# Patient Record
Sex: FEMALE | Race: WHITE | NOT HISPANIC OR LATINO | Employment: FULL TIME | URBAN - METROPOLITAN AREA
[De-identification: names, ages, dates, MRNs, and addresses within clinical notes are randomized per-mention and may not be internally consistent; named-entity substitution may affect disease eponyms.]

---

## 2021-01-04 ENCOUNTER — TRANSCRIBE ORDERS (OUTPATIENT)
Dept: URGENT CARE | Facility: CLINIC | Age: 41
End: 2021-01-04

## 2021-01-04 ENCOUNTER — APPOINTMENT (OUTPATIENT)
Dept: URGENT CARE | Facility: CLINIC | Age: 41
End: 2021-01-04

## 2021-01-04 DIAGNOSIS — Z02.1 PHYSICAL EXAM, PRE-EMPLOYMENT: ICD-10-CM

## 2021-01-04 DIAGNOSIS — Z02.1 PHYSICAL EXAM, PRE-EMPLOYMENT: Primary | ICD-10-CM

## 2021-01-04 PROCEDURE — 86765 RUBEOLA ANTIBODY: CPT | Performed by: NURSE PRACTITIONER

## 2021-01-04 PROCEDURE — 86480 TB TEST CELL IMMUN MEASURE: CPT | Performed by: NURSE PRACTITIONER

## 2021-01-05 LAB
GAMMA INTERFERON BACKGROUND BLD IA-ACNC: 0.03 IU/ML
M TB IFN-G BLD-IMP: NEGATIVE
M TB IFN-G CD4+ BCKGRND COR BLD-ACNC: 0 IU/ML
M TB IFN-G CD4+ BCKGRND COR BLD-ACNC: 0.01 IU/ML
MEV IGG SER QL: NORMAL
MITOGEN IGNF BCKGRD COR BLD-ACNC: >10 IU/ML

## 2021-05-06 ENCOUNTER — OFFICE VISIT (OUTPATIENT)
Dept: PODIATRY | Facility: CLINIC | Age: 41
End: 2021-05-06
Payer: COMMERCIAL

## 2021-05-06 VITALS
SYSTOLIC BLOOD PRESSURE: 103 MMHG | HEIGHT: 64 IN | HEART RATE: 73 BPM | DIASTOLIC BLOOD PRESSURE: 71 MMHG | WEIGHT: 177.2 LBS | BODY MASS INDEX: 30.25 KG/M2

## 2021-05-06 DIAGNOSIS — M79.674 PAIN IN TOE OF RIGHT FOOT: ICD-10-CM

## 2021-05-06 DIAGNOSIS — L60.0 INGROWN TOENAIL: Primary | ICD-10-CM

## 2021-05-06 DIAGNOSIS — M79.675 PAIN IN TOE OF LEFT FOOT: ICD-10-CM

## 2021-05-06 PROCEDURE — 11750 EXCISION NAIL&NAIL MATRIX: CPT | Performed by: PODIATRIST

## 2021-05-06 PROCEDURE — 99202 OFFICE O/P NEW SF 15 MIN: CPT | Performed by: PODIATRIST

## 2021-05-06 RX ORDER — LIDOCAINE HYDROCHLORIDE AND EPINEPHRINE 10; 10 MG/ML; UG/ML
1 INJECTION, SOLUTION INFILTRATION; PERINEURAL ONCE
Status: COMPLETED | OUTPATIENT
Start: 2021-05-06 | End: 2021-05-06

## 2021-05-06 RX ORDER — PROPRANOLOL HYDROCHLORIDE 40 MG/1
TABLET ORAL 2 TIMES DAILY
COMMUNITY
Start: 2021-04-28

## 2021-05-06 RX ORDER — ZOLPIDEM TARTRATE 5 MG/1
TABLET ORAL DAILY PRN
COMMUNITY
Start: 2021-04-28 | End: 2021-12-28

## 2021-05-06 RX ORDER — SUMATRIPTAN 100 MG/1
TABLET, FILM COATED ORAL DAILY PRN
COMMUNITY
Start: 2021-04-28 | End: 2022-03-28

## 2021-05-06 RX ORDER — LORATADINE 10 MG/1
10 TABLET ORAL DAILY
COMMUNITY

## 2021-05-06 RX ORDER — MELOXICAM 15 MG/1
TABLET ORAL
COMMUNITY
Start: 2021-04-05 | End: 2021-10-28 | Stop reason: CLARIF

## 2021-05-06 RX ORDER — LIDOCAINE HYDROCHLORIDE 10 MG/ML
1 INJECTION, SOLUTION EPIDURAL; INFILTRATION; INTRACAUDAL; PERINEURAL ONCE
Status: COMPLETED | OUTPATIENT
Start: 2021-05-06 | End: 2021-05-06

## 2021-05-06 RX ADMIN — LIDOCAINE HYDROCHLORIDE AND EPINEPHRINE 1 ML: 10; 10 INJECTION, SOLUTION INFILTRATION; PERINEURAL at 08:24

## 2021-05-06 RX ADMIN — LIDOCAINE HYDROCHLORIDE 1 ML: 10 INJECTION, SOLUTION EPIDURAL; INFILTRATION; INTRACAUDAL; PERINEURAL at 08:24

## 2021-05-06 NOTE — PROGRESS NOTES
Assessment/Plan:      Explained the patient that she is dealing with chronic ingrown toenails affecting the medial nail border of each great toe  Patient desires both toes addressed today  Discussed treatment options recommending partial matrixectomy  The goal of this procedure is permanent  Elimination of the offending nail border  Procedure performed as follows: Anesthesia via 6 cc of a 1:1 mixture of 1 percent xylocaine with epinephrine and 1percent xylocaine plain  Betadine prep was performed  The medial nail border of the right great toe was avulsed to the eponychium  A partial matrixectomy was performed utilizing phenol in a standard manner  A bacitracin dressing was applied  Patient is to soak in warm water twice a day tomorrow followed by a Neosporin dressing  A similar procedure was then performed along the medial nail border of the left great toe  No problem-specific Assessment & Plan notes found for this encounter  Diagnoses and all orders for this visit:    Ingrown toenail  -     lidocaine (PF) (XYLOCAINE-MPF) 1 % injection 1 mL  -     lidocaine-epinephrine (XYLOCAINE/EPINEPHRINE) 1 %-1:100,000 injection 1 mL    Pain in toe of right foot    Pain in toe of left foot    Other orders  -     loratadine (CLARITIN) 10 mg tablet; Take 10 mg by mouth  -     meloxicam (MOBIC) 15 mg tablet; TAKE 1 TABLET BY MOUTH DAILY AS NEEDED FOR JOINT OR MUSCLE PAIN  -     propranolol (INDERAL) 40 mg tablet  -     SUMAtriptan (IMITREX) 100 mg tablet  -     zolpidem (AMBIEN) 5 mg tablet          Subjective:      Patient ID: Norma Sherman is a 47 y o  female  HPI       Patient, a 81-year-old female presents with painful ingrown toenails affecting the medial nail border of each great toe  The left great toe is recently infected but the infection seems to have resolved  There is granulation tissue along the medial border however      The following portions of the patient's history were reviewed and updated as appropriate: allergies, current medications, past family history, past medical history, past social history, past surgical history and problem list     Review of Systems   Gastrointestinal: Negative  Musculoskeletal: Negative  Neurological: Negative  Objective:      /71   Pulse 73   Ht 5' 4" (1 626 m) Comment: verbal  Wt 80 4 kg (177 lb 3 2 oz)   BMI 30 42 kg/m²          Physical Exam  Constitutional:       Appearance: Normal appearance  Cardiovascular:      Pulses: Normal pulses  Musculoskeletal: Normal range of motion  Skin:     Comments: Pain with palpation medial nail border great toe bilateral   Dried granulation tissue noted along the medial nail border of the left great toe  Neurological:      General: No focal deficit present  Mental Status: She is oriented to person, place, and time  Nail removal    Date/Time: 5/6/2021 8:31 AM  Performed by: Angelic Cordova DPM  Authorized by: Angelic Cordova DPM     Patient location:  [facility] Protocol:  Consent: Verbal consent obtained  Risks and benefits: risks, benefits and alternatives were discussed  Consent given by: patient  Patient understanding: patient states understanding of the procedure being performed  Patient identity confirmed: verbally with patient      Location:     Foot:  R big toe  Pre-procedure details:     Skin preparation:  Betadine    Preparation: Patient was prepped and draped in the usual sterile fashion    Anesthesia (see MAR for exact dosages):      Anesthesia method:  Nerve block    Block needle gauge:  25 G    Block anesthetic:  Lidocaine 1% WITH epi and lidocaine 1% w/o epi    Block injection procedure:  Anatomic landmarks identified    Block outcome:  Anesthesia achieved  Nail Removal:     Nail removed:  Partial    Nail side:  Medial    Nail bed sutured: no    Ingrown nail:     Wedge excision of skin: no      Nail matrix removed or ablated:  Partial  Post-procedure details: Dressing:  4x4 sterile gauze, antibiotic ointment and gauze roll    Patient tolerance of procedure: Tolerated well, no immediate complications  Nail removal    Date/Time: 5/6/2021 8:32 AM  Performed by: Dmitri Persaud DPM  Authorized by: Dmitri Persaud DPM     Patient location:  Clinic[facility] Protocol:  Consent: Verbal consent obtained  Risks and benefits: risks, benefits and alternatives were discussed  Consent given by: patient  Patient understanding: patient states understanding of the procedure being performed  Patient identity confirmed: verbally with patient      Location:     Foot:  L big toe  Pre-procedure details:     Skin preparation:  Betadine    Preparation: Patient was prepped and draped in the usual sterile fashion    Anesthesia (see MAR for exact dosages): Anesthesia method:  Nerve block    Block needle gauge:  25 G    Block anesthetic:  Lidocaine 1% WITH epi and lidocaine 1% w/o epi    Block injection procedure:  Anatomic landmarks identified    Block outcome:  Anesthesia achieved  Nail Removal:     Nail removed:  Partial    Nail side:  Medial    Nail bed sutured: no    Ingrown nail:     Wedge excision of skin: no      Nail matrix removed or ablated:  Partial  Post-procedure details:     Dressing:  4x4 sterile gauze, antibiotic ointment and gauze roll    Patient tolerance of procedure:   Tolerated well, no immediate complications

## 2021-05-13 ENCOUNTER — OFFICE VISIT (OUTPATIENT)
Dept: PODIATRY | Facility: CLINIC | Age: 41
End: 2021-05-13

## 2021-05-13 VITALS
HEIGHT: 64 IN | WEIGHT: 177 LBS | SYSTOLIC BLOOD PRESSURE: 109 MMHG | DIASTOLIC BLOOD PRESSURE: 76 MMHG | HEART RATE: 71 BPM | BODY MASS INDEX: 30.22 KG/M2

## 2021-05-13 DIAGNOSIS — L60.0 INGROWN TOENAIL: Primary | ICD-10-CM

## 2021-05-13 PROCEDURE — 99024 POSTOP FOLLOW-UP VISIT: CPT | Performed by: PODIATRIST

## 2021-05-13 NOTE — PROGRESS NOTES
Patient presents for assessment of partial matrixectomy which was performed last week   Along the medial nail border of each great toe  Surgical sites healing uneventfully with minimal discomfort related  Drainage is present within normal limits for procedure performed  There is no evidence of infection  Patient to contact me should problems arise

## 2021-07-26 ENCOUNTER — OFFICE VISIT (OUTPATIENT)
Dept: GASTROENTEROLOGY | Facility: CLINIC | Age: 41
End: 2021-07-26
Payer: COMMERCIAL

## 2021-07-26 VITALS
TEMPERATURE: 98.9 F | SYSTOLIC BLOOD PRESSURE: 112 MMHG | WEIGHT: 172 LBS | HEIGHT: 64 IN | BODY MASS INDEX: 29.37 KG/M2 | HEART RATE: 80 BPM | DIASTOLIC BLOOD PRESSURE: 60 MMHG

## 2021-07-26 DIAGNOSIS — K59.00 CONSTIPATION, UNSPECIFIED CONSTIPATION TYPE: Primary | ICD-10-CM

## 2021-07-26 DIAGNOSIS — R10.9 ABDOMINAL PAIN, UNSPECIFIED ABDOMINAL LOCATION: ICD-10-CM

## 2021-07-26 DIAGNOSIS — R12 HEARTBURN: ICD-10-CM

## 2021-07-26 PROCEDURE — 99244 OFF/OP CNSLTJ NEW/EST MOD 40: CPT | Performed by: PHYSICIAN ASSISTANT

## 2021-07-26 RX ORDER — LINACLOTIDE 72 UG/1
1 CAPSULE, GELATIN COATED ORAL
Qty: 30 CAPSULE | Refills: 5 | Status: SHIPPED | OUTPATIENT
Start: 2021-07-26 | End: 2021-10-28 | Stop reason: CLARIF

## 2021-07-26 RX ORDER — OMEPRAZOLE 20 MG/1
20 CAPSULE, DELAYED RELEASE ORAL DAILY
Qty: 30 CAPSULE | Refills: 3 | Status: SHIPPED | OUTPATIENT
Start: 2021-07-26 | End: 2021-10-20

## 2021-07-26 RX ORDER — DICYCLOMINE HYDROCHLORIDE 10 MG/1
10 CAPSULE ORAL
Qty: 120 CAPSULE | Refills: 2 | Status: SHIPPED | OUTPATIENT
Start: 2021-07-26 | End: 2021-12-28

## 2021-07-26 NOTE — PROGRESS NOTES
[facility] - Outpatient Consultation  Kala Blackwood 47 y o  female MRN: [id number]  Encounter: [id number]          ASSESSMENT AND PLAN:      Marcin Nielson is a 48 y/o female here for consultation of alternating bowel movements  1  Change in bowel habits  2  Abdominal pain, generalized   3  Rectal Bleeding  Pt had chronic diarrhea since she was young but this changed to constipation about 1-2 years ago with loose BMs as a result and associated abdominal pain that is alleviated after moving her bowels  She has BRB [place] with wiping when straining  She has tried and failed: fiber, miralax, OTC stool softeners  Pt says her maternal grandfather and uncle have colon cancer but no immediate family  No prior colon  -etiology likely mixed with functional cause and overflow constipation   -TSH, Celiac, cmp ordered  -cbc to monitor blood count  -continue daily fiber supplement   -increase daily water to at least 64 ounces/day  -increase daily activity  -bentyl prn pain   -as she has failed miralax and stool softeners, she should start linzess 72 mcg daily but I educated that she should buy OTC mag citrate and clean herself out prior to starting it  -educated pt that loose stools for about 1 week after starting linzess is normal but call office if this continues  -call office in 2 weeks regardless to let us know outcome  -colonoscopy ordered due to change in bowel habits; instructions given; risks and benefits reviewed     4  Heartburn   Pt complains of heartburn without specific food triggers about 2-3 times/week  She is not on antireflux therapy  No prior EGD  Denies NSAID and tobacco use    -anti-gerd diet and lifestyle management explained   -egd with biopsies ordered to rule out h pylori, ulcer disease  ______________________________________________________________________    HPI:  Marcin Nielson is a 48 y/o female here for consultation of alternating bowel movements   Pt says she has had chronic diarrhea since she was young but about 1-2 years ago, this turned to constipation in that she goes about 1-3 weeks without moving her bowels  Then, she says that when she finally moves her bowels, she has loose BMs  She says that when she gets the loose stools, she gets associated abdominal pain prior and that the BMs alleviate the pain  She also says that when she attempts to move her bowels when she is constipated, she has BRB [place] with wiping about once or twice every few weeks  Patient says she has tried daily fiber supplement, miralax, stool softeners, and laxatives OTC (unknown name) that have not helped  She says that her grandfather and uncle form her mother's side have colon cancer but denies any immediate family members  She has never had prior colonoscopy and she denies: unintentional weight loss, fevers, chills, night sweats, hematochezia, melena  Pt also denies any recent change in her diet or new or OTC meds that she started prior to this change in bowel habits  She does admit to heartburn about 2-3 times a week without identifiable triggers  REVIEW OF SYSTEMS:    CONSTITUTIONAL: Denies any fever, chills, rigors, and weight loss  HEENT: No earache or tinnitus  Denies hearing loss or visual disturbances  CARDIOVASCULAR: No chest pain or palpitations  RESPIRATORY: Denies any cough, hemoptysis, shortness of breath or dyspnea on exertion  GASTROINTESTINAL: As noted in the History of Present Illness  GENITOURINARY: No problems with urination  Denies any hematuria or dysuria  NEUROLOGIC: No dizziness or vertigo, denies headaches  MUSCULOSKELETAL: Denies any muscle or joint pain  SKIN: Denies skin rashes or itching  ENDOCRINE: Denies excessive thirst  Denies intolerance to heat or cold  PSYCHOSOCIAL: Denies depression or anxiety  Denies any recent memory loss         Historical Information   Past Medical History:   Diagnosis Date    Migraines     Chronic per pt     Past Surgical History:   Procedure Laterality Date    NASAL SEPTUM SURGERY      TONSILLECTOMY       Social History   Social History     Substance and Sexual Activity   Alcohol Use None     Social History     Substance and Sexual Activity   Drug Use Not on file     Social History     Tobacco Use   Smoking Status Never Smoker   Smokeless Tobacco Never Used     No family history on file  Meds/Allergies       Current Outpatient Medications:     loratadine (CLARITIN) 10 mg tablet    meloxicam (MOBIC) 15 mg tablet    propranolol (INDERAL) 40 mg tablet    SUMAtriptan (IMITREX) 100 mg tablet    zolpidem (AMBIEN) 5 mg tablet    No Known Allergies        Objective     There were no vitals taken for this visit  There is no height or weight on file to calculate BMI  PHYSICAL EXAM:      General Appearance:   Alert, cooperative, no distress   HEENT:   Normocephalic, atraumatic, anicteric      Neck:  Supple, symmetrical, trachea midline   Lungs:   Clear to auscultation bilaterally; no rales, rhonchi or wheezing; respirations unlabored    Heart[de-identified]   Regular rate and rhythm; no murmur, rub, or gallop  Abdomen:   Soft, non-tender, non-distended; normal bowel sounds; no masses, no organomegaly    Genitalia:   Deferred    Rectal:   Deferred    Extremities:  No cyanosis, clubbing or edema    Pulses:  2+ and symmetric    Skin:  No jaundice, rashes, or lesions    Lymph nodes:  No palpable cervical lymphadenopathy        Lab Results:   No visits with results within 1 Day(s) from this visit  Latest known visit with results is:   Appointment on 01/04/2021   Component Date Value    QFT Nil 01/04/2021 0 03     QFT TB1-NIL 01/04/2021 0 01     QFT TB2-NIL 01/04/2021 0 00     QFT Mitogen-NIL 01/04/2021 >10 00     QFT Final Interpretation 01/04/2021 Negative     Rubeola IgG 01/04/2021 IMMUNE          Radiology Results:   No results found

## 2021-07-26 NOTE — PATIENT INSTRUCTIONS
1  Increase daily water to at least 64 ounces/day  2  Continue daily fiber  3  Take magnesium citrate whenever you have off this week; after you're cleared out, start linzess the next day   4   Take bentyl as needed for the pain       EGD/COLON scheduled on 9/8 at [facility] with Paolo Linda gave pt verbal instructions/paper work given  Prep-Miralax/Dulcolax

## 2021-08-19 ENCOUNTER — APPOINTMENT (OUTPATIENT)
Dept: LAB | Age: 41
End: 2021-08-19
Payer: COMMERCIAL

## 2021-08-19 DIAGNOSIS — Z00.8 HEALTH EXAMINATION IN POPULATION SURVEY: ICD-10-CM

## 2021-08-19 DIAGNOSIS — K59.00 CONSTIPATION, UNSPECIFIED CONSTIPATION TYPE: ICD-10-CM

## 2021-08-19 LAB
ALBUMIN SERPL BCP-MCNC: 3.6 G/DL (ref 3.5–5)
ALP SERPL-CCNC: 47 U/L (ref 46–116)
ALT SERPL W P-5'-P-CCNC: 29 U/L (ref 12–78)
ANION GAP SERPL CALCULATED.3IONS-SCNC: 8 MMOL/L (ref 4–13)
AST SERPL W P-5'-P-CCNC: 18 U/L (ref 5–45)
BILIRUB SERPL-MCNC: 0.31 MG/DL (ref 0.2–1)
BUN SERPL-MCNC: 13 MG/DL (ref 5–25)
CALCIUM SERPL-MCNC: 8.8 MG/DL (ref 8.3–10.1)
CHLORIDE SERPL-SCNC: 107 MMOL/L (ref 100–108)
CHOLEST SERPL-MCNC: 190 MG/DL (ref 50–200)
CO2 SERPL-SCNC: 23 MMOL/L (ref 21–32)
CREAT SERPL-MCNC: 0.67 MG/DL (ref 0.6–1.3)
ERYTHROCYTE [DISTWIDTH] IN BLOOD BY AUTOMATED COUNT: 12.2 % (ref 11.6–15.1)
EST. AVERAGE GLUCOSE BLD GHB EST-MCNC: 108 MG/DL
GFR SERPL CREATININE-BSD FRML MDRD: 112 ML/MIN/1.73SQ M
GLUCOSE P FAST SERPL-MCNC: 86 MG/DL (ref 65–99)
HBA1C MFR BLD: 5.4 %
HCT VFR BLD AUTO: 39.1 % (ref 34.8–46.1)
HDLC SERPL-MCNC: 47 MG/DL
HGB BLD-MCNC: 13 G/DL (ref 11.5–15.4)
LDLC SERPL CALC-MCNC: 129 MG/DL (ref 0–100)
MCH RBC QN AUTO: 31.9 PG (ref 26.8–34.3)
MCHC RBC AUTO-ENTMCNC: 33.2 G/DL (ref 31.4–37.4)
MCV RBC AUTO: 96 FL (ref 82–98)
NONHDLC SERPL-MCNC: 143 MG/DL
PLATELET # BLD AUTO: 306 THOUSANDS/UL (ref 149–390)
PMV BLD AUTO: 10.9 FL (ref 8.9–12.7)
POTASSIUM SERPL-SCNC: 3.8 MMOL/L (ref 3.5–5.3)
PROT SERPL-MCNC: 7 G/DL (ref 6.4–8.2)
RBC # BLD AUTO: 4.08 MILLION/UL (ref 3.81–5.12)
SODIUM SERPL-SCNC: 138 MMOL/L (ref 136–145)
T4 FREE SERPL-MCNC: 0.99 NG/DL (ref 0.76–1.46)
TRIGL SERPL-MCNC: 72 MG/DL
TSH SERPL DL<=0.05 MIU/L-ACNC: 4.09 UIU/ML (ref 0.36–3.74)
WBC # BLD AUTO: 6.81 THOUSAND/UL (ref 4.31–10.16)

## 2021-08-19 PROCEDURE — 84439 ASSAY OF FREE THYROXINE: CPT

## 2021-08-19 PROCEDURE — 36415 COLL VENOUS BLD VENIPUNCTURE: CPT

## 2021-08-19 PROCEDURE — 83516 IMMUNOASSAY NONANTIBODY: CPT

## 2021-08-19 PROCEDURE — 82784 ASSAY IGA/IGD/IGG/IGM EACH: CPT

## 2021-08-19 PROCEDURE — 80061 LIPID PANEL: CPT

## 2021-08-19 PROCEDURE — 80053 COMPREHEN METABOLIC PANEL: CPT

## 2021-08-19 PROCEDURE — 86255 FLUORESCENT ANTIBODY SCREEN: CPT

## 2021-08-19 PROCEDURE — 83036 HEMOGLOBIN GLYCOSYLATED A1C: CPT

## 2021-08-19 PROCEDURE — 84443 ASSAY THYROID STIM HORMONE: CPT

## 2021-08-19 PROCEDURE — 85027 COMPLETE CBC AUTOMATED: CPT

## 2021-08-20 LAB
ENDOMYSIUM IGA SER QL: NEGATIVE
GLIADIN PEPTIDE IGA SER-ACNC: 3 UNITS (ref 0–19)
GLIADIN PEPTIDE IGG SER-ACNC: 1 UNITS (ref 0–19)
IGA SERPL-MCNC: 108 MG/DL (ref 87–352)
TTG IGA SER-ACNC: <2 U/ML (ref 0–3)
TTG IGG SER-ACNC: <2 U/ML (ref 0–5)

## 2021-08-30 ENCOUNTER — TELEPHONE (OUTPATIENT)
Dept: GASTROENTEROLOGY | Facility: CLINIC | Age: 41
End: 2021-08-30

## 2021-09-06 ENCOUNTER — NURSE TRIAGE (OUTPATIENT)
Dept: OTHER | Facility: OTHER | Age: 41
End: 2021-09-06

## 2021-09-06 DIAGNOSIS — Z20.828 SARS-ASSOCIATED CORONAVIRUS EXPOSURE: Primary | ICD-10-CM

## 2021-09-06 NOTE — TELEPHONE ENCOUNTER
Regarding: COVID - Symptomatic - Headache / Diarrhea  ----- Message from Wannetta Jal sent at 9/6/2021  5:55 PM EDT -----  "I have a headache & diarrhea for the last two days, I was exposed to someone that tested positive about 6 days ago, I would like to be tested for COVID "

## 2021-09-06 NOTE — TELEPHONE ENCOUNTER
Reason for Disposition   [1] COVID-19 infection suspected by caller or triager AND [2] mild symptoms (cough, fever, or others) AND [2] no complications or SOB    Answer Assessment - Initial Assessment Questions  1  COVID-19 DIAGNOSIS: "Who made your Coronavirus (COVID-19) diagnosis?" "Was it confirmed by a positive lab test?" If not diagnosed by a HCP, ask "Are there lots of cases (community spread) where you live?" (See public health department website, if unsure)      Community spread  2  COVID-19 EXPOSURE: "Was there any known exposure to COVID before the symptoms began?" [facility] Definition of close contact: within 6 feet (2 meters) for a total of 15 minutes or more over a 24-hour period  Secondary contact  3  ONSET: "When did the COVID-19 symptoms start?"       2 days ago  4  WORST SYMPTOM: "What is your worst symptom?" (e g , cough, fever, shortness of breath, muscle aches)      Headache  5  COUGH: "Do you have a cough?" If Yes, ask: "How bad is the cough?"        Denies  6  FEVER: "Do you have a fever?" If Yes, ask: "What is your temperature, how was it measured, and when did it start?"      Denies  7  RESPIRATORY STATUS: "Describe your breathing?" (e g , shortness of breath, wheezing, unable to speak)       Unremarkable - able to converse with ease  8  BETTER-SAME-WORSE: "Are you getting better, staying the same or getting worse compared to yesterday?"  If getting worse, ask, "In what way?"      Worse  9  HIGH RISK DISEASE: "Do you have any chronic medical problems?" (e g , asthma, heart or lung disease, weak immune system, obesity, etc )      Denies  10  PREGNANCY: "Is there any chance you are pregnant?" "When was your last menstrual period?"        8/28/21  11   OTHER SYMPTOMS: "Do you have any other symptoms?"  (e g , chills, fatigue, headache, loss of smell or taste, muscle pain, sore throat; new loss of smell or taste especially support the diagnosis of COVID-19)        Diarrhea - currently taking prep for colonoscopy on Wed 9/8/21    Protocols used: CORONAVIRUS (COVID-19) DIAGNOSED OR SUSPECTED-ADULT-AH

## 2021-09-07 ENCOUNTER — TELEPHONE (OUTPATIENT)
Dept: GASTROENTEROLOGY | Facility: HOSPITAL | Age: 41
End: 2021-09-07

## 2021-09-07 PROCEDURE — U0005 INFEC AGEN DETEC AMPLI PROBE: HCPCS | Performed by: FAMILY MEDICINE

## 2021-09-07 PROCEDURE — U0003 INFECTIOUS AGENT DETECTION BY NUCLEIC ACID (DNA OR RNA); SEVERE ACUTE RESPIRATORY SYNDROME CORONAVIRUS 2 (SARS-COV-2) (CORONAVIRUS DISEASE [COVID-19]), AMPLIFIED PROBE TECHNIQUE, MAKING USE OF HIGH THROUGHPUT TECHNOLOGIES AS DESCRIBED BY CMS-2020-01-R: HCPCS | Performed by: FAMILY MEDICINE

## 2021-09-08 ENCOUNTER — HOSPITAL ENCOUNTER (OUTPATIENT)
Dept: GASTROENTEROLOGY | Facility: HOSPITAL | Age: 41
Setting detail: OUTPATIENT SURGERY
Discharge: HOME | End: 2021-09-08
Attending: INTERNAL MEDICINE | Admitting: INTERNAL MEDICINE
Payer: COMMERCIAL

## 2021-09-08 ENCOUNTER — ANESTHESIA EVENT (OUTPATIENT)
Dept: GASTROENTEROLOGY | Facility: HOSPITAL | Age: 41
End: 2021-09-08

## 2021-09-08 ENCOUNTER — ANESTHESIA (OUTPATIENT)
Dept: GASTROENTEROLOGY | Facility: HOSPITAL | Age: 41
End: 2021-09-08

## 2021-09-08 VITALS
DIASTOLIC BLOOD PRESSURE: 53 MMHG | SYSTOLIC BLOOD PRESSURE: 98 MMHG | OXYGEN SATURATION: 99 % | RESPIRATION RATE: 16 BRPM | HEART RATE: 79 BPM | TEMPERATURE: 96.4 F

## 2021-09-08 DIAGNOSIS — R12 HEARTBURN: ICD-10-CM

## 2021-09-08 DIAGNOSIS — K59.00 CONSTIPATION, UNSPECIFIED CONSTIPATION TYPE: ICD-10-CM

## 2021-09-08 LAB
EXT PREGNANCY TEST URINE: NEGATIVE
EXT. CONTROL: NORMAL

## 2021-09-08 PROCEDURE — 43239 EGD BIOPSY SINGLE/MULTIPLE: CPT | Performed by: INTERNAL MEDICINE

## 2021-09-08 PROCEDURE — 88305 TISSUE EXAM BY PATHOLOGIST: CPT | Performed by: PATHOLOGY

## 2021-09-08 PROCEDURE — 81025 URINE PREGNANCY TEST: CPT | Performed by: ANESTHESIOLOGY

## 2021-09-08 PROCEDURE — 45378 DIAGNOSTIC COLONOSCOPY: CPT | Performed by: INTERNAL MEDICINE

## 2021-09-08 RX ORDER — SODIUM CHLORIDE 9 MG/ML
INJECTION, SOLUTION INTRAVENOUS CONTINUOUS PRN
Status: DISCONTINUED | OUTPATIENT
Start: 2021-09-08 | End: 2021-09-08

## 2021-09-08 RX ORDER — PROPOFOL 10 MG/ML
INJECTION, EMULSION INTRAVENOUS CONTINUOUS PRN
Status: DISCONTINUED | OUTPATIENT
Start: 2021-09-08 | End: 2021-09-08

## 2021-09-08 RX ORDER — PROPOFOL 10 MG/ML
INJECTION, EMULSION INTRAVENOUS AS NEEDED
Status: DISCONTINUED | OUTPATIENT
Start: 2021-09-08 | End: 2021-09-08

## 2021-09-08 RX ORDER — ONDANSETRON 2 MG/ML
INJECTION INTRAMUSCULAR; INTRAVENOUS AS NEEDED
Status: DISCONTINUED | OUTPATIENT
Start: 2021-09-08 | End: 2021-09-08

## 2021-09-08 RX ORDER — LIDOCAINE HYDROCHLORIDE 10 MG/ML
INJECTION, SOLUTION EPIDURAL; INFILTRATION; INTRACAUDAL; PERINEURAL AS NEEDED
Status: DISCONTINUED | OUTPATIENT
Start: 2021-09-08 | End: 2021-09-08

## 2021-09-08 RX ADMIN — ONDANSETRON 4 MG: 2 INJECTION INTRAMUSCULAR; INTRAVENOUS at 10:30

## 2021-09-08 RX ADMIN — PROPOFOL 120 MG: 10 INJECTION, EMULSION INTRAVENOUS at 10:09

## 2021-09-08 RX ADMIN — SODIUM CHLORIDE: 9 INJECTION, SOLUTION INTRAVENOUS at 10:04

## 2021-09-08 RX ADMIN — LIDOCAINE HYDROCHLORIDE 50 MG: 10 INJECTION, SOLUTION EPIDURAL; INFILTRATION; INTRACAUDAL; PERINEURAL at 10:09

## 2021-09-08 RX ADMIN — PROPOFOL 150 MCG/KG/MIN: 10 INJECTION, EMULSION INTRAVENOUS at 10:09

## 2021-09-08 NOTE — ANESTHESIA PREPROCEDURE EVALUATION
Procedure:  COLONOSCOPY  EGD    Relevant Problems   ANESTHESIA   (-) History of anesthesia complications      CARDIO   (+) Migraines        Physical Exam    Airway    Mallampati score: II  TM Distance: >3 FB  Neck ROM: full     Dental   No notable dental hx     Cardiovascular      Pulmonary      Other Findings        Anesthesia Plan  ASA Score- 1     Anesthesia Type- IV sedation with anesthesia with ASA Monitors  Additional Monitors:   Airway Plan:     Comment: I discussed the risks and benefits of IV sedation anesthesia including the possibility of the need to convert to general anesthesia and the potential risk of awareness  Plan Factors-Exercise tolerance (METS): >4 METS  Exercise comment: Able to climb two flights of stairs without cardiopulmonary limitation  Chart reviewed  Existing labs reviewed  Patient summary reviewed  Patient is not a current smoker  Patient did not smoke on day of surgery  Induction- intravenous  Postoperative Plan-     Informed Consent- Anesthetic plan and risks discussed with patient

## 2021-09-08 NOTE — H&P
History and Physical -  [facility]  Dory Macias 47 y o  female MRN: [id number]                  HPI: Dory Macias is a 47y o  year old female who presents for EGD/colonoscopy for abdominal pain, diarrhea and rectal bleeding  REVIEW OF SYSTEMS: Per the HPI, and otherwise unremarkable  Historical Information   Past Medical History:   Diagnosis Date    Migraines     Chronic per pt     Past Surgical History:   Procedure Laterality Date    NASAL SEPTUM SURGERY      TONSILLECTOMY       Social History   Social History     Substance and Sexual Activity   Alcohol Use Yes    Comment: socially      Social History     Substance and Sexual Activity   Drug Use Never     Social History     Tobacco Use   Smoking Status Never Smoker   Smokeless Tobacco Never Used     Family History   Problem Relation Age of Onset    Colon cancer Maternal Grandfather     Colon cancer Cousin        Meds/Allergies       Current Outpatient Medications:     dicyclomine (BENTYL) 10 mg capsule    linaCLOtide (Linzess) 72 MCG CAPS    loratadine (CLARITIN) 10 mg tablet    meloxicam (MOBIC) 15 mg tablet    omeprazole (PriLOSEC) 20 mg delayed release capsule    propranolol (INDERAL) 40 mg tablet    SUMAtriptan (IMITREX) 100 mg tablet    zolpidem (AMBIEN) 5 mg tablet    No Known Allergies    Objective     There were no vitals taken for this visit  PHYSICAL EXAM    Gen: NAD  Head: NCAT  CV: RRR  CHEST: Clear  ABD: soft, NT/ND  EXT: no edema      ASSESSMENT/PLAN:  Dory Macias is a 47y o  year old female who presents for EGD/colonoscopy for abdominal pain, diarrhea and rectal bleeding  The patient is stable and optimized for the procedure, we reviewed risk and benefits  Risk include but not limited to infection, bleeding, perforation and missing a lesion

## 2021-10-20 DIAGNOSIS — R12 HEARTBURN: ICD-10-CM

## 2021-10-20 RX ORDER — OMEPRAZOLE 20 MG/1
CAPSULE, DELAYED RELEASE ORAL
Qty: 90 CAPSULE | Refills: 0 | Status: SHIPPED | OUTPATIENT
Start: 2021-10-20 | End: 2022-01-20

## 2021-10-28 ENCOUNTER — OFFICE VISIT (OUTPATIENT)
Dept: GASTROENTEROLOGY | Facility: CLINIC | Age: 41
End: 2021-10-28
Payer: COMMERCIAL

## 2021-10-28 VITALS
WEIGHT: 170.2 LBS | HEIGHT: 64 IN | HEART RATE: 57 BPM | DIASTOLIC BLOOD PRESSURE: 60 MMHG | BODY MASS INDEX: 29.06 KG/M2 | SYSTOLIC BLOOD PRESSURE: 100 MMHG | TEMPERATURE: 98.2 F

## 2021-10-28 DIAGNOSIS — K58.2 IRRITABLE BOWEL SYNDROME WITH BOTH CONSTIPATION AND DIARRHEA: ICD-10-CM

## 2021-10-28 DIAGNOSIS — K29.30 CHRONIC SUPERFICIAL GASTRITIS WITHOUT BLEEDING: ICD-10-CM

## 2021-10-28 DIAGNOSIS — K21.9 GASTROESOPHAGEAL REFLUX DISEASE WITHOUT ESOPHAGITIS: Primary | ICD-10-CM

## 2021-10-28 DIAGNOSIS — K44.9 HIATAL HERNIA: ICD-10-CM

## 2021-10-28 DIAGNOSIS — K59.00 CONSTIPATION, UNSPECIFIED CONSTIPATION TYPE: ICD-10-CM

## 2021-10-28 PROCEDURE — 99214 OFFICE O/P EST MOD 30 MIN: CPT | Performed by: INTERNAL MEDICINE

## 2021-10-28 RX ORDER — LINACLOTIDE 290 UG/1
290 CAPSULE, GELATIN COATED ORAL DAILY
Qty: 90 CAPSULE | Refills: 3 | Status: SHIPPED | OUTPATIENT
Start: 2021-10-28

## 2021-10-28 RX ORDER — DOCUSATE SODIUM 100 MG/1
100 CAPSULE, LIQUID FILLED ORAL 2 TIMES DAILY
Qty: 180 CAPSULE | Refills: 3 | Status: SHIPPED | OUTPATIENT
Start: 2021-10-28

## 2021-11-04 ENCOUNTER — ANNUAL EXAM (OUTPATIENT)
Dept: OBGYN CLINIC | Facility: CLINIC | Age: 41
End: 2021-11-04
Payer: COMMERCIAL

## 2021-11-04 VITALS
HEIGHT: 64 IN | SYSTOLIC BLOOD PRESSURE: 116 MMHG | DIASTOLIC BLOOD PRESSURE: 68 MMHG | WEIGHT: 166.6 LBS | BODY MASS INDEX: 28.44 KG/M2

## 2021-11-04 DIAGNOSIS — Z01.419 ENCOUNTER FOR ANNUAL ROUTINE GYNECOLOGICAL EXAMINATION: Primary | ICD-10-CM

## 2021-11-04 DIAGNOSIS — Z11.51 SCREENING FOR HUMAN PAPILLOMAVIRUS (HPV): ICD-10-CM

## 2021-11-04 DIAGNOSIS — Z12.31 ENCOUNTER FOR SCREENING MAMMOGRAM FOR MALIGNANT NEOPLASM OF BREAST: ICD-10-CM

## 2021-11-04 DIAGNOSIS — R39.89 BLADDER PAIN: ICD-10-CM

## 2021-11-04 DIAGNOSIS — G43.709 CHRONIC MIGRAINE W/O AURA W/O STATUS MIGRAINOSUS, NOT INTRACTABLE: ICD-10-CM

## 2021-11-04 PROBLEM — K58.1 IRRITABLE BOWEL SYNDROME WITH CONSTIPATION: Status: ACTIVE | Noted: 2021-11-04

## 2021-11-04 PROBLEM — N87.1 CIN II (CERVICAL INTRAEPITHELIAL NEOPLASIA II): Status: ACTIVE | Noted: 2017-10-04

## 2021-11-04 PROCEDURE — G0145 SCR C/V CYTO,THINLAYER,RESCR: HCPCS | Performed by: OBSTETRICS & GYNECOLOGY

## 2021-11-04 PROCEDURE — G0476 HPV COMBO ASSAY CA SCREEN: HCPCS | Performed by: OBSTETRICS & GYNECOLOGY

## 2021-11-04 PROCEDURE — 99395 PREV VISIT EST AGE 18-39: CPT | Performed by: OBSTETRICS & GYNECOLOGY

## 2021-11-08 LAB
HPV HR 12 DNA CVX QL NAA+PROBE: NEGATIVE
HPV16 DNA CVX QL NAA+PROBE: NEGATIVE
HPV18 DNA CVX QL NAA+PROBE: NEGATIVE

## 2021-11-10 LAB
LAB AP GYN PRIMARY INTERPRETATION: NORMAL
LAB AP LMP: NORMAL
Lab: NORMAL

## 2021-12-01 ENCOUNTER — APPOINTMENT (OUTPATIENT)
Dept: LAB | Age: 41
End: 2021-12-01
Payer: COMMERCIAL

## 2021-12-01 DIAGNOSIS — R39.89 BLADDER PAIN: ICD-10-CM

## 2021-12-01 LAB

## 2021-12-01 PROCEDURE — 87086 URINE CULTURE/COLONY COUNT: CPT

## 2021-12-01 PROCEDURE — 81001 URINALYSIS AUTO W/SCOPE: CPT

## 2021-12-03 LAB — BACTERIA UR CULT: NORMAL

## 2021-12-20 ENCOUNTER — PATIENT MESSAGE (OUTPATIENT)
Dept: OBGYN CLINIC | Facility: CLINIC | Age: 41
End: 2021-12-20

## 2021-12-28 ENCOUNTER — OFFICE VISIT (OUTPATIENT)
Dept: INTERNAL MEDICINE CLINIC | Age: 41
End: 2021-12-28
Payer: COMMERCIAL

## 2021-12-28 VITALS
DIASTOLIC BLOOD PRESSURE: 70 MMHG | TEMPERATURE: 98.2 F | BODY MASS INDEX: 28.34 KG/M2 | HEIGHT: 64 IN | WEIGHT: 166 LBS | HEART RATE: 73 BPM | SYSTOLIC BLOOD PRESSURE: 108 MMHG | OXYGEN SATURATION: 97 %

## 2021-12-28 DIAGNOSIS — K58.1 IRRITABLE BOWEL SYNDROME WITH CONSTIPATION: ICD-10-CM

## 2021-12-28 DIAGNOSIS — Z76.89 ENCOUNTER TO ESTABLISH CARE: ICD-10-CM

## 2021-12-28 DIAGNOSIS — G43.709 CHRONIC MIGRAINE W/O AURA W/O STATUS MIGRAINOSUS, NOT INTRACTABLE: ICD-10-CM

## 2021-12-28 DIAGNOSIS — R79.89 ABNORMAL TSH: ICD-10-CM

## 2021-12-28 DIAGNOSIS — J30.1 ALLERGIC RHINITIS DUE TO POLLEN, UNSPECIFIED SEASONALITY: ICD-10-CM

## 2021-12-28 DIAGNOSIS — E04.1 THYROID NODULE: ICD-10-CM

## 2021-12-28 DIAGNOSIS — F51.01 PRIMARY INSOMNIA: Primary | ICD-10-CM

## 2021-12-28 PROBLEM — IMO0002 CHRONIC MIGRAINE: Status: ACTIVE | Noted: 2021-12-28

## 2021-12-28 PROBLEM — IMO0002 CHRONIC MIGRAINE: Status: RESOLVED | Noted: 2021-12-28 | Resolved: 2021-12-28

## 2021-12-28 PROCEDURE — 99204 OFFICE O/P NEW MOD 45 MIN: CPT | Performed by: NURSE PRACTITIONER

## 2021-12-28 RX ORDER — FLUTICASONE PROPIONATE 50 MCG
2 SPRAY, SUSPENSION (ML) NASAL DAILY
Qty: 16 G | Refills: 2 | Status: SHIPPED | OUTPATIENT
Start: 2021-12-28

## 2021-12-28 RX ORDER — MELOXICAM 15 MG/1
15 TABLET ORAL DAILY PRN
COMMUNITY
Start: 2021-12-21

## 2021-12-28 RX ORDER — ZOLPIDEM TARTRATE 6.25 MG/1
6.25 TABLET, FILM COATED, EXTENDED RELEASE ORAL
Qty: 30 TABLET | Refills: 0 | Status: SHIPPED | OUTPATIENT
Start: 2021-12-28

## 2021-12-30 ENCOUNTER — HOSPITAL ENCOUNTER (OUTPATIENT)
Dept: RADIOLOGY | Age: 41
Discharge: HOME | End: 2021-12-30
Payer: COMMERCIAL

## 2021-12-30 DIAGNOSIS — E04.1 THYROID NODULE: ICD-10-CM

## 2021-12-30 DIAGNOSIS — R79.89 ABNORMAL TSH: ICD-10-CM

## 2021-12-30 PROCEDURE — 76536 US EXAM OF HEAD AND NECK: CPT

## 2022-01-14 ENCOUNTER — TELEMEDICINE (OUTPATIENT)
Dept: INTERNAL MEDICINE CLINIC | Facility: OTHER | Age: 42
End: 2022-01-14
Payer: COMMERCIAL

## 2022-01-14 VITALS — TEMPERATURE: 99.3 F

## 2022-01-14 DIAGNOSIS — B34.9 VIRAL INFECTION, UNSPECIFIED: Primary | ICD-10-CM

## 2022-01-14 LAB
FLUAV RNA RESP QL NAA+PROBE: NEGATIVE
FLUBV RNA RESP QL NAA+PROBE: NEGATIVE
SARS-COV-2 RNA RESP QL NAA+PROBE: POSITIVE

## 2022-01-14 PROCEDURE — 99213 OFFICE O/P EST LOW 20 MIN: CPT | Performed by: NURSE PRACTITIONER

## 2022-01-14 PROCEDURE — 87636 SARSCOV2 & INF A&B AMP PRB: CPT | Performed by: NURSE PRACTITIONER

## 2022-01-14 NOTE — PROGRESS NOTES
COVID-19 Outpatient Progress Note    Assessment/Plan:    Problem List Items Addressed This Visit     None      Visit Diagnoses     Viral infection, unspecified    -  Primary    Relevant Orders    Covid/Flu- Mobile Van or [facility] Collect         Disposition:     Referred patient to centralized site to test for COVID-19/Influenza  Advised to use ibuprofen 400mg and tylenol 650mg and alternate every 3 hours  Mucinex D and flonase  Rest and hydration  Vitamin D3 2000 IU daily    Advised to start isolating and if positive follow CDC guidelines which is to isolate for a full 5 days from symptom onset (first day is day 0)  After day 5 if symptoms are improved and you are afebrile for 24 hours without the use of fever reducing medications, you may discontinue isolation and continue to mask around others for an additional 5 days  If your symptoms persist or worsen, please call our office to schedule a follow up  Reviewed red flag symptoms which would require ED visit         I have spent 10 minutes directly with the patient  Encounter provider HENRY Isbell    Provider located at [address] [place] [address]    Recent Visits  No visits were found meeting these conditions  Showing recent visits within past 7 days and meeting all other requirements  Today's Visits  Date Type Provider Dept   01/14/22 HENRY [facility]   Showing today's visits and meeting all other requirements  Future Appointments  No visits were found meeting these conditions  Showing future appointments within next 150 days and meeting all other requirements       Patient agrees to participate in a virtual check in via telephone or video visit instead of presenting to the office to address urgent/immediate medical needs   Patient is aware this is a billable service  After connecting through [facility], the patient was identified by name and date of birth  Brown Merlin was informed that this was a telemedicine visit and that the exam was being conducted confidentially over secure lines  My office door was closed  No one else was in the room  Brown Merlin acknowledged consent and understanding of privacy and security of the telemedicine visit  I informed the patient that I have reviewed her record in Epic and presented the opportunity for her to ask any questions regarding the visit today  The patient agreed to participate  Verification of patient location:  Patient is located in the following state in which I hold an active license: [place]    Subjective:   Brown Merlin is a 46 y o  female who is concerned about COVID-19  Patient's symptoms include fever (102 4 t max), chills, fatigue, nasal congestion, myalgias and headache  Patient denies rhinorrhea, sore throat (scratchy), cough, shortness of breath, chest tightness, nausea, vomiting and diarrhea  - Date of symptom onset: 1/12/2022      COVID-19 vaccination status: Not vaccinated    Exposure:   Contact with a person who is under investigation (PUI) for or who is positive for COVID-19 within the last 14 days?: No    Started with sinus congestion over a week ago which she does report is common for her with change in weather after the snow storm  Wednesday 1/12/2022 is when she started with body aches and fever   She has been using claritin D last week  She is not vaccinated and does get tested twice weekly for work   She tested negative for covid on Monday     Lab Results   Component Value Date    SARSCOV2 Negative 01/10/2022     Past Medical History:   Diagnosis Date    Migraines     Chronic per pt     Past Surgical History:   Procedure Laterality Date    CERVICAL BIOPSY  W/ LOOP ELECTRODE EXCISION  2017    COLONOSCOPY      NASAL SEPTUM SURGERY      TONSILLECTOMY      UPPER GASTROINTESTINAL ENDOSCOPY  WISDOM TOOTH EXTRACTION       Current Outpatient Medications   Medication Sig Dispense Refill    docusate sodium (COLACE) 100 mg capsule Take 1 capsule (100 mg total) by mouth 2 (two) times a day 180 capsule 3    fluticasone (FLONASE) 50 mcg/act nasal spray 2 sprays into each nostril daily 16 g 2    linaCLOtide (Linzess) 290 MCG CAPS Take 1 capsule by mouth daily 90 capsule 3    loratadine (CLARITIN) 10 mg tablet Take 10 mg by mouth      meloxicam (MOBIC) 15 mg tablet Take 15 mg by mouth daily as needed      omeprazole (PriLOSEC) 20 mg delayed release capsule TAKE ONE CAPSULE BY MOUTH EVERY DAY 90 capsule 0    propranolol (INDERAL) 40 mg tablet 2 (two) times a day        SUMAtriptan (IMITREX) 100 mg tablet daily as needed        zolpidem (AMBIEN CR) 6 25 MG CR tablet Take 1 tablet (6 25 mg total) by mouth daily at bedtime as needed for sleep 30 tablet 0     No current facility-administered medications for this visit  No Known Allergies    Review of Systems   Constitutional: Positive for chills, fatigue and fever (102 4 t max)  HENT: Positive for congestion  Negative for rhinorrhea and sore throat (scratchy)  Respiratory: Negative for cough, chest tightness and shortness of breath  Gastrointestinal: Negative for diarrhea, nausea and vomiting  Musculoskeletal: Positive for myalgias  Neurological: Positive for headaches  Objective:    Vitals:    01/14/22 1018   Temp: 99 3 °F (37 4 °C)   TempSrc: Temporal       Physical Exam  Constitutional:       Appearance: She is ill-appearing  She is not toxic-appearing  HENT:      Head: Normocephalic and atraumatic  Nose: Congestion present  Pulmonary:      Effort: Pulmonary effort is normal  No respiratory distress  Comments: No cough  No conversational dyspnea  Neurological:      Mental Status: She is alert  Mental status is at baseline     Psychiatric:         Mood and Affect: Mood normal          Behavior: Behavior normal  VIRTUAL VISIT DISCLAIMER    Merry White Bem verbally agrees to participate in [employer]  Pt is aware that [employer] could be limited without vital signs or the ability to perform a full hands-on physical Rachael Duong understands she or the provider may request at any time to terminate the video visit and request the patient to seek care or treatment in person

## 2022-01-17 ENCOUNTER — TELEMEDICINE (OUTPATIENT)
Dept: INTERNAL MEDICINE CLINIC | Facility: OTHER | Age: 42
End: 2022-01-17
Payer: COMMERCIAL

## 2022-01-17 ENCOUNTER — TELEPHONE (OUTPATIENT)
Dept: INTERNAL MEDICINE CLINIC | Age: 42
End: 2022-01-17

## 2022-01-17 VITALS — TEMPERATURE: 98.5 F

## 2022-01-17 DIAGNOSIS — J01.10 ACUTE NON-RECURRENT FRONTAL SINUSITIS: Primary | ICD-10-CM

## 2022-01-17 PROCEDURE — 99213 OFFICE O/P EST LOW 20 MIN: CPT | Performed by: NURSE PRACTITIONER

## 2022-01-17 RX ORDER — AZITHROMYCIN 250 MG/1
TABLET, FILM COATED ORAL
Qty: 6 TABLET | Refills: 0 | Status: SHIPPED | OUTPATIENT
Start: 2022-01-17 | End: 2022-01-22

## 2022-01-17 NOTE — TELEPHONE ENCOUNTER
Pt is covid positive, had a virtual visit with Ceci Stock last week  Still having a lot of sinus congestion, asking if there is something you can prescribe or she can take  She said it wont drain no matter what she does

## 2022-01-17 NOTE — TELEPHONE ENCOUNTER
She may try over the counter Mucinex, and use flonase nasal spray  She may take warm steamy showers to help with congestion, and humidification at night  If no relief of sympotms recommend virtual follow up

## 2022-01-17 NOTE — PROGRESS NOTES
Assessment/Plan:    Acute non-recurrent frontal sinusitis  Afrin 2 sprays bilateral nares bid x 3 days  Encourage hydration  Zpack take as directed         Problem List Items Addressed This Visit        Respiratory    Acute non-recurrent frontal sinusitis - Primary     Afrin 2 sprays bilateral nares bid x 3 days  Encourage hydration  Zpack take as directed         Relevant Medications    azithromycin (Zithromax) 250 mg tablet            Subjective:      Patient ID: Annette Chow is a 46 y o  female  Joe More is calling today for persistent nasal congestion  She states that the nasal congestion initially started back on January 7th, she did develop symptoms of headache, fever, cough and fatigue and tested positive on January 14th for COVID  Those symptoms have since resolved however her nasal congestion remains constant, she is now having discoloration in her secretions as well  I am recommending treating her with an antibiotic for sinusitis, as well as I did recommend Afrin 2 sprays to each tarry 2 times daily for 3 days  I also recommended plenty of hydration  She is finishing up her quarantine and knows to wear a mask  She does work from home  The following portions of the patient's history were reviewed and updated as appropriate: allergies, current medications, past family history, past medical history, past social history, past surgical history and problem list     Review of Systems   Constitutional: Negative for chills, fever and unexpected weight change  HENT: Positive for congestion, sinus pressure and sinus pain  Negative for ear pain and sore throat  Eyes: Negative for visual disturbance  Respiratory: Negative for cough, shortness of breath and wheezing  Cardiovascular: Negative for chest pain, palpitations and leg swelling  Gastrointestinal: Negative for abdominal pain, constipation, diarrhea, nausea and vomiting     Genitourinary: Negative for difficulty urinating, dysuria, frequency and hematuria  Musculoskeletal: Negative for myalgias  Skin: Negative for rash  Neurological: Negative for dizziness, numbness and headaches  Psychiatric/Behavioral: Negative for confusion  The patient is not nervous/anxious  Objective:      Temp 98 5 °F (36 9 °C) (Tympanic)          Physical Exam  Vitals and nursing note reviewed

## 2022-01-20 ENCOUNTER — TELEMEDICINE (OUTPATIENT)
Dept: INTERNAL MEDICINE CLINIC | Age: 42
End: 2022-01-20
Payer: COMMERCIAL

## 2022-01-20 VITALS — TEMPERATURE: 97.4 F

## 2022-01-20 DIAGNOSIS — S16.1XXA ACUTE STRAIN OF NECK MUSCLE, INITIAL ENCOUNTER: Primary | ICD-10-CM

## 2022-01-20 DIAGNOSIS — G43.809 OTHER MIGRAINE WITHOUT STATUS MIGRAINOSUS, NOT INTRACTABLE: ICD-10-CM

## 2022-01-20 DIAGNOSIS — R12 HEARTBURN: ICD-10-CM

## 2022-01-20 DIAGNOSIS — R09.81 NASAL CONGESTION: ICD-10-CM

## 2022-01-20 DIAGNOSIS — U07.1 COVID-19: ICD-10-CM

## 2022-01-20 PROCEDURE — 99213 OFFICE O/P EST LOW 20 MIN: CPT | Performed by: PHYSICIAN ASSISTANT

## 2022-01-20 RX ORDER — PREDNISONE 20 MG/1
40 TABLET ORAL DAILY
Qty: 10 TABLET | Refills: 0 | Status: SHIPPED | OUTPATIENT
Start: 2022-01-20 | End: 2022-02-10 | Stop reason: ALTCHOICE

## 2022-01-20 RX ORDER — METHOCARBAMOL 500 MG/1
500 TABLET, FILM COATED ORAL 4 TIMES DAILY
Qty: 30 TABLET | Refills: 0 | Status: SHIPPED | OUTPATIENT
Start: 2022-01-20 | End: 2022-02-10

## 2022-01-20 RX ORDER — OMEPRAZOLE 20 MG/1
CAPSULE, DELAYED RELEASE ORAL
Qty: 90 CAPSULE | Refills: 0 | Status: SHIPPED | OUTPATIENT
Start: 2022-01-20 | End: 2022-04-20

## 2022-01-20 NOTE — LETTER
January 20, 2022     Patient: Nannette Lewis   YOB: 1980   Date of Visit: 1/20/2022       To Whom it May Concern:    Dari Graves is under my professional care  She was seen in my office on 1/20/2022  She may return to work on 1/21/22  If you have any questions or concerns, please don't hesitate to call           Sincerely,          Sylvia Nicholson PA-C        CC: No Recipients

## 2022-01-20 NOTE — PROGRESS NOTES
COVID-19 Outpatient Progress Note    Assessment/Plan:    Problem List Items Addressed This Visit        Cardiovascular and Mediastinum    Migraines    Relevant Medications    predniSONE 20 mg tablet    methocarbamol (ROBAXIN) 500 mg tablet      Other Visit Diagnoses     Acute strain of neck muscle, initial encounter    -  Primary    Relevant Medications    methocarbamol (ROBAXIN) 500 mg tablet    COVID-19        Relevant Medications    predniSONE 20 mg tablet    Nasal congestion        Relevant Medications    predniSONE 20 mg tablet       persistent migraine, pred 40mg qd x 5 days, take in am with food   may use methocarbamol prn for neck strain, warned may make drowsy   may continue to work from home as tolerated    Disposition:     Patient has COVID-19 infection  Based off CDC guidelines, they were recommended to isolate for 5 days from the date of the positive test  If they remain asymptomatic, isolation may be ended followed by 5 days of wearing a mask when around othes to minimize risk of infecting others  If they have a fever, continue to stay home until fever resolves for at least 24 hours  I recommended continued isolation until at least 24 hours have passed since recovery defined as resolution of fever without the use of fever-reducing medications AND improvement in COVID symptoms AND 10 days have passed since onset of symptoms (or 10 days have passed since date of first positive viral diagnostic test for asymptomatic patients)  I have spent 12 minutes directly with the patient  Greater than 50% of this time was spent in counseling/coordination of care regarding: instructions for management and patient and family education   pred 40mg daily x 5 days   - take in am with food  Methocarbamol for cervical strain prn      Encounter provider Sonia Lutz PA-C    Provider located at [place] [address] [place] [id number]    Recent Visits  Date Type Provider Dept   01/17/22 Telephone HENRY Tate [facility]   01/14/22 Yonnyclaudia [address]te [address], MALIKNP [facility]   Showing recent visits within past 7 days and meeting all other requirements  Today's Visits  Date Type Provider Dept   01/20/22 Telemedicine Gorge Jimenez PA-C [facility]   Showing today's visits and meeting all other requirements  Future Appointments  No visits were found meeting these conditions  Showing future appointments within next 150 days and meeting all other requirements     This virtual check-in was done via [facility] and patient was informed that this is a secure, HIPAA-compliant platform  She agrees to proceed  Patient agrees to participate in a virtual check in via telephone or video visit instead of presenting to the office to address urgent/immediate medical needs  Patient is aware this is a billable service  After connecting through [facility], the patient was identified by name and date of birth  Shawn Polanco was informed that this was a telemedicine visit and that the exam was being conducted confidentially over secure lines  My office door was closed  No one else was in the room  Shawn Polanco acknowledged consent and understanding of privacy and security of the telemedicine visit  I informed the patient that I have reviewed her record in Epic and presented the opportunity for her to ask any questions regarding the visit today  The patient agreed to participate  Verification of patient location:  Patient is located in the following state in which I hold an active license: [place]    Subjective:   Shawn Polanco is a 46 y o  female who has been screened for COVID-19  Symptom change since last report: worsening  Patient's symptoms include nasal congestion, myalgias and headache   Patient denies fever, chills, fatigue, malaise, sore throat, anosmia, loss of taste, cough, shortness of breath, chest tightness, abdominal pain, nausea, vomiting and diarrhea  - Date of symptom onset: 1/12/2022  - Date of positive COVID-19 test: 1/14/2022  Type of test: PCR  COVID-19 vaccination status: Fully vaccinated with booster    Katia Mcgrath has been staying home and has isolated themselves in her home  She is taking care to not share personal items and is cleaning all surfaces that are touched often, like counters, tabletops, and doorknobs using household cleaning sprays or wipes  She is wearing a mask when she leaves her room  Pt reports worsening migraines, frontal sinus pressure and severe nasal congestion     Lab Results   Component Value Date    SARSCOV2 Positive (A) 01/14/2022     Past Medical History:   Diagnosis Date    Migraines     Chronic per pt    Venereal wart 8/7/2014     Past Surgical History:   Procedure Laterality Date    CERVICAL BIOPSY  W/ LOOP ELECTRODE EXCISION  2017    COLONOSCOPY      NASAL SEPTUM SURGERY      TONSILLECTOMY      UPPER GASTROINTESTINAL ENDOSCOPY      WISDOM TOOTH EXTRACTION       Current Outpatient Medications   Medication Sig Dispense Refill    azithromycin (Zithromax) 250 mg tablet Take 2 tablets (500 mg total) by mouth daily for 1 day, THEN 1 tablet (250 mg total) daily for 4 days   6 tablet 0    docusate sodium (COLACE) 100 mg capsule Take 1 capsule (100 mg total) by mouth 2 (two) times a day 180 capsule 3    fluticasone (FLONASE) 50 mcg/act nasal spray 2 sprays into each nostril daily 16 g 2    linaCLOtide (Linzess) 290 MCG CAPS Take 1 capsule by mouth daily 90 capsule 3    loratadine (CLARITIN) 10 mg tablet Take 10 mg by mouth daily        meloxicam (MOBIC) 15 mg tablet Take 15 mg by mouth daily as needed      omeprazole (PriLOSEC) 20 mg delayed release capsule TAKE ONE CAPSULE BY MOUTH EVERY DAY 90 capsule 0    propranolol (INDERAL) 40 mg tablet 2 (two) times a day        SUMAtriptan (IMITREX) 100 mg tablet daily as needed        zolpidem (AMBIEN CR) 6 25 MG CR tablet Take 1 tablet (6 25 mg total) by mouth daily at bedtime as needed for sleep 30 tablet 0    methocarbamol (ROBAXIN) 500 mg tablet Take 1 tablet (500 mg total) by mouth 4 (four) times a day 30 tablet 0    predniSONE 20 mg tablet Take 2 tablets (40 mg total) by mouth daily In am with food 10 tablet 0     No current facility-administered medications for this visit  No Known Allergies    Review of Systems   Constitutional: Negative for chills, fatigue and fever  HENT: Positive for congestion  Negative for sore throat  Respiratory: Negative for cough, chest tightness and shortness of breath  Gastrointestinal: Negative for abdominal pain, diarrhea, nausea and vomiting  Musculoskeletal: Positive for myalgias  Neurological: Positive for headaches  Objective:    Vitals:    01/20/22 0945   Temp: (!) 97 4 °F (36 3 °C)   TempSrc: Tympanic       Physical Exam  Vitals reviewed  Constitutional:       General: She is not in acute distress  Pulmonary:      Effort: Pulmonary effort is normal  No respiratory distress  Neurological:      Mental Status: She is alert  Psychiatric:         Mood and Affect: Mood normal          VIRTUAL VISIT DISCLAIMER    Vanda Lu Bem verbally agrees to participate in [employer]  Pt is aware that [employer] could be limited without vital signs or the ability to perform a full hands-on physical Yoonsumit Lewis understands she or the provider may request at any time to terminate the video visit and request the patient to seek care or treatment in person

## 2022-02-07 ENCOUNTER — APPOINTMENT (OUTPATIENT)
Dept: LAB | Age: 42
End: 2022-02-07
Payer: COMMERCIAL

## 2022-02-07 DIAGNOSIS — R79.89 ABNORMAL TSH: ICD-10-CM

## 2022-02-07 DIAGNOSIS — E04.1 THYROID NODULE: ICD-10-CM

## 2022-02-07 LAB
T4 FREE SERPL-MCNC: 0.95 NG/DL (ref 0.76–1.46)
TSH SERPL DL<=0.05 MIU/L-ACNC: 6.55 UIU/ML (ref 0.36–3.74)

## 2022-02-07 PROCEDURE — 36415 COLL VENOUS BLD VENIPUNCTURE: CPT

## 2022-02-07 PROCEDURE — 84439 ASSAY OF FREE THYROXINE: CPT

## 2022-02-07 PROCEDURE — 84443 ASSAY THYROID STIM HORMONE: CPT

## 2022-02-10 ENCOUNTER — OFFICE VISIT (OUTPATIENT)
Dept: INTERNAL MEDICINE CLINIC | Facility: CLINIC | Age: 42
End: 2022-02-10
Payer: COMMERCIAL

## 2022-02-10 ENCOUNTER — APPOINTMENT (OUTPATIENT)
Dept: RADIOLOGY | Age: 42
End: 2022-02-10
Payer: COMMERCIAL

## 2022-02-10 VITALS
SYSTOLIC BLOOD PRESSURE: 110 MMHG | OXYGEN SATURATION: 96 % | WEIGHT: 162.4 LBS | HEART RATE: 77 BPM | BODY MASS INDEX: 26.1 KG/M2 | DIASTOLIC BLOOD PRESSURE: 78 MMHG | HEIGHT: 66 IN | TEMPERATURE: 97.4 F

## 2022-02-10 DIAGNOSIS — H93.8X3 SENSATION OF FULLNESS IN BOTH EARS: ICD-10-CM

## 2022-02-10 DIAGNOSIS — E04.1 THYROID NODULE: Primary | ICD-10-CM

## 2022-02-10 DIAGNOSIS — R79.89 ABNORMAL TSH: ICD-10-CM

## 2022-02-10 DIAGNOSIS — M54.2 NECK PAIN: ICD-10-CM

## 2022-02-10 PROCEDURE — 99214 OFFICE O/P EST MOD 30 MIN: CPT | Performed by: NURSE PRACTITIONER

## 2022-02-10 PROCEDURE — 72040 X-RAY EXAM NECK SPINE 2-3 VW: CPT

## 2022-02-10 RX ORDER — METHYLPREDNISOLONE 4 MG/1
TABLET ORAL
Qty: 21 EACH | Refills: 0 | Status: SHIPPED | OUTPATIENT
Start: 2022-02-10 | End: 2022-03-28

## 2022-02-10 NOTE — PROGRESS NOTES
Assessment/Plan:    Thyroid nodule  -Reviewed results  -Repeat US in one year    Abnormal TSH  TSH elevated, T4 within normal range, will defer medication at this time as patient is asymptomatic  Repeat TSH in 6 weeks  Neck pain  Referral placed to physical therapy, will get x-ray of cervical spine  Will do Medrol Dosepak  Sensation of fullness in both ears  Continue with Flonase as well as antihistamine  Patient will be on Medrol Dosepak for neck pain  If no relief of symptoms follow-up with ENT  Diagnoses and all orders for this visit:    Thyroid nodule  -     US thyroid; Future  -     TSH, 3rd generation with Free T4 reflex    Neck pain  -     XR spine cervical 2 or 3 vw injury; Future  -     methylPREDNISolone 4 MG tablet therapy pack; Use as directed on package  -     Ambulatory Referral to Physical Therapy; Future    Sensation of fullness in both ears  -     Ambulatory Referral to Otolaryngology; Future    Abnormal TSH          Subjective:      Patient ID: Mala Ryan is a 46 y o  female  Patient presents today for follow up on thyroid US and blood work results  She is a [employer] Employee and works as an        Medical conditions include:     IBS-constipation - currently followed by GI  Managed on linzess 290mg daily and colace 100mg twice daily  She states she does still suffer from pretty significant constipation  She has a bowel movement every 5 days  She is s/p colonoscopy and EGD, advised routine follow up at age 41     Small hiatal hernia with reflux - EGD 7/26/2021  Controlled on omeprazole 20mg daily      Migraines - she is currently on propranolol 40mg twice daily  Imitrex 100mg prn  She ranges from migraines 1-2 times a week  She is scheduled to see a neurologist in March 2022   She has seen neurology in the past - she has nortriptyline and tropane in the past    She feels the propranolol has been working the best    She reports getting sinus headaches and menstrual headaches      Insomnia  - she is currently on Ambien 5mg prn  She feels her sleep has been very poor lately over the last few months  She has been having trouble falling asleep and staying asleep  She also adds melatonin 3mg which helps her stay asleep  She tries to cut caffeine out early in the day  She has tried Trudee Bologna but this makes her groggy in the morning     Thyroid 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 times in the past and states that she know she has a thyroid nodule which seems to be increasing in size and now is about 1 cm  She has never seen medical provider for this  Her most recent TSH was slightly abnormal with a normal free T4  Patient reports that she is asymptomatic and would like to hold off on medication at this time       US thyroid results  Multinodular thyroid gland  No nodule meeting current ACR criteria for biopsy or follow-up, however, given the appearance of the left lower lobe 0 9 cm nodule, recommend 12 month follow-up ultrasound  Reports that she had COVID in January of this year at which point she had pretty significant congestion, she has been started on a Z-Coy and prednisone and originally her symptoms have improved  She reports however since that time she has been having congestion which seems to be worse at night and on and off ear pressure to her right ear  She reports some intermittent loss of hearing to right ear  She is currently taking antihistamine and using Flonase  She also reports since COVID she has been having muscle aches and neck pain to her neck  She denies any previous significant injury to her neck however she does report that 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 and looking forward  She had been previously treated with prednisone as well as muscle relaxer with no relief of discomfort    Patient does reports that she gets some massages for her neck pain which temporarily relieves the discomfort but comes back shortly after  The following portions of the patient's history were reviewed and updated as appropriate: allergies, current medications, past family history, past medical history, past social history, past surgical history and problem list     Review of Systems   Constitutional: Negative for activity change, appetite change, chills, diaphoresis and fever  HENT: Positive for congestion  Negative for ear discharge, ear pain, postnasal drip, rhinorrhea, sinus pressure, sinus pain and sore throat  Eyes: Negative for pain, discharge, itching and visual disturbance  Respiratory: Negative for cough, chest tightness, shortness of breath and wheezing  Cardiovascular: Negative for chest pain, palpitations and leg swelling  Gastrointestinal: Negative for abdominal pain, constipation, diarrhea, nausea and vomiting  Endocrine: Negative for polydipsia, polyphagia and polyuria  Genitourinary: Negative for difficulty urinating, dysuria and urgency  Musculoskeletal: Positive for neck pain  Negative for arthralgias and back pain  Skin: Negative for rash and wound  Neurological: Negative for dizziness, weakness, numbness and headaches           Past Medical History:   Diagnosis Date    Migraines     Chronic per pt    Venereal wart 8/7/2014         Current Outpatient Medications:     docusate sodium (COLACE) 100 mg capsule, Take 1 capsule (100 mg total) by mouth 2 (two) times a day, Disp: 180 capsule, Rfl: 3    fluticasone (FLONASE) 50 mcg/act nasal spray, 2 sprays into each nostril daily, Disp: 16 g, Rfl: 2    linaCLOtide (Linzess) 290 MCG CAPS, Take 1 capsule by mouth daily, Disp: 90 capsule, Rfl: 3    loratadine (CLARITIN) 10 mg tablet, Take 10 mg by mouth daily  , Disp: , Rfl:     meloxicam (MOBIC) 15 mg tablet, Take 15 mg by mouth daily as needed, Disp: , Rfl:     omeprazole (PriLOSEC) 20 mg delayed release capsule, TAKE ONE CAPSULE BY MOUTH EVERY DAY, Disp: 90 capsule, Rfl: 0    propranolol (INDERAL) 40 mg tablet, 2 (two) times a day  , Disp: , Rfl:     SUMAtriptan (IMITREX) 100 mg tablet, daily as needed  , Disp: , Rfl:     zolpidem (AMBIEN CR) 6 25 MG CR tablet, Take 1 tablet (6 25 mg total) by mouth daily at bedtime as needed for sleep, Disp: 30 tablet, Rfl: 0    methylPREDNISolone 4 MG tablet therapy pack, Use as directed on package, Disp: 21 each, Rfl: 0    No Known Allergies    Social History   Past Surgical History:   Procedure Laterality Date    CERVICAL BIOPSY  W/ LOOP ELECTRODE EXCISION  2017    COLONOSCOPY      NASAL SEPTUM SURGERY      TONSILLECTOMY      UPPER GASTROINTESTINAL ENDOSCOPY      WISDOM TOOTH EXTRACTION       Family History   Problem Relation Age of Onset    Colon cancer Maternal Grandfather     Colon cancer Cousin     Hypertension Mother     Transient ischemic attack Father     Hypertension Father     Uterine cancer Maternal Grandmother     Lung cancer Paternal Grandmother        Objective:  /78 (BP Location: Left arm, Patient Position: Sitting, Cuff Size: Standard)   Pulse 77   Temp (!) 97 4 °F (36 3 °C) (Tympanic)   Ht 5' 5 63" (1 667 m)   Wt 73 7 kg (162 lb 6 4 oz)   SpO2 96% Comment: room air  BMI 26 51 kg/m²     Recent Results (from the past 1344 hour(s))   COVID only    Collection Time: 12/16/21  5:32 PM    Specimen: Nose; Nares   Result Value Ref Range    SARS-CoV-2 Negative Negative   COVID only    Collection Time: 12/21/21  6:38 PM    Specimen: Nose; Nares   Result Value Ref Range    SARS-CoV-2 Negative Negative   COVID only    Collection Time: 12/28/21  6:01 PM    Specimen: Nose; Nares   Result Value Ref Range    SARS-CoV-2 Negative Negative   COVID only    Collection Time: 01/06/22  6:14 PM    Specimen: Nose; Nares   Result Value Ref Range    SARS-CoV-2 Negative Negative   COVID only    Collection Time: 01/10/22  5:10 PM    Specimen: Nose; Nares   Result Value Ref Range SARS-CoV-2 Negative Negative   Covid/Flu- [facility] Collect    Collection Time: 01/14/22 11:38 AM    Specimen: Nose; Nares   Result Value Ref Range    SARS-CoV-2 Positive (A) Negative    INFLUENZA A PCR Negative Negative    INFLUENZA B PCR Negative Negative   TSH, 3rd generation with Free T4 reflex    Collection Time: 02/07/22  6:58 AM   Result Value Ref Range    TSH 3RD GENERATON 6 550 (H) 0 358 - 3 740 uIU/mL   T4, free    Collection Time: 02/07/22  6:58 AM   Result Value Ref Range    Free T4 0 95 0 76 - 1 46 ng/dL            Physical Exam  Constitutional:       General: She is not in acute distress  Appearance: She is well-developed  She is not diaphoretic  HENT:      Head: Normocephalic and atraumatic  Right Ear: External ear normal  Tympanic membrane is bulging  Tympanic membrane is not erythematous  Left Ear: External ear normal  Tympanic membrane is bulging  Tympanic membrane is not erythematous  Nose: Nose normal       Mouth/Throat:      Pharynx: No oropharyngeal exudate  Eyes:      General:         Right eye: No discharge  Left eye: No discharge  Conjunctiva/sclera: Conjunctivae normal       Pupils: Pupils are equal, round, and reactive to light  Neck:      Thyroid: No thyromegaly  Cardiovascular:      Rate and Rhythm: Normal rate and regular rhythm  Heart sounds: Normal heart sounds  No murmur heard  No friction rub  No gallop  Pulmonary:      Effort: Pulmonary effort is normal  No respiratory distress  Breath sounds: Normal breath sounds  No stridor  No wheezing or rales  Abdominal:      General: Bowel sounds are normal  There is no distension  Palpations: Abdomen is soft  Tenderness: There is no abdominal tenderness  Musculoskeletal:      Cervical back: Normal range of motion and neck supple  Spinous process tenderness and muscular tenderness present  Normal range of motion     Lymphadenopathy:      Cervical: No cervical adenopathy  Skin:     General: Skin is warm and dry  Findings: No erythema or rash  Neurological:      Mental Status: She is alert and oriented to person, place, and time  Psychiatric:         Behavior: Behavior normal          Thought Content:  Thought content normal          Judgment: Judgment normal

## 2022-02-10 NOTE — ASSESSMENT & PLAN NOTE
Continue with Flonase as well as antihistamine  Patient will be on Medrol Dosepak for neck pain  If no relief of symptoms follow-up with ENT

## 2022-02-10 NOTE — ASSESSMENT & PLAN NOTE
TSH elevated, T4 within normal range, will defer medication at this time as patient is asymptomatic  Repeat TSH in 6 weeks

## 2022-03-01 ENCOUNTER — EVALUATION (OUTPATIENT)
Dept: PHYSICAL THERAPY | Age: 42
End: 2022-03-01
Payer: COMMERCIAL

## 2022-03-01 DIAGNOSIS — M25.511 ACUTE PAIN OF RIGHT SHOULDER: ICD-10-CM

## 2022-03-01 DIAGNOSIS — M54.2 NECK PAIN: Primary | ICD-10-CM

## 2022-03-01 PROCEDURE — 97162 PT EVAL MOD COMPLEX 30 MIN: CPT | Performed by: PHYSICAL THERAPIST

## 2022-03-01 PROCEDURE — 97110 THERAPEUTIC EXERCISES: CPT | Performed by: PHYSICAL THERAPIST

## 2022-03-01 NOTE — PROGRESS NOTES
PT Evaluation     Today's date: 3/1/2022  Patient name: Sarita Espinoza  : 1980  MRN: [id number]  Referring provider: Pina Garvin*  Dx:   Encounter Diagnosis     ICD-10-CM    1  Neck pain  M54 2 Ambulatory Referral to Physical Therapy   2  Acute pain of left shoulder  M25 512    3  Acute pain of right shoulder  M25 511                   Assessment  Assessment details: Patient seen for PT evaluation for neck and R shoulder pain  Patient presents with scapular dyskinesia in the R shoulder, R shoulder anteriorly tilted, patient is able to correct and move R shoulder into proper position; weakness in R shoulder with pain with MMT in all directions  No significant ROM loss in R shoulder, however, painful end range throughout movement  Patient has been active at home and has been exercising, decreased weights herself at home to not aggravate the shoulder  Patient works long hours and may not have consistent time for PT treatments; PT did recommend patient to trial the exercises at home (HEP) and to notify PT if she would like to return to PT or not, mainly for manual work  Based on presentation, possible adhesive capsulitis (beginning/freezing phase) vs scapular diskinesia  PT did recommend patient to keep her weights minimal to none and focus on strengthening her scapula, to correct her R humeral head position  Impairments: impaired physical strength, lacks appropriate home exercise program, pain with function and scapular dyskinesis  Understanding of Dx/Px/POC: good   Prognosis: good    Goals  Impairment Goals to be met within 4 weeks  - Decrease pain to 0/10  - Patient to have pain free full ROM R shoulder  - Increase strength to 5/5 throughout  - Reduce pain at cervical spine at end range  Functional Goals to be met within 4-6 weeks  - Return to Prior Level of Function  - Increase Functional Status Measure to: expected  - Patient will be independent with HEP  - Improve upright posture  Plan  Patient would benefit from: skilled physical therapy  Planned modality interventions: cryotherapy and thermotherapy: hydrocollator packs  Planned therapy interventions: abdominal trunk stabilization, activity modification, manual therapy, massage, joint mobilization, neuromuscular re-education, patient education, postural training, strengthening, stretching, therapeutic activities, therapeutic exercise, home exercise program and body mechanics training  Frequency: 1x week  Duration in weeks: 6  Treatment plan discussed with: patient        Subjective Evaluation    History of Present Illness  Date of onset: 2022  Mechanism of injury: Patient reports a history of L shoulder pain, L UT tightness, and has been starting to have more R shoulder pain, specifically across the front of the shoulder to the front of the chest  Patient still has L shoulder and UT tightness, but has not had the R shoulder pain before  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, 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  Pain  Current pain ratin  At best pain rating: 3  At worst pain ratin  Location: R shoulder  Quality: tight  Aggravating factors: overhead activity and lifting  Progression: no change    Hand dominance: right      Diagnostic Tests  No diagnostic tests performed  Treatments  Previous treatment: medication  Patient Goals  Patient goals for therapy: decreased pain, increased motion, increased strength and independence with ADLs/IADLs          Objective     Concurrent Complaints  Positive for disturbed sleep  Negative for night pain    Additional Special Questions  Patient has been noticing a whitening of her hands, specifically her finger tips to ~ mid metacarpals in B hands- thinking this could be an onset of raynaud's   Hasn't been officially diagnosed with yet    Active Range of Motion   Cervical/Thoracic Spine       Cervical    Flexion: 46 degrees with pain  Extension: 25 degrees      Left lateral flexion: 26 (pain at R UT with L lateral flexion) degrees     with pain  Right lateral flexion: 24 degrees      Left rotation: 68 degrees  Right rotation: 64 degrees    with pain    Right Shoulder   Flexion: 180 degrees with pain  Extension: 60 degrees   Abduction: 170 degrees with pain  External rotation BTH: C6 with pain  Internal rotation BTB: T8     Left Elbow   Normal active range of motion    Right Elbow   Normal active range of motion    Strength/Myotome Testing     Left Shoulder   Normal muscle strength    Right Shoulder     Planes of Motion   Flexion: 4-   Abduction: 4-   External rotation at 0°: 3+   Internal rotation at 0°: 3+     Left Elbow   Normal strength    Right Elbow   Flexion: 4  Extension: 4             Precautions: none      Manuals 3/1                                       Neuro Re-Ed                                                                Ther Ex         ----HEP--- recommended no weights to tolerance       scap retraction with depression 10x       Prone Is, Ts, Ys, W's 10x       Corner vs doorway pec stretch 3x:10       S/l shoulder abd 10x       S/l ER 10x        --------------------                                                                                               Ther Activity                        Gait Training                        Modalities

## 2022-03-09 ENCOUNTER — OFFICE VISIT (OUTPATIENT)
Dept: URGENT CARE | Age: 42
End: 2022-03-09
Payer: COMMERCIAL

## 2022-03-09 VITALS
RESPIRATION RATE: 20 BRPM | SYSTOLIC BLOOD PRESSURE: 124 MMHG | DIASTOLIC BLOOD PRESSURE: 74 MMHG | BODY MASS INDEX: 26.98 KG/M2 | OXYGEN SATURATION: 99 % | HEART RATE: 65 BPM | HEIGHT: 64 IN | TEMPERATURE: 99 F | WEIGHT: 158 LBS

## 2022-03-09 DIAGNOSIS — G43.011 INTRACTABLE MIGRAINE WITHOUT AURA AND WITH STATUS MIGRAINOSUS: Primary | ICD-10-CM

## 2022-03-09 PROCEDURE — G0382 LEV 3 HOSP TYPE B ED VISIT: HCPCS

## 2022-03-09 PROCEDURE — S9083 URGENT CARE CENTER GLOBAL: HCPCS

## 2022-03-09 RX ORDER — KETOROLAC TROMETHAMINE 30 MG/ML
30 INJECTION, SOLUTION INTRAMUSCULAR; INTRAVENOUS ONCE
Status: COMPLETED | OUTPATIENT
Start: 2022-03-09 | End: 2022-03-09

## 2022-03-09 RX ORDER — PREDNISONE 20 MG/1
20 TABLET ORAL DAILY
Qty: 5 TABLET | Refills: 0 | Status: SHIPPED | OUTPATIENT
Start: 2022-03-09 | End: 2022-03-14

## 2022-03-09 RX ORDER — ZOLPIDEM TARTRATE 5 MG/1
5 TABLET ORAL
COMMUNITY
Start: 2022-02-25

## 2022-03-09 RX ADMIN — KETOROLAC TROMETHAMINE 30 MG: 30 INJECTION, SOLUTION INTRAMUSCULAR; INTRAVENOUS at 19:56

## 2022-03-10 NOTE — PROGRESS NOTES
[facility]        NAME: Ender Hernandez is a 46 y o  female  : 1980    MRN: [id number]  DATE: 2022  TIME: 7:53 PM    Assessment and Plan   Intractable migraine without aura and with status migrainosus [G43 011]  1  Intractable migraine without aura and with status migrainosus  ketorolac (TORADOL) injection 30 mg   Impression is intractable migraine aggravated by sinusitis  Patient interested in IM Toradol which she believes she has had in the past with some success  Reports that she has not had any steroids in approximately 2 months, will trial oral prednisone  Discussed possibility of emergency department referral, patient is declining at this time  Patient Instructions   If change in vision, weakness, intractable vomiting, lethargy or other neurological signs, report to emergency department for further evaluation  Follow up with PCP in 3-5 days  Proceed to  ER if symptoms worsen  Chief Complaint     Chief Complaint   Patient presents with    Migraine     MIGRAINE FOR 3 DAYS   PAIN NECK AND UPPER BACK LEFT SIDE, BILATERAL EAR PAIN AND CLOGGED  WITH SINUS PRESSURE    Nausea    Abdominal Pain    Earache         History of Present Illness       Patient is a 46 y o  female with PMH significant for migraine and sinusitis who presents for chief complaint of intractable migraine for 3 days  She reports left frontal and temporal pain that radiates down the left side of her neck  She reports nausea, photophobia, tinnitus, ear pressure, post nasal drip and congestion  She has been taking Flonase, Mobic/Tylenol with minimal relief  She denies numbness, tingling, vision changes, weakness, dizziness, vomiting, rash, chest pain, SOB  Neurologic Problem  The patient's primary symptoms include a loss of balance, a visual change and weakness   The patient's pertinent negatives include no altered mental status, clumsiness, focal sensory loss, focal weakness, memory loss, near-syncope, slurred speech or syncope  This is a recurrent problem  The current episode started in the past 7 days  The neurological problem developed suddenly  The problem has been waxing and waning since onset  There was facial and left-sided focality noted  Associated symptoms include an auditory change, an aura, back pain, dizziness, fatigue, headaches, nausea, neck pain and vertigo  Pertinent negatives include no abdominal pain, bladder incontinence, bowel incontinence, chest pain, confusion, diaphoresis, fever, light-headedness, palpitations, shortness of breath or vomiting  Past treatments include acetaminophen, bed rest, drinking, eating, medication, neck support, position change, sleep and walking  The treatment provided mild relief  Review of Systems   Review of Systems   Constitutional: Positive for fatigue  Negative for diaphoresis and fever  HENT: Positive for congestion, ear pain, hearing loss, postnasal drip, rhinorrhea, sinus pressure, sinus pain and tinnitus  Negative for dental problem, drooling, ear discharge, facial swelling, mouth sores, nosebleeds, sneezing, sore throat, trouble swallowing and voice change  Eyes: Positive for photophobia  Negative for pain, discharge, redness, itching and visual disturbance  Respiratory: Negative for cough, choking, shortness of breath, wheezing and stridor  Cardiovascular: Negative for chest pain, palpitations and near-syncope  Gastrointestinal: Positive for nausea  Negative for abdominal pain, bowel incontinence and vomiting  Endocrine: Negative  Genitourinary: Negative  Negative for bladder incontinence, difficulty urinating, dysuria, pelvic pain and urgency  Musculoskeletal: Positive for back pain and neck pain  Negative for gait problem, joint swelling and myalgias  Skin: Negative  Negative for rash  Allergic/Immunologic: Negative  Neurological: Positive for dizziness, vertigo, weakness, headaches and loss of balance   Negative for focal weakness, syncope and light-headedness  Hematological: Negative  Psychiatric/Behavioral: Negative for confusion and memory loss           Current Medications       Current Outpatient Medications:     fluticasone (FLONASE) 50 mcg/act nasal spray, 2 sprays into each nostril daily, Disp: 16 g, Rfl: 2    linaCLOtide (Linzess) 290 MCG CAPS, Take 1 capsule by mouth daily, Disp: 90 capsule, Rfl: 3    loratadine (CLARITIN) 10 mg tablet, Take 10 mg by mouth daily  , Disp: , Rfl:     meloxicam (MOBIC) 15 mg tablet, Take 15 mg by mouth daily as needed, Disp: , Rfl:     omeprazole (PriLOSEC) 20 mg delayed release capsule, TAKE ONE CAPSULE BY MOUTH EVERY DAY, Disp: 90 capsule, Rfl: 0    SUMAtriptan (IMITREX) 100 mg tablet, daily as needed  , Disp: , Rfl:     zolpidem (AMBIEN CR) 6 25 MG CR tablet, Take 1 tablet (6 25 mg total) by mouth daily at bedtime as needed for sleep, Disp: 30 tablet, Rfl: 0    docusate sodium (COLACE) 100 mg capsule, Take 1 capsule (100 mg total) by mouth 2 (two) times a day (Patient not taking: Reported on 3/9/2022 ), Disp: 180 capsule, Rfl: 3    methylPREDNISolone 4 MG tablet therapy pack, Use as directed on package (Patient not taking: Reported on 3/9/2022 ), Disp: 21 each, Rfl: 0    propranolol (INDERAL) 40 mg tablet, 2 (two) times a day   (Patient not taking: Reported on 3/9/2022 ), Disp: , Rfl:     zolpidem (AMBIEN) 5 mg tablet, Take 5 mg by mouth daily at bedtime as needed (Patient not taking: Reported on 3/9/2022 ), Disp: , Rfl:     Current Facility-Administered Medications:     ketorolac (TORADOL) injection 30 mg, 30 mg, Intramuscular, Once, HENRY Fabian    Current Allergies     Allergies as of 03/09/2022    (No Known Allergies)            The following portions of the patient's history were reviewed and updated as appropriate: allergies, current medications, past family history, past medical history, past social history, past surgical history and problem list  Past Medical History:   Diagnosis Date    Migraines     Chronic per pt    Venereal wart 8/7/2014       Past Surgical History:   Procedure Laterality Date    CERVICAL BIOPSY  W/ LOOP ELECTRODE EXCISION  2017    COLONOSCOPY      NASAL SEPTUM SURGERY      TONSILLECTOMY      UPPER GASTROINTESTINAL ENDOSCOPY      WISDOM TOOTH EXTRACTION         Family History   Problem Relation Age of Onset    Colon cancer Maternal Grandfather     Colon cancer Cousin     Hypertension Mother     Transient ischemic attack Father     Hypertension Father     Uterine cancer Maternal Grandmother     Lung cancer Paternal Grandmother          Medications have been verified  Objective   /74   Pulse 65   Temp 99 °F (37 2 °C) (Temporal)   Resp 20   Ht 5' 4" (1 626 m)   Wt 71 7 kg (158 lb)   LMP 02/09/2022 (Approximate)   SpO2 99%   BMI 27 12 kg/m²        Physical Exam     Physical Exam  Vitals and nursing note reviewed  Constitutional:       General: She is not in acute distress  Appearance: Normal appearance  She is not ill-appearing  HENT:      Head: Normocephalic and atraumatic  Right Ear: Tympanic membrane normal       Left Ear: Tympanic membrane normal       Nose: Nose normal  No congestion or rhinorrhea  Mouth/Throat:      Mouth: Mucous membranes are moist       Tongue: No lesions  Tongue does not deviate from midline  Palate: No mass and lesions  Eyes:      General: Lids are normal  Vision grossly intact  Extraocular Movements: Extraocular movements intact  Right eye: Normal extraocular motion  Left eye: Normal extraocular motion  Conjunctiva/sclera: Conjunctivae normal       Right eye: Right conjunctiva is not injected  Left eye: Left conjunctiva is not injected  Pupils: Pupils are equal, round, and reactive to light  Comments: Notes some pain when turning eyes to left during cardinal points of gaze   Mild unidirectional nystagmus noted with extreme lateral gaze  PERRLA, pupils 3 mm bilatrally  Cardiovascular:      Rate and Rhythm: Normal rate and regular rhythm  Pulses: Normal pulses  Heart sounds: Normal heart sounds  No murmur heard  No friction rub  No gallop  Pulmonary:      Effort: Pulmonary effort is normal  No respiratory distress  Breath sounds: Normal breath sounds  No stridor  No wheezing, rhonchi or rales  Abdominal:      General: Bowel sounds are normal       Palpations: Abdomen is soft  Tenderness: There is no abdominal tenderness  There is no guarding or rebound  Musculoskeletal:         General: Normal range of motion  Cervical back: Normal range of motion and neck supple  No signs of trauma, rigidity, torticollis, tenderness or crepitus  No pain with movement  Normal range of motion  Lymphadenopathy:      Cervical: Cervical adenopathy present  Right cervical: Superficial cervical adenopathy present  Left cervical: Superficial cervical adenopathy present  Skin:     General: Skin is warm and dry  Capillary Refill: Capillary refill takes less than 2 seconds  Neurological:      General: No focal deficit present  Mental Status: She is alert and oriented to person, place, and time  Mental status is at baseline  GCS: GCS eye subscore is 4  GCS verbal subscore is 5  GCS motor subscore is 6  Cranial Nerves: No cranial nerve deficit  Sensory: Sensation is intact  Motor: Motor function is intact  Coordination: Coordination is intact  Gait: Gait is intact  Deep Tendon Reflexes:      Reflex Scores:       Patellar reflexes are 1+ on the right side and 1+ on the left side    Psychiatric:         Mood and Affect: Mood normal          Behavior: Behavior normal

## 2022-03-10 NOTE — PATIENT INSTRUCTIONS
Sinusitis   WHAT YOU NEED TO KNOW:   Sinusitis is inflammation or infection of your sinuses  Sinusitis is most often caused by a virus  Acute sinusitis may last up to 12 weeks  Chronic sinusitis lasts longer than 12 weeks  Recurrent sinusitis means you have 4 or more infections in 1 year  DISCHARGE INSTRUCTIONS:   Return to the emergency department if:   · You have trouble breathing or wheezing that is getting worse  · You have a stiff neck, a fever, or a bad headache  · You cannot open your eye  · Your eyeball bulges out or you cannot move your eye  · You are more sleepy than normal, or you notice changes in your ability to think, move, or talk  · You have swelling of your forehead or scalp  Call your doctor if:   · You have vision changes, such as double vision  · Your eye and eyelid are red, swollen, and painful  · Your symptoms do not improve or go away after 10 days  · You have nausea and are vomiting  · Your nose is bleeding  · You have questions or concerns about your condition or care  Medicines: Your symptoms may go away on their own  Your healthcare provider may recommend watchful waiting for up to 10 days before starting antibiotics  You may need any of the following:  · Acetaminophen  decreases pain and fever  It is available without a doctor's order  Ask how much to take and how often to take it  Follow directions  Read the labels of all other medicines you are using to see if they also contain acetaminophen, or ask your doctor or pharmacist  Acetaminophen can cause liver damage if not taken correctly  Do not use more than 4 grams (4,000 milligrams) total of acetaminophen in one day  · NSAIDs , such as ibuprofen, help decrease swelling, pain, and fever  This medicine is available with or without a doctor's order  NSAIDs can cause stomach bleeding or kidney problems in certain people   If you take blood thinner medicine, always ask your healthcare provider if NSAIDs are safe for you  Always read the medicine label and follow directions  · Nasal steroid sprays  may help decrease inflammation in your nose and sinuses  · Decongestants  help reduce swelling and drain mucus in the nose and sinuses  They may help you breathe easier  · Antihistamines  help dry mucus in the nose and relieve sneezing  · Antibiotics  help treat or prevent a bacterial infection  · Take your medicine as directed  Contact your healthcare provider if you think your medicine is not helping or if you have side effects  Tell him or her if you are allergic to any medicine  Keep a list of the medicines, vitamins, and herbs you take  Include the amounts, and when and why you take them  Bring the list or the pill bottles to follow-up visits  Carry your medicine list with you in case of an emergency  Self-care:   · Rinse your sinuses as directed  Use a sinus rinse device to rinse your nasal passages with a saline (salt water) solution or distilled water  Do not use tap water  This will help thin the mucus in your nose and rinse away pollen and dirt  It will also help reduce swelling so you can breathe normally  · Use a humidifier  to increase air moisture in your home  This may make it easier for you to breathe and help decrease your cough  · Sleep with your head elevated  Place an extra pillow under your head before you go to sleep to help your sinuses drain  · Drink liquids as directed  Ask your healthcare provider how much liquid to drink each day and which liquids are best for you  Liquids will thin the mucus in your nose and help it drain  Avoid drinks that contain alcohol or caffeine  · Do not smoke, and avoid secondhand smoke  Nicotine and other chemicals in cigarettes and cigars can make your symptoms worse  Ask your healthcare provider for information if you currently smoke and need help to quit   E-cigarettes or smokeless tobacco still contain nicotine  Talk to your healthcare provider before you use these products  Prevent the spread of germs:   · Wash your hands often with soap and water  Wash your hands after you use the bathroom, change a child's diaper, or sneeze  Wash your hands before you prepare or eat food  · Stay away from people who are sick  Some germs spread easily and quickly through contact  Follow up with your doctor as directed: You may be referred to an ear, nose, and throat specialist  Write down your questions so you remember to ask them during your visits  © Copyright [employer] 2022 Information is for End User's use only and may not be sold, redistributed or otherwise used for commercial purposes  All illustrations and images included in CareNotes® are the copyrighted property of [employer]  or Keisha Vasquez   The above information is an  only  It is not intended as medical advice for individual conditions or treatments  Talk to your doctor, nurse or pharmacist before following any medical regimen to see if it is safe and effective for you  Migraine Headache   WHAT YOU NEED TO KNOW:   A migraine is a severe headache  The pain can be so severe that it interferes with your daily activities  A migraine can last a few hours up to several days  The exact cause of migraines is not known  DISCHARGE INSTRUCTIONS:   Call your local emergency number (911 in the [address]) or have someone call if:   · You feel like you are going to faint, you become confused, or you have a seizure  Return to the emergency department if:   · You have a headache that seems different or much worse than your usual migraine headache  · You have a severe headache with a fever or a stiff neck  · You have new problems with speech, vision, balance, or movement  Call your doctor or neurologist if:   · Your migraines interfere with your daily activities  · Your medicines or treatments stop working      · You have questions or concerns about your condition or care  Medicines:  Some medicines may only be given while you are in the emergency department  You may also need medicines later to manage migraines or other health problems they can cause  Take medicine as soon as you feel a migraine begin, or as directed  · Migraine medicines  are used to help prevent or stop a migraine  · NSAIDs  help decrease swelling and pain or fever  This medicine is available with or without a doctor's order  NSAIDs can cause stomach bleeding or kidney problems in certain people  If you take blood thinner medicine, always ask your healthcare provider if NSAIDs are safe for you  Always read the medicine label and follow directions  · Acetaminophen  decreases pain and fever  It is available without a doctor's order  Ask how much to take and how often to take it  Follow directions  Read the labels of all other medicines you are using to see if they also contain acetaminophen, or ask your doctor or pharmacist  Acetaminophen can cause liver damage if not taken correctly  Do not use more than 4 grams (4,000 milligrams) total of acetaminophen in one day  · Prescription pain medicine  may be given  Ask your healthcare provider how to take this medicine safely  Some prescription pain medicines contain acetaminophen  Do not take other medicines that contain acetaminophen without talking to your healthcare provider  Too much acetaminophen may cause liver damage  Prescription pain medicine may cause constipation  Ask your healthcare provider how to prevent or treat constipation  · Antinausea medicine  may be given to calm your stomach and to help prevent vomiting  This medicine can also help relieve pain  · Steroids  may be given to prevent a migraine from coming back right away  · Take your medicine as directed  Contact your healthcare provider if you think your medicine is not helping or if you have side effects   Tell him or her if you are allergic to any medicine  Keep a list of the medicines, vitamins, and herbs you take  Include the amounts, and when and why you take them  Bring the list or the pill bottles to follow-up visits  Carry your medicine list with you in case of an emergency  Manage your symptoms:   · Rest in a dark, quiet room  This will help decrease your pain  Sleep may also help relieve the pain  · Apply ice to decrease pain  Use an ice pack, or put crushed ice in a plastic bag  Cover the ice pack with a towel and place it on your head  Apply ice for 15 to 20 minutes every hour  · Apply heat to decrease pain and muscle spasms  Use a small towel dampened with warm water or a heating pad, or sit in a warm bath  Apply heat on the area for 20 to 30 minutes every 2 hours  You may alternate heat and ice  · Keep a migraine record  Write down when your migraines start and stop  Include your symptoms and what you were doing when a migraine began  Record what you ate or drank for 24 hours before the migraine started  Keep track of what you did to treat your migraine and if it worked  Bring the migraine record with you to visits with your healthcare provider  Common triggers for a migraine include the following:   · Stress, eye strain, oversleeping, or not getting enough sleep    · Hormone changes in women from birth control pills, pregnancy, menopause, or during a monthly period    · Skipping meals, going too long without eating, or not drinking enough liquids    · Certain foods or drinks such as chocolate, hard cheese, alcohol, or drinks that contain caffeine    · Foods that contain gluten, nitrates, MSG, or artificial sweeteners    · Sunlight, bright or flashing lights, loud noises, smoke, or strong smells    · Heat, humidity, or changes in the weather    Prevent another migraine:   · Prevent a medicine overuse headache  Take pain medicines only as long as directed   A medicine may be limited to a certain amount each month  Your healthcare provider can help you create a plan so you get a safe amount each month  · Do not smoke  Nicotine and other chemicals in cigarettes and cigars can trigger a migraine or make it worse  Ask your healthcare provider for information if you currently smoke and need help to quit  E-cigarettes or smokeless tobacco still contain nicotine  Talk to your healthcare provider before you use these products  · Do not drink alcohol  Alcohol can trigger a migraine  It can also keep medicines used to treat your migraines from working  · Be physically active  Physical activity, such as exercise, may help prevent migraines  Talk to your healthcare provider about the best activity plan for you  Try to get at least 30 minutes of physical activity on most days  · Manage stress  Stress may trigger a migraine  Learn new ways to relax, such as deep breathing  · Create a sleep schedule  Go to bed and get up at the same times each day  Do not watch television before bed  · Eat a variety of healthy foods  Include healthy foods such as fruit, vegetables, whole-grain breads, low-fat dairy products, beans, lean meat, and fish  Do not have food or drinks that trigger your migraines  · Drink more liquids to prevent dehydration  Your healthcare provider can tell you how much liquid to drink each day and which liquids are best for you  Follow up with your doctor or neurologist as directed:  Bring your migraine record with you  Write down your questions so you remember to ask them during your visits  © Copyright [address] 2022 Information is for End User's use only and may not be sold, redistributed or otherwise used for commercial purposes  All illustrations and images included in CareNotes® are the copyrighted property of [employer]  or Keisha Lott  The above information is an  only   It is not intended as medical advice for individual conditions or treatments  Talk to your doctor, nurse or pharmacist before following any medical regimen to see if it is safe and effective for you

## 2022-03-16 PROCEDURE — 88305 TISSUE EXAM BY PATHOLOGIST: CPT | Performed by: STUDENT IN AN ORGANIZED HEALTH CARE EDUCATION/TRAINING PROGRAM

## 2022-03-17 ENCOUNTER — LAB REQUISITION (OUTPATIENT)
Dept: LAB | Facility: HOSPITAL | Age: 42
End: 2022-03-17
Payer: COMMERCIAL

## 2022-03-17 DIAGNOSIS — D48.5 NEOPLASM OF UNCERTAIN BEHAVIOR OF SKIN: ICD-10-CM

## 2022-03-28 ENCOUNTER — CONSULT (OUTPATIENT)
Dept: NEUROLOGY | Facility: CLINIC | Age: 42
End: 2022-03-28
Payer: COMMERCIAL

## 2022-03-28 VITALS
BODY MASS INDEX: 27.31 KG/M2 | HEIGHT: 64 IN | DIASTOLIC BLOOD PRESSURE: 74 MMHG | SYSTOLIC BLOOD PRESSURE: 121 MMHG | WEIGHT: 160 LBS | HEART RATE: 80 BPM

## 2022-03-28 DIAGNOSIS — G43.109 MIGRAINE WITH AURA AND WITHOUT STATUS MIGRAINOSUS, NOT INTRACTABLE: ICD-10-CM

## 2022-03-28 PROCEDURE — 99245 OFF/OP CONSLTJ NEW/EST HI 55: CPT | Performed by: PSYCHIATRY & NEUROLOGY

## 2022-03-28 RX ORDER — RIZATRIPTAN BENZOATE 10 MG/1
10 TABLET ORAL ONCE AS NEEDED
Qty: 9 TABLET | Refills: 6 | Status: SHIPPED | OUTPATIENT
Start: 2022-03-28

## 2022-03-28 NOTE — PROGRESS NOTES
[facility] [facility] Consult  PATIENT:  Renard Almazan  MRN:  [id number]  :  1980  DATE OF SERVICE:  3/28/2022  REFERRED BY: Barbee Frankel, MD  PMD: HENRY King    Assessment/Plan:     Renard Almazan is a deligthful 46 y o  female with a past medical history that includes IBS with constipation, allergic rhinitis, chronic sinusitis, migraines, TOSHIA II, insomnia, neck pain, vasovagal syncope in 20s, anxiety, Intestinal cystitis, childhood asthma  referred here for evaluation of headache  My initial evaluation 3/28/2022    Migraine with aura and without status migrainosus, not intractable  She reports a long history of headaches and migraines dating back to 10or 8years old  Family history of migraines  She reports she has followed with multiple neurologists in the past   Pain is typically unilateral more often left-sided with visual aura at times and with typical associated migrainous features as well as autonomic features that do not seem to be unilateral to suggest trigeminal autonomic cephalalgia   -as of 2022 on average over 15 migraine days per month  Trial of emgality for prevention rizatriptan for abortive  Workup:  - Neurologic assessment reveals normal neurological exam   - MRI brain in her teens was reportedly unremarkable  - at this time with symptoms consistent with migraine, no red flags, no new or concerning features, normal exam, we discussed could hold off on follow-up imaging at this time  However, could obtain repeat brain MRI at any time if indicated  Preventative:  - we discussed headache hygiene and lifestyle factors that may improve headaches  - trial of emgality  Discussed proper use, possible side effects and risks    - Currently on through other providers: propranolol 40 mg BID (could consider gradual wean off in the future), nortriptyline (for Intestinal cystitis)  - Past/ failed/contraindicated: topiramate, gabapentin, propranolol 40 mg BID, nortriptyline, amitriptyline, prosac/fluoxetine, aimovig contraindicated due to constiptation  - future options: alternative CGRP, botox     Abortive:  - discussed not taking over-the-counter or prescription pain medications more than 3 days per week to prevent medication overuse/rebound headache  - trial of     rizatriptan (MAXALT) 10 MG tablet; Take 1 tablet (10 mg total) by mouth once as needed for migraine May repeat in 2 hours if needed  Max 2/24 hours, 9/month  Discussed proper use, possible side effects and risks  - Past/ failed/contraindicated: sumatriptan 100 mg works sometimes and not others   - past/helped:  Steroids have helped temporarily  - future options:   prochlorperazine, Toradol IM or p o , could consider trial for 5 days of Depakote or dexamethasone for prolonged migraine, ubrelvy, reyvow, nurtec        Patient instructions      No estrogen due to migraine aura    Headache/migraine treatment:   Abortive medications (for immediate treatment of a headache): It is ok to take ibuprofen, acetaminophen or naproxen (Advil, Tylenol,  Aleve, Excedrin) if they help your headaches you should limit these to No more than 3 times a week to avoid medication overuse/rebound headaches  For your more moderate to severe migraines take this medication early   Maxalt (rizatriptan) 10mg tabs - take one at the onset of headache  May repeat one time after 1-2 hours if pain has not resolved  (Max 2 a day and 9 a month)         Prescription preventive medications for headaches/migraines   (to take every day to help prevent headaches - not to take at the time of headache):  [x] Emgality/Galcanezumab - the 1st dose is 240 mg loading dose of 2 consecutive 120 mg injections  Thereafter, 120 mg injections every 30 days     READ INSTRUCTIONS that come with the medication  REFRIGERATE  Keep out of direct sunlight  Prior to administration, allow to come to room temperature for 30 minutes   Do not warm using a heat source (eg, microwave or hot water)  Do not shake  Administer in preferably abdomen (avoiding 2 inches around the navel), thigh, upper arm, or buttocks avoiding areas of skin that are tender, bruised, red or hard  Deliver entire contents of single-use prefilled pen or syringe  Unknown impact in pregnancy therefore would recommend stopping 6 months prior to considering pregnancy  *Typically these types of medications take time untill you see the benefit, although some may see improvement in days, often it may take weeks, especially if the medication is being titrated up to a beneficial level  Please contact us if there are any concerns or questions regarding the medication  Lifestyle Recommendations:  [x] SLEEP - Maintain a regular sleep schedule: Adults need at least 7-8 hours of uninterrupted a night  Maintain good sleep hygiene:  Going to bed and waking up at consistent times, avoiding excessive daytime naps, avoiding caffeinated beverages in the evening, avoid excessive stimulation in the evening and generally using bed primarily for sleeping  One hour before bedtime would recommend turning lights down lower, decreasing your activity (may read quietly, listen to music at a low volume)  When you get into bed, should eliminate all technology (no texting, emailing, playing with your phone, iPad or tablet in bed)  [x] HYDRATION - Maintain good hydration  Drink  2L of fluid a day (4 typical small water bottles)  [x] DIET - Maintain good nutrition  In particular don't skip meals and try and eat healthy balanced meals regularly  [x] TRIGGERS - Look for other triggers and avoid them: Limit caffeine to 1-2 cups a day or less  Avoid dietary triggers that you have noticed bring on your headaches (this could include aged cheese, peanuts, MSG, aspartame and nitrates)    [x] EXERCISE - physical exercise as we all know is good for you in many ways, and not only is good for your heart, but also is beneficial for your mental health, cognitive health and  chronic pain/headaches  I would encourage at the least 5 days of physical exercise weekly for at least 30 minutes  Education and Follow-up  [x] Please call with any questions or concerns  Of course if any new concerning symptoms go to the emergency department  [x] Follow up 3 months, sooner if needed         CC: We had the pleasure of evaluating Roswell Schirmer in neurological consultation today  Roswell Schirmer is a   right handed female who presents today for evaluation of headaches  History obtained from patient as well as available medical record review  History of Present Illness:   Current medical illnesses  or past medical history include IBS with constipation, allergic rhinitis, chronic sinusitis, migraines, TOSHIA II, insomnia, neck pain, vasovagal syncope in 20s, anxiety, Intestinal cystitis, childhood asthma          Headaches started at what age? 89 years old  How often do the headaches occur?   - as of 3/28/2022: on average over 15 days a month   What time of the day do the headaches start? Occasionally wakes up with them, or afternoon or evening  How long do the headaches last? All day, up to weeks   Are you ever headache free? Yes    Aura? Sometimes aura  Blurred, floaters, lines, flashing orange, 20 mins or more     Last eye exam: goes early for monitoring, no issues except     Where is your headache located and pain quality?   - most of the time unilateral and usually on the left  - left side of head and neck - temporal and parietal and occipital and down the neck  - stabbing, pressure, pulsating  - head warm to touch  - sinus pressure   What is the intensity of pain?  Average: 5-6/10, worst 9/10  Associated symptoms:   [x] Nausea       [] Vomiting        [x] Stiff or sore neck   [x] Photophobia     [x]Phonophobia      [x] Osmophobia  [x] Blurred vision    [x] Light-headed or dizzy     [x] Tinnitus  - both  [x] Hands or feet tingle or feel numb/paresthesias - tingling in both legs and fingers sometimes without focal weakness   [] Ptosis      [] Facial droop  [x] Lacrimation - both  [x] Nasal congestion/rhinorrhea - chronically       Things that make the headache worse? No specific movements, any movement if bad enough    Headache triggers:  Hormonal/menses, weather changes, stress, certain foods, alcohol, sinus congestion      Have you seen someone else for headaches or pain? Yes multiple neurologists, last about 10 years ago   Have you had trigger point injection performed and how often? No  Have you had Botox injection performed and how often? No   Have you had epidural injections or transforaminal injections performed? No  Are you current pregnant or planning on getting pregnant? No future pregnancy, not active   Have you ever had any Brain imaging? yes - MRI Brain in her teens was normal     What medications do you take or have you taken for your headaches?    ABORTIVE:      Sumatriptan 100 mg - makes her nauseated      Past  Steroids In Jan 2021 with COVID, and then again in Feb for unrelenting migraine helps   Rizatriptan/maxalt - thinks may not as been as effective as imitrex    PREVENTIVE:       Propranolol 40 mg BID (4 years has helped)  ambien 5  Nortriptyline 20 mg (started a couple of weeks ago for Intestinal cystitis)    Past/ failed/contraindicated:  nortriptyline  Amitriptyline  topiramate  Gabapentin  prosac/fluoxetine      LIFESTYLE  Sleep   - averages: 6 hours, sometimes takes ambien  Problems falling asleep?:   Yes  Problems staying asleep?:  Yes, 3-4 a night    Water: 40-60 oz per day  Caffeine: coffee - 1 cups in am, 1 at lunch, per day    Mood:   Anxiety maybe more in her 25s, not much now     The following portions of the patient's history were reviewed and updated as appropriate: allergies, current medications, past family history, past medical history, past social history, past surgical history and problem list      Pertinent family history:  Family history of headaches:  migraine headaches in sister, and mom has headaches that are likely migraines   Any family history of aneurysms - No    Pertinent social history:  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) and US at [employer] alone normally        Past Medical History:     Past Medical History:   Diagnosis Date    Migraines     Chronic per pt    Venereal wart 8/7/2014       Patient Active Problem List   Diagnosis    Migraines    Chronic migraine w/o aura w/o status migrainosus, not intractable    TOSHIA II (cervical intraepithelial neoplasia II)    Irritable bowel syndrome with constipation    Allergic rhinitis due to pollen    Chronic sinusitis    Insomnia    Neck pain    Vestibulitis, vulvar    Thyroid nodule    Abnormal TSH    Acute non-recurrent frontal sinusitis    Sensation of fullness in both ears       Medications:      Current Outpatient Medications   Medication Sig Dispense Refill    fluticasone (FLONASE) 50 mcg/act nasal spray 2 sprays into each nostril daily 16 g 2    linaCLOtide (Linzess) 290 MCG CAPS Take 1 capsule by mouth daily 90 capsule 3    loratadine (CLARITIN) 10 mg tablet Take 10 mg by mouth daily        meloxicam (MOBIC) 15 mg tablet Take 15 mg by mouth daily as needed      omeprazole (PriLOSEC) 20 mg delayed release capsule TAKE ONE CAPSULE BY MOUTH EVERY DAY 90 capsule 0    propranolol (INDERAL) 40 mg tablet 2 (two) times a day        zolpidem (AMBIEN) 5 mg tablet Take 5 mg by mouth daily at bedtime as needed        docusate sodium (COLACE) 100 mg capsule Take 1 capsule (100 mg total) by mouth 2 (two) times a day (Patient not taking: Reported on 3/9/2022 ) 180 capsule 3    Galcanezumab-gnlm 120 MG/ML SOAJ Inject 240 mg under the skin once for 1 dose For just the first month loading dose, followed by 1 injection monthly on separate prescription   2 mL 0    Galcanezumab-gnlm 120 MG/ML SOAJ Inject 120 mg under the skin every 30 (thirty) days Following the first month loading dose of 2 pens  1 mL 11    rizatriptan (MAXALT) 10 MG tablet Take 1 tablet (10 mg total) by mouth once as needed for migraine May repeat in 2 hours if needed  Max 2/24 hours, 9/month  9 tablet 6    zolpidem (AMBIEN CR) 6 25 MG CR tablet Take 1 tablet (6 25 mg total) by mouth daily at bedtime as needed for sleep (Patient not taking: Reported on 3/28/2022 ) 30 tablet 0     No current facility-administered medications for this visit          Allergies:    No Known Allergies    Family History:     Family History   Problem Relation Age of Onset    Colon cancer Maternal Grandfather     Colon cancer Cousin     Hypertension Mother     Transient ischemic attack Father     Hypertension Father     Uterine cancer Maternal Grandmother     Lung cancer Paternal Grandmother        Social History:       Social History     Socioeconomic History    Marital status: Single     Spouse name: Not on file    Number of children: Not on file    Years of education: Not on file    Highest education level: Not on file   Occupational History    Not on file   Tobacco Use    Smoking status: Never Smoker    Smokeless tobacco: Never Used   Vaping Use    Vaping Use: Never used   Substance and Sexual Activity    Alcohol use: Yes     Comment: socially     Drug use: Never    Sexual activity: Not Currently   Other Topics Concern    Not on file   Social History Narrative    Not on file     Social Determinants of Health     Financial Resource Strain: Not on file   Food Insecurity: Not on file   Transportation Needs: Not on file   Physical Activity: Not on file   Stress: Not on file   Social Connections: Not on file   Intimate Partner Violence: Not on file   Housing Stability: Not on file         Objective:       Physical Exam:                                                                 Vitals:            Constitutional:    /74 (BP Location: Left arm, Patient Position: Sitting, Cuff Size: Standard)   Pulse 80   Ht 5' 4" (1 626 m)   Wt 72 6 kg (160 lb)   BMI 27 46 kg/m²   BP Readings from Last 3 Encounters:   03/28/22 121/74   03/09/22 124/74   02/10/22 110/78     Pulse Readings from Last 3 Encounters:   03/28/22 80   03/09/22 65   02/10/22 77         Well developed, well nourished, well groomed  No dysmorphic features  HEENT:  Normocephalic atraumatic  Oropharynx is clear and moist  No oral mucosal lesions  Chest:  Respirations regular and unlabored  Cardiovascular:  Distal extremities warm without palpable edema or tenderness, no observed significant swelling  Musculoskeletal:  (see below under neurologic exam for evaluation of motor function and gait)   Skin:  warm and dry, not diaphoretic  No apparent birthmarks or stigmata of neurocutaneous disease  Psychiatric:  Normal behavior and appropriate affect        Neurological Examination:     Mental status/cognitive function:   Orientated to time, place and person  Recent and remote memory intact  Attention span and concentration as well as fund of knowledge are appropriate for age  Normal language and spontaneous speech  Cranial Nerves:  II-visual fields full  Fundi poorly visualized due to pupillary constriction  III, IV, VI-Pupils were equal, round, and reactive to light and accomodation  Extraocular movements were full and conjugate without nystagmus  V-facial sensation symmetric  VII-facial expression symmetric, intact forehead wrinkle, strong eye closure, symmetric smile    VIII-hearing grossly intact bilaterally   IX, X-palate elevation symmetric, no dysarthria  XI-shoulder shrug strength intact    XII-tongue protrusion midline  Motor Exam: symmetric bulk and tone throughout, no pronator drift  Power/strength 5/5 bilateral upper and lower extremities, no atrophy, fasciculations or abnormal movements noted  Sensory: grossly intact light touch in all extremities  Reflexes: brachioradialis 2+, biceps 2+, knee 2+, ankle 2+ bilaterally  No ankle clonus  Coordination: Finger nose finger intact bilaterally, no apparent dysmetria, ataxia or tremor noted  Gait: steady casual and tandem gait  Pertinent lab results:   - 01/14/2022 COVID-19 positive  - 8/19/21 CMP and CBC unremarkable   TSH elevated at 4 09 with normal Free T4     Imaging:   No head imaging in system     MRI brain in her teens was reportedly unremarkable    Review of Systems:   ROS obtained by medical assistant Personally reviewed and updated if indicated  I recommended PCP follow up for non neurologic problems  Review of Systems   Constitutional: Negative  Negative for appetite change and fever  HENT: Positive for ear pain (ear pressure)  Negative for hearing loss, tinnitus, trouble swallowing and voice change  Eyes: Negative  Negative for photophobia and pain  Respiratory: Negative  Negative for shortness of breath  Cardiovascular: Negative  Negative for palpitations  Gastrointestinal: Negative  Negative for nausea and vomiting  Endocrine: Negative  Negative for cold intolerance  Genitourinary: Negative  Negative for dysuria, frequency and urgency  Musculoskeletal: Negative  Negative for myalgias and neck pain  Skin: Negative  Negative for rash  Allergic/Immunologic: Positive for environmental allergies (nasal pressure)  Neurological: Negative  Negative for dizziness, tremors, seizures, syncope, facial asymmetry, speech difficulty, weakness, light-headedness, numbness and headaches  Hematological: Negative  Does not bruise/bleed easily  Psychiatric/Behavioral: Negative  Negative for confusion, hallucinations and sleep disturbance  All other systems reviewed and are negative         I have spent 55 minutes with Patient today in which greater than 50% of this time was spent in counseling/coordination of care regarding Diagnostic results, Prognosis, Risks and benefits of tx options, Intructions for management, Patient education, Importance of tx compliance, Risk factor reductions and Impressions  I also spent 12 minutes non face to face for this patient the same day           Author:  Trenton Wilkins MD 3/28/2022 10:26 AM

## 2022-03-28 NOTE — PROGRESS NOTES
Review of Systems   Constitutional: Negative  Negative for appetite change and fever  HENT: Positive for ear pain (ear pressure)  Negative for hearing loss, tinnitus, trouble swallowing and voice change  Eyes: Negative  Negative for photophobia and pain  Respiratory: Negative  Negative for shortness of breath  Cardiovascular: Negative  Negative for palpitations  Gastrointestinal: Negative  Negative for nausea and vomiting  Endocrine: Negative  Negative for cold intolerance  Genitourinary: Negative  Negative for dysuria, frequency and urgency  Musculoskeletal: Negative  Negative for myalgias and neck pain  Skin: Negative  Negative for rash  Allergic/Immunologic: Positive for environmental allergies (nasal pressure)  Neurological: Negative  Negative for dizziness, tremors, seizures, syncope, facial asymmetry, speech difficulty, weakness, light-headedness, numbness and headaches  Hematological: Negative  Does not bruise/bleed easily  Psychiatric/Behavioral: Negative  Negative for confusion, hallucinations and sleep disturbance  All other systems reviewed and are negative

## 2022-03-28 NOTE — PATIENT INSTRUCTIONS
Headache/migraine treatment:   Abortive medications (for immediate treatment of a headache): It is ok to take ibuprofen, acetaminophen or naproxen (Advil, Tylenol,  Aleve, Excedrin) if they help your headaches you should limit these to No more than 3 times a week to avoid medication overuse/rebound headaches  For your more moderate to severe migraines take this medication early   Maxalt (rizatriptan) 10mg tabs - take one at the onset of headache  May repeat one time after 1-2 hours if pain has not resolved  (Max 2 a day and 9 a month)         Prescription preventive medications for headaches/migraines   (to take every day to help prevent headaches - not to take at the time of headache):  [x] Emgality/Galcanezumab - the 1st dose is 240 mg loading dose of 2 consecutive 120 mg injections  Thereafter, 120 mg injections every 30 days     READ INSTRUCTIONS that come with the medication  REFRIGERATE  Keep out of direct sunlight  Prior to administration, allow to come to room temperature for 30 minutes  Do not warm using a heat source (eg, microwave or hot water)  Do not shake  Administer in preferably abdomen (avoiding 2 inches around the navel), thigh, upper arm, or buttocks avoiding areas of skin that are tender, bruised, red or hard  Deliver entire contents of single-use prefilled pen or syringe  Unknown impact in pregnancy therefore would recommend stopping 6 months prior to considering pregnancy  *Typically these types of medications take time untill you see the benefit, although some may see improvement in days, often it may take weeks, especially if the medication is being titrated up to a beneficial level  Please contact us if there are any concerns or questions regarding the medication  Lifestyle Recommendations:  [x] SLEEP - Maintain a regular sleep schedule: Adults need at least 7-8 hours of uninterrupted a night   Maintain good sleep hygiene:  Going to bed and waking up at consistent times, avoiding excessive daytime naps, avoiding caffeinated beverages in the evening, avoid excessive stimulation in the evening and generally using bed primarily for sleeping  One hour before bedtime would recommend turning lights down lower, decreasing your activity (may read quietly, listen to music at a low volume)  When you get into bed, should eliminate all technology (no texting, emailing, playing with your phone, iPad or tablet in bed)  [x] HYDRATION - Maintain good hydration  Drink  2L of fluid a day (4 typical small water bottles)  [x] DIET - Maintain good nutrition  In particular don't skip meals and try and eat healthy balanced meals regularly  [x] TRIGGERS - Look for other triggers and avoid them: Limit caffeine to 1-2 cups a day or less  Avoid dietary triggers that you have noticed bring on your headaches (this could include aged cheese, peanuts, MSG, aspartame and nitrates)  [x] EXERCISE - physical exercise as we all know is good for you in many ways, and not only is good for your heart, but also is beneficial for your mental health, cognitive health and  chronic pain/headaches  I would encourage at the least 5 days of physical exercise weekly for at least 30 minutes  Education and Follow-up  [x] Please call with any questions or concerns  Of course if any new concerning symptoms go to the emergency department    [x] Follow up 3 months, sooner if needed

## 2022-03-29 ENCOUNTER — TELEPHONE (OUTPATIENT)
Dept: NEUROLOGY | Facility: CLINIC | Age: 42
End: 2022-03-29

## 2022-04-04 ENCOUNTER — COSMETIC (OUTPATIENT)
Dept: PLASTIC SURGERY | Facility: CLINIC | Age: 42
End: 2022-04-04

## 2022-04-04 DIAGNOSIS — Z41.1 ENCOUNTER FOR COSMETIC PROCEDURE: ICD-10-CM

## 2022-04-04 PROCEDURE — BOTOX1U PR BOTOX BY THE UNIT: Performed by: PLASTIC SURGERY

## 2022-04-04 NOTE — PROGRESS NOTES
Patient is a 46year-old female who is here today for Botox injection, the patient has never had botox before   She is here today for treatment to the forehead  She has no major medical problems, she had no major reactions to the Botox appear, she does not take steroids or blood thinners        Physical exam--patient with moderate transverse forehead lines with animation, she has minimal forehead lines at rest        discussed with the patient that she is an excellent candidate for Botox, we discussed the risks, benefits, and alternatives of Botox including the nature of the treatment, its mechanism of action, the risk of bleeding, bruising, infection, scarring, asymmetry, poor aesthetic result, under correction, over-correction, brow ptosis, eyelid ptosis, need for repeat treatment, need for revision, need for touchup, discussed with her the Botox typically takes approximately 3-4 days to see for affected lost approximately 3-4 months  All the patient's questions were answered to her satisfaction, informed consent obtained and signed, the patient then underwent 20 units injection into the forehead in 10 injection sites keeping 1 cm at least between the injection site in the orbital rim and within the limits of the lateral limbus, the injections were done in an aseptic conditions directly into the muscle belly    The patient tolerated the procedure well, she can follow-up in 1 week for reassessment as needed and 3-4 months or sooner p r n  for treatment      1 area 20 units

## 2022-04-05 NOTE — TELEPHONE ENCOUNTER
PA has been approved through 10/1/2022    [facility] pharmacy, spoke w/Melany and made aware of the approval  She got a paid claim for the loading dose but copay is   $250 w/ insurance       Called and left a message on pt's answering machine for a call back    Shout TV message sent

## 2022-04-11 ENCOUNTER — APPOINTMENT (OUTPATIENT)
Dept: LAB | Age: 42
End: 2022-04-11
Payer: COMMERCIAL

## 2022-04-11 DIAGNOSIS — Z00.8 HEALTH EXAMINATION IN POPULATION SURVEY: ICD-10-CM

## 2022-04-11 LAB
CHOLEST SERPL-MCNC: 170 MG/DL
EST. AVERAGE GLUCOSE BLD GHB EST-MCNC: 103 MG/DL
HBA1C MFR BLD: 5.2 %
HDLC SERPL-MCNC: 51 MG/DL
LDLC SERPL CALC-MCNC: 102 MG/DL (ref 0–100)
NONHDLC SERPL-MCNC: 119 MG/DL
TRIGL SERPL-MCNC: 86 MG/DL
TSH SERPL DL<=0.05 MIU/L-ACNC: 1.73 UIU/ML (ref 0.45–4.5)

## 2022-04-11 PROCEDURE — 83036 HEMOGLOBIN GLYCOSYLATED A1C: CPT

## 2022-04-11 PROCEDURE — 80061 LIPID PANEL: CPT

## 2022-04-11 PROCEDURE — 84443 ASSAY THYROID STIM HORMONE: CPT | Performed by: NURSE PRACTITIONER

## 2022-04-11 PROCEDURE — 36415 COLL VENOUS BLD VENIPUNCTURE: CPT

## 2022-04-12 ENCOUNTER — COSMETIC (OUTPATIENT)
Dept: PLASTIC SURGERY | Facility: CLINIC | Age: 42
End: 2022-04-12

## 2022-04-12 DIAGNOSIS — Z41.1 ENCOUNTER FOR COSMETIC PROCEDURE: Primary | ICD-10-CM

## 2022-04-12 PROCEDURE — RECHECK: Performed by: PLASTIC SURGERY

## 2022-04-12 NOTE — PROGRESS NOTES
Patient is a 56-year-old female who is here today for Botox follow-up status post forehead injection  The patient has slightly more lateral elevation of the left lateral brow than the right  Otherwise she is very happy with her result  Physical exam--patient with a very natural appearing arch to her brow, she does have some lateral movement to the tail of her left lateral eyebrow  Discussion--discussed with patient who benefit from a small touchup in this area  We briefly once again reviewed the risks, benefits, alternatives of Botox, the patient then underwent a 4 unit injection to the peak of the activity of the frontalis on the left brow  This was done under aseptic conditions, the patient tolerated the procedure well  Patient can follow-up for repeat treatment, will take note to treat the left lateral brow slightly lower and more lateral during the next repeat treatment

## 2022-04-20 DIAGNOSIS — R12 HEARTBURN: ICD-10-CM

## 2022-04-20 RX ORDER — OMEPRAZOLE 20 MG/1
CAPSULE, DELAYED RELEASE ORAL
Qty: 90 CAPSULE | Refills: 0 | Status: SHIPPED | OUTPATIENT
Start: 2022-04-20 | End: 2022-07-21

## 2022-06-22 ENCOUNTER — TELEPHONE (OUTPATIENT)
Dept: NEUROLOGY | Facility: CLINIC | Age: 42
End: 2022-06-22

## 2022-06-22 NOTE — TELEPHONE ENCOUNTER
Called and left message for patient confirming their upcoming appointment with Dr Jinny Hall on 7/1/22

## 2022-07-01 ENCOUNTER — OFFICE VISIT (OUTPATIENT)
Dept: NEUROLOGY | Facility: CLINIC | Age: 42
End: 2022-07-01
Payer: COMMERCIAL

## 2022-07-01 VITALS
WEIGHT: 158.7 LBS | DIASTOLIC BLOOD PRESSURE: 71 MMHG | BODY MASS INDEX: 27.24 KG/M2 | HEART RATE: 73 BPM | SYSTOLIC BLOOD PRESSURE: 105 MMHG

## 2022-07-01 DIAGNOSIS — G43.109 MIGRAINE WITH AURA AND WITHOUT STATUS MIGRAINOSUS, NOT INTRACTABLE: Primary | ICD-10-CM

## 2022-07-01 PROCEDURE — 99214 OFFICE O/P EST MOD 30 MIN: CPT | Performed by: PSYCHIATRY & NEUROLOGY

## 2022-07-01 RX ORDER — NORTRIPTYLINE HYDROCHLORIDE 10 MG/1
CAPSULE ORAL
COMMUNITY
Start: 2022-05-13

## 2022-07-01 NOTE — PROGRESS NOTES
Patient ID: Edgar Vance is a 46 y o  female  Assessment/Plan:    No problem-specific Assessment & Plan notes found for this encounter  {Assess/PlanSmartLinks:42163}       Subjective:    HPI    {[facility] HPI texts:69043}    {Common ambulatory SmartLinks:10668}         Objective:    Blood pressure 105/71, pulse 73, weight 72 kg (158 lb 11 2 oz)  Physical Exam    Neurological Exam      ROS:    Review of Systems   Constitutional: Negative  Negative for appetite change and fever  HENT: Negative  Negative for hearing loss, tinnitus, trouble swallowing and voice change  Eyes: Negative  Negative for photophobia and pain  Respiratory: Negative  Negative for shortness of breath  Cardiovascular: Negative  Negative for palpitations  Gastrointestinal: Negative  Negative for nausea and vomiting  Endocrine: Negative  Negative for cold intolerance  Genitourinary: Negative  Negative for dysuria, frequency and urgency  Musculoskeletal: Negative  Negative for myalgias and neck pain  Skin: Negative  Negative for rash  Neurological: Negative  Negative for dizziness, tremors, seizures, syncope, facial asymmetry, speech difficulty, weakness, light-headedness, numbness and headaches  Hematological: Negative  Does not bruise/bleed easily  Psychiatric/Behavioral: Negative  Negative for confusion, hallucinations and sleep disturbance

## 2022-07-01 NOTE — PATIENT INSTRUCTIONS
No estrogen due to migraine aura    Headache/migraine treatment:   Abortive medications (for immediate treatment of a headache): It is ok to take ibuprofen, acetaminophen or naproxen (Advil, Tylenol,  Aleve, Excedrin) if they help your headaches you should limit these to No more than 3 times a week to avoid medication overuse/rebound headaches  For your more moderate to severe migraines take this medication early   Maxalt (rizatriptan) 10mg tabs - take one at the onset of headache  May repeat one time after 1-2 hours if pain has not resolved  (Max 2 a day and 9 a month)     Prescription preventive medications for headaches/migraines   (to take every day to help prevent headaches - not to take at the time of headache):  [x] Emgality/Galcanezumab -  120 mg injections every 30 days     READ INSTRUCTIONS that come with the medication  REFRIGERATE  Keep out of direct sunlight  Prior to administration, allow to come to room temperature for 30 minutes  Do not warm using a heat source (eg, microwave or hot water)  Do not shake  Administer in preferably abdomen (avoiding 2 inches around the navel), thigh, upper arm, or buttocks avoiding areas of skin that are tender, bruised, red or hard  Deliver entire contents of single-use prefilled pen or syringe  Unknown impact in pregnancy therefore would recommend stopping 6 months prior to considering pregnancy  Let me know if you ever want to try and slowly come off propranolol       *Typically these types of medications take time untill you see the benefit, although some may see improvement in days, often it may take weeks, especially if the medication is being titrated up to a beneficial level  Please contact us if there are any concerns or questions regarding the medication  Lifestyle Recommendations:  [x] SLEEP - Maintain a regular sleep schedule: Adults need at least 7-8 hours of uninterrupted a night   Maintain good sleep hygiene:  Going to bed and waking up at consistent times, avoiding excessive daytime naps, avoiding caffeinated beverages in the evening, avoid excessive stimulation in the evening and generally using bed primarily for sleeping  One hour before bedtime would recommend turning lights down lower, decreasing your activity (may read quietly, listen to music at a low volume)  When you get into bed, should eliminate all technology (no texting, emailing, playing with your phone, iPad or tablet in bed)  [x] HYDRATION - Maintain good hydration  Drink  2L of fluid a day (4 typical small water bottles)  [x] DIET - Maintain good nutrition  In particular don't skip meals and try and eat healthy balanced meals regularly  [x] TRIGGERS - Look for other triggers and avoid them: Limit caffeine to 1-2 cups a day or less  Avoid dietary triggers that you have noticed bring on your headaches (this could include aged cheese, peanuts, MSG, aspartame and nitrates)  [x] EXERCISE - physical exercise as we all know is good for you in many ways, and not only is good for your heart, but also is beneficial for your mental health, cognitive health and  chronic pain/headaches  I would encourage at the least 5 days of physical exercise weekly for at least 30 minutes  Education and Follow-up  [x] Please call with any questions or concerns  Of course if any new concerning symptoms go to the emergency department    [x] Follow up 6 months, sooner if needed

## 2022-07-01 NOTE — PROGRESS NOTES
[facility] Consult - Follow up   PATIENT:  Sal Ramon  MRN:  [id number]  :  1980  DATE OF SERVICE:  2022  REFERRED BY: No ref  provider found  PMD: HENRY Duffy    Assessment/Plan:   Sal Ramon is a deligthful 46 y o  female with a past medical history that includes IBS with constipation, allergic rhinitis, chronic sinusitis, migraines, TOSHIA II, insomnia, neck pain, vasovagal syncope in 20s, anxiety, Intestinal cystitis, childhood asthma  referred here for evaluation of headache  My initial evaluation 3/28/2022    Migraine with aura and without status migrainosus, not intractable  She reports a long history of headaches and migraines dating back to 10or 8years old  Family history of migraines  She reports she has followed with multiple neurologists in the past   Pain is typically unilateral more often left-sided with visual aura at times and with typical associated migrainous features as well as autonomic features that do not seem to be unilateral to suggest trigeminal autonomic cephalalgia   -as of 2022 on average over 15 migraine days per month  Trial of emgality for prevention rizatriptan for abortive  - as of 2022:  She reports she has had significant improvement on emgality down from 15 to 7-8 times a month and severity has improved as well  They respond to rizatriptan which she feels she tolerates more than sumatriptan  Workup:  - Neurologic assessment reveals normal neurological exam   - MRI brain in her teens was reportedly unremarkable  - at this time with symptoms consistent with migraine, no red flags, no new or concerning features, normal exam, we discussed could hold off on follow-up imaging at this time  However, could obtain repeat brain MRI at any time if indicated  Preventative:  - we discussed headache hygiene and lifestyle factors that may improve headaches  - continue  emgality   Discussed proper use, possible side effects and risks  - Currently on through other providers: propranolol 40 mg BID (could consider gradual wean off in the future), nortriptyline 20 mg (for Intestinal cystitis), ambien   - Past/ failed/contraindicated: topiramate, gabapentin, propranolol 40 mg BID, nortriptyline, amitriptyline, prosac/fluoxetine, aimovig contraindicated due to constiptation  - future options: alternative CGRP, botox     Abortive:  - discussed not taking over-the-counter or prescription pain medications more than 3 days per week to prevent medication overuse/rebound headache  -  rizatriptan (MAXALT) 10 MG tablet; Take 1 tablet (10 mg total) by mouth once as needed for migraine May repeat in 2 hours if needed  Max 2/24 hours, 9/month  Discussed proper use, possible side effects and risks  - Past/ failed/contraindicated: sumatriptan 100 mg works sometimes and not others, rizatriptan   - past/helped:  Steroids have helped temporarily  - future options:   prochlorperazine, Toradol IM or p o , could consider trial for 5 days of Depakote or dexamethasone for prolonged migraine, ubrelvy, reyvow, nurtec        Patient instructions      No estrogen due to migraine aura    Headache/migraine treatment:   Abortive medications (for immediate treatment of a headache): It is ok to take ibuprofen, acetaminophen or naproxen (Advil, Tylenol,  Aleve, Excedrin) if they help your headaches you should limit these to No more than 3 times a week to avoid medication overuse/rebound headaches  For your more moderate to severe migraines take this medication early   Maxalt (rizatriptan) 10mg tabs - take one at the onset of headache  May repeat one time after 1-2 hours if pain has not resolved     (Max 2 a day and 9 a month)     Prescription preventive medications for headaches/migraines   (to take every day to help prevent headaches - not to take at the time of headache):  [x] Emgality/Galcanezumab -  120 mg injections every 30 days     READ INSTRUCTIONS that come with the medication  REFRIGERATE  Keep out of direct sunlight  Prior to administration, allow to come to room temperature for 30 minutes  Do not warm using a heat source (eg, microwave or hot water)  Do not shake  Administer in preferably abdomen (avoiding 2 inches around the navel), thigh, upper arm, or buttocks avoiding areas of skin that are tender, bruised, red or hard  Deliver entire contents of single-use prefilled pen or syringe  Unknown impact in pregnancy therefore would recommend stopping 6 months prior to considering pregnancy  Let me know if you ever want to try and slowly come off propranolol       *Typically these types of medications take time untill you see the benefit, although some may see improvement in days, often it may take weeks, especially if the medication is being titrated up to a beneficial level  Please contact us if there are any concerns or questions regarding the medication  Lifestyle Recommendations:  [x] SLEEP - Maintain a regular sleep schedule: Adults need at least 7-8 hours of uninterrupted a night  Maintain good sleep hygiene:  Going to bed and waking up at consistent times, avoiding excessive daytime naps, avoiding caffeinated beverages in the evening, avoid excessive stimulation in the evening and generally using bed primarily for sleeping  One hour before bedtime would recommend turning lights down lower, decreasing your activity (may read quietly, listen to music at a low volume)  When you get into bed, should eliminate all technology (no texting, emailing, playing with your phone, iPad or tablet in bed)  [x] HYDRATION - Maintain good hydration  Drink  2L of fluid a day (4 typical small water bottles)  [x] DIET - Maintain good nutrition  In particular don't skip meals and try and eat healthy balanced meals regularly  [x] TRIGGERS - Look for other triggers and avoid them: Limit caffeine to 1-2 cups a day or less   Avoid dietary triggers that you have noticed bring on your headaches (this could include aged cheese, peanuts, MSG, aspartame and nitrates)  [x] EXERCISE - physical exercise as we all know is good for you in many ways, and not only is good for your heart, but also is beneficial for your mental health, cognitive health and  chronic pain/headaches  I would encourage at the least 5 days of physical exercise weekly for at least 30 minutes  Education and Follow-up  [x] Please call with any questions or concerns  Of course if any new concerning symptoms go to the emergency department  [x] Follow up 6 months, sooner if needed         CC: We had the pleasure of evaluating Ariane Rodriguez in neurological consultation today  Ariane Rodriguez is a   right handed female who presents today for evaluation of headaches  History obtained from patient as well as available medical record review  History of Present Illness:   Interval history as of 7/1/2022  - denies any new or concerning neurologic symptoms since last visit      Headaches and migraines   She reports improvement on emgality which has brought migraines down from 15 days a month to 7-8 a month  Less severe as well   The first month did the best, "almost forgot I had migraines for a while"   Some wearing off effect when due for next shot   - triggered by weather changes     Preventative:   - Trial of emgality - approved 4/5/22 - 4th shot soon   Denies bothersome side effects     Abortive:   - Trial of rizatriptan 10 mg - makes her less nauseated than sumatriptan, does not work quiet as well as sumatriptan, may not completely get rid of it but improves and then eventually goes away   Denies bothersome side effects     History as of initial visit 3/28/22:   Headaches started at what age? 89 years old  How often do the headaches occur?   - as of 3/28/2022: on average over 15 days a month   What time of the day do the headaches start?  Occasionally wakes up with them, or afternoon or evening  How long do the headaches last? All day, up to weeks   Are you ever headache free? Yes    Aura? Sometimes aura  Blurred, floaters, lines, flashing orange, 20 mins or more     Last eye exam: goes early for monitoring, no issues except     Where is your headache located and pain quality?   - most of the time unilateral and usually on the left  - left side of head and neck - temporal and parietal and occipital and down the neck  - stabbing, pressure, pulsating  - head warm to touch  - sinus pressure   What is the intensity of pain? Average: 5-6/10, worst 9/10  Associated symptoms:   [x] Nausea       [] Vomiting        [x] Stiff or sore neck   [x] Photophobia     [x]Phonophobia      [x] Osmophobia  [x] Blurred vision    [x] Light-headed or dizzy     [x] Tinnitus  - both  [x] Hands or feet tingle or feel numb/paresthesias - tingling in both legs and fingers sometimes without focal weakness   [] Ptosis      [] Facial droop  [x] Lacrimation - both  [x] Nasal congestion/rhinorrhea - chronically       Things that make the headache worse? No specific movements, any movement if bad enough    Headache triggers:  Hormonal/menses, weather changes, stress, certain foods, alcohol, sinus congestion      Have you seen someone else for headaches or pain? Yes multiple neurologists, last about 10 years ago   Have you had trigger point injection performed and how often? No  Have you had Botox injection performed and how often? No   Have you had epidural injections or transforaminal injections performed? No  Are you current pregnant or planning on getting pregnant? No future pregnancy, not active   Have you ever had any Brain imaging? yes - MRI Brain in her teens was normal     What medications do you take or have you taken for your headaches?    ABORTIVE:      Sumatriptan 100 mg - makes her nauseated      Past  Steroids In Jan 2021 with COVID, and then again in Feb for unrelenting migraine helps   Rizatriptan/maxalt - thinks may not as been as effective as imitrex    PREVENTIVE:       Propranolol 40 mg BID (4 years has helped)  ambien 5  Nortriptyline 20 mg (started a couple of weeks ago for Intestinal cystitis)    Past/ failed/contraindicated:  nortriptyline  Amitriptyline  topiramate  Gabapentin  prosac/fluoxetine      LIFESTYLE  Sleep   - averages: 6 hours, sometimes takes ambien  Problems falling asleep?:   Yes  Problems staying asleep?:  Yes, 3-4 a night    Water: 40-60 oz per day  Caffeine: coffee - 1 cups in am, 1 at lunch, per day    Mood:   Anxiety maybe more in her 25s, not much now     The following portions of the patient's history were reviewed and updated as appropriate: allergies, current medications, past family history, past medical history, past social history, past surgical history and problem list      Pertinent family history:  Family history of headaches:  migraine headaches in sister, and mom has headaches that are likely migraines   Any family history of aneurysms - No    Pertinent social history:  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 alone normally      Past Medical History:     Past Medical History:   Diagnosis Date    Migraines     Chronic per pt    Venereal wart 8/7/2014       Patient Active Problem List   Diagnosis    Migraines    Chronic migraine w/o aura w/o status migrainosus, not intractable    TOSHIA II (cervical intraepithelial neoplasia II)    Irritable bowel syndrome with constipation    Allergic rhinitis due to pollen    Chronic sinusitis    Insomnia    Neck pain    Vestibulitis, vulvar    Thyroid nodule    Abnormal TSH    Acute non-recurrent frontal sinusitis    Sensation of fullness in both ears       Medications:      Current Outpatient Medications   Medication Sig Dispense Refill    fluticasone (FLONASE) 50 mcg/act nasal spray 2 sprays into each nostril daily 16 g 2    Galcanezumab-gnlm 120 MG/ML SOAJ Inject 120 mg under the skin every 30 (thirty) days Following the first month loading dose of 2 pens  1 mL 11    linaCLOtide (Linzess) 290 MCG CAPS Take 1 capsule by mouth daily 90 capsule 3    loratadine (CLARITIN) 10 mg tablet Take 10 mg by mouth daily        meloxicam (MOBIC) 15 mg tablet Take 15 mg by mouth daily as needed      omeprazole (PriLOSEC) 20 mg delayed release capsule TAKE ONE CAPSULE BY MOUTH EVERY DAY 90 capsule 0    propranolol (INDERAL) 40 mg tablet 2 (two) times a day        rizatriptan (MAXALT) 10 MG tablet Take 1 tablet (10 mg total) by mouth once as needed for migraine May repeat in 2 hours if needed  Max 2/24 hours, 9/month  9 tablet 6    zolpidem (AMBIEN) 5 mg tablet Take 5 mg by mouth daily at bedtime as needed        docusate sodium (COLACE) 100 mg capsule Take 1 capsule (100 mg total) by mouth 2 (two) times a day (Patient not taking: No sig reported) 180 capsule 3    nortriptyline (PAMELOR) 10 mg capsule TAKE 1 CAPSULE BY MOUTH AT BEDTIME MAY INCREASE TO TAKE 1 CAPSULE BY MOUTH AT BEDTIME AFTER 1 WEEK      zolpidem (AMBIEN CR) 6 25 MG CR tablet Take 1 tablet (6 25 mg total) by mouth daily at bedtime as needed for sleep (Patient not taking: Reported on 3/28/2022 ) 30 tablet 0     No current facility-administered medications for this visit          Allergies:    No Known Allergies    Family History:     Family History   Problem Relation Age of Onset    Colon cancer Maternal Grandfather     Colon cancer Cousin     Hypertension Mother     Transient ischemic attack Father     Hypertension Father     Uterine cancer Maternal Grandmother     Lung cancer Paternal Grandmother        Social History:     Social History     Socioeconomic History    Marital status: Single     Spouse name: Not on file    Number of children: Not on file    Years of education: Not on file    Highest education level: Not on file   Occupational History    Not on file   Tobacco Use    Smoking status: Never Smoker    Smokeless tobacco: Never Used   Vaping Use    Vaping Use: Never used   Substance and Sexual Activity    Alcohol use: Yes     Comment: socially     Drug use: Never    Sexual activity: Not Currently   Other Topics Concern    Not on file   Social History Narrative    Not on file     Social Determinants of Health     Financial Resource Strain: Not on file   Food Insecurity: Not on file   Transportation Needs: Not on file   Physical Activity: Not on file   Stress: Not on file   Social Connections: Not on file   Intimate Partner Violence: Not on file   Housing Stability: Not on file         Objective:     Physical Exam:                                                                 Vitals:            Constitutional:    /71 (BP Location: Left arm, Patient Position: Sitting, Cuff Size: Standard)   Pulse 73   Wt 72 kg (158 lb 11 2 oz)   BMI 27 24 kg/m²   BP Readings from Last 3 Encounters:   07/01/22 105/71   03/28/22 121/74   03/09/22 124/74     Pulse Readings from Last 3 Encounters:   07/01/22 73   03/28/22 80   03/09/22 65         Well developed, well nourished, well groomed  No dysmorphic features  HEENT:  Normocephalic atraumatic  See neuro exam   Chest:  Respirations appear regular and unlabored  Cardiovascular:  no observed significant swelling  Musculoskeletal:  (see below under neurologic exam for evaluation of motor function and gait)   Skin:  warm and dry, not diaphoretic  Psychiatric:  Normal behavior and appropriate affect        Neurological Examination:     Mental status/cognitive function:   Recent and remote memory intact  Attention span and concentration as well as fund of knowledge are appropriate for age  Normal language and spontaneous speech  Cranial Nerves:  VII-facial expression symmetric  VIII-hearing grossly intact bilaterally   Motor Exam: symmetric bulk throughout  no atrophy, fasciculations or abnormal movements noted     Coordination:  no apparent dysmetria, ataxia or tremor noted  Gait: steady casual gait     Pertinent lab results:   See EMR for recent labs  - 01/14/2022 COVID-19 positive  - 8/19/21 CMP and CBC unremarkable   TSH elevated at 4 09 with normal Free T4     Imaging:   No head imaging in system      MRI brain in her teens was reportedly unremarkable    Review of Systems:   ROS obtained by medical assistant Personally reviewed and updated if indicated  I recommended PCP follow up for non neurologic problems  Review of Systems   Constitutional: Negative  Negative for appetite change and fever  HENT: Negative  Negative for hearing loss, tinnitus, trouble swallowing and voice change  Eyes: Negative  Negative for photophobia and pain  Respiratory: Negative  Negative for shortness of breath  Cardiovascular: Negative  Negative for palpitations  Gastrointestinal: Negative  Negative for nausea and vomiting  Endocrine: Negative  Negative for cold intolerance  Genitourinary: Negative  Negative for dysuria, frequency and urgency  Musculoskeletal: Negative  Negative for myalgias and neck pain  Skin: Negative  Negative for rash  Neurological: Negative  Negative for dizziness, tremors, seizures, syncope, facial asymmetry, speech difficulty, weakness, light-headedness, numbness and headaches  Hematological: Negative  Does not bruise/bleed easily  Psychiatric/Behavioral: Negative  Negative for confusion, hallucinations and sleep disturbance  I have spent 23 minutes with Patient  today in which greater than 50% of this time was spent in counseling/coordination of care  I also spent 10 minutes non face to face for this patient the same day         Author:  Eed Ramirez MD 7/1/2022 8:56 AM

## 2022-07-21 DIAGNOSIS — R12 HEARTBURN: ICD-10-CM

## 2022-07-21 RX ORDER — OMEPRAZOLE 20 MG/1
CAPSULE, DELAYED RELEASE ORAL
Qty: 90 CAPSULE | Refills: 0 | Status: SHIPPED | OUTPATIENT
Start: 2022-07-21 | End: 2022-10-17

## 2022-08-01 ENCOUNTER — TELEPHONE (OUTPATIENT)
Dept: PLASTIC SURGERY | Facility: CLINIC | Age: 42
End: 2022-08-01

## 2022-08-22 ENCOUNTER — COSMETIC (OUTPATIENT)
Dept: PLASTIC SURGERY | Facility: CLINIC | Age: 42
End: 2022-08-22

## 2022-08-22 DIAGNOSIS — Z41.1 ENCOUNTER FOR COSMETIC PROCEDURE: Primary | ICD-10-CM

## 2022-08-22 PROCEDURE — RECHECK: Performed by: PLASTIC SURGERY

## 2022-08-22 PROCEDURE — BOTOX1U PR BOTOX BY THE UNIT: Performed by: PLASTIC SURGERY

## 2022-08-22 NOTE — PROGRESS NOTES
Patient is a 42-year-old female who is known to me status post Botox for the forehead, she required a small touchup on the left side as she had some increased movement, she states that she initially like how the movement appeared and did not like the completely frozen appearance afterwards, I discussed with her today that we would split the difference between those 2 areas to try to get her that desired result, otherwise she was happy with her results and is here today for repeat treatment  Physical exam--patient with resumption of animation to the forehead, she has moderate transverse lines at animation and minimal at rest    discussed with the patient that she is an excellent candidate for Botox, we discussed the risks, benefits, and alternatives of Botox including the nature of the treatment, its mechanism of action, the risk of bleeding, bruising, infection, scarring, asymmetry, poor aesthetic result, under correction, over-correction, brow ptosis, eyelid ptosis, need for repeat treatment, need for revision, need for touchup, discussed with her the Botox typically takes approximately 3-4 days to see for affected lost approximately 3-4 months   All the patient's questions were answered to her satisfaction, informed consent obtained and signed, the patient then underwent 20 units injection into the forehead in 10 injection sites keeping 1 cm at least between the injection site in the orbital rim and within the limits of the lateral limbus, the lateral injection sites were made slightly more lateral to decrease the risk of her brow elevation that she had last time the injections were done in an aseptic conditions directly into the muscle belly  The patient tolerated the procedure well, she can follow-up in 1 week for reassessment as needed and 3-4 months or sooner p r n  for treatment      20 units, 1 area

## 2022-10-12 PROBLEM — J01.10 ACUTE NON-RECURRENT FRONTAL SINUSITIS: Status: RESOLVED | Noted: 2022-01-17 | Resolved: 2022-10-12

## 2022-10-17 DIAGNOSIS — R12 HEARTBURN: ICD-10-CM

## 2022-10-17 RX ORDER — OMEPRAZOLE 20 MG/1
CAPSULE, DELAYED RELEASE ORAL
Qty: 90 CAPSULE | Refills: 0 | Status: SHIPPED | OUTPATIENT
Start: 2022-10-17

## 2022-10-20 ENCOUNTER — OFFICE VISIT (OUTPATIENT)
Dept: PODIATRY | Facility: CLINIC | Age: 42
End: 2022-10-20
Payer: COMMERCIAL

## 2022-10-20 VITALS
HEIGHT: 64 IN | SYSTOLIC BLOOD PRESSURE: 117 MMHG | WEIGHT: 161.8 LBS | HEART RATE: 64 BPM | BODY MASS INDEX: 27.62 KG/M2 | DIASTOLIC BLOOD PRESSURE: 80 MMHG

## 2022-10-20 DIAGNOSIS — M25.572 PAIN IN JOINT OF LEFT FOOT: Primary | ICD-10-CM

## 2022-10-20 PROCEDURE — 99243 OFF/OP CNSLTJ NEW/EST LOW 30: CPT | Performed by: PODIATRIST

## 2022-10-20 RX ORDER — CHLORHEXIDINE GLUCONATE 0.12 MG/ML
15 RINSE ORAL ONCE
OUTPATIENT
Start: 2022-10-20 | End: 2022-10-20

## 2022-10-20 NOTE — PROGRESS NOTES
Assessment/Plan:    I personally reviewed x-rays of the left foot  They reveal metatarsus primus elevatus  No dorsal spur present 1st metatarsal head  Explained the patient that she has pain in the left great toe joint due to an elevation of the 1st metatarsal ray  Discussed treatment options  Cortisone injection could provide temporary relief but also atrophy at the site of injection could prove problematic in the future  In order to correct this disorder, recommended Hazel/Grupo bunionectomy left foot  This is a osteotomy that should plantar flex the 1st metatarsal head  Patient desires the surgery as soon as possible  The procedure was explained including pre and postoperative course, risks and possible complications  The consent form was signed  Procedure has been scheduled for December 16th at [facility]  No problem-specific Assessment & Plan notes found for this encounter  Diagnoses and all orders for this visit:    Pain in joint of left foot  -     XR foot 2 vw left; Future          Subjective:      Patient ID: Klaus Robbins is a 38 y o  female  HPI     Patient, a 28-year-old female in apparent good health presents with pain in her left great toe joint  Patient notes a bone prominence on the top of the left 1st metatarsal head  Pressure on this prominence causes pain along with radiation towards the left great toe  At times, the left great toe feels numb  Patient denies trauma to this joint  The pain began in July of 2022  The pain is intermittent but has been intensifying  Patient tried meloxicam to no avail  The following portions of the patient's history were reviewed and updated as appropriate: allergies, current medications, past family history, past medical history, past social history, past surgical history and problem list     Review of Systems   Gastrointestinal:        Irritable bowel syndrome   Musculoskeletal: Positive for arthralgias  Objective:      /80   Pulse 64   Ht 5' 4" (1 626 m)   Wt 73 4 kg (161 lb 12 8 oz)   BMI 27 77 kg/m²          Physical Exam  Constitutional:       Appearance: Normal appearance  Cardiovascular:      Pulses: Normal pulses  Musculoskeletal:         General: Tenderness and deformity present  Comments: Dorsal bone prominence noted in weight-bearing left 1st metatarsal head  Direct pain on the prominence causes pain  Range of motion left 1st MPJ is within normal limits  There is pain with plantar flexion  Skin:     General: Skin is warm  Neurological:      General: No focal deficit present  Mental Status: She is oriented to person, place, and time

## 2022-11-04 ENCOUNTER — TELEPHONE (OUTPATIENT)
Dept: NEUROLOGY | Facility: CLINIC | Age: 42
End: 2022-11-04

## 2022-11-04 ENCOUNTER — PATIENT MESSAGE (OUTPATIENT)
Dept: NEUROLOGY | Facility: CLINIC | Age: 42
End: 2022-11-04

## 2022-11-04 NOTE — TELEPHONE ENCOUNTER
Claudell Bustard  to Luis Manuel Santiago MD    SB      11/4/22 11:51 AM  Hi Dr Guy Fraire,   I sent my refill in for my Emgality injection this month and it was denied by insurance  They told me it requires a prior authorization   I am due to take the injection tomorrow 11/5, so I was wondering if there’s an alternative that I could use that might be covered by insurance       Thank you,  Michele Mcnamara

## 2022-11-04 NOTE — TELEPHONE ENCOUNTER
----- Message from Vamsi Murphy sent at 11/4/2022 11:51 AM EDT -----  Regarding: Emgality refill  Hi Dr Tapia Never,   I sent my refill in for my Emgality injection this month and it was denied by insurance  They told me it requires a prior authorization  I am due to take the injection tomorrow 11/5, so I was wondering if there’s an alternative that I could use that might be covered by insurance       Thank you,  Mike Cobian

## 2022-11-04 NOTE — TELEPHONE ENCOUNTER
Called [facility] at [phone] and spoke to lety KIRK complete over the phone  Marked as urgent  Turn around time is within 24 hours     PA #[id number]    [employer] message sent to pt

## 2022-11-04 NOTE — TELEPHONE ENCOUNTER
Hi, my name is Lakesha Gonzales  I was calling because my pharmacy notified me that my emgality prescription was denied by insurance this month  They said they needed a prior auth and it was denied  So I'm just calling to see if there is an alternative  I normally take the injection the 5th of every month, which would be tomorrow  I think I've been taking it since the spring or winter of last year  If you could please let me know if there's an alternative or how I might go about being able to get that filled or authorized  Phone number is [phone]  Thank you  Patient calling about PA denial for Emgality  Due for next injection tomorrow

## 2022-11-07 ENCOUNTER — APPOINTMENT (OUTPATIENT)
Dept: LAB | Age: 42
End: 2022-11-07

## 2022-11-07 DIAGNOSIS — M25.572 PAIN IN JOINT OF LEFT FOOT: ICD-10-CM

## 2022-11-07 DIAGNOSIS — Z01.818 PRE-OP TESTING: ICD-10-CM

## 2022-11-07 LAB
ANION GAP SERPL CALCULATED.3IONS-SCNC: 4 MMOL/L (ref 4–13)
BASOPHILS # BLD AUTO: 0.07 THOUSANDS/ÂΜL (ref 0–0.1)
BASOPHILS NFR BLD AUTO: 1 % (ref 0–1)
BUN SERPL-MCNC: 20 MG/DL (ref 5–25)
CALCIUM SERPL-MCNC: 8.8 MG/DL (ref 8.3–10.1)
CHLORIDE SERPL-SCNC: 111 MMOL/L (ref 96–108)
CO2 SERPL-SCNC: 26 MMOL/L (ref 21–32)
CREAT SERPL-MCNC: 0.67 MG/DL (ref 0.6–1.3)
EOSINOPHIL # BLD AUTO: 0.23 THOUSAND/ÂΜL (ref 0–0.61)
EOSINOPHIL NFR BLD AUTO: 3 % (ref 0–6)
ERYTHROCYTE [DISTWIDTH] IN BLOOD BY AUTOMATED COUNT: 12.4 % (ref 11.6–15.1)
GFR SERPL CREATININE-BSD FRML MDRD: 110 ML/MIN/1.73SQ M
GLUCOSE SERPL-MCNC: 95 MG/DL (ref 65–140)
HCT VFR BLD AUTO: 36.4 % (ref 34.8–46.1)
HGB BLD-MCNC: 11.7 G/DL (ref 11.5–15.4)
IMM GRANULOCYTES # BLD AUTO: 0.01 THOUSAND/UL (ref 0–0.2)
IMM GRANULOCYTES NFR BLD AUTO: 0 % (ref 0–2)
LYMPHOCYTES # BLD AUTO: 2.22 THOUSANDS/ÂΜL (ref 0.6–4.47)
LYMPHOCYTES NFR BLD AUTO: 33 % (ref 14–44)
MCH RBC QN AUTO: 31 PG (ref 26.8–34.3)
MCHC RBC AUTO-ENTMCNC: 32.1 G/DL (ref 31.4–37.4)
MCV RBC AUTO: 97 FL (ref 82–98)
MONOCYTES # BLD AUTO: 0.59 THOUSAND/ÂΜL (ref 0.17–1.22)
MONOCYTES NFR BLD AUTO: 9 % (ref 4–12)
NEUTROPHILS # BLD AUTO: 3.56 THOUSANDS/ÂΜL (ref 1.85–7.62)
NEUTS SEG NFR BLD AUTO: 54 % (ref 43–75)
NRBC BLD AUTO-RTO: 0 /100 WBCS
PLATELET # BLD AUTO: 328 THOUSANDS/UL (ref 149–390)
PMV BLD AUTO: 10.4 FL (ref 8.9–12.7)
POTASSIUM SERPL-SCNC: 3.9 MMOL/L (ref 3.5–5.3)
RBC # BLD AUTO: 3.77 MILLION/UL (ref 3.81–5.12)
SODIUM SERPL-SCNC: 141 MMOL/L (ref 135–147)
WBC # BLD AUTO: 6.68 THOUSAND/UL (ref 4.31–10.16)

## 2022-11-08 NOTE — TELEPHONE ENCOUNTER
Emgality approved from 11/4/22-11/4/23    [employer] message sent to pt making her aware of approval

## 2022-11-21 DIAGNOSIS — K59.00 CONSTIPATION, UNSPECIFIED CONSTIPATION TYPE: ICD-10-CM

## 2022-11-21 RX ORDER — LINACLOTIDE 290 UG/1
CAPSULE, GELATIN COATED ORAL
Qty: 30 CAPSULE | Refills: 0 | Status: SHIPPED | OUTPATIENT
Start: 2022-11-21

## 2022-12-01 ENCOUNTER — TELEPHONE (OUTPATIENT)
Dept: PLASTIC SURGERY | Facility: CLINIC | Age: 42
End: 2022-12-01

## 2022-12-22 RX ORDER — DIPHENHYDRAMINE HYDROCHLORIDE 25 MG/1
TABLET ORAL
COMMUNITY

## 2022-12-22 RX ORDER — DIPHENOXYLATE HYDROCHLORIDE AND ATROPINE SULFATE 2.5; .025 MG/1; MG/1
1 TABLET ORAL DAILY
COMMUNITY

## 2022-12-22 NOTE — PRE-PROCEDURE INSTRUCTIONS
Pre-Surgery Instructions:   Medication Instructions   • Biotin (Biotin 5000) 5 MG CAPS Stop taking 7 days prior to surgery  • FIBER ADULT GUMMIES PO Stop taking 7 days prior to surgery  • Levocetirizine Dihydrochloride (XYZAL PO) Take day of surgery  • Linzess 290 MCG CAPS Hold day of surgery  • meloxicam (MOBIC) 15 mg tablet Stop taking 3 days prior to surgery  • multivitamin (THERAGRAN) TABS Stop taking 7 days prior to surgery  • omeprazole (PriLOSEC) 20 mg delayed release capsule Take day of surgery  • propranolol (INDERAL) 40 mg tablet Take day of surgery  • rizatriptan (MAXALT) 10 MG tablet Hold day of surgery  • zolpidem (AMBIEN) 5 mg tablet Hold day of surgery  Preop bathing and medication instructions reviewed with pt via telephone call  Pt verbalizes understanding  Pt states she takes her vitamins on her own, not prescribed by physician  Pt ok with holding for 7 days prior to sx  Pt states she can hold Linzess and will take after surgery  Instructed pt no alcohol, drugs or smoking 24 hrs prior to surgery  No vitamins or supplements (not ordered by a physician) for 7 days prior to surgery  No NSAIDS at least 3 days prior to surgery  Tylenol is OK to use  NPO after midnight the night prior to surgery  The hospital will call the pt with time and instructions one business day prior to surgery  Pt verbalizes understanding and all questions/concerns addressed

## 2022-12-28 ENCOUNTER — ANESTHESIA EVENT (OUTPATIENT)
Dept: PERIOP | Facility: HOSPITAL | Age: 42
End: 2022-12-28

## 2022-12-29 PROBLEM — E66.3 OVERWEIGHT (BMI 25.0-29.9): Status: ACTIVE | Noted: 2022-12-29

## 2022-12-29 NOTE — ANESTHESIA PREPROCEDURE EVALUATION
Procedure:  OSTEOTOMY METATARSAL 1st (Left: Foot)    Relevant Problems   CARDIO   (+) Migraines      NEURO/PSYCH   (+) Migraines      Digestive   (+) Irritable bowel syndrome with constipation      Endocrine   (+) Thyroid nodule      Other   (+) Abnormal TSH   (+) Overweight (BMI 25 0-29  9)        Physical Exam    Airway    Mallampati score: II  TM Distance: >3 FB  Neck ROM: full     Dental   No notable dental hx     Cardiovascular  Rhythm: regular, Cardiovascular exam normal    Pulmonary  Pulmonary exam normal     Other Findings        Anesthesia Plan  ASA Score- 2     Anesthesia Type- general with ASA Monitors  Additional Monitors:   Airway Plan: LMA  Plan Factors-Exercise tolerance (METS): >4 METS  Chart reviewed  Patient is not a current smoker  Obstructive sleep apnea risk education given perioperatively  Induction- intravenous  Postoperative Plan- Plan for postoperative opioid use  Informed Consent- Anesthetic plan and risks discussed with patient

## 2022-12-30 ENCOUNTER — APPOINTMENT (OUTPATIENT)
Dept: RADIOLOGY | Facility: HOSPITAL | Age: 42
End: 2022-12-30

## 2022-12-30 ENCOUNTER — ANESTHESIA (OUTPATIENT)
Dept: PERIOP | Facility: HOSPITAL | Age: 42
End: 2022-12-30

## 2022-12-30 ENCOUNTER — HOSPITAL ENCOUNTER (OUTPATIENT)
Facility: HOSPITAL | Age: 42
Setting detail: OUTPATIENT SURGERY
Discharge: HOME | End: 2022-12-30
Attending: PODIATRIST | Admitting: PODIATRIST

## 2022-12-30 VITALS
HEIGHT: 64 IN | RESPIRATION RATE: 16 BRPM | SYSTOLIC BLOOD PRESSURE: 106 MMHG | WEIGHT: 161.82 LBS | BODY MASS INDEX: 27.63 KG/M2 | HEART RATE: 73 BPM | OXYGEN SATURATION: 98 % | DIASTOLIC BLOOD PRESSURE: 67 MMHG | TEMPERATURE: 98 F

## 2022-12-30 DIAGNOSIS — Z98.890 POST-OPERATIVE STATE: Primary | ICD-10-CM

## 2022-12-30 LAB
EXT PREGNANCY TEST URINE: NEGATIVE
EXT. CONTROL: NORMAL

## 2022-12-30 DEVICE — SCREW COMP 2.5 X 14MM MICRO FT: Type: IMPLANTABLE DEVICE | Site: FOOT | Status: FUNCTIONAL

## 2022-12-30 DEVICE — WIRE 0.86MM  DBL ENDED TROCAR TIP: Type: IMPLANTABLE DEVICE | Site: FOOT | Status: FUNCTIONAL

## 2022-12-30 RX ORDER — CEFAZOLIN SODIUM 1 G/50ML
1000 SOLUTION INTRAVENOUS ONCE
Status: COMPLETED | OUTPATIENT
Start: 2022-12-30 | End: 2022-12-30

## 2022-12-30 RX ORDER — FENTANYL CITRATE 50 UG/ML
INJECTION, SOLUTION INTRAMUSCULAR; INTRAVENOUS AS NEEDED
Status: DISCONTINUED | OUTPATIENT
Start: 2022-12-30 | End: 2022-12-30

## 2022-12-30 RX ORDER — BUPIVACAINE HYDROCHLORIDE 5 MG/ML
INJECTION, SOLUTION EPIDURAL; INTRACAUDAL AS NEEDED
Status: DISCONTINUED | OUTPATIENT
Start: 2022-12-30 | End: 2022-12-30 | Stop reason: HOSPADM

## 2022-12-30 RX ORDER — ACETAMINOPHEN 500 MG
1000 TABLET ORAL EVERY 6 HOURS PRN
COMMUNITY

## 2022-12-30 RX ORDER — HYDROMORPHONE HCL/PF 1 MG/ML
0.5 SYRINGE (ML) INJECTION
Status: DISCONTINUED | OUTPATIENT
Start: 2022-12-30 | End: 2022-12-30 | Stop reason: HOSPADM

## 2022-12-30 RX ORDER — MAGNESIUM HYDROXIDE 1200 MG/15ML
LIQUID ORAL AS NEEDED
Status: DISCONTINUED | OUTPATIENT
Start: 2022-12-30 | End: 2022-12-30 | Stop reason: HOSPADM

## 2022-12-30 RX ORDER — CHLORHEXIDINE GLUCONATE 0.12 MG/ML
15 RINSE ORAL ONCE
Status: COMPLETED | OUTPATIENT
Start: 2022-12-30 | End: 2022-12-30

## 2022-12-30 RX ORDER — ONDANSETRON 2 MG/ML
4 INJECTION INTRAMUSCULAR; INTRAVENOUS ONCE AS NEEDED
Status: DISCONTINUED | OUTPATIENT
Start: 2022-12-30 | End: 2022-12-30 | Stop reason: HOSPADM

## 2022-12-30 RX ORDER — LIDOCAINE HYDROCHLORIDE 10 MG/ML
INJECTION, SOLUTION EPIDURAL; INFILTRATION; INTRACAUDAL; PERINEURAL AS NEEDED
Status: DISCONTINUED | OUTPATIENT
Start: 2022-12-30 | End: 2022-12-30

## 2022-12-30 RX ORDER — MIDAZOLAM HYDROCHLORIDE 2 MG/2ML
INJECTION, SOLUTION INTRAMUSCULAR; INTRAVENOUS AS NEEDED
Status: DISCONTINUED | OUTPATIENT
Start: 2022-12-30 | End: 2022-12-30

## 2022-12-30 RX ORDER — ONDANSETRON 2 MG/ML
INJECTION INTRAMUSCULAR; INTRAVENOUS AS NEEDED
Status: DISCONTINUED | OUTPATIENT
Start: 2022-12-30 | End: 2022-12-30

## 2022-12-30 RX ORDER — MEPERIDINE HYDROCHLORIDE 25 MG/ML
12.5 INJECTION INTRAMUSCULAR; INTRAVENOUS; SUBCUTANEOUS
Status: DISCONTINUED | OUTPATIENT
Start: 2022-12-30 | End: 2022-12-30 | Stop reason: HOSPADM

## 2022-12-30 RX ORDER — LIDOCAINE HYDROCHLORIDE 10 MG/ML
INJECTION, SOLUTION EPIDURAL; INFILTRATION; INTRACAUDAL; PERINEURAL AS NEEDED
Status: DISCONTINUED | OUTPATIENT
Start: 2022-12-30 | End: 2022-12-30 | Stop reason: HOSPADM

## 2022-12-30 RX ORDER — KETOROLAC TROMETHAMINE 30 MG/ML
INJECTION, SOLUTION INTRAMUSCULAR; INTRAVENOUS AS NEEDED
Status: DISCONTINUED | OUTPATIENT
Start: 2022-12-30 | End: 2022-12-30

## 2022-12-30 RX ORDER — PROPOFOL 10 MG/ML
INJECTION, EMULSION INTRAVENOUS CONTINUOUS PRN
Status: DISCONTINUED | OUTPATIENT
Start: 2022-12-30 | End: 2022-12-30

## 2022-12-30 RX ORDER — PROPOFOL 10 MG/ML
INJECTION, EMULSION INTRAVENOUS AS NEEDED
Status: DISCONTINUED | OUTPATIENT
Start: 2022-12-30 | End: 2022-12-30

## 2022-12-30 RX ORDER — CHOLECALCIFEROL (VITAMIN D3) 125 MCG
CAPSULE ORAL
COMMUNITY

## 2022-12-30 RX ORDER — OXYCODONE HYDROCHLORIDE AND ACETAMINOPHEN 5; 325 MG/1; MG/1
1 TABLET ORAL EVERY 4 HOURS PRN
Qty: 20 TABLET | Refills: 0 | Status: SHIPPED | OUTPATIENT
Start: 2022-12-30 | End: 2023-01-06

## 2022-12-30 RX ORDER — OXYCODONE HYDROCHLORIDE AND ACETAMINOPHEN 5; 325 MG/1; MG/1
1 TABLET ORAL ONCE AS NEEDED
Status: DISCONTINUED | OUTPATIENT
Start: 2022-12-30 | End: 2022-12-30 | Stop reason: HOSPADM

## 2022-12-30 RX ORDER — FENTANYL CITRATE/PF 50 MCG/ML
25 SYRINGE (ML) INJECTION
Status: DISCONTINUED | OUTPATIENT
Start: 2022-12-30 | End: 2022-12-30 | Stop reason: HOSPADM

## 2022-12-30 RX ORDER — SODIUM CHLORIDE, SODIUM LACTATE, POTASSIUM CHLORIDE, CALCIUM CHLORIDE 600; 310; 30; 20 MG/100ML; MG/100ML; MG/100ML; MG/100ML
125 INJECTION, SOLUTION INTRAVENOUS CONTINUOUS
Status: DISCONTINUED | OUTPATIENT
Start: 2022-12-30 | End: 2022-12-30 | Stop reason: HOSPADM

## 2022-12-30 RX ADMIN — CEFAZOLIN SODIUM 1000 MG: 1 SOLUTION INTRAVENOUS at 07:22

## 2022-12-30 RX ADMIN — LIDOCAINE HYDROCHLORIDE 100 MG: 10 INJECTION, SOLUTION EPIDURAL; INFILTRATION; INTRACAUDAL; PERINEURAL at 07:29

## 2022-12-30 RX ADMIN — KETOROLAC TROMETHAMINE 30 MG: 30 INJECTION, SOLUTION INTRAMUSCULAR at 08:33

## 2022-12-30 RX ADMIN — SODIUM CHLORIDE, SODIUM LACTATE, POTASSIUM CHLORIDE, AND CALCIUM CHLORIDE 125 ML/HR: .6; .31; .03; .02 INJECTION, SOLUTION INTRAVENOUS at 09:32

## 2022-12-30 RX ADMIN — PROPOFOL 100 MCG/KG/MIN: 10 INJECTION, EMULSION INTRAVENOUS at 07:30

## 2022-12-30 RX ADMIN — ONDANSETRON 4 MG: 2 INJECTION INTRAMUSCULAR; INTRAVENOUS at 08:26

## 2022-12-30 RX ADMIN — FENTANYL CITRATE 50 MCG: 50 INJECTION INTRAMUSCULAR; INTRAVENOUS at 07:29

## 2022-12-30 RX ADMIN — PROPOFOL 50 MG: 10 INJECTION, EMULSION INTRAVENOUS at 07:51

## 2022-12-30 RX ADMIN — PROPOFOL 200 MG: 10 INJECTION, EMULSION INTRAVENOUS at 07:29

## 2022-12-30 RX ADMIN — CHLORHEXIDINE GLUCONATE 0.12% ORAL RINSE 15 ML: 1.2 LIQUID ORAL at 06:33

## 2022-12-30 RX ADMIN — FENTANYL CITRATE 50 MCG: 50 INJECTION INTRAMUSCULAR; INTRAVENOUS at 07:51

## 2022-12-30 RX ADMIN — SODIUM CHLORIDE, SODIUM LACTATE, POTASSIUM CHLORIDE, AND CALCIUM CHLORIDE 125 ML/HR: .6; .31; .03; .02 INJECTION, SOLUTION INTRAVENOUS at 06:35

## 2022-12-30 RX ADMIN — MIDAZOLAM 2 MG: 1 INJECTION INTRAMUSCULAR; INTRAVENOUS at 07:22

## 2022-12-30 NOTE — ANESTHESIA POSTPROCEDURE EVALUATION
Post-Op Assessment Note    CV Status:  Stable  Pain Score: 0    Pain management: adequate     Mental Status:  Alert and awake   Hydration Status:  Euvolemic   PONV Controlled:  Controlled   Airway Patency:  Patent      Post Op Vitals Reviewed: Yes      Staff: Anesthesiologist         No notable events documented      BP      Temp      Pulse     Resp      SpO2      /67   Pulse 73   Temp 98 °F (36 7 °C) (Temporal)   Resp 16   Ht 5' 4" (1 626 m)   Wt 73 4 kg (161 lb 13 1 oz)   LMP 12/24/2022   SpO2 98%   BMI 27 78 kg/m²

## 2022-12-30 NOTE — OP NOTE
OPERATIVE REPORT - Podiatry  PATIENT NAME: Rohini Lombardo    :  1980  MRN: [id number]  Pt Location: [facility] OR ROOM 03    SURGERY DATE: 2022    Surgeon(s) and Role:     * Lennox Bonilla DPM - Primary     * Davis Halsted, DPM - Assisting    Pre-op Diagnosis:  Pain in joint of left foot [M25 572]    Post-Op Diagnosis Codes:     * Pain in joint of left foot [M25 572]    Procedure(s) (LRB):  OSTEOTOMY METATARSAL 1st (Left)    Specimen(s):  * No specimens in log *    Estimated Blood Loss:   5 mL    Drains:  * No LDAs found *    Anesthesia Type:   General/LMA with 10 ml of 1% Lidocaine and 0 5% Bupivacaine in a 1:1 mixture    Hemostasis:  Ankle tourniquet 250mmHg    Materials:  Implant Name Type Inv  Item Serial No   Lot No  LRB No  Used Action   SCREW COMP 2 5 X 14MM MICRO FT - [id number]  SCREW COMP 2 5 X 14MM MICRO FT  [employer]  Left 2 Implanted   WIRE 0 86MM  DBL ENDED TROCAR TIP - [id number]  WIRE 0 86MM  DBL ENDED TROCAR TIP  [employer]  Left 2 Implanted     3-0 Vicryl suture  4-0 Vicryl suture  4-0 Nylon suture    Operative Findings:  Consistent with pre op diagnosis  Mild to moderate degeneration of the 1st metatarsal head articular surface noted intra-op  Complications:   None    Procedure and Technique:     Under mild sedation, the patient was brought into the operating room and placed on the operating room table in the supine position  IV sedation was achieved by anesthesia team and a universal timeout was performed where all parties are in agreement of correct patient, correct procedure and correct site  A pneumatic tourniquet was then placed over the patient's left ankle with ample padding  A ribeiro block was performed consisting of 10 ml of 1% Lidocaine and 0 5% Bupivacaine in a 1:1 mixture  The foot was then prepped and draped in the usual aseptic manner  An esmarch bandage was used to exsangunate the foot and the pneumatic tourniquet was then inflated to 250mmHg      Attention was then directed to the left foot  A linear longitudinal skin incision was made with a 15 blade over the dorsomedial 1st MTPJ  This incision was deepened with sharp and blunt dissection down to capsule and periosteum with care taken to protect neurovascular structures and cauterize any superficial veins as needed  An L shaped periosteal and capsular incision was then made to gain access to the 1st MTPJ  There was mild to moderate degeneration of the articular surface of the 1st metatarsal head noted with a small fissure of the articular cartilage centrally  The medial eminence of the 1st metatarsal head was then resected with a sagittal saw  The 2mm [place] chevron osteotomy guide was then aligned into proper position and secured with K wires  Using the guide, the osteotomies were made with a sagittal saw and the sliver of bone was removed from the dorsal arm of the osteotomy and passed off the field  The sagittal saw was then used to resect the dorsal spurring/lipping of bone at the 1st metatarsal head and smooth the surface  K wires were then used to obtain temporary fixation across the osteotomy  Two [employer] 2 5mm cannulated headless screws were then inserted across the osteotomy per  protocol to serve as fixation across the osteotomy and all K wires were removed  The site was irrigated with normal saline  The 1st MTPJ was noted to have improved motion clinically  Capsular and periosteal closure was obtained using 3-0 Vicryl suture in interrupted fashion  Subcutaneous closure was obtained using 4-0 Vicryl suture in interrupted fashion  Skin closure was obtained using 4-0 Nylon suture in interrupted fashion  A post op block consisting of 7ml of 0 5% Bupivacaine was administered  The site was dressed with betadine soaked adaptic, 4x4 gauze, oly, and tape  The tourniquet was deflated at approximately 52 min and normal hyperemic response was noted to all digits   The patient tolerated the procedure and anesthesia well without immediate complications and transferred to PACU with vital signs stable  Dr Raisa Clayton was present during the entire procedure and participated in all key aspects  SIGNATURE: Tsering Rivera DPM  DATE: December 30, 2022  TIME: 8:43 AM      Portions of the record may have been created with voice recognition software  Occasional wrong word or "sound a like" substitutions may have occurred due to the inherent limitations of voice recognition software  Read the chart carefully and recognize, using context, where substitutions have occurred

## 2022-12-30 NOTE — DISCHARGE INSTR - AVS FIRST PAGE
Discharge Instructions - Podiatry    Weight Bearing Status: Weight bearing as tolerated, wearing surgical shoe on left foot  Use crutches for assistance as needed  Pain: Continue analgesics as directed    Follow-up appointment instructions: Please go to your first post op visit with Dr Kelton Lawrence  Contact sooner if any increase in pain, or signs of infection occur    Wound Care: Leave dressings clean, dry, and intact to first post op visit

## 2022-12-30 NOTE — H&P
H&P Exam - Namitae Manner 38 y o  female MRN: [id number]    Unit/Bed#: [facility] Encounter: [id number]    Assessment:  ASA 2 38 female     Plan:  Acceptable for General anesthesia     History of Present Illness   1 st toe bunion    Review of Systems   Constitutional: Negative  HENT: Negative  Eyes: Negative  Respiratory: Negative  Cardiovascular: Negative  Gastrointestinal:        IBS   Endocrine: Negative  Genitourinary: Negative  Neurological: Negative  Hematological: Negative  Psychiatric/Behavioral: Negative  All other systems reviewed and are negative  Historical Information   Past Medical History:   Diagnosis Date   • History of IBS     with constipation   • Ingrown toenail    • Migraines     Chronic per pt   • Plantar fasciitis    • Venereal wart 08/07/2014     Past Surgical History:   Procedure Laterality Date   • CERVICAL BIOPSY  W/ LOOP ELECTRODE EXCISION  2017   • COLONOSCOPY     • NASAL SEPTUM SURGERY     • TONSILLECTOMY     • UPPER GASTROINTESTINAL ENDOSCOPY     • WISDOM TOOTH EXTRACTION       Social History   Social History     Substance and Sexual Activity   Alcohol Use Yes   • Alcohol/week: 2 0 standard drinks   • Types: 2 Glasses of wine per week    Comment: 2 glassess wine a week     Social History     Substance and Sexual Activity   Drug Use Never     Social History     Tobacco Use   Smoking Status Never   Smokeless Tobacco Never     E-Cigarette Use: Never User     E-Cigarette/Vaping Substances   • Nicotine No    • THC No    • CBD No    • Flavoring No    • Other No    • Unknown No        Family History: non-contributory    Meds/Allergies   PTA meds:   Prior to Admission Medications   Prescriptions Last Dose Informant Patient Reported? Taking?    Biotin 5 MG CAPS 12/21/2022  Yes Yes   Sig: Take by mouth   FIBER ADULT GUMMIES PO 12/21/2022  Yes Yes   Sig: Take by mouth   Galcanezumab-gnlm 120 MG/ML SOAJ   No No   Sig: Inject 120 mg under the skin every 30 (thirty) days Following the first month loading dose of 2 pens  Levocetirizine Dihydrochloride (XYZAL PO) 2022 at 2100  Yes Yes   Sig: Take by mouth   Linzess 290 MCG CAPS 2022  No Yes   Sig: TAKE ONE CAPSULE BY MOUTH DAILY  Melatonin 5 MG TABS 2022  Yes Yes   Sig: Take by mouth   acetaminophen (TYLENOL) 500 mg tablet 2022  Yes Yes   Sig: Take 1,000 mg by mouth every 6 (six) hours as needed for mild pain   fluticasone (FLONASE) 50 mcg/act nasal spray Not Taking  No No   Si sprays into each nostril daily   Patient not taking: Reported on 2022   loratadine (CLARITIN) 10 mg tablet Not Taking  Yes No   Sig: Take 10 mg by mouth daily     Patient not taking: Reported on 2022   meloxicam (MOBIC) 15 mg tablet 2022  Yes Yes   Sig: Take 15 mg by mouth daily as needed   multivitamin [facility]) TABS 2022  Yes Yes   Sig: Take 1 tablet by mouth daily   nortriptyline (PAMELOR) 10 mg capsule Not Taking  Yes No   Sig: TAKE 1 CAPSULE BY MOUTH AT BEDTIME MAY INCREASE TO TAKE 1 CAPSULE BY MOUTH AT BEDTIME AFTER 1 WEEK   Patient not taking: Reported on 2022   omeprazole (PriLOSEC) 20 mg delayed release capsule 2022  No Yes   Sig: TAKE ONE CAPSULE BY MOUTH EVERY DAY   propranolol (INDERAL) 40 mg tablet 2022  Yes Yes   Si (two) times a day     rizatriptan (MAXALT) 10 MG tablet Past Month  No Yes   Sig: Take 1 tablet (10 mg total) by mouth once as needed for migraine May repeat in 2 hours if needed   Max 2/24 hours, 9/month    zolpidem (AMBIEN) 5 mg tablet 2022  Yes Yes   Sig: Take 10 mg by mouth daily at bedtime as needed      Facility-Administered Medications: None     No Known Allergies    Objective   First Vitals:   Blood Pressure: 115/79 (22)  Pulse: 67 (22)  Temperature: 98 7 °F (37 1 °C) (22)  Temp Source: Temporal (22)  Respirations: 16 (22)  Height: 5' 4" (162 6 cm) (22)  Weight - Scale: 73 4 kg (161 lb 13 1 oz) (12/30/22 0618)  SpO2: 97 % (12/30/22 0618)    Current Vitals:   Blood Pressure: 115/79 (12/30/22 0618)  Pulse: 67 (12/30/22 0618)  Temperature: 98 7 °F (37 1 °C) (12/30/22 0618)  Temp Source: Temporal (12/30/22 0618)  Respirations: 16 (12/30/22 0618)  Height: 5' 4" (162 6 cm) (12/30/22 0618)  Weight - Scale: 73 4 kg (161 lb 13 1 oz) (12/30/22 0618)  SpO2: 97 % (12/30/22 0618)    No intake or output data in the 24 hours ending 12/30/22 0727    Invasive Devices     Peripheral Intravenous Line  Duration           Peripheral IV 12/30/22 Dorsal (posterior); Left Hand <1 day                Physical Exam    Lab Results: n/a  Imaging: n/a  EKG, Pathology, and Other Studies: n/a    Code Status: No Order  Advance Directive and Living Will:      Power of :    POLST:      Counseling / Coordination of Care:   None

## 2022-12-30 NOTE — DISCHARGE SUMMARY
Discharge Summary Outpatient Procedure Podiatry -   Carla Sarmiento 38 y o  female MRN: [id number]  Unit/Bed#: OR [place] Encounter: [id number]    Admission Date: 12/30/2022     Admitting Diagnosis: Pain in joint of left foot [M25 572]    Discharge Diagnosis: same    Procedures Performed: OSTEOTOMY METATARSAL 1st: 26664 (CPT®)    Complications: none    Condition at Discharge: stable    Discharge instructions/Information to patient and family:   See after visit summary for information provided to patient and family  Provisions for Follow-Up Care/Important appointments:  See after visit summary for information related to follow-up care and any pertinent home health orders  Discharge Medications:  See after visit summary for reconciled discharge medications provided to patient and family 
Unknown if ever smoked

## 2023-01-03 ENCOUNTER — HOSPITAL ENCOUNTER (OUTPATIENT)
Dept: RADIOLOGY | Age: 43
Discharge: HOME | End: 2023-01-03

## 2023-01-03 VITALS — HEIGHT: 64 IN | BODY MASS INDEX: 27.49 KG/M2 | WEIGHT: 161 LBS

## 2023-01-03 DIAGNOSIS — Z12.31 ENCOUNTER FOR SCREENING MAMMOGRAM FOR MALIGNANT NEOPLASM OF BREAST: ICD-10-CM

## 2023-01-03 DIAGNOSIS — E04.1 THYROID NODULE: ICD-10-CM

## 2023-01-05 ENCOUNTER — OFFICE VISIT (OUTPATIENT)
Dept: PODIATRY | Facility: CLINIC | Age: 43
End: 2023-01-05

## 2023-01-05 VITALS
DIASTOLIC BLOOD PRESSURE: 69 MMHG | SYSTOLIC BLOOD PRESSURE: 105 MMHG | HEART RATE: 65 BPM | BODY MASS INDEX: 27.64 KG/M2 | HEIGHT: 64 IN

## 2023-01-05 DIAGNOSIS — M25.572 PAIN IN JOINT OF LEFT FOOT: Primary | ICD-10-CM

## 2023-01-05 RX ORDER — ZOLPIDEM TARTRATE 10 MG/1
10 TABLET ORAL
COMMUNITY
Start: 2022-12-28

## 2023-01-05 NOTE — PROGRESS NOTES
Patient presents 6 days post Hazel/Grupo bunionectomy left foot  Surgical site healing uneventfully  Minimal pain related  Good range of motion at left first MPJ  Patient to continue with current orders  She will be rescheduled in 1 week for suture removal   It is anticipated that she will be in surgical shoe for a total of 4 weeks

## 2023-01-06 ENCOUNTER — TELEPHONE (OUTPATIENT)
Dept: NEUROLOGY | Facility: CLINIC | Age: 43
End: 2023-01-06

## 2023-01-06 NOTE — TELEPHONE ENCOUNTER
Called and spoke to patient to confirm their upcoming appointment with Dr Gladis Gerardo  Informed patient about arriving in the [place] location 15 minutes prior to their appointment to get checked in and going over chart

## 2023-01-10 ENCOUNTER — OFFICE VISIT (OUTPATIENT)
Dept: PODIATRY | Facility: CLINIC | Age: 43
End: 2023-01-10

## 2023-01-10 VITALS
DIASTOLIC BLOOD PRESSURE: 73 MMHG | SYSTOLIC BLOOD PRESSURE: 110 MMHG | HEIGHT: 64 IN | BODY MASS INDEX: 27.64 KG/M2 | HEART RATE: 67 BPM

## 2023-01-10 DIAGNOSIS — E04.1 THYROID NODULE: Primary | ICD-10-CM

## 2023-01-10 DIAGNOSIS — M25.572 PAIN IN JOINT OF LEFT FOOT: Primary | ICD-10-CM

## 2023-01-10 NOTE — PROGRESS NOTES
Patient presents 11 days post Hazel/Grupo bunionectomy left foot  Minimal pain related  Surgical site healing uneventfully  All sutures removed  Patient may now get the foot wet  She is to remain in surgical shoe for approximately 14 more days  She is rescheduled in 3 weeks

## 2023-01-11 ENCOUNTER — OFFICE VISIT (OUTPATIENT)
Dept: NEUROLOGY | Facility: CLINIC | Age: 43
End: 2023-01-11

## 2023-01-11 VITALS
HEIGHT: 64 IN | BODY MASS INDEX: 28.51 KG/M2 | HEART RATE: 76 BPM | SYSTOLIC BLOOD PRESSURE: 122 MMHG | TEMPERATURE: 98.1 F | DIASTOLIC BLOOD PRESSURE: 70 MMHG | WEIGHT: 167 LBS

## 2023-01-11 DIAGNOSIS — G43.109 MIGRAINE WITH AURA AND WITHOUT STATUS MIGRAINOSUS, NOT INTRACTABLE: ICD-10-CM

## 2023-01-11 DIAGNOSIS — G47.00 INSOMNIA, UNSPECIFIED TYPE: Primary | ICD-10-CM

## 2023-01-11 RX ORDER — RIZATRIPTAN BENZOATE 10 MG/1
10 TABLET ORAL ONCE AS NEEDED
Qty: 9 TABLET | Refills: 12 | Status: SHIPPED | OUTPATIENT
Start: 2023-01-11

## 2023-01-11 NOTE — PROGRESS NOTES
[facility] Consult - Follow up   PATIENT:  Hola Shelton  MRN:  [id number]  :  1980  DATE OF SERVICE:  2023  REFERRED BY: No ref  provider found  PMD: HENRY Logan    Assessment/Plan:   Hola Shelton is a deligthful 38 y o  female with a past medical history that includes IBS with constipation, allergic rhinitis, chronic sinusitis, migraines, TOSHIA II, insomnia, neck pain, vasovagal syncope in 20s, anxiety, Intestinal cystitis, childhood asthma  referred here for evaluation of headache  My initial evaluation 3/28/2022    Migraine with aura and without status migrainosus, not intractable  She reports a long history of headaches and migraines dating back to 10or 8years old  Family history of migraines  She reports she has followed with multiple neurologists in the past   Pain is typically unilateral more often left-sided with visual aura at times and with typical associated migrainous features as well as autonomic features that do not seem to be unilateral to suggest trigeminal autonomic cephalalgia   -as of 2022 on average over 15 migraine days per month  Trial of emgality for prevention rizatriptan for abortive  - as of 2022:  She reports she has had significant improvement on emgality down from 15 to 7-8 times a month and severity has improved as well  They respond to rizatriptan which she feels she tolerates more than sumatriptan  - as of 2023: She reports she continues to do very well on Emgality with about 4 migraines a month that resolve with rizatriptan  Some wearing off when due for next dose and recommended taking it at 28 days rather than 30 or 31  She would like to stay on propranolol 40 mg twice daily for now as she feels this is helping prevent tachycardia from causing migraine  She was able to get off nortriptyline summer 2022 which she was on for an interstitial cystitis without increase        Workup:  - Neurologic assessment reveals normal neurological exam   - MRI brain in her teens was reportedly unremarkable  - at this time with symptoms consistent with migraine, no red flags, no new or concerning features, normal exam, we discussed could hold off on follow-up imaging at this time  However, could obtain repeat brain MRI at any time if indicated  Preventative:  - we discussed headache hygiene and lifestyle factors that may improve headaches  - continue  emgality  Discussed proper use, possible side effects and risks  - Currently on through other providers: propranolol 40 mg BID (could consider gradual wean off in the future),   nortriptyline 20 mg (for Intestinal cystitis) - stopped around July 2022 - IC not worse off of it, ambien 10 mg (previously on 5 and plans on going back, moved in April and loud) - (off and on 5 years)  - Past/ failed/contraindicated: topiramate, gabapentin, propranolol 40 mg BID, nortriptyline, amitriptyline, prosac/fluoxetine, aimovig contraindicated due to constiptation  - future options: alternative CGRP, botox     Abortive:  - discussed not taking over-the-counter or prescription pain medications more than 3 days per week to prevent medication overuse/rebound headache  -  rizatriptan (MAXALT) 10 MG tablet; Take 1 tablet (10 mg total) by mouth once as needed for migraine May repeat in 2 hours if needed  Max 2/24 hours, 9/month  Discussed proper use, possible side effects and risks  - Past/ failed/contraindicated: sumatriptan 100 mg works sometimes and not others, rizatriptan   - past/helped:  Steroids have helped temporarily  - future options:   prochlorperazine, Toradol IM or p o , could consider trial for 5 days of Depakote or dexamethasone for prolonged migraine, ubrelvy, reyvow, nurtec    Insomnia, prolonged Ambien use  -     Ambulatory referral to Sleep Medicine;  Future      Patient instructions      No estrogen due to migraine aura    "why we sleep" by Leeanna Branham - good read or listen on audible    I am placing a referral to the sleep medicine specialist team to further evaluate your sleep issues  Please call [phone] to schedule and I recommend you see one of the following providers:  - Francie Antoine MD - sleep doctor who is a neurologist and is excellent       Headache/migraine treatment:   Abortive medications (for immediate treatment of a headache): It is ok to take ibuprofen, acetaminophen or naproxen (Advil, Tylenol,  Aleve, Excedrin) if they help your headaches you should limit these to No more than 3 times a week to avoid medication overuse/rebound headaches  For your more moderate to severe migraines take this medication early   Maxalt (rizatriptan) 10mg tabs - take one at the onset of headache  May repeat one time after 1-2 hours if pain has not resolved  (Max 2 a day and 9 a month)     Prescription preventive medications for headaches/migraines   (to take every day to help prevent headaches - not to take at the time of headache):  [x] Emgality/Galcanezumab -  120 mg injections every 28 days     READ INSTRUCTIONS that come with the medication  REFRIGERATE  Keep out of direct sunlight  Prior to administration, allow to come to room temperature for 30 minutes  Do not warm using a heat source (eg, microwave or hot water)  Do not shake  Administer in preferably abdomen (avoiding 2 inches around the navel), thigh, upper arm, or buttocks avoiding areas of skin that are tender, bruised, red or hard  Deliver entire contents of single-use prefilled pen or syringe  Unknown impact in pregnancy therefore would recommend stopping 6 months prior to considering pregnancy  Let me know if you ever want to try and slowly come off propranolol     *Typically these types of medications take time untill you see the benefit, although some may see improvement in days, often it may take weeks, especially if the medication is being titrated up to a beneficial level   Please contact us if there are any concerns or questions regarding the medication  Lifestyle Recommendations:  [x] SLEEP - Maintain a regular sleep schedule: Adults need at least 7-8 hours of uninterrupted a night  Maintain good sleep hygiene:  Going to bed and waking up at consistent times, avoiding excessive daytime naps, avoiding caffeinated beverages in the evening, avoid excessive stimulation in the evening and generally using bed primarily for sleeping  One hour before bedtime would recommend turning lights down lower, decreasing your activity (may read quietly, listen to music at a low volume)  When you get into bed, should eliminate all technology (no texting, emailing, playing with your phone, iPad or tablet in bed)  [x] HYDRATION - Maintain good hydration  Drink  2L of fluid a day (4 typical small water bottles)  [x] DIET - Maintain good nutrition  In particular don't skip meals and try and eat healthy balanced meals regularly  [x] TRIGGERS - Look for other triggers and avoid them: Limit caffeine to 1-2 cups a day or less  Avoid dietary triggers that you have noticed bring on your headaches (this could include aged cheese, peanuts, MSG, aspartame and nitrates)  [x] EXERCISE - physical exercise as we all know is good for you in many ways, and not only is good for your heart, but also is beneficial for your mental health, cognitive health and  chronic pain/headaches  I would encourage at the least 5 days of physical exercise weekly for at least 30 minutes  Education and Follow-up  [x] Please call with any questions or concerns  Of course if any new concerning symptoms go to the emergency department  [x] Follow up 1 year,  sooner if needed    CC: We had the pleasure of evaluating Montana Clemens in neurological consultation today  Montana Clemens is a   right handed female who presents today for evaluation of headaches  History obtained from patient as well as available medical record review    History of Present Illness:   Interval history as of 1/11/2023  - denies any new or concerning neurologic symptoms since last visit   - foot surgery 12/30/22  - thyroid bx coming up    Headaches and migraines   She is doing much better, now about 4 a month and rizatriptan works well, "its amazing"  Nov had one that lasted a week  Last was Dec 29th  Wearing off effect when due for next dose, maybe 3 days before     Preventative:    - emgality   Denies bothersome side effects  - Currently on through other providers: propranolol 40 mg BID (could consider gradual wean off in the future),   nortriptyline 20 mg (for Intestinal cystitis) - stopped around July 2022 - IC not worse off of it, ambien 10 mg (previously on 5 and plans on going back, moved in April and loud) - (off and on 5 years)    Abortive:   - rizatriptan - working well now, rarely needs two  Denies bothersome side effects        Interval history as of 7/1/2022  - denies any new or concerning neurologic symptoms since last visit      Headaches and migraines   She reports improvement on emgality which has brought migraines down from 15 days a month to 7-8 a month  Less severe as well   The first month did the best, "almost forgot I had migraines for a while"   Some wearing off effect when due for next shot   - triggered by weather changes     Preventative:   - Trial of emgality - approved 4/5/22 - 4th shot soon   Denies bothersome side effects     Abortive:   - Trial of rizatriptan 10 mg - makes her less nauseated than sumatriptan, does not work quiet as well as sumatriptan, may not completely get rid of it but improves and then eventually goes away   Denies bothersome side effects     History as of initial visit 3/28/22:   Headaches started at what age? 89 years old  How often do the headaches occur?   - as of 3/28/2022: on average over 15 days a month   What time of the day do the headaches start?  Occasionally wakes up with them, or afternoon or evening  How long do the headaches last? All day, up to weeks   Are you ever headache free? Yes    Aura? Sometimes aura  Blurred, floaters, lines, flashing orange, 20 mins or more     Last eye exam: goes early for monitoring, no issues except     Where is your headache located and pain quality?   - most of the time unilateral and usually on the left  - left side of head and neck - temporal and parietal and occipital and down the neck  - stabbing, pressure, pulsating  - head warm to touch  - sinus pressure   What is the intensity of pain? Average: 5-6/10, worst 9/10  Associated symptoms:   [x] Nausea       [] Vomiting        [x] Stiff or sore neck   [x] Photophobia     [x]Phonophobia      [x] Osmophobia  [x] Blurred vision    [x] Light-headed or dizzy     [x] Tinnitus  - both  [x] Hands or feet tingle or feel numb/paresthesias - tingling in both legs and fingers sometimes without focal weakness   [] Ptosis      [] Facial droop  [x] Lacrimation - both  [x] Nasal congestion/rhinorrhea - chronically       Things that make the headache worse? No specific movements, any movement if bad enough    Headache triggers:  Hormonal/menses, weather changes, stress, certain foods, alcohol, sinus congestion      Have you seen someone else for headaches or pain? Yes multiple neurologists, last about 10 years ago   Have you had trigger point injection performed and how often? No  Have you had Botox injection performed and how often? No   Have you had epidural injections or transforaminal injections performed? No  Are you current pregnant or planning on getting pregnant? No future pregnancy, not active   Have you ever had any Brain imaging? yes - MRI Brain in her teens was normal     What medications do you take or have you taken for your headaches?    ABORTIVE:      Sumatriptan 100 mg - makes her nauseated      Past  Steroids In Jan 2021 with COVID, and then again in Feb for unrelenting migraine helps   Rizatriptan/maxalt - thinks may not as been as effective as imitrex    PREVENTIVE:       Propranolol 40 mg BID (4 years has helped)  ambien 5  Nortriptyline 20 mg (started a couple of weeks ago for Intestinal cystitis)    Past/ failed/contraindicated:  nortriptyline  Amitriptyline  topiramate  Gabapentin  prosac/fluoxetine      LIFESTYLE  Sleep   - averages: 6 hours, sometimes takes ambien  Problems falling asleep?:   Yes  Problems staying asleep?:  Yes, 3-4 a night    Water: 40-60 oz per day  Caffeine: coffee - 1 cups in am, 1 at lunch, per day    Mood:   Anxiety maybe more in her 25s, not much now     The following portions of the patient's history were reviewed and updated as appropriate: allergies, current medications, past family history, past medical history, past social history, past surgical history and problem list      Pertinent family history:  Family history of headaches:  migraine headaches in sister, and mom has headaches that are likely migraines   Any family history of aneurysms - No    Pertinent social history:  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) and US at [employer] alone normally      Past Medical History:     Past Medical History:   Diagnosis Date   • History of IBS     with constipation   • Ingrown toenail    • Migraines     Chronic per pt   • Plantar fasciitis    • Venereal wart 08/07/2014       Patient Active Problem List   Diagnosis   • Migraines   • Chronic migraine w/o aura w/o status migrainosus, not intractable   • TOSHIA II (cervical intraepithelial neoplasia II)   • Irritable bowel syndrome with constipation   • Allergic rhinitis due to pollen   • Chronic sinusitis   • Insomnia   • Neck pain   • Vestibulitis, vulvar   • Thyroid nodule   • Abnormal TSH   • Sensation of fullness in both ears   • Overweight (BMI 25 0-29  9)       Medications:      Current Outpatient Medications   Medication Sig Dispense Refill   • acetaminophen (TYLENOL) 500 mg tablet Take 1,000 mg by mouth every 6 (six) hours as needed for mild pain     • Biotin 5 MG CAPS Take by mouth     • FIBER ADULT GUMMIES PO Take by mouth     • fluticasone (FLONASE) 50 mcg/act nasal spray 2 sprays into each nostril daily 16 g 2   • Galcanezumab-gnlm 120 MG/ML SOAJ Inject 120 mg under the skin every 28 days 1 mL 11   • Levocetirizine Dihydrochloride (XYZAL PO) Take by mouth     • Linzess 290 MCG CAPS TAKE ONE CAPSULE BY MOUTH DAILY  30 capsule 0   • Melatonin 5 MG TABS Take by mouth     • meloxicam (MOBIC) 15 mg tablet Take 15 mg by mouth daily as needed     • multivitamin (THERAGRAN) TABS Take 1 tablet by mouth daily     • omeprazole (PriLOSEC) 20 mg delayed release capsule TAKE ONE CAPSULE BY MOUTH EVERY DAY 90 capsule 0   • propranolol (INDERAL) 40 mg tablet 2 (two) times a day     • rizatriptan (MAXALT) 10 mg tablet Take 1 tablet (10 mg total) by mouth once as needed for migraine May repeat in 2 hours if needed  Max 2/24 hours, 9/month  9 tablet 12   • zolpidem (AMBIEN) 10 mg tablet Take 10 mg by mouth daily at bedtime as needed       No current facility-administered medications for this visit          Allergies:    No Known Allergies    Family History:     Family History   Problem Relation Age of Onset   • Hypertension Mother    • Arthritis Mother    • Transient ischemic attack Father    • Hypertension Father    • Rashes / Skin problems Sister         Shingles   • No Known Problems Sister    • No Known Problems Sister    • Uterine cancer Maternal Grandmother 21   • Cancer Maternal Grandmother         Uterine   • Colon cancer Maternal Grandfather 61   • Cancer Maternal Grandfather         Colon   • Lung cancer Paternal Grandmother 61   • Cancer Paternal Grandmother         Lung   • No Known Problems Paternal Grandfather    • Colon cancer Cousin [place]       Social History:     Social History     Socioeconomic History   • Marital status: Single     Spouse name: Not on file   • Number of children: Not on file   • Years of education: Not on file   • Highest education level: Not on file   Occupational History   • Not on file   Tobacco Use   • Smoking status: Never   • Smokeless tobacco: Never   Vaping Use   • Vaping Use: Never used   Substance and Sexual Activity   • Alcohol use: Yes     Alcohol/week: 2 0 standard drinks     Types: 2 Glasses of wine per week     Comment: 2 glassess wine a week   • Drug use: Never   • Sexual activity: Not Currently   Other Topics Concern   • Not on file   Social History Narrative   • Not on file     Social Determinants of Health     Financial Resource Strain: Not on file   Food Insecurity: Not on file   Transportation Needs: Not on file   Physical Activity: Not on file   Stress: Not on file   Social Connections: Not on file   Intimate Partner Violence: Not on file   Housing Stability: Not on file         Objective:     Physical Exam:                                                                 Vitals:            Constitutional:    /70 (BP Location: Left arm, Patient Position: Sitting, Cuff Size: Standard)   Pulse 76   Temp 98 1 °F (36 7 °C) (Tympanic)   Ht 5' 4" (1 626 m)   Wt 75 8 kg (167 lb)   LMP 12/24/2022 (Exact Date)   BMI 28 67 kg/m²   BP Readings from Last 3 Encounters:   01/11/23 122/70   01/10/23 110/73   01/05/23 105/69     Pulse Readings from Last 3 Encounters:   01/11/23 76   01/10/23 67   01/05/23 65         Well developed, well nourished, well groomed  Psychiatric:  Normal behavior and appropriate affect        Neurological Examination:     Mental status/cognitive function:   Recent and remote memory appear intact  Attention span and concentration as well as fund of knowledge are appropriate for age  Normal language and spontaneous speech  Cranial Nerves:   VII-facial expression symmetric  Motor Exam: symmetric bulk throughout  no atrophy, fasciculations or abnormal movements noted     Coordination:  no apparent dysmetria, ataxia or tremor noted  Gait: steady casual gait        Pertinent lab results:   See EMR for recent labs  - 01/14/2022 COVID-19 positive  - 8/19/21 CMP and CBC unremarkable   TSH elevated at 4 09 with normal Free T4     Imaging:   No head imaging in system      MRI brain in her teens was reportedly unremarkable    Review of Systems:   ROS obtained by medical assistant Personally reviewed and updated if indicated  I recommended PCP follow up for non neurologic problems  Review of Systems   Constitutional: Negative for appetite change and fever  HENT: Negative  Negative for hearing loss, tinnitus, trouble swallowing and voice change  Eyes: Negative  Negative for photophobia and pain  Respiratory: Negative  Negative for shortness of breath  Cardiovascular: Negative  Negative for palpitations  Gastrointestinal: Negative  Negative for nausea and vomiting  Endocrine: Negative  Negative for cold intolerance  Genitourinary: Negative  Negative for dysuria, frequency and urgency  Musculoskeletal: Negative  Negative for myalgias and neck pain  Skin: Negative  Negative for rash  Neurological: Negative  Negative for dizziness, tremors, seizures, syncope, facial asymmetry, speech difficulty, weakness, light-headedness, numbness and headaches  Hematological: Negative  Does not bruise/bleed easily  Psychiatric/Behavioral: Negative  Negative for confusion, hallucinations and sleep disturbance  All other systems reviewed and are negative      I have spent 23 minutes with Patient  today in which greater than 50% of this time was spent in counseling/coordination of care  I also spent 10 minutes non face to face for this patient the same day         Author:  Adilia Mccoy MD 1/11/2023 4:52 PM

## 2023-01-11 NOTE — PATIENT INSTRUCTIONS
No estrogen due to migraine aura    "why we sleep" by Humphrey Kinsey - good read or listen on audible    I am placing a referral to the sleep medicine specialist team to further evaluate your sleep issues  Please call [phone] to schedule and I recommend you see one of the following providers:  - Lisa Villegas MD - sleep doctor who is a neurologist and is excellent       Headache/migraine treatment:   Abortive medications (for immediate treatment of a headache): It is ok to take ibuprofen, acetaminophen or naproxen (Advil, Tylenol,  Aleve, Excedrin) if they help your headaches you should limit these to No more than 3 times a week to avoid medication overuse/rebound headaches  For your more moderate to severe migraines take this medication early   Maxalt (rizatriptan) 10mg tabs - take one at the onset of headache  May repeat one time after 1-2 hours if pain has not resolved  (Max 2 a day and 9 a month)     Prescription preventive medications for headaches/migraines   (to take every day to help prevent headaches - not to take at the time of headache):  [x] Emgality/Galcanezumab -  120 mg injections every 30 days     READ INSTRUCTIONS that come with the medication  REFRIGERATE  Keep out of direct sunlight  Prior to administration, allow to come to room temperature for 30 minutes  Do not warm using a heat source (eg, microwave or hot water)  Do not shake  Administer in preferably abdomen (avoiding 2 inches around the navel), thigh, upper arm, or buttocks avoiding areas of skin that are tender, bruised, red or hard  Deliver entire contents of single-use prefilled pen or syringe  Unknown impact in pregnancy therefore would recommend stopping 6 months prior to considering pregnancy      Let me know if you ever want to try and slowly come off propranolol     *Typically these types of medications take time untill you see the benefit, although some may see improvement in days, often it may take weeks, especially if the medication is being titrated up to a beneficial level  Please contact us if there are any concerns or questions regarding the medication  Lifestyle Recommendations:  [x] SLEEP - Maintain a regular sleep schedule: Adults need at least 7-8 hours of uninterrupted a night  Maintain good sleep hygiene:  Going to bed and waking up at consistent times, avoiding excessive daytime naps, avoiding caffeinated beverages in the evening, avoid excessive stimulation in the evening and generally using bed primarily for sleeping  One hour before bedtime would recommend turning lights down lower, decreasing your activity (may read quietly, listen to music at a low volume)  When you get into bed, should eliminate all technology (no texting, emailing, playing with your phone, iPad or tablet in bed)  [x] HYDRATION - Maintain good hydration  Drink  2L of fluid a day (4 typical small water bottles)  [x] DIET - Maintain good nutrition  In particular don't skip meals and try and eat healthy balanced meals regularly  [x] TRIGGERS - Look for other triggers and avoid them: Limit caffeine to 1-2 cups a day or less  Avoid dietary triggers that you have noticed bring on your headaches (this could include aged cheese, peanuts, MSG, aspartame and nitrates)  [x] EXERCISE - physical exercise as we all know is good for you in many ways, and not only is good for your heart, but also is beneficial for your mental health, cognitive health and  chronic pain/headaches  I would encourage at the least 5 days of physical exercise weekly for at least 30 minutes  Education and Follow-up  [x] Please call with any questions or concerns  Of course if any new concerning symptoms go to the emergency department    [x] Follow up 1 year sooner if needed

## 2023-01-17 ENCOUNTER — TELEMEDICINE (OUTPATIENT)
Dept: INTERNAL MEDICINE CLINIC | Facility: OTHER | Age: 43
End: 2023-01-17

## 2023-01-17 VITALS — WEIGHT: 159 LBS | HEIGHT: 64 IN | BODY MASS INDEX: 27.14 KG/M2

## 2023-01-17 DIAGNOSIS — K58.1 IRRITABLE BOWEL SYNDROME WITH CONSTIPATION: ICD-10-CM

## 2023-01-17 DIAGNOSIS — R79.89 ABNORMAL TSH: ICD-10-CM

## 2023-01-17 DIAGNOSIS — E04.1 THYROID NODULE: Primary | ICD-10-CM

## 2023-01-17 DIAGNOSIS — G43.709 CHRONIC MIGRAINE W/O AURA W/O STATUS MIGRAINOSUS, NOT INTRACTABLE: ICD-10-CM

## 2023-01-17 DIAGNOSIS — F51.01 PRIMARY INSOMNIA: ICD-10-CM

## 2023-01-17 DIAGNOSIS — Z13.228 SCREENING FOR METABOLIC DISORDER: ICD-10-CM

## 2023-01-17 NOTE — PROGRESS NOTES
Virtual Regular Visit    Verification of patient location:    Patient is located in the following state in which I hold an active license [place]      Assessment/Plan:    Problem List Items Addressed This Visit        Digestive    Irritable bowel syndrome with constipation     Currently follows GI, due for follow up, continues on Linzess  Endocrine    Thyroid nodule - Primary     Reviewed US, recommend biopsy, patient is scheduled         Relevant Orders    TSH, 3rd generation with Free T4 reflex       Cardiovascular and Mediastinum    Chronic migraine w/o aura w/o status migrainosus, not intractable     Well controlled, follows neurology  Other    Insomnia     Controlled on Ambien          Abnormal TSH     Will get repeat TSH         Relevant Orders    TSH, 3rd generation with Free T4 reflex   Other Visit Diagnoses     Screening for metabolic disorder        Relevant Orders    CBC and differential    Comprehensive metabolic panel    Lipid panel          BMI Counseling: Body mass index is 27 29 kg/m²  The BMI is above normal  Nutrition recommendations include decreasing portion sizes, encouraging healthy choices of fruits and vegetables, decreasing fast food intake, consuming healthier snacks, limiting drinks that contain sugar, moderation in carbohydrate intake, increasing intake of lean protein, reducing intake of saturated and trans fat and reducing intake of cholesterol  Exercise recommendations include moderate physical activity 150 minutes/week and exercising 3-5 times per week  Rationale for BMI follow-up plan is due to patient being overweight or obese  Depression Screening and Follow-up Plan: Patient was screened for depression during today's encounter  They screened negative with a PHQ-2 score of 0          Reason for visit is   Chief Complaint   Patient presents with   • Virtual Regular Visit     Virtual follow up         Encounter provider HENRY Guevara    Provider located at [address]  [address]      Recent Visits  No visits were found meeting these conditions  Showing recent visits within past 7 days and meeting all other requirements  Today's Visits  Date Type Provider Dept   01/17/23 [place], CRNP [facility]   Showing today's visits and meeting all other requirements  Future Appointments  No visits were found meeting these conditions  Showing future appointments within next 150 days and meeting all other requirements       The patient was identified by name and date of birth  Mariann Reyes was informed that this is a telemedicine visit and that the visit is being conducted through the [address] [employer] platform  She agrees to proceed     My office door was closed  No one else was in the room  She acknowledged consent and understanding of privacy and security of the video platform  The patient has agreed to participate and understands they can discontinue the visit at any time  Patient is aware this is a billable service  Jh Perez is a 38 y o  female    Patient presents today for follow up on thyroid US  She is a [employer] and works as an     Denies any new concerns      Medical conditions include:     IBS-constipation - currently followed by GI  Managed on linzess 290mg daily  She is s/p colonoscopy and EGD, advised routine follow up at age 41  Has been getting worse lately, she reports that she is due for follow up      Small hiatal hernia with reflux - EGD 7/26/2021   Controlled on omeprazole 20mg daily      Migraines -4-5 a month, she currently follows neurology, significant improvement on Emgality      Insomnia  - she is currently on Ambien 5mg prn     Thyroid nodule - US reviewed and suggested biopsy, which has been scheduled               Past Medical History:   Diagnosis Date   • Allergic    • GERD (gastroesophageal reflux disease)    • Headache(784 0)    • History of IBS     with constipation   • Ingrown toenail    • Migraines     Chronic per pt   • Plantar fasciitis    • Venereal wart 08/07/2014       Past Surgical History:   Procedure Laterality Date   • CERVICAL BIOPSY  W/ LOOP ELECTRODE EXCISION  2017   • COLONOSCOPY     • NASAL SEPTUM SURGERY     • RI OSTEOT W/WO LNGTH SHRT/CORRJ 1ST METAR Left 12/30/2022    Procedure: OSTEOTOMY METATARSAL 1st;  Surgeon: Maude Mae DPM;  Location: [facility];  Service: Podiatry   • TONSILLECTOMY     • UPPER GASTROINTESTINAL ENDOSCOPY     • WISDOM TOOTH EXTRACTION         Current Outpatient Medications   Medication Sig Dispense Refill   • acetaminophen (TYLENOL) 500 mg tablet Take 1,000 mg by mouth every 6 (six) hours as needed for mild pain     • Biotin 5 MG CAPS Take by mouth     • FIBER ADULT GUMMIES PO Take by mouth     • fluticasone (FLONASE) 50 mcg/act nasal spray 2 sprays into each nostril daily 16 g 2   • Galcanezumab-gnlm 120 MG/ML SOAJ Inject 120 mg under the skin every 28 days 1 mL 11   • Levocetirizine Dihydrochloride (XYZAL PO) Take by mouth     • Linzess 290 MCG CAPS TAKE ONE CAPSULE BY MOUTH DAILY  30 capsule 0   • Melatonin 5 MG TABS Take by mouth     • meloxicam (MOBIC) 15 mg tablet Take 15 mg by mouth daily as needed     • multivitamin (THERAGRAN) TABS Take 1 tablet by mouth daily     • omeprazole (PriLOSEC) 20 mg delayed release capsule TAKE ONE CAPSULE BY MOUTH EVERY DAY 90 capsule 0   • propranolol (INDERAL) 40 mg tablet 2 (two) times a day     • rizatriptan (MAXALT) 10 mg tablet Take 1 tablet (10 mg total) by mouth once as needed for migraine May repeat in 2 hours if needed  Max 2/24 hours, 9/month  9 tablet 12   • zolpidem (AMBIEN) 10 mg tablet Take 10 mg by mouth daily at bedtime as needed       No current facility-administered medications for this visit          No Known Allergies    Review of Systems   Constitutional: Negative for activity change, appetite change, chills, diaphoresis and fever  HENT: Negative for congestion, ear discharge, ear pain, postnasal drip, rhinorrhea, sinus pressure, sinus pain and sore throat  Eyes: Negative for pain, discharge, itching and visual disturbance  Respiratory: Negative for cough, chest tightness, shortness of breath and wheezing  Cardiovascular: Negative for chest pain, palpitations and leg swelling  Gastrointestinal: Negative for abdominal pain, constipation, diarrhea, nausea and vomiting  Endocrine: Negative for polydipsia, polyphagia and polyuria  Genitourinary: Negative for difficulty urinating, dysuria and urgency  Musculoskeletal: Negative for arthralgias, back pain and neck pain  Skin: Negative for rash and wound  Neurological: Negative for dizziness, weakness, numbness and headaches  Psychiatric/Behavioral: Negative for dysphoric mood  The patient is not nervous/anxious  Video Exam    Vitals:    01/17/23 0826   Weight: 72 1 kg (159 lb)   Height: 5' 4" (1 626 m)       Physical Exam  Constitutional:       General: She is not in acute distress  HENT:      Mouth/Throat:      Pharynx: No oropharyngeal exudate  Eyes:      General: No scleral icterus  Pulmonary:      Effort: No respiratory distress  Neurological:      Mental Status: She is alert and oriented to person, place, and time  Psychiatric:         Behavior: Behavior normal          Thought Content:  Thought content normal          Judgment: Judgment normal           I spent 15 minutes directly with the patient during this visit

## 2023-01-20 NOTE — NURSING NOTE
Call placed to patient to discuss upcoming appointment at [facility] radiology department and complete consultation with patient  Patient is having a thyroid biopsy utilizing US  guidance  Reviewed patient's allergies, no current anticoagulant medication present per patient , also discussed the pre and post procedure expectations  Reminded patient of location and time expected for procedure, Patient expressed understanding by verbalizing and repeating instructions

## 2023-01-26 ENCOUNTER — OFFICE VISIT (OUTPATIENT)
Dept: PODIATRY | Facility: CLINIC | Age: 43
End: 2023-01-26

## 2023-01-26 VITALS
SYSTOLIC BLOOD PRESSURE: 112 MMHG | DIASTOLIC BLOOD PRESSURE: 74 MMHG | HEIGHT: 64 IN | HEART RATE: 51 BPM | BODY MASS INDEX: 27.29 KG/M2

## 2023-01-26 DIAGNOSIS — M25.572 PAIN IN JOINT OF LEFT FOOT: Primary | ICD-10-CM

## 2023-01-26 NOTE — PROGRESS NOTES
Patient presents approximately 1 month post Hazel/Grupo bunionectomy left foot  Surgical site is healing unremarkably with no evidence of infection  Patient is still in surgical shoe  Patient has pain with range of motion especially forced dorsiflexion  Dorsiflexion present to 40 degrees at this time  Patient told that she may return to a running shoe pain permitted tomorrow  Patient has meloxicam at home for her shoulder injury and she was told to return to it on a regular basis  She will be reassessed in 3 weeks

## 2023-01-31 ENCOUNTER — HOSPITAL ENCOUNTER (OUTPATIENT)
Dept: RADIOLOGY | Facility: HOSPITAL | Age: 43
Discharge: HOME | End: 2023-01-31
Admitting: RADIOLOGY

## 2023-01-31 DIAGNOSIS — E04.1 THYROID NODULE: ICD-10-CM

## 2023-01-31 RX ORDER — LIDOCAINE HYDROCHLORIDE 10 MG/ML
2 INJECTION, SOLUTION EPIDURAL; INFILTRATION; INTRACAUDAL; PERINEURAL ONCE
Status: DISCONTINUED | OUTPATIENT
Start: 2023-01-31 | End: 2023-02-01 | Stop reason: HOSPADM

## 2023-02-01 ENCOUNTER — DOCUMENTATION (OUTPATIENT)
Dept: HEMATOLOGY ONCOLOGY | Facility: CLINIC | Age: 43
End: 2023-02-01

## 2023-02-01 PROBLEM — R89.9 ABNORMAL THYROID BIOPSY: Status: ACTIVE | Noted: 2023-02-01

## 2023-02-01 NOTE — PROGRESS NOTES
Intake received/ Chart reviewed for services completed outside of [facility]    Pathology completed:1/31/23    Imaging completed:1/3/23    All records needed are in patients chart  No records retrieval needed at this time

## 2023-02-03 ENCOUNTER — CONSULT (OUTPATIENT)
Dept: ENDOCRINOLOGY | Facility: CLINIC | Age: 43
End: 2023-02-03

## 2023-02-03 ENCOUNTER — APPOINTMENT (OUTPATIENT)
Dept: LAB | Age: 43
End: 2023-02-03

## 2023-02-03 VITALS
HEART RATE: 62 BPM | TEMPERATURE: 98.7 F | OXYGEN SATURATION: 99 % | SYSTOLIC BLOOD PRESSURE: 108 MMHG | DIASTOLIC BLOOD PRESSURE: 72 MMHG | HEIGHT: 64 IN | BODY MASS INDEX: 27.83 KG/M2 | WEIGHT: 163 LBS

## 2023-02-03 DIAGNOSIS — C73 PAPILLARY THYROID CARCINOMA (HCC): Primary | ICD-10-CM

## 2023-02-03 DIAGNOSIS — C73 PAPILLARY THYROID CARCINOMA (HCC): ICD-10-CM

## 2023-02-03 DIAGNOSIS — R89.9 ABNORMAL THYROID BIOPSY: ICD-10-CM

## 2023-02-03 LAB
BASOPHILS # BLD AUTO: 0.07 THOUSANDS/ÂΜL (ref 0–0.1)
BASOPHILS NFR BLD AUTO: 1 % (ref 0–1)
EOSINOPHIL # BLD AUTO: 0.19 THOUSAND/ÂΜL (ref 0–0.61)
EOSINOPHIL NFR BLD AUTO: 2 % (ref 0–6)
ERYTHROCYTE [DISTWIDTH] IN BLOOD BY AUTOMATED COUNT: 12.2 % (ref 11.6–15.1)
HCT VFR BLD AUTO: 37.8 % (ref 34.8–46.1)
HGB BLD-MCNC: 12.4 G/DL (ref 11.5–15.4)
IMM GRANULOCYTES # BLD AUTO: 0.01 THOUSAND/UL (ref 0–0.2)
IMM GRANULOCYTES NFR BLD AUTO: 0 % (ref 0–2)
LYMPHOCYTES # BLD AUTO: 1.89 THOUSANDS/ÂΜL (ref 0.6–4.47)
LYMPHOCYTES NFR BLD AUTO: 23 % (ref 14–44)
MCH RBC QN AUTO: 31.6 PG (ref 26.8–34.3)
MCHC RBC AUTO-ENTMCNC: 32.8 G/DL (ref 31.4–37.4)
MCV RBC AUTO: 96 FL (ref 82–98)
MONOCYTES # BLD AUTO: 0.59 THOUSAND/ÂΜL (ref 0.17–1.22)
MONOCYTES NFR BLD AUTO: 7 % (ref 4–12)
NEUTROPHILS # BLD AUTO: 5.52 THOUSANDS/ÂΜL (ref 1.85–7.62)
NEUTS SEG NFR BLD AUTO: 67 % (ref 43–75)
NRBC BLD AUTO-RTO: 0 /100 WBCS
PLATELET # BLD AUTO: 339 THOUSANDS/UL (ref 149–390)
PMV BLD AUTO: 10.3 FL (ref 8.9–12.7)
RBC # BLD AUTO: 3.93 MILLION/UL (ref 3.81–5.12)
T3 SERPL-MCNC: 1.1 NG/ML (ref 0.6–1.8)
T4 FREE SERPL-MCNC: 1.27 NG/DL (ref 0.76–1.46)
TSH SERPL DL<=0.05 MIU/L-ACNC: 1.9 UIU/ML (ref 0.45–4.5)
WBC # BLD AUTO: 8.27 THOUSAND/UL (ref 4.31–10.16)

## 2023-02-03 NOTE — PROGRESS NOTES
Colleen Cespedes is a 38 y o  female presents for evaluation for thyroid FNA biopsy  Patient is a ultrasound technician, a thyroid ultrasound was done in 2021 which showed left lower pole nodule measuring 0 5 5 4 x 0 2, TI-RADS 4 and left lower pole nodule measuring 0 8x 0 9x 0 6 with TI-RADS 4,   A follow-up thyroid ultrasound which was done in 2023 showed left lower pole nodule measuring 1 x 0 7 x 1 cm which has grown and showed punctate echogenic foci with TI-RADS 5, ultrasound-guided thyroid biopsy was done in [place] showed [place] category VI, papillary thyroid carcinoma  Associated signs/symptoms:  Trouble swallowing  No  Hoarseness/change in voice: Yes  Trouble breathing  No     Modifying factors which can cause nodule formation:  H/o Radiation to the head or neck region No  History of thyroid cancer in one or more first degree relativesNo  Prior hemithyroidectomy with discovery of thyroid cancer No  Family history of thyroid disease: Yes     Fatigue: No  No weight loss/ Yes weight gain  NoChange in appetite  Noheat intolerance/ Yescold intolerance  Nodiarrhea/ Yesconstipation  Nosweating/Notremor/Nopalpitation  Yesdifficulty sleeping        Patient Active Problem List   Diagnosis   • Migraines   • Chronic migraine w/o aura w/o status migrainosus, not intractable   • TOSHIA II (cervical intraepithelial neoplasia II)   • Irritable bowel syndrome with constipation   • Insomnia   • Neck pain   • Vestibulitis, vulvar   • Thyroid nodule   • Abnormal TSH   • Sensation of fullness in both ears   • Overweight (BMI 25 0-29  9)   • Abnormal thyroid biopsy      Past Medical History:   Diagnosis Date   • Allergic    • GERD (gastroesophageal reflux disease)    • Headache(784 0)    • History of IBS     with constipation   • Ingrown toenail    • Migraines     Chronic per pt   • Plantar fasciitis    • Venereal wart 08/07/2014      Past Surgical History:   Procedure Laterality Date   • CERVICAL BIOPSY  W/ LOOP ELECTRODE EXCISION  2017   • COLONOSCOPY     • NASAL SEPTUM SURGERY     • OR OSTEOT W/WO LNGTH SHRT/CORRJ 1ST METAR Left 12/30/2022    Procedure: OSTEOTOMY METATARSAL 1st;  Surgeon: Maude Mae DPM;  Location: [facility];  Service: Podiatry   • TONSILLECTOMY     • UPPER GASTROINTESTINAL ENDOSCOPY     • US GUIDED THYROID BIOPSY  1/31/2023   • WISDOM TOOTH EXTRACTION        Family History   Problem Relation Age of Onset   • Hypertension Mother    • Arthritis Mother    • Thyroid disease Mother    • Transient ischemic attack Father    • Hypertension Father    • Stroke Father    • Vision loss Father    • Rashes / Skin problems Sister         Shingles   • No Known Problems Sister    • No Known Problems Sister    • Uterine cancer Maternal Grandmother 21   • Cancer Maternal Grandmother         Uterine   • Colon cancer Maternal Grandfather    • Cancer Maternal Grandfather         Colon   • Lung cancer Paternal Grandmother 61   • Cancer Paternal Grandmother         Lung   • No Known Problems Paternal Grandfather    • Colon cancer Cousin 27   • Breast cancer Maternal Aunt    • Breast cancer Maternal Aunt    • Breast cancer Maternal Aunt      Social History     Tobacco Use   • Smoking status: Never   • Smokeless tobacco: Never   Substance Use Topics   • Alcohol use: Yes     Alcohol/week: 2 0 standard drinks     Types: 2 Glasses of wine per week     Comment: 2 glassess wine a week     No Known Allergies     Physical Examination:  Vitals:    02/03/23 1129   BP: 108/72   Pulse: 62   Temp: 98 7 °F (37 1 °C)   SpO2: 99%       General appearance - alert, well appearing, and in no distress  Mental status - normal mood, behavior, speech, dress, motor activity, and thought processes  HEENT: Normocephalic/ atraumatic  EOMI  Respiratory : CTAB; no wheeze; no crept  Cardiovascular: regular rate and rhythm,  Abdomen: soft, non distended, non tender  Extremities: No edema   DP 2 + No cyanosis/ no clubbing  Thyroid - normal size, not tender, a firm mobile   Nodule not fixed to any adjacent structures, nontender, not firm, location, left lower lobe was palpable,        Component      Latest Ref Rng & Units 2/7/2022 4/11/2022   Hemoglobin A1C      Normal 3 8-5 6%; PreDiabetic 5 7-6 4%; Diabetic >=6 5%; Glycemic control for adults with diabetes <7 0% %  5 2   eAG, EST AVG Glucose      mg/dl  103   TSH 3RD GENERATON      0 450 - 4 500 uIU/mL 6 550 (H) 1 730   Free T4      0 76 - 1 46 ng/dL 0 95         Final Diagnosis   A -B  Thyroid, Left, lower (Thin-prep and smear preparations): Malignant ([place] Category VI) - See note  Papillary carcinoma  Imaging:      THYROID ULTRASOUND     INDICATION:    E04 1: Nontoxic single thyroid nodule      COMPARISON:  None     TECHNIQUE:   Ultrasound of the thyroid was performed with a high frequency linear transducer in transverse and sagittal planes including volumetric imaging sweeps as well as traditional still imaging technique      FINDINGS:  Normal homogeneous smooth echotexture      Right lobe: 4 8 x 1 2 x 1 7 cm  Volume 4 4 mL  Left lobe:  4 5 x 1 0 x 1 4 cm  Volume 3 1 mL  Isthmus: 0 2  cm      4 mm nodule in the lower aspect of the left lobe of thyroid is unchanged      Nodule #2  Image 51  LEFT lower pole nodule measuring 1 x 0 7 x 1 cm  Minimally larger   COMPOSITION:  2 points, solid or almost completely solid   ECHOGENICITY:  2 points, hypoechoic  SHAPE:  0 points, wider-than-tall  MARGIN: 2 points, lobulated or irregular  ECHOGENIC FOCI:  3 points, punctate echogenic foci  TI-RADS Classification: TR 5 (7 or > points), Highly suspicious  FNA if > 1 cm  Follow if > 0 5 cm            IMPRESSION:     The following meet current ACR criteria for recommending ultrasound guided biopsy:  Nodule 2 in the inferior left lobe of thyroid, TR 5      Previous records including pertinent laboratory and radiographic results were reviewed  Thyroid ultrasound images were independently reviewed  ASSESSMENT/PLAN:              1  Thyroid nodules  2  S/p US guided biopsy with [place] Category VI  3  Papillary thyroid cancer   4  History of subclinical hypothyroidism       I reviewed pathophysiology of thyroid nodules with patient  Reviewed FNA results which is [place] category VI  We reviewed differentiated thyroid cancer differentials including papillary thyroid carcinoma, different surgical options, possible need for radioactive iodine and importance of follow-up with thyroglobulin and thyroglobulin antibody and TSH suppression if needed after surgical removal of the nodule and final pathology  She has upcoming appointment with surgical oncology as well  I ordered TSH, free T4, thyroglobulin antibody and microsomal antibody  I ordered neck ultrasound with lymph node mapping  I have spent 40 minutes with Patient  today in which greater than 50% of this time was spent in counseling/coordination of care regarding Diagnostic results, Prognosis, Intructions for management, Patient and family education, Importance of tx compliance and Impressions  Portions of the record may have been created with voice recognition software  Occasional wrong word or "sound a like" substitutions may have occurred due to the inherent limitations of voice recognition software  Read the chart carefully and recognize, using context, where substitutions have occurred

## 2023-02-06 PROBLEM — C73 PAPILLARY THYROID CARCINOMA (HCC): Status: ACTIVE | Noted: 2023-02-06

## 2023-02-07 LAB
THYROGLOB AB SERPL-ACNC: <1 IU/ML (ref 0–0.9)
THYROGLOB SERPL-MCNC: 12 NG/ML (ref 1.5–38.5)

## 2023-02-08 ENCOUNTER — CONSULT (OUTPATIENT)
Dept: SURGICAL ONCOLOGY | Facility: CLINIC | Age: 43
End: 2023-02-08

## 2023-02-08 VITALS
SYSTOLIC BLOOD PRESSURE: 110 MMHG | BODY MASS INDEX: 27.83 KG/M2 | WEIGHT: 163 LBS | OXYGEN SATURATION: 100 % | TEMPERATURE: 98.1 F | RESPIRATION RATE: 16 BRPM | DIASTOLIC BLOOD PRESSURE: 70 MMHG | HEART RATE: 70 BPM | HEIGHT: 64 IN

## 2023-02-08 DIAGNOSIS — C73 PAPILLARY THYROID CARCINOMA (HCC): Primary | ICD-10-CM

## 2023-02-08 DIAGNOSIS — R89.9 ABNORMAL THYROID BIOPSY: ICD-10-CM

## 2023-02-08 RX ORDER — LEVOTHYROXINE SODIUM 0.12 MG/1
125 TABLET ORAL DAILY
Qty: 30 TABLET | Refills: 3 | Status: SHIPPED | OUTPATIENT
Start: 2023-02-08

## 2023-02-08 RX ORDER — TRAMADOL HYDROCHLORIDE 50 MG/1
50 TABLET ORAL EVERY 6 HOURS PRN
Qty: 10 TABLET | Refills: 0 | Status: SHIPPED | OUTPATIENT
Start: 2023-02-08

## 2023-02-08 NOTE — PROGRESS NOTES
Surgical Oncology Consult       [facility] [address] [id number] [phone]    Martin Lindsay  1980  [id number]  [facility] Mariano Mercedes  [address] [facility]  [facility] [id number] [phone]    Chief Complaint   Patient presents with   • Consult       Assessment/Plan:    No problem-specific Assessment & Plan notes found for this encounter  Diagnoses and all orders for this visit:    Papillary thyroid carcinoma ([address] )    Abnormal thyroid biopsy  -     Ambulatory Referral to Surgical Oncology        Advance Care Planning/Advance Directives:  Discussed disease status, cancer treatment plans and/or cancer treatment goals with the patient  Oncology History   Papillary thyroid carcinoma ([address] )   1/31/2023 Biopsy    Thyroid, Left, lower (Thin-prep and smear preparations): Malignant ([place] Category VI)  Papillary carcinoma  History of Present Illness: 38year old woman who underwent screening ultrasound, found to have a nodule in her left thyroid lobe  She is aware this from prior ultrasounds  Most recent scan showed interval enlargement in size  This prompted a biopsy confirming papillary thyroid cancer  She is otherwise asymptomatic  No personal or family history of endocrine malignancies  No history radiation exposure to the head or neck area  Currently not on thyroid replacement therapy  Review of Systems   Constitutional: Negative  HENT: Negative  Eyes: Negative  Respiratory: Negative  Cardiovascular: Negative  Gastrointestinal: Negative  Endocrine: Negative  Genitourinary: Negative  Musculoskeletal: Negative  Skin: Negative  Allergic/Immunologic: Negative  Neurological: Negative  Hematological: Negative  Psychiatric/Behavioral: Negative      All other systems reviewed and are negative  Patient Active Problem List   Diagnosis   • Migraines   • Chronic migraine w/o aura w/o status migrainosus, not intractable   • TOSHIA II (cervical intraepithelial neoplasia II)   • Irritable bowel syndrome with constipation   • Insomnia   • Neck pain   • Vestibulitis, vulvar   • Thyroid nodule   • Abnormal TSH   • Sensation of fullness in both ears   • Overweight (BMI 25 0-29  9)   • Abnormal thyroid biopsy   • Papillary thyroid carcinoma (HCC)     Past Medical History:   Diagnosis Date   • Allergic    • GERD (gastroesophageal reflux disease)    • Headache(784 0)    • History of IBS     with constipation   • Ingrown toenail    • Migraines     Chronic per pt   • Plantar fasciitis    • Venereal wart 08/07/2014     Past Surgical History:   Procedure Laterality Date   • CERVICAL BIOPSY  W/ LOOP ELECTRODE EXCISION  2017   • COLONOSCOPY     • NASAL SEPTUM SURGERY     • WV OSTEOT W/WO LNGTH SHRT/CORRJ 1ST METAR Left 12/30/2022    Procedure: OSTEOTOMY METATARSAL 1st;  Surgeon: Maude Mae DPM;  Location: [facility];  Service: Podiatry   • TONSILLECTOMY     • UPPER GASTROINTESTINAL ENDOSCOPY     • US GUIDED THYROID BIOPSY  1/31/2023   • WISDOM TOOTH EXTRACTION       Family History   Problem Relation Age of Onset   • Hypertension Mother    • Arthritis Mother    • Thyroid disease Mother    • Transient ischemic attack Father    • Hypertension Father    • Stroke Father    • Vision loss Father    • Rashes / Skin problems Sister         Shingles   • No Known Problems Sister    • No Known Problems Sister    • Uterine cancer Maternal Grandmother 20   • Cancer Maternal Grandmother         Uterine   • Colon cancer Maternal Grandfather    • Cancer Maternal Grandfather         Colon   • Lung cancer Paternal Grandmother 61   • Cancer Paternal Grandmother         Lung   • No Known Problems Paternal Grandfather    • Colon cancer Cousin 27   • Breast cancer Maternal Aunt    • Breast cancer Maternal Aunt    • Breast cancer Maternal Aunt      Social History     Socioeconomic History   • Marital status: Single     Spouse name: Not on file   • Number of children: Not on file   • Years of education: Not on file   • Highest education level: Not on file   Occupational History   • Not on file   Tobacco Use   • Smoking status: Never   • Smokeless tobacco: Never   Vaping Use   • Vaping Use: Never used   Substance and Sexual Activity   • Alcohol use: Yes     Alcohol/week: 2 0 standard drinks     Types: 2 Glasses of wine per week     Comment: 2 glassess wine a week   • Drug use: Never   • Sexual activity: Not Currently   Other Topics Concern   • Not on file   Social History Narrative   • Not on file     Social Determinants of Health     Financial Resource Strain: Not on file   Food Insecurity: Not on file   Transportation Needs: Not on file   Physical Activity: Not on file   Stress: Not on file   Social Connections: Not on file   Intimate Partner Violence: Not on file   Housing Stability: Not on file       Current Outpatient Medications:   •  acetaminophen (TYLENOL) 500 mg tablet, Take 1,000 mg by mouth every 6 (six) hours as needed for mild pain, Disp: , Rfl:   •  Biotin 5 MG CAPS, Take by mouth, Disp: , Rfl:   •  FIBER ADULT GUMMIES PO, Take by mouth, Disp: , Rfl:   •  fluticasone (FLONASE) 50 mcg/act nasal spray, 2 sprays into each nostril daily, Disp: 16 g, Rfl: 2  •  Galcanezumab-gnlm 120 MG/ML SOAJ, Inject 120 mg under the skin every 28 days, Disp: 1 mL, Rfl: 11  •  Levocetirizine Dihydrochloride (XYZAL PO), Take by mouth, Disp: , Rfl:   •  Linzess 290 MCG CAPS, TAKE ONE CAPSULE BY MOUTH DAILY  , Disp: 30 capsule, Rfl: 0  •  Melatonin 5 MG TABS, Take by mouth as needed, Disp: , Rfl:   •  meloxicam (MOBIC) 15 mg tablet, Take 15 mg by mouth daily as needed, Disp: , Rfl:   •  multivitamin (THERAGRAN) TABS, Take 1 tablet by mouth daily, Disp: , Rfl:   •  omeprazole (PriLOSEC) 20 mg delayed release capsule, TAKE ONE CAPSULE BY MOUTH EVERY DAY (Patient taking differently: Take 20 mg by mouth as needed), Disp: 90 capsule, Rfl: 0  •  propranolol (INDERAL) 40 mg tablet, 2 (two) times a day, Disp: , Rfl:   •  rizatriptan (MAXALT) 10 mg tablet, Take 1 tablet (10 mg total) by mouth once as needed for migraine May repeat in 2 hours if needed  Max 2/24 hours, 9/month , Disp: 9 tablet, Rfl: 12  •  zolpidem (AMBIEN) 10 mg tablet, Take 10 mg by mouth daily at bedtime as needed, Disp: , Rfl:   No Known Allergies  Vitals:    02/08/23 0917   BP: 110/70   Pulse: 70   Resp: 16   Temp: 98 1 °F (36 7 °C)   SpO2: 100%       Physical Exam  Vitals reviewed  Constitutional:       Appearance: Normal appearance  HENT:      Head: Normocephalic and atraumatic  Right Ear: External ear normal       Left Ear: External ear normal       Mouth/Throat:      Mouth: Mucous membranes are moist    Eyes:      Extraocular Movements: Extraocular movements intact  Pupils: Pupils are equal, round, and reactive to light  Cardiovascular:      Rate and Rhythm: Normal rate and regular rhythm  Pulses: Normal pulses  Heart sounds: Normal heart sounds  Pulmonary:      Effort: Pulmonary effort is normal       Breath sounds: Normal breath sounds  Abdominal:      General: Abdomen is flat  Palpations: Abdomen is soft  Musculoskeletal:         General: Normal range of motion  Cervical back: Normal range of motion and neck supple  No rigidity or tenderness  Lymphadenopathy:      Cervical: No cervical adenopathy  Skin:     General: Skin is warm and dry  Neurological:      Mental Status: She is alert and oriented to person, place, and time  Psychiatric:         Mood and Affect: Mood normal          Behavior: Behavior normal          Thought Content:  Thought content normal          Judgment: Judgment normal          Pathology:  Case Report   Non-gynecologic Cytology                          Case: [id number]                                   Authorizing Provider: HENRY Holland   Collected:           01/31/2023 0940               Ordering Location:     [facility]      Received:            01/31/2023 0957                                      Hospital Ultrasound                                                           Pathologist:           Janet Adams DO                                                             Specimens:   A) - Thyroid, Left, lower                                                                            B) - Thyroid, Left, lower                                                                  Final Diagnosis   A -B  Thyroid, Left, lower (Thin-prep and smear preparations): Malignant ([place] Category VI) - See note  Papillary carcinoma      Satisfactory for evaluation      Note: As reported in the [address] [facility] [address] for Reporting Thyroid Cytopathology*, this diagnostic category has demonstrated anywhere from 97% - 99% risk of malignancy being found in subsequent resections  A small proportion of cases (~3-4%) diagnosed as malignant - compatible with papillary thyroid carcinoma - may prove to be NIFTP (non-invasive follicular thyroid neoplasm with papillary-like nuclear features) on histopathologic examination  Due to the usage of the surgical pathology diagnosis of NIFTP, the anticipated risk of malignancy is 94-96%  The manual reports that the usual management following this diagnosis is near-total or total thyroidectomy (in cases of "Malignant" interpretation indicating metastatic tumor rather than primary, surgery may not be indicated)  Ultimately, clinical/imaging correlation for this patient is needed in arriving at the actual management plan  *     *The [place] System for Reporting Thyroid Cytopathology, Jodie Mckeon, 2018 (2nd ed )    Electronically signed by Janet Adams DO on 2/1/2023 at 12:58 PM         Labs:  Lab Results   Component Value Date    [id number] 1 900 02/03/2023 Imaging    THYROID ULTRASOUND     INDICATION:    E04 1: Nontoxic single thyroid nodule  R79 89: Other specified abnormal findings of blood chemistry      COMPARISON:  None     TECHNIQUE:   Ultrasound of the thyroid was performed with a high frequency linear transducer in transverse and sagittal planes including volumetric imaging sweeps as well as traditional still imaging technique      FINDINGS:  Normal homogeneous smooth echotexture      Right lobe:  4 9 x 0 9 x 2 0 cm  Left lobe:  4 6 x 1 0 x 1 3 cm  Isthmus:  0 2 cm      Nodule #1  Image 26  LEFT lower pole nodule measuring 0 5 x 0 4 x 0 2 cm  COMPOSITION:  2 points, solid or almost completely solid   ECHOGENICITY:  2 points, hypoechoic  SHAPE:  0 points, wider-than-tall  MARGIN: 0 points, smooth  ECHOGENIC FOCI:  0 points, none or large comet-tail artifacts  TI-RADS Classification: TR 4 (4-6 points),  FNA if > 1 5 cm  Follow if > 1cm      Nodule #2  Image 32  LEFT lower pole nodule measuring 0 8 x 0 9 x 0 6 cm   COMPOSITION:  2 points, solid or almost completely solid   ECHOGENICITY:  2 points, hypoechoic  SHAPE:  0 points, wider-than-tall  MARGIN: 2 points, lobulated or irregular  ECHOGENIC FOCI:  0 points, none or large comet-tail artifacts  TI-RADS Classification: TR 4 (4-6 points), FNA if > 1 5 cm  Follow if > 1cm           IMPRESSION:     Multinodular thyroid gland  No nodule meeting current ACR criteria for biopsy or follow-up, however, given the appearance of the left lower lobe 0 9 cm nodule, recommend 12 month follow-up ultrasound            The study was marked in EPIC for significant notification  US guided thyroid biopsy with PJ ROBERTS    Result Date: 1/31/2023  Narrative: ULTRASOUND-GUIDED THYROID BIOPSY HISTORY: 38year-old with a history of thyroid nodules  The patient presents with a prescription for ultrasound-guided fine-needle aspiration biopsy with Afirma sampling of the left lower pole thyroid nodule  COMPARISON: Thyroid ultrasound dated 1/3/2023 was reviewed  A left lower pole thyroid nodule measuring 1 0 x 0 7 x 1 0 cm was identified and recommended for biopsy  FINDINGS: On ultrasound imaging performed today, the corresponding left lower pole thyroid nodule measures 1 1 x 0 8 x 1 2 cm  PROCEDURE: The procedure was explained to the patient including risks of hemorrhage, infection, allergic reaction, and local injury  The possibility of a nondiagnostic biopsy result and the need for repeat biopsy or sonographic follow-up was explained to  the patient  Informed consent was freely obtained  The patient verbalized expressed understanding of the above risks and wished to proceed with the procedure  Final standard "time-out" procedure was performed  The neck was prepped and draped in normal sterile fashion  Under real-time ultrasound guidance and local anesthesia five passes with 25 gauge needles were made through the left lower pole thyroid nodule  Cytopathology was present and deemed the specimens  adequate for evaluation  Two of the samples were reserved for potential Afirma testing  The patient tolerated the procedure well  There were no complications  Post-procedure instructions were provided for the patient  I asked the patient to call us with any questions, concerns, or acute problems  The patient expressed understanding of the above  Impression: Status post successful ultrasound-guided thyroid biopsy  Final pathology results are pending  Additional FNA samples were obtained and reserved for Afirma testing, if required  The above findings and procedure were reviewed with Dr Lora Olvera  Procedure was performed by Mary Lou Mathews PA-C under the direct supervision of Dr Lora Olvera  Workstation performed: [id number]     I reviewed the above laboratory and imaging data  Discussion/Summary: 38year-old thyroid cancer in left lobe    Treatment options including Remi versus total thyroidecetomy were discussed today  She understands and wishes to proceed with total thyroidectomy  Ultrasound for cervical node mapping is still pending  Assuming all is negative, we will plan on total thyroidectomy  Risk and benefits of surgical infection, bleeding, recurrent nerve injury, hypocalcemia, discussed  All questions answered and consent signed at this visit

## 2023-02-08 NOTE — H&P (VIEW-ONLY)
Surgical Oncology Consult       [address] [facility] [address] [address] [phone]    Ender David  1980  [id number]  [address] [facility] [address]  [facility] [id number] [phone]    Chief Complaint   Patient presents with   • Consult       Assessment/Plan:    No problem-specific Assessment & Plan notes found for this encounter  Diagnoses and all orders for this visit:    Papillary thyroid carcinoma ([facility] [address] )    Abnormal thyroid biopsy  -     Ambulatory Referral to Surgical Oncology        Advance Care Planning/Advance Directives:  Discussed disease status, cancer treatment plans and/or cancer treatment goals with the patient  Oncology History   Papillary thyroid carcinoma ([address] )   1/31/2023 Biopsy    Thyroid, Left, lower (Thin-prep and smear preparations): Malignant ([place] Category VI)  Papillary carcinoma  History of Present Illness: 38year old woman who underwent screening ultrasound, found to have a nodule in her left thyroid lobe  She is aware this from prior ultrasounds  Most recent scan showed interval enlargement in size  This prompted a biopsy confirming papillary thyroid cancer  She is otherwise asymptomatic  No personal or family history of endocrine malignancies  No history radiation exposure to the head or neck area  Currently not on thyroid replacement therapy  Review of Systems   Constitutional: Negative  HENT: Negative  Eyes: Negative  Respiratory: Negative  Cardiovascular: Negative  Gastrointestinal: Negative  Endocrine: Negative  Genitourinary: Negative  Musculoskeletal: Negative  Skin: Negative  Allergic/Immunologic: Negative  Neurological: Negative  Hematological: Negative  Psychiatric/Behavioral: Negative      All other systems reviewed and are negative  Patient Active Problem List   Diagnosis   • Migraines   • Chronic migraine w/o aura w/o status migrainosus, not intractable   • TOSHIA II (cervical intraepithelial neoplasia II)   • Irritable bowel syndrome with constipation   • Insomnia   • Neck pain   • Vestibulitis, vulvar   • Thyroid nodule   • Abnormal TSH   • Sensation of fullness in both ears   • Overweight (BMI 25 0-29  9)   • Abnormal thyroid biopsy   • Papillary thyroid carcinoma (HCC)     Past Medical History:   Diagnosis Date   • Allergic    • GERD (gastroesophageal reflux disease)    • Headache(784 0)    • History of IBS     with constipation   • Ingrown toenail    • Migraines     Chronic per pt   • Plantar fasciitis    • Venereal wart 08/07/2014     Past Surgical History:   Procedure Laterality Date   • CERVICAL BIOPSY  W/ LOOP ELECTRODE EXCISION  2017   • COLONOSCOPY     • NASAL SEPTUM SURGERY     • IA OSTEOT W/WO LNGTH SHRT/CORRJ 1ST METAR Left 12/30/2022    Procedure: OSTEOTOMY METATARSAL 1st;  Surgeon: Tiffanie Kendall DPM;  Location: [facility];  Service: Podiatry   • TONSILLECTOMY     • UPPER GASTROINTESTINAL ENDOSCOPY     • US GUIDED THYROID BIOPSY  1/31/2023   • WISDOM TOOTH EXTRACTION       Family History   Problem Relation Age of Onset   • Hypertension Mother    • Arthritis Mother    • Thyroid disease Mother    • Transient ischemic attack Father    • Hypertension Father    • Stroke Father    • Vision loss Father    • Rashes / Skin problems Sister         Shingles   • No Known Problems Sister    • No Known Problems Sister    • Uterine cancer Maternal Grandmother 20   • Cancer Maternal Grandmother         Uterine   • Colon cancer Maternal Grandfather    • Cancer Maternal Grandfather         Colon   • Lung cancer Paternal Grandmother 61   • Cancer Paternal Grandmother         Lung   • No Known Problems Paternal Grandfather    • Colon cancer Cousin 27   • Breast cancer Maternal Aunt    • Breast cancer Maternal Aunt    • Breast cancer Maternal Aunt      Social History     Socioeconomic History   • Marital status: Single     Spouse name: Not on file   • Number of children: Not on file   • Years of education: Not on file   • Highest education level: Not on file   Occupational History   • Not on file   Tobacco Use   • Smoking status: Never   • Smokeless tobacco: Never   Vaping Use   • Vaping Use: Never used   Substance and Sexual Activity   • Alcohol use: Yes     Alcohol/week: 2 0 standard drinks     Types: 2 Glasses of wine per week     Comment: 2 glassess wine a week   • Drug use: Never   • Sexual activity: Not Currently   Other Topics Concern   • Not on file   Social History Narrative   • Not on file     Social Determinants of Health     Financial Resource Strain: Not on file   Food Insecurity: Not on file   Transportation Needs: Not on file   Physical Activity: Not on file   Stress: Not on file   Social Connections: Not on file   Intimate Partner Violence: Not on file   Housing Stability: Not on file       Current Outpatient Medications:   •  acetaminophen (TYLENOL) 500 mg tablet, Take 1,000 mg by mouth every 6 (six) hours as needed for mild pain, Disp: , Rfl:   •  Biotin 5 MG CAPS, Take by mouth, Disp: , Rfl:   •  FIBER ADULT GUMMIES PO, Take by mouth, Disp: , Rfl:   •  fluticasone (FLONASE) 50 mcg/act nasal spray, 2 sprays into each nostril daily, Disp: 16 g, Rfl: 2  •  Galcanezumab-gnlm 120 MG/ML SOAJ, Inject 120 mg under the skin every 28 days, Disp: 1 mL, Rfl: 11  •  Levocetirizine Dihydrochloride (XYZAL PO), Take by mouth, Disp: , Rfl:   •  Linzess 290 MCG CAPS, TAKE ONE CAPSULE BY MOUTH DAILY  , Disp: 30 capsule, Rfl: 0  •  Melatonin 5 MG TABS, Take by mouth as needed, Disp: , Rfl:   •  meloxicam (MOBIC) 15 mg tablet, Take 15 mg by mouth daily as needed, Disp: , Rfl:   •  multivitamin (THERAGRAN) TABS, Take 1 tablet by mouth daily, Disp: , Rfl:   •  omeprazole (PriLOSEC) 20 mg delayed release capsule, TAKE ONE CAPSULE BY MOUTH EVERY DAY (Patient taking differently: Take 20 mg by mouth as needed), Disp: 90 capsule, Rfl: 0  •  propranolol (INDERAL) 40 mg tablet, 2 (two) times a day, Disp: , Rfl:   •  rizatriptan (MAXALT) 10 mg tablet, Take 1 tablet (10 mg total) by mouth once as needed for migraine May repeat in 2 hours if needed  Max 2/24 hours, 9/month , Disp: 9 tablet, Rfl: 12  •  zolpidem (AMBIEN) 10 mg tablet, Take 10 mg by mouth daily at bedtime as needed, Disp: , Rfl:   No Known Allergies  Vitals:    02/08/23 0917   BP: 110/70   Pulse: 70   Resp: 16   Temp: 98 1 °F (36 7 °C)   SpO2: 100%       Physical Exam  Vitals reviewed  Constitutional:       Appearance: Normal appearance  HENT:      Head: Normocephalic and atraumatic  Right Ear: External ear normal       Left Ear: External ear normal       Mouth/Throat:      Mouth: Mucous membranes are moist    Eyes:      Extraocular Movements: Extraocular movements intact  Pupils: Pupils are equal, round, and reactive to light  Cardiovascular:      Rate and Rhythm: Normal rate and regular rhythm  Pulses: Normal pulses  Heart sounds: Normal heart sounds  Pulmonary:      Effort: Pulmonary effort is normal       Breath sounds: Normal breath sounds  Abdominal:      General: Abdomen is flat  Palpations: Abdomen is soft  Musculoskeletal:         General: Normal range of motion  Cervical back: Normal range of motion and neck supple  No rigidity or tenderness  Lymphadenopathy:      Cervical: No cervical adenopathy  Skin:     General: Skin is warm and dry  Neurological:      Mental Status: She is alert and oriented to person, place, and time  Psychiatric:         Mood and Affect: Mood normal          Behavior: Behavior normal          Thought Content:  Thought content normal          Judgment: Judgment normal          Pathology:  Case Report   Non-gynecologic Cytology                          Case: [id number]                                   Authorizing Provider: HENRY Warren   Collected:           01/31/2023 0940               Ordering Location:     [facility]      Received:            01/31/2023 0957                                      Hospital [facility]                                                           Pathologist:           Jairo Lew DO                                                             Specimens:   A) - Thyroid, Left, lower                                                                            B) - Thyroid, Left, lower                                                                  Final Diagnosis   A -B  Thyroid, Left, lower (Thin-prep and smear preparations): Malignant ([place] Category VI) - See note  Papillary carcinoma      Satisfactory for evaluation      Note: As reported in the [facility] for Reporting Thyroid Cytopathology*, this diagnostic category has demonstrated anywhere from 97% - 99% risk of malignancy being found in subsequent resections  A small proportion of cases (~3-4%) diagnosed as malignant - compatible with papillary thyroid carcinoma - may prove to be NIFTP (non-invasive follicular thyroid neoplasm with papillary-like nuclear features) on histopathologic examination  Due to the usage of the surgical pathology diagnosis of NIFTP, the anticipated risk of malignancy is 94-96%  The manual reports that the usual management following this diagnosis is near-total or total thyroidectomy (in cases of "Malignant" interpretation indicating metastatic tumor rather than primary, surgery may not be indicated)  Ultimately, clinical/imaging correlation for this patient is needed in arriving at the actual management plan  *     *The [place] System for Reporting Thyroid Cytopathology, Patrick Padgett, 2018 (2nd ed )    Electronically signed by Jairo Lew DO on 2/1/2023 at 12:58 PM         Labs:  Lab Results   Component Value Date    [id number] 1 900 02/03/2023 Imaging    THYROID ULTRASOUND     INDICATION:    E04 1: Nontoxic single thyroid nodule  R79 89: Other specified abnormal findings of blood chemistry      COMPARISON:  None     TECHNIQUE:   Ultrasound of the thyroid was performed with a high frequency linear transducer in transverse and sagittal planes including volumetric imaging sweeps as well as traditional still imaging technique      FINDINGS:  Normal homogeneous smooth echotexture      Right lobe:  4 9 x 0 9 x 2 0 cm  Left lobe:  4 6 x 1 0 x 1 3 cm  Isthmus:  0 2 cm      Nodule #1  Image 26  LEFT lower pole nodule measuring 0 5 x 0 4 x 0 2 cm  COMPOSITION:  2 points, solid or almost completely solid   ECHOGENICITY:  2 points, hypoechoic  SHAPE:  0 points, wider-than-tall  MARGIN: 0 points, smooth  ECHOGENIC FOCI:  0 points, none or large comet-tail artifacts  TI-RADS Classification: TR 4 (4-6 points),  FNA if > 1 5 cm  Follow if > 1cm      Nodule #2  Image 32  LEFT lower pole nodule measuring 0 8 x 0 9 x 0 6 cm   COMPOSITION:  2 points, solid or almost completely solid   ECHOGENICITY:  2 points, hypoechoic  SHAPE:  0 points, wider-than-tall  MARGIN: 2 points, lobulated or irregular  ECHOGENIC FOCI:  0 points, none or large comet-tail artifacts  TI-RADS Classification: TR 4 (4-6 points), FNA if > 1 5 cm  Follow if > 1cm           IMPRESSION:     Multinodular thyroid gland  No nodule meeting current ACR criteria for biopsy or follow-up, however, given the appearance of the left lower lobe 0 9 cm nodule, recommend 12 month follow-up ultrasound            The study was marked in EPIC for significant notification  US guided thyroid biopsy with PJ ROBERTS    Result Date: 1/31/2023  Narrative: ULTRASOUND-GUIDED THYROID BIOPSY HISTORY: 38year-old with a history of thyroid nodules  The patient presents with a prescription for ultrasound-guided fine-needle aspiration biopsy with Afirma sampling of the left lower pole thyroid nodule  COMPARISON: Thyroid ultrasound dated 1/3/2023 was reviewed  A left lower pole thyroid nodule measuring 1 0 x 0 7 x 1 0 cm was identified and recommended for biopsy  FINDINGS: On ultrasound imaging performed today, the corresponding left lower pole thyroid nodule measures 1 1 x 0 8 x 1 2 cm  PROCEDURE: The procedure was explained to the patient including risks of hemorrhage, infection, allergic reaction, and local injury  The possibility of a nondiagnostic biopsy result and the need for repeat biopsy or sonographic follow-up was explained to  the patient  Informed consent was freely obtained  The patient verbalized expressed understanding of the above risks and wished to proceed with the procedure  Final standard "time-out" procedure was performed  The neck was prepped and draped in normal sterile fashion  Under real-time ultrasound guidance and local anesthesia five passes with 25 gauge needles were made through the left lower pole thyroid nodule  Cytopathology was present and deemed the specimens  adequate for evaluation  Two of the samples were reserved for potential Afirma testing  The patient tolerated the procedure well  There were no complications  Post-procedure instructions were provided for the patient  I asked the patient to call us with any questions, concerns, or acute problems  The patient expressed understanding of the above  Impression: Status post successful ultrasound-guided thyroid biopsy  Final pathology results are pending  Additional FNA samples were obtained and reserved for Afirma testing, if required  The above findings and procedure were reviewed with Dr Wilver Felix  Procedure was performed by Lawanda Mathews PA-C under the direct supervision of Dr Wilver Felix  Workstation performed: [id number]     I reviewed the above laboratory and imaging data  Discussion/Summary: 38year-old thyroid cancer in left lobe    Treatment options including Remi versus total thyroidecetomy were discussed today  She understands and wishes to proceed with total thyroidectomy  Ultrasound for cervical node mapping is still pending  Assuming all is negative, we will plan on total thyroidectomy  Risk and benefits of surgical infection, bleeding, recurrent nerve injury, hypocalcemia, discussed  All questions answered and consent signed at this visit

## 2023-02-10 ENCOUNTER — HOSPITAL ENCOUNTER (OUTPATIENT)
Dept: RADIOLOGY | Facility: HOSPITAL | Age: 43
Discharge: HOME | End: 2023-02-10
Attending: STUDENT IN AN ORGANIZED HEALTH CARE EDUCATION/TRAINING PROGRAM

## 2023-02-10 DIAGNOSIS — C73 PAPILLARY THYROID CARCINOMA (HCC): ICD-10-CM

## 2023-02-13 ENCOUNTER — APPOINTMENT (OUTPATIENT)
Dept: LAB | Age: 43
End: 2023-02-13

## 2023-02-13 ENCOUNTER — OFFICE VISIT (OUTPATIENT)
Dept: LAB | Age: 43
End: 2023-02-13

## 2023-02-13 ENCOUNTER — APPOINTMENT (OUTPATIENT)
Dept: RADIOLOGY | Age: 43
End: 2023-02-13

## 2023-02-13 ENCOUNTER — APPOINTMENT (OUTPATIENT)
Dept: PREADMISSION TESTING | Facility: HOSPITAL | Age: 43
End: 2023-02-13

## 2023-02-13 DIAGNOSIS — C73 PAPILLARY THYROID CARCINOMA (HCC): ICD-10-CM

## 2023-02-13 DIAGNOSIS — Z13.228 SCREENING FOR METABOLIC DISORDER: ICD-10-CM

## 2023-02-13 LAB
ALBUMIN SERPL BCP-MCNC: 3.5 G/DL (ref 3.5–5)
ALP SERPL-CCNC: 38 U/L (ref 46–116)
ALT SERPL W P-5'-P-CCNC: 22 U/L (ref 12–78)
ANION GAP SERPL CALCULATED.3IONS-SCNC: 8 MMOL/L (ref 4–13)
AST SERPL W P-5'-P-CCNC: 16 U/L (ref 5–45)
ATRIAL RATE: 62 BPM
BASOPHILS # BLD AUTO: 0.07 THOUSANDS/ÂΜL (ref 0–0.1)
BASOPHILS NFR BLD AUTO: 1 % (ref 0–1)
BILIRUB SERPL-MCNC: 0.29 MG/DL (ref 0.2–1)
BUN SERPL-MCNC: 30 MG/DL (ref 5–25)
CALCIUM SERPL-MCNC: 8.9 MG/DL (ref 8.3–10.1)
CHLORIDE SERPL-SCNC: 111 MMOL/L (ref 96–108)
CHOLEST SERPL-MCNC: 149 MG/DL
CO2 SERPL-SCNC: 23 MMOL/L (ref 21–32)
CREAT SERPL-MCNC: 0.96 MG/DL (ref 0.6–1.3)
EOSINOPHIL # BLD AUTO: 0.27 THOUSAND/ÂΜL (ref 0–0.61)
EOSINOPHIL NFR BLD AUTO: 3 % (ref 0–6)
ERYTHROCYTE [DISTWIDTH] IN BLOOD BY AUTOMATED COUNT: 12.3 % (ref 11.6–15.1)
GFR SERPL CREATININE-BSD FRML MDRD: 74 ML/MIN/1.73SQ M
GLUCOSE P FAST SERPL-MCNC: 84 MG/DL (ref 65–99)
HCT VFR BLD AUTO: 36.6 % (ref 34.8–46.1)
HDLC SERPL-MCNC: 45 MG/DL
HGB BLD-MCNC: 12.2 G/DL (ref 11.5–15.4)
IMM GRANULOCYTES # BLD AUTO: 0.02 THOUSAND/UL (ref 0–0.2)
IMM GRANULOCYTES NFR BLD AUTO: 0 % (ref 0–2)
LDLC SERPL CALC-MCNC: 92 MG/DL (ref 0–100)
LYMPHOCYTES # BLD AUTO: 2.36 THOUSANDS/ÂΜL (ref 0.6–4.47)
LYMPHOCYTES NFR BLD AUTO: 28 % (ref 14–44)
MCH RBC QN AUTO: 31.9 PG (ref 26.8–34.3)
MCHC RBC AUTO-ENTMCNC: 33.3 G/DL (ref 31.4–37.4)
MCV RBC AUTO: 96 FL (ref 82–98)
MONOCYTES # BLD AUTO: 0.75 THOUSAND/ÂΜL (ref 0.17–1.22)
MONOCYTES NFR BLD AUTO: 9 % (ref 4–12)
NEUTROPHILS # BLD AUTO: 4.85 THOUSANDS/ÂΜL (ref 1.85–7.62)
NEUTS SEG NFR BLD AUTO: 59 % (ref 43–75)
NONHDLC SERPL-MCNC: 104 MG/DL
NRBC BLD AUTO-RTO: 0 /100 WBCS
P AXIS: 44 DEGREES
PLATELET # BLD AUTO: 295 THOUSANDS/UL (ref 149–390)
PMV BLD AUTO: 10.7 FL (ref 8.9–12.7)
POTASSIUM SERPL-SCNC: 3.9 MMOL/L (ref 3.5–5.3)
PR INTERVAL: 158 MS
PROT SERPL-MCNC: 6.7 G/DL (ref 6.4–8.4)
QRS AXIS: 43 DEGREES
QRSD INTERVAL: 84 MS
QT INTERVAL: 380 MS
QTC INTERVAL: 385 MS
RBC # BLD AUTO: 3.82 MILLION/UL (ref 3.81–5.12)
SODIUM SERPL-SCNC: 142 MMOL/L (ref 135–147)
T WAVE AXIS: 20 DEGREES
TRIGL SERPL-MCNC: 58 MG/DL
VENTRICULAR RATE: 62 BPM
WBC # BLD AUTO: 8.32 THOUSAND/UL (ref 4.31–10.16)

## 2023-02-15 ENCOUNTER — ANESTHESIA EVENT (OUTPATIENT)
Dept: PERIOP | Facility: HOSPITAL | Age: 43
End: 2023-02-15

## 2023-02-15 NOTE — PRE-PROCEDURE INSTRUCTIONS
Pre-Surgery Instructions:   Medication Instructions   • Biotin 5 MG CAPS Stop taking 7 days prior to surgery  • FIBER ADULT GUMMIES PO Hold day of surgery  • fluticasone (FLONASE) 50 mcg/act nasal spray Uses PRN- OK to take day of surgery   • Galcanezumab-gnlm 120 MG/ML SOAJ takes monthly   • Levocetirizine Dihydrochloride (XYZAL PO) Uses PRN- OK to take day of surgery   • Linzess 290 MCG CAPS Take day of surgery  • Melatonin 5 MG TABS Take night before surgery   • meloxicam (MOBIC) 15 mg tablet Stop taking 3 days prior to surgery  • multivitamin (THERAGRAN) TABS Stop taking 7 days prior to surgery  • omeprazole (PriLOSEC) 20 mg delayed release capsule Take day of surgery  • propranolol (INDERAL) 40 mg tablet Take day of surgery  • rizatriptan (MAXALT) 10 mg tablet Uses PRN- OK to take day of surgery   • zolpidem (AMBIEN) 10 mg tablet Take night before surgery      - Reviewed with patient, in detail, instructions from "My Surgical Experience"  - Instructed to avoid all OTC vitamins/supplements one week prior to surgery and NSAIDS for 3 days prior to surgery per anesthesia guidelines  Tylenol ok to take PRN  - Advised patient nothing eat or drink after midnight prior to surgery, except medications that he/she is to take morning DOS with only small sip of water     - Advised patient that Herbert Hayes will call with surgery arrival time and hospital directions the business day prior to surgery  Patient verbalized understanding of current visitor restrictions/masking guidelines and advised that he/she can confirm these at time of arrival call with Herbert Hayes      - Patient verbalized understanding and knows to call surgeon's office with any additional questions prior to surgery  Instructed to call surgeon's office in meantime with any new illnesses/exposure, patient verbalized understanding

## 2023-02-16 ENCOUNTER — OFFICE VISIT (OUTPATIENT)
Dept: PODIATRY | Facility: CLINIC | Age: 43
End: 2023-02-16

## 2023-02-16 VITALS
HEIGHT: 64 IN | BODY MASS INDEX: 27.83 KG/M2 | HEART RATE: 66 BPM | SYSTOLIC BLOOD PRESSURE: 117 MMHG | WEIGHT: 163 LBS | DIASTOLIC BLOOD PRESSURE: 75 MMHG

## 2023-02-16 DIAGNOSIS — M25.572 PAIN IN JOINT OF LEFT FOOT: Primary | ICD-10-CM

## 2023-02-16 NOTE — PROGRESS NOTES
Patient presents 7 weeks post [facility]el[facility] bunionectomy left foot  Patient is wearing a shoe with reasonable comfort  Patient still has discomfort with palpation of the surgical incision and with range of motion but it is improving  She has been taking meloxicam on a regular basis month but will be discontinuing on February 21  She is scheduled for thyroidectomy due to thyroid cancer  Recommended range of motion exercises at the great toe joint  She was told to return to the meloxicam once she recuperates from her thyroid surgery  She may use ice at this time  She will be reassessed in 6 weeks

## 2023-02-23 PROBLEM — E03.9 HYPOTHYROIDISM: Status: ACTIVE | Noted: 2023-02-23

## 2023-02-23 PROBLEM — K21.9 GASTROESOPHAGEAL REFLUX DISEASE: Status: ACTIVE | Noted: 2023-02-23

## 2023-02-24 ENCOUNTER — ANESTHESIA (OUTPATIENT)
Dept: PERIOP | Facility: HOSPITAL | Age: 43
End: 2023-02-24

## 2023-02-24 ENCOUNTER — HOSPITAL ENCOUNTER (OUTPATIENT)
Facility: HOSPITAL | Age: 43
Setting detail: OUTPATIENT SURGERY
Discharge: HOME | End: 2023-02-24
Attending: SURGERY | Admitting: SURGERY

## 2023-02-24 VITALS
HEART RATE: 70 BPM | DIASTOLIC BLOOD PRESSURE: 75 MMHG | RESPIRATION RATE: 15 BRPM | HEIGHT: 64 IN | TEMPERATURE: 98.2 F | BODY MASS INDEX: 27.83 KG/M2 | WEIGHT: 163 LBS | OXYGEN SATURATION: 99 % | SYSTOLIC BLOOD PRESSURE: 110 MMHG

## 2023-02-24 DIAGNOSIS — R89.9 ABNORMAL THYROID BIOPSY: ICD-10-CM

## 2023-02-24 DIAGNOSIS — C73 PAPILLARY THYROID CARCINOMA (HCC): ICD-10-CM

## 2023-02-24 LAB
CALCIUM SERPL-MCNC: 8.6 MG/DL (ref 8.3–10.1)
CALCIUM SERPL-MCNC: 8.7 MG/DL (ref 8.3–10.1)
CALCIUM SERPL-MCNC: 8.8 MG/DL (ref 8.3–10.1)
EXT PREGNANCY TEST URINE: NEGATIVE
EXT. CONTROL: NORMAL

## 2023-02-24 RX ORDER — FENTANYL CITRATE 50 UG/ML
INJECTION, SOLUTION INTRAMUSCULAR; INTRAVENOUS AS NEEDED
Status: DISCONTINUED | OUTPATIENT
Start: 2023-02-24 | End: 2023-02-24

## 2023-02-24 RX ORDER — GLYCOPYRROLATE 0.2 MG/ML
INJECTION INTRAMUSCULAR; INTRAVENOUS AS NEEDED
Status: DISCONTINUED | OUTPATIENT
Start: 2023-02-24 | End: 2023-02-24

## 2023-02-24 RX ORDER — SODIUM CHLORIDE, SODIUM LACTATE, POTASSIUM CHLORIDE, CALCIUM CHLORIDE 600; 310; 30; 20 MG/100ML; MG/100ML; MG/100ML; MG/100ML
50 INJECTION, SOLUTION INTRAVENOUS CONTINUOUS
Status: DISCONTINUED | OUTPATIENT
Start: 2023-02-24 | End: 2023-02-24

## 2023-02-24 RX ORDER — PROPOFOL 10 MG/ML
INJECTION, EMULSION INTRAVENOUS AS NEEDED
Status: DISCONTINUED | OUTPATIENT
Start: 2023-02-24 | End: 2023-02-24

## 2023-02-24 RX ORDER — OXYCODONE HYDROCHLORIDE 5 MG/1
2.5 TABLET ORAL EVERY 4 HOURS PRN
Status: DISCONTINUED | OUTPATIENT
Start: 2023-02-24 | End: 2023-02-24 | Stop reason: HOSPADM

## 2023-02-24 RX ORDER — PROMETHAZINE HYDROCHLORIDE 25 MG/ML
25 INJECTION, SOLUTION INTRAMUSCULAR; INTRAVENOUS ONCE AS NEEDED
Status: DISCONTINUED | OUTPATIENT
Start: 2023-02-24 | End: 2023-02-24 | Stop reason: HOSPADM

## 2023-02-24 RX ORDER — FENTANYL CITRATE/PF 50 MCG/ML
50 SYRINGE (ML) INJECTION
Status: DISCONTINUED | OUTPATIENT
Start: 2023-02-24 | End: 2023-02-24 | Stop reason: HOSPADM

## 2023-02-24 RX ORDER — ALBUTEROL SULFATE 2.5 MG/3ML
2.5 SOLUTION RESPIRATORY (INHALATION) ONCE AS NEEDED
Status: DISCONTINUED | OUTPATIENT
Start: 2023-02-24 | End: 2023-02-24 | Stop reason: HOSPADM

## 2023-02-24 RX ORDER — LIDOCAINE HYDROCHLORIDE 20 MG/ML
INJECTION, SOLUTION EPIDURAL; INFILTRATION; INTRACAUDAL; PERINEURAL AS NEEDED
Status: DISCONTINUED | OUTPATIENT
Start: 2023-02-24 | End: 2023-02-24

## 2023-02-24 RX ORDER — PROPRANOLOL HYDROCHLORIDE 20 MG/1
40 TABLET ORAL ONCE
Status: DISCONTINUED | OUTPATIENT
Start: 2023-02-24 | End: 2023-02-24

## 2023-02-24 RX ORDER — KETOROLAC TROMETHAMINE 30 MG/ML
INJECTION, SOLUTION INTRAMUSCULAR; INTRAVENOUS AS NEEDED
Status: DISCONTINUED | OUTPATIENT
Start: 2023-02-24 | End: 2023-02-24

## 2023-02-24 RX ORDER — ONDANSETRON 2 MG/ML
INJECTION INTRAMUSCULAR; INTRAVENOUS AS NEEDED
Status: DISCONTINUED | OUTPATIENT
Start: 2023-02-24 | End: 2023-02-24

## 2023-02-24 RX ORDER — ACETAMINOPHEN 325 MG/1
650 TABLET ORAL EVERY 6 HOURS PRN
Status: DISCONTINUED | OUTPATIENT
Start: 2023-02-24 | End: 2023-02-24 | Stop reason: HOSPADM

## 2023-02-24 RX ORDER — MIDAZOLAM HYDROCHLORIDE 2 MG/2ML
INJECTION, SOLUTION INTRAMUSCULAR; INTRAVENOUS AS NEEDED
Status: DISCONTINUED | OUTPATIENT
Start: 2023-02-24 | End: 2023-02-24

## 2023-02-24 RX ORDER — ACETAMINOPHEN 325 MG/1
975 TABLET ORAL ONCE
Status: COMPLETED | OUTPATIENT
Start: 2023-02-24 | End: 2023-02-24

## 2023-02-24 RX ORDER — ONDANSETRON 2 MG/ML
4 INJECTION INTRAMUSCULAR; INTRAVENOUS ONCE AS NEEDED
Status: COMPLETED | OUTPATIENT
Start: 2023-02-24 | End: 2023-02-24

## 2023-02-24 RX ORDER — NEOSTIGMINE METHYLSULFATE 1 MG/ML
INJECTION INTRAVENOUS AS NEEDED
Status: DISCONTINUED | OUTPATIENT
Start: 2023-02-24 | End: 2023-02-24

## 2023-02-24 RX ORDER — HYDROMORPHONE HCL/PF 1 MG/ML
0.5 SYRINGE (ML) INJECTION
Status: DISCONTINUED | OUTPATIENT
Start: 2023-02-24 | End: 2023-02-24 | Stop reason: HOSPADM

## 2023-02-24 RX ORDER — MAGNESIUM HYDROXIDE 1200 MG/15ML
LIQUID ORAL AS NEEDED
Status: DISCONTINUED | OUTPATIENT
Start: 2023-02-24 | End: 2023-02-24 | Stop reason: HOSPADM

## 2023-02-24 RX ORDER — ROCURONIUM BROMIDE 10 MG/ML
INJECTION, SOLUTION INTRAVENOUS AS NEEDED
Status: DISCONTINUED | OUTPATIENT
Start: 2023-02-24 | End: 2023-02-24

## 2023-02-24 RX ADMIN — Medication 100 MCG: at 11:56

## 2023-02-24 RX ADMIN — KETOROLAC TROMETHAMINE 15 MG: 30 INJECTION, SOLUTION INTRAMUSCULAR; INTRAVENOUS at 13:50

## 2023-02-24 RX ADMIN — PROPOFOL 160 MG: 10 INJECTION, EMULSION INTRAVENOUS at 11:28

## 2023-02-24 RX ADMIN — FENTANYL CITRATE 50 MCG: 50 INJECTION, SOLUTION INTRAMUSCULAR; INTRAVENOUS at 11:28

## 2023-02-24 RX ADMIN — ONDANSETRON 4 MG: 2 INJECTION INTRAMUSCULAR; INTRAVENOUS at 14:46

## 2023-02-24 RX ADMIN — SODIUM CHLORIDE, SODIUM LACTATE, POTASSIUM CHLORIDE, AND CALCIUM CHLORIDE 50 ML/HR: .6; .31; .03; .02 INJECTION, SOLUTION INTRAVENOUS at 09:35

## 2023-02-24 RX ADMIN — NEOSTIGMINE METHYLSULFATE 3 MG: 1 INJECTION INTRAVENOUS at 13:58

## 2023-02-24 RX ADMIN — FENTANYL CITRATE 50 MCG: 50 INJECTION INTRAMUSCULAR; INTRAVENOUS at 14:46

## 2023-02-24 RX ADMIN — MIDAZOLAM 2 MG: 1 INJECTION INTRAMUSCULAR; INTRAVENOUS at 11:24

## 2023-02-24 RX ADMIN — OXYCODONE HYDROCHLORIDE 2.5 MG: 5 TABLET ORAL at 17:04

## 2023-02-24 RX ADMIN — ACETAMINOPHEN 650 MG: 325 TABLET ORAL at 16:03

## 2023-02-24 RX ADMIN — GLYCOPYRROLATE 0.4 MG: 0.2 INJECTION, SOLUTION INTRAMUSCULAR; INTRAVENOUS at 13:58

## 2023-02-24 RX ADMIN — MIDAZOLAM 2 MG: 1 INJECTION INTRAMUSCULAR; INTRAVENOUS at 11:20

## 2023-02-24 RX ADMIN — LIDOCAINE HYDROCHLORIDE 100 MG: 20 INJECTION, SOLUTION EPIDURAL; INFILTRATION; INTRACAUDAL; PERINEURAL at 11:28

## 2023-02-24 RX ADMIN — Medication 10 MG: at 12:58

## 2023-02-24 RX ADMIN — FENTANYL CITRATE 50 MCG: 50 INJECTION, SOLUTION INTRAMUSCULAR; INTRAVENOUS at 12:50

## 2023-02-24 RX ADMIN — ROCURONIUM BROMIDE 50 MG: 10 INJECTION INTRAVENOUS at 11:28

## 2023-02-24 RX ADMIN — ACETAMINOPHEN 975 MG: 325 TABLET ORAL at 09:26

## 2023-02-24 RX ADMIN — ONDANSETRON 4 MG: 2 INJECTION INTRAMUSCULAR; INTRAVENOUS at 13:49

## 2023-02-24 NOTE — INTERVAL H&P NOTE
H&P reviewed  After examining the patient I find no changes in the patients condition since the H&P had been written      Vitals:    02/24/23 0919   BP: 96/74   Pulse: 68   Resp: 16   Temp: 98 5 °F (36 9 °C)   SpO2: 97%

## 2023-02-24 NOTE — OP NOTE
OPERATIVE REPORT  PATIENT NAME: Nannette Lewis    :  1980  MRN: [id number]  Pt Location: [facility] OR ROOM 06    SURGERY DATE: 2023    Surgeon(s) and Role:     * Manuel Rivas MD - Primary     * Gayle Gilliland MD - Assisting    Preop Diagnosis:  Papillary thyroid carcinoma ([address] ) [C73]  Abnormal thyroid biopsy [R89 9]    Post-Op Diagnosis Codes:     * Papillary thyroid carcinoma ([address] ) [C73]     * Abnormal thyroid biopsy [R89 9]    Procedure(s):  TOTAL THYROIDECTOMY    Specimen(s):  ID Type Source Tests Collected by Time Destination   1 : Total thyroid  Stitch marks short is right, long is left  Tissue Thyroid TISSUE EXAM Manuel Rivas MD 2023 1318        Estimated Blood Loss:   Minimal    Drains:  * No LDAs found *    Anesthesia Type:   General    Operative Indications:  Papillary thyroid carcinoma ([address] ) [C73]  Abnormal thyroid biopsy [R89 9]      Operative Findings:  Left thyroid nodule    Complications:   None    Procedure and Technique:  The patient was brought into the operating room and identified by a proper timeout  Following this she was intubated by the anesthesia team  She was then positioned with a shoulder roll behind the scapula and the head in the sniffing position  The neck was then prepped and draped in the usual fashion  Following this, the skin at and around the planned cervical incision site was anesthetized with lidocaine  Next, a 4 cm incision was made 2 fingerbreadths above the sternal notch  Cautery was used to dissect through the dermis and subcutaneous fat  The platysma was transected with cautery  We then created flaps superiorly and inferiorly underneath the platysma  Gelpi retractors were then placed for exposure  We then proceeded to look for the strap muscles  We were able to visualize strap muscles and divided them to expose the thyroid  We first focused on the left thyroid lobe  The strap muscles were mobilized them off the anterolateral aspect of the thyroid lobe  Using traction on the superior pole we were able to take down the superior pole vessels with Harmonic Scalpel  We rotated the gland anteromedially  We saw what appeared to be the parathyroid glands which were visualized and preserved by means of close capsular dissection using bipolar device  We visualized recurrent laryngeal nerve as we rolled the lobe forward  The ligament of berry was then clamped and tied off with a 2-0 vicryl suture  The lobe was then freed from anterior surface of the trachea with cautery  Valsalva was applied in the operative Field inspected for bleeding  None was seen  Surgicel was placed in the operative field  We then focused on the right thyroid lobe  The strap muscles were mobilized them off the anterolateral aspect of the thyroid lobe  Using traction on the superior pole we were able to take down the superior pole vessels with Harmonic Scalpel  We rotated the gland anteromedially  We saw what appeared to be the parathyroid glands which were visualized and preserved by means of close capsular dissection using bipolar device  We visualized recurrent laryngeal nerve as we rolled the lobe forward  The ligament of berry was then clamped and tied off with a 2-0 vicryl suture  The lobe was then freed from anterior surface of the trachea with cautery and the gland was then sent to pathology for processing  Valsalva was applied in the operative Field inspected for bleeding  None was seen  Surgicel was placed in the operative field  Once we were sure of hemostasis, closure was performed using 3-0 Vicryl to close the strap muscles in the midline, followed by running 3-0 Vicryl to close the platysma, followed by a 4-0 vicryl to close the dermis in interrupted fashion, followed by 5-0 Monocryl placed in running subcuticular fashion to close the skin  Benzoin and steristrips were applied to the incision, followed by gauze and a blue towel   The patient tolerated the procedure well without any difficulties     I was present for the entire procedure    Patient Disposition:  PACU         SIGNATURE: Missy Mejias MD  DATE: February 24, 2023  TIME: 2:58 PM

## 2023-02-24 NOTE — DISCHARGE INSTR - AVS FIRST PAGE
Please follow-up as scheduled  Activity:  - No lifting greater than 20 pounds or strenuous physical activity or exercise for 2 weeks  - Walking and normal light activities are encouraged  - Normal daily activities including climbing steps are okay  - No driving until no longer using pain medications  Diet:    - You may resume your normal diet  Wound Care:  - May shower daily  No tub baths or swimming until cleared by your surgeon   - Wash incision gently with soap and water and pat dry  - Do not apply any creams or ointments unless instructed to do so by your surgeon   - Katie Gandhi may apply ice as needed (no longer than 20 minutes at a time) for the first 48 hours  - Bruising is not unusual and will go away with a little time  You may apply a warm, moist compress that may help the bruising resolve quicker  - You may remove the dressings the day after surgery (unless otherwise instructed)  Leave any skin tapes (steri-strips) on the incision(s) in place until they fall off on their own  Any new dressings are optional      Medications:    - You may resume all of your regular medications, including blood thinners and aspirin, after going home unless otherwise instructed  Please refer to your discharge medication list for further details  - Please take the pain medications as directed  - You are encouraged to use non-narcotic pain medications first and whenever possible  Reserve the use of narcotic pain medication for moderate to severe pain not controlled by non-narcotic medications   - No driving while taking narcotic pain medications  - You may become constipated, especially if taking pain medications  You may take any over the counter stool softeners or laxatives as needed  Examples: Milk of Magnesia, Colace, Senna      Additional Instructions:  - If you have any questions or concerns after discharge please call the office   - Call office or return to ER if fever greater than 101, chills, persistent nausea/vomiting, worsening/uncontrollable pain, and/or increasing redness or purulent/foul smelling drainage from incision(s)

## 2023-02-24 NOTE — ANESTHESIA POSTPROCEDURE EVALUATION
Post-Op Assessment Note    CV Status:  Stable  Pain Score: 0    Pain management: adequate     Mental Status:  Sleepy   Hydration Status:  Euvolemic   PONV Controlled:  Controlled   Airway Patency:  Patent      Post Op Vitals Reviewed: Yes      Staff: Anesthesiologist         No notable events documented      BP   107/75   Temp (!) 97 1 °F (36 2 °C) (02/24/23 1409)    Pulse  79   Resp   12   SpO2   100

## 2023-02-24 NOTE — ANESTHESIA PREPROCEDURE EVALUATION
Procedure:  TOTAL THYROIDECTOMY (Neck)    Relevant Problems   CARDIO   (+) Chronic migraine w/o aura w/o status migrainosus, not intractable   (+) Migraines      ENDO   (+) Hypothyroidism      GI/HEPATIC   (+) Gastroesophageal reflux disease      NEURO/PSYCH   (+) Chronic migraine w/o aura w/o status migrainosus, not intractable   (+) Migraines        Physical Exam    Airway    Mallampati score: II  TM Distance: >3 FB  Neck ROM: full     Dental   No notable dental hx     Cardiovascular      Pulmonary      Other Findings        Anesthesia Plan  ASA Score- 2     Anesthesia Type- general with ASA Monitors  Additional Monitors:   Airway Plan: ETT  Plan Factors-Exercise tolerance (METS): >4 METS  Chart reviewed  Existing labs reviewed  Patient summary reviewed  Patient is not a current smoker  Induction- intravenous  Postoperative Plan- Plan for postoperative opioid use  Planned trial extubation    Informed Consent- Anesthetic plan and risks discussed with patient

## 2023-02-26 ENCOUNTER — HOSPITAL ENCOUNTER (OUTPATIENT)
Facility: HOSPITAL | Age: 43
Setting detail: OBSERVATION
Discharge: HOME | End: 2023-02-28
Attending: EMERGENCY MEDICINE | Admitting: SURGERY

## 2023-02-26 ENCOUNTER — TELEPHONE (OUTPATIENT)
Dept: OTHER | Facility: OTHER | Age: 43
End: 2023-02-26

## 2023-02-26 DIAGNOSIS — G43.709 CHRONIC MIGRAINE W/O AURA W/O STATUS MIGRAINOSUS, NOT INTRACTABLE: ICD-10-CM

## 2023-02-26 DIAGNOSIS — R20.2 PARESTHESIAS: ICD-10-CM

## 2023-02-26 DIAGNOSIS — E89.0 S/P THYROIDECTOMY: Primary | ICD-10-CM

## 2023-02-26 DIAGNOSIS — E83.51 HYPOCALCEMIA: ICD-10-CM

## 2023-02-26 DIAGNOSIS — E87.6 HYPOKALEMIA: ICD-10-CM

## 2023-02-26 LAB
ALBUMIN SERPL BCP-MCNC: 3.3 G/DL (ref 3.5–5)
ALP SERPL-CCNC: 40 U/L (ref 46–116)
ALT SERPL W P-5'-P-CCNC: 22 U/L (ref 12–78)
ANION GAP SERPL CALCULATED.3IONS-SCNC: 3 MMOL/L (ref 4–13)
ANION GAP SERPL CALCULATED.3IONS-SCNC: 3 MMOL/L (ref 4–13)
AST SERPL W P-5'-P-CCNC: 14 U/L (ref 5–45)
BASOPHILS # BLD AUTO: 0.07 THOUSANDS/ÂΜL (ref 0–0.1)
BASOPHILS NFR BLD AUTO: 1 % (ref 0–1)
BILIRUB SERPL-MCNC: 0.19 MG/DL (ref 0.2–1)
BUN SERPL-MCNC: 15 MG/DL (ref 5–25)
BUN SERPL-MCNC: 16 MG/DL (ref 5–25)
CA-I BLD-SCNC: 0.89 MMOL/L (ref 1.12–1.32)
CA-I BLD-SCNC: 0.94 MMOL/L (ref 1.12–1.32)
CALCIUM ALBUM COR SERPL-MCNC: 7.4 MG/DL (ref 8.3–10.1)
CALCIUM SERPL-MCNC: 6.8 MG/DL (ref 8.3–10.1)
CALCIUM SERPL-MCNC: 7 MG/DL (ref 8.3–10.1)
CHLORIDE SERPL-SCNC: 108 MMOL/L (ref 96–108)
CHLORIDE SERPL-SCNC: 109 MMOL/L (ref 96–108)
CO2 SERPL-SCNC: 27 MMOL/L (ref 21–32)
CO2 SERPL-SCNC: 29 MMOL/L (ref 21–32)
CREAT SERPL-MCNC: 0.65 MG/DL (ref 0.6–1.3)
CREAT SERPL-MCNC: 0.69 MG/DL (ref 0.6–1.3)
EOSINOPHIL # BLD AUTO: 0.19 THOUSAND/ÂΜL (ref 0–0.61)
EOSINOPHIL NFR BLD AUTO: 2 % (ref 0–6)
ERYTHROCYTE [DISTWIDTH] IN BLOOD BY AUTOMATED COUNT: 12.3 % (ref 11.6–15.1)
GFR SERPL CREATININE-BSD FRML MDRD: 109 ML/MIN/1.73SQ M
GFR SERPL CREATININE-BSD FRML MDRD: 111 ML/MIN/1.73SQ M
GLUCOSE SERPL-MCNC: 88 MG/DL (ref 65–140)
GLUCOSE SERPL-MCNC: 90 MG/DL (ref 65–140)
HCT VFR BLD AUTO: 35.2 % (ref 34.8–46.1)
HGB BLD-MCNC: 11.7 G/DL (ref 11.5–15.4)
IMM GRANULOCYTES # BLD AUTO: 0.02 THOUSAND/UL (ref 0–0.2)
IMM GRANULOCYTES NFR BLD AUTO: 0 % (ref 0–2)
LYMPHOCYTES # BLD AUTO: 2.57 THOUSANDS/ÂΜL (ref 0.6–4.47)
LYMPHOCYTES NFR BLD AUTO: 33 % (ref 14–44)
MAGNESIUM SERPL-MCNC: 2 MG/DL (ref 1.6–2.6)
MCH RBC QN AUTO: 32.1 PG (ref 26.8–34.3)
MCHC RBC AUTO-ENTMCNC: 33.2 G/DL (ref 31.4–37.4)
MCV RBC AUTO: 96 FL (ref 82–98)
MONOCYTES # BLD AUTO: 0.64 THOUSAND/ÂΜL (ref 0.17–1.22)
MONOCYTES NFR BLD AUTO: 8 % (ref 4–12)
NEUTROPHILS # BLD AUTO: 4.39 THOUSANDS/ÂΜL (ref 1.85–7.62)
NEUTS SEG NFR BLD AUTO: 56 % (ref 43–75)
NRBC BLD AUTO-RTO: 0 /100 WBCS
PLATELET # BLD AUTO: 292 THOUSANDS/UL (ref 149–390)
PMV BLD AUTO: 9.8 FL (ref 8.9–12.7)
POTASSIUM SERPL-SCNC: 3.2 MMOL/L (ref 3.5–5.3)
POTASSIUM SERPL-SCNC: 4.6 MMOL/L (ref 3.5–5.3)
PROT SERPL-MCNC: 6.4 G/DL (ref 6.4–8.4)
PTH-INTACT SERPL-MCNC: <6.3 PG/ML (ref 18.4–80.1)
RBC # BLD AUTO: 3.65 MILLION/UL (ref 3.81–5.12)
SODIUM SERPL-SCNC: 139 MMOL/L (ref 135–147)
SODIUM SERPL-SCNC: 140 MMOL/L (ref 135–147)
WBC # BLD AUTO: 7.88 THOUSAND/UL (ref 4.31–10.16)

## 2023-02-26 RX ORDER — PANTOPRAZOLE SODIUM 20 MG/1
20 TABLET, DELAYED RELEASE ORAL
Status: DISCONTINUED | OUTPATIENT
Start: 2023-02-27 | End: 2023-02-28 | Stop reason: HOSPADM

## 2023-02-26 RX ORDER — ACETAMINOPHEN 325 MG/1
650 TABLET ORAL ONCE
Status: COMPLETED | OUTPATIENT
Start: 2023-02-26 | End: 2023-02-26

## 2023-02-26 RX ORDER — CALCIUM CARBONATE 200(500)MG
1000 TABLET,CHEWABLE ORAL 3 TIMES DAILY
Status: DISCONTINUED | OUTPATIENT
Start: 2023-02-26 | End: 2023-02-26

## 2023-02-26 RX ORDER — FLUTICASONE PROPIONATE 50 MCG
2 SPRAY, SUSPENSION (ML) NASAL DAILY
Status: DISCONTINUED | OUTPATIENT
Start: 2023-02-26 | End: 2023-02-28 | Stop reason: HOSPADM

## 2023-02-26 RX ORDER — CALCIUM GLUCONATE 20 MG/ML
1 INJECTION, SOLUTION INTRAVENOUS ONCE
Status: COMPLETED | OUTPATIENT
Start: 2023-02-26 | End: 2023-02-26

## 2023-02-26 RX ORDER — LUBIPROSTONE 8 UG/1
8 CAPSULE ORAL 2 TIMES DAILY WITH MEALS
Status: DISCONTINUED | OUTPATIENT
Start: 2023-02-26 | End: 2023-02-28 | Stop reason: HOSPADM

## 2023-02-26 RX ORDER — POTASSIUM CHLORIDE 20 MEQ/1
80 TABLET, EXTENDED RELEASE ORAL ONCE
Status: COMPLETED | OUTPATIENT
Start: 2023-02-26 | End: 2023-02-26

## 2023-02-26 RX ORDER — CALCITRIOL 0.25 UG/1
0.5 CAPSULE, LIQUID FILLED ORAL DAILY
Status: DISCONTINUED | OUTPATIENT
Start: 2023-02-26 | End: 2023-02-27

## 2023-02-26 RX ORDER — LEVOTHYROXINE SODIUM 0.12 MG/1
125 TABLET ORAL DAILY
Status: DISCONTINUED | OUTPATIENT
Start: 2023-02-26 | End: 2023-02-28 | Stop reason: HOSPADM

## 2023-02-26 RX ORDER — SUMATRIPTAN 25 MG/1
25 TABLET, FILM COATED ORAL ONCE AS NEEDED
Status: COMPLETED | OUTPATIENT
Start: 2023-02-26 | End: 2023-02-26

## 2023-02-26 RX ORDER — PROPRANOLOL HYDROCHLORIDE 20 MG/1
40 TABLET ORAL EVERY 12 HOURS SCHEDULED
Status: DISCONTINUED | OUTPATIENT
Start: 2023-02-26 | End: 2023-02-28 | Stop reason: HOSPADM

## 2023-02-26 RX ORDER — CALCIUM GLUCONATE 20 MG/ML
2 INJECTION, SOLUTION INTRAVENOUS ONCE
Status: COMPLETED | OUTPATIENT
Start: 2023-02-26 | End: 2023-02-26

## 2023-02-26 RX ORDER — ZOLPIDEM TARTRATE 5 MG/1
10 TABLET ORAL
Status: DISCONTINUED | OUTPATIENT
Start: 2023-02-26 | End: 2023-02-28 | Stop reason: HOSPADM

## 2023-02-26 RX ORDER — CALCIUM CARBONATE 500(1250)
2 TABLET ORAL
Status: DISCONTINUED | OUTPATIENT
Start: 2023-02-26 | End: 2023-02-27

## 2023-02-26 RX ORDER — ENOXAPARIN SODIUM 100 MG/ML
40 INJECTION SUBCUTANEOUS DAILY
Status: DISCONTINUED | OUTPATIENT
Start: 2023-02-26 | End: 2023-02-28 | Stop reason: HOSPADM

## 2023-02-26 RX ORDER — TRAMADOL HYDROCHLORIDE 50 MG/1
50 TABLET ORAL EVERY 6 HOURS PRN
Status: DISCONTINUED | OUTPATIENT
Start: 2023-02-26 | End: 2023-02-28 | Stop reason: HOSPADM

## 2023-02-26 RX ORDER — LANOLIN ALCOHOL/MO/W.PET/CERES
5 CREAM (GRAM) TOPICAL
Status: DISCONTINUED | OUTPATIENT
Start: 2023-02-26 | End: 2023-02-28 | Stop reason: HOSPADM

## 2023-02-26 RX ORDER — MELOXICAM 7.5 MG/1
15 TABLET ORAL DAILY PRN
Status: DISCONTINUED | OUTPATIENT
Start: 2023-02-26 | End: 2023-02-27

## 2023-02-26 RX ORDER — CALCIUM CARBONATE 200(500)MG
1000 TABLET,CHEWABLE ORAL DAILY PRN
Status: COMPLETED | OUTPATIENT
Start: 2023-02-26 | End: 2023-02-26

## 2023-02-26 RX ADMIN — TRAMADOL HYDROCHLORIDE 50 MG: 50 TABLET, COATED ORAL at 23:13

## 2023-02-26 RX ADMIN — CALCIUM GLUCONATE 2 G: 20 INJECTION, SOLUTION INTRAVENOUS at 14:29

## 2023-02-26 RX ADMIN — CALCIUM GLUCONATE 2 G: 20 INJECTION, SOLUTION INTRAVENOUS at 20:11

## 2023-02-26 RX ADMIN — PROPRANOLOL HYDROCHLORIDE 40 MG: 20 TABLET ORAL at 23:13

## 2023-02-26 RX ADMIN — SUMATRIPTAN SUCCINATE 25 MG: 25 TABLET ORAL at 21:55

## 2023-02-26 RX ADMIN — SODIUM CHLORIDE, SODIUM LACTATE, POTASSIUM CHLORIDE, AND CALCIUM CHLORIDE 1000 ML: .6; .31; .03; .02 INJECTION, SOLUTION INTRAVENOUS at 15:02

## 2023-02-26 RX ADMIN — ACETAMINOPHEN 650 MG: 325 TABLET ORAL at 12:37

## 2023-02-26 RX ADMIN — SODIUM CHLORIDE 500 ML: 0.9 INJECTION, SOLUTION INTRAVENOUS at 17:45

## 2023-02-26 RX ADMIN — Medication 2 TABLET: at 17:09

## 2023-02-26 RX ADMIN — CALCIUM CARBONATE (ANTACID) CHEW TAB 500 MG 1000 MG: 500 CHEW TAB at 22:01

## 2023-02-26 RX ADMIN — CALCIUM GLUCONATE 1 G: 20 INJECTION, SOLUTION INTRAVENOUS at 17:05

## 2023-02-26 RX ADMIN — POTASSIUM CHLORIDE 80 MEQ: 1500 TABLET, EXTENDED RELEASE ORAL at 14:27

## 2023-02-26 RX ADMIN — CALCITRIOL CAPSULES 0.25 MCG 0.5 MCG: 0.25 CAPSULE ORAL at 14:38

## 2023-02-26 NOTE — H&P
H&P Exam - Surgical Oncology   Rohini Lombardo 38 y o  female MRN: [id number]  Unit/Bed#: [facility] Encounter: [id number]    Assessment/Plan     Assessment:  38 F s/p total thyroidectomy on 2/24 c/b post-operative hypocalcemia  AVSS, room air     Incision c/d/i, neck normal to palpation    + Chvostek sign    Ca 6 8 (zeinab Ca 7 1)  iCa 0 89    No EKG abnormalities     Plan:  -observation  -2G IV calcium gluconate  -commence calcium carbonate 1000mg TID  -check PTH  -commence Rocaltrol, 0 5mcg loading dose, 0 25mcg daily thereafter  -replace potassium   -regular diet  -endocrinology consult  -6p, then AM labs  -continue other home medications as normal     History of Present Illness   History, ROS and PFSH unobtainable from any source due to n/a  HPI:  Rohini Lombardo is a 38 y o  female who presents with gradual onsent perioral and extremital numbness and paresthesias following a recent total thyroidectomy  She also reports headaches  She denies fevers,c hills, chest pain, rapid or irregular heartbeat, hoarseness, difficulty swallowing, and states her incision site has been well  Symtpom onset occurred this AM and has been progressive throughht  Review of Systems   Constitutional: Negative for chills, diaphoresis, fatigue and fever  HENT: Negative for trouble swallowing  Respiratory: Negative for chest tightness and shortness of breath  Musculoskeletal: Positive for neck pain  Neurological: Positive for numbness and headaches  Negative for dizziness, facial asymmetry and light-headedness  Hematological: Negative for adenopathy  Does not bruise/bleed easily  All other systems reviewed and are negative        Historical Information   Past Medical History:   Diagnosis Date   • Allergic    • Anxiety    • COVID-19 01/2022    mild s/s-not hospitalized, not vaccinated   • GERD (gastroesophageal reflux disease)    • Headache(784 0)    • History of IBS     with constipation   • Ingrown toenail    • Irritable bowel syndrome    • Migraines     Chronic per pt   • Plantar fasciitis    • Thyroid carcinoma (HCC)    • Venereal wart 08/07/2014     Past Surgical History:   Procedure Laterality Date   • CERVICAL BIOPSY  W/ LOOP ELECTRODE EXCISION  2017   • COLONOSCOPY     • NASAL SEPTUM SURGERY     • VT OSTEOT W/WO LNGTH SHRT/CORRJ 1ST METAR Left 12/30/2022    Procedure: OSTEOTOMY METATARSAL 1st;  Surgeon: Rosana Godwin DPM;  Location: [facility];  Service: Podiatry   • TONSILLECTOMY     • UPPER GASTROINTESTINAL ENDOSCOPY     • US GUIDED THYROID BIOPSY  1/31/2023   • WISDOM TOOTH EXTRACTION       Social History   Social History     Substance and Sexual Activity   Alcohol Use Yes   • Alcohol/week: 2 0 standard drinks   • Types: 2 Glasses of wine per week    Comment: 2 glasses wine a week     Social History     Substance and Sexual Activity   Drug Use Never     Social History     Tobacco Use   Smoking Status Never   Smokeless Tobacco Never     E-Cigarette/Vaping   • E-Cigarette Use Never User      E-Cigarette/Vaping Substances   • Nicotine No    • THC No    • CBD No    • Flavoring No    • Other No    • Unknown No      Family History:   Family History   Problem Relation Age of Onset   • Hypertension Mother    • Arthritis Mother    • Thyroid disease Mother    • Transient ischemic attack Father    • Hypertension Father    • Stroke Father    • Vision loss Father    • Rashes / Skin problems Sister         Shingles   • No Known Problems Sister    • No Known Problems Sister    • Uterine cancer Maternal Grandmother 20   • Cancer Maternal Grandmother         Uterine   • Colon cancer Maternal Grandfather    • Cancer Maternal Grandfather         Colon   • Lung cancer Paternal Grandmother 61   • Cancer Paternal Grandmother         Lung   • No Known Problems Paternal Grandfather    • Colon cancer Cousin 27   • Breast cancer Maternal Aunt    • Breast cancer Maternal Aunt    • Breast cancer Maternal Aunt        Meds/Allergies   all medications and allergies reviewed  No Known Allergies    Objective   First Vitals:   Blood Pressure: 142/87 (02/26/23 1059)  Pulse: 73 (02/26/23 1059)  Temperature: 98 4 °F (36 9 °C) (02/26/23 1059)  Temp Source: Oral (02/26/23 1059)  Respirations: 20 (02/26/23 1059)  SpO2: 100 % (02/26/23 1059)    Current Vitals:   Blood Pressure: 110/71 (02/26/23 1313)  Pulse: 67 (02/26/23 1313)  Temperature: 98 4 °F (36 9 °C) (02/26/23 1059)  Temp Source: Oral (02/26/23 1059)  Respirations: 18 (02/26/23 1313)  SpO2: 99 % (02/26/23 1313)    No intake or output data in the 24 hours ending 02/26/23 1445    Invasive Devices     Peripheral Intravenous Line  Duration           Peripheral IV 02/26/23 Left Antecubital <1 day                Physical Exam  Vitals reviewed  Constitutional:       General: She is not in acute distress  Appearance: Normal appearance  She is not ill-appearing or diaphoretic  HENT:      Head: Normocephalic and atraumatic  Mouth/Throat:      Mouth: Mucous membranes are moist    Eyes:      General: No scleral icterus  Pupils: Pupils are equal, round, and reactive to light  Neck:     Cardiovascular:      Rate and Rhythm: Normal rate and regular rhythm  Pulmonary:      Effort: No respiratory distress  Abdominal:      General: There is no distension  Palpations: Abdomen is soft  Tenderness: There is no abdominal tenderness  Musculoskeletal:         General: No swelling, tenderness or deformity  Cervical back: No rigidity  Lymphadenopathy:      Cervical: No cervical adenopathy  Skin:     General: Skin is warm and dry  Capillary Refill: Capillary refill takes 2 to 3 seconds  Coloration: Skin is not jaundiced or pale  Neurological:      Mental Status: She is alert and oriented to person, place, and time  Cranial Nerves: No cranial nerve deficit  Motor: No weakness     Psychiatric:         Mood and Affect: Mood normal          Behavior: Behavior normal          Lab Results:   I have personally reviewed pertinent lab results    , CBC:   Lab Results   Component Value Date    WBC 7 88 02/26/2023    HGB 11 7 02/26/2023    HCT 35 2 02/26/2023    MCV 96 02/26/2023     02/26/2023    MCH 32 1 02/26/2023    MCHC 33 2 02/26/2023    RDW 12 3 02/26/2023    MPV 9 8 02/26/2023    NRBC 0 02/26/2023   , CMP:   Lab Results   Component Value Date    SODIUM 140 02/26/2023    K 3 2 (L) 02/26/2023     02/26/2023    CO2 29 02/26/2023    BUN 16 02/26/2023    CREATININE 0 69 02/26/2023    CALCIUM 6 8 (L) 02/26/2023    AST 14 02/26/2023    ALT 22 02/26/2023    ALKPHOS 40 (L) 02/26/2023    EGFR 109 02/26/2023   , Coagulation: No results found for: PT, INR, APTT, Urinalysis: No results found for: Kourtney Emanuel, SPECGRAV, PHUR, LEUKOCYTESUR, NITRITE, PROTEINUA, GLUCOSEU, KETONESU, BILIRUBINUR, BLOODU, Amylase: No results found for: AMYLASE, Lipase: No results found for: LIPASE  Imaging: no imaging  EKG, Pathology, and Other Studies: no imagin    Code Status: Level 1 - Full Code  Advance Directive and Living Will:      Power of :    POLST:

## 2023-02-26 NOTE — PROGRESS NOTES
Progress Note - Surgical Oncology   Colleen Cespedes 38 y o  female MRN: [id number]  Unit/Bed#: [facility] 925-01 Encounter: [id number]    Assessment:  38 F s/p total thyroidectomy on 2/24 c/b symptomatic hypocalcemia  VS: AVSS, room air   UOP: multiple unmeasured occurrences     AM Labs:   K 3 4  Ca 7 6 from 7 0  p 5 5   Mg 1 9       Plan:  -regular diet  -continue Rocaltrol 0 25mcg daily  -continue calcium carbonate 1000mg TID  -continue synthroid  -home medications as normal   -pain control  endocrinology input appreciated  -DVT PPX  -disposition planning , likely d/c today     Subjective/Objective       Subjective: ongoing intermittent paresthesias and headaches     Objective:     Blood pressure 127/80, pulse 79, temperature 98 1 °F (36 7 °C), temperature source Temporal, resp  rate 16, height 5' 4" (1 626 m), weight 73 9 kg (163 lb), SpO2 97 %  ,Body mass index is 27 98 kg/m²  Intake/Output Summary (Last 24 hours) at 2/27/2023 2084  Last data filed at 2/26/2023 2011  Gross per 24 hour   Intake 593 ml   Output --   Net 593 ml       Invasive Devices     Peripheral Intravenous Line  Duration           Peripheral IV 02/26/23 Left Antecubital <1 day                Physical Exam:     General: NAD  HEENT: normocephalic, atraumatic   Cardiovascular: RRR  Respiratory: No distress, room air   Abdomen: soft, nontender  Extremities: No c/c/e  Neuro: AAOx3, 5/5 strength   Skin: warm, dry       Lab, Imaging and other studies:  I have personally reviewed pertinent lab results    , CBC:   Lab Results   Component Value Date    WBC 7 88 02/26/2023    HGB 11 7 02/26/2023    HCT 35 2 02/26/2023    MCV 96 02/26/2023     02/27/2023    MCH 32 1 02/26/2023    MCHC 33 2 02/26/2023    RDW 12 3 02/26/2023    MPV 9 8 02/27/2023    NRBC 0 02/26/2023   , CMP:   Lab Results   Component Value Date    SODIUM 139 02/27/2023    K 3 4 (L) 02/27/2023     02/27/2023    CO2 30 02/27/2023    BUN 9 02/27/2023    CREATININE 0 63 02/27/2023 CALCIUM 7 6 (L) 02/27/2023    AST 14 02/26/2023    ALT 22 02/26/2023    ALKPHOS 40 (L) 02/26/2023    EGFR 112 02/27/2023     VTE Pharmacologic Prophylaxis: Enoxaparin (Lovenox)  VTE Mechanical Prophylaxis: sequential compression device

## 2023-02-26 NOTE — ED ATTENDING ATTESTATION
2/26/2023  Ambrose Cleaning DO, saw and evaluated the patient  I have discussed the patient with the resident/non-physician practitioner and agree with the resident's/non-physician practitioner's findings, Plan of Care, and MDM as documented in the resident's/non-physician practitioner's note, except where noted  All available labs and Radiology studies were reviewed  I was present for key portions of any procedure(s) performed by the resident/non-physician practitioner and I was immediately available to provide assistance  At this point I agree with the current assessment done in the Emergency Department  I have conducted an independent evaluation of this patient a history and physical is as follows:    Patient presents for evaluation of diffuse paresthesias presenting as a tingling in her extremities  She is status post total thyroidectomy with attempted salvage of parathyroid glands 3 days ago  She did contact the ENT on-call who advised her to take Tums for concern for hypocalcemia and present to the ED  She does have mild, expected postoperative pain without swelling, bleeding or drainage from the wound  She is having no difficulty swallowing or phonating  No recent travel or sick contacts  ROS: Denies f/c, HA, CP, SOB, abdominal pain, n/v/d  12 system ROS o/w negative  PE: NAD, appears mildly uncomfortable, alert; PERRL, EOMI; MMM, no posterior oropharyngeal exudate, edema or erythema; clean/dry/intact low anterior neck surgical wound consistent with thyroidectomy that is glued and Steri-Stripped closed without bleeding, drainage, induration or fluctuance, it is appropriately, mildly TTP; HRR, no murmur, monitor shows sinus rhythm at 77 bpm; lungs CTA w/o w/r/r, POx 100% on RA (nl); abdomen s/nt/nd, nl BS in all 4 quadrant; (-) LE edema or calf TTP, FROM extremities x4, normal patellar reflexes; skin p/w/d; CNs GI/NF, oriented      MDM: Paresthesias - hypo-/hypercalcemia, hypo-/hypomagnesemia, other electrolyte abnormalities, no clinical evidence of infection including sepsis  Available laboratory test results were independently reviewed by me  A/P: Will check electrolytes, treat symptoms, reevaluate for further work up and disposition      ED Course         Critical Care Time  Procedures

## 2023-02-26 NOTE — ED NOTES
Pt c/o increased numbness/tingling  Pt given second bag of calcium and reports feeling worse  Dr Alfredo Burns notified        Logan Parra  02/26/23 1739

## 2023-02-26 NOTE — ED PROVIDER NOTES
History  Chief Complaint   Patient presents with   • Post-op Problem     Pt states total thyroidectomy this past week and woke up this am c/o numbness and tingling throughout her body and feeling lightheaded  Pt denies any fever or chills       History provided by:  Patient   used: No    Neurologic Problem  Severity:  Mild  Onset quality:  Gradual  Duration:  1 day  Timing:  Constant  Progression:  Worsening  Chronicity:  New  Context:  Tingling to extremities and genearlized weakness s/p thyroidectomy x3 days ago      Prior to Admission Medications   Prescriptions Last Dose Informant Patient Reported? Taking? Biotin 5 MG CAPS   Yes No   Sig: Take by mouth   FIBER ADULT GUMMIES PO   Yes No   Sig: Take by mouth   Galcanezumab-gnlm 120 MG/ML SOAJ   No No   Sig: Inject 120 mg under the skin every 28 days   Levocetirizine Dihydrochloride (XYZAL PO)   Yes No   Sig: Take by mouth daily at bedtime   Linzess 290 MCG CAPS   No No   Sig: TAKE ONE CAPSULE BY MOUTH DAILY  Melatonin 5 MG TABS   Yes No   Sig: Take by mouth as needed   acetaminophen (TYLENOL) 500 mg tablet   Yes No   Sig: Take 1,000 mg by mouth every 6 (six) hours as needed for mild pain   fluticasone (FLONASE) 50 mcg/act nasal spray   No No   Si sprays into each nostril daily   levothyroxine (Synthroid) 125 mcg tablet   No No   Sig: Take 1 tablet (125 mcg total) by mouth daily   meloxicam (MOBIC) 15 mg tablet   Yes No   Sig: Take 15 mg by mouth daily as needed   multivitamin (THERAGRAN) TABS   Yes No   Sig: Take 1 tablet by mouth daily   omeprazole (PriLOSEC) 20 mg delayed release capsule   No No   Sig: TAKE ONE CAPSULE BY MOUTH EVERY DAY   Patient taking differently: Take 20 mg by mouth as needed   propranolol (INDERAL) 40 mg tablet   Yes No   Si (two) times a day   rizatriptan (MAXALT) 10 mg tablet   No No   Sig: Take 1 tablet (10 mg total) by mouth once as needed for migraine May repeat in 2 hours if needed   Max 2/24 hours, 9/month     traMADol (Ultram) 50 mg tablet   No No   Sig: Take 1 tablet (50 mg total) by mouth every 6 (six) hours as needed for moderate pain   zolpidem (AMBIEN) 10 mg tablet   Yes No   Sig: Take 10 mg by mouth daily at bedtime as needed      Facility-Administered Medications: None       Past Medical History:   Diagnosis Date   • Allergic    • Anxiety    • COVID-19 01/2022    mild s/s-not hospitalized, not vaccinated   • GERD (gastroesophageal reflux disease)    • Headache(784 0)    • History of IBS     with constipation   • Ingrown toenail    • Irritable bowel syndrome    • Migraines     Chronic per pt   • Plantar fasciitis    • Thyroid carcinoma ([address] )    • Venereal wart 08/07/2014       Past Surgical History:   Procedure Laterality Date   • CERVICAL BIOPSY  W/ LOOP ELECTRODE EXCISION  2017   • COLONOSCOPY     • NASAL SEPTUM SURGERY     • [place] OSTEOT W/ [email or web address] SHRT/CORRJ 1ST METAR Left 12/30/2022    Procedure: OSTEOTOMY METATARSAL 1st;  Surgeon: Josue Casas DPM;  Location: [facility];  Service: Podiatry   • TONSILLECTOMY     • UPPER GASTROINTESTINAL ENDOSCOPY     • US GUIDED THYROID BIOPSY  1/31/2023   • WISDOM TOOTH EXTRACTION         Family History   Problem Relation Age of Onset   • Hypertension Mother    • Arthritis Mother    • Thyroid disease Mother    • Transient ischemic attack Father    • Hypertension Father    • Stroke Father    • Vision loss Father    • Rashes / Skin problems Sister         Shingles   • No Known Problems Sister    • No Known Problems Sister    • Uterine cancer Maternal Grandmother 20   • Cancer Maternal Grandmother         Uterine   • Colon cancer Maternal Grandfather    • Cancer Maternal Grandfather         Colon   • Lung cancer Paternal Grandmother 61   • Cancer Paternal Grandmother         Lung   • No Known Problems Paternal Grandfather    • Colon cancer Cousin 27   • Breast cancer Maternal Aunt    • Breast cancer Maternal Aunt    • Breast cancer Maternal Aunt      I have reviewed and agree with the history as documented  E-Cigarette/Vaping   • E-Cigarette Use Never User      E-Cigarette/Vaping Substances   • Nicotine No    • THC No    • CBD No    • Flavoring No    • Other No    • Unknown No      Social History     Tobacco Use   • Smoking status: Never   • Smokeless tobacco: Never   Vaping Use   • Vaping Use: Never used   Substance Use Topics   • Alcohol use: Yes     Alcohol/week: 2 0 standard drinks     Types: 2 Glasses of wine per week     Comment: 2 glasses wine a week   • Drug use: Never        Review of Systems   Neurological: Positive for weakness (Generalized, nonfocal) and light-headedness  Negative for dizziness, seizures, syncope and numbness  Paresthesias to extremities   All other systems reviewed and are negative  Physical Exam  ED Triage Vitals [02/26/23 1059]   Temperature Pulse Respirations Blood Pressure SpO2   98 4 °F (36 9 °C) 73 20 142/87 100 %      Temp Source Heart Rate Source Patient Position - Orthostatic VS BP Location FiO2 (%)   Oral Monitor Sitting Left arm --      Pain Score       4             Orthostatic Vital Signs  Vitals:    02/26/23 1059 02/26/23 1151 02/26/23 1313 02/26/23 1535   BP: 142/87 132/90 110/71    Pulse: 73 77 67 73   Patient Position - Orthostatic VS: Sitting Lying         Physical Exam  Vitals and nursing note reviewed  Constitutional:       General: She is not in acute distress  Appearance: Normal appearance  She is normal weight  She is not ill-appearing  HENT:      Head: Normocephalic and atraumatic  Mouth/Throat:      Mouth: Mucous membranes are moist    Eyes:      Extraocular Movements: Extraocular movements intact  Cardiovascular:      Rate and Rhythm: Normal rate and regular rhythm  Pulmonary:      Effort: Pulmonary effort is normal  No respiratory distress  Breath sounds: Normal breath sounds  Abdominal:      Palpations: Abdomen is soft  Tenderness: There is no abdominal tenderness  Musculoskeletal:         General: Normal range of motion  Cervical back: Normal range of motion  Skin:     General: Skin is warm and dry  Neurological:      General: No focal deficit present  Mental Status: She is alert and oriented to person, place, and time           ED Medications  Medications   calcitriol (ROCALTROL) capsule 0 5 mcg (0 5 mcg Oral Given 2/26/23 1438)   calcium carbonate (OYSTER SHELL,OSCAL) 500 mg tablet 2 tablet (has no administration in time range)   enoxaparin (LOVENOX) subcutaneous injection 40 mg (40 mg Subcutaneous Not Given 2/26/23 1502)   fluticasone (FLONASE) 50 mcg/act nasal spray 2 spray (has no administration in time range)   levothyroxine tablet 125 mcg (has no administration in time range)   melatonin tablet 4 5 mg (has no administration in time range)   meloxicam (MOBIC) tablet 15 mg (has no administration in time range)   pantoprazole (PROTONIX) EC tablet 20 mg (has no administration in time range)   SUMAtriptan (IMITREX) tablet 25 mg (has no administration in time range)   zolpidem (AMBIEN) tablet 10 mg (has no administration in time range)   traMADol (ULTRAM) tablet 50 mg (has no administration in time range)   lubiprostone (AMITIZA) capsule 8 mcg (has no administration in time range)   acetaminophen (TYLENOL) tablet 650 mg (650 mg Oral Given 2/26/23 1237)   potassium chloride (K-DUR,KLOR-CON) CR tablet 80 mEq (80 mEq Oral Given 2/26/23 1427)   calcium gluconate 2 g in sodium chloride 0 9% 100 mL (premix) (0 g Intravenous Stopped 2/26/23 1530)   lactated ringers bolus 1,000 mL (1,000 mL Intravenous New Bag 2/26/23 1502)       Diagnostic Studies  Results Reviewed     Procedure Component Value Units Date/Time    Basic metabolic panel [[id number]]     Lab Status: No result Specimen: Blood     Calcium, ionized [[id number]]     Lab Status: No result Specimen: Blood     PTH, intact [[id number]]  (Abnormal) Collected: 02/26/23 1420    Lab Status: Final result Specimen: Blood from Arm, Left Updated: 02/26/23 1501     PTH <6 3 pg/mL     Platelet count [[id number]]     Lab Status: No result Specimen: Blood     Magnesium [[id number]]  (Normal) Collected: 02/26/23 1228    Lab Status: Final result Specimen: Blood from Arm, Left Updated: 02/26/23 1317     Magnesium 2 0 mg/dL     Comprehensive metabolic panel [[id number]]  (Abnormal) Collected: 02/26/23 1228    Lab Status: Final result Specimen: Blood from Arm, Left Updated: 02/26/23 1317     Sodium 140 mmol/L      Potassium 3 2 mmol/L      Chloride 108 mmol/L      CO2 29 mmol/L      ANION GAP 3 mmol/L      BUN 16 mg/dL      Creatinine 0 69 mg/dL      Glucose 90 mg/dL      Calcium 6 8 mg/dL      Corrected Calcium 7 4 mg/dL      AST 14 U/L      ALT 22 U/L      Alkaline Phosphatase 40 U/L      Total Protein 6 4 g/dL      Albumin 3 3 g/dL      Total Bilirubin 0 19 mg/dL      eGFR 109 ml/min/1 73sq m     Narrative:      [place] guidelines for Chronic Kidney Disease (CKD):   •  Stage 1 with normal or high GFR (GFR > 90 mL/min/1 73 square meters)  •  Stage 2 Mild CKD (GFR = 60-89 mL/min/1 73 square meters)  •  Stage 3A Moderate CKD (GFR = 45-59 mL/min/1 73 square meters)  •  Stage 3B Moderate CKD (GFR = 30-44 mL/min/1 73 square meters)  •  Stage 4 Severe CKD (GFR = 15-29 mL/min/1 73 square meters)  •  Stage 5 End Stage CKD (GFR <15 mL/min/1 73 square meters)  Note: GFR calculation is accurate only with a steady state creatinine    CBC and differential [[id number]]  (Abnormal) Collected: 02/26/23 1228    Lab Status: Final result Specimen: Blood from Arm, Left Updated: 02/26/23 1248     WBC 7 88 Thousand/uL      RBC 3 65 Million/uL      Hemoglobin 11 7 g/dL      Hematocrit 35 2 %      MCV 96 fL      MCH 32 1 pg      MCHC 33 2 g/dL      RDW 12 3 %      MPV 9 8 fL      Platelets 134 Thousands/uL      nRBC 0 /100 WBCs      Neutrophils Relative 56 %      Immat GRANS % 0 %      Lymphocytes Relative 33 %      Monocytes Relative 8 %      Eosinophils Relative 2 %      Basophils Relative 1 %      Neutrophils Absolute 4 39 Thousands/µL      Immature Grans Absolute 0 02 Thousand/uL      Lymphocytes Absolute 2 57 Thousands/µL      Monocytes Absolute 0 64 Thousand/µL      Eosinophils Absolute 0 19 Thousand/µL      Basophils Absolute 0 07 Thousands/µL     Calcium, ionized [[id number]]  (Abnormal) Collected: 02/26/23 1228    Lab Status: Final result Specimen: Blood from Arm, Left Updated: 02/26/23 1241     Calcium, Ionized 0 89 mmol/L                  No orders to display         Procedures  ECG 12 Lead Documentation Only    Date/Time: 2/26/2023 12:26 PM  Performed by: Edie Acosta DO  Authorized by: Edie Acosta DO     Indications / Diagnosis:  S/p thyroidectomy c/o tingling weakness  ECG reviewed by me, the ED Provider: yes    Patient location:  ED  Previous ECG:     Previous ECG:  Compared to current    Similarity:  No change  Interpretation:     Interpretation: abnormal    Rate:     ECG rate:  58    ECG rate assessment: bradycardic    Rhythm:     Rhythm: sinus bradycardia    Ectopy:     Ectopy: none    QRS:     QRS axis:  Normal    QRS intervals:  Normal  Conduction:     Conduction: normal    ST segments:     ST segments:  Normal  T waves:     T waves: normal    Comments:      No QT prolongation          ED Course                                       Medical Decision Making  Pt is a 37 y/o F presenting for evaluation of tingling and generalized weakness x1 day    Initial presentation pt is stable  Reports that she had had 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  Since that time has been noting worsening gradual onset tingling to the extremities started in the left upper extremity yesterday progressed to all 4 extremities today patient noticed this upon awakening today  She endorses generalized weakness, no focal complaints of weakness  No numbness    Denies headache vision changes neck pain chest pain shortness of breath  Does endorse some lightheadedness and dizziness described as "feeling unsteady on my feet"  Patient denies any other complaints at this time  No other contributory medical history    On exam patient is tired but otherwise well-appearing nontoxic  Vitals within normal limits  Head atraumatic normocephalic no focal deficits on exam moving all extremities  EOMI cranial nerves grossly intact  Heart regular rate rhythm no murmurs  Lungs clear to auscultation bilaterally abdomen soft nontender  Neck full range of motion moving all extremities  Skin warm dry intact no rashes  Ddx: Given recent history of total thyroidectomy likely patient's symptoms are due to hypocalcemia  Will assess for other electrolyte abnormalities as contributory cause  Will assess for arrhythmia  Unlikely to be neurologic or infectious  Plan: Labs electrolytes EKG monitor reassess  Final dispo pending    Labs as interpreted by me hypocalcemia  Ionized calcium 0 79, serum corrected calcium 7 4  Potassium 3 2  No other electrolyte abnormalities  Patient has hypokalemia and hypocalcemia  Will need repletion  EKG as interpreted by me sinus bradycardia, no QT prolongation  Otherwise normal EKG (as discussed above)  Imaging as interpreted by me not applicable    ED Course: Remained hemodynamically stable during her entire ED course  Discussed with the [facility] surgery resident who is on-call for surgical oncology service  He will come to evaluate the patient  Orders placed for electrolyte repletion  [facility] surgery team to admit the patient under observation for monitoring repeat labs and electrolyte repletion as needed  Discussed plan with patient she is in agreement  We will follow-up with them outpatient  Final Dispo:  Admission          Hypocalcemia: acute illness or injury  Hypokalemia: acute illness or injury  S/P thyroidectomy: acute illness or injury  Amount and/or Complexity of Data Reviewed  Labs: ordered  Decision-making details documented in ED Course  Risk  OTC drugs  Decision regarding hospitalization  Disposition  Final diagnoses:   S/P thyroidectomy   Hypocalcemia   Hypokalemia   Paresthesias     Time reflects when diagnosis was documented in both MDM as applicable and the Disposition within this note     Time User Action Codes Description Comment    2/26/2023 12:27 PM Brain Hanks Add [E89 0] S/P thyroidectomy     2/26/2023  1:54 PM Brain Hanks Add [E83 51] Hypocalcemia     2/26/2023  1:54 PM Brain Hanks Add [E87 6] Hypokalemia     2/26/2023  4:27 PM [address] [place] [address]  [address], [facility] [address] [R20 2] Paresthesias       ED Disposition     ED Disposition   Admit    Condition   Stable    Date/Time   Sun Feb 26, 2023  2:29 PM    Comment   Case was discussed with Dr Zoraida Velazquez and the patient's admission status was agreed to be Admission Status: observation status to the service of Surg-Onc   Follow-up Information    None         Patient's Medications   Discharge Prescriptions    No medications on file     No discharge procedures on file  PDMP Review       Value Time User    PDMP Reviewed  Yes 2/8/2023 10:11 AM Bentley Ivey MD           ED Provider  Attending physically available and evaluated [id number] [address] Ramirez I managed the patient along with the ED Attending      Electronically Signed by         Hiwot Dye,   02/26/23 [address], DO  02/26/23 0699

## 2023-02-26 NOTE — TELEPHONE ENCOUNTER
Patient had surgery with Dr Traci Nowak on 02/24 and is now feeling generally unwell and experiencing tingling of extremities  She is requesting to speak with the on call provider      Paged to on call provider via TC

## 2023-02-27 PROBLEM — E83.51 HYPOCALCEMIA: Status: ACTIVE | Noted: 2023-02-27

## 2023-02-27 LAB
25(OH)D3 SERPL-MCNC: 27.8 NG/ML (ref 30–100)
ALBUMIN SERPL BCP-MCNC: 3.5 G/DL (ref 3.5–5)
ALBUMIN SERPL BCP-MCNC: 3.6 G/DL (ref 3.5–5)
ANION GAP SERPL CALCULATED.3IONS-SCNC: 5 MMOL/L (ref 4–13)
ATRIAL RATE: 58 BPM
BUN SERPL-MCNC: 9 MG/DL (ref 5–25)
CALCIUM SERPL-MCNC: 7.6 MG/DL (ref 8.3–10.1)
CALCIUM SERPL-MCNC: 7.9 MG/DL (ref 8.3–10.1)
CALCIUM SERPL-MCNC: 8.6 MG/DL (ref 8.3–10.1)
CHLORIDE SERPL-SCNC: 104 MMOL/L (ref 96–108)
CO2 SERPL-SCNC: 30 MMOL/L (ref 21–32)
CREAT SERPL-MCNC: 0.63 MG/DL (ref 0.6–1.3)
GFR SERPL CREATININE-BSD FRML MDRD: 112 ML/MIN/1.73SQ M
GLUCOSE SERPL-MCNC: 100 MG/DL (ref 65–140)
MAGNESIUM SERPL-MCNC: 1.9 MG/DL (ref 1.6–2.6)
P AXIS: 66 DEGREES
PHOSPHATE SERPL-MCNC: 5.5 MG/DL (ref 2.7–4.5)
PLATELET # BLD AUTO: 272 THOUSANDS/UL (ref 149–390)
PMV BLD AUTO: 9.8 FL (ref 8.9–12.7)
POTASSIUM SERPL-SCNC: 3.4 MMOL/L (ref 3.5–5.3)
PR INTERVAL: 158 MS
QRS AXIS: 68 DEGREES
QRSD INTERVAL: 84 MS
QT INTERVAL: 404 MS
QTC INTERVAL: 396 MS
SODIUM SERPL-SCNC: 139 MMOL/L (ref 135–147)
T WAVE AXIS: 30 DEGREES
VENTRICULAR RATE: 58 BPM

## 2023-02-27 RX ORDER — CALCITRIOL 0.25 UG/1
0.25 CAPSULE, LIQUID FILLED ORAL 2 TIMES DAILY
Status: DISCONTINUED | OUTPATIENT
Start: 2023-02-27 | End: 2023-02-28 | Stop reason: HOSPADM

## 2023-02-27 RX ORDER — CALCITRIOL 0.25 UG/1
0.25 CAPSULE, LIQUID FILLED ORAL 3 TIMES DAILY
Status: DISCONTINUED | OUTPATIENT
Start: 2023-02-27 | End: 2023-02-27

## 2023-02-27 RX ORDER — SUMATRIPTAN 50 MG/1
50 TABLET, FILM COATED ORAL ONCE
Status: COMPLETED | OUTPATIENT
Start: 2023-02-27 | End: 2023-02-27

## 2023-02-27 RX ORDER — SODIUM CHLORIDE 9 MG/ML
80 INJECTION, SOLUTION INTRAVENOUS CONTINUOUS
Status: DISCONTINUED | OUTPATIENT
Start: 2023-02-27 | End: 2023-02-27 | Stop reason: CLARIF

## 2023-02-27 RX ORDER — CALCIUM CARBONATE 500(1250)
2 TABLET ORAL 2 TIMES DAILY WITH MEALS
Status: DISCONTINUED | OUTPATIENT
Start: 2023-02-28 | End: 2023-02-28 | Stop reason: HOSPADM

## 2023-02-27 RX ORDER — CALCITRIOL 0.25 UG/1
0.25 CAPSULE, LIQUID FILLED ORAL DAILY
Status: DISCONTINUED | OUTPATIENT
Start: 2023-02-27 | End: 2023-02-27

## 2023-02-27 RX ORDER — KETOROLAC TROMETHAMINE 30 MG/ML
15 INJECTION, SOLUTION INTRAMUSCULAR; INTRAVENOUS ONCE
Status: COMPLETED | OUTPATIENT
Start: 2023-02-27 | End: 2023-02-27

## 2023-02-27 RX ORDER — MELOXICAM 7.5 MG/1
15 TABLET ORAL DAILY PRN
Status: DISCONTINUED | OUTPATIENT
Start: 2023-02-27 | End: 2023-02-28 | Stop reason: HOSPADM

## 2023-02-27 RX ORDER — POTASSIUM CHLORIDE 20 MEQ/1
40 TABLET, EXTENDED RELEASE ORAL ONCE
Status: COMPLETED | OUTPATIENT
Start: 2023-02-27 | End: 2023-02-27

## 2023-02-27 RX ORDER — TOPIRAMATE 25 MG/1
25 TABLET ORAL ONCE
Status: DISCONTINUED | OUTPATIENT
Start: 2023-02-27 | End: 2023-02-28 | Stop reason: HOSPADM

## 2023-02-27 RX ORDER — ONDANSETRON 2 MG/ML
4 INJECTION INTRAMUSCULAR; INTRAVENOUS EVERY 6 HOURS PRN
Status: DISCONTINUED | OUTPATIENT
Start: 2023-02-27 | End: 2023-02-28 | Stop reason: HOSPADM

## 2023-02-27 RX ADMIN — SUMATRIPTAN SUCCINATE 50 MG: 50 TABLET ORAL at 17:59

## 2023-02-27 RX ADMIN — POTASSIUM CHLORIDE 40 MEQ: 1500 TABLET, EXTENDED RELEASE ORAL at 05:06

## 2023-02-27 RX ADMIN — ONDANSETRON 4 MG: 2 INJECTION INTRAMUSCULAR; INTRAVENOUS at 07:02

## 2023-02-27 RX ADMIN — PANTOPRAZOLE SODIUM 20 MG: 20 TABLET, DELAYED RELEASE ORAL at 05:06

## 2023-02-27 RX ADMIN — ZOLPIDEM TARTRATE 10 MG: 5 TABLET ORAL at 21:04

## 2023-02-27 RX ADMIN — ONDANSETRON 4 MG: 2 INJECTION INTRAMUSCULAR; INTRAVENOUS at 15:17

## 2023-02-27 RX ADMIN — CALCITRIOL CAPSULES 0.25 MCG 0.25 MCG: 0.25 CAPSULE ORAL at 08:04

## 2023-02-27 RX ADMIN — SODIUM CHLORIDE 500 ML: 0.9 INJECTION, SOLUTION INTRAVENOUS at 05:06

## 2023-02-27 RX ADMIN — CALCIUM GLUCONATE: 98 INJECTION, SOLUTION INTRAVENOUS at 12:01

## 2023-02-27 RX ADMIN — LEVOTHYROXINE SODIUM 125 MCG: 125 TABLET ORAL at 05:06

## 2023-02-27 RX ADMIN — KETOROLAC TROMETHAMINE 15 MG: 30 INJECTION, SOLUTION INTRAMUSCULAR; INTRAVENOUS at 15:17

## 2023-02-27 RX ADMIN — Medication 2 TABLET: at 05:06

## 2023-02-27 RX ADMIN — PROPRANOLOL HYDROCHLORIDE 40 MG: 20 TABLET ORAL at 21:15

## 2023-02-27 RX ADMIN — LUBIPROSTONE 8 MCG: 8 CAPSULE, GELATIN COATED ORAL at 16:26

## 2023-02-27 RX ADMIN — SUMATRIPTAN SUCCINATE 50 MG: 50 TABLET ORAL at 08:04

## 2023-02-27 RX ADMIN — Medication 2 TABLET: at 12:02

## 2023-02-27 RX ADMIN — ENOXAPARIN SODIUM 40 MG: 40 INJECTION SUBCUTANEOUS at 08:04

## 2023-02-27 RX ADMIN — LUBIPROSTONE 8 MCG: 8 CAPSULE, GELATIN COATED ORAL at 07:02

## 2023-02-27 RX ADMIN — MELOXICAM 15 MG: 7.5 TABLET ORAL at 16:26

## 2023-02-27 RX ADMIN — PROPRANOLOL HYDROCHLORIDE 40 MG: 20 TABLET ORAL at 08:04

## 2023-02-27 RX ADMIN — SUMATRIPTAN SUCCINATE 50 MG: 50 TABLET ORAL at 12:25

## 2023-02-27 RX ADMIN — TRAMADOL HYDROCHLORIDE 50 MG: 50 TABLET, COATED ORAL at 21:04

## 2023-02-27 RX ADMIN — CALCITRIOL CAPSULES 0.25 MCG 0.25 MCG: 0.25 CAPSULE ORAL at 21:04

## 2023-02-27 NOTE — PLAN OF CARE
Problem: PAIN - ADULT  Goal: Verbalizes/displays adequate comfort level or baseline comfort level  Description: Interventions:  - Encourage patient to monitor pain and request assistance  - Assess pain using appropriate pain scale  - Administer analgesics based on type and severity of pain and evaluate response  - Implement non-pharmacological measures as appropriate and evaluate response  - Consider cultural and social influences on pain and pain management  - Notify physician/advanced practitioner if interventions unsuccessful or patient reports new pain  Outcome: Progressing     Problem: INFECTION - ADULT  Goal: Absence or prevention of progression during hospitalization  Description: INTERVENTIONS:  - Assess and monitor for signs and symptoms of infection  - Monitor lab/diagnostic results  - Monitor all insertion sites, i e  indwelling lines, tubes, and drains  - Monitor endotracheal if appropriate and nasal secretions for changes in amount and color  - [place] appropriate cooling/warming therapies per order  - Administer medications as ordered  - Instruct and encourage patient and family to use good hand hygiene technique  - Identify and instruct in appropriate isolation precautions for identified infection/condition  Outcome: Progressing  Goal: Absence of fever/infection during neutropenic period  Description: INTERVENTIONS:  - Monitor WBC    Outcome: Progressing     Problem: SAFETY ADULT  Goal: Patient will remain free of falls  Description: INTERVENTIONS:  - Educate patient/family on patient safety including physical limitations  - Instruct patient to call for assistance with activity   - Consult OT/PT to assist with strengthening/mobility   - Keep Call bell within reach  - Keep bed low and locked with side rails adjusted as appropriate  - Keep care items and personal belongings within reach  - Initiate and maintain comfort rounds  - Make Fall Risk Sign visible to staff  - Apply yellow socks and bracelet for high fall risk patients  - Consider moving patient to room near nurses station  Outcome: Progressing  Goal: Maintain or return to baseline ADL function  Description: INTERVENTIONS:  -  Assess patient's ability to carry out ADLs; assess patient's baseline for ADL function and identify physical deficits which impact ability to perform ADLs (bathing, care of mouth/teeth, toileting, grooming, dressing, etc )  - Assess/evaluate cause of self-care deficits   - Assess range of motion  - Assess patient's mobility; develop plan if impaired  - Assess patient's need for assistive devices and provide as appropriate  - Encourage maximum independence but intervene and supervise when necessary  - Involve family in performance of ADLs  - Assess for home care needs following discharge   - Consider OT consult to assist with ADL evaluation and planning for discharge  - Provide patient education as appropriate  Outcome: Progressing  Goal: Maintains/Returns to pre admission functional level  Description: INTERVENTIONS:  - Perform BMAT or MOVE assessment daily    - Set and communicate daily mobility goal to care team and patient/family/caregiver     - Collaborate with rehabilitation services on mobility goals if consulted  - Out of bed for toileting  - Record patient progress and toleration of activity level   Outcome: Progressing     Problem: DISCHARGE PLANNING  Goal: Discharge to home or other facility with appropriate resources  Description: INTERVENTIONS:  - Identify barriers to discharge w/patient and caregiver  - Arrange for needed discharge resources and transportation as appropriate  - Identify discharge learning needs (meds, wound care, etc )  - Arrange for interpretive services to assist at discharge as needed  - Refer to Case Management Department for coordinating discharge planning if the patient needs post-hospital services based on physician/advanced practitioner order or complex needs related to functional status, cognitive ability, or social support system  Outcome: Progressing     Problem: Knowledge Deficit  Goal: Patient/family/caregiver demonstrates understanding of disease process, treatment plan, medications, and discharge instructions  Description: Complete learning assessment and assess knowledge base    Interventions:  - Provide teaching at level of understanding  - Provide teaching via preferred learning methods  Outcome: Progressing

## 2023-02-27 NOTE — UTILIZATION REVIEW
Initial Clinical Review    Admission: Date/Time/Statement:   Admission Orders (From admission, onward)     Ordered        02/26/23 1438  Place in Observation  Once                      Orders Placed This Encounter   Procedures   • Place in Observation     Standing Status:   Standing     Number of Occurrences:   1     Order Specific Question:   Level of Care     Answer:   Med Surg [16]     ED Arrival Information     Expected   -    Arrival   2/26/2023 10:49    Acuity   Urgent            Means of arrival   Wheelchair    Escorted by   Self    Service   Oncology-Medical    Admission type   Emergency            Arrival complaint   post op problem,numbness           Chief Complaint   Patient presents with   • Post-op Problem     Pt states total thyroidectomy this past week and woke up this am c/o numbness and tingling throughout her body and feeling lightheaded  Pt denies any fever or chills       Initial Presentation: 38 y o  female who presents to ED as walk-in n with gradual onsent perioral and extremital numbness and paresthesias following a recent total thyroidectomy  She also reports headaches  She denies fevers, chills, chest pain, rapid or irregular heartbeat, hoarseness, difficulty swallowing, and states her incision site has been well  Symtpom onset occurred this AM and has been progressive throughout  On exam Incision c/d/i, neck normal to palpation, + Chvostek sign  VSS  Ca 6 8 (zeinab Ca 7 1)  iCa 0 89  No EKG abnormalities  Plan: admit under observation to M/S unit  -2G IV calcium gluconate  -commence calcium carbonate 1000mg TID  -check PTH  -commence Rocaltrol, 0 5mcg loading dose, 0 25mcg daily thereafter  -replace potassium   -regular diet  -endocrinology consult  -6p, then AM labs  -continue other home medications as normal         Date: 2/27   Day 2: ongoing intermittent paresthesias and headaches  K 3 4, Ca 7 6 from 7 0, p 5 5, Mg 1 9  Continue regular diet   Rocaltrol 0 25mcg daily, calcium carbonate 1000mg TID, synthroid  Home meds as normal  Pain control  Endocrine consult pending  SCDs  Endocrine consult --   Hypocalcemia secondary to hypoparathyroidism  Corrected calcium on admission 7 4 no, ionized calcium 0 89 low PTH less than 6 3, magnesium 2 normal, 25 hydroxyvitamin D 27 8 low  Corrected calcium today 8 2, Phos 5 5  Would recommend calcium carbonate 1000 mg twice daily and calcitriol 0 25 mcg twice daily  We will repeat calcium, albumin later today and follow-up on it to ensure if pt's symptoms correlate with hypocalcemia   Upon discharge, patient would benefit from calcium citrate instead of calcium carbonate for better absorption of calcium as she takes omeprazole daily as outpatient  Goal corrected calcium is low normal as pt has hypoparathyroidism     Papillary thyroid cancer status post total thyroidectomy in 2/24  Awaiting final pathology for total thyroidectomy   FNA of left lower thyroid lobe nodule in January 2023 showed [place] category VI      Size of the L lower thyroid nodule was 1 x 0 7 x 1 cm  Follows up outpatient with endocrine     Postoperative hypothyroidism  TSH 1 9 February 2023 (preop)      ED Triage Vitals [02/26/23 1059]   Temperature Pulse Respirations Blood Pressure SpO2   98 4 °F (36 9 °C) 73 20 142/87 100 %      Temp Source Heart Rate Source Patient Position - Orthostatic VS BP Location FiO2 (%)   Oral Monitor Sitting Left arm --      Pain Score       4          Wt Readings from Last 1 Encounters:   02/27/23 73 9 kg (163 lb)     Additional Vital Signs:   Date/Time Temp Pulse Resp BP MAP (mmHg) SpO2 O2 Device Patient Position - Orthostatic VS   02/27/23 08:05:06 97 9 °F (36 6 °C) 64 -- 122/80 94 96 % -- --   02/27/23 0804 -- 64 -- 122/80 -- -- -- --   02/27/23 0326 98 1 °F (36 7 °C) 79 16 127/80 -- -- -- --   02/27/23 0322 -- -- -- -- -- -- None (Room air) --   02/26/23 22:04:25 98 1 °F (36 7 °C) 79 15 127/80 96 97 % -- --   02/26/23 1900 -- -- -- -- -- -- None (Room air) --   02/26/23 18:45:25 98 1 °F (36 7 °C) 72 16 121/80 94 97 % -- --   02/26/23 1651 -- 78 20 149/92 114 100 % None (Room air) --   02/26/23 1535 -- 73 18 -- -- 99 % None (Room air) --   02/26/23 1313 -- 67 18 110/71 -- 99 % None (Room air) --   02/26/23 1151 -- 77 20 132/90 -- 100 % None (Room air) Lying   02/26/23 1059 98 4 °F (36 9 °C) 73 20 142/87 -- 100 % None (Room air) Sitting     Pertinent Labs/Diagnostic Test Results:   EKG 2/26:  Normal sinus rhythm with sinus arrhythmia  Nonspecific T wave abnormality  Abnormal ECG     Results from last 7 days   Lab Units 02/27/23  0407 02/26/23  1228   WBC Thousand/uL  --  7 88   HEMOGLOBIN g/dL  --  11 7   HEMATOCRIT %  --  35 2   PLATELETS Thousands/uL 272 292   NEUTROS ABS Thousands/µL  --  4 39     Results from last 7 days   Lab Units 02/27/23  0434 02/27/23  0434 02/26/23  1822 02/26/23  1751 02/26/23  1228 02/24/23  1928 02/24/23  1659   SODIUM mmol/L  --  139  --  139 140  --   --    POTASSIUM mmol/L  --  3 4*  --  4 6 3 2*  --   --    CHLORIDE mmol/L  --  104  --  109* 108  --   --    CO2 mmol/L  --  30  --  27 29  --   --    ANION GAP mmol/L  --  5  --  3* 3*  --   --    BUN mg/dL  --  9  --  15 16  --   --    CREATININE mg/dL  --  0 63  --  0 65 0 69  --   --    EGFR ml/min/1 73sq m  --  112  --  111 109  --   --    CALCIUM mg/dL  --  7 6*  --  7 0* 6 8* 8 6 8 7   CALCIUM, IONIZED mmol/L  --   --  0 94*  --  0 89*  --   --    MAGNESIUM mg/dL  --  1 9  --   --  2 0  --   --    PHOSPHORUS mg/dL 5 5*  --   --   --   --   --   --      Results from last 7 days   Lab Units 02/26/23  1228   AST U/L 14   ALT U/L 22   ALK PHOS U/L 40*   TOTAL PROTEIN g/dL 6 4   ALBUMIN g/dL 3 3*   TOTAL BILIRUBIN mg/dL 0 19*     Results from last 7 days   Lab Units 02/27/23  0434 02/26/23  1751 02/26/23  1228   GLUCOSE RANDOM mg/dL 100 88 90         ED Treatment:   Medication Administration from 02/26/2023 1048 to 02/26/2023 1837       Date/Time Order Dose Route Action     02/26/2023 1237 EST acetaminophen (TYLENOL) tablet 650 mg 650 mg Oral Given     02/26/2023 1427 EST potassium chloride (K-DUR,KLOR-CON) CR tablet 80 mEq 80 mEq Oral Given     02/26/2023 1429 EST calcium gluconate 2 g in sodium chloride 0 9% 100 mL (premix) 2 g Intravenous New Bag     02/26/2023 1438 EST calcitriol (ROCALTROL) capsule 0 5 mcg 0 5 mcg Oral Given     02/26/2023 1709 EST calcium carbonate (OYSTER SHELL,OSCAL) 500 mg tablet 2 tablet 2 tablet Oral Given     02/26/2023 1502 EST lactated ringers bolus 1,000 mL 1,000 mL Intravenous New Bag     02/26/2023 1705 EST calcium gluconate 1 g in sodium chloride 0 9% 50 mL (premix) 1 g Intravenous New Bag     02/26/2023 1745 EST sodium chloride 0 9 % bolus 500 mL 500 mL Intravenous New Bag     Past Medical History:   Diagnosis Date   • Allergic    • Anxiety    • COVID-19 01/2022    mild s/s-not hospitalized, not vaccinated   • GERD (gastroesophageal reflux disease)    • Headache(784 0)    • History of IBS     with constipation   • Ingrown toenail    • Irritable bowel syndrome    • Migraines     Chronic per pt   • Plantar fasciitis    • Thyroid carcinoma (HCC)    • Venereal wart 08/07/2014     Admitting Diagnosis: Hypocalcemia [E83 51]  Hypokalemia [E87 6]  Paresthesias [R20 2]  Post-op pain [G89 18]  S/P thyroidectomy [E89 0]  Age/Sex: 38 y o  female  Admission Orders:  Scheduled Medications:  calcitriol, 0 25 mcg, Oral, Daily  calcium carbonate, 2 tablet, Oral, TID With Meals  enoxaparin, 40 mg, Subcutaneous, Daily  fluticasone, 2 spray, Nasal, Daily  levothyroxine, 125 mcg, Oral, Daily  lubiprostone, 8 mcg, Oral, BID With Meals  pantoprazole, 20 mg, Oral, Early Morning  propranolol, 40 mg, Oral, Q12H TWYLA    PRN Meds:  melatonin, 4 5 mg, Oral, HS PRN  meloxicam, 15 mg, Oral, Daily PRN  ondansetron, 4 mg, Intravenous, Q6H PRN  traMADol, 50 mg, Oral, Q6H PRN  zolpidem, 10 mg, Oral, HS PRN        IP CONSULT TO ENDOCRINOLOGY    Network Utilization Review Department  ATTENTION: Please call with any questions or concerns to [phone] and carefully listen to the prompts so that you are directed to the right person  All voicemails are confidential   Aisha Pérez all requests for admission clinical reviews, approved or denied determinations and any other requests to dedicated fax number below belonging to the campus where the patient is receiving treatment   List of dedicated fax numbers for the Facilities:  [address] DENIALS (Administrative/Medical Necessity) [phone]   [address] (Maternity/NICU/Pediatrics) [phone]   4 Delmi Vidal [phone]   [facility] 77 [phone]   [address] [place] [address] [facility] 28 [phone]   1559 [facility] 134 815 [facility] [phone]

## 2023-02-27 NOTE — CONSULTS
Consultation - Nahomy Reyes 38 y o  female MRN: [id number]    Unit/Bed#: [facility] 925-01 Encounter: [id number]      Assessment/Plan     Assessment: This is a 38y o -year-old female with past medical history of papillary thyroid cancer status post total thyroidectomy, IBS, migraines who presented to the hospital with generalized numbness and tingling all over her body, admitted for symptomatic hypocalcemia    Plan:  Hypocalcemia secondary to hypoparathyroidism  Corrected calcium on admission 7 4 no, ionized calcium 0 89 low PTH less than 6 3, magnesium 2 normal, 25 hydroxyvitamin D 27 8 low  Corrected calcium today 8 2, Phos 5 5  Would recommend calcium carbonate 1000 mg twice daily and calcitriol 0 25 mcg twice daily  We will repeat calcium, albumin later today and follow-up on it to ensure if pt's symptoms correlate with hypocalcemia   Upon discharge, patient would benefit from calcium citrate instead of calcium carbonate for better absorption of calcium as she takes omeprazole daily as outpatient  Goal corrected calcium is low normal as pt has hypoparathyroidism    Papillary thyroid cancer status post total thyroidectomy in 2/24  Awaiting final pathology for total thyroidectomy   FNA of left lower thyroid lobe nodule in January 2023 showed [place] category VI  Size of the L lower thyroid nodule was 1 x 0 7 x 1 cm  Follows up outpatient with endocrine    Postoperative hypothyroidism  TSH 1 9 February 2023 (preop)  Continue with levothyroxine 125 mcg daily, repeat TSH and free T4 as outpatient in about 4 weeks    CC: Hypocalcemia Consult    History of Present Illness     HPI:    This is a 38y o -year-old female with past medical history of papillary thyroid cancer status post total thyroidectomy, IBS, migraines who presented to the hospital with generalized numbness and tingling all over her body, admitted for hypocalcemia    Patient reports that she had total thyroidectomy done on 2/24, was alerted to come to the emergency room for symptoms of hypocalcemia  Patient reports that she started to have symptoms of numbness and tingling as well as cramps that prompted her to come to the emergency room  Patient also complains of lightheadedness, headache, fatigue  On arrival to the emergency room patient's corrected calcium was found to be 7 4, was given calcium gluconate IV 2 g x 2, 1 g x 1, was started on calcium carbonate 1000 mg 3 times daily, calcitriol 0 5 mg loading dose followed by 0 25 mcg 3 times daily  Noted to have QTc of 397 on EKG  Corrected calcium today is 8 2, Phos 5 5, magnesium 1 9  Today, patient complains of feeling fatigued, reports to have migraine today  Inpatient consult to Endocrinology  Consult performed by: Nima Armstrong MD  Consult ordered by: Al Tesfaye MD          Review of Systems   Constitutional: Positive for fatigue  Negative for activity change and appetite change  HENT: Negative for congestion, sore throat and voice change  Eyes: Negative for redness and visual disturbance  Respiratory: Negative for cough and shortness of breath  Cardiovascular: Negative for chest pain, palpitations and leg swelling  Gastrointestinal: Positive for diarrhea  Negative for abdominal pain, constipation, nausea and vomiting  Endocrine: Negative for cold intolerance, heat intolerance, polydipsia, polyphagia and polyuria  Genitourinary: Negative for difficulty urinating and dysuria  Musculoskeletal: Negative for gait problem and neck pain  Skin: Negative for color change  Neurological: Positive for numbness and headaches  Negative for dizziness and syncope  Psychiatric/Behavioral: Negative for agitation and behavioral problems  All other systems reviewed and are negative        Historical Information   Past Medical History:   Diagnosis Date   • Allergic    • Anxiety    • COVID-19 01/2022    mild s/s-not hospitalized, not vaccinated   • GERD (gastroesophageal reflux disease) • Headache(784 0)    • History of IBS     with constipation   • Ingrown toenail    • Irritable bowel syndrome    • Migraines     Chronic per pt   • Plantar fasciitis    • Thyroid carcinoma ([address] )    • Venereal wart 08/07/2014     Past Surgical History:   Procedure Laterality Date   • CERVICAL BIOPSY  W/ LOOP ELECTRODE EXCISION  2017   • COLONOSCOPY     • NASAL SEPTUM SURGERY     • MA OSTEOT W/WO LNGTH SHRT/CORRJ 1ST METAR Left 12/30/2022    Procedure: OSTEOTOMY METATARSAL 1st;  Surgeon: Alex Mratines DPM;  Location: [facility];  Service: Podiatry   • THYROIDECTOMY N/A 2/24/2023    Procedure: TOTAL THYROIDECTOMY;  Surgeon: Lori Quezada MD;  Location: [facility];  Service: Surgical Oncology   • TONSILLECTOMY     • UPPER GASTROINTESTINAL ENDOSCOPY     • US GUIDED THYROID BIOPSY  1/31/2023   • WISDOM TOOTH EXTRACTION       Social History   Social History     Substance and Sexual Activity   Alcohol Use Yes   • Alcohol/week: 2 0 standard drinks   • Types: 2 Glasses of wine per week    Comment: 2 glasses wine a week     Social History     Substance and Sexual Activity   Drug Use Never     Social History     Tobacco Use   Smoking Status Never   Smokeless Tobacco Never     Family History:   Family History   Problem Relation Age of Onset   • Hypertension Mother    • Arthritis Mother    • Thyroid disease Mother    • Transient ischemic attack Father    • Hypertension Father    • Stroke Father    • Vision loss Father    • Rashes / Skin problems Sister         Shingles   • No Known Problems Sister    • No Known Problems Sister    • Uterine cancer Maternal Grandmother 20   • Cancer Maternal Grandmother         Uterine   • Colon cancer Maternal Grandfather    • Cancer Maternal Grandfather         Colon   • Lung cancer Paternal Grandmother 61   • Cancer Paternal Grandmother         Lung   • No Known Problems Paternal Grandfather    • Colon cancer Cousin 27   • Breast cancer Maternal Aunt    • Breast cancer Maternal Aunt    • Breast cancer Maternal Aunt        Meds/Allergies   Current Facility-Administered Medications   Medication Dose Route Frequency Provider Last Rate Last Admin   • calcitriol (ROCALTROL) capsule 0 25 mcg  0 25 mcg Oral TID Geraldo Cohen PA-C       • calcium carbonate (OYSTER SHELL,OSCAL) 500 mg tablet 2 tablet  2 tablet Oral TID With Meals Dee Dee Barr MD   2 tablet at 02/27/23 7355   • calcium gluconate 4 g in sodium chloride 0 9 % 1,000 mL infusion   Intravenous Once Geraldo Cohen PA-C       • enoxaparin (LOVENOX) subcutaneous injection 40 mg  40 mg Subcutaneous Daily Dee Dee Barr MD   40 mg at 02/27/23 0804   • fluticasone (FLONASE) 50 mcg/act nasal spray 2 spray  2 spray Nasal Daily Dee Dee Barr MD       • levothyroxine tablet 125 mcg  125 mcg Oral Daily Dee Dee Barr MD   125 mcg at 02/27/23 4699   • lubiprostone (AMITIZA) capsule 8 mcg  8 mcg Oral BID With Meals Dee Dee Barr MD   8 mcg at 02/27/23 3024   • melatonin tablet 4 5 mg  4 5 mg Oral HS PRN Dee Dee Barr MD       • meloxicam [facility]) tablet 15 mg  15 mg Oral Daily PRN Dee Dee Barr MD       • ondansetron [facility]) injection 4 mg  4 mg Intravenous Q6H PRN Dee Dee Barr MD   4 mg at 02/27/23 6331   • pantoprazole (PROTONIX) EC tablet 20 mg  20 mg Oral Early Morning Dee Dee Barr MD   20 mg at 02/27/23 0506   • propranolol (INDERAL) tablet 40 mg  40 mg Oral Q12H [address] Dee Dee Barr MD   40 mg at 02/27/23 0804   • traMADol (ULTRAM) tablet 50 mg  50 mg Oral Q6H PRN Dee Dee Barr MD   50 mg at 02/26/23 2313   • zolpidem (AMBIEN) tablet 10 mg  10 mg Oral HS PRN Dee Dee Barr MD         No Known Allergies    Objective   Vitals: Blood pressure 122/80, pulse 64, temperature 97 9 °F (36 6 °C), resp  rate 16, height 5' 4" (1 626 m), weight 73 9 kg (163 lb), SpO2 96 %      Intake/Output Summary (Last 24 hours) at 2/27/2023 1121  Last data filed at 2/26/2023 2011  Gross per 24 hour   Intake 593 ml   Output -- Net 593 ml     Invasive Devices     Peripheral Intravenous Line  Duration           Peripheral IV 02/26/23 Left Antecubital <1 day                Physical Exam  Constitutional:       Appearance: Normal appearance  HENT:      Head: Normocephalic and atraumatic  Neck:      Comments: Post thyroidectomy horizontal Anterior neck incision site noted, covered in steri strips  Cardiovascular:      Rate and Rhythm: Normal rate and regular rhythm  Pulses: Normal pulses  Heart sounds: Normal heart sounds  No murmur heard  No gallop  Pulmonary:      Effort: Pulmonary effort is normal       Breath sounds: Normal breath sounds  No wheezing or rales  Abdominal:      Tenderness: There is no abdominal tenderness  Musculoskeletal:         General: No swelling  Cervical back: Normal range of motion and neck supple  Right lower leg: No edema  Left lower leg: No edema  Skin:     General: Skin is warm  Neurological:      Mental Status: She is alert and oriented to person, place, and time  Mental status is at baseline  Comments: Chvostek's sign positive bilaterally   Psychiatric:         Mood and Affect: Mood normal          Behavior: Behavior normal          The history was obtained from the review of the chart, patient  Lab Results:       Lab Results   Component Value Date    WBC 7 88 02/26/2023    HGB 11 7 02/26/2023    HCT 35 2 02/26/2023    MCV 96 02/26/2023     02/27/2023     Lab Results   Component Value Date/Time    BUN 9 02/27/2023 04:34 AM    K 3 4 (L) 02/27/2023 04:34 AM     02/27/2023 04:34 AM    CO2 30 02/27/2023 04:34 AM    CREATININE 0 63 02/27/2023 04:34 AM    AST 14 02/26/2023 12:28 PM    ALT 22 02/26/2023 12:28 PM    ALB 3 3 (L) 02/26/2023 12:28 PM     No results for input(s): CHOL, HDL, LDL, TRIG, VLDL in the last 72 hours    No results found for: Cyndee Curling  No results found for: POCGLU    Imaging Studies: I have personally reviewed pertinent reports  US thyroid Jan 2023:  FINDINGS:  Normal homogeneous smooth echotexture      Right lobe: 4 8 x 1 2 x 1 7 cm  Volume 4 4 mL  Left lobe:  4 5 x 1 0 x 1 4 cm  Volume 3 1 mL  Isthmus: 0 2  cm      4 mm nodule in the lower aspect of the left lobe of thyroid is unchanged      Nodule #2  Image 51  LEFT lower pole nodule measuring 1 x 0 7 x 1 cm  Minimally larger   COMPOSITION:  2 points, solid or almost completely solid   ECHOGENICITY:  2 points, hypoechoic  SHAPE:  0 points, wider-than-tall  MARGIN: 2 points, lobulated or irregular  ECHOGENIC FOCI:  3 points, punctate echogenic foci  TI-RADS Classification: TR 5 (7 or > points), Highly suspicious  FNA if > 1 cm  Follow if > 0 5 cm        IMPRESSION:     The following meet current ACR criteria for recommending ultrasound guided biopsy:  Nodule 2 in the inferior left lobe of thyroid, TR 5  FNA of L lower thyroid lobe Jan 2023:    A -B  Thyroid, Left, lower (Thin-prep and smear preparations): Malignant ([place] Category VI) - See note  Papillary carcinoma      Satisfactory for evaluation      Portions of the record may have been created with voice recognition software  Please Tigertext questions to the clinician covering the "[id number]" Role  Thank you

## 2023-02-28 VITALS
RESPIRATION RATE: 17 BRPM | BODY MASS INDEX: 27.83 KG/M2 | HEART RATE: 67 BPM | SYSTOLIC BLOOD PRESSURE: 118 MMHG | WEIGHT: 163 LBS | TEMPERATURE: 97.9 F | DIASTOLIC BLOOD PRESSURE: 77 MMHG | HEIGHT: 64 IN | OXYGEN SATURATION: 96 %

## 2023-02-28 LAB
ALBUMIN SERPL BCP-MCNC: 3.3 G/DL (ref 3.5–5)
ALBUMIN SERPL BCP-MCNC: 3.5 G/DL (ref 3.5–5)
ANION GAP SERPL CALCULATED.3IONS-SCNC: 4 MMOL/L (ref 4–13)
ANION GAP SERPL CALCULATED.3IONS-SCNC: 5 MMOL/L (ref 4–13)
BUN SERPL-MCNC: 7 MG/DL (ref 5–25)
BUN SERPL-MCNC: 7 MG/DL (ref 5–25)
CALCIUM SERPL-MCNC: 7.7 MG/DL (ref 8.3–10.1)
CALCIUM SERPL-MCNC: 7.9 MG/DL (ref 8.3–10.1)
CALCIUM SERPL-MCNC: 8 MG/DL (ref 8.3–10.1)
CHLORIDE SERPL-SCNC: 106 MMOL/L (ref 96–108)
CHLORIDE SERPL-SCNC: 106 MMOL/L (ref 96–108)
CO2 SERPL-SCNC: 21 MMOL/L (ref 21–32)
CO2 SERPL-SCNC: 29 MMOL/L (ref 21–32)
CREAT SERPL-MCNC: 0.62 MG/DL (ref 0.6–1.3)
CREAT SERPL-MCNC: 0.66 MG/DL (ref 0.6–1.3)
GFR SERPL CREATININE-BSD FRML MDRD: 110 ML/MIN/1.73SQ M
GFR SERPL CREATININE-BSD FRML MDRD: 113 ML/MIN/1.73SQ M
GLUCOSE SERPL-MCNC: 106 MG/DL (ref 65–140)
GLUCOSE SERPL-MCNC: 107 MG/DL (ref 65–140)
POTASSIUM SERPL-SCNC: 3.6 MMOL/L (ref 3.5–5.3)
POTASSIUM SERPL-SCNC: 5.9 MMOL/L (ref 3.5–5.3)
SODIUM SERPL-SCNC: 131 MMOL/L (ref 135–147)
SODIUM SERPL-SCNC: 140 MMOL/L (ref 135–147)

## 2023-02-28 RX ORDER — DEXTROSE MONOHYDRATE 100 MG/ML
125 INJECTION, SOLUTION INTRAVENOUS CONTINUOUS
Status: DISCONTINUED | OUTPATIENT
Start: 2023-02-28 | End: 2023-02-28

## 2023-02-28 RX ORDER — SUMATRIPTAN 50 MG/1
50 TABLET, FILM COATED ORAL ONCE
Status: COMPLETED | OUTPATIENT
Start: 2023-02-28 | End: 2023-02-28

## 2023-02-28 RX ORDER — ONDANSETRON 4 MG/1
4 TABLET, ORALLY DISINTEGRATING ORAL EVERY 6 HOURS PRN
Qty: 20 TABLET | Refills: 0 | Status: SHIPPED | OUTPATIENT
Start: 2023-02-28

## 2023-02-28 RX ORDER — KETOROLAC TROMETHAMINE 30 MG/ML
30 INJECTION, SOLUTION INTRAMUSCULAR; INTRAVENOUS ONCE
Status: COMPLETED | OUTPATIENT
Start: 2023-02-28 | End: 2023-02-28

## 2023-02-28 RX ORDER — MELATONIN
1000 DAILY
Qty: 30 TABLET | Refills: 0 | Status: SHIPPED | OUTPATIENT
Start: 2023-03-01 | End: 2023-03-31

## 2023-02-28 RX ORDER — ACETAMINOPHEN 325 MG/1
650 TABLET ORAL EVERY 6 HOURS PRN
Status: DISCONTINUED | OUTPATIENT
Start: 2023-02-28 | End: 2023-02-28 | Stop reason: HOSPADM

## 2023-02-28 RX ORDER — MELATONIN
1000 DAILY
Status: DISCONTINUED | OUTPATIENT
Start: 2023-02-28 | End: 2023-02-28 | Stop reason: HOSPADM

## 2023-02-28 RX ORDER — MAGNESIUM SULFATE HEPTAHYDRATE 40 MG/ML
2 INJECTION, SOLUTION INTRAVENOUS ONCE
Status: COMPLETED | OUTPATIENT
Start: 2023-02-28 | End: 2023-02-28

## 2023-02-28 RX ORDER — METOCLOPRAMIDE 10 MG/1
10 TABLET ORAL ONCE
Status: COMPLETED | OUTPATIENT
Start: 2023-02-28 | End: 2023-02-28

## 2023-02-28 RX ORDER — CALCIUM CARBONATE 500(1250)
2 TABLET ORAL 2 TIMES DAILY WITH MEALS
Qty: 56 TABLET | Refills: 0 | OUTPATIENT
Start: 2023-02-28 | End: 2023-03-02

## 2023-02-28 RX ORDER — PREDNISONE 20 MG/1
TABLET ORAL
Qty: 18 TABLET | Refills: 0 | Status: SHIPPED | OUTPATIENT
Start: 2023-03-01

## 2023-02-28 RX ORDER — CALCITRIOL 0.25 UG/1
0.25 CAPSULE, LIQUID FILLED ORAL 2 TIMES DAILY
Qty: 28 CAPSULE | Refills: 0 | Status: SHIPPED | OUTPATIENT
Start: 2023-02-28 | End: 2023-03-14 | Stop reason: SDUPTHER

## 2023-02-28 RX ADMIN — ENOXAPARIN SODIUM 40 MG: 40 INJECTION SUBCUTANEOUS at 09:36

## 2023-02-28 RX ADMIN — KETOROLAC TROMETHAMINE 30 MG: 30 INJECTION, SOLUTION INTRAMUSCULAR; INTRAVENOUS at 14:09

## 2023-02-28 RX ADMIN — ACETAMINOPHEN 650 MG: 325 TABLET ORAL at 02:50

## 2023-02-28 RX ADMIN — Medication 2 TABLET: at 09:38

## 2023-02-28 RX ADMIN — MAGNESIUM SULFATE HEPTAHYDRATE 2 G: 40 INJECTION, SOLUTION INTRAVENOUS at 14:58

## 2023-02-28 RX ADMIN — PROPRANOLOL HYDROCHLORIDE 40 MG: 20 TABLET ORAL at 09:36

## 2023-02-28 RX ADMIN — VALPROATE SODIUM 500 MG: 100 INJECTION, SOLUTION INTRAVENOUS at 14:08

## 2023-02-28 RX ADMIN — LUBIPROSTONE 8 MCG: 8 CAPSULE, GELATIN COATED ORAL at 17:32

## 2023-02-28 RX ADMIN — CHOLECALCIFEROL TAB 25 MCG (1000 UNIT) 1000 UNITS: 25 TAB at 09:36

## 2023-02-28 RX ADMIN — ONDANSETRON 4 MG: 2 INJECTION INTRAMUSCULAR; INTRAVENOUS at 06:44

## 2023-02-28 RX ADMIN — Medication 2 TABLET: at 17:32

## 2023-02-28 RX ADMIN — CALCITRIOL CAPSULES 0.25 MCG 0.25 MCG: 0.25 CAPSULE ORAL at 09:36

## 2023-02-28 RX ADMIN — METOCLOPRAMIDE 10 MG: 10 TABLET ORAL at 14:09

## 2023-02-28 RX ADMIN — SUMATRIPTAN SUCCINATE 50 MG: 50 TABLET ORAL at 05:21

## 2023-02-28 RX ADMIN — PANTOPRAZOLE SODIUM 20 MG: 20 TABLET, DELAYED RELEASE ORAL at 06:44

## 2023-02-28 RX ADMIN — LEVOTHYROXINE SODIUM 125 MCG: 125 TABLET ORAL at 06:44

## 2023-02-28 RX ADMIN — LUBIPROSTONE 8 MCG: 8 CAPSULE, GELATIN COATED ORAL at 09:36

## 2023-02-28 NOTE — PLAN OF CARE
Problem: PAIN - ADULT  Goal: Verbalizes/displays adequate comfort level or baseline comfort level  Description: Interventions:  - Encourage patient to monitor pain and request assistance  - Assess pain using appropriate pain scale  - Administer analgesics based on type and severity of pain and evaluate response  - Implement non-pharmacological measures as appropriate and evaluate response  - Consider cultural and social influences on pain and pain management  - Notify physician/advanced practitioner if interventions unsuccessful or patient reports new pain  Outcome: Progressing     Problem: INFECTION - ADULT  Goal: Absence or prevention of progression during hospitalization  Description: INTERVENTIONS:  - Assess and monitor for signs and symptoms of infection  - Monitor lab/diagnostic results  - Monitor all insertion sites, i e  indwelling lines, tubes, and drains  - Monitor endotracheal if appropriate and nasal secretions for changes in amount and color  - [place] appropriate cooling/warming therapies per order  - Administer medications as ordered  - Instruct and encourage patient and family to use good hand hygiene technique  - Identify and instruct in appropriate isolation precautions for identified infection/condition  Outcome: Progressing

## 2023-02-28 NOTE — PROGRESS NOTES
Progress Note - Surgical Oncology  Julietta Leventhal 38 y o  female MRN: [id number]  Unit/Bed#: [facility] 925-01 Encounter: [id number]      Objective: Nimesh Mckenzie feels somewhat better this morning  Still does not feel great  No perioral numbness or tingling, less tingling of all her extremities  Still complaining of a migraine headache states it is slightly less this morning however  Her vitamin D level is decreased as well as patient is a coffee drinker and has not had any coffee in the last few days  Patient is also complaining of some tinnitus which is normal for her when she gets a migraine  Patient states she is hungry this morning  Did not eat a lot of dinner last night  Her last calcium yesterday was 8 6 with an albumin of 3 5  Patient continues on calcitriol 0 25 mcg twice daily, calcium carbonate 2 tabs twice daily with meals, Inderal 40 mg every 12 hours, Imitrex 50 mg daily may repeat in 2 hours and not to go higher than 200 mg in 24 hours  3 UA as recorded    Blood pressure 127/80, pulse 68, temperature 97 9 °F (36 6 °C), resp  rate 16, height 5' 4" (1 626 m), weight 73 9 kg (163 lb), SpO2 97 %  ,Body mass index is 27 98 kg/m²  No intake or output data in the 24 hours ending 02/28/23 0516    Invasive Devices     Peripheral Intravenous Line  Duration           Peripheral IV 02/26/23 Left Antecubital 1 day                Physical Exam:   Anterior neck incision is clean and dry with Steri-Strips intact, there is no hematoma, no ecchymosis  No Chovstek's  sign present    Lab, Imaging and other studies: BMP pending this morning  VTE Pharmacologic Prophylaxis: Enoxaparin (Lovenox)  VTE Mechanical Prophylaxis: sequential compression device    Assessment:  POD #4 total thyroidectomy  Hypocalcemia  Migraines  Tinnitus    Plan:  1  To continue with the Imitrex up to 200 mg daily if needed  2  Follow a m  calcium level  3  We will add vitamin D 3 since low levels of vitamin D can cause headaches  4    Patient may have coffee whenever she wants to assist with her migraines  5  To be out of bed ambulating the halls and working with her incentive spirometry  6  May shower  7    Follow-up endocrinology

## 2023-02-28 NOTE — DISCHARGE INSTR - AVS FIRST PAGE
Prednisone taper: 20 mg tabs    Take 80 mg on March 1st, then decrease by 10 mg every day until complete:    March 1st: 4 tabs (80 mg)  March 2nd: 3 5 tabs (70 mg)  March 3rd: 3 tabs (60 mg)  March 4th: 2 5 tabs (50 mg)  March 5th: 2 tabs (40 mg)  March 6th: 1 5 tabs (30 mg)  March 7th:1 tab (20 mg)  March 8th: 0 5 tab (10 mg)    Calcitriol 0 25 mg twice a day for 7 days  Calcium carbonate 1000 mg twice a day for 7 days  Vitamin D 1000 mg daily    Post Thyroidectomy   -If you develop any numbness or tingling around the mouth, in the fingertips, or in the toes, TAKE 2 TUMS AND CALL THE OFFICE  -If you develop any significant swelling underneath the incision site (tennis ball, baseball, softball size), call the office or go directly to ED  -If you have any SOB, difficulty breathing or swallowing, call the office or go directly to the ED

## 2023-02-28 NOTE — DISCHARGE SUMMARY
Discharge Summary -surgical oncology  Gurwinder Renteria Bem 38 y o  female MRN: [id number]  Unit/Bed#: [facility] 925-01 Encounter: [id number]    Admission Date: 2/26/2023     Discharge Date: 2/28/2023    Admitting Diagnosis: Hypocalcemia [E83 51]  Hypokalemia [E87 6]  Paresthesias [R20 2]  Post-op pain [G89 18]  S/P thyroidectomy [E89 0]    Discharge Diagnosis: hypocalcemia tx calcitriol, calcium carbonate, calcium gluconate, endocrine consult-resolved/improved  Chronic migraine treated with neurology consult, prednisone taper    Attending and Service: Dr Kenan Bliss,  Surgical oncology services  Consulting Physician(s): Neurology and endocrinology    Imaging and Procedures Performed:   Orders Placed This Encounter   Procedures   • ED ECG Documentation Only     Hospital Course: Bandar Cheney is a female with a past medical history of recent thyroidectomy38year-old who presented with gradual onset over perioral numbness and tingling as well as peripheral paresthesias  She was admitted under the surgical oncology service and was started on additional calcium supplementation including calcitriol 0 25 mg twice daily, calcium carbonate 1000 mg twice daily, and 4 g calcium gluconate mixed with normal saline infused over 12 hours  Endocrinology was also consulted for assistance with supplementation  Neurology was also consulted for patient's chronic migraines with aura  Throughout hospital stay her calcium and albumin were monitored closely and she was cleared for discharge on hospital day 2 with improvement in calcium levels and symptoms resolved  Neurology gave strict instructions on prednisone taper for discharge  They will follow-up with her in the office in 1 to 2 weeks  All discharge instructions as well as follow-up appointments were discussed with the patient, she was in agreement with plan      On discharge, the patient is instructed to follow-up with the patient's primary care provider within the next 2 weeks to review the events of the recent Cherry on 3/8/2023 at 10 AM   The patient is instructed to follow-up with   The patient is instructed to follow the provided discharge instructions  Condition at Discharge: good     Discharge instructions/Information to patient and family:   See after visit summary for information provided to patient and family  Provisions for Follow-Up Care:  See after visit summary for information related to follow-up care and any pertinent home health orders  Disposition: Home    Planned Readmission: No    Discharge Statement   I spent 30 minutes discharging the patient  This time was spent on the day of discharge  I had direct contact with the patient on the day of discharge  Additional documentation is required if more than 30 minutes were spent on discharge  Discharge Medications:  See after visit summary for reconciled discharge medications provided to patient and family      Kenan Tipton PA-C  2/28/2023  4:45 PM

## 2023-02-28 NOTE — CONSULTS
Consultation - Neurology   Murali Kolb Bem 38 y o  female MRN: [id number]  Unit/Bed#: [facility] 925-01 Encounter: [id number]      Assessment/Plan   Migraines  Assessment & Plan  44-year-old female with recently diagnosed papillary thyroid cancer via fine-needle aspiration biopsy (chronic history of headache/migraine, recent total thyroidectomy on 2/24), IBS, IC, insomnia, anxiety  Presents on 2/26 following development of diffuse extremity paresthesias; suspected symptomatic hypocalcemia/parathyroid dysfunction  Neurology asked to evaluate today 2/28 following continued migraine as well as nausea which began on 2/25 following her surgery  Headache course and characteristics are typical of her migraine history without new/atypical features   No nuchal rigidity nor meningismus on exam   No other focal neurologic deficits elicited during exam     Plan:  -Headache meds trialed up to this morning include:   -Imitrex 50 mg for 4 doses   -Zofran as needed   -Tylenol as needed  -Trial Toradol/Reglan and assess response; if remaining admitted overnight, can use Toradol 30 mg/Reglan dose at night with gabapentin 200 mg at night for additional headache relief  -Trial Mg Sulfate/Depacon and assess response   -upon discharge, will recommend Prednisone taper to bridge migraine management from inpatient to home setting   -80 mg daily starting tomorrow (03/01), then decrease dose by 10 mg every day after until completed (8 day course)  -advised patient if no improvement/migraine still bothersome in 3-4 days from now, to call headache clinic /Dr Mary Monk to discuss alternative options  -supportive care   -Continued post-operative/medical management via surg oncology and endocrine teams    No further inpatient neurologic workup, would be ok from neurology standpoint or discharge later today if deemed ready by primary team  Discussed plan of care with attending neurologist     Jodie Craig will need follow up in 8-10 with headache attending or advance practitioner  She will not require outpatient neurological testing  History of Present Illness     Reason for Consult / Principal Problem: Migraine    HPI: Bandar Cheney is a 38 y o  female with     Patient is known to the outpatient headache clinic, particularly Dr Denice Orona, last seen in January 2023  To summarize her migraine history, she began with migraines at approximately [age]years old, with strong family history of migraines as well  Her migraines are largely unilateral, involving the left side of her temple as well as left maxillary area, radiating down the left side of her head to her neck/paracervical area  She also has associated photophobia, nausea, tinnitus with severe migraines  She started [facility] in 2022 and noticed an improvement in monthly migraines; previously her migraines 10+ per month, now are approximately 4/month  Currently: headache/migraine began Saturday 2/25 (following her total thyroidectomy which was the day prior on the 24th)  She took her abortive rizatriptan Saturday evening, but noticed it did not improve the headache  She ultimately presented on the 26th following onset of diffuse extremity tingling and paresthesias in the setting of low calcium  Given her headache and nausea has continued during admission, neurology was consulted for input today  She denies any new/atypical migrainous features today compared to usual   Currently she has blurry vision as well as bilateral tinnitus, which she is experienced with long migraines in the past   Denies any hemimotor or sensory deficits  No issues with dysphagia  No recent fevers, chills, diaphoresis  During admission thus far she has received several doses of Imitrex without significant effect noting the Imitrex has made her "jittery and spacey"  Headache at onset on Saturday was approximately 4/10, today is 5/10, but nausea is slightly worsening    Headache is not positionally induced  Inpatient consult to Neurology  Consult performed by: Dossie Gaucher, PA-C  Consult ordered by: Tyler De Los Santos PA-C          Review of Systems   Constitutional: Negative for chills, diaphoresis, fatigue and fever  HENT: Positive for tinnitus  Negative for hearing loss, trouble swallowing and voice change  Eyes: Positive for photophobia and visual disturbance (blurriness)  Respiratory: Negative  Cardiovascular: Negative  Gastrointestinal: Positive for nausea  Negative for vomiting  Musculoskeletal: Positive for neck pain  Negative for neck stiffness  Skin: Negative  Neurological: Positive for headaches  Negative for dizziness, tremors, seizures, syncope, facial asymmetry, speech difficulty, weakness, light-headedness and numbness  All other ROS reviewed and negative       Historical Information   Past Medical History:   Diagnosis Date   • Allergic    • Anxiety    • COVID-19 01/2022    mild s/s-not hospitalized, not vaccinated   • GERD (gastroesophageal reflux disease)    • Headache(784 0)    • History of IBS     with constipation   • Ingrown toenail    • Irritable bowel syndrome    • Migraines     Chronic per pt   • Plantar fasciitis    • Thyroid carcinoma ([facility] [address] )    • Venereal wart 08/07/2014     Past Surgical History:   Procedure Laterality Date   • CERVICAL BIOPSY  W/ LOOP ELECTRODE EXCISION  2017   • COLONOSCOPY     • NASAL SEPTUM SURGERY     • DE OSTEOT W/ [address] SHRT/CORRJ 1ST METAR Left 12/30/2022    Procedure: OSTEOTOMY METATARSAL 1st;  Surgeon: Leighton Bee DPM;  Location: [facility];  Service: Podiatry   • THYROIDECTOMY N/A 2/24/2023    Procedure: TOTAL THYROIDECTOMY;  Surgeon: Tommy Mendez MD;  Location:  MAIN OR;  Service: Surgical Oncology   • TONSILLECTOMY     • UPPER GASTROINTESTINAL ENDOSCOPY     • US GUIDED THYROID BIOPSY  1/31/2023   • WISDOM TOOTH EXTRACTION       Social History   Social History     Substance and Sexual Activity   Alcohol Use Yes • Alcohol/week: 2 0 standard drinks   • Types: 2 Glasses of wine per week    Comment: 2 glasses wine a week     Social History     Substance and Sexual Activity   Drug Use Never     E-Cigarette/Vaping   • E-Cigarette Use Never User      E-Cigarette/Vaping Substances   • Nicotine No    • THC No    • CBD No    • Flavoring No    • Other No    • Unknown No      Social History     Tobacco Use   Smoking Status Never   Smokeless Tobacco Never     Family History:   Family History   Problem Relation Age of Onset   • Hypertension Mother    • Arthritis Mother    • Thyroid disease Mother    • Transient ischemic attack Father    • Hypertension Father    • Stroke Father    • Vision loss Father    • Rashes / Skin problems Sister         Shingles   • No Known Problems Sister    • No Known Problems Sister    • Uterine cancer Maternal Grandmother 20   • Cancer Maternal Grandmother         Uterine   • Colon cancer Maternal Grandfather    • Cancer Maternal Grandfather         Colon   • Lung cancer Paternal Grandmother 61   • Cancer Paternal Grandmother         Lung   • No Known Problems Paternal Grandfather    • Colon cancer Cousin 27   • Breast cancer Maternal Aunt    • Breast cancer Maternal Aunt    • Breast cancer Maternal Aunt        Review of previous medical records was completed      Meds/Allergies   current meds:   Current Facility-Administered Medications   Medication Dose Route Frequency   • acetaminophen (TYLENOL) tablet 650 mg  650 mg Oral Q6H PRN   • calcitriol (ROCALTROL) capsule 0 25 mcg  0 25 mcg Oral BID   • calcium carbonate (OYSTER SHELL,OSCAL) 500 mg tablet 2 tablet  2 tablet Oral BID With Meals   • cholecalciferol (VITAMIN D3) tablet 1,000 Units  1,000 Units Oral Daily   • enoxaparin (LOVENOX) subcutaneous injection 40 mg  40 mg Subcutaneous Daily   • fluticasone (FLONASE) 50 mcg/act nasal spray 2 spray  2 spray Nasal Daily   • ketorolac (TORADOL) injection 30 mg  30 mg Intravenous Once   • levothyroxine tablet 125 mcg  125 mcg Oral Daily   • lubiprostone (AMITIZA) capsule 8 mcg  8 mcg Oral BID With Meals   • magnesium sulfate 2 g/50 mL IVPB (premix) 2 g  2 g Intravenous Once   • melatonin tablet 4 5 mg  4 5 mg Oral HS PRN   • meloxicam (MOBIC) tablet 15 mg  15 mg Oral Daily PRN   • metoclopramide (REGLAN) tablet 10 mg  10 mg Oral Once   • ondansetron (ZOFRAN) injection 4 mg  4 mg Intravenous Q6H PRN   • pantoprazole (PROTONIX) EC tablet 20 mg  20 mg Oral Early Morning   • propranolol (INDERAL) tablet 40 mg  40 mg Oral Q12H TWYLA   • topiramate (TOPAMAX) tablet 25 mg  25 mg Oral Once   • traMADol (ULTRAM) tablet 50 mg  50 mg Oral Q6H PRN   • valproate (DEPACON) 500 mg in sodium chloride 0 9 % 50 mL BOLUS  500 mg Intravenous Once   • zolpidem (AMBIEN) tablet 10 mg  10 mg Oral HS PRN    and PTA meds:   Prior to Admission Medications   Prescriptions Last Dose Informant Patient Reported? Taking? Biotin 5 MG CAPS 2023  Yes No   Sig: Take by mouth   FIBER ADULT GUMMIES PO 2023  Yes No   Sig: Take by mouth   Galcanezumab-gnlm 120 MG/ML SOAJ 2023  No No   Sig: Inject 120 mg under the skin every 28 days   Levocetirizine Dihydrochloride (XYZAL PO) 2023  Yes Yes   Sig: Take by mouth daily at bedtime   Linzess 290 MCG CAPS 2023  No Yes   Sig: TAKE ONE CAPSULE BY MOUTH DAILY     Melatonin 5 MG TABS 2023  Yes Yes   Sig: Take by mouth as needed   acetaminophen (TYLENOL) 500 mg tablet 2023  Yes Yes   Sig: Take 1,000 mg by mouth every 6 (six) hours as needed for mild pain   fluticasone (FLONASE) 50 mcg/act nasal spray Not Taking  No No   Si sprays into each nostril daily   Patient not taking: Reported on 2023   levothyroxine (Synthroid) 125 mcg tablet 2023  No Yes   Sig: Take 1 tablet (125 mcg total) by mouth daily   meloxicam (MOBIC) 15 mg tablet 2023  Yes Yes   Sig: Take 15 mg by mouth daily as needed   multivitamin [facility]) TABS 2023  Yes No   Sig: Take 1 tablet by mouth daily omeprazole (PriLOSEC) 20 mg delayed release capsule Past Week  No Yes   Sig: TAKE ONE CAPSULE BY MOUTH EVERY DAY   Patient taking differently: Take 20 mg by mouth as needed   propranolol (INDERAL) 40 mg tablet 2023  Yes Yes   Si (two) times a day   rizatriptan (MAXALT) 10 mg tablet Past Week  No Yes   Sig: Take 1 tablet (10 mg total) by mouth once as needed for migraine May repeat in 2 hours if needed  Max 224 hours, 9/month  traMADol (Ultram) 50 mg tablet 2023  No Yes   Sig: Take 1 tablet (50 mg total) by mouth every 6 (six) hours as needed for moderate pain   zolpidem (AMBIEN) 10 mg tablet 2023  Yes Yes   Sig: Take 10 mg by mouth daily at bedtime as needed      Facility-Administered Medications: None       No Known Allergies    Objective   Vitals:Blood pressure 120/76, pulse 77, temperature 97 9 °F (36 6 °C), resp  rate 16, height 5' 4" (1 626 m), weight 73 9 kg (163 lb), SpO2 95 %  ,Body mass index is 27 98 kg/m²  No intake or output data in the 24 hours ending 23 1224    Invasive Devices: Invasive Devices     Peripheral Intravenous Line  Duration           Peripheral IV 23 Left Antecubital 1 day                Physical Exam  Constitutional:       Appearance: Normal appearance  HENT:      Head: Normocephalic and atraumatic  Eyes:      Extraocular Movements: Extraocular movements intact  Conjunctiva/sclera: Conjunctivae normal       Pupils: Pupils are equal, round, and reactive to light  Neck:      Comments: Slight L paracervical TTP  Cardiovascular:      Rate and Rhythm: Normal rate  Pulmonary:      Effort: Pulmonary effort is normal    Abdominal:      General: There is no distension  Musculoskeletal:         General: Normal range of motion  Cervical back: Normal range of motion and neck supple  Skin:     General: Skin is warm and dry  Neurological:      Mental Status: She is alert        Motor: Motor strength is normal       Coordination: Finger-Nose-Finger Test and Heel to [place] cherelle Test normal        Neurologic Exam     Mental Status     Pleasant, fully awake and alert, fully oriented  Speech clear, following commands  Cranial Nerves     CN III, IV, VI   Pupils are equal, round, and reactive to light  Full neck range of motion, no meningeal signs no nuchal rigidity  Otherwise cranial nerve exam normal      Motor Exam     Strength   Strength 5/5 throughout  Sensory Exam   Light touch normal      Gait, Coordination, and Reflexes     Coordination   Finger to nose coordination: normal  Heel to shin coordination: normal    Tremor   Resting tremor: absent  Intention tremor: absent  2+ DTRs throughout, gait deferred given headache  Lab Results:   CBC:   Results from last 7 days   Lab Units 02/27/23  0407 02/26/23  1228   WBC Thousand/uL  --  7 88   RBC Million/uL  --  3 65*   HEMOGLOBIN g/dL  --  11 7   HEMATOCRIT %  --  35 2   MCV fL  --  96   PLATELETS Thousands/uL 272 292   , BMP/CMP:   Results from last 7 days   Lab Units 02/28/23  0803 02/28/23  8828 02/27/23  1649 02/27/23  1132 02/27/23  0434 02/26/23  1751 02/26/23  1228   SODIUM mmol/L 140 131*  --   --  139   < > 140   POTASSIUM mmol/L 3 6 5 9*  --   --  3 4*   < > 3 2*   CHLORIDE mmol/L 106 106  --   --  104   < > 108   CO2 mmol/L 29 21  --   --  30   < > 29   BUN mg/dL 7 7  --   --  9   < > 16   CREATININE mg/dL 0 62 0 66  --   --  0 63   < > 0 69   CALCIUM mg/dL 7 7* 7 9* 8 6   < > 7 6*   < > 6 8*   AST U/L  --   --   --   --   --   --  14   ALT U/L  --   --   --   --   --   --  22   ALK PHOS U/L  --   --   --   --   --   --  40*   EGFR ml/min/1 73sq m 113 110  --   --  112   < > 109    < > = values in this interval not displayed  , Vitamin B12:   , HgBA1C:   , TSH:   , Coagulation:   , Lipid Profile:     Imaging Studies: I have personally reviewed pertinent films in PACS   No orders to display     EKG, Pathology, and Other Studies: I have personally reviewed pertinent reports  VTE Prophylaxis: Sequential compression device (Venodyne)  and Enoxaparin (Lovenox)    Code Status: Level 1 - Full Code    Total time spent today 45 minutes

## 2023-03-01 ENCOUNTER — NURSE TRIAGE (OUTPATIENT)
Dept: OTHER | Facility: OTHER | Age: 43
End: 2023-03-01

## 2023-03-01 ENCOUNTER — TELEPHONE (OUTPATIENT)
Dept: HEMATOLOGY ONCOLOGY | Facility: CLINIC | Age: 43
End: 2023-03-01

## 2023-03-01 DIAGNOSIS — K59.00 CONSTIPATION, UNSPECIFIED CONSTIPATION TYPE: ICD-10-CM

## 2023-03-01 NOTE — TELEPHONE ENCOUNTER
Reason for Disposition  • Caller has NON-URGENT medicine question about med that PCP or specialist prescribed and triager unable to answer question    Answer Assessment - Initial Assessment Questions  1  NAME of MEDICATION: "What medicine are you calling about?"      Linzess   2  QUESTION: "What is your question?" (e g , medication refill, side effect)      Patient was prescribed to take Linzess on empty stomach  Patient is concerned if it is ok take Linzess along with Levothyroxine which she also  takes on empty stomach   3  PRESCRIBING HCP: "Who prescribed it?" Reason: if prescribed by specialist, call should be referred to that group        Dr Clare Dunn    Protocols used: MEDICATION QUESTION CALL-ADULT-[place]

## 2023-03-01 NOTE — TELEPHONE ENCOUNTER
Spoke to patient and answered all questions regarding her post op medications  Patient reports that she is started to feel a little better since her surgery

## 2023-03-01 NOTE — TELEPHONE ENCOUNTER
Call Transfer   Reason for patient call? Patient calling in with questions regarding her post- op medication  If someone other than patient is calling, are they listed on the communication consent? N/A   Patient's primary oncologist? Dr Stern Russian    Person/Department that the call was transferred to? Time that call was transferred? Brittany Simmons @ 9:25 AM    Informed patient that the message will be forwarded to the team and someone will get back to them as soon as possible  Did you relay this information to the patient?  Yes

## 2023-03-01 NOTE — TELEPHONE ENCOUNTER
"Regarding: Med question  ----- Message from [facility] sent at 3/1/2023 12:29 PM EST -----  \"I need to know if I an take my Linzess in the morn with another medication  \"    "

## 2023-03-02 ENCOUNTER — HOSPITAL ENCOUNTER (EMERGENCY)
Facility: HOSPITAL | Age: 43
Discharge: HOME | End: 2023-03-02
Attending: EMERGENCY MEDICINE

## 2023-03-02 ENCOUNTER — TELEPHONE (OUTPATIENT)
Dept: HEMATOLOGY ONCOLOGY | Facility: CLINIC | Age: 43
End: 2023-03-02

## 2023-03-02 VITALS
RESPIRATION RATE: 17 BRPM | DIASTOLIC BLOOD PRESSURE: 71 MMHG | TEMPERATURE: 98.1 F | HEART RATE: 60 BPM | OXYGEN SATURATION: 95 % | SYSTOLIC BLOOD PRESSURE: 122 MMHG

## 2023-03-02 DIAGNOSIS — H53.8 VISION BLURRING: ICD-10-CM

## 2023-03-02 DIAGNOSIS — C73 PAPILLARY THYROID CARCINOMA (HCC): Primary | ICD-10-CM

## 2023-03-02 DIAGNOSIS — R53.1 GENERALIZED WEAKNESS: ICD-10-CM

## 2023-03-02 DIAGNOSIS — E83.51 HYPOCALCEMIA: ICD-10-CM

## 2023-03-02 DIAGNOSIS — R79.89 LOW SERUM PARATHYROID HORMONE (PTH): ICD-10-CM

## 2023-03-02 DIAGNOSIS — R79.89 LOW TSH LEVEL: ICD-10-CM

## 2023-03-02 DIAGNOSIS — R20.2 PARESTHESIAS: Primary | ICD-10-CM

## 2023-03-02 LAB
ALBUMIN SERPL BCP-MCNC: 3.6 G/DL (ref 3.5–5)
ALP SERPL-CCNC: 45 U/L (ref 46–116)
ALT SERPL W P-5'-P-CCNC: 43 U/L (ref 12–78)
ANION GAP SERPL CALCULATED.3IONS-SCNC: 2 MMOL/L (ref 4–13)
AST SERPL W P-5'-P-CCNC: 38 U/L (ref 5–45)
ATRIAL RATE: 72 BPM
BASOPHILS # BLD MANUAL: 0 THOUSAND/UL (ref 0–0.1)
BASOPHILS NFR MAR MANUAL: 0 % (ref 0–1)
BILIRUB SERPL-MCNC: 0.2 MG/DL (ref 0.2–1)
BUN SERPL-MCNC: 14 MG/DL (ref 5–25)
CA-I BLD-SCNC: 1.01 MMOL/L (ref 1.12–1.32)
CALCIUM SERPL-MCNC: 7.8 MG/DL (ref 8.3–10.1)
CHLORIDE SERPL-SCNC: 107 MMOL/L (ref 96–108)
CO2 SERPL-SCNC: 28 MMOL/L (ref 21–32)
CREAT SERPL-MCNC: 0.67 MG/DL (ref 0.6–1.3)
EOSINOPHIL # BLD MANUAL: 0 THOUSAND/UL (ref 0–0.4)
EOSINOPHIL NFR BLD MANUAL: 0 % (ref 0–6)
ERYTHROCYTE [DISTWIDTH] IN BLOOD BY AUTOMATED COUNT: 11.9 % (ref 11.6–15.1)
GFR SERPL CREATININE-BSD FRML MDRD: 110 ML/MIN/1.73SQ M
GIANT PLATELETS BLD QL SMEAR: PRESENT
GLUCOSE SERPL-MCNC: 131 MG/DL (ref 65–140)
HCT VFR BLD AUTO: 38 % (ref 34.8–46.1)
HGB BLD-MCNC: 12.7 G/DL (ref 11.5–15.4)
LYMPHOCYTES # BLD AUTO: 0.36 THOUSAND/UL (ref 0.6–4.47)
LYMPHOCYTES # BLD AUTO: 4 % (ref 14–44)
MAGNESIUM SERPL-MCNC: 2.3 MG/DL (ref 1.6–2.6)
MCH RBC QN AUTO: 31.2 PG (ref 26.8–34.3)
MCHC RBC AUTO-ENTMCNC: 33.4 G/DL (ref 31.4–37.4)
MCV RBC AUTO: 93 FL (ref 82–98)
MONOCYTES # BLD AUTO: 0.09 THOUSAND/UL (ref 0–1.22)
MONOCYTES NFR BLD: 1 % (ref 4–12)
NEUTROPHILS # BLD MANUAL: 8.05 THOUSAND/UL (ref 1.85–7.62)
NEUTS BAND NFR BLD MANUAL: 8 % (ref 0–8)
NEUTS SEG NFR BLD AUTO: 81 % (ref 43–75)
P AXIS: 62 DEGREES
PHOSPHATE SERPL-MCNC: 3.7 MG/DL (ref 2.7–4.5)
PLATELET # BLD AUTO: 330 THOUSANDS/UL (ref 149–390)
PLATELET BLD QL SMEAR: ADEQUATE
PMV BLD AUTO: 10 FL (ref 8.9–12.7)
POLYCHROMASIA BLD QL SMEAR: PRESENT
POTASSIUM SERPL-SCNC: 3.9 MMOL/L (ref 3.5–5.3)
PR INTERVAL: 156 MS
PROT SERPL-MCNC: 7.1 G/DL (ref 6.4–8.4)
PTH-INTACT SERPL-MCNC: <6.3 PG/ML (ref 18.4–80.1)
QRS AXIS: 76 DEGREES
QRSD INTERVAL: 72 MS
QT INTERVAL: 370 MS
QTC INTERVAL: 405 MS
RBC # BLD AUTO: 4.07 MILLION/UL (ref 3.81–5.12)
RBC MORPH BLD: PRESENT
SODIUM SERPL-SCNC: 137 MMOL/L (ref 135–147)
T WAVE AXIS: 30 DEGREES
T4 FREE SERPL-MCNC: 1.59 NG/DL (ref 0.76–1.46)
TSH SERPL DL<=0.05 MIU/L-ACNC: 0.17 UIU/ML (ref 0.45–4.5)
VARIANT LYMPHS # BLD AUTO: 6 %
VENTRICULAR RATE: 72 BPM
WBC # BLD AUTO: 9.04 THOUSAND/UL (ref 4.31–10.16)

## 2023-03-02 RX ORDER — LINACLOTIDE 290 UG/1
1 CAPSULE, GELATIN COATED ORAL DAILY
Qty: 30 CAPSULE | Refills: 0 | Status: SHIPPED | OUTPATIENT
Start: 2023-03-02

## 2023-03-02 RX ORDER — CALCIUM GLUCONATE 20 MG/ML
2 INJECTION, SOLUTION INTRAVENOUS ONCE
Status: COMPLETED | OUTPATIENT
Start: 2023-03-02 | End: 2023-03-02

## 2023-03-02 RX ORDER — IBUPROFEN 200 MG
1 CAPSULE ORAL 3 TIMES DAILY
Qty: 42 TABLET | Refills: 0 | Status: SHIPPED | OUTPATIENT
Start: 2023-03-02 | End: 2023-03-16

## 2023-03-02 RX ORDER — CALCIUM CARBONATE 200(500)MG
500 TABLET,CHEWABLE ORAL DAILY PRN
Status: DISCONTINUED | OUTPATIENT
Start: 2023-03-02 | End: 2023-03-02 | Stop reason: HOSPADM

## 2023-03-02 RX ADMIN — CALCIUM GLUCONATE 2 G: 20 INJECTION, SOLUTION INTRAVENOUS at 14:51

## 2023-03-02 NOTE — ED PROVIDER NOTES
History  Chief Complaint   Patient presents with   • Post-op Problem     Pt had recent total thyroidectomy on Friday  Pt is experiencing tingling/numbness in all extremities, blurred vision, and tinnitus  Pt states she felt like this after the sx and they realized she was hypocalcemic      Patient is a 47-year-old female, with a history significant for papillary thyroid cancer status post total thyroidectomy on 2/24, who presents to the ED today, at the recommendation of her surg onc, due to total body paresthesias  Patient reports associated binocular vision blurring, muscle spasms, nonbloody diarrhea (no recent antibiotics, travel, history of A-fib)  Notably, patient was hospitalized with hypo-calcemia from 2/26-2/28 and reports the same constellation of symptoms during that time  She states that, since being discharged with calcium supplements and vitamin D, that her symptoms have been improving until today when they came back severely  Patient's sister, at bedside, also states that patient has been somewhat confused today  There is no associated fever, chest pain, dyspnea, urinary symptoms, focal weakness or numbness, current abdominal pain  Patient is without other concerns at this time  Prior to Admission Medications   Prescriptions Last Dose Informant Patient Reported? Taking?    Biotin 5 MG CAPS   Yes No   Sig: Take by mouth   FIBER ADULT GUMMIES PO   Yes No   Sig: Take by mouth   Galcanezumab-gnlm 120 MG/ML SOAJ   No No   Sig: Inject 120 mg under the skin every 28 days   Levocetirizine Dihydrochloride (XYZAL PO)   Yes No   Sig: Take by mouth daily at bedtime   Melatonin 5 MG TABS   Yes No   Sig: Take by mouth as needed   acetaminophen (TYLENOL) 500 mg tablet   Yes No   Sig: Take 1,000 mg by mouth every 6 (six) hours as needed for mild pain   calcitriol (ROCALTROL) 0 25 mcg capsule   No No   Sig: Take 1 capsule (0 25 mcg total) by mouth 2 (two) times a day for 14 days   calcium carbonate (OYSTER SHELL,OSCAL) 500 mg   No No   Sig: Take 2 tablets by mouth 2 (two) times a day with meals for 14 days   cholecalciferol (VITAMIN D3) 1,000 units tablet   No No   Sig: Take 1 tablet (1,000 Units total) by mouth daily Do not start before 2023  levothyroxine (Synthroid) 125 mcg tablet   No No   Sig: Take 1 tablet (125 mcg total) by mouth daily   linaCLOtide (Linzess) 290 MCG CAPS   No No   Sig: Take 1 capsule by mouth daily   meloxicam (MOBIC) 15 mg tablet   Yes No   Sig: Take 15 mg by mouth daily as needed   multivitamin (THERAGRAN) TABS   Yes No   Sig: Take 1 tablet by mouth daily   omeprazole (PriLOSEC) 20 mg delayed release capsule   No No   Sig: TAKE ONE CAPSULE BY MOUTH EVERY DAY   Patient taking differently: Take 20 mg by mouth as needed   ondansetron (Zofran ODT) 4 mg disintegrating tablet   No No   Sig: Take 1 tablet (4 mg total) by mouth every 6 (six) hours as needed for nausea or vomiting   predniSONE 20 mg tablet   No No   Sig: Take 80 mg on ; then decrease by 10 mg every day until completed: : 4 tabs, : 3 5 tabs, : 3 tabs, : 2 5 tabs, : 2 tabs, : 1 5 tabs, : 1 tab, : 0 5 tab Do not start before 2023  propranolol (INDERAL) 40 mg tablet   Yes No   Si (two) times a day   rizatriptan (MAXALT) 10 mg tablet   No No   Sig: Take 1 tablet (10 mg total) by mouth once as needed for migraine May repeat in 2 hours if needed  Max 2/24 hours, 9/month     traMADol (Ultram) 50 mg tablet   No No   Sig: Take 1 tablet (50 mg total) by mouth every 6 (six) hours as needed for moderate pain   zolpidem (AMBIEN) 10 mg tablet   Yes No   Sig: Take 10 mg by mouth daily at bedtime as needed      Facility-Administered Medications: None       Past Medical History:   Diagnosis Date   • Allergic    • Anxiety    • COVID-19 2022    mild s/s-not hospitalized, not vaccinated   • GERD (gastroesophageal reflux disease)    • Headache(784 0)    • History of IBS     with constipation   • Ingrown toenail    • Irritable bowel syndrome    • Migraines     Chronic per pt   • Plantar fasciitis    • Thyroid carcinoma ([address] )    • Venereal wart 08/07/2014       Past Surgical History:   Procedure Laterality Date   • CERVICAL BIOPSY  W/ LOOP ELECTRODE EXCISION  2017   • COLONOSCOPY     • NASAL SEPTUM SURGERY     • IL OSTEOT W/WO LNGTH SHRT/CORRJ 1ST METAR Left 12/30/2022    Procedure: OSTEOTOMY METATARSAL 1st;  Surgeon: Maddie Carrion DPM;  Location: [facility];  Service: Podiatry   • THYROIDECTOMY N/A 2/24/2023    Procedure: TOTAL THYROIDECTOMY;  Surgeon: Mojgan Patel MD;  Location: [facility];  Service: Surgical Oncology   • TONSILLECTOMY     • UPPER GASTROINTESTINAL ENDOSCOPY     • US GUIDED THYROID BIOPSY  1/31/2023   • WISDOM TOOTH EXTRACTION         Family History   Problem Relation Age of Onset   • Hypertension Mother    • Arthritis Mother    • Thyroid disease Mother    • Transient ischemic attack Father    • Hypertension Father    • Stroke Father    • Vision loss Father    • Rashes / Skin problems Sister         Shingles   • No Known Problems Sister    • No Known Problems Sister    • Uterine cancer Maternal Grandmother 20   • Cancer Maternal Grandmother         Uterine   • Colon cancer Maternal Grandfather    • Cancer Maternal Grandfather         Colon   • Lung cancer Paternal Grandmother 61   • Cancer Paternal Grandmother         Lung   • No Known Problems Paternal Grandfather    • Colon cancer Cousin 27   • Breast cancer Maternal Aunt    • Breast cancer Maternal Aunt    • Breast cancer Maternal Aunt      I have reviewed and agree with the history as documented      E-Cigarette/Vaping   • E-Cigarette Use Never User      E-Cigarette/Vaping Substances   • Nicotine No    • THC No    • CBD No    • Flavoring No    • Other No    • Unknown No      Social History     Tobacco Use   • Smoking status: Never   • Smokeless tobacco: Never   Vaping Use • Vaping Use: Never used   Substance Use Topics   • Alcohol use: Yes     Alcohol/week: 2 0 standard drinks     Types: 2 Glasses of wine per week     Comment: 2 glasses wine a week   • Drug use: Never        Review of Systems   Constitutional: Negative for fever  HENT: Negative for trouble swallowing  Eyes: Positive for visual disturbance  Respiratory: Negative for shortness of breath  Cardiovascular: Negative for chest pain  Gastrointestinal: Positive for diarrhea  Negative for abdominal pain (Resolved, felt like spasm earlier ) and vomiting  Endocrine: Negative for polyuria  Genitourinary: Negative for dysuria  Musculoskeletal: Negative for gait problem  Skin: Negative for rash  Allergic/Immunologic: Negative for environmental allergies  Neurological: Positive for weakness and numbness  Hematological: Negative for adenopathy  Psychiatric/Behavioral: Positive for confusion  All other systems reviewed and are negative  Physical Exam  ED Triage Vitals [03/02/23 1348]   Temperature Pulse Respirations Blood Pressure SpO2   98 1 °F (36 7 °C) 71 18 125/86 96 %      Temp Source Heart Rate Source Patient Position - Orthostatic VS BP Location FiO2 (%)   Temporal Monitor Lying Right arm --      Pain Score       5             Orthostatic Vital Signs  Vitals:    03/02/23 1348 03/02/23 1430 03/02/23 1630   BP: 125/86 118/78 122/71   Pulse: 71 70 60   Patient Position - Orthostatic VS: Lying  Lying       Physical Exam  Vitals and nursing note reviewed  Constitutional:       General: She is not in acute distress  Appearance: She is not toxic-appearing or diaphoretic  Comments: Patient appears comfortable during my evaluation  Tired appearing   HENT:      Head: Normocephalic and atraumatic  Right Ear: External ear normal       Left Ear: External ear normal       Nose: Nose normal  No rhinorrhea        Mouth/Throat:      Mouth: Mucous membranes are moist       Pharynx: Oropharynx is clear  No oropharyngeal exudate or posterior oropharyngeal erythema  Comments: Uvula midline  No oropharyngeal or submandibular mass/swelling  Eyes:      General: No scleral icterus  Right eye: No discharge  Left eye: No discharge  Conjunctiva/sclera: Conjunctivae normal       Pupils: Pupils are equal, round, and reactive to light  Neck:      Comments: Patient is spontaneously rotating their neck to the left and right during the history and physical exam interaction without difficulty or apparent discomfort      Anterior neck wound appropriately dressed  No surrounding erythema or crepitus  Cardiovascular:      Rate and Rhythm: Normal rate and regular rhythm  Pulses: Normal pulses  Heart sounds: Normal heart sounds  No murmur heard  No friction rub  No gallop  Comments: 2+ Radial  Pulmonary:      Effort: Pulmonary effort is normal  No respiratory distress  Breath sounds: Normal breath sounds  No stridor  No wheezing, rhonchi or rales  Abdominal:      General: Abdomen is flat  There is no distension  Palpations: Abdomen is soft  Tenderness: There is no abdominal tenderness  There is no right CVA tenderness, left CVA tenderness, guarding or rebound  Musculoskeletal:         General: No deformity  Cervical back: Neck supple  No tenderness  No muscular tenderness  Lymphadenopathy:      Cervical: No cervical adenopathy  Skin:     General: Skin is warm and dry  Capillary Refill: Capillary refill takes less than 2 seconds  Neurological:      Mental Status: She is alert  Comments: Patient is speaking clearly in complete sentences  Patient is answering appropriately and able follow commands  Patient is moving all four extremities spontaneously  No facial droop  Tongue midline  4/5 strength in all 4 extremities    Slight spasm with Chvostek testing   Psychiatric:         Mood and Affect: Mood normal          Behavior: Behavior normal          ED Medications  Medications   calcium carbonate (TUMS) chewable tablet 500 mg (has no administration in time range)   calcium gluconate 2 g in sodium chloride 0 9% 100 mL (premix) (0 g Intravenous Stopped 3/2/23 1629)       Diagnostic Studies  Results Reviewed     Procedure Component Value Units Date/Time    T4, free [[id number]]  (Abnormal) Collected: 03/02/23 1629    Lab Status: Final result Specimen: Blood from Arm, Left Updated: 03/02/23 1728     Free T4 1 59 ng/dL     TSH, 3rd generation with Free T4 reflex [[id number]]  (Abnormal) Collected: 03/02/23 1629    Lab Status: Final result Specimen: Blood from Arm, Left Updated: 03/02/23 1709     TSH 3RD GENERATON 0 167 uIU/mL     Narrative:      Patients undergoing fluorescein dye angiography may retain small amounts of fluorescein in the body for 48-72 hours post procedure  Samples containing fluorescein can produce falsely depressed TSH values  If the patient had this procedure,a specimen should be resubmitted post fluorescein clearance        PTH, intact [[id number]]  (Abnormal) Collected: 03/02/23 1629    Lab Status: Final result Specimen: Blood from Arm, Left Updated: 03/02/23 1709     PTH <6 3 pg/mL     Comprehensive metabolic panel [[id number]]  (Abnormal) Collected: 03/02/23 1426    Lab Status: Final result Specimen: Blood from Arm, Left Updated: 03/02/23 1502     Sodium 137 mmol/L      Potassium 3 9 mmol/L      Chloride 107 mmol/L      CO2 28 mmol/L      ANION GAP 2 mmol/L      BUN 14 mg/dL      Creatinine 0 67 mg/dL      Glucose 131 mg/dL      Calcium 7 8 mg/dL      AST 38 U/L      ALT 43 U/L      Alkaline Phosphatase 45 U/L      Total Protein 7 1 g/dL      Albumin 3 6 g/dL      Total Bilirubin 0 20 mg/dL      eGFR 110 ml/min/1 73sq m     Narrative:      [place] guidelines for Chronic Kidney Disease (CKD):   •  Stage 1 with normal or high GFR (GFR > 90 mL/min/1 73 square meters)  •  Stage 2 Mild CKD (GFR = 60-89 mL/min/1 73 square meters)  •  Stage 3A Moderate CKD (GFR = 45-59 mL/min/1 73 square meters)  •  Stage 3B Moderate CKD (GFR = 30-44 mL/min/1 73 square meters)  •  Stage 4 Severe CKD (GFR = 15-29 mL/min/1 73 square meters)  •  Stage 5 End Stage CKD (GFR <15 mL/min/1 73 square meters)  Note: GFR calculation is accurate only with a steady state creatinine    Magnesium [[id number]]  (Normal) Collected: 03/02/23 1426    Lab Status: Final result Specimen: Blood from Arm, Left Updated: 03/02/23 1502     Magnesium 2 3 mg/dL     Phosphorus [[id number]]  (Normal) Collected: 03/02/23 1426    Lab Status: Final result Specimen: Blood from Arm, Left Updated: 03/02/23 1502     Phosphorus 3 7 mg/dL     CBC and differential [[id number]]  (Normal) Collected: 03/02/23 1426    Lab Status: Final result Specimen: Blood from Arm, Left Updated: 03/02/23 1458     WBC 9 04 Thousand/uL      RBC 4 07 Million/uL      Hemoglobin 12 7 g/dL      Hematocrit 38 0 %      MCV 93 fL      MCH 31 2 pg      MCHC 33 4 g/dL      RDW 11 9 %      MPV 10 0 fL      Platelets 067 Thousands/uL     Narrative: This is an appended report  These results have been appended to a previously verified report      Manual Differential(PHLEBS Do Not Order) [[id number]]  (Abnormal) Collected: 03/02/23 1426    Lab Status: Final result Specimen: Blood from Arm, Left Updated: 03/02/23 1458     Segmented % 81 %      Bands % 8 %      Lymphocytes % 4 %      Monocytes % 1 %      Eosinophils, % 0 %      Basophils % 0 %      Atypical Lymphocytes % 6 %      Absolute Neutrophils 8 05 Thousand/uL      Lymphocytes Absolute 0 36 Thousand/uL      Monocytes Absolute 0 09 Thousand/uL      Eosinophils Absolute 0 00 Thousand/uL      Basophils Absolute 0 00 Thousand/uL      Total Counted --     RBC Morphology Present     Polychromasia Present     Platelet Estimate Adequate     Giant PLTs Present    Calcium, ionized [[id number]]  (Abnormal) Collected: 03/02/23 1426    Lab Status: Final result Specimen: Blood from Arm, Left Updated: 03/02/23 1442     Calcium, Ionized 1 01 mmol/L                  No orders to display         Procedures  Procedures      ED Course  ED Course as of 03/02/23 1804   Thu Mar 02, 2023   1421 ECG per my independent interpretation: Normal rate, regular rhythm, no ectopy, normal axis, no ST elevations or depressions     1430 Calcium, Ionized(!): 1 01  Low   1504 Calcium(!): 7 8  Low   1543 Discussed with Dr Darilyn Mortimer from Surg Onc - Low suspicion for hypocalcemia and higher suspicion for thyroid dysregulation  Endocrine consult recommended   [phone] Discussed with Dr Lisha Singh from endocrinology  No clear reason for admission at this time  Recommendations include: Calcium citrate 1000mg TID, continue calcitriol 0 25mg BID  CMP in 1 week  Patient has strong preference to go home at this time  6172 Patient reports improvement in her symptoms and she does appear less fatigued at this time  Clarification questions, after receiving discharge instructions return precautions, were answered to her satisfaction                                       Medical Decision Making  Patient is a 53-year-old female, with a history significant for papillary thyroid cancer status post total thyroidectomy on 2/24, who presents to the ED today, at the recommendation of her surg onc, due to total body paresthesias  Patient reports associated binocular vision blurring, muscle spasms, nonbloody diarrhea (no recent antibiotics, travel, history of A-fib)  Notably, patient was hospitalized with hypo-calcemia from 2/26-2/28 and reports the same constellation of symptoms during that time  She states that, since being discharged with calcium supplements and vitamin D, that her symptoms have been improving until today when they came back severely  Patient's sister, at bedside, also states that patient has been somewhat confused today    There is no associated fever, chest pain, dyspnea, urinary symptoms, focal weakness or numbness, current abdominal pain  Patient is currently afebrile and hemodynamically stable  Her physical exam is notable for generalized weakness, slight spasm with Chvostek testing, tired appearance  This presentation is concerning for: Hypocalcemia  Will investigate with CBC, CMP, ionized calcium, magnesium, phosphorus  Will manage with calcium, surgeon unk evaluation, further based upon work-up/recommendations      - No language barrier    - History obtained from patient and her sister    - There are no limitations to the history obtained  - External record review performed of previous charting was performed by me  - I independently reviewed and interpreted ordered labs, ECGs from this encounter   - Patient's medication regimen reviewed  - Patient's comorbidities factored into diagnosis considerations and disposition planning  Generalized weakness: acute illness or injury  Hypocalcemia: self-limited or minor problem  Low serum parathyroid hormone (PTH): acute illness or injury  Low TSH level: acute illness or injury  Paresthesias: acute illness or injury  Vision blurring: acute illness or injury  Amount and/or Complexity of Data Reviewed  Labs: ordered  Decision-making details documented in ED Course  Risk  OTC drugs  Prescription drug management              Disposition  Final diagnoses:   Paresthesias   Generalized weakness   Vision blurring   Hypocalcemia   Low serum parathyroid hormone (PTH)   Low TSH level     Time reflects when diagnosis was documented in both MDM as applicable and the Disposition within this note     Time User Action Codes Description Comment    3/2/2023  2:02 PM Evern Jitendra A Add [R20 2] Paresthesias     3/2/2023  2:02 PM Tata Page A Add [R53 1] Generalized weakness     3/2/2023  2:02 PM Evern Jitendra Add [H53 8] Vision blurring     3/2/2023  4:05 PM Evern Jitendra Add [E83 51] Hypocalcemia     3/2/2023  5:50 PM Camilo Tata ESPINOZA Add [R79 89] Low serum parathyroid hormone (PTH)     3/2/2023  5:50 PM Camilo Tata ESPINOZA Add [R79 89] Low TSH level       ED Disposition     ED Disposition   Discharge    Condition   Stable    Date/Time   Thu Mar 2, 2023  5:55 PM    Comment   Fermin Felix Bem discharge to home/self care  Follow-up Information     Follow up With Specialties Details Why Contact Info Additional [facility] Schedule an appointment as soon as possible for a visit   [address] [place] [address]       [address] [facility] [place] [facility] Schedule an appointment as soon as possible for a visit   [address] [facility] [id number] [phone] 126 [facility] [place], [address] [place] [address] Marcy Armani, [place], [address] [place] [address]          Patient's Medications   Discharge Prescriptions    CALCIUM CITRATE (CALCITRATE) 950 (200 CA) MG TABLET    Take 1 tablet (950 mg total) by mouth 3 (three) times a day for 14 days       Start Date: 3/2/2023  End Date: 3/16/2023       Order Dose: 950 mg       Quantity: 42 tablet    Refills: 0     Outpatient Discharge Orders   Comprehensive metabolic panel   Standing Status: Future Standing Exp  Date: 03/02/24       PDMP Review       Value Time User    PDMP Reviewed  Yes 2/8/2023 10:11 AM Elli Miner MD           ED Provider  Attending physically available and evaluated [id number] [address] Ramirez I managed the patient along with the ED Attending      Electronically Signed by         Patel Huerta MD  03/02/23 5490

## 2023-03-02 NOTE — ED ATTENDING ATTESTATION
Final Diagnoses:     1  Paresthesias    2  Generalized weakness    3  Vision blurring    4  Hypocalcemia    5  Low serum parathyroid hormone (PTH)    6  Low TSH level           IKarl MD, saw and evaluated the patient  All available labs and X-rays were ordered by me or the resident and have been reviewed by myself  I discussed the patient with the resident / non-physician and agree with the resident's / non-physician practitioner's findings and plan as documented in the resident's / non-physician practicitioner's note, except where noted  At this point, I agree with the current assessment done in the ED  I was present during key portions of all procedures performed unless otherwise stated  Nursing Triage:     Chief Complaint   Patient presents with   • Post-op Problem     Pt had recent total thyroidectomy on Friday  Pt is experiencing tingling/numbness in all extremities, blurred vision, and tinnitus  Pt states she felt like this after the sx and they realized she was hypocalcemic        HPI:   This is a 38 y o  female presenting for evaluation of tingling in both arms and legs  She had a recent thyroid procedure  She had very abnormal electorlytes after that  She's currently on prednisone and calcium supplements  She describes muscle spasms  She did note weakness when lifting a coffee cup this morning  ASSESSMENT + PLAN:   Highly suspicious for electrolyte derangements given the procedures ? ionized calcium, CMP, mag/phos  Will talk t ospecialist   Likely admission depending on results  Physical:   Pertinent: no trousseau sign  No hyperreflexia noted  No tremor  No clonus  No focal weakness, bilateral  equal strength  HF HE 5/5 bilaterally  General: VS reviewed  Appears in NAD  awake, alert  Well-nourished, well-developed  Appears stated age  Speaking normally in full sentences  Head: Normocephalic, atraumatic  Eyes: EOM-I  No diplopia  No hyphema     No subconjunctival hemorrhages  Symmetrical lids  ENT: Atraumatic external nose and ears  MMM  No malocclusion  No stridor  Normal phonation  No drooling  Normal swallowing  Neck: No JVD  CV: No pallor noted  Lungs:   No tachypnea  No respiratory distress  Abd: soft nt nd no rebound/guarding  MSK:   FROM spontaneously  Skin: Dry, intact  Neuro: Awake, alert, GCS15, CN II-XII grossly intact  Motor grossly intact  Psychiatric/Behavioral: interacting normally; appropriate mood/affect   Exam: deferred    Vitals:    03/02/23 1348 03/02/23 1430 03/02/23 1630   BP: 125/86 118/78 122/71   BP Location: Right arm  Left arm   Pulse: 71 70 60   Resp: 18 18 17   Temp: 98 1 °F (36 7 °C)     TempSrc: Temporal     SpO2: 96% 95% 95%     - There are no obvious limitations to social determinants of care  - Nursing note reviewed  - Vitals reviewed  - Orders placed by myself and/or advanced practitioner / resident     - Previous chart was reviewed  - No language barrier    - History obtained from patient  - There are no limitations to the history obtained:     Past Medical:    has a past medical history of Allergic, Anxiety, COVID-19 (01/2022), GERD (gastroesophageal reflux disease), Headache(784 0), History of IBS, Ingrown toenail, Irritable bowel syndrome, Migraines, Plantar fasciitis, Thyroid carcinoma ([facility] [address] ), and Venereal wart (08/07/2014)  Past Surgical:    has a past surgical history that includes Nasal septum surgery; Tonsillectomy; Colonoscopy; Upper gastrointestinal endoscopy; [place] tooth extraction; Cervical biopsy w/ loop electrode excision (2017); pr osteot w/wo lngth shrt/corrj 1st metar (Left, 12/30/2022); US guided thyroid biopsy (1/31/2023); and Thyroidectomy (N/A, 2/24/2023)      Social:     Social History     Substance and Sexual Activity   Alcohol Use Yes   • Alcohol/week: 2 0 standard drinks   • Types: 2 Glasses of wine per week    Comment: 2 glasses wine a week     Social History Tobacco Use   Smoking Status Never   Smokeless Tobacco Never     Social History     Substance and Sexual Activity   Drug Use Never       Echo:   No results found for this or any previous visit  No results found for this or any previous visit  Cath:    No results found for this or any previous visit  Code Status: Prior  Advance Directive and Living Will:      Power of :    POLST:    Medications   calcium gluconate 2 g in sodium chloride 0 9% 100 mL (premix) (0 g Intravenous Stopped 3/2/23 4284)     No orders to display     Orders Placed This Encounter   Procedures   • CBC and differential   • Comprehensive metabolic panel   • Calcium, ionized   • Magnesium   • Phosphorus   • TSH, 3rd generation with Free T4 reflex   • PTH, intact   • T4, free   • Comprehensive metabolic panel   • Ambulatory Referral to Endocrinology   • Inpatient Consult to Surgical Oncology   • ECG 12 lead   • ECG 12 lead     Labs Reviewed   COMPREHENSIVE METABOLIC PANEL - Abnormal       Result Value Ref Range Status    Sodium 137  135 - 147 mmol/L Final    Potassium 3 9  3 5 - 5 3 mmol/L Final    Chloride 107  96 - 108 mmol/L Final    CO2 28  21 - 32 mmol/L Final    ANION GAP 2 (*) 4 - 13 mmol/L Final    BUN 14  5 - 25 mg/dL Final    Creatinine 0 67  0 60 - 1 30 mg/dL Final    Comment: Standardized to IDMS reference method    Glucose 131  65 - 140 mg/dL Final    Comment: If the patient is fasting, the ADA then defines impaired fasting glucose as > 100 mg/dL and diabetes as > or equal to 123 mg/dL  Specimen collection should occur prior to Sulfasalazine administration due to the potential for falsely depressed results  Specimen collection should occur prior to Sulfapyridine administration due to the potential for falsely elevated results      Calcium 7 8 (*) 8 3 - 10 1 mg/dL Final    AST 38  5 - 45 U/L Final    Comment: Specimen collection should occur prior to Sulfasalazine administration due to the potential for falsely depressed results  ALT 43  12 - 78 U/L Final    Comment: Specimen collection should occur prior to Sulfasalazine and/or Sulfapyridine administration due to the potential for falsely depressed results  Alkaline Phosphatase 45 (*) 46 - 116 U/L Final    Total Protein 7 1  6 4 - 8 4 g/dL Final    Albumin 3 6  3 5 - 5 0 g/dL Final    Total Bilirubin 0 20  0 20 - 1 00 mg/dL Final    Comment: Use of this assay is not recommended for patients undergoing treatment with eltrombopag due to the potential for falsely elevated results  eGFR 110  ml/min/1 73sq m Final    Narrative:     [place] guidelines for Chronic Kidney Disease (CKD):   •  Stage 1 with normal or high GFR (GFR > 90 mL/min/1 73 square meters)  •  Stage 2 Mild CKD (GFR = 60-89 mL/min/1 73 square meters)  •  Stage 3A Moderate CKD (GFR = 45-59 mL/min/1 73 square meters)  •  Stage 3B Moderate CKD (GFR = 30-44 mL/min/1 73 square meters)  •  Stage 4 Severe CKD (GFR = 15-29 mL/min/1 73 square meters)  •  Stage 5 End Stage CKD (GFR <15 mL/min/1 73 square meters)  Note: GFR calculation is accurate only with a steady state creatinine   CALCIUM, IONIZED - Abnormal    Calcium, Ionized 1 01 (*) 1 12 - 1 32 mmol/L Final   TSH, 3RD GENERATION WITH FREE T4 REFLEX - Abnormal    TSH 3RD GENERATON 0 167 (*) 0 450 - 4 500 uIU/mL Final    Comment: The recommended reference ranges for TSH during pregnancy are as follows:   First trimester 0 1 to 2 5 uIU/mL   Second trimester  0 2 to 3 0 uIU/mL   Third trimester 0 3 to 3 0 uIU/m    Note: Normal ranges may not apply to patients who are transgender, non-binary, or whose legal sex, sex at birth, and gender identity differ  Adult TSH (3rd generation) reference range follows the recommended guidelines of the American Thyroid Association, January, 2020  Narrative:     Patients undergoing fluorescein dye angiography may retain small amounts of fluorescein in the body for 48-72 hours post procedure  Samples containing fluorescein can produce falsely depressed TSH values  If the patient had this procedure,a specimen should be resubmitted post fluorescein clearance  PTH, INTACT - Abnormal    PTH <6 3 (*) 18 4 - 80 1 pg/mL Final   T4, FREE - Abnormal    Free T4 1 59 (*) 0 76 - 1 46 ng/dL Final    Comment: Specimen collection should occur prior to Sulfasalazine administration due to the potential for falsely elevated results  MANUAL DIFFERENTIAL(PHLEBS DO NOT ORDER) - Abnormal    Segmented % 81 (*) 43 - 75 % Final    Bands % 8  0 - 8 % Final    Lymphocytes % 4 (*) 14 - 44 % Final    Monocytes % 1 (*) 4 - 12 % Final    Eosinophils, % 0  0 - 6 % Final    Basophils % 0  0 - 1 % Final    Atypical Lymphocytes % 6 (*) <=0 % Final    Absolute Neutrophils 8 05 (*) 1 85 - 7 62 Thousand/uL Final    Lymphocytes Absolute 0 36 (*) 0 60 - 4 47 Thousand/uL Final    Monocytes Absolute 0 09  0 00 - 1 22 Thousand/uL Final    Eosinophils Absolute 0 00  0 00 - 0 40 Thousand/uL Final    Basophils Absolute 0 00  0 00 - 0 10 Thousand/uL Final    Total Counted     Final    RBC Morphology Present   Final    Polychromasia Present   Final    Platelet Estimate Adequate  Adequate Final    Giant PLTs Present   Final   CBC AND DIFFERENTIAL - Normal    WBC 9 04  4 31 - 10 16 Thousand/uL Final    RBC 4 07  3 81 - 5 12 Million/uL Final    Hemoglobin 12 7  11 5 - 15 4 g/dL Final    Hematocrit 38 0  34 8 - 46 1 % Final    MCV 93  82 - 98 fL Final    MCH 31 2  26 8 - 34 3 pg Final    MCHC 33 4  31 4 - 37 4 g/dL Final    RDW 11 9  11 6 - 15 1 % Final    MPV 10 0  8 9 - 12 7 fL Final    Platelets 269  633 - 390 Thousands/uL Final    Narrative: This is an appended report  These results have been appended to a previously verified report     MAGNESIUM - Normal    Magnesium 2 3  1 6 - 2 6 mg/dL Final   PHOSPHORUS - Normal    Phosphorus 3 7  2 7 - 4 5 mg/dL Final     Time reflects when diagnosis was documented in both MDM as applicable and the Disposition within this note     Time User Action Codes Description Comment    3/2/2023  2:02 PM Elyn Larger A Add [R20 2] Paresthesias     3/2/2023  2:02 PM Legare, Dorthea Lesch A Add [R53 1] Generalized weakness     3/2/2023  2:02 PM Elyn Larger A Add [H53 8] Vision blurring     3/2/2023  4:05 PM Elyn Larger A Add [E83 51] Hypocalcemia     3/2/2023  5:50 PM Elyn Larger A Add [R79 89] Low serum parathyroid hormone (PTH)     3/2/2023  5:50 PM Elyn Larger A Add [R79 89] Low TSH level       ED Disposition     ED Disposition   Discharge    Condition   Stable    Date/Time   Thu Mar 2, 2023  5:55 PM    Comment   Kaleigh Dao Bem discharge to home/self care                 Follow-up Information     Follow up With Specialties Details Why Contact Info Additional [address] [facility] DO [facility] Schedule an appointment as soon as possible for a visit   [address] [place] [address] 35       126 [facility] End[facility]ology [place] [facility] Schedule an appointment as soon as possible for a visit   [address] [facility] [address] [phone] 126 [facility], [address] [place], [place], [facility]        Discharge Medication List as of 3/2/2023  5:56 PM      START taking these medications    Details   calcium citrate (CALCITRATE) 950 (200 Ca) MG tablet Take 1 tablet (950 mg total) by mouth 3 (three) times a day for 14 days, Starting Thu 3/2/2023, Until u 3/16/2023, Normal         CONTINUE these medications which have NOT CHANGED    Details   acetaminophen (TYLENOL) 500 mg tablet Take 1,000 mg by mouth every 6 (six) hours as needed for mild pain, Historical Med      Biotin 5 MG CAPS Take by mouth, Historical Med      calcitriol (ROCALTROL) 0 25 mcg capsule Take 1 capsule (0 25 mcg total) by mouth 2 (two) times a day for 14 days, Starting Tue 2/28/2023, Until Tue 3/14/2023, Normal      cholecalciferol (VITAMIN D3) 1,000 units tablet Take 1 tablet (1,000 Units total) by mouth daily Do not start before March 1, 2023 , Starting Wed 3/1/2023, Until Fri 3/31/2023, Normal      FIBER ADULT GUMMIES PO Take by mouth, Historical Med      Galcanezumab-gnlm 120 MG/ML SOAJ Inject 120 mg under the skin every 28 days, Starting Wed 1/11/2023, Normal      Levocetirizine Dihydrochloride (XYZAL PO) Take by mouth daily at bedtime, Historical Med      levothyroxine (Synthroid) 125 mcg tablet Take 1 tablet (125 mcg total) by mouth daily, Starting Wed 2/8/2023, Normal      linaCLOtide (Linzess) 290 MCG CAPS Take 1 capsule by mouth daily, Starting Thu 3/2/2023, Normal      Melatonin 5 MG TABS Take by mouth as needed, Historical Med      meloxicam (MOBIC) 15 mg tablet Take 15 mg by mouth daily as needed, Starting Tue 12/21/2021, Historical Med      multivitamin (THERAGRAN) TABS Take 1 tablet by mouth daily, Historical Med      omeprazole (PriLOSEC) 20 mg delayed release capsule TAKE ONE CAPSULE BY MOUTH EVERY DAY, Normal      ondansetron (Zofran ODT) 4 mg disintegrating tablet Take 1 tablet (4 mg total) by mouth every 6 (six) hours as needed for nausea or vomiting, Starting Tue 2/28/2023, Normal      predniSONE 20 mg tablet Take 80 mg on March 1st; then decrease by 10 mg every day until completed: March 1st: 4 tabs, March 2nd: 3 5 tabs, March 3rd: 3 tabs, March 4th: 2 5 tabs, March 5th: 2 tabs, March 6th: 1 5 tabs, March 7th: 1 tab, March 8th: 0 5 tab Do not start before Ma rch 1, 2023 , Normal      propranolol (INDERAL) 40 mg tablet 2 (two) times a day, Starting Wed 4/28/2021, Historical Med      rizatriptan (MAXALT) 10 mg tablet Take 1 tablet (10 mg total) by mouth once as needed for migraine May repeat in 2 hours if needed   Max 2/24 hours, 9/month , Starting Wed 1/11/2023, Normal      traMADol (Ultram) 50 mg tablet Take 1 tablet (50 mg total) by mouth every 6 (six) hours as needed for moderate pain, Starting Wed 2/8/2023, Normal      zolpidem (AMBIEN) 10 mg tablet Take 10 mg by mouth daily at bedtime as needed, Starting Wed 12/28/2022, Historical Med         STOP taking these medications       calcium carbonate (OYSTER SHELL,OSCAL) 500 mg Comments:   Reason for Stopping:             Outpatient Discharge Orders   Comprehensive metabolic panel   Standing Status: Future Standing Exp  Date: 03/02/24     Prior to Admission Medications   Prescriptions Last Dose Informant Patient Reported? Taking? Biotin 5 MG CAPS   Yes No   Sig: Take by mouth   FIBER ADULT GUMMIES PO   Yes No   Sig: Take by mouth   Galcanezumab-gnlm 120 MG/ML SOAJ   No No   Sig: Inject 120 mg under the skin every 28 days   Levocetirizine Dihydrochloride (XYZAL PO)   Yes No   Sig: Take by mouth daily at bedtime   Melatonin 5 MG TABS   Yes No   Sig: Take by mouth as needed   acetaminophen (TYLENOL) 500 mg tablet   Yes No   Sig: Take 1,000 mg by mouth every 6 (six) hours as needed for mild pain   calcitriol (ROCALTROL) 0 25 mcg capsule   No No   Sig: Take 1 capsule (0 25 mcg total) by mouth 2 (two) times a day for 14 days   calcium carbonate (OYSTER SHELL,OSCAL) 500 mg   No No   Sig: Take 2 tablets by mouth 2 (two) times a day with meals for 14 days   cholecalciferol (VITAMIN D3) 1,000 units tablet   No No   Sig: Take 1 tablet (1,000 Units total) by mouth daily Do not start before March 1, 2023     levothyroxine (Synthroid) 125 mcg tablet   No No   Sig: Take 1 tablet (125 mcg total) by mouth daily   linaCLOtide (Linzess) 290 MCG CAPS   No No   Sig: Take 1 capsule by mouth daily   meloxicam (MOBIC) 15 mg tablet   Yes No   Sig: Take 15 mg by mouth daily as needed   multivitamin (THERAGRAN) TABS   Yes No   Sig: Take 1 tablet by mouth daily   omeprazole (PriLOSEC) 20 mg delayed release capsule   No No   Sig: TAKE ONE CAPSULE BY MOUTH EVERY DAY   Patient taking differently: Take 20 mg by mouth as needed ondansetron (Zofran ODT) 4 mg disintegrating tablet   No No   Sig: Take 1 tablet (4 mg total) by mouth every 6 (six) hours as needed for nausea or vomiting   predniSONE 20 mg tablet   No No   Sig: Take 80 mg on ; then decrease by 10 mg every day until completed: : 4 tabs, : 3 5 tabs, : 3 tabs, : 2 5 tabs, : 2 tabs, : 1 5 tabs, : 1 tab, : 0 5 tab Do not start before 2023  propranolol (INDERAL) 40 mg tablet   Yes No   Si (two) times a day   rizatriptan (MAXALT) 10 mg tablet   No No   Sig: Take 1 tablet (10 mg total) by mouth once as needed for migraine May repeat in 2 hours if needed  Max 2/24 hours, 9/month  traMADol (Ultram) 50 mg tablet   No No   Sig: Take 1 tablet (50 mg total) by mouth every 6 (six) hours as needed for moderate pain   zolpidem (AMBIEN) 10 mg tablet   Yes No   Sig: Take 10 mg by mouth daily at bedtime as needed      Facility-Administered Medications: None                        Portions of the record may have been created with voice recognition software  Occasional wrong word or "sound a like" substitutions may have occurred due to the inherent limitations of voice recognition software  Read the chart carefully and recognize, using context, where substitutions have occurred      Electronically signed by:  Rodolfo Patricia

## 2023-03-02 NOTE — TELEPHONE ENCOUNTER
Spoke with patient and relayed recommendation  Patient states she will call the pharmacy where she picks up her medications  Patient requests one refill of Linzess until OV on 3/31 with Peterson KIRK  Rx sent

## 2023-03-02 NOTE — CONSULTS
Acute Care Surgery  Consultation    Assessment:  38year old female s/p total thyroidectomy, complicated by post operative hypocalcemia requiring re-admission who presents with malaise, weakness and blurred vision x 1 day  Ca: 7 8 (8 at discharge)  Patient taking daily levothyroxine at breakfast with calcium supplementation and lunch and dinner  She has been compliant with this  Plan:  Do not suspect her symptoms are related to hypocalcemia given stable calcium value  Would recommend evaluation by endocrinology pending TSH and T4 labs  History of Present Illness   HPI:  Carla Sarmiento is a 38 y o  female with recent total thyroidectomy  She was discharged home and subsequently readmitted due to symptoms of hypocalcemia and found to be hypocalcemic  She was readmitted to the hospital for management of hypocalcemia and calcium levels were trended  Additionally, she was seen by neurology due to chronic migraines as she was symptomatic throughout her hospital stay  Once her calcium level stabilized, she was discharged home  Patient represents today due to feelings of malaise, weakness, blurred vision additionally feeling tingling in her fingers bilaterally  She states that she has felt well until this morning when she started to feel weak, foggy and impaired memory  She denies fever, chills, chest pain, shortness of breath  She reports she has had decreased p o  intake yesterday and today but is drinking fluids adequately  Review of Systems   Constitutional: Positive for activity change, appetite change and fatigue  Negative for chills and fever  HENT: Negative  Eyes: Positive for visual disturbance  Respiratory: Negative for chest tightness and shortness of breath  Gastrointestinal: Negative for abdominal distention, abdominal pain, nausea and vomiting  Genitourinary: Negative for difficulty urinating  Musculoskeletal: Positive for neck pain  Negative for back pain     Skin: Negative for color change  Neurological: Positive for dizziness, weakness, light-headedness, numbness and headaches  Psychiatric/Behavioral: Negative for agitation, behavioral problems and confusion  All other systems reviewed and are negative        Historical Information   Past Medical History:   Diagnosis Date   • Allergic    • Anxiety    • COVID-19 01/2022    mild s/s-not hospitalized, not vaccinated   • GERD (gastroesophageal reflux disease)    • Headache(784 0)    • History of IBS     with constipation   • Ingrown toenail    • Irritable bowel syndrome    • Migraines     Chronic per pt   • Plantar fasciitis    • Thyroid carcinoma ([facility] [address] )    • Venereal wart 08/07/2014     Past Surgical History:   Procedure Laterality Date   • CERVICAL BIOPSY  W/ LOOP ELECTRODE EXCISION  2017   • COLONOSCOPY     • NASAL SEPTUM SURGERY     • [place] OSTEOT W/ [address] SHRT/CORRJ 1ST METAR Left 12/30/2022    Procedure: OSTEOTOMY METATARSAL 1st;  Surgeon: Valaria Cushing, DPM;  Location: [facility];  Service: Podiatry   • THYROIDECTOMY N/A 2/24/2023    Procedure: TOTAL THYROIDECTOMY;  Surgeon: Mili Banuelos MD;  Location: [facility];  Service: Surgical Oncology   • TONSILLECTOMY     • UPPER GASTROINTESTINAL ENDOSCOPY     • US GUIDED THYROID BIOPSY  1/31/2023   • WISDOM TOOTH EXTRACTION       Social History   Social History     Substance and Sexual Activity   Alcohol Use Yes   • Alcohol/week: 2 0 standard drinks   • Types: 2 Glasses of wine per week    Comment: 2 glasses wine a week     Social History     Substance and Sexual Activity   Drug Use Never     Social History     Tobacco Use   Smoking Status Never   Smokeless Tobacco Never     Family History   Problem Relation Age of Onset   • Hypertension Mother    • Arthritis Mother    • Thyroid disease Mother    • Transient ischemic attack Father    • Hypertension Father    • Stroke Father    • Vision loss Father    • Rashes / Skin problems Sister         Shingles   • No Known Problems Sister    • No Known Problems Sister    • Uterine cancer Maternal Grandmother 21   • Cancer Maternal Grandmother         Uterine   • Colon cancer Maternal Grandfather    • Cancer Maternal Grandfather         Colon   • Lung cancer Paternal Grandmother 61   • Cancer Paternal Grandmother         Lung   • No Known Problems Paternal Grandfather    • Colon cancer Cousin 27   • Breast cancer Maternal Aunt    • Breast cancer Maternal Aunt    • Breast cancer Maternal Aunt        Meds/Allergies   all current active meds have been reviewed, current meds:   Current Facility-Administered Medications   Medication Dose Route Frequency   • calcium carbonate (TUMS) chewable tablet 500 mg  500 mg Oral Daily PRN    and PTA meds:   Prior to Admission Medications   Prescriptions Last Dose Informant Patient Reported? Taking? Biotin 5 MG CAPS   Yes No   Sig: Take by mouth   FIBER ADULT GUMMIES PO   Yes No   Sig: Take by mouth   Galcanezumab-gnlm 120 MG/ML SOAJ   No No   Sig: Inject 120 mg under the skin every 28 days   Levocetirizine Dihydrochloride (XYZAL PO)   Yes No   Sig: Take by mouth daily at bedtime   Melatonin 5 MG TABS   Yes No   Sig: Take by mouth as needed   acetaminophen (TYLENOL) 500 mg tablet   Yes No   Sig: Take 1,000 mg by mouth every 6 (six) hours as needed for mild pain   calcitriol (ROCALTROL) 0 25 mcg capsule   No No   Sig: Take 1 capsule (0 25 mcg total) by mouth 2 (two) times a day for 14 days   calcium carbonate (OYSTER SHELL,OSCAL) 500 mg   No No   Sig: Take 2 tablets by mouth 2 (two) times a day with meals for 14 days   cholecalciferol (VITAMIN D3) 1,000 units tablet   No No   Sig: Take 1 tablet (1,000 Units total) by mouth daily Do not start before March 1, 2023     levothyroxine (Synthroid) 125 mcg tablet   No No   Sig: Take 1 tablet (125 mcg total) by mouth daily   linaCLOtide (Linzess) 290 MCG CAPS   No No   Sig: Take 1 capsule by mouth daily   meloxicam (MOBIC) 15 mg tablet   Yes No   Sig: Take 15 mg by mouth daily as needed   multivitamin (THERAGRAN) TABS   Yes No   Sig: Take 1 tablet by mouth daily   omeprazole (PriLOSEC) 20 mg delayed release capsule   No No   Sig: TAKE ONE CAPSULE BY MOUTH EVERY DAY   Patient taking differently: Take 20 mg by mouth as needed   ondansetron (Zofran ODT) 4 mg disintegrating tablet   No No   Sig: Take 1 tablet (4 mg total) by mouth every 6 (six) hours as needed for nausea or vomiting   predniSONE 20 mg tablet   No No   Sig: Take 80 mg on ; then decrease by 10 mg every day until completed: : 4 tabs, : 3 5 tabs, : 3 tabs, : 2 5 tabs, : 2 tabs, : 1 5 tabs, : 1 tab, : 0 5 tab Do not start before 2023  propranolol (INDERAL) 40 mg tablet   Yes No   Si (two) times a day   rizatriptan (MAXALT) 10 mg tablet   No No   Sig: Take 1 tablet (10 mg total) by mouth once as needed for migraine May repeat in 2 hours if needed  Max 2/24 hours, /month     traMADol (Ultram) 50 mg tablet   No No   Sig: Take 1 tablet (50 mg total) by mouth every 6 (six) hours as needed for moderate pain   zolpidem (AMBIEN) 10 mg tablet   Yes No   Sig: Take 10 mg by mouth daily at bedtime as needed      Facility-Administered Medications: None     No Known Allergies    Objective   First Vitals:   Blood Pressure: 125/86 (23 1348)  Pulse: 71 (23 1348)  Temperature: 98 1 °F (36 7 °C) (23 1348)  Temp Source: Temporal (23 1348)  Respirations: 18 (23 1348)  SpO2: 96 % (23 1348)    Current Vitals:   Blood Pressure: 122/71 (23 1630)  Pulse: 60 (23 1630)  Temperature: 98 1 °F (36 7 °C) (23 1348)  Temp Source: Temporal (23 1348)  Respirations: 17 (23 1630)  SpO2: 95 % (23 1630)      Intake/Output Summary (Last 24 hours) at 3/2/2023 1800  Last data filed at 3/2/2023 1629  Gross per 24 hour   Intake 100 ml   Output --   Net 100 ml       Invasive Devices     Peripheral Intravenous Line Duration           Peripheral IV 03/02/23 Distal;Left;Upper;Ventral (anterior) Arm <1 day                Physical Exam  Vitals and nursing note reviewed  Constitutional:       General: She is not in acute distress  Appearance: Normal appearance  She is not ill-appearing  HENT:      Head: Normocephalic and atraumatic  Mouth/Throat:      Mouth: Mucous membranes are moist       Pharynx: Oropharynx is clear  Eyes:      Extraocular Movements: Extraocular movements intact  Conjunctiva/sclera: Conjunctivae normal    Neck:      Comments: Midline surgical scar dressed with Steri-Strips  Clean, dry, intact  No erythema, hematoma, fluctuance  No active drainage  Appropriately tender to palpation  Cardiovascular:      Rate and Rhythm: Normal rate and regular rhythm  Pulmonary:      Effort: Pulmonary effort is normal  No respiratory distress  Abdominal:      General: Abdomen is flat  There is no distension  Palpations: Abdomen is soft  Tenderness: There is no abdominal tenderness  Musculoskeletal:         General: No swelling or tenderness  Normal range of motion  Skin:     General: Skin is warm and dry  Coloration: Skin is not jaundiced  Neurological:      General: No focal deficit present  Mental Status: She is alert and oriented to person, place, and time  Mental status is at baseline  Psychiatric:         Mood and Affect: Mood normal          Behavior: Behavior normal          Thought Content:  Thought content normal          Judgment: Judgment normal          Lab Results:   CBC:   Lab Results   Component Value Date    WBC 9 04 03/02/2023    HGB 12 7 03/02/2023    HCT 38 0 03/02/2023    MCV 93 03/02/2023     03/02/2023    MCH 31 2 03/02/2023    MCHC 33 4 03/02/2023    RDW 11 9 03/02/2023    MPV 10 0 03/02/2023   , CMP:   Lab Results   Component Value Date    SODIUM 137 03/02/2023    K 3 9 03/02/2023     03/02/2023    CO2 28 03/02/2023    BUN 14 03/02/2023 CREATININE 0 67 03/02/2023    CALCIUM 7 8 (L) 03/02/2023    AST 38 03/02/2023    ALT 43 03/02/2023    ALKPHOS 45 (L) 03/02/2023    EGFR 110 03/02/2023     Imaging: I have personally reviewed pertinent reports  EKG, Pathology, and Other Studies: I have personally reviewed pertinent reports  Counseling / Coordination of Care  Total floor / unit time spent today 40 minutes  Greater than 50% of total time was spent with the patient and / or family counseling and / or coordination of care      Miryam Hill MD  3/2/2023 6:00 PM

## 2023-03-02 NOTE — TELEPHONE ENCOUNTER
CALL RETURN FORM   Reason for patient call? Patient is calling in requesting to speak to Mathew Ricks regarding symptoms she is experiencing  She is saying that she is in extreme pain, feeling weak and has alot of muscle spasms    Patient's primary oncologist? Dr Cali Esteves    Name of person the patient was calling for? Jana    Any additional information to add, if applicable?  I offered to transfer patient to leave voicemail but patient declined     Informed patient that the message will be forwarded to the team and someone will get back to them as soon as possible    Did you relay this information to the patient?  yes, Zehra Kearney can be reached back at

## 2023-03-02 NOTE — DISCHARGE INSTRUCTIONS
You were evaluated in the emergency department for: change in sensation  You can access your current and pending results through [address]  A radiologist will take a second look at your X-Rays, if you had any, and you will be contacted with any new findings  You should follow-up with your primary care provider and your surgeon, as soon as possible, for re-evaluation  If you do not have a primary care provider, I have referred you to [address] [place] [address]  You will be contacted about scheduling an appointment  Their phone number is also included on this paperwork  You have also been referred to endocrinology and you should follow-up with them as well  Note: Your calcium prescription has changed  You should also have repeat bloodwork in one week and follow up with your doctors about that  Your workup revealed no emergent features at this time; however, many disease processes are dynamic:    Please, return to the emergency department if you experience new or worsening symptoms, fever, chest pain, shortness of breath, difficulty breathing, dizziness, abdominal pain, persistent nausea/vomiting, syncope or passing out, blood in your urine or stool, coughing up blood, leg swelling/pain, urinary retention, bowel or bladder incontinence, numbness between your legs

## 2023-03-02 NOTE — TELEPHONE ENCOUNTER
Spoke to patient and she reports that she is not feeling well, and is having blurry vision  Patient was encouraged to go to the ED for stat check of calcium  Patient agreed to go

## 2023-03-03 ENCOUNTER — OFFICE VISIT (OUTPATIENT)
Dept: ENDOCRINOLOGY | Facility: CLINIC | Age: 43
End: 2023-03-03

## 2023-03-03 VITALS
HEIGHT: 64 IN | DIASTOLIC BLOOD PRESSURE: 72 MMHG | OXYGEN SATURATION: 99 % | BODY MASS INDEX: 27.83 KG/M2 | HEART RATE: 80 BPM | WEIGHT: 163 LBS | SYSTOLIC BLOOD PRESSURE: 120 MMHG

## 2023-03-03 DIAGNOSIS — E89.0 POSTOPERATIVE HYPOTHYROIDISM: ICD-10-CM

## 2023-03-03 DIAGNOSIS — E83.51 HYPOCALCEMIA: ICD-10-CM

## 2023-03-03 DIAGNOSIS — E89.2 HYPOPARATHYROIDISM AFTER PROCEDURE (HCC): Primary | ICD-10-CM

## 2023-03-03 DIAGNOSIS — C73 PAPILLARY THYROID CARCINOMA (HCC): ICD-10-CM

## 2023-03-03 NOTE — QUICK NOTE
Received a message from ED team regarding concerns for calcium levels  Pt's corrected calcium was 8 1, pt is taking calcium carbonate 1000mg BID and calcitriol 0 25mcg BID  Pt takes omeprazole PRN  We will recommend calcium citrate 950mg TID and calcitriol 0 25mcg BID   Recommend to get CMP done in 1 week

## 2023-03-03 NOTE — PROGRESS NOTES
Opal Sanabria 38 y o  female MRN: [id number]    Encounter: [id number]      Assessment/Plan     1  Papillary thyroid carcinoma (HCC)  Papillary thyroid carcinoma, classic, 1 2 cm, involves left thyroid lobe, no lymphovascular invasion, no extrathyroidal extension,  AJCC eighth edition pT1b  Low risk for recurrence, no need for radioactive iodine ablation therapy  TSH goal 0 5-2  Continue levothyroxine 125 mcg once daily and half a pill on Sundays  Check TSH and free T4 in 8 weeks  Thyroglobulin and thyroglobulin antibody 6 to 8 weeks after surgery  Neck ultrasound with lymph node mapping 6 months after surgery  - T4, free Lab Collect; Future  - TSH, 3rd generation Lab Collect; Future  - Thyroglobulin w/ab Lab Collect; Future    2  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 [facility])  Currently on calcium carbonate 1000 mg twice daily and calcitriol 0 25 mcg twice daily  The most recent corrected calcium is 8 1 mg/dL, the goal is to keep calcium between 8 to 9 mg/dL  Patient reports wide range of symptoms, however patient has no sign of hypocalcemia and her most recent calcium is within goal   She will switch to calcium citrate 3 times a day and will continue Rocaltrol with current dose, will continue to monitor calcium over time  She was advised if she experiences any symptoms of hypocalcemia including perioral numbness, carpopedal spasm, report to ED  3  Hypocalcemia  See plan for postoperative hypoparathyroidism    4  Postoperative hypothyroidism  Continue levothyroxine 125 mcg once daily and half a pill on Sundays  TSH and free T4 in 8 weeks      CC:   Hypocalcemia    History of Present Illness      HPI:    Opal Sanabria is a 53-year-old female with papillary thyroid carcinoma and postsurgical hypoparathyroidism presented for follow-up    Patient is status post total thyroidectomy on February 24, 2023,  Patient had recent admission for hypocalcemia associated symptoms including perioral numbness and tingling, on admission corrected calcium was 7 4, with ionized calcium of 0 89 and PTH less than 6 3, magnesium of 2 and 25-hydroxy vitamin D of 27 8  Patient is currently on calcium carbonate 1000 mg twice daily and calcitriol 0 25 mg twice a day  Most recently calcium was 7 8 (corrected 8 1 mg/dL)  Patient is status post total thyroidectomy for thyroid nodule with FNA biopsy result [place] class VI  Pathology showed unifocal papillary carcinoma, classic, greatest dimension of 1 2 cm, with no angioinvasion, no lymphatic invasion, no extrathyroidal extension and margin negative for carcinoma  Patient is currently on levothyroxine 125 mcg once daily  Review of Systems   Constitutional: Positive for diaphoresis and fatigue  Negative for activity change, appetite change, chills, fever and unexpected weight change  HENT: Negative for congestion, drooling, ear discharge, ear pain, trouble swallowing and voice change  Eyes: Negative for photophobia, pain, discharge, redness, itching and visual disturbance  Respiratory: Negative for cough, chest tightness, shortness of breath and wheezing  Cardiovascular: Negative for chest pain, palpitations and leg swelling  Gastrointestinal: Negative for abdominal distention, abdominal pain, blood in stool, constipation, diarrhea, nausea and vomiting  Endocrine: Negative for cold intolerance, heat intolerance, polydipsia, polyphagia and polyuria  Genitourinary: Negative for dysuria, flank pain, frequency and hematuria  Musculoskeletal: Negative for back pain, gait problem, joint swelling, myalgias, neck pain and neck stiffness  Skin: Negative for color change, pallor, rash and wound  Neurological: Negative for dizziness, tremors, seizures, syncope, speech difficulty, weakness, numbness and headaches  Occasionally hands or feet paraesthesia    Psychiatric/Behavioral: Positive for confusion and sleep disturbance  Negative for agitation     All other systems reviewed and are negative        Historical Information   Past Medical History:   Diagnosis Date   • Allergic    • Anxiety    • COVID-19 01/2022    mild s/s-not hospitalized, not vaccinated   • GERD (gastroesophageal reflux disease)    • Headache(784 0)    • History of IBS     with constipation   • Ingrown toenail    • Irritable bowel syndrome    • Migraines     Chronic per pt   • Plantar fasciitis    • Thyroid carcinoma ([address] )    • Venereal wart 08/07/2014     Past Surgical History:   Procedure Laterality Date   • CERVICAL BIOPSY  W/ LOOP ELECTRODE EXCISION  2017   • COLONOSCOPY     • NASAL SEPTUM SURGERY     • WA OSTEOT W/WO LNGTH SHRT/CORRJ 1ST METAR Left 12/30/2022    Procedure: OSTEOTOMY METATARSAL 1st;  Surgeon: Mundo Canales DPM;  Location: [facility];  Service: Podiatry   • THYROIDECTOMY N/A 2/24/2023    Procedure: TOTAL THYROIDECTOMY;  Surgeon: Paolo Sorenson MD;  Location:  MAIN OR;  Service: Surgical Oncology   • TONSILLECTOMY     • UPPER GASTROINTESTINAL ENDOSCOPY     • US GUIDED THYROID BIOPSY  1/31/2023   • WISDOM TOOTH EXTRACTION       Social History   Social History     Substance and Sexual Activity   Alcohol Use Yes   • Alcohol/week: 2 0 standard drinks   • Types: 2 Glasses of wine per week    Comment: 2 glasses wine a week     Social History     Substance and Sexual Activity   Drug Use Never     Social History     Tobacco Use   Smoking Status Never   Smokeless Tobacco Never     Family History:   Family History   Problem Relation Age of Onset   • Hypertension Mother    • Arthritis Mother    • Thyroid disease Mother    • Transient ischemic attack Father    • Hypertension Father    • Stroke Father    • Vision loss Father    • Rashes / Skin problems Sister         Shingles   • No Known Problems Sister    • No Known Problems Sister    • Uterine cancer Maternal Grandmother 20   • Cancer Maternal Grandmother         Uterine   • Colon cancer Maternal Grandfather    • Cancer Maternal Grandfather Colon   • Lung cancer Paternal Grandmother 61   • Cancer Paternal Grandmother         Lung   • No Known Problems Paternal Grandfather    • Colon cancer Cousin 27   • Breast cancer Maternal Aunt    • Breast cancer Maternal Aunt    • Breast cancer Maternal Aunt        Meds/Allergies   Current Outpatient Medications   Medication Sig Dispense Refill   • acetaminophen (TYLENOL) 500 mg tablet Take 1,000 mg by mouth every 6 (six) hours as needed for mild pain     • Biotin 5 MG CAPS Take by mouth     • calcitriol (ROCALTROL) 0 25 mcg capsule Take 1 capsule (0 25 mcg total) by mouth 2 (two) times a day for 14 days 28 capsule 0   • calcium citrate (CALCITRATE) 950 (200 Ca) MG tablet Take 1 tablet (950 mg total) by mouth 3 (three) times a day for 14 days 42 tablet 0   • cholecalciferol (VITAMIN D3) 1,000 units tablet Take 1 tablet (1,000 Units total) by mouth daily Do not start before March 1, 2023  30 tablet 0   • FIBER ADULT GUMMIES PO Take by mouth     • Galcanezumab-gnlm 120 MG/ML SOAJ Inject 120 mg under the skin every 28 days 1 mL 11   • Levocetirizine Dihydrochloride (XYZAL PO) Take by mouth daily at bedtime     • levothyroxine (Synthroid) 125 mcg tablet Take 1 tablet (125 mcg total) by mouth daily 30 tablet 3   • linaCLOtide (Linzess) 290 MCG CAPS Take 1 capsule by mouth daily 30 capsule 0   • Melatonin 5 MG TABS Take by mouth as needed     • meloxicam (MOBIC) 15 mg tablet Take 15 mg by mouth daily as needed     • multivitamin (THERAGRAN) TABS Take 1 tablet by mouth daily     • omeprazole (PriLOSEC) 20 mg delayed release capsule TAKE ONE CAPSULE BY MOUTH EVERY DAY (Patient taking differently: Take 20 mg by mouth as needed) 90 capsule 0   • ondansetron (Zofran ODT) 4 mg disintegrating tablet Take 1 tablet (4 mg total) by mouth every 6 (six) hours as needed for nausea or vomiting 20 tablet 0   • predniSONE 20 mg tablet Take 80 mg on March 1st; then decrease by 10 mg every day until completed: March 1st: 4 tabs, March 2nd: 3 5 tabs, March 3rd: 3 tabs, March 4th: 2 5 tabs, March 5th: 2 tabs, March 6th: 1 5 tabs, March 7th: 1 tab, March 8th: 0 5 tab Do not start before March 1, 2023  18 tablet 0   • propranolol (INDERAL) 40 mg tablet 2 (two) times a day     • rizatriptan (MAXALT) 10 mg tablet Take 1 tablet (10 mg total) by mouth once as needed for migraine May repeat in 2 hours if needed  Max 2/24 hours, 9/month  9 tablet 12   • traMADol (Ultram) 50 mg tablet Take 1 tablet (50 mg total) by mouth every 6 (six) hours as needed for moderate pain 10 tablet 0   • zolpidem (AMBIEN) 10 mg tablet Take 10 mg by mouth daily at bedtime as needed       No current facility-administered medications for this visit  No Known Allergies    Objective   Vitals: Last menstrual period 02/18/2023  Physical Exam  Constitutional:       Appearance: She is well-developed  HENT:      Head: Normocephalic and atraumatic  Nose: Nose normal    Eyes:      Pupils: Pupils are equal, round, and reactive to light  Neck:      Thyroid: No thyromegaly  Vascular: No JVD  Comments: Negative [place]   Thyroidectomy with clean dressing    Cardiovascular:      Rate and Rhythm: Normal rate and regular rhythm  Heart sounds: Normal heart sounds  No murmur heard  No friction rub  No gallop  Pulmonary:      Effort: Pulmonary effort is normal  No respiratory distress  Breath sounds: Normal breath sounds  No stridor  No wheezing or rales  Chest:      Chest wall: No tenderness  Abdominal:      General: Bowel sounds are normal  There is no distension  Palpations: Abdomen is soft  There is no mass  Tenderness: There is no abdominal tenderness  There is no guarding  Musculoskeletal:         General: No deformity  Normal range of motion  Cervical back: Normal range of motion  Comments: Negative carpopedal spasm   Skin:     General: Skin is warm     Neurological:      Mental Status: She is alert and oriented to person, place, and time  The history was obtained from the review of the chart, patient  Lab Results:   Lab Results   Component Value Date/Time    TSH 3RD GENERATON 0 167 (L) 03/02/2023 04:29 PM    TSH 3RD GENERATON 1 900 02/03/2023 01:34 PM    TSH 3RD GENERATON 1 730 04/11/2022 05:56 PM    Free T4 1 59 (H) 03/02/2023 04:29 PM    Free T4 1 27 02/03/2023 01:34 PM    Thyroglobulin Ab <1 0 02/03/2023 01:34 PM     Component      Latest Ref Rng & Units 2/3/2023 3/2/2023   T3, Total      0 60 - 1 80 ng/mL 1 10    Calcium, Ionized      1 12 - 1 32 mmol/L  1 01 (L)   Magnesium      1 6 - 2 6 mg/dL  2 3   Phosphorus      2 7 - 4 5 mg/dL  3 7   TSH 3RD GENERATON      0 450 - 4 500 uIU/mL  0 167 (L)   PARATHYROID HORMONE      18 4 - 80 1 pg/mL  <6 3 (L)   Free T4      0 76 - 1 46 ng/dL  1 59 (H)     Component      Latest Ref Rng & Units 3/2/2023   Sodium      135 - 147 mmol/L 137   Potassium      3 5 - 5 3 mmol/L 3 9   Chloride      96 - 108 mmol/L 107   CO2      21 - 32 mmol/L 28   Anion Gap      4 - 13 mmol/L 2 (L)   BUN      5 - 25 mg/dL 14   Creatinine      0 60 - 1 30 mg/dL 0 67   Glucose, Random      65 - 140 mg/dL 131   Calcium      8 3 - 10 1 mg/dL 7 8 (L)   AST      5 - 45 U/L 38   ALT      12 - 78 U/L 43   Alkaline Phosphatase      46 - 116 U/L 45 (L)   Total Protein      6 4 - 8 4 g/dL 7 1   Albumin      3 5 - 5 0 g/dL 3 6   TOTAL BILIRUBIN      0 20 - 1 00 mg/dL 0 20   eGFR      ml/min/1 73sq m 110     Imaging Studies:  Results for orders placed during the hospital encounter of 02/10/23    US head neck lymph node mapping    Impression  No evidence of metastatic disease        Workstation performed: [id number]    SPECIMEN   Procedure  Total thyroidectomy    TUMOR   Tumor Focality  Unifocal    Tumor Characteristics     Tumor Site  Left lobe    Tumor Size  Greatest Dimension (Centimeters): 1 2 cm   Histologic Type  Papillary carcinoma, classic (usual, conventional)    Angioinvasion (vascular invasion)  Not identified    Lymphatic Invasion  Not identified    Extrathyroidal Extension  Not identified    Margin Status  All margins negative for carcinoma    Distance from Invasive Carcinoma to Closest Margin  1 mm   REGIONAL LYMPH NODES   Regional Lymph Node Status  Not applicable (no regional lymph nodes submitted or found)    PATHOLOGIC STAGE CLASSIFICATION (pTNM, AJCC 8th Edition)   Reporting of pT, pN, and (when applicable) pM categories is based on information available to the pathologist at the time the report is issued  As per the AJCC (Chapter 1, 8th Ed ) it is the managing physician’s responsibility to establish the final pathologic stage based upon all pertinent information, including but potentially not limited to this pathology report  pT Category  pT1b    pN Category  pN not assigned (no nodes submitted or found)    ADDITIONAL FINDINGS          I have personally reviewed pertinent reports  Portions of the record may have been created with voice recognition software  Occasional wrong word or "sound a like" substitutions may have occurred due to the inherent limitations of voice recognition software  Read the chart carefully and recognize, using context, where substitutions have occurred

## 2023-03-06 ENCOUNTER — TELEPHONE (OUTPATIENT)
Dept: ENDOCRINOLOGY | Facility: CLINIC | Age: 43
End: 2023-03-06

## 2023-03-06 NOTE — TELEPHONE ENCOUNTER
Pt called and said she saw Dr Mika Koch and he told her that if she isnt feeling any better to call back Monday  Pt called back and said she hasn't been feeling any better still dealing with blurred vision and other issues

## 2023-03-07 ENCOUNTER — APPOINTMENT (OUTPATIENT)
Dept: LAB | Age: 43
End: 2023-03-07

## 2023-03-07 DIAGNOSIS — E83.51 HYPOCALCEMIA: ICD-10-CM

## 2023-03-07 DIAGNOSIS — E89.2 HYPOPARATHYROIDISM AFTER PROCEDURE (HCC): Primary | ICD-10-CM

## 2023-03-07 LAB
ALBUMIN SERPL BCP-MCNC: 3.5 G/DL (ref 3.5–5)
ALP SERPL-CCNC: 42 U/L (ref 46–116)
ALT SERPL W P-5'-P-CCNC: 32 U/L (ref 12–78)
ANION GAP SERPL CALCULATED.3IONS-SCNC: 4 MMOL/L (ref 4–13)
AST SERPL W P-5'-P-CCNC: 11 U/L (ref 5–45)
BILIRUB SERPL-MCNC: 0.2 MG/DL (ref 0.2–1)
BUN SERPL-MCNC: 18 MG/DL (ref 5–25)
CALCIUM SERPL-MCNC: 7.8 MG/DL (ref 8.3–10.1)
CHLORIDE SERPL-SCNC: 103 MMOL/L (ref 96–108)
CO2 SERPL-SCNC: 30 MMOL/L (ref 21–32)
CREAT SERPL-MCNC: 0.7 MG/DL (ref 0.6–1.3)
GFR SERPL CREATININE-BSD FRML MDRD: 108 ML/MIN/1.73SQ M
GLUCOSE P FAST SERPL-MCNC: 88 MG/DL (ref 65–99)
POTASSIUM SERPL-SCNC: 3.7 MMOL/L (ref 3.5–5.3)
PROT SERPL-MCNC: 6.6 G/DL (ref 6.4–8.4)
SODIUM SERPL-SCNC: 137 MMOL/L (ref 135–147)

## 2023-03-08 ENCOUNTER — TELEPHONE (OUTPATIENT)
Dept: NEUROLOGY | Facility: CLINIC | Age: 43
End: 2023-03-08

## 2023-03-08 ENCOUNTER — OFFICE VISIT (OUTPATIENT)
Dept: SURGICAL ONCOLOGY | Facility: CLINIC | Age: 43
End: 2023-03-08

## 2023-03-08 VITALS
BODY MASS INDEX: 27.49 KG/M2 | TEMPERATURE: 99.3 F | SYSTOLIC BLOOD PRESSURE: 110 MMHG | WEIGHT: 161 LBS | DIASTOLIC BLOOD PRESSURE: 74 MMHG | HEIGHT: 64 IN

## 2023-03-08 DIAGNOSIS — C73 PAPILLARY THYROID CARCINOMA (HCC): Primary | ICD-10-CM

## 2023-03-08 NOTE — PROGRESS NOTES
Surgical Oncology Follow Up       [facility] [place]  [address]  [facility] [id number] [phone]    Dharmesh Vazquez  1980  [id number]  [address] [facility] SUR[facility] [address] [id number] [phone]    Chief Complaint   Patient presents with   • Post-op       Assessment/Plan:    No problem-specific Assessment & Plan notes found for this encounter  Diagnoses and all orders for this visit:    Papillary thyroid carcinoma [facility])        Advance Care Planning/Advance Directives:  Discussed disease status, cancer treatment plans and/or cancer treatment goals with the patient  Oncology History   Papillary thyroid carcinoma ([facility] [address] )   1/31/2023 Biopsy    Thyroid, Left, lower (Thin-prep and smear preparations): Malignant ([place] Category VI)  Papillary carcinoma  2/24/2023 Surgery    Thyroid; total thyroidectomy:       - Papillary thyroid carcinoma, classic, 1 2 cm      - Carcinoma involves left thyroid lobe      - Lymphovascular invasion: Not identified       - Extrathyroidal extension: Not identified       - All margins free of carcinoma       - Background thyroid with previous biopsy site changes  pT1b         History of Present Illness: 53-year-old woman here for postop check status post total thyroidectomy   -Interval History: No thyroid issues since surgery  She has had issues with blurry vision off and on since then however  She is being followed closely by endocrinology team   She also had postoperative hypocalcemia and was started on calcium and Rocaltrol  Corrected calcium levels have now normalized  Review of Systems:  Review of Systems   Constitutional: Positive for fatigue  HENT: Negative  Negative for trouble swallowing and voice change  Eyes: Negative  Respiratory: Negative  Cardiovascular: Negative      Gastrointestinal: Negative  Endocrine: Negative  Genitourinary: Negative  Musculoskeletal: Negative  Skin: Negative  Allergic/Immunologic: Negative  Neurological: Negative  Hematological: Negative  Psychiatric/Behavioral: Negative  Patient Active Problem List   Diagnosis   • Migraines   • Chronic migraine w/o aura w/o status migrainosus, not intractable   • TOSHIA II (cervical intraepithelial neoplasia II)   • Irritable bowel syndrome with constipation   • Insomnia   • Neck pain   • Vestibulitis, vulvar   • Thyroid nodule   • Abnormal TSH   • Sensation of fullness in both ears   • Overweight (BMI 25 0-29  9)   • Abnormal thyroid biopsy   • Papillary thyroid carcinoma (HCC)   • Hypothyroidism   • Gastroesophageal reflux disease   • Hypocalcemia   • 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 [facility])     Past Medical History:   Diagnosis Date   • Allergic    • Anxiety    • COVID-19 01/2022    mild s/s-not hospitalized, not vaccinated   • GERD (gastroesophageal reflux disease)    • Headache(784 0)    • History of IBS     with constipation   • Ingrown toenail    • Irritable bowel syndrome    • Migraines     Chronic per pt   • Plantar fasciitis    • Thyroid carcinoma ([facility] [address] )    • Venereal wart 08/07/2014     Past Surgical History:   Procedure Laterality Date   • CERVICAL BIOPSY  W/ LOOP ELECTRODE EXCISION  2017   • COLONOSCOPY     • NASAL SEPTUM SURGERY     • AK OSTEOT W/WO LNGTH SHRT/CORRJ 1ST METAR Left 12/30/2022    Procedure: OSTEOTOMY METATARSAL 1st;  Surgeon: Flip White DPM;  [facility];  Service: Podiatry   • THYROIDECTOMY N/A 2/24/2023    Procedure: TOTAL THYROIDECTOMY;  Surgeon: Luciano Norris MD;  [facility];  Service: Surgical Oncology   • TONSILLECTOMY     • UPPER GASTROINTESTINAL ENDOSCOPY     • US GUIDED THYROID BIOPSY  1/31/2023   • WISDOM TOOTH EXTRACTION       Family History   Problem Relation Age of Onset   • Hypertension Mother    • Arthritis Mother    • Thyroid disease Mother • Transient ischemic attack Father    • Hypertension Father    • Stroke Father    • Vision loss Father    • Rashes / Skin problems Sister         Shingles   • No Known Problems Sister    • No Known Problems Sister    • Uterine cancer Maternal Grandmother    • Cancer Maternal Grandmother         Uterine   • Colon cancer Maternal Grandfather    • Cancer Maternal Grandfather         Colon   • Lung cancer Paternal Grandmother 61   • Cancer Paternal Grandmother         Lung   • No Known Problems Paternal Grandfather    • Colon cancer Cousin 27   • Breast cancer Maternal Aunt    • Breast cancer Maternal Aunt    • Breast cancer Maternal Aunt      Social History     Socioeconomic History   • Marital status: Single     Spouse name: Not on file   • Number of children: Not on file   • Years of education: Not on file   • Highest education level: Not on file   Occupational History   • Not on file   Tobacco Use   • Smoking status: Never   • Smokeless tobacco: Never   Vaping Use   • Vaping Use: Never used   Substance and Sexual Activity   • Alcohol use:  Yes     Alcohol/week: 2 0 standard drinks     Types: 2 Glasses of wine per week     Comment: 2 glasses wine a week   • Drug use: Never   • Sexual activity: Not Currently   Other Topics Concern   • Not on file   Social History Narrative   • Not on file     Social Determinants of Health     Financial Resource Strain: Not on file   Food Insecurity: Not on file   Transportation Needs: Not on file   Physical Activity: Not on file   Stress: Not on file   Social Connections: Not on file   Intimate Partner Violence: Not on file   Housing Stability: Not on file       Current Outpatient Medications:   •  acetaminophen (TYLENOL) 500 mg tablet, Take 1,000 mg by mouth every 6 (six) hours as needed for mild pain, Disp: , Rfl:   •  Biotin 5 MG CAPS, Take by mouth, Disp: , Rfl:   •  calcitriol (ROCALTROL) 0 25 mcg capsule, Take 1 capsule (0 25 mcg total) by mouth 2 (two) times a day for 14 days, Disp: 28 capsule, Rfl: 0  •  calcium citrate (CALCITRATE) 950 (200 Ca) MG tablet, Take 1 tablet (950 mg total) by mouth 3 (three) times a day for 14 days, Disp: 42 tablet, Rfl: 0  •  cholecalciferol (VITAMIN D3) 1,000 units tablet, Take 1 tablet (1,000 Units total) by mouth daily Do not start before March 1, 2023 , Disp: 30 tablet, Rfl: 0  •  FIBER ADULT GUMMIES PO, Take by mouth, Disp: , Rfl:   •  Galcanezumab-gnlm 120 MG/ML SOAJ, Inject 120 mg under the skin every 28 days, Disp: 1 mL, Rfl: 11  •  Levocetirizine Dihydrochloride (XYZAL PO), Take by mouth daily at bedtime, Disp: , Rfl:   •  levothyroxine (Synthroid) 125 mcg tablet, Take 1 tablet (125 mcg total) by mouth daily, Disp: 30 tablet, Rfl: 3  •  linaCLOtide (Linzess) 290 MCG CAPS, Take 1 capsule by mouth daily, Disp: 30 capsule, Rfl: 0  •  Melatonin 5 MG TABS, Take by mouth as needed, Disp: , Rfl:   •  meloxicam (MOBIC) 15 mg tablet, Take 15 mg by mouth daily as needed, Disp: , Rfl:   •  multivitamin (THERAGRAN) TABS, Take 1 tablet by mouth daily, Disp: , Rfl:   •  omeprazole (PriLOSEC) 20 mg delayed release capsule, TAKE ONE CAPSULE BY MOUTH EVERY DAY (Patient taking differently: Take 20 mg by mouth as needed), Disp: 90 capsule, Rfl: 0  •  ondansetron (Zofran ODT) 4 mg disintegrating tablet, Take 1 tablet (4 mg total) by mouth every 6 (six) hours as needed for nausea or vomiting, Disp: 20 tablet, Rfl: 0  •  predniSONE 20 mg tablet, Take 80 mg on March 1st; then decrease by 10 mg every day until completed: March 1st: 4 tabs, March 2nd: 3 5 tabs, March 3rd: 3 tabs, March 4th: 2 5 tabs, March 5th: 2 tabs, March 6th: 1 5 tabs, March 7th: 1 tab, March 8th: 0 5 tab Do not start before March 1, 2023 , Disp: 18 tablet, Rfl: 0  •  propranolol (INDERAL) 40 mg tablet, 2 (two) times a day, Disp: , Rfl:   •  rizatriptan (MAXALT) 10 mg tablet, Take 1 tablet (10 mg total) by mouth once as needed for migraine May repeat in 2 hours if needed   Max 2/24 hours, 9/month , Disp: 9 tablet, Rfl: 12  •  traMADol (Ultram) 50 mg tablet, Take 1 tablet (50 mg total) by mouth every 6 (six) hours as needed for moderate pain, Disp: 10 tablet, Rfl: 0  •  zolpidem (AMBIEN) 10 mg tablet, Take 10 mg by mouth daily at bedtime as needed, Disp: , Rfl:   No Known Allergies  Vitals:    03/08/23 0946   BP: 110/74   Temp: 99 3 °F (37 4 °C)       Physical Exam  Vitals reviewed  Constitutional:       Appearance: Normal appearance  HENT:      Head: Normocephalic and atraumatic  Right Ear: External ear normal       Left Ear: External ear normal       Nose: Nose normal    Eyes:      Extraocular Movements: Extraocular movements intact  Pupils: Pupils are equal, round, and reactive to light  Cardiovascular:      Rate and Rhythm: Normal rate and regular rhythm  Heart sounds: Normal heart sounds  Pulmonary:      Effort: Pulmonary effort is normal       Breath sounds: Normal breath sounds  Abdominal:      General: Abdomen is flat  Palpations: Abdomen is soft  Musculoskeletal:         General: Normal range of motion  Cervical back: Normal range of motion and neck supple  Skin:     General: Skin is warm and dry  Neurological:      General: No focal deficit present  Mental Status: She is alert and oriented to person, place, and time  Psychiatric:         Mood and Affect: Mood normal          Behavior: Behavior normal          Thought Content:  Thought content normal            Results:  Labs:  Lab Results   Component Value Date    PTH <6 3 (L) 03/02/2023    CALCIUM 7 8 (L) 03/07/2023    PHOS 3 7 03/02/2023     Case Report   Surgical Pathology Report                         Case: [id number]                                    Authorizing Provider: Fidel Vera MD        Collected:           02/24/2023 1318               Ordering Location:     [address]      Received:            02/24/2023 [address] [place] [address] Operating Room                                                       Pathologist:           Osiel Ventura MD                                                          Specimen:    Thyroid, Total thyroid  Stitch marks short is right, long is left                          Final Diagnosis   A  Thyroid; total thyroidectomy:       - Papillary thyroid carcinoma, classic, 1 2 cm, see note       - Carcinoma involves left thyroid lobe      - Lymphovascular invasion: Not identified       - Extrathyroidal extension: Not identified       - All margins free of carcinoma       - Background thyroid with previous biopsy site changes      - See synoptic report   Electronically signed by Nia Karimi MD on 2/28/2023 at 11:37 AM   Note    If clinically indicated, the most appropriate tissue block(s) for ancillary testing are block(s): A8     Intradepartmental consultation is in agreement [facility])      Dr Lawanda Morocho is notified of the diagnosis in [facility] via [address] on 2/28/23 at 11:28AM     Additional Information    Interpretation performed at [facility], [address] [place] [address]     All reported additional testing was performed with appropriately reactive controls   These tests were developed and their performance characteristics determined by [facility] or appropriate performing facility, though some tests may be performed on tissues which have not been validated for performance characteristics (such as staining performed on alcohol exposed cell blocks and decalcified tissues)   Results should be interpreted with caution and in the context of the patients’ clinical condition  These tests may not be cleared or approved by the [place]  Food and Drug Administration, though the FDA has determined that such clearance or approval is not necessary  These tests are used for clinical purposes and they should not be regarded as investigational or for research   This laboratory has been approved by CLIA 88, designated as a high-complexity laboratory and is qualified to perform these tests      Synoptic Checklist   THYROID GLAND  8th Edition - Protocol posted: 6/30/2021  THYROID GLAND: RESECTION - All Specimens       SPECIMEN   Procedure  Total thyroidectomy    TUMOR   Tumor Focality  Unifocal    Tumor Characteristics     Tumor Site  Left lobe    Tumor Size  Greatest Dimension (Centimeters): 1 2 cm   Histologic Type  Papillary carcinoma, classic (usual, conventional)    Angioinvasion (vascular invasion)  Not identified    Lymphatic Invasion  Not identified    Extrathyroidal Extension  Not identified    Margin Status  All margins negative for carcinoma    Distance from Invasive Carcinoma to Closest Margin  1 mm   REGIONAL LYMPH NODES   Regional Lymph Node Status  Not applicable (no regional lymph nodes submitted or found)    PATHOLOGIC STAGE CLASSIFICATION (pTNM, AJCC 8th Edition)            Imaging  XR chest pa & lateral    Result Date: 2/15/2023  Narrative: CHEST INDICATION:   C73: Malignant neoplasm of thyroid gland  COMPARISON:  None EXAM PERFORMED/VIEWS:  XR CHEST PA & LATERAL  The frontal view was performed utilizing dual energy radiographic technique  FINDINGS: Cardiomediastinal silhouette appears unremarkable  The lungs are clear  No pneumothorax or pleural effusion  Osseous structures appear within normal limits for patient age  Impression: No acute cardiopulmonary disease  Workstation performed: [id number]     US head neck lymph node mapping    Result Date: 2/19/2023  Narrative: NECK ULTRASOUND INDICATION:     C73: Malignant neoplasm of thyroid gland  COMPARISON:   None FINDINGS: Ultrasound of the cervical lymph node chains was performed with a high frequency linear transducer  Lymph nodes maintain normal morphologic contour, echogenicity and short axis dimensions of less than 0 7 cm  No evidence for microcalcification or focal nodularity  Impression: No evidence of metastatic disease   Workstation performed: [id number]     I reviewed the above laboratory and imaging data  Discussion/Summary: 29-year-old woman, status post total thyroidectomy for stage I thyroid cancer  Doing well  Ultrasound of nodes negative  Margins clear  No high risk features  No role for radioactive iodine at this point  We will follow-up in 6 months with blood work and ultrasound at that time for cancer surveillance  All questions answered and copy of path report given to her for her records

## 2023-03-08 NOTE — TELEPHONE ENCOUNTER
1ST ATTEMPT - Called patient and LVM to call back to schedule HFU appt  Left our number  HFU/SLB/MIGRAINES      NOTES: Keily Gwendolyn will need follow up in 8-10 with headache attending or advance practitioner  She will not require outpatient neurological testing

## 2023-03-13 ENCOUNTER — APPOINTMENT (OUTPATIENT)
Dept: LAB | Age: 43
End: 2023-03-13

## 2023-03-13 DIAGNOSIS — E83.51 HYPOCALCEMIA: ICD-10-CM

## 2023-03-13 DIAGNOSIS — E89.2 HYPOPARATHYROIDISM AFTER PROCEDURE (HCC): ICD-10-CM

## 2023-03-13 DIAGNOSIS — E89.2 HYPOPARATHYROIDISM AFTER PROCEDURE (HCC): Primary | ICD-10-CM

## 2023-03-13 LAB
ALBUMIN SERPL BCP-MCNC: 3.5 G/DL (ref 3.5–5)
ALP SERPL-CCNC: 45 U/L (ref 46–116)
ALT SERPL W P-5'-P-CCNC: 25 U/L (ref 12–78)
ANION GAP SERPL CALCULATED.3IONS-SCNC: 3 MMOL/L (ref 4–13)
AST SERPL W P-5'-P-CCNC: 13 U/L (ref 5–45)
BILIRUB SERPL-MCNC: 0.28 MG/DL (ref 0.2–1)
BUN SERPL-MCNC: 26 MG/DL (ref 5–25)
CALCIUM SERPL-MCNC: 8.8 MG/DL (ref 8.3–10.1)
CHLORIDE SERPL-SCNC: 108 MMOL/L (ref 96–108)
CO2 SERPL-SCNC: 27 MMOL/L (ref 21–32)
CREAT SERPL-MCNC: 0.85 MG/DL (ref 0.6–1.3)
GFR SERPL CREATININE-BSD FRML MDRD: 85 ML/MIN/1.73SQ M
GLUCOSE P FAST SERPL-MCNC: 98 MG/DL (ref 65–99)
POTASSIUM SERPL-SCNC: 4 MMOL/L (ref 3.5–5.3)
PROT SERPL-MCNC: 6.9 G/DL (ref 6.4–8.4)
SODIUM SERPL-SCNC: 138 MMOL/L (ref 135–147)

## 2023-03-14 ENCOUNTER — TELEPHONE (OUTPATIENT)
Dept: ENDOCRINOLOGY | Facility: CLINIC | Age: 43
End: 2023-03-14

## 2023-03-14 DIAGNOSIS — R20.2 PARESTHESIAS: ICD-10-CM

## 2023-03-14 DIAGNOSIS — E83.51 HYPOCALCEMIA: ICD-10-CM

## 2023-03-14 RX ORDER — CALCITRIOL 0.25 UG/1
CAPSULE, LIQUID FILLED ORAL
Qty: 120 CAPSULE | Refills: 0 | Status: SHIPPED | OUTPATIENT
Start: 2023-03-14

## 2023-03-14 RX ORDER — IBUPROFEN 200 MG
1 CAPSULE ORAL 2 TIMES DAILY
Qty: 28 TABLET | Refills: 0 | Status: SHIPPED | OUTPATIENT
Start: 2023-03-14 | End: 2023-03-14 | Stop reason: SDUPTHER

## 2023-03-14 RX ORDER — IBUPROFEN 200 MG
1 CAPSULE ORAL 2 TIMES DAILY
Qty: 120 TABLET | Refills: 0 | Status: SHIPPED | OUTPATIENT
Start: 2023-03-14 | End: 2023-03-14 | Stop reason: SDUPTHER

## 2023-03-14 RX ORDER — CALCITRIOL 0.25 UG/1
0.25 CAPSULE, LIQUID FILLED ORAL 2 TIMES DAILY
Qty: 120 CAPSULE | Refills: 0 | Status: SHIPPED | OUTPATIENT
Start: 2023-03-14 | End: 2023-03-14 | Stop reason: SDUPTHER

## 2023-03-14 RX ORDER — IBUPROFEN 200 MG
1 CAPSULE ORAL 2 TIMES DAILY
Qty: 120 TABLET | Refills: 0 | Status: SHIPPED | OUTPATIENT
Start: 2023-03-14 | End: 2023-05-13

## 2023-03-30 ENCOUNTER — OFFICE VISIT (OUTPATIENT)
Dept: PODIATRY | Facility: CLINIC | Age: 43
End: 2023-03-30

## 2023-03-30 VITALS
HEIGHT: 64 IN | SYSTOLIC BLOOD PRESSURE: 126 MMHG | DIASTOLIC BLOOD PRESSURE: 85 MMHG | HEART RATE: 66 BPM | WEIGHT: 161 LBS | BODY MASS INDEX: 27.49 KG/M2

## 2023-03-30 DIAGNOSIS — M25.572 PAIN IN JOINT OF LEFT FOOT: Primary | ICD-10-CM

## 2023-03-30 NOTE — PROGRESS NOTES
Patient presents approximately 3 months post Hazel/Grupo bunionectomy left foot  Patient is doing very well with minimal discomfort  Range of motion is normal both in dorsiflexion and plantarflexion  There is no longer a dorsal exostosis  Patient may now wear any shoe that she is comfortable wearing  She is may return to all activities    Reappoint as needed

## 2023-03-31 ENCOUNTER — APPOINTMENT (OUTPATIENT)
Dept: RADIOLOGY | Age: 43
End: 2023-03-31

## 2023-03-31 ENCOUNTER — OFFICE VISIT (OUTPATIENT)
Dept: GASTROENTEROLOGY | Facility: CLINIC | Age: 43
End: 2023-03-31

## 2023-03-31 VITALS
SYSTOLIC BLOOD PRESSURE: 104 MMHG | HEIGHT: 64 IN | BODY MASS INDEX: 28.34 KG/M2 | WEIGHT: 166 LBS | TEMPERATURE: 98.8 F | DIASTOLIC BLOOD PRESSURE: 72 MMHG

## 2023-03-31 DIAGNOSIS — K21.9 GASTROESOPHAGEAL REFLUX DISEASE WITHOUT ESOPHAGITIS: ICD-10-CM

## 2023-03-31 DIAGNOSIS — R06.02 SOB (SHORTNESS OF BREATH): ICD-10-CM

## 2023-03-31 DIAGNOSIS — R05.2 SUBACUTE COUGH: ICD-10-CM

## 2023-03-31 DIAGNOSIS — K59.04 CHRONIC IDIOPATHIC CONSTIPATION: Primary | ICD-10-CM

## 2023-03-31 RX ORDER — OMEPRAZOLE 40 MG/1
40 CAPSULE, DELAYED RELEASE ORAL DAILY
Qty: 30 CAPSULE | Refills: 2 | Status: SHIPPED | OUTPATIENT
Start: 2023-03-31

## 2023-03-31 RX ORDER — LUBIPROSTONE 24 UG/1
24 CAPSULE ORAL 2 TIMES DAILY WITH MEALS
Qty: 60 CAPSULE | Refills: 2 | Status: SHIPPED | OUTPATIENT
Start: 2023-03-31

## 2023-03-31 NOTE — PATIENT INSTRUCTIONS
Stop linzess  Strt taking amitiza twice/day with meals  Do bowel preparation the night BEFORE you start amitiza the next morning   Stop over-the-counter omeprazole   Start omeprazole 40 mg every evening: make sure you have not eaten anything about an hour prior and wait half an hour to eat something after

## 2023-03-31 NOTE — PROGRESS NOTES
"[facility] Outpatient Follow[facility]  Christin Licea 38 y o  female MRN: [id number]  Encounter: [id number]          ASSESSMENT AND PLAN:       Krishan Mckeon is a 39 y/o female with GERD, IBS-C, chronic migraines and hx of papillary thyroid carcinoma s/p thyroidectomy with subsequent hypothyroid & hypoparathyroidism and hypocalcemia who presents for follow-up  1  GERD with hiatal hernia  Pt says that her omeprazole was stopped briefly when she was in the hospital for her thyroid cancer however she was able to re-start this, but is taking the OTC dose without any further relief  She says the higher doses of omeprazole \"always helped\" in the past  Pt underwent EGD in 2021 which noted small hiatal hernia; no signs of H pylori on gastric bx and no signs of celiac on duodenal bx   -stop OTC omeprazole  -start omeprazole 40 mg QHS     2  IBS-C  Pt says that since being started on calcium supplements, the linzess is no longer working and she is only moving her bowels once/week or so  She says this is causing a lot of bloating, increase flatulence, and lower abdominal discomfort  Colonoscopy in 2021 was completely WNL  -stop linzess  -start high dose amitiza BID with meals: I asked pt to undergo miralax bowel prep the night before starting this as she has not had a BM in 3-4 days  -please call our office in 1 5-2 weeks after starting amitiza to see if this has helped    3  Blurry vision   4  Brain Fog  5  Cough   Pt notes that since her thyroidectomy, she has been having intermittent blurry vision with brain fog, lack of focus, \"short-term memory loss\"  Despite having a corrected calcium level now  She says her surg/onc and endocrinology team are both aware  She also complains of cough with SOB \"every time\" she takes a deep breath since the surgery   She has appt with neuro in May  -asked pt to get blood work done ordered by her other providers to check TSH, T4, PTH, vitamin D  -I asked pt to call " "her neurology team today rather than waiting until May to be evaluated for this   -CXR ordered: if this is WNL, I told her that I would recommend she follows up with her PCP for the cough/SOB  ______________________________________________________________________    SUBJECTIVE:  Bryson Keith is a 37 y/o female with GERD, IBS-C, chronic migraines and hx of papillary thyroid carcinoma s/p thyroidectomy with hypothyroid hypoparathyroidism who presents for follow-up  The pt says that she was doing fine on omeprzole 20 mg and linzess daily however she was recently found to have thyroid cancer and underwent thyroid removal  She had subsequent hypocalcemia, so she was started on calcium supple,ments  Since being on calcium, the linzess is no longer helping and she is only moving her bowels once every week or so  She says this causes her to get very bloated, has increased flatulence and gas and will eventually have lower abdominal discomfort  She notes her omeprazole was stopped when she was admitted so when she was d/c home, she started OTC omeprazole (she suspects was 10 mg) which is not alleviate her reflux anymore  She says the higher doses of omeprazole \"Always helped\" in the past   Otherwise she denies unintentional weight loss, fevers, chills, night sweats, vomiting, bloody or black BMs  Non-related to GI: the pt complains of blurry vision that comes and goes a few times/week, brain fog in that she feels \"in a haze\" constantly, and short-term memory loss  She also notes that whenever she goes to take a deep breath, she gets short of breath and has a dry cough after  Pt says these all started after her thyroidectomy  REVIEW OF SYSTEMS IS OTHERWISE NEGATIVE        Historical Information   Past Medical History:   Diagnosis Date   • Allergic    • Anxiety    • COVID-19 01/2022    mild s/s-not hospitalized, not vaccinated   • GERD (gastroesophageal reflux disease)    • Headache(784 0)    • History of IBS     with " constipation   • Ingrown toenail    • Irritable bowel syndrome    • Migraines     Chronic per pt   • Plantar fasciitis    • Thyroid carcinoma ([facility] [address] )    • Venereal wart 08/07/2014     Past Surgical History:   Procedure Laterality Date   • CERVICAL BIOPSY  W/ LOOP ELECTRODE EXCISION  2017   • COLONOSCOPY     • NASAL SEPTUM SURGERY     • [place] OSTEOT W/ [address] SHRT/CORRJ 1ST METAR Left 12/30/2022    Procedure: OSTEOTOMY METATARSAL 1st;  Surgeon: Carmen Riggins DPM;  [facility];  Service: Podiatry   • THYROIDECTOMY N/A 2/24/2023    Procedure: TOTAL THYROIDECTOMY;  Surgeon: Torrie Shetty MD;  [facility];  Service: Surgical Oncology   • TONSILLECTOMY     • UPPER GASTROINTESTINAL ENDOSCOPY     • US GUIDED THYROID BIOPSY  1/31/2023   • WISDOM TOOTH EXTRACTION       Social History   Social History     Substance and Sexual Activity   Alcohol Use Yes   • Alcohol/week: 2 0 standard drinks   • Types: 2 Glasses of wine per week    Comment: 2 glasses wine a week     Social History     Substance and Sexual Activity   Drug Use Never     Social History     Tobacco Use   Smoking Status Never   Smokeless Tobacco Never     Family History   Problem Relation Age of Onset   • Hypertension Mother    • Arthritis Mother    • Thyroid disease Mother    • Transient ischemic attack Father    • Hypertension Father    • Stroke Father    • Vision loss Father    • Rashes / Skin problems Sister         Shingles   • No Known Problems Sister    • No Known Problems Sister    • Uterine cancer Maternal Grandmother    • Cancer Maternal Grandmother         Uterine   • Colon cancer Maternal Grandfather    • Cancer Maternal Grandfather         Colon   • Lung cancer Paternal Grandmother 61   • Cancer Paternal Grandmother         Lung   • No Known Problems Paternal Grandfather    • Colon cancer Cousin 27   • Breast cancer Maternal Aunt    • Breast cancer Maternal Aunt    • Breast cancer Maternal Aunt        Meds/Allergies       Current Outpatient Medications:   •  acetaminophen (TYLENOL) 500 mg tablet  •  Biotin 5 MG CAPS  •  calcitriol (ROCALTROL) 0 25 mcg capsule  •  calcium citrate (CALCITRATE) 950 (200 Ca) MG tablet  •  cholecalciferol (VITAMIN D3) 1,000 units tablet  •  FIBER ADULT GUMMIES PO  •  Galcanezumab-gnlm 120 MG/ML SOAJ  •  Levocetirizine Dihydrochloride (XYZAL PO)  •  levothyroxine (Synthroid) 125 mcg tablet  •  linaCLOtide (Linzess) 290 MCG CAPS  •  Melatonin 5 MG TABS  •  meloxicam (MOBIC) 15 mg tablet  •  multivitamin (THERAGRAN) TABS  •  omeprazole (PriLOSEC) 20 mg delayed release capsule  •  ondansetron (Zofran ODT) 4 mg disintegrating tablet  •  propranolol (INDERAL) 40 mg tablet  •  rizatriptan (MAXALT) 10 mg tablet  •  zolpidem (AMBIEN) 10 mg tablet    No Known Allergies        Objective     There were no vitals taken for this visit  There is no height or weight on file to calculate BMI  PHYSICAL EXAM:      General Appearance:   Alert, cooperative, no distress   HEENT:   Normocephalic, atraumatic, anicteric      Neck:  Supple, symmetrical, trachea midline   Lungs:   Clear to auscultation bilaterally; no rales, rhonchi or wheezing; respirations unlabored    Heart[de-identified]   Regular rate and rhythm; no murmur, rub, or gallop  Abdomen:   Soft, non-tender, non-distended; normal bowel sounds; no masses, no organomegaly    Genitalia:   Deferred    Rectal:   Deferred    Extremities:  No cyanosis, clubbing or edema    Pulses:  2+ and symmetric    Skin:  No jaundice, rashes, or lesions    Lymph nodes:  No palpable cervical lymphadenopathy        Lab Results:   No visits with results within 1 Day(s) from this visit     Latest known visit with results is:   Appointment on 03/13/2023   Component Date Value   • Sodium 03/13/2023 138    • Potassium 03/13/2023 4 0    • Chloride 03/13/2023 108    • CO2 03/13/2023 27    • ANION GAP 03/13/2023 3 (L)    • BUN 03/13/2023 26 (H)    • Creatinine 03/13/2023 0 85    • Glucose, Fasting 03/13/2023 98 • Calcium 03/13/2023 8 8    • AST 03/13/2023 13    • ALT 03/13/2023 25    • Alkaline Phosphatase 03/13/2023 45 (L)    • Total Protein 03/13/2023 6 9    • Albumin 03/13/2023 3 5    • Total Bilirubin 03/13/2023 0 28    • eGFR 03/13/2023 85          Radiology Results:   No results found

## 2023-04-05 ENCOUNTER — OFFICE VISIT (OUTPATIENT)
Dept: NEUROLOGY | Facility: CLINIC | Age: 43
End: 2023-04-05

## 2023-04-05 ENCOUNTER — TELEPHONE (OUTPATIENT)
Dept: NEUROLOGY | Facility: CLINIC | Age: 43
End: 2023-04-05

## 2023-04-05 VITALS
DIASTOLIC BLOOD PRESSURE: 86 MMHG | BODY MASS INDEX: 28.17 KG/M2 | HEART RATE: 56 BPM | TEMPERATURE: 97.7 F | WEIGHT: 165 LBS | HEIGHT: 64 IN | SYSTOLIC BLOOD PRESSURE: 120 MMHG

## 2023-04-05 DIAGNOSIS — G43.109 MIGRAINE WITH AURA AND WITHOUT STATUS MIGRAINOSUS, NOT INTRACTABLE: Primary | ICD-10-CM

## 2023-04-05 DIAGNOSIS — F44.7 FUNCTIONAL NEUROLOGICAL SYMPTOM DISORDER WITH MIXED SYMPTOMS: ICD-10-CM

## 2023-04-05 DIAGNOSIS — G47.33 OBSTRUCTIVE SLEEP APNEA (ADULT) (PEDIATRIC): ICD-10-CM

## 2023-04-05 DIAGNOSIS — F43.10 PTSD (POST-TRAUMATIC STRESS DISORDER): ICD-10-CM

## 2023-04-05 RX ORDER — TOPIRAMATE 25 MG/1
TABLET ORAL
Qty: 120 TABLET | Refills: 4 | Status: SHIPPED | OUTPATIENT
Start: 2023-04-05

## 2023-04-05 RX ORDER — DEXAMETHASONE 2 MG/1
TABLET ORAL
Qty: 5 TABLET | Refills: 0 | Status: SHIPPED | OUTPATIENT
Start: 2023-04-05

## 2023-04-05 NOTE — PROGRESS NOTES
Review of Systems   Constitutional: Negative for appetite change and fever  HENT: Positive for tinnitus  Negative for hearing loss, trouble swallowing and voice change  Eyes: Negative  Negative for photophobia and pain  Respiratory: Negative  Negative for shortness of breath  Cardiovascular: Negative  Negative for palpitations  Gastrointestinal: Negative  Negative for nausea and vomiting  Endocrine: Negative  Negative for cold intolerance  Genitourinary: Negative  Negative for dysuria, frequency and urgency  Musculoskeletal: Negative  Negative for myalgias and neck pain  Skin: Negative  Negative for rash  Neurological: Positive for dizziness and headaches  Negative for tremors, seizures, syncope, facial asymmetry, speech difficulty, weakness, light-headedness and numbness  Bad short term memory  Trouble concentrating  Muscle spasms in both legs   Hematological: Negative  Does not bruise/bleed easily  Psychiatric/Behavioral: Positive for sleep disturbance  Negative for confusion and hallucinations

## 2023-04-05 NOTE — TELEPHONE ENCOUNTER
----- Message from Ruddy Murry MD sent at 4/5/2023  4:07 PM EDT -----  Could you please help this patient with a list of therapists or psychologists covered by their insurance?  Specifically needs a provider who is experienced in cognitive behavioral therapy for PTSD

## 2023-04-05 NOTE — PATIENT INSTRUCTIONS
"  No estrogen due to migraine aura    \"why we sleep\" by Billy Escobar - good read or listen on audible    - \"Rewire Your Anxious Brain: How to Use the Neuroscience of Fear to End Anxiety, Panic, and Worry\" By Zack Molina PhD    - consider reading or listening to the book \"The body keeps the score\" - interesting book on how PTSD and stress can impact the body and cause physical symptoms    Our  will reach out to you with a list of providers as well that are covered by your insurance  - Dr Chivo Dia at [address] [facility]     - more info at [facility]pt[facility]  org   I have had patients recommend the following female therapists  (I am assuming female by their names of course and I do not know them myself): - Benjamín Vieyra PhD in [address] with Shauna Cline in [address] [facility] in [address] [facility] in [address] [place] [address] in [address] in [address] [facility] in [place]       I am placing a referral to the sleep medicine specialist team to further evaluate your sleep issues  Please call [phone] to schedule and I recommend you see one of the following providers:  - Ludin Villanueva MD - sleep doctor who is a neurologist and is excellent       Headache/migraine treatment:   Abortive medications (for immediate treatment of a headache): It is ok to take ibuprofen, acetaminophen or naproxen (Advil, Tylenol,  Aleve, Excedrin) if they help your headaches you should limit these to No more than 3 times a week to avoid medication overuse/rebound headaches  -     dexamethasone (DECADRON) 2 mg tablet; One tab with breakfast for 1-5 days for unrelenting migraine    For your more moderate to severe migraines take this medication early   Maxalt (rizatriptan) 10mg tabs - take one at the onset of headache   May repeat one time after 1-2 hours if pain has not " resolved  (Max 2 a day and 9 a month)     Prescription preventive medications for headaches/migraines   (to take every day to help prevent headaches - not to take at the time of headache):  [x] -     topiramate (TOPAMAX) 25 mg tablet; 1 tab PO QHS for 1 week, increase as tolerated to 2 tabs QHS for 1 week, then 3 tabs QHS for 1 week and finish at 4 tabs nightly or stop at tolerated dose    - generally the common side effects improve as your body gets used to the medication  If we need to spread out a more gradual increase of the medication on a longer scale we can, just call if any questions or concerns  - if necessary, if the a m  dose is causing side effects we can always have you take the full dose at night instead    - important to know per data, this medication may, but typically does not affect birth control unless you are taking 200 mg daily or more and I highly recommend being on birth control while on this medication due to possible significant detrimental effects to fetus if you were to get pregnant     The medication you are taking may impact pregnancy  It has been associated with birth defects and learning problems if taken during pregnancy  Thus, it is important to avoid unplanned pregnancy while taking this medication  In the future, if you plan to become pregnant, then you should discuss this with your neurologist since medication adjustments may be indicated  Emgality/Galcanezumab -  120 mg injections every 28 days     READ INSTRUCTIONS that come with the medication  REFRIGERATE  Keep out of direct sunlight  Prior to administration, allow to come to room temperature for 30 minutes  Do not warm using a heat source (eg, microwave or hot water)  Do not shake  Administer in preferably abdomen (avoiding 2 inches around the navel), thigh, upper arm, or buttocks avoiding areas of skin that are tender, bruised, red or hard  Deliver entire contents of single-use prefilled pen or syringe    Unknown impact in pregnancy therefore would recommend stopping 6 months prior to considering pregnancy  Let me know if you ever want to try and slowly come off propranolol     *Typically these types of medications take time untill you see the benefit, although some may see improvement in days, often it may take weeks, especially if the medication is being titrated up to a beneficial level  Please contact us if there are any concerns or questions regarding the medication  Lifestyle Recommendations:  [x] SLEEP - Maintain a regular sleep schedule: Adults need at least 7-8 hours of uninterrupted a night  Maintain good sleep hygiene:  Going to bed and waking up at consistent times, avoiding excessive daytime naps, avoiding caffeinated beverages in the evening, avoid excessive stimulation in the evening and generally using bed primarily for sleeping  One hour before bedtime would recommend turning lights down lower, decreasing your activity (may read quietly, listen to music at a low volume)  When you get into bed, should eliminate all technology (no texting, emailing, playing with your phone, iPad or tablet in bed)  [x] HYDRATION - Maintain good hydration  Drink  2L of fluid a day (4 typical small water bottles)  [x] DIET - Maintain good nutrition  In particular don't skip meals and try and eat healthy balanced meals regularly  [x] TRIGGERS - Look for other triggers and avoid them: Limit caffeine to 1-2 cups a day or less  Avoid dietary triggers that you have noticed bring on your headaches (this could include aged cheese, peanuts, MSG, aspartame and nitrates)  [x] EXERCISE - physical exercise as we all know is good for you in many ways, and not only is good for your heart, but also is beneficial for your mental health, cognitive health and  chronic pain/headaches  I would encourage at the least 5 days of physical exercise weekly for at least 30 minutes       Education and Follow-up  [x] Please call with any questions or concerns  Of course if any new concerning symptoms go to the emergency department    [x] Follow up  2-3 months,  sooner if needed

## 2023-04-05 NOTE — PROGRESS NOTES
[facility] Consult - Follow up   PATIENT:  Nila Puri  MRN:  [id number]  :  1980  DATE OF SERVICE:  2023  REFERRED BY: No ref  provider found  PMD: Merritt Rosenberg,     Assessment/Plan:   Nila Puri is a deligthful 38 y o  female with a past medical history that includes papillary thyroid cancer status post total thyroidectomy 2023 with postop hypoparathyroidism and significantly low calcium now on replacement, IBS with constipation, allergic rhinitis, chronic sinusitis, migraines, TOSHIA II, insomnia, neck pain, vasovagal syncope in 20s, anxiety, Intestinal cystitis, childhood asthma  referred here for evaluation of headache  My initial evaluation 3/28/2022    Migraine with aura and without status migrainosus, not intractable  Post traumatic stress disorder with symptoms of functional neurologic disorder  She reports a long history of headaches and migraines dating back to 10or 8years old  Family history of migraines  She reports she has followed with multiple neurologists in the past   Pain is typically unilateral more often left-sided with visual aura at times and with typical associated migrainous features as well as autonomic features that do not seem to be unilateral to suggest trigeminal autonomic cephalalgia   -as of 2022 on average over 15 migraine days per month  Trial of emgality for prevention rizatriptan for abortive  - as of 2022:  She reports she has had significant improvement on emgality down from 15 to 7-8 times a month and severity has improved as well  They respond to rizatriptan which she feels she tolerates more than sumatriptan  - as of 2023: She reports she continues to do very well on Emgality with about 4 migraines a month that resolve with rizatriptan  Some wearing off when due for next dose and recommended taking it at 28 days rather than 30 or 31    She would like to stay on propranolol 40 mg twice daily for now as she feels this is helping prevent tachycardia from causing migraine  She was able to get off nortriptyline summer 2022 which she was on for an interstitial cystitis without increase     - as of 4/5/2023:  She returns sooner with daily migraines now following papillary thyroid cancer status post total thyroidectomy 2/24/2023 with postop hypoparathyroidism and significantly low calcium now on replacement with some symptoms possibly consistent with high calcium and upcoming recheck of labs through endocrinology may suggest lowering calcium meds, but would defer to endocrine  She certainly has had increase in her migrainous symptoms as well as some symptoms possibly consistent with sleep apnea and I ordered sleep study  She reports she has had improvement in symptoms since the surgery but still was hoping there is something that could improve them as all the other doctors just told her to wait it out  We will start low-dose topiramate at night to see if this can help if tolerated  We also discussed PTSD and how it can cause intrusion, avoidance, negative impact on cognition and mood as well as arousal and reactivity changes and certainly sounds like she has symptoms of this as well and recommend counseling  We will have our  reach out to see if she can help  Workup:  - Neurologic assessment reveals normal neurological exam   - MRI brain in her teens was reportedly unremarkable  - at this time with symptoms consistent with migraine, no red flags, normal exam, we discussed could hold off on follow-up imaging at this time  However, could obtain repeat brain MRI at any time if indicated  Certainly will obtain if symptoms do not improve, thankfully as of 4/5/2023 she reports they are  Preventative:  - we discussed headache hygiene and lifestyle factors that may improve headaches  - continue  emgality every 28 days  Discussed proper use, possible side effects and risks    - re  Trial of "topiramate (TOPAMAX) 25 mg tablet; 1 tab PO QHS for 1 week, increase as tolerated to 2 tabs QHS for 1 week, then 3 tabs QHS for 1 week and finish at 4 tabs nightly or stop at tolerated dose  - Currently on through other providers: propranolol 40 mg BID, ambien 10 mg- (off and on 6 years), melatonin  - Past/ failed/contraindicated: topiramate, gabapentin, propranolol 40 mg BID, nortriptyline, amitriptyline, prosac/fluoxetine, aimovig contraindicated due to constiptation  - future options: alternative CGRP, botox     Abortive:  - discussed not taking over-the-counter or prescription pain medications more than 3 days per week to prevent medication overuse/rebound headache  -  Trial of dexamethasone (DECADRON) 2 mg tablet; One tab with breakfast for 1-5 days for unrelenting migraine  Discussed proper use, possible side effects and risks  -  rizatriptan (MAXALT) 10 MG tablet; Take 1 tablet (10 mg total) by mouth once as needed for migraine May repeat in 2 hours if needed  Max 2/24 hours, 9/month  Discussed proper use, possible side effects and risks    - Past/ failed/contraindicated: sumatriptan 100 mg works sometimes and not others, rizatriptan   - past/helped:  Steroids have helped temporarily, toradol IM in the past helped   - future options:   prochlorperazine, Toradol IM or p o , could consider trial for 5 days of Depakote or dexamethasone for prolonged migraine, ubrelvy, reyvow, nurtec    Insomnia, prolonged Ambien use  Suspected sleep apnea  -     Ambulatory referral to Sleep Medicine placed 1/11/2023  -Home sleep study ordered for 523          Patient instructions      Agree with eye doctor layla     No estrogen due to migraine aura    \"why we sleep\" by Roni Liao - good read or listen on audible    - \"Rewire Your Anxious Brain: How to Use the Neuroscience of Fear to End Anxiety, Panic, and Worry\" By Delma Ring PhD    - consider reading or listening to the book \"The body keeps the score\" - interesting book on how " PTSD and stress can impact the body and cause physical symptoms    Our  will reach out to you with a list of providers as well that are covered by your insurance  - Dr Luke Vasquez at [address] [facility]     - more info at neurosymptoms  org   I have had patients recommend the following female therapists  (I am assuming female by their names of course and I do not know them myself): - Braydon Parnell PhD in [address] with Benjamín Carlinrow in [address] [facility] in [address] in [address] in [address] in [address] [facility] in [place]       I am placing a referral to the sleep medicine specialist team to further evaluate your sleep issues  Please call [phone] to schedule and I recommend you see one of the following providers:  - Amari Calle MD - sleep doctor who is a neurologist and is excellent       Headache/migraine treatment:   Abortive medications (for immediate treatment of a headache): It is ok to take ibuprofen, acetaminophen or naproxen (Advil, Tylenol,  Aleve, Excedrin) if they help your headaches you should limit these to No more than 3 times a week to avoid medication overuse/rebound headaches  -     dexamethasone (DECADRON) 2 mg tablet; One tab with breakfast for 1-5 days for unrelenting migraine    For your more moderate to severe migraines take this medication early   Maxalt (rizatriptan) 10mg tabs - take one at the onset of headache  May repeat one time after 1-2 hours if pain has not resolved     (Max 2 a day and 9 a month)     Prescription preventive medications for headaches/migraines   (to take every day to help prevent headaches - not to take at the time of headache):  [x] -     topiramate (TOPAMAX) 25 mg tablet; 1 tab PO QHS for 1 week, increase as tolerated to 2 tabs QHS for 1 week, then 3 tabs QHS for 1 week and finish at 4 tabs nightly or stop at tolerated dose    - generally the common side effects improve as your body gets used to the medication  If we need to spread out a more gradual increase of the medication on a longer scale we can, just call if any questions or concerns  - if necessary, if the a m  dose is causing side effects we can always have you take the full dose at night instead    - important to know per data, this medication may, but typically does not affect birth control unless you are taking 200 mg daily or more and I highly recommend being on birth control while on this medication due to possible significant detrimental effects to fetus if you were to get pregnant     The medication you are taking may impact pregnancy  It has been associated with birth defects and learning problems if taken during pregnancy  Thus, it is important to avoid unplanned pregnancy while taking this medication  In the future, if you plan to become pregnant, then you should discuss this with your neurologist since medication adjustments may be indicated  Emgality/Galcanezumab -  120 mg injections every 28 days     READ INSTRUCTIONS that come with the medication  REFRIGERATE  Keep out of direct sunlight  Prior to administration, allow to come to room temperature for 30 minutes  Do not warm using a heat source (eg, microwave or hot water)  Do not shake  Administer in preferably abdomen (avoiding 2 inches around the navel), thigh, upper arm, or buttocks avoiding areas of skin that are tender, bruised, red or hard  Deliver entire contents of single-use prefilled pen or syringe  Unknown impact in pregnancy therefore would recommend stopping 6 months prior to considering pregnancy      Let me know if you ever want to try and slowly come off propranolol     *Typically these types of medications take time untill you see the benefit, although some may see improvement in days, often it may take weeks, especially if the medication is being titrated up to a beneficial level  Please contact us if there are any concerns or questions regarding the medication  Lifestyle Recommendations:  [x] SLEEP - Maintain a regular sleep schedule: Adults need at least 7-8 hours of uninterrupted a night  Maintain good sleep hygiene:  Going to bed and waking up at consistent times, avoiding excessive daytime naps, avoiding caffeinated beverages in the evening, avoid excessive stimulation in the evening and generally using bed primarily for sleeping  One hour before bedtime would recommend turning lights down lower, decreasing your activity (may read quietly, listen to music at a low volume)  When you get into bed, should eliminate all technology (no texting, emailing, playing with your phone, iPad or tablet in bed)  [x] HYDRATION - Maintain good hydration  Drink  2L of fluid a day (4 typical small water bottles)  [x] DIET - Maintain good nutrition  In particular don't skip meals and try and eat healthy balanced meals regularly  [x] TRIGGERS - Look for other triggers and avoid them: Limit caffeine to 1-2 cups a day or less  Avoid dietary triggers that you have noticed bring on your headaches (this could include aged cheese, peanuts, MSG, aspartame and nitrates)  [x] EXERCISE - physical exercise as we all know is good for you in many ways, and not only is good for your heart, but also is beneficial for your mental health, cognitive health and  chronic pain/headaches  I would encourage at the least 5 days of physical exercise weekly for at least 30 minutes  Education and Follow-up  [x] Please call with any questions or concerns  Of course if any new concerning symptoms go to the emergency department  [x] Follow up  2-3 months,  sooner if needed    CC: We had the pleasure of evaluating Lum Meigs in neurological consultation today  Lum Meigs is a   right handed female who presents today for evaluation of headaches       History obtained from patient as well as available medical record review  History of Present Illness:   Interval history as of 4/5/2023    - 2/24/23 thyroid resection and post op had severe hypoparathyroidism and they wanted to readmit her and then followed up with endocrine and continue calcium dose   - symptoms of hypoparathyroid intially and now symptoms of hyperparathyroidism    - all of the symptoms of PTSD, overwhelmed with noises   - was not on phone for a week because could not look at it     Headaches and migraines   - A lot better than she was, could not speak for a while and spasms, tetany, couldn't speak, much better    - daily headaches since the surgery, first in the hospital was way worse and steroids helped, and then headache never resolved  Worse at night before bed, comes and goes, not all day, better   - Mild headache now, they are daily, migraines nearly daily, not last night, every other night   - Dizziness better, but still there, may have had vertigo in the past    Tinnitus both ears, Right >left 30 seconds of muffled hearing at times, rarely in the past     Preventative:   - emgality monthly - [employer] - a few days ago     Abortive:   - rizatriptan once dose often helps   Denies bothersome side effects       Sleep   - averages: 5-6 hours, sometimes takes ambien, snores   Problems falling asleep?:   Yes  Problems staying asleep?:  Yes, 3-4 a night  - tired all the time    How likely are you to doze off or fall sleep during the following situations?     0 - would never doze  1 - slight chance of dozing  2 - moderate chance of dozing  3 - high chance of dozing  Faber sleepiness scale   Sitting and reading - 2  Watching TV - 3  Sitting, inactive in a public place such as a theater 1  As a passenger in a car for an hour without a break 1  Lying down to rest in afternoon when circumstances permit 3  Sitting and talking to someone 0  Sitting quietly after lunch without alcohol 2  In a car while stopped for "few minutes in traffic - 0      Interval history as of 1/11/2023  - denies any new or concerning neurologic symptoms since last visit   - foot surgery 12/30/22  - thyroid bx coming up    Headaches and migraines   She is doing much better, now about 4 a month and rizatriptan works well, [employer]  Nov had one that lasted a week  Last was Dec 29th  Wearing off effect when due for next dose, maybe 3 days before     Preventative:    - emgality     Denies bothersome side effects  - Currently on through other providers: propranolol 40 mg BID (could consider gradual wean off in the future),   nortriptyline 20 mg (for Intestinal cystitis) - stopped around July 2022 - IC not worse off of it, ambien 10 mg (previously on 5 and plans on going back, moved in April and loud) - (off and on 5 years)    Abortive:   - rizatriptan - working well now, rarely needs two  Denies bothersome side effects        Interval history as of 7/1/2022  - denies any new or concerning neurologic symptoms since last visit      Headaches and migraines   She reports improvement on emgality which has brought migraines down from 15 days a month to 7-8 a month  Less severe as well   The first month did the best, \"almost forgot I had migraines for a while\"   Some wearing off effect when due for next shot   - triggered by weather changes     Preventative:   - Trial of emgality - approved 4/5/22 - 4th shot soon   Denies bothersome side effects     Abortive:   - Trial of rizatriptan 10 mg - makes her less nauseated than sumatriptan, does not work quiet as well as sumatriptan, may not completely get rid of it but improves and then eventually goes away   Denies bothersome side effects     History as of initial visit 3/28/22:   Headaches started at what age? 89 years old  How often do the headaches occur?   - as of 3/28/2022: on average over 15 days a month   What time of the day do the headaches start?  Occasionally wakes up with them, or afternoon or " evening  How long do the headaches last? All day, up to weeks   Are you ever headache free? Yes    Aura? Sometimes aura  Blurred, floaters, lines, flashing orange, 20 mins or more     Last eye exam: goes early for monitoring, no issues except     Where is your headache located and pain quality?   - most of the time unilateral and usually on the left  - left side of head and neck - temporal and parietal and occipital and down the neck  - stabbing, pressure, pulsating  - head warm to touch  - sinus pressure   What is the intensity of pain? Average: 5-6/10, worst 9/10  Associated symptoms:   [x] Nausea       [] Vomiting        [x] Stiff or sore neck   [x] Photophobia     [x]Phonophobia      [x] Osmophobia  [x] Blurred vision    [x] Light-headed or dizzy     [x] Tinnitus  - both  [x] Hands or feet tingle or feel numb/paresthesias - tingling in both legs and fingers sometimes without focal weakness   [] Ptosis      [] Facial droop  [x] Lacrimation - both  [x] Nasal congestion/rhinorrhea - chronically       Things that make the headache worse? No specific movements, any movement if bad enough    Headache triggers:  Hormonal/menses, weather changes, stress, certain foods, alcohol, sinus congestion      Have you seen someone else for headaches or pain? Yes multiple neurologists, last about 10 years ago   Have you had trigger point injection performed and how often? No  Have you had Botox injection performed and how often? No   Have you had epidural injections or transforaminal injections performed? No  Are you current pregnant or planning on getting pregnant? No future pregnancy, not active   Have you ever had any Brain imaging? yes - MRI Brain in her teens was normal     What medications do you take or have you taken for your headaches?    ABORTIVE:      Sumatriptan 100 mg - makes her nauseated      Past  Steroids In Jan 2021 with COVID, and then again in Feb for unrelenting migraine helps   Rizatriptan/maxalt - thinks may not as been as effective as imitrex    PREVENTIVE:       Propranolol 40 mg BID (4 years has helped)  ambien 5  Nortriptyline 20 mg (started a couple of weeks ago for Intestinal cystitis)    Past/ failed/contraindicated:  nortriptyline  Amitriptyline  topiramate - maybe 1-2 months   Gabapentin  prosac/fluoxetine      LIFESTYLE  Sleep   - averages: 6 hours, sometimes takes ambien  Problems falling asleep?:   Yes  Problems staying asleep?:  Yes, 3-4 a night    Water: 40-60 oz per day  Caffeine: coffee - 1 cups in am, 1 at lunch, per day    Mood:   Anxiety maybe more in her 25s, not much now     The following portions of the patient's history were reviewed and updated as appropriate: allergies, current medications, past family history, past medical history, past social history, past surgical history and problem list      Pertinent family history:  Family history of headaches:  migraine headaches in sister, and mom has headaches that are likely migraines   Any family history of aneurysms - No    Pertinent social history:  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) and US at [employer] alone normally    Past Medical History:     Past Medical History:   Diagnosis Date   • Allergic    • Anxiety    • COVID-19 01/2022    mild s/s-not hospitalized, not vaccinated   • GERD (gastroesophageal reflux disease)    • Headache(784 0)    • History of IBS     with constipation   • Ingrown toenail    • Irritable bowel syndrome    • Migraines     Chronic per pt   • Plantar fasciitis    • Thyroid carcinoma ([facility] [address] )    • Venereal wart 08/07/2014       Patient Active Problem List   Diagnosis   • Migraines   • Chronic migraine w/o aura w/o status migrainosus, not intractable   • TOSHIA II (cervical intraepithelial neoplasia II)   • Irritable bowel syndrome with constipation   • Insomnia   • Neck pain   • Vestibulitis, vulvar   • Thyroid nodule   • Abnormal TSH   • Sensation of fullness in both ears   • Overweight (BMI 25 0-29  9)   • Abnormal thyroid biopsy   • Papillary thyroid carcinoma (HCC)   • Hypothyroidism   • Gastroesophageal reflux disease   • Hypocalcemia   • 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)       Medications:      Current Outpatient Medications   Medication Sig Dispense Refill   • acetaminophen (TYLENOL) 500 mg tablet Take 1,000 mg by mouth every 6 (six) hours as needed for mild pain     • calcitriol (ROCALTROL) 0 25 mcg capsule Take 1 tablet twice daily 120 capsule 0   • calcium citrate (CALCITRATE) 950 (200 Ca) MG tablet Take 1 tablet (950 mg total) by mouth 2 (two) times a day (Patient taking differently: Take 1 tablet by mouth 3 (three) times a day) 120 tablet 0   • cholecalciferol (VITAMIN D3) 1,000 units tablet Take 1 tablet (1,000 Units total) by mouth daily Do not start before March 1, 2023  30 tablet 0   • dexamethasone (DECADRON) 2 mg tablet One tab with breakfast for 1-5 days for unrelenting migraine 5 tablet 0   • Galcanezumab-gnlm 120 MG/ML SOAJ Inject 120 mg under the skin every 28 days 1 mL 11   • Levocetirizine Dihydrochloride (XYZAL PO) Take by mouth daily at bedtime     • levothyroxine (Synthroid) 125 mcg tablet Take 1 tablet (125 mcg total) by mouth daily 30 tablet 3   • Melatonin 5 MG TABS Take by mouth as needed     • meloxicam (MOBIC) 15 mg tablet Take 15 mg by mouth daily as needed     • omeprazole (PriLOSEC) 40 MG capsule Take 1 capsule (40 mg total) by mouth daily 30 capsule 2   • propranolol (INDERAL) 40 mg tablet 2 (two) times a day     • rizatriptan (MAXALT) 10 mg tablet Take 1 tablet (10 mg total) by mouth once as needed for migraine May repeat in 2 hours if needed  Max 2/24 hours, 9/month   9 tablet 12   • topiramate (TOPAMAX) 25 mg tablet 1 tab PO QHS for 1 week, increase as tolerated to 2 tabs QHS for 1 week, then 3 tabs QHS for 1 week and finish at 4 tabs nightly or stop at tolerated dose 120 tablet 4   • zolpidem (AMBIEN) 10 mg tablet Take 10 mg by mouth daily at bedtime as needed     • Biotin 5 MG CAPS Take by mouth (Patient not taking: Reported on 4/5/2023)     • FIBER ADULT GUMMIES PO Take by mouth (Patient not taking: Reported on 4/5/2023)     • lubiprostone (AMITIZA) 24 mcg capsule Take 1 capsule (24 mcg total) by mouth 2 (two) times a day with meals (Patient not taking: Reported on 4/5/2023) 60 capsule 2   • multivitamin (THERAGRAN) TABS Take 1 tablet by mouth daily (Patient not taking: Reported on 4/5/2023)     • ondansetron (Zofran ODT) 4 mg disintegrating tablet Take 1 tablet (4 mg total) by mouth every 6 (six) hours as needed for nausea or vomiting (Patient not taking: Reported on 4/5/2023) 20 tablet 0     No current facility-administered medications for this visit          Allergies:    No Known Allergies    Family History:     Family History   Problem Relation Age of Onset   • Hypertension Mother    • Arthritis Mother    • Thyroid disease Mother    • Transient ischemic attack Father    • Hypertension Father    • Stroke Father    • Vision loss Father    • Rashes / Skin problems Sister         Shingles   • No Known Problems Sister    • No Known Problems Sister    • Uterine cancer Maternal Grandmother    • Cancer Maternal Grandmother         Uterine   • Colon cancer Maternal Grandfather    • Cancer Maternal Grandfather         Colon   • Lung cancer Paternal Grandmother 61   • Cancer Paternal Grandmother         Lung   • No Known Problems Paternal Grandfather    • Colon cancer Cousin 27   • Breast cancer Maternal Aunt    • Breast cancer Maternal Aunt    • Breast cancer Maternal Aunt        Social History:     Social History     Socioeconomic History   • Marital status: Single     Spouse name: Not on file   • Number of children: Not on file   • Years of education: Not on file   • Highest education level: Not on file   Occupational History   • Not on file   Tobacco Use   • Smoking status: Never   • Smokeless tobacco: Never   Vaping Use   • Vaping Use: Never used "  Substance and Sexual Activity   • Alcohol use: Yes     Alcohol/week: 2 0 standard drinks     Types: 2 Glasses of wine per week     Comment: 2 glasses wine a week   • Drug use: Never   • Sexual activity: Not Currently   Other Topics Concern   • Not on file   Social History Narrative   • Not on file     Social Determinants of Health     Financial Resource Strain: Not on file   Food Insecurity: Not on file   Transportation Needs: Not on file   Physical Activity: Not on file   Stress: Not on file   Social Connections: Not on file   Intimate Partner Violence: Not on file   Housing Stability: Not on file         Objective:       Physical Exam:                                                                 Vitals:            Constitutional:    /86 (BP Location: Right arm, Patient Position: Sitting, Cuff Size: Standard)   Pulse 56   Temp 97 7 °F (36 5 °C) (Tympanic)   Ht 5' 4\" (1 626 m)   Wt 74 8 kg (165 lb)   BMI 28 32 kg/m²   BP Readings from Last 3 Encounters:   04/05/23 120/86   03/31/23 104/72   03/30/23 126/85     Pulse Readings from Last 3 Encounters:   04/05/23 56   03/30/23 66   03/03/23 80         Well developed, well nourished, well groomed  No dysmorphic features  HEENT:  Normocephalic atraumatic  Oropharynx appears clear and moist  No oral mucosal lesions noted  Chest:  Respirations appear regular and unlabored  Cardiovascular:  Distal extremities warm without palpable edema or tenderness, no observed significant swelling  Musculoskeletal:  (see below under neurologic exam for evaluation of motor function and gait)   Skin:  warm and dry  No apparent birthmarks or stigmata of neurocutaneous disease  Psychiatric:  Normal behavior and appropriate affect        Neurological Examination:     Mental status/cognitive function:   Recent and remote memory intact  Attention span and concentration as well as fund of knowledge are appropriate for age  Normal language and spontaneous speech   " Cranial Nerves:  II-visual fields full  Fundi poorly visualized due to pupillary constriction  III, IV, VI-Pupils were equal, round, and reactive to light and accommodation  Extraocular movements were full and conjugate without nystagmus  V-facial sensation symmetric  VII-facial expression symmetric, intact forehead wrinkle, strong eye closure, symmetric smile    VIII-hearing grossly intact bilaterally   IX, X-palate elevation symmetric, no dysarthria  XI-shoulder shrug strength intact    XII-tongue protrusion midline  Motor Exam: symmetric bulk and tone throughout, no pronator drift  Power/strength 5/5 bilateral upper and lower extremities, no atrophy, fasciculations or abnormal movements noted  Sensory: grossly intact light touch in all extremities  Reflexes: brachioradialis 2+, biceps 2+, knee 2+, ankle 2+ bilaterally  No ankle clonus  Coordination: Finger nose finger intact bilaterally, no apparent dysmetria, ataxia or tremor noted  Gait: steady casual and tandem gait  Pertinent lab results:   See EMR for recent labs  - 01/14/2022 COVID-19 positive  - 8/19/21 CMP and CBC unremarkable   TSH elevated at 4 09 with normal Free T4     Imaging:   No head imaging in system      MRI brain in her teens was reportedly unremarkable  Review of Systems:   ROS obtained by medical assistant Personally reviewed and updated if indicated  I recommended PCP follow up for non neurologic problems  Review of Systems   Constitutional: Negative for appetite change and fever  HENT: Positive for tinnitus  Negative for hearing loss, trouble swallowing and voice change  Eyes: Negative  Negative for photophobia and pain  Respiratory: Negative  Negative for shortness of breath  Cardiovascular: Negative  Negative for palpitations  Gastrointestinal: Negative  Negative for nausea and vomiting  Endocrine: Negative  Negative for cold intolerance  Genitourinary: Negative    Negative for dysuria, frequency and urgency  Musculoskeletal: Negative  Negative for myalgias and neck pain  Skin: Negative  Negative for rash  Neurological: Positive for dizziness and headaches  Negative for tremors, seizures, syncope, facial asymmetry, speech difficulty, weakness, light-headedness and numbness  Bad short term memory  Trouble concentrating  Muscle spasms in both legs   Hematological: Negative  Does not bruise/bleed easily  Psychiatric/Behavioral: Positive for sleep disturbance  Negative for confusion and hallucinations  I have spent 55 minutes with Patient  today in which greater than 50% of this time was spent in counseling/coordination of care regarding Prognosis, Risks and benefits of tx options, Instructions for management, Patient education, Importance of tx compliance, Risk factor reductions, Impressions, Counseling / Coordination of care, Documenting in the medical record, Reviewing / ordering tests, medicine, procedures   and Obtaining or reviewing history    I also spent 5 minutes non face to face for this patient the same day         Author:  Korina Grewal MD 4/5/2023 4:13 PM

## 2023-04-06 NOTE — TELEPHONE ENCOUNTER
MSW phoned pt and she was agreeable to review list of in network counselors sent via email to   [email or web address]  She will inform this writer which providers she is interested in , in order for MSW to call and learn if they have availability

## 2023-04-10 NOTE — TELEPHONE ENCOUNTER
MSW phoned pt who informed that she received list of counselors, she did not get a chance to review it yet  MSW made her aware that if she needs any assistance to please inform this writer   Pt was agreeable with plan

## 2023-05-08 ENCOUNTER — LAB REQUISITION (OUTPATIENT)
Dept: LAB | Facility: HOSPITAL | Age: 43
End: 2023-05-08

## 2023-05-08 DIAGNOSIS — D22.72 MELANOCYTIC NEVI OF LEFT LOWER LIMB, INCLUDING HIP: ICD-10-CM

## 2023-05-08 DIAGNOSIS — R20.8 OTHER DISTURBANCES OF SKIN SENSATION: ICD-10-CM

## 2023-05-08 DIAGNOSIS — D22.5 MELANOCYTIC NEVI OF TRUNK: ICD-10-CM

## 2023-05-11 DIAGNOSIS — R20.2 PARESTHESIAS: ICD-10-CM

## 2023-05-11 NOTE — TELEPHONE ENCOUNTER
Name of medication/s patient is requesting to be refilled- Calcitriol    Medication dosage 0 25 mg    How often is medication being taken 2 x daily     Is patient requesting 30 or 90 day supply, 30 day     Name of [address] [place] [address],[facility]     Last Visit 3/14/2023  Next Visit - 6/2/2023

## 2023-05-12 RX ORDER — CALCITRIOL 0.25 UG/1
CAPSULE, LIQUID FILLED ORAL
Qty: 120 CAPSULE | Refills: 0 | Status: SHIPPED | OUTPATIENT
Start: 2023-05-12

## 2023-05-31 ENCOUNTER — APPOINTMENT (OUTPATIENT)
Dept: LAB | Age: 43
End: 2023-05-31
Payer: COMMERCIAL

## 2023-05-31 DIAGNOSIS — C73 PAPILLARY THYROID CARCINOMA (HCC): ICD-10-CM

## 2023-05-31 LAB
T4 FREE SERPL-MCNC: 1.6 NG/DL (ref 0.61–1.12)
TSH SERPL DL<=0.05 MIU/L-ACNC: 3.87 UIU/ML (ref 0.45–4.5)

## 2023-05-31 PROCEDURE — 84443 ASSAY THYROID STIM HORMONE: CPT

## 2023-05-31 PROCEDURE — 86800 THYROGLOBULIN ANTIBODY: CPT

## 2023-05-31 PROCEDURE — 36415 COLL VENOUS BLD VENIPUNCTURE: CPT

## 2023-05-31 PROCEDURE — 84432 ASSAY OF THYROGLOBULIN: CPT

## 2023-05-31 PROCEDURE — 84439 ASSAY OF FREE THYROXINE: CPT

## 2023-06-01 LAB
THYROGLOB AB SERPL-ACNC: <1 IU/ML (ref 0–0.9)
THYROGLOB SERPL-MCNC: 0.3 NG/ML (ref 1.5–38.5)

## 2023-06-02 ENCOUNTER — OFFICE VISIT (OUTPATIENT)
Dept: ENDOCRINOLOGY | Facility: CLINIC | Age: 43
End: 2023-06-02

## 2023-06-02 VITALS
HEART RATE: 64 BPM | BODY MASS INDEX: 27.31 KG/M2 | SYSTOLIC BLOOD PRESSURE: 96 MMHG | HEIGHT: 64 IN | DIASTOLIC BLOOD PRESSURE: 58 MMHG | OXYGEN SATURATION: 98 % | WEIGHT: 160 LBS | RESPIRATION RATE: 18 BRPM

## 2023-06-02 DIAGNOSIS — E89.0 POSTOPERATIVE HYPOTHYROIDISM: ICD-10-CM

## 2023-06-02 DIAGNOSIS — E89.2 HYPOPARATHYROIDISM AFTER PROCEDURE (HCC): ICD-10-CM

## 2023-06-02 DIAGNOSIS — C73 PAPILLARY THYROID CARCINOMA (HCC): Primary | ICD-10-CM

## 2023-06-02 DIAGNOSIS — E83.51 HYPOCALCEMIA: ICD-10-CM

## 2023-06-02 NOTE — PROGRESS NOTES
Cari Sequeira 38 y o  female MRN: [id number]    Encounter: [id number]      Assessment/Plan     1  Postoperative hypothyroidism  Currently on levothyroxine 125 mcg once daily, half a pill on Sundays, she is taking it regularly and properly, she is clinically euthyroid, TSH goal is 0 5- 2 0, most recent TFT showed TSH of 3 873 and free T4 of 1 6, TSH is slightly above goal, however free T4 is above normal, I did not change levothyroxine, will repeat TFT in 2 months, she was advised to not take levothyroxine before blood draw for TFT  - TSH, 3rd generation Lab Collect; Future  - T4, free Lab Collect; Future    2  Papillary thyroid carcinoma ([facility] )    Detailed pathology in HPI, patient is low risk for recurrence, thyroglobulin is undetectable and thyroglobulin antibody is negative, no need for radioactive iodine for now  We will repeat thyroid current thyroid antibody in 6 months, he is scheduled for neck ultrasound with lymph node mapping in September     - TSH, 3rd generation Lab Collect; Future  - T4, free Lab Collect; Future    3  Hypocalcemia  Improved, currently on calcium citrate 500 mg 3 times a day and calcitriol 0 25 mcg twice a day  I decrease calcitriol to twice daily, will repeat CMP in 2 months     - Vitamin D 25 hydroxy Lab Collect; Future  - Comprehensive metabolic panel Lab Collect; Future    4  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 [facility])  See plan for hypocalcemia  Thyroid cancer  History of Present Illness      HPI:    Cari Sequeira is a 62-year-old female with papillary thyroid carcinoma, surgical hypoparathyroidism and postoperative hypothyroidism who presented to follow-up  Patient reports feeling significantly better clinically, no symptoms concerning for hypocalcemia, except occasional feeling pins-and-needles in her hands and feet  Recent lab results showed thyroglobulin of 0 3 ng/mL, negative thyroid antibody, TSH of 3 873 and free T4 of 1 60 ng/dl          Review of Systems Constitutional: Positive for fatigue  Negative for appetite change and unexpected weight change  HENT: Negative for trouble swallowing and voice change  Eyes: Negative for visual disturbance  Respiratory: Negative for cough, shortness of breath and wheezing  Cardiovascular: Negative for palpitations and leg swelling  Gastrointestinal: Negative for abdominal pain, constipation, diarrhea, nausea and vomiting  Endocrine: Negative for cold intolerance, heat intolerance, polyphagia and polyuria  Musculoskeletal: Negative for arthralgias  Skin: Negative for color change, rash and wound  Neurological: Negative for dizziness, tremors, weakness, light-headedness, numbness and headaches  Psychiatric/Behavioral: Negative for agitation and sleep disturbance  The patient is not nervous/anxious          Historical Information   Past Medical History:   Diagnosis Date   • Allergic    • Anxiety    • Cancer ([facility] ) 2/1/2023    Thyroid- Papillary carcinoma   • COVID-19 01/2022    mild s/s-not hospitalized, not vaccinated   • GERD (gastroesophageal reflux disease)    • Headache(784 0)    • History of IBS     with constipation   • Ingrown toenail    • Irritable bowel syndrome    • Migraines     Chronic per pt   • Plantar fasciitis    • [facility]    • Varicella    • Venereal wart 08/07/2014     Past Surgical History:   Procedure Laterality Date   • CERVICAL BIOPSY  W/ LOOP ELECTRODE EXCISION  2017   • COLONOSCOPY     • NASAL SEPTUM SURGERY     • CA OSTEOT W/ [address] SHRT/CORRJ 1ST METAR Left 12/30/2022    Procedure: OSTEOTOMY METATARSAL 1st;  Surgeon: Apurva Blood DPM;  Location: [facility];  Service: Podiatry   • THYROIDECTOMY N/A 2/24/2023    Procedure: TOTAL THYROIDECTOMY;  Surgeon: Baldomero Szymanski MD;  Location:  MAIN OR;  Service: Surgical Oncology   • TONSILLECTOMY     • UPPER GASTROINTESTINAL ENDOSCOPY     • US GUIDED THYROID BIOPSY  1/31/2023   • [address] EXTRACTION       Social History   Social History     Substance and Sexual Activity   Alcohol Use Yes   • Alcohol/week: 2 0 standard drinks of alcohol   • Types: 2 Glasses of wine per week    Comment: 2 glasses wine a week     Social History     Substance and Sexual Activity   Drug Use Never     Social History     Tobacco Use   Smoking Status Never   Smokeless Tobacco Never     Family History:   Family History   Problem Relation Age of Onset   • Hypertension Mother    • Arthritis Mother    • Thyroid disease Mother    • Osteoarthritis Mother    • Transient ischemic attack Father    • Hypertension Father    • Stroke Father    • Vision loss Father    • Rashes / Skin problems Sister         Shingles   • Migraines Sister    • No Known Problems Sister    • Uterine cancer Maternal Grandmother    • Cancer Maternal Grandmother         Uterine   • Colon cancer Maternal Grandfather    • Cancer Maternal Grandfather         Colon   • Lung cancer Paternal Grandmother 61   • Cancer Paternal Grandmother         Lung   • Heart attack Paternal Grandfather    • Colon cancer Cousin 27   • Breast cancer Maternal Aunt    • Breast cancer Maternal Aunt    • Breast cancer Maternal Aunt        Meds/Allergies   Current Outpatient Medications   Medication Sig Dispense Refill   • acetaminophen (TYLENOL) 500 mg tablet Take 1,000 mg by mouth every 6 (six) hours as needed for mild pain     • calcitriol (ROCALTROL) 0 25 mcg capsule Take 1 tablet twice daily 120 capsule 0   • dexamethasone (DECADRON) 2 mg tablet One tab with breakfast for 1-5 days for unrelenting migraine 5 tablet 0   • Galcanezumab-gnlm 120 MG/ML SOAJ Inject 120 mg under the skin every 28 days 1 mL 11   • Levocetirizine Dihydrochloride (XYZAL PO) Take by mouth daily at bedtime     • levothyroxine (Synthroid) 125 mcg tablet Take 1 tablet (125 mcg total) by mouth daily 30 tablet 3   • Melatonin 5 MG TABS Take by mouth as needed     • meloxicam (MOBIC) 15 mg tablet Take 15 mg by mouth daily as needed     • "omeprazole (PriLOSEC) 40 MG capsule Take 1 capsule (40 mg total) by mouth daily 30 capsule 2   • propranolol (INDERAL) 40 mg tablet 2 (two) times a day     • rizatriptan (MAXALT) 10 mg tablet Take 1 tablet (10 mg total) by mouth once as needed for migraine May repeat in 2 hours if needed  Max 2/24 hours, 9/month  9 tablet 12   • topiramate (TOPAMAX) 25 mg tablet 1 tab PO QHS for 1 week, increase as tolerated to 2 tabs QHS for 1 week, then 3 tabs QHS for 1 week and finish at 4 tabs nightly or stop at tolerated dose 120 tablet 4   • zolpidem (AMBIEN) 10 mg tablet Take 10 mg by mouth daily at bedtime as needed     • calcium citrate (CALCITRATE) 950 (200 Ca) MG tablet Take 1 tablet (950 mg total) by mouth 2 (two) times a day (Patient taking differently: Take 1 tablet by mouth 3 (three) times a day) 120 tablet 0   • cholecalciferol (VITAMIN D3) 1,000 units tablet Take 1 tablet (1,000 Units total) by mouth daily Do not start before March 1, 2023  30 tablet 0   • lubiprostone (AMITIZA) 24 mcg capsule Take 1 capsule (24 mcg total) by mouth 2 (two) times a day with meals (Patient not taking: Reported on 4/5/2023) 60 capsule 2   • multivitamin (THERAGRAN) TABS Take 1 tablet by mouth daily (Patient not taking: Reported on 4/5/2023)       No current facility-administered medications for this visit  No Known Allergies    Objective   Vitals: Blood pressure 96/58, pulse 64, resp  rate 18, height 5' 4\" (1 626 m), weight 72 6 kg (160 lb), SpO2 98 %  Physical Exam  Constitutional:       Appearance: She is well-developed  HENT:      Head: Normocephalic and atraumatic  Nose: Nose normal    Eyes:      Pupils: Pupils are equal, round, and reactive to light  Neck:      Thyroid: No thyromegaly  Vascular: No JVD  Comments: Total thyroidectomy scar, well-healed  Cardiovascular:      Rate and Rhythm: Normal rate and regular rhythm  Heart sounds: Normal heart sounds  No murmur heard  No friction rub   No " gallop  Pulmonary:      Effort: Pulmonary effort is normal  No respiratory distress  Breath sounds: Normal breath sounds  No stridor  No wheezing or rales  Chest:      Chest wall: No tenderness  Abdominal:      General: Bowel sounds are normal  There is no distension  Palpations: Abdomen is soft  There is no mass  Tenderness: There is no abdominal tenderness  There is no guarding  Musculoskeletal:         General: No deformity  Normal range of motion  Cervical back: Normal range of motion  Skin:     General: Skin is warm  Neurological:      Mental Status: She is alert and oriented to person, place, and time  The history was obtained from the review of the chart, patient  Lab Results:   Lab Results   Component Value Date/Time    Free T4 1 60 (H) 05/31/2023 07:07 AM    Free T4 1 59 (H) 03/02/2023 04:29 PM    Free T4 1 27 02/03/2023 01:34 PM    Thyroglobulin Ab <1 0 05/31/2023 07:07 AM    Thyroglobulin Ab <1 0 02/03/2023 01:34 PM    TSH 3RD GENERATON 3 873 05/31/2023 07:07 AM    TSH 3RD GENERATON 0 167 (L) 03/02/2023 04:29 PM    TSH 3RD GENERATON 1 900 02/03/2023 01:34 PM       Component      Latest Ref Rng & Units 5/31/2023   Free T4      0 61 - 1 12 ng/dL 1 60 (H)   TSH 3RD GENERATON      0 450 - 4 500 uIU/mL 3 873   THYROGLOBULIN AB      0 0 - 0 9 IU/mL <1 0   Thyroglobulin-MACHO      1 5 - 38 5 ng/mL 0 3 (L)       Imaging Studies:  Results for orders placed during the hospital encounter of 02/10/23    US head neck lymph node mapping    Impression  No evidence of metastatic disease        Workstation performed: [id number]    Procedure  Total thyroidectomy    TUMOR   Tumor Focality  Unifocal    Tumor Characteristics     Tumor Site  Left lobe    Tumor Size  Greatest Dimension (Centimeters): 1 2 cm   Histologic Type  Papillary carcinoma, classic (usual, conventional)    Angioinvasion (vascular invasion)  Not identified    Lymphatic Invasion  Not identified    Extrathyroidal "Extension  Not identified    Margin Status  All margins negative for carcinoma    Distance from Invasive Carcinoma to Closest Margin  1 mm   REGIONAL LYMPH NODES   Regional Lymph Node Status  Not applicable (no regional lymph nodes submitted or found)    PATHOLOGIC STAGE CLASSIFICATION (pTNM, AJCC 8th Edition)   Reporting of pT, pN, and (when applicable) pM categories is based on information available to the pathologist at the time the report is issued  As per the AJCC (Chapter 1, 8th Ed ) it is the managing physician’s responsibility to establish the final pathologic stage based upon all pertinent information, including but potentially not limited to this pathology report  pT Category  pT1b    pN Category  pN not assigned (no nodes submitted or found)    ADDITIONAL FINDINGS   Additional Findings  None identified      Gross Description           I have personally reviewed pertinent reports  Portions of the record may have been created with voice recognition software  Occasional wrong word or \"sound a like\" substitutions may have occurred due to the inherent limitations of voice recognition software  Read the chart carefully and recognize, using context, where substitutions have occurred    "

## 2023-06-08 ENCOUNTER — OFFICE VISIT (OUTPATIENT)
Dept: INTERNAL MEDICINE CLINIC | Facility: OTHER | Age: 43
End: 2023-06-08
Payer: COMMERCIAL

## 2023-06-08 VITALS
HEIGHT: 64 IN | DIASTOLIC BLOOD PRESSURE: 76 MMHG | HEART RATE: 72 BPM | RESPIRATION RATE: 18 BRPM | TEMPERATURE: 98.9 F | WEIGHT: 161.2 LBS | SYSTOLIC BLOOD PRESSURE: 112 MMHG | OXYGEN SATURATION: 100 % | BODY MASS INDEX: 27.52 KG/M2

## 2023-06-08 DIAGNOSIS — M54.50 ACUTE LEFT-SIDED LOW BACK PAIN WITHOUT SCIATICA: ICD-10-CM

## 2023-06-08 DIAGNOSIS — M54.13 RADICULOPATHY OF CERVICOTHORACIC REGION: ICD-10-CM

## 2023-06-08 DIAGNOSIS — M54.6 ACUTE LEFT-SIDED THORACIC BACK PAIN: Primary | ICD-10-CM

## 2023-06-08 PROCEDURE — 99214 OFFICE O/P EST MOD 30 MIN: CPT | Performed by: PHYSICIAN ASSISTANT

## 2023-06-08 RX ORDER — PREDNISONE 10 MG/1
TABLET ORAL
Qty: 20 TABLET | Refills: 0 | Status: SHIPPED | OUTPATIENT
Start: 2023-06-08

## 2023-06-08 RX ORDER — METHOCARBAMOL 500 MG/1
500 TABLET, FILM COATED ORAL
Qty: 30 TABLET | Refills: 0 | Status: SHIPPED | OUTPATIENT
Start: 2023-06-08

## 2023-06-08 RX ORDER — CLINDAMYCIN PHOSPHATE 10 UG/ML
LOTION TOPICAL DAILY
COMMUNITY
Start: 2023-05-30

## 2023-06-08 NOTE — PROGRESS NOTES
Assessment/Plan:         Diagnoses and all orders for this visit:    Acute left-sided thoracic back pain  Comments:  heat affected area, may use topical pain patches  short pred course take with food  robaxin qhs    Orders:  -     methocarbamol (ROBAXIN) 500 mg tablet; Take 1 tablet (500 mg total) by mouth daily at bedtime as needed for muscle spasms  -     XR spine thoracic 3 vw; Future    Radiculopathy of cervicothoracic region  -     predniSONE 10 mg tablet; 4 tabs daily for 2 days then 3 tabs daily x 2 days then 2 tabs daily x 2 days then 1 tab daily for 2 days  Acute left-sided low back pain without sciatica  Comments:  xray and PT if persists  Orders:  -     XR spine lumbar minimum 4 views non injury; Future    Other orders  -     clindamycin (CLEOCIN T) 1 % lotion; in the morning          Subjective:      Patient ID: Rohan Menchaca is a 38 y o  female  39 y/o female c/o L low back pain  Pt states she was sitting outside at a concert last Thursday, states it seems to have started after this, pt states it has persited since then  Pt states sitting in a chair feels worse and flexion of the back exacerbates it  Pt states its always there, notices especially when holding something waist height  Pt denies numbness/tingling in hands related to this  Pt tried melxociam / tylenol and lidocaine patches with minimal relief  Sleeping on stomach seems to help  Has had pain in this area in the past         The following portions of the patient's history were reviewed and updated as appropriate: allergies, current medications, past family history, past medical history, past social history, past surgical history and problem list     Review of Systems   Constitutional: Negative for activity change, appetite change, chills, diaphoresis, fatigue and fever  HENT: Negative for congestion and sore throat  Respiratory: Negative for cough, shortness of breath and wheezing      Cardiovascular: Negative for chest pain and leg swelling  Gastrointestinal: Negative for abdominal pain, constipation, diarrhea and nausea  Musculoskeletal: Positive for arthralgias, back pain (mid back pain) and neck pain  Negative for joint swelling  Neurological: Negative for dizziness and headaches  Psychiatric/Behavioral: Positive for sleep disturbance  The patient is not nervous/anxious            Past Medical History:   Diagnosis Date   • Allergic    • Anxiety    • Cancer ([facility] [address] ) 2/1/2023    Thyroid- Papillary carcinoma   • COVID-19 01/2022    mild s/s-not hospitalized, not vaccinated   • GERD (gastroesophageal reflux disease)    • Headache(784 0)    • History of IBS     with constipation   • Ingrown toenail    • Irritable bowel syndrome    • Migraines     Chronic per pt   • Plantar fasciitis    • Thyroid carcinoma (HCC)    • Varicella    • Venereal wart 08/07/2014         Current Outpatient Medications:   •  acetaminophen (TYLENOL) 500 mg tablet, Take 1,000 mg by mouth every 6 (six) hours as needed for mild pain, Disp: , Rfl:   •  calcitriol (ROCALTROL) 0 25 mcg capsule, Take 1 tablet twice daily, Disp: 120 capsule, Rfl: 0  •  calcium citrate (CALCITRATE) 950 (200 Ca) MG tablet, Take 1 tablet (950 mg total) by mouth 2 (two) times a day, Disp: 120 tablet, Rfl: 0  •  cholecalciferol (VITAMIN D3) 1,000 units tablet, Take 1 tablet (1,000 Units total) by mouth daily Do not start before March 1, 2023 , Disp: 30 tablet, Rfl: 0  •  clindamycin (CLEOCIN T) 1 % lotion, in the morning, Disp: , Rfl:   •  Galcanezumab-gnlm 120 MG/ML SOAJ, Inject 120 mg under the skin every 28 days, Disp: 1 mL, Rfl: 11  •  Levocetirizine Dihydrochloride (XYZAL PO), Take by mouth daily at bedtime, Disp: , Rfl:   •  levothyroxine (Synthroid) 125 mcg tablet, Take 1 tablet (125 mcg total) by mouth daily, Disp: 30 tablet, Rfl: 3  •  Melatonin 5 MG TABS, Take by mouth as needed, Disp: , Rfl:   •  meloxicam (MOBIC) 15 mg tablet, Take 15 mg by mouth daily as needed, Disp: , Rfl:   • methocarbamol (ROBAXIN) 500 mg tablet, Take 1 tablet (500 mg total) by mouth daily at bedtime as needed for muscle spasms, Disp: 30 tablet, Rfl: 0  •  multivitamin (THERAGRAN) TABS, Take 1 tablet by mouth daily, Disp: , Rfl:   •  omeprazole (PriLOSEC) 40 MG capsule, Take 1 capsule (40 mg total) by mouth daily, Disp: 30 capsule, Rfl: 2  •  predniSONE 10 mg tablet, 4 tabs daily for 2 days then 3 tabs daily x 2 days then 2 tabs daily x 2 days then 1 tab daily for 2 days  , Disp: 20 tablet, Rfl: 0  •  propranolol (INDERAL) 40 mg tablet, 2 (two) times a day, Disp: , Rfl:   •  rizatriptan (MAXALT) 10 mg tablet, Take 1 tablet (10 mg total) by mouth once as needed for migraine May repeat in 2 hours if needed   Max 2/24 hours, 9/month , Disp: 9 tablet, Rfl: 12  •  topiramate (TOPAMAX) 25 mg tablet, 1 tab PO QHS for 1 week, increase as tolerated to 2 tabs QHS for 1 week, then 3 tabs QHS for 1 week and finish at 4 tabs nightly or stop at tolerated dose (Patient taking differently: Take 25 mg by mouth 2 (two) times a day 1 tab PO QHS for 1 week, increase as tolerated to 2 tabs QHS for 1 week, then 3 tabs QHS for 1 week and finish at 4 tabs nightly or stop at tolerated dose), Disp: 120 tablet, Rfl: 4  •  zolpidem (AMBIEN) 10 mg tablet, Take 10 mg by mouth daily at bedtime as needed, Disp: , Rfl:   •  lubiprostone (AMITIZA) 24 mcg capsule, Take 1 capsule (24 mcg total) by mouth 2 (two) times a day with meals (Patient not taking: Reported on 4/5/2023), Disp: 60 capsule, Rfl: 2    No Known Allergies    Social History   Past Surgical History:   Procedure Laterality Date   • CERVICAL BIOPSY  W/ LOOP ELECTRODE EXCISION  2017   • COLONOSCOPY     • NASAL SEPTUM SURGERY     • ID OSTEOT W/ [address] SHRT/CORRJ 1ST METAR Left 12/30/2022    Procedure: OSTEOTOMY METATARSAL 1st;  Surgeon: Kiran Copeland DPM;  Location: [facility];  Service: Podiatry   • THYROIDECTOMY N/A 2/24/2023    Procedure: TOTAL THYROIDECTOMY;  Surgeon: Yury Miner MD; " Location: [facility];  Service: Surgical Oncology   • TONSILLECTOMY     • UPPER GASTROINTESTINAL ENDOSCOPY     • US GUIDED THYROID BIOPSY  1/31/2023   • WISDOM TOOTH EXTRACTION       Family History   Problem Relation Age of Onset   • Hypertension Mother    • Arthritis Mother    • Thyroid disease Mother    • Osteoarthritis Mother    • Transient ischemic attack Father    • Hypertension Father    • Stroke Father    • Vision loss Father    • Rashes / Skin problems Sister         Shingles   • Migraines Sister    • No Known Problems Sister    • Uterine cancer Maternal Grandmother    • Cancer Maternal Grandmother         Uterine   • Colon cancer Maternal Grandfather    • Cancer Maternal Grandfather         Colon   • Lung cancer Paternal Grandmother 61   • Cancer Paternal Grandmother         Lung   • Heart attack Paternal Grandfather    • Colon cancer Cousin 27   • Breast cancer Maternal Aunt    • Breast cancer Maternal Aunt    • Breast cancer Maternal Aunt        Objective:  /76 (BP Location: Left arm, Patient Position: Sitting, Cuff Size: Standard)   Pulse 72   Temp 98 9 °F (37 2 °C) (Temporal)   Resp 18   Ht 5' 4\" (1 626 m)   Wt 73 1 kg (161 lb 3 2 oz)   SpO2 100%   BMI 27 67 kg/m²        Physical Exam  Vitals reviewed  Constitutional:       General: She is not in acute distress  HENT:      Head: Normocephalic and atraumatic  Nose: Nose normal       Mouth/Throat:      Mouth: Mucous membranes are moist    Eyes:      General:         Right eye: No discharge  Left eye: No discharge  Extraocular Movements: Extraocular movements intact  Conjunctiva/sclera: Conjunctivae normal       Pupils: Pupils are equal, round, and reactive to light  Cardiovascular:      Rate and Rhythm: Normal rate and regular rhythm  Pulmonary:      Effort: Pulmonary effort is normal  No respiratory distress  Breath sounds: Normal breath sounds  No wheezing, rhonchi or rales     Musculoskeletal:         " General: Tenderness (L paraspinals thoracic to lumbar region) present  Skin:     Findings: No bruising, erythema or rash  Neurological:      General: No focal deficit present  Mental Status: She is alert and oriented to person, place, and time        Deep Tendon Reflexes: Reflexes normal    Psychiatric:         Mood and Affect: Mood normal          Behavior: Behavior normal

## 2023-06-12 ENCOUNTER — TELEPHONE (OUTPATIENT)
Dept: OBGYN CLINIC | Facility: CLINIC | Age: 43
End: 2023-06-12

## 2023-06-15 ENCOUNTER — HOSPITAL ENCOUNTER (OUTPATIENT)
Dept: SLEEP CENTER | Facility: CLINIC | Age: 43
Discharge: HOME | End: 2023-06-15
Payer: COMMERCIAL

## 2023-06-15 DIAGNOSIS — G47.33 OBSTRUCTIVE SLEEP APNEA (ADULT) (PEDIATRIC): ICD-10-CM

## 2023-06-15 PROCEDURE — G0399 HOME SLEEP TEST/TYPE 3 PORTA: HCPCS

## 2023-06-16 ENCOUNTER — HOSPITAL ENCOUNTER (OUTPATIENT)
Dept: RADIOLOGY | Facility: HOSPITAL | Age: 43
Discharge: HOME | End: 2023-06-16
Payer: COMMERCIAL

## 2023-06-16 DIAGNOSIS — M54.6 ACUTE LEFT-SIDED THORACIC BACK PAIN: ICD-10-CM

## 2023-06-16 DIAGNOSIS — M54.50 ACUTE LEFT-SIDED LOW BACK PAIN WITHOUT SCIATICA: ICD-10-CM

## 2023-06-16 PROCEDURE — 72110 X-RAY EXAM L-2 SPINE 4/>VWS: CPT

## 2023-06-16 PROCEDURE — 72072 X-RAY EXAM THORAC SPINE 3VWS: CPT

## 2023-06-17 NOTE — PROGRESS NOTES
Home Sleep Study Documentation    HOME STUDY DEVICE: Noxturnal no                                           Mena G3 yes      Pre-Sleep Home Study:    Set-up and instructions performed by: home study tech    Technician performed demonstration for Patient: yes    Return demonstration performed by Patient: yes    Written instructions provided to Patient: yes    Patient signed consent form: yes        Post-Sleep Home Study:    Additional comments by Patient: none    Home Sleep Study Failed:no:    Failure reason: N/A    Reported or Detected: N/A    Scored by: SALINA Briggs

## 2023-06-23 ENCOUNTER — TELEPHONE (OUTPATIENT)
Dept: SLEEP CENTER | Facility: CLINIC | Age: 43
End: 2023-06-23

## 2023-06-23 NOTE — TELEPHONE ENCOUNTER
Called patient and left message advising sleep study resulted and does not show sleep apnea (Patient also viewed the results via [employer])  Patient is scheduled for sleep consult with Dr Johanny Navarro on 8/1/23

## 2023-06-27 ENCOUNTER — OFFICE VISIT (OUTPATIENT)
Dept: NEUROLOGY | Facility: CLINIC | Age: 43
End: 2023-06-27
Payer: COMMERCIAL

## 2023-06-27 VITALS
HEIGHT: 64 IN | HEART RATE: 67 BPM | SYSTOLIC BLOOD PRESSURE: 114 MMHG | BODY MASS INDEX: 27.31 KG/M2 | DIASTOLIC BLOOD PRESSURE: 79 MMHG | WEIGHT: 160 LBS | TEMPERATURE: 98.1 F

## 2023-06-27 DIAGNOSIS — G47.33 OBSTRUCTIVE SLEEP APNEA (ADULT) (PEDIATRIC): ICD-10-CM

## 2023-06-27 DIAGNOSIS — G43.109 MIGRAINE WITH AURA AND WITHOUT STATUS MIGRAINOSUS, NOT INTRACTABLE: Primary | ICD-10-CM

## 2023-06-27 DIAGNOSIS — G93.2 IIH (IDIOPATHIC INTRACRANIAL HYPERTENSION): ICD-10-CM

## 2023-06-27 PROCEDURE — 99215 OFFICE O/P EST HI 40 MIN: CPT | Performed by: PSYCHIATRY & NEUROLOGY

## 2023-06-27 RX ORDER — ACETAZOLAMIDE 125 MG/1
TABLET ORAL
Qty: 120 TABLET | Refills: 6 | Status: SHIPPED | OUTPATIENT
Start: 2023-06-27

## 2023-06-27 RX ORDER — RIMEGEPANT SULFATE 75 MG/75MG
TABLET, ORALLY DISINTEGRATING ORAL
Qty: 16 TABLET | Refills: 11 | Status: SHIPPED | OUTPATIENT
Start: 2023-06-27

## 2023-06-27 NOTE — PROGRESS NOTES
[address] [facility] Consult - Follow up   PATIENT:  Julian Robles  MRN:  [id number]  :  1980  DATE OF SERVICE:  2023  REFERRED BY: No ref  provider found  PMD: Bonnell Holstein, DO    Assessment/Plan:   Julian Robles is a deligthful 38 y o  female with a past medical history that includes papillary thyroid cancer status post total thyroidectomy 2023 with postop hypoparathyroidism and significantly low calcium now on replacement, IBS with constipation, allergic rhinitis, chronic sinusitis, migraines, TOSHIA II, insomnia, neck pain, vasovagal syncope in 20s, anxiety, Intestinal cystitis, childhood asthma  referred here for evaluation of headache  My initial evaluation 3/28/2022    Migraine with aura and without status migrainosus, not intractable  Post traumatic stress disorder   Suspected IIH (idiopathic intracranial hypertension)  She reports a long history of headaches and migraines dating back to 10or 8years old  Family history of migraines  She reports she has followed with multiple neurologists in the past   Pain is typically unilateral more often left-sided with visual aura at times and with typical associated migrainous features as well as autonomic features that do not seem to be unilateral to suggest trigeminal autonomic cephalalgia   -as of 2022 on average over 15 migraine days per month  Trial of emgality for prevention rizatriptan for abortive  - as of 2022:  She reports she has had significant improvement on emgality down from 15 to 7-8 times a month and severity has improved as well  They respond to rizatriptan which she feels she tolerates more than sumatriptan  - as of 2023: She reports she continues to do very well on Emgality with about 4 migraines a month that resolve with rizatriptan  Some wearing off when due for next dose and recommended taking it at 28 days rather than 30 or 31    She would like to stay on propranolol 40 mg twice daily for now as she feels this is helping prevent tachycardia from causing migraine  She was able to get off nortriptyline summer 2022 which she was on for an interstitial cystitis without increase     - as of 4/5/2023:  She returns sooner with daily migraines now following papillary thyroid cancer status post total thyroidectomy 2/24/2023 with postop hypoparathyroidism and significantly low calcium now on replacement with some symptoms possibly consistent with high calcium and upcoming recheck of labs through endocrinology may suggest lowering calcium meds, but would defer to endocrine  She certainly has had increase in her migrainous symptoms as well as some symptoms possibly consistent with sleep apnea and I ordered sleep study  She reports she has had improvement in symptoms since the surgery but still was hoping there is something that could improve them as all the other doctors just told her to wait it out  We will start low-dose topiramate at night to see if this can help if tolerated  We also discussed PTSD and how it can cause intrusion, avoidance, negative impact on cognition and mood as well as arousal and reactivity changes and certainly sounds like she has symptoms of this as well and recommend counseling  We will have our  reach out to see if she can help  - as of 6/27/2023: She reports increasing frequency and severity of headaches overall and currently 3-4 a week  She did not tolerate trial of topiramate as it worsened mood which is slightly improved from its low point, but she plans to follow-up with counselor  We discussed trial of acetazolamide/Diamox for suspected subclinical IIH with cervical medullary compression as well as MRI brain to rule out other causes of intracranial hypertension and look for signs of such  Diagnostic sleep study ordered due to suspected false negative home study  Has Decadron available for worse symptoms if needed    Rizatriptan side effects and will add trial of Nurtec which also could help with prevention the more she takes it  Workup:  -Due to increased frequency and severity of headaches and migraines as well as concern for increased intracranial pressure which requires ruling out nefarious pathology in the brain increasing pressure, recommend further evaluation with MRI brain with and without contrast to rule out structural or treatable causes of symptoms  We will pay close attention to the cerebellar tonsils to see if there is any cerebellar ectopia or signs of increased intracranial pressure  Preventative:  - we discussed headache hygiene and lifestyle factors that may improve headaches  - continue  emgality every 28 days  Discussed proper use, possible side effects and risks  -   Trial of  acetaZOLAMIDE (DIAMOX) 125 mg tablet; 125 mg PO nightly for 1 week, then increase to 125 mg BID for 1 week, then 125 mg in am and 250 mg in pm for 1 week, then 250 mg BID  Discussed proper use, possible side effects and risks  - Currently on through other providers: propranolol 40 mg BID, ambien 10 mg- (off and on 6 years), melatonin  - Past/ failed/contraindicated: topiramate, gabapentin, propranolol 40 mg BID, nortriptyline, amitriptyline, prosac/fluoxetine, aimovig contraindicated due to constiptation  - future options: alternative CGRP, botox     Abortive:  - discussed not taking over-the-counter or prescription pain medications more than 3 days per week to prevent medication overuse/rebound headache  -  Trial of dexamethasone (DECADRON) 2 mg tablet; One tab with breakfast for 1-5 days for unrelenting migraine  Discussed proper use, possible side effects and risks  -    Trial of  rimegepant sulfate (Nurtec) 75 mg TBDP; Take one NURTEC 75 mg at onset under tongue  Limit 1 in 24 hours    Discussed proper use, possible side effects and risks    - Past/ failed/contraindicated: sumatriptan 100 mg works sometimes and not others, rizatriptan   - past/helped: "Steroids have helped temporarily, toradol IM in the past helped   - future options:   prochlorperazine, Toradol IM or p o , could consider trial for 5 days of Depakote or dexamethasone for prolonged migraine, ubrelvy, reyvow    Insomnia, prolonged Ambien use  Suspected sleep apnea  -     Ambulatory referral to Sleep Medicine placed 1/11/2023  - Diagnostic sleep study       Patient instructions        MRI Brain ordered     No estrogen due to migraine aura    \"why we sleep\" by Katrin Mercado - good read or listen on audible    - \"Rewire Your Anxious Brain: How to Use the Neuroscience of Fear to End Anxiety, Panic, and Worry\" By Gabrielle Benavidez PhD    - consider reading or listening to the book \"The body keeps the score\" - interesting book on how PTSD and stress can impact the body and cause physical symptoms    Our  will reach out to you with a list of providers as well that are covered by your insurance  - [facility] [address] [facility] have had patients recommend the following female therapists  (I am assuming female by their names of course and I do not know them myself): - Adry Queen PhD in [address] [facility] [address] with Mahogany Gabriel in [address] [facility] in [address] [facility] in [address] [address] in [address] [facility] in [address] [facility] in [place]     I am placing a referral to the sleep medicine specialist team to further evaluate your sleep issues  Please call [phone] to schedule and I recommend you see one of the following providers:  - Maikol Dawkins MD - sleep doctor who is a neurologist and is excellent       Headache/migraine treatment:   Abortive medications (for immediate treatment of a headache):    It is ok to take ibuprofen, acetaminophen or naproxen (Advil, Tylenol,  Aleve, Excedrin) if they help your headaches you should limit " these to No more than 3 times a week to avoid medication overuse/rebound headaches  -     rimegepant sulfate (Nurtec) 75 mg TBDP; Take one NURTEC 75 mg at onset under tongue  Limit 1 in 24 hours  Prior auth, coupon card for copay     -     dexamethasone (DECADRON) 2 mg tablet; One tab with breakfast for 1-5 days for unrelenting migraine    For your more moderate to severe migraines take this medication early   Maxalt (rizatriptan) 10mg tabs - take one at the onset of headache  May repeat one time after 1-2 hours if pain has not resolved  (Max 2 a day and 9 a month)     Prescription preventive medications for headaches/migraines   (to take every day to help prevent headaches - not to take at the time of headache):  [x] - decrease topiramate to 25 mg nightly for 1 week and then stop    Emgality/Galcanezumab -  120 mg injections every 28 days     READ INSTRUCTIONS that come with the medication  REFRIGERATE  Keep out of direct sunlight  Prior to administration, allow to come to room temperature for 30 minutes  Do not warm using a heat source (eg, microwave or hot water)  Do not shake  Administer in preferably abdomen (avoiding 2 inches around the navel), thigh, upper arm, or buttocks avoiding areas of skin that are tender, bruised, red or hard  Deliver entire contents of single-use prefilled pen or syringe  Unknown impact in pregnancy therefore would recommend stopping 6 months prior to considering pregnancy      Let me know if you ever want to try and slowly come off propranolol   - especially if BP lowers on diamox      I suggest trial of lower dose diamox than we typically use in more severe cases -  -     acetaZOLAMIDE (DIAMOX) 125 mg tablet; 125 mg PO nightly for 1 week, then increase to 125 mg  in am and in pm for 1 week, then 125 mg in am and 250 mg in pm for 1 week, then 250 mg in am and in pm        -If you had papilledema or swelling behind your eyes we would rapidly get you up to 500 mg twice a day and may need to go higher  I have 1 patient who is up to 3000 mg in a day just for reference and I will be starting you on 125 mg       - if a lower dose is helpful, can stay lower, if higher dose causes side effects, go back to last tolerated dose  If you get all the way up to the dose recommended and is not helping enough let me know as I will absolutely go higher     - make sure to stay hydrated while on this as can cause dehydration since that is it's purpose to take fluid off    - most common side effect is tingling of the nerves at times  - can cause electrolyte disturbances, but not typically in any significant way  However, be cautious if taking with other meds that lower potassium like hydrochlorothiazide etc      *Typically these types of medications take time untill you see the benefit, although some may see improvement in days, often it may take weeks, especially if the medication is being titrated up to a beneficial level  Please contact us if there are any concerns or questions regarding the medication  Lifestyle Recommendations:  [x] SLEEP - Maintain a regular sleep schedule: Adults need at least 7-8 hours of uninterrupted a night  Maintain good sleep hygiene:  Going to bed and waking up at consistent times, avoiding excessive daytime naps, avoiding caffeinated beverages in the evening, avoid excessive stimulation in the evening and generally using bed primarily for sleeping  One hour before bedtime would recommend turning lights down lower, decreasing your activity (may read quietly, listen to music at a low volume)  When you get into bed, should eliminate all technology (no texting, emailing, playing with your phone, iPad or tablet in bed)  [x] HYDRATION - Maintain good hydration  Drink  2L of fluid a day (4 typical small water bottles)  [x] DIET - Maintain good nutrition  In particular don't skip meals and try and eat healthy balanced meals regularly    [x] TRIGGERS - Look for other triggers and avoid them: Limit caffeine to 1-2 cups a day or less  Avoid dietary triggers that you have noticed bring on your headaches (this could include aged cheese, peanuts, MSG, aspartame and nitrates)  [x] EXERCISE - physical exercise as we all know is good for you in many ways, and not only is good for your heart, but also is beneficial for your mental health, cognitive health and  chronic pain/headaches  I would encourage at the least 5 days of physical exercise weekly for at least 30 minutes  Education and Follow-up  [x] Please call with any questions or concerns  Of course if any new concerning symptoms go to the emergency department  [x] Follow up  2-3 months,  sooner if needed    - As we discussed if there are no abnormalities on the brain MRI that need urgent intervention (very often there are incidental findings that may not mean anything significant and are better discussed in person), you will not necessarily receive a call from us, but feel free to call in to check on the status of the report (since not knowing can be anxiety provoking) and the nurses will let you know the result or make sure I have nothing to pass on regarding the results first   Ideally, all of this will be discussed in detail in the follow-up visit  CC:   We had the pleasure of evaluating Liza Green in neurological consultation today  Liza Green is a   right handed female who presents today for evaluation of headaches  History obtained from patient as well as available medical record review    History of Present Illness:   Interval history as of 6/27/2023  - no significant new or concerning neurologic symptoms since last visit  - dealing with hypocalcemia following surgery and mood symptoms, fatigue,   - eye doctor May and vision is great and better than 20/20  - since last visit hurt her back, mid back muscle spasm and was bad so was put on steroids and MSK relaxor    Headaches and migraines   3-4 headaches a week rizatriptan helps   Worse the past month   Weather     Preventative:   - trial of topiramate and was at 75 mg for 3 weeks and then stopped because she was having weird dark thoughts - 50 mg at night, mood is better   - emgality helping - PA renewed last time, every 28 days     Abortive:   Rizatriptan - nauseated and lethargic   - decadron not needed since last visit, but took other steroids   Denies bothersome side effects      Sleep study    The patient had a total of 5 respiratory events made up of 2 obstructive apneas, 0 central apneas, 0 mixed apneas and 2 hypopneas resulting in a respiratory event index (BRYCE) of 0 7  The lowest SpO2 recorded is 92%  The snore index was 0 2%  IMPRESSION:  This study did not demonstrate evidence for obstructive sleep apnea  Interval history as of 4/5/2023  - 2/24/23 thyroid resection and post op had severe hypoparathyroidism and they wanted to readmit her and then followed up with endocrine and continue calcium dose   - symptoms of hypoparathyroid intially and now symptoms of hyperparathyroidism    - all of the symptoms of PTSD, overwhelmed with noises   - was not on phone for a week because could not look at it     Headaches and migraines   - A lot better than she was, could not speak for a while and spasms, tetany, couldn't speak, much better    - daily headaches since the surgery, first in the hospital was way worse and steroids helped, and then headache never resolved   Worse at night before bed, comes and goes, not all day, better   - Mild headache now, they are daily, migraines nearly daily, not last night, every other night   - Dizziness better, but still there, may have had vertigo in the past    Tinnitus both ears, Right >left 30 seconds of muffled hearing at times, rarely in the past     Preventative:   - emgality monthly - [employer] - a few days ago     Abortive:   - rizatriptan once dose often helps   Denies bothersome side effects       Sleep   - averages: 5-6 "hours, sometimes takes ambien, snores   Problems falling asleep?:   Yes  Problems staying asleep?:  Yes, 3-4 a night  - tired all the time    How likely are you to doze off or fall sleep during the following situations? 0 - would never doze  1 - slight chance of dozing  2 - moderate chance of dozing  3 - high chance of dozing  [place] sleepiness scale   Sitting and reading - 2  Watching TV - 3  Sitting, inactive in a public place such as a theater 1  As a passenger in a car for an hour without a break 1  Lying down to rest in afternoon when circumstances permit 3  Sitting and talking to someone 0  Sitting quietly after lunch without alcohol 2  In a car while stopped for few minutes in traffic - 0      Interval history as of 1/11/2023  - denies any new or concerning neurologic symptoms since last visit   - foot surgery 12/30/22  - thyroid bx coming up    Headaches and migraines   She is doing much better, now about 4 a month and rizatriptan works well, [employer]  Nov had one that lasted a week  Last was Dec 29th  Wearing off effect when due for next dose, maybe 3 days before     Preventative:    - emgality     Denies bothersome side effects  - Currently on through other providers: propranolol 40 mg BID (could consider gradual wean off in the future),   nortriptyline 20 mg (for Intestinal cystitis) - stopped around July 2022 - IC not worse off of it, ambien 10 mg (previously on 5 and plans on going back, moved in April and loud) - (off and on 5 years)    Abortive:   - rizatriptan - working well now, rarely needs two  Denies bothersome side effects        Interval history as of 7/1/2022  - denies any new or concerning neurologic symptoms since last visit      Headaches and migraines   She reports improvement on emgality which has brought migraines down from 15 days a month to 7-8 a month     Less severe as well   The first month did the best, \"almost forgot I had migraines for a while\"   Some wearing off effect " when due for next shot   - triggered by weather changes     Preventative:   - Trial of emgality - approved 4/5/22 - 4th shot soon   Denies bothersome side effects     Abortive:   - Trial of rizatriptan 10 mg - makes her less nauseated than sumatriptan, does not work quiet as well as sumatriptan, may not completely get rid of it but improves and then eventually goes away   Denies bothersome side effects     History as of initial visit 3/28/22:   Headaches started at what age? 89 years old  How often do the headaches occur?   - as of 3/28/2022: on average over 15 days a month   What time of the day do the headaches start? Occasionally wakes up with them, or afternoon or evening  How long do the headaches last? All day, up to weeks   Are you ever headache free? Yes    Aura? Sometimes aura  Blurred, floaters, lines, flashing orange, 20 mins or more     Last eye exam: goes early for monitoring, no issues except     Where is your headache located and pain quality?   - most of the time unilateral and usually on the left  - left side of head and neck - temporal and parietal and occipital and down the neck  - stabbing, pressure, pulsating  - head warm to touch  - sinus pressure   What is the intensity of pain? Average: 5-6/10, worst 9/10  Associated symptoms:   [x] Nausea       [] Vomiting        [x] Stiff or sore neck   [x] Photophobia     [x]Phonophobia      [x] Osmophobia  [x] Blurred vision    [x] Light-headed or dizzy     [x] Tinnitus  - both  [x] Hands or feet tingle or feel numb/paresthesias - tingling in both legs and fingers sometimes without focal weakness   [] Ptosis      [] Facial droop  [x] Lacrimation - both  [x] Nasal congestion/rhinorrhea - chronically       Things that make the headache worse? No specific movements, any movement if bad enough    Headache triggers:  Hormonal/menses, weather changes, stress, certain foods, alcohol, sinus congestion      Have you seen someone else for headaches or pain?  Yes multiple neurologists, last about 10 years ago   Have you had trigger point injection performed and how often? No  Have you had Botox injection performed and how often? No   Have you had epidural injections or transforaminal injections performed? No  Are you current pregnant or planning on getting pregnant? No future pregnancy, not active   Have you ever had any Brain imaging? yes - MRI Brain in her teens was normal     What medications do you take or have you taken for your headaches?    ABORTIVE:      Sumatriptan 100 mg - makes her nauseated      Past  Steroids In Jan 2021 with COVID, and then again in Feb for unrelenting migraine helps   Rizatriptan/maxalt - thinks may not as been as effective as imitrex    PREVENTIVE:       Propranolol 40 mg BID (4 years has helped)  ambien 5  Nortriptyline 20 mg (started a couple of weeks ago for Intestinal cystitis)    Past/ failed/contraindicated:  nortriptyline  Amitriptyline  topiramate - maybe 1-2 months   Gabapentin  prosac/fluoxetine      LIFESTYLE  Sleep   - averages: 6 hours, sometimes takes ambien  Problems falling asleep?:   Yes  Problems staying asleep?:  Yes, 3-4 a night    Water: 40-60 oz per day  Caffeine: coffee - 1 cups in am, 1 at lunch, per day    Mood:   Anxiety maybe more in her 25s, not much now     The following portions of the patient's history were reviewed and updated as appropriate: allergies, current medications, past family history, past medical history, past social history, past surgical history and problem list      Pertinent family history:  Family history of headaches:  migraine headaches in sister, and mom has headaches that are likely migraines   Any family history of aneurysms - No    Pertinent social history:  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) and US at [employer] alone normally    Past Medical History:     Past Medical History:   Diagnosis Date   • Allergic    • Anxiety    • Cancer ([facility] [address] ) 2/1/2023    Thyroid- Papillary carcinoma   • COVID-19 01/2022    mild s/s-not hospitalized, not vaccinated   • GERD (gastroesophageal reflux disease)    • Headache(784 0)    • History of IBS     with constipation   • Ingrown toenail    • Irritable bowel syndrome    • Migraines     Chronic per pt   • Plantar fasciitis    • Thyroid carcinoma (HCC)    • Varicella    • Venereal wart 08/07/2014       Patient Active Problem List   Diagnosis   • Migraines   • Chronic migraine w/o aura w/o status migrainosus, not intractable   • TOSHIA II (cervical intraepithelial neoplasia II)   • Irritable bowel syndrome with constipation   • Insomnia   • Neck pain   • Vestibulitis, vulvar   • Thyroid nodule   • Abnormal TSH   • Sensation of fullness in both ears   • Overweight (BMI 25 0-29  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)   • NUNU (obstructive sleep apnea)       Medications:      Current Outpatient Medications   Medication Sig Dispense Refill   • acetaminophen (TYLENOL) 500 mg tablet Take 1,000 mg by mouth every 6 (six) hours as needed for mild pain     • acetaZOLAMIDE (DIAMOX) 125 mg tablet 125 mg PO nightly for 1 week, then increase to 125 mg BID for 1 week, then 125 mg in am and 250 mg in pm for 1 week, then 250 mg  tablet 6   • calcitriol (ROCALTROL) 0 25 mcg capsule Take 1 tablet twice daily 120 capsule 0   • calcium citrate (CALCITRATE) 950 (200 Ca) MG tablet Take 1 tablet (950 mg total) by mouth 2 (two) times a day 120 tablet 0   • cholecalciferol (VITAMIN D3) 1,000 units tablet Take 1 tablet (1,000 Units total) by mouth daily Do not start before March 1, 2023  30 tablet 0   • clindamycin (CLEOCIN T) 1 % lotion in the morning     • Galcanezumab-gnlm 120 MG/ML SOAJ Inject 120 mg under the skin every 28 days 1 mL 11   • Levocetirizine Dihydrochloride (XYZAL PO) Take by mouth daily at bedtime     • levothyroxine (Synthroid) 125 mcg tablet Take 1 tablet (125 mcg total) by mouth daily 30 tablet 3   • lubiprostone (AMITIZA) 24 mcg capsule Take 1 capsule (24 mcg total) by mouth 2 (two) times a day with meals 60 capsule 2   • Melatonin 5 MG TABS Take by mouth as needed     • meloxicam (MOBIC) 15 mg tablet Take 15 mg by mouth daily as needed     • methocarbamol (ROBAXIN) 500 mg tablet Take 1 tablet (500 mg total) by mouth daily at bedtime as needed for muscle spasms 30 tablet 0   • omeprazole (PriLOSEC) 40 MG capsule Take 1 capsule (40 mg total) by mouth daily 30 capsule 2   • propranolol (INDERAL) 40 mg tablet 2 (two) times a day     • rimegepant sulfate (Nurtec) 75 mg TBDP Take one NURTEC 75 mg at onset under tongue  Limit 1 in 24 hours  16 tablet 11   • rizatriptan (MAXALT) 10 mg tablet Take 1 tablet (10 mg total) by mouth once as needed for migraine May repeat in 2 hours if needed  Max 2/24 hours, 9/month  9 tablet 12   • zolpidem (AMBIEN) 10 mg tablet Take 10 mg by mouth daily at bedtime as needed     • multivitamin (THERAGRAN) TABS Take 1 tablet by mouth daily (Patient not taking: Reported on 6/27/2023)     • predniSONE 10 mg tablet 4 tabs daily for 2 days then 3 tabs daily x 2 days then 2 tabs daily x 2 days then 1 tab daily for 2 days  20 tablet 0     No current facility-administered medications for this visit          Allergies:    No Known Allergies    Family History:     Family History   Problem Relation Age of Onset   • Hypertension Mother    • Arthritis Mother    • Thyroid disease Mother    • Osteoarthritis Mother    • Transient ischemic attack Father    • Hypertension Father    • Stroke Father    • Vision loss Father    • Rashes / Skin problems Sister         Shingles   • Migraines Sister    • No Known Problems Sister    • Uterine cancer Maternal Grandmother    • Cancer Maternal Grandmother         Uterine   • Colon cancer Maternal "Grandfather    • Cancer Maternal Grandfather         Colon   • Lung cancer Paternal Grandmother 61   • Cancer Paternal Grandmother         Lung   • Heart attack Paternal Grandfather    • Colon cancer Cousin 27   • Breast cancer Maternal Aunt    • Breast cancer Maternal Aunt    • Breast cancer Maternal Aunt        Social History:     Social History     Socioeconomic History   • Marital status: Single     Spouse name: Not on file   • Number of children: Not on file   • Years of education: Not on file   • Highest education level: Not on file   Occupational History   • Not on file   Tobacco Use   • Smoking status: Never   • Smokeless tobacco: Never   Vaping Use   • Vaping Use: Never used   Substance and Sexual Activity   • Alcohol use: Yes     Alcohol/week: 2 0 standard drinks of alcohol     Types: 2 Glasses of wine per week     Comment: 2 glasses wine a week   • Drug use: Never   • Sexual activity: Not Currently   Other Topics Concern   • Not on file   Social History Narrative   • Not on file     Social Determinants of Health     Financial Resource Strain: Not on file   Food Insecurity: Not on file   Transportation Needs: Not on file   Physical Activity: Not on file   Stress: Not on file   Social Connections: Not on file   Intimate Partner Violence: Not on file   Housing Stability: Not on file         Objective:       Physical Exam:                                                                 Vitals:            Constitutional:    /79 (BP Location: Right arm, Patient Position: Sitting, Cuff Size: Standard)   Pulse 67   Temp 98 1 °F (36 7 °C) (Tympanic)   Ht 5' 4\" (1 626 m)   Wt 72 6 kg (160 lb)   BMI 27 46 kg/m²   BP Readings from Last 3 Encounters:   06/27/23 114/79   06/08/23 112/76   06/02/23 96/58     Pulse Readings from Last 3 Encounters:   06/27/23 67   06/08/23 72   06/02/23 64         Well developed, well nourished, well groomed          Psychiatric:  Normal behavior and appropriate affect  " Neurological Examination:     Mental status/cognitive function:   Recent and remote memory appear intact  Attention span and concentration as well as fund of knowledge are appropriate for age  Normal language and spontaneous speech  Cranial Nerves:   VII-facial expression symmetric  Motor Exam: symmetric bulk throughout  no atrophy, fasciculations or abnormal movements noted  Coordination:  no apparent dysmetria, ataxia or tremor noted  Gait: steady casual gait          Pertinent lab results:   See EMR for recent labs  - 01/14/2022 COVID-19 positive  - 8/19/21 CMP and CBC unremarkable   TSH elevated at 4 09 with normal Free T4     Imaging:   No head imaging in system      MRI brain in her teens was reportedly unremarkable    Review of Systems:   ROS obtained by medical assistant Personally reviewed and updated if indicated  I recommended PCP follow up for non neurologic problems  Review of Systems   Constitutional: Negative for appetite change and fever  HENT: Negative  Negative for hearing loss, tinnitus, trouble swallowing and voice change  Eyes: Negative  Negative for photophobia and pain  Respiratory: Negative  Negative for shortness of breath  Cardiovascular: Negative  Negative for palpitations  Gastrointestinal: Negative  Negative for nausea and vomiting  Endocrine: Negative  Negative for cold intolerance  Genitourinary: Negative  Negative for dysuria, frequency and urgency  Musculoskeletal: Negative  Negative for myalgias and neck pain  Skin: Negative  Negative for rash  Neurological: Positive for headaches  Negative for dizziness, tremors, seizures, syncope, facial asymmetry, speech difficulty, weakness, light-headedness and numbness  Hematological: Negative  Does not bruise/bleed easily  Psychiatric/Behavioral: Positive for sleep disturbance  Negative for confusion and hallucinations  All other systems reviewed and are negative      I have spent 35 minutes with Patient  today in which greater than 50% of this time was spent in counseling/coordination of care regarding Diagnostic results, Prognosis, Risks and benefits of tx options, Instructions for management, Patient and family education, Importance of tx compliance, Risk factor reductions, Impressions, Counseling / Coordination of care, Documenting in the medical record, Reviewing / ordering tests, medicine, procedures   and Obtaining or reviewing history    I also spent 7 minutes non face to face for this patient the same day         Author:  Osiel Hayes MD 6/27/2023 6:04 PM

## 2023-06-27 NOTE — PATIENT INSTRUCTIONS
"MRI Brain ordered     No estrogen due to migraine aura    \"why we sleep\" by Leydi Howard - good read or listen on audible    - \"Rewire Your Anxious Brain: How to Use the Neuroscience of Fear to End Anxiety, Panic, and Worry\" By Kevin Steven PhD    - consider reading or listening to the book \"The body keeps the score\" - interesting book on how PTSD and stress can impact the body and cause physical symptoms    Our  will reach out to you with a list of providers as well that are covered by your insurance  - Dr Raghav William at [address] have had patients recommend the following female therapists  (I am assuming female by their names of course and I do not know them myself): - Matilde Hua PhD in [address] with Casey Michael in [address] in [address] in [address] [address] [place]  in [address] in [address] [facility] [facility] in [place]       I am placing a referral to the sleep medicine specialist team to further evaluate your sleep issues  Please call [phone] to schedule and I recommend you see one of the following providers:  - Gregorio Pepper MD - sleep doctor who is a neurologist and is excellent       Headache/migraine treatment:   Abortive medications (for immediate treatment of a headache): It is ok to take ibuprofen, acetaminophen or naproxen (Advil, Tylenol,  Aleve, Excedrin) if they help your headaches you should limit these to No more than 3 times a week to avoid medication overuse/rebound headaches  -     rimegepant sulfate (Nurtec) 75 mg TBDP; Take one NURTEC 75 mg at onset under tongue  Limit 1 in 24 hours  Prior auth, coupon card for copay     -     dexamethasone (DECADRON) 2 mg tablet;  One tab with breakfast for 1-5 days for unrelenting migraine    For your more moderate to severe migraines take this " medication early   Maxalt (rizatriptan) 10mg tabs - take one at the onset of headache  May repeat one time after 1-2 hours if pain has not resolved  (Max 2 a day and 9 a month)     Prescription preventive medications for headaches/migraines   (to take every day to help prevent headaches - not to take at the time of headache):  [x] - decrease topiramate to 25 mg nightly for 1 week and then stop    Emgality/Galcanezumab -  120 mg injections every 28 days     READ INSTRUCTIONS that come with the medication  REFRIGERATE  Keep out of direct sunlight  Prior to administration, allow to come to room temperature for 30 minutes  Do not warm using a heat source (eg, microwave or hot water)  Do not shake  Administer in preferably abdomen (avoiding 2 inches around the navel), thigh, upper arm, or buttocks avoiding areas of skin that are tender, bruised, red or hard  Deliver entire contents of single-use prefilled pen or syringe  Unknown impact in pregnancy therefore would recommend stopping 6 months prior to considering pregnancy  Let me know if you ever want to try and slowly come off propranolol   - especially if BP lowers on diamox      I suggest trial of lower dose diamox than we typically use in more severe cases -  -     acetaZOLAMIDE (DIAMOX) 125 mg tablet; 125 mg PO nightly for 1 week, then increase to 125 mg  in am and in pm for 1 week, then 125 mg in am and 250 mg in pm for 1 week, then 250 mg in am and in pm        -If you had papilledema or swelling behind your eyes we would rapidly get you up to 500 mg twice a day and may need to go higher  I have 1 patient who is up to 3000 mg in a day just for reference and I will be starting you on 125 mg       - if a lower dose is helpful, can stay lower, if higher dose causes side effects, go back to last tolerated dose    If you get all the way up to the dose recommended and is not helping enough let me know as I will absolutely go higher     - make sure to stay hydrated while on this as can cause dehydration since that is it's purpose to take fluid off    - most common side effect is tingling of the nerves at times  - can cause electrolyte disturbances, but not typically in any significant way  However, be cautious if taking with other meds that lower potassium like hydrochlorothiazide etc      *Typically these types of medications take time untill you see the benefit, although some may see improvement in days, often it may take weeks, especially if the medication is being titrated up to a beneficial level  Please contact us if there are any concerns or questions regarding the medication  Lifestyle Recommendations:  [x] SLEEP - Maintain a regular sleep schedule: Adults need at least 7-8 hours of uninterrupted a night  Maintain good sleep hygiene:  Going to bed and waking up at consistent times, avoiding excessive daytime naps, avoiding caffeinated beverages in the evening, avoid excessive stimulation in the evening and generally using bed primarily for sleeping  One hour before bedtime would recommend turning lights down lower, decreasing your activity (may read quietly, listen to music at a low volume)  When you get into bed, should eliminate all technology (no texting, emailing, playing with your phone, iPad or tablet in bed)  [x] HYDRATION - Maintain good hydration  Drink  2L of fluid a day (4 typical small water bottles)  [x] DIET - Maintain good nutrition  In particular don't skip meals and try and eat healthy balanced meals regularly  [x] TRIGGERS - Look for other triggers and avoid them: Limit caffeine to 1-2 cups a day or less  Avoid dietary triggers that you have noticed bring on your headaches (this could include aged cheese, peanuts, MSG, aspartame and nitrates)    [x] EXERCISE - physical exercise as we all know is good for you in many ways, and not only is good for your heart, but also is beneficial for your mental health, cognitive health and  chronic pain/headaches  I would encourage at the least 5 days of physical exercise weekly for at least 30 minutes  Education and Follow-up  [x] Please call with any questions or concerns  Of course if any new concerning symptoms go to the emergency department  [x] Follow up  2-3 months,  sooner if needed    - As we discussed if there are no abnormalities on the brain MRI that need urgent intervention (very often there are incidental findings that may not mean anything significant and are better discussed in person), you will not necessarily receive a call from us, but feel free to call in to check on the status of the report (since not knowing can be anxiety provoking) and the nurses will let you know the result or make sure I have nothing to pass on regarding the results first   Ideally, all of this will be discussed in detail in the follow-up visit

## 2023-06-28 ENCOUNTER — HOSPITAL ENCOUNTER (OUTPATIENT)
Dept: RADIOLOGY | Facility: HOSPITAL | Age: 43
Discharge: HOME | End: 2023-06-28
Attending: OBSTETRICS & GYNECOLOGY
Payer: COMMERCIAL

## 2023-06-28 VITALS — WEIGHT: 160 LBS | HEIGHT: 64 IN | BODY MASS INDEX: 27.31 KG/M2

## 2023-06-28 DIAGNOSIS — R92.2 DENSE BREAST TISSUE ON MAMMOGRAM: ICD-10-CM

## 2023-06-28 DIAGNOSIS — Z12.39 BREAST CANCER SCREENING, HIGH RISK PATIENT: ICD-10-CM

## 2023-06-28 PROCEDURE — G1004 CDSM NDSC: HCPCS

## 2023-06-28 PROCEDURE — C8937 CAD BREAST MRI: HCPCS

## 2023-06-28 PROCEDURE — C8908 MRI W/O FOL W/CONT, BREAST,: HCPCS

## 2023-06-28 PROCEDURE — A9585 GADOBUTROL INJECTION: HCPCS | Performed by: OBSTETRICS & GYNECOLOGY

## 2023-06-28 RX ADMIN — GADOBUTROL 7 ML: 604.72 INJECTION INTRAVENOUS at 18:29

## 2023-06-30 ENCOUNTER — OFFICE VISIT (OUTPATIENT)
Dept: GASTROENTEROLOGY | Facility: CLINIC | Age: 43
End: 2023-06-30
Payer: COMMERCIAL

## 2023-06-30 VITALS
BODY MASS INDEX: 26.98 KG/M2 | TEMPERATURE: 99.5 F | SYSTOLIC BLOOD PRESSURE: 110 MMHG | DIASTOLIC BLOOD PRESSURE: 60 MMHG | WEIGHT: 158 LBS | HEIGHT: 64 IN

## 2023-06-30 DIAGNOSIS — K59.09 CHRONIC CONSTIPATION: Primary | ICD-10-CM

## 2023-06-30 RX ORDER — BISACODYL 5 MG/1
10 TABLET, DELAYED RELEASE ORAL 2 TIMES DAILY PRN
Qty: 60 TABLET | Refills: 1 | Status: SHIPPED | OUTPATIENT
Start: 2023-06-30

## 2023-06-30 RX ORDER — BISACODYL 10 MG
10 SUPPOSITORY, RECTAL RECTAL DAILY PRN
Qty: 30 SUPPOSITORY | Refills: 0 | Status: SHIPPED | OUTPATIENT
Start: 2023-06-30

## 2023-06-30 NOTE — PROGRESS NOTES
[address] [facility] - Outpatient Follow-up Note  Pieter Leone 38 y o  female MRN: [id number]  Encounter: [id number]          ASSESSMENT AND PLAN:      Althea Granados is a 39 y/o female with GERD, IBS-C, chronic migraines and hx of papillary thyroid carcinoma s/p thyroidectomy with subsequent hypothyroid & hypoparathyroidism and hypocalcemia who presents for follow-up       1  GERD with hiatal hernia  2  Severe constipation/IBS-C   3  Thyroid dysfunction   Recent thyroid studies abnormal  She follows with endocrine who decreased her calcium dose but has not yet changed her synthroid dose  She says that despite being on amitiza 24 mcg BID, she still only moves her bowels once every 1-2 weeks when she takes dulcolax  She tried and failed linzess, miralax, stool softeners  She says bc her constipation is still severe, her reflux is still causing issues despite being on daily omeprazole  Colonoscopy in 2021 was completely WNL; she was chronically constipated at this time but this has worsened since her thyroid dysfunction    -stop amitiza  -I will reach out to endocrine team as I suspect her thyroid dysfunction is the reason none of these medications are no longer helping her; I explained to pt that so long as her thyroid function is not controlled, laxatives likely will not completely be able to treat her constipation    -continue omeprazole 40 mg daily as her worsening reflux is likely due to back-up from the constipation   -as her last BM was 2 weeks ago: please start another miralax bowel prep tonight  Tomorrow morning, please start duclolax 10 mg BID as she says dulcolax is the only medication that helps  I asked her to please call us next week to let us know if this is helping or not  ______________________________________________________________________    SUBJECTIVE:  Pt says she went down on her calcium dose but continues to struggle   She says she takes her amitiza as prescribed but continues to only "have 1 BM every 1-2 weeks  She says this only occurs once she takes dulcolax  She says her endocrinologist did not want to change her synthroid dose, but she is still fatigued and weak, as well, so she would like this changed  Pt says her reflux did worsen but \"she knows\" this is due to her constipation as this is alleviated once she moves her bowels  Pt denies weight changes, fevers, chills, diarrhea, vomiting, bloody or black BMs  Pt does admit that this is causing her to be very nauseous  REVIEW OF SYSTEMS IS OTHERWISE NEGATIVE        Historical Information   Past Medical History:   Diagnosis Date   • Allergic    • Anxiety    • [facility] ) 2/1/2023    Thyroid- Papillary carcinoma   • COVID-19 01/2022    mild s/s-not hospitalized, not vaccinated   • GERD (gastroesophageal reflux disease)    • Headache(784 0)    • History of IBS     with constipation   • Ingrown toenail    • Irritable bowel syndrome    • Migraines     Chronic per pt   • Plantar fasciitis    • Thyroid carcinoma ([facility] )    • Varicella    • Venereal wart 08/07/2014     Past Surgical History:   Procedure Laterality Date   • CERVICAL BIOPSY  W/ LOOP ELECTRODE EXCISION  2017   • COLONOSCOPY     • NASAL SEPTUM SURGERY     • MO OSTEOT W/ [address] SHRT/CORRJ 1ST METAR Left 12/30/2022    Procedure: OSTEOTOMY METATARSAL 1st;  Surgeon: Rosita Emanuel DPM;  Location: [facility];  Service: Podiatry   • THYROIDECTOMY N/A 2/24/2023    Procedure: TOTAL THYROIDECTOMY;  Surgeon: Niya Higgins MD;  Location:  MAIN OR;  Service: Surgical Oncology   • TONSILLECTOMY     • UPPER GASTROINTESTINAL ENDOSCOPY     • US GUIDED THYROID BIOPSY  1/31/2023   • WISDOM TOOTH EXTRACTION       Social History   Social History     Substance and Sexual Activity   Alcohol Use Yes   • Alcohol/week: 2 0 standard drinks of alcohol   • Types: 2 Glasses of wine per week    Comment: 2 glasses wine a week     Social History     Substance and Sexual Activity   Drug Use Never     Social " History     Tobacco Use   Smoking Status Never   Smokeless Tobacco Never     Family History   Problem Relation Age of Onset   • Hypertension Mother    • Arthritis Mother    • Thyroid disease Mother    • Osteoarthritis Mother    • Transient ischemic attack Father    • Hypertension Father    • Stroke Father    • Vision loss Father    • Rashes / Skin problems Sister         Shingles   • Migraines Sister    • No Known Problems Sister    • Uterine cancer Maternal Grandmother    • Cancer Maternal Grandmother         Uterine   • Colon cancer Maternal Grandfather    • Cancer Maternal Grandfather         Colon   • Lung cancer Paternal Grandmother 61   • Cancer Paternal Grandmother         Lung   • Heart attack Paternal Grandfather    • Colon cancer Cousin 27   • Breast cancer Maternal Aunt    • Breast cancer Maternal Aunt    • Breast cancer Maternal Aunt        Meds/Allergies       Current Outpatient Medications:   •  acetaminophen (TYLENOL) 500 mg tablet  •  acetaZOLAMIDE (DIAMOX) 125 mg tablet  •  calcitriol (ROCALTROL) 0 25 mcg capsule  •  calcium citrate (CALCITRATE) 950 (200 Ca) MG tablet  •  cholecalciferol (VITAMIN D3) 1,000 units tablet  •  clindamycin (CLEOCIN T) 1 % lotion  •  Galcanezumab-gnlm 120 MG/ML SOAJ  •  Levocetirizine Dihydrochloride (XYZAL PO)  •  levothyroxine (Synthroid) 125 mcg tablet  •  lubiprostone (AMITIZA) 24 mcg capsule  •  Melatonin 5 MG TABS  •  meloxicam (MOBIC) 15 mg tablet  •  methocarbamol (ROBAXIN) 500 mg tablet  •  multivitamin (THERAGRAN) TABS  •  omeprazole (PriLOSEC) 40 MG capsule  •  predniSONE 10 mg tablet  •  propranolol (INDERAL) 40 mg tablet  •  rimegepant sulfate (Nurtec) 75 mg TBDP  •  rizatriptan (MAXALT) 10 mg tablet  •  zolpidem (AMBIEN) 10 mg tablet    No Known Allergies        Objective     Last menstrual period 06/12/2023  There is no height or weight on file to calculate BMI        PHYSICAL EXAM:      General Appearance:   Alert, cooperative, no distress   HEENT:   Normocephalic, atraumatic, anicteric      Neck:  Supple, symmetrical, trachea midline   Lungs:   Clear to auscultation bilaterally; no rales, rhonchi or wheezing; respirations unlabored    Heart[de-identified]   Regular rate and rhythm; no murmur, rub, or gallop  Abdomen:   Soft, non-tender, non-distended; normal bowel sounds; no masses, no organomegaly    Genitalia:   Deferred    Rectal:   Deferred    Extremities:  No cyanosis, clubbing or edema    Pulses:  2+ and symmetric    Skin:  No jaundice, rashes, or lesions    Lymph nodes:  No palpable cervical lymphadenopathy        Lab Results:   No visits with results within 1 Day(s) from this visit  Latest known visit with results is:   Appointment on 05/31/2023   Component Date Value   • Free T4 05/31/2023 1 60 (H)    • TSH 3RD GENERATON 05/31/2023 3 873    • Thyroglobulin Ab 05/31/2023 <1 0    • Thyroglobulin-MACHO 05/31/2023 0 3 (L)          Radiology Results:   MRI breast bilateral w and wo contrast w cad    Result Date: 6/29/2023  Narrative: DIAGNOSIS: Dense breast tissue on mammogram; Breast cancer screening, high risk patient TECHNIQUE: MRI of both breasts was performed in axial planes utilizing a combination of localizer, axial T2 STIR, Axial T1 FSPGR in phase, and axial dynamic 3D fat suppressed gradient-echo T1 sequences (VIBRANT) before and after contrast administration at multiple time points  Subtraction images were generated  This exam was read in conjunction with [employer] software  MEDICATIONS ADMINISTERED: Gadobutrol injection (SINGLE-DOSE) SOLN 7 mL, Total Given: 7 mL (1 dose) Intravenous contrast was administered at 2cc/sec, without documented contrast reaction  COMPARISONS: Prior breast imaging dated: 01/03/2023 RELEVANT HISTORY: Family Breast Cancer History: History of breast cancer in Maternal Aunt, Maternal Aunt, Maternal Aunt   Family Medical History: Family medical history includes breast cancer in 3 relatives (maternal aunt, maternal aunt, maternal aunt) and colon cancer in 2 relatives (cousin, maternal grandfather)  Personal History: Hormone history includes birth control and birth control  No known relevant surgical history  No known relevant medical history  RISK ASSESSMENT: 5 Year Tyrer-Cuzick: 2 14 % 10 Year Tyrer-Cuzick: 5 34 % Lifetime Tyrer-Cuzick: 37 13 %  TISSUE DENSITY: FGT: The breasts have extreme amounts of fibroglandular tissue  BPE: The background parenchymal enhancement is moderate and symmetric  INDICATION: Julian Robles is a 38 y o  female presenting for high risk breast cancer screening, dense breast tissue  No reported breast symptoms  FINDINGS: Bilateral There are no suspicious enhancing masses or suspicious areas of non-mass enhancement  There is no axillary or internal mammary adenopathy  Impression:  No MRI evidence of invasive breast malignancy  ASSESSMENT/BI-RADS CATEGORY: Left: 1 - Negative Right: 1 - Negative Overall: 1 - Negative RECOMMENDATION:      - Continue annual screening mammography for both breasts  - Breast MRI Screening in 1 year for both breasts  Workstation ID: [id number]    XR spine lumbar minimum 4 views non injury    Result Date: 6/21/2023  Narrative: LUMBAR SPINE INDICATION:   M[age]: Low back pain, unspecified  COMPARISON:  None VIEWS:  XR SPINE LUMBAR MINIMUM 4 VIEWS NON INJURY FINDINGS: There are 5 non rib bearing lumbar vertebral bodies  There is no evidence of acute fracture or destructive osseous lesion  Alignment is unremarkable  No significant lumbar degenerative change noted  The pedicles appear intact  Soft tissues are unremarkable  Impression: Normal examination  Workstation performed: [id number]     XR spine thoracic 3 vw    Result Date: 6/21/2023  Narrative: THORACIC SPINE INDICATION:   M54 8: Pain in thoracic spine  COMPARISON:  None VIEWS:  XR SPINE THORACIC 3 VW FINDINGS: There is no fracture or pathologic bone lesion  Thoracic vertebral alignment is within normal limits   No significant degenerative changes  There is no displacement of the paraspinal line  The pedicles appear intact  Impression: No acute osseous abnormality  Workstation performed: [id number]     Home Study    Result Date: 6/19/2023  Narrative: Table formatting from the original result was not included  Images from the original result were not included  Home Study Report Patient Name: Lelia Atkins Patient Number: [id number] Date of Home Study: 6/15/2023 Referring Physician: Vanessa Riojas Interpreting Physician:  Ayanna SANTANA B : 1980 STUDY FORMAT: The patient was admitted to the sleep laboratory at the [address] [place] [address] and oriented to the home sleep study testing procedure and equipment  The home sleep testing study was performed using the Lively device  A return demonstration by the patient helped to assure correct usage  The following parameters were monitored: p-flow via nasal cannula, body position, oximetry, pulse rate and respiratory effort via thoracic belt  The sleep study was scored following the rules established by the American Academy of Sleep Medicine (AASM)  Hypopneas are defined as a ?30% drop in flow for ? 10 seconds that are associated with ?4% desaturations  BRYCE is the Respiratory Event Index (number of respiratory events per hour) and is a surrogate for the apnea/hypopnea index (AHI)  PATIENT HISTORY: Home sleep testing was undertaken to evaluate this patient with suggestive symptoms and /or risk factors for sleep  disordered breathing  TESTING RESULTS: The test results are from 1Night  The total time in bed (analysis time) was 492 5 minutes  The patient had a total of 5 respiratory events made up of 2 obstructive apneas, 0 central apneas, 0 mixed apneas and 2 hypopneas resulting in a respiratory event index (BRYCE) of 0 7  The lowest SpO2 recorded is 92%  The snore index was 0 2%  Impression:  This study did not demonstrate evidence for obstructive sleep apnea  All respiratory events cannot be scored when sleep is not quantified  This, together with other limitations of Home sleep testing may further compromise accuracy  If negative or equivocal, and index of suspicion remains high, a LAB based diagnostic sleep study would be warranted  Clinical correlation is advised  Board Certified Sleep Physician  Study Date: 6/15/2023 Patient Name: Mahendra Le Recording Device: Donald Beal Sex: F Height: 64 6 in  YOB: 1980 Weight: 162 0 lbs  Age: 38 years B  M I: 27 3 lb/in2 Times and Durations Lights off clock time:  10:00:02 PM Total Recording Time (TRT): 496 5 minutes Lights on clock time: 6:12:32 AM Time In Bed (TIB): 492 5 minutes   Monitoring Time (MT): 443 5 minutes Device and Sensor Details The study was recorded on a [employer] device using 1 RIP effort belt and a pressure based flow sensor  The heart rate is derived from the oximeter sensor and the snore signal is derived from the pressure sensor  The device also records body position and uses it to determine the monitoring time (sleep/wake periods)  Summary BRYCE 0 7 OAI 0 3 TRUNG 0 0 Lowest Desat 92 BRYCE is the number of respiratory events per hour  OAI is the number of obstructive apneas per hour  TRUNG is the number of central apneas per hour  Lowest Desat is the lowest blood oxygen level that lasted at least 2 seconds   RESPIRATORY EVENTS  Index (#/hour) Total # of Events Mean duration  (sec) Max duration  (sec) # of Events by Position      Supine Prone Left Right Up Central Apneas 0 0 0 0 0 0 0 0 0 0 0 0 Obstructive Apneas 0 3 2 11 0 11 0 2 0 0 0 0 Mixed Apneas 0 0 0 0 0 0 0 0 0 0 0 0 Hypopneas 0 3 2 26 8 28 0 2 0 0 0 0 Apneas + Hypopneas 0 5 4 18 9 28 0 4 0 0 0 0 RERAs 0 1 1 10 0 10 0 1 0 0 0 0 Total 0 7 5 17 1 28 0 5 0 0 0 0 Time in Position 297 4 20 9 94 4 30 3 2 5 BRYCE in Position 1 0 0 0 0 0 0 0 0 0 Positional Summary  Supine Non-Supine Total # of Events 5 0 00 Total Duration (minutes) 297 4 148 10 Sleep Duration (minutes) 297 4 146 10 BRYCE in Position 1 0 0 00 REISupine / REINon-Supine Ratio 0 00  Positional Sleep Apnea is indicated if BRYCE (supine) >= 2 x BRYCE (non-supine) per Ramos Antunez, Sleep  1984;7(2)    Oximetry Summary  Dur  (min) % TIB <90 % 0 0 0 0 <85 % 0 0 0 0 <80 % 0 0 0 0 <70 % 0 0 0 0 Total Dur (min) < 89 0 0 min Average (%) 96 Total # of Desats 2 Desat Index (#/hour) 0 3 Desat Max (%) 4 Desat Max dur (sec) 70 0 Lowest SpO2 % during sleep 92 Duration of Min SpO2 (sec) 3 Highest SpO2 % during sleep 98 Duration of Max SpO2(sec) 261  Heart Rate Stats Mean HR during sleep 60 0 (BPM) Highest HR during sleep 89  (BPM) Highest HR during TIB  114 (BPM) Lowest HR during sleep 46  (BPM) Lowest HR during TIB 46 (BPM) Snoring Summary Total Snoring Episodes 10 Total Duration with Snoring 1 1 minutes Mean Duration of Snoring 6 3 seconds Percentage of Snoring 0 2 %

## 2023-06-30 NOTE — Clinical Note
Hello! How are you? I hope you're doing well  We share a mutual patient who is complaining to me about very severe constipation (1 BM every 1-2 weeks)  I see her thyroid studies are abnormal and she says that her synthroid was not changed  Just wanted to reach out to see your thoughts on this as unfortunately, her constipation will likely not get better if her thyroid dysfunction continues  Thanks so much!

## 2023-07-02 ENCOUNTER — TELEPHONE (OUTPATIENT)
Dept: GASTROENTEROLOGY | Facility: CLINIC | Age: 43
End: 2023-07-02

## 2023-07-02 DIAGNOSIS — R20.2 PARESTHESIAS: ICD-10-CM

## 2023-07-02 DIAGNOSIS — K21.9 GASTROESOPHAGEAL REFLUX DISEASE WITHOUT ESOPHAGITIS: ICD-10-CM

## 2023-07-02 DIAGNOSIS — C73 PAPILLARY THYROID CARCINOMA (HCC): ICD-10-CM

## 2023-07-02 RX ORDER — LEVOTHYROXINE SODIUM 0.12 MG/1
125 TABLET ORAL DAILY
Qty: 30 TABLET | Refills: 0 | Status: CANCELLED | OUTPATIENT
Start: 2023-07-02

## 2023-07-03 DIAGNOSIS — C73 PAPILLARY THYROID CARCINOMA (HCC): ICD-10-CM

## 2023-07-03 RX ORDER — CALCITRIOL 0.25 UG/1
CAPSULE, LIQUID FILLED ORAL
Qty: 180 CAPSULE | Refills: 0 | Status: SHIPPED | OUTPATIENT
Start: 2023-07-03

## 2023-07-03 RX ORDER — LEVOTHYROXINE SODIUM 0.12 MG/1
125 TABLET ORAL DAILY
Qty: 30 TABLET | Refills: 3 | Status: SHIPPED | OUTPATIENT
Start: 2023-07-03

## 2023-07-03 RX ORDER — OMEPRAZOLE 40 MG/1
40 CAPSULE, DELAYED RELEASE ORAL DAILY
Qty: 30 CAPSULE | Refills: 0 | Status: SHIPPED | OUTPATIENT
Start: 2023-07-03

## 2023-07-03 NOTE — TELEPHONE ENCOUNTER
Patient needs refill of calcitriol and the levothyroxine refilled for 30 day supply and sent to [facility]. Pt also stated she received a phone call from Dr. Cristhian Hall, regarding her gastroenterology Doctor reaching out and didn't know if she needed to call back or if she was all good then.  If not, you can call her back today

## 2023-07-03 NOTE — TELEPHONE ENCOUNTER
Spoke to patient and made her aware that her levothyroxine should be refilled through her PCP or endocrinologist. Patient verbalized understanding.

## 2023-07-05 ENCOUNTER — TELEPHONE (OUTPATIENT)
Dept: SLEEP CENTER | Facility: CLINIC | Age: 43
End: 2023-07-05

## 2023-07-05 ENCOUNTER — APPOINTMENT (OUTPATIENT)
Dept: LAB | Age: 43
End: 2023-07-05
Payer: COMMERCIAL

## 2023-07-05 DIAGNOSIS — C73 PAPILLARY THYROID CARCINOMA (HCC): ICD-10-CM

## 2023-07-05 DIAGNOSIS — E89.0 POSTOPERATIVE HYPOTHYROIDISM: ICD-10-CM

## 2023-07-05 DIAGNOSIS — E83.51 HYPOCALCEMIA: ICD-10-CM

## 2023-07-05 LAB
25(OH)D3 SERPL-MCNC: 36.1 NG/ML (ref 30–100)
ALBUMIN SERPL BCP-MCNC: 3.5 G/DL (ref 3.5–5)
ALP SERPL-CCNC: 32 U/L (ref 46–116)
ALT SERPL W P-5'-P-CCNC: 24 U/L (ref 12–78)
ANION GAP SERPL CALCULATED.3IONS-SCNC: 3 MMOL/L
AST SERPL W P-5'-P-CCNC: 16 U/L (ref 5–45)
BILIRUB SERPL-MCNC: 0.35 MG/DL (ref 0.2–1)
BUN SERPL-MCNC: 18 MG/DL (ref 5–25)
CALCIUM SERPL-MCNC: 8.3 MG/DL (ref 8.3–10.1)
CHLORIDE SERPL-SCNC: 110 MMOL/L (ref 96–108)
CO2 SERPL-SCNC: 26 MMOL/L (ref 21–32)
CREAT SERPL-MCNC: 0.84 MG/DL (ref 0.6–1.3)
GFR SERPL CREATININE-BSD FRML MDRD: 87 ML/MIN/1.73SQ M
GLUCOSE P FAST SERPL-MCNC: 93 MG/DL (ref 65–99)
POTASSIUM SERPL-SCNC: 3.6 MMOL/L (ref 3.5–5.3)
PROT SERPL-MCNC: 6.9 G/DL (ref 6.4–8.4)
SODIUM SERPL-SCNC: 139 MMOL/L (ref 135–147)
T4 FREE SERPL-MCNC: 1.11 NG/DL (ref 0.61–1.12)
TSH SERPL DL<=0.05 MIU/L-ACNC: 1.18 UIU/ML (ref 0.45–4.5)

## 2023-07-05 PROCEDURE — 82306 VITAMIN D 25 HYDROXY: CPT

## 2023-07-05 PROCEDURE — 84443 ASSAY THYROID STIM HORMONE: CPT

## 2023-07-05 PROCEDURE — 80053 COMPREHEN METABOLIC PANEL: CPT

## 2023-07-05 PROCEDURE — 36415 COLL VENOUS BLD VENIPUNCTURE: CPT

## 2023-07-05 PROCEDURE — 84439 ASSAY OF FREE THYROXINE: CPT

## 2023-07-05 NOTE — TELEPHONE ENCOUNTER
----- Message from Bessy Tate MD sent at 7/5/2023 11:07 AM EDT -----  approved  ----- Message -----  From: Kenn Melgoza  Sent: 7/5/2023   8:41 AM EDT  To: Sleep Medicine [facility] Provider    This Diagnostic sleep study needs approval.     If approved please sign and return to clerical pool. If denied please include reasons why. Also provide alternative testing if warranted. Please sign and return to clerical pool.

## 2023-07-06 ENCOUNTER — TELEPHONE (OUTPATIENT)
Dept: NEUROLOGY | Facility: CLINIC | Age: 43
End: 2023-07-06

## 2023-07-06 NOTE — TELEPHONE ENCOUNTER
Received fax from [facility].  [facility] requires PA  Key [id number]    PA initiated on CMM    Awaiting determination

## 2023-07-13 ENCOUNTER — HOSPITAL ENCOUNTER (OUTPATIENT)
Dept: RADIOLOGY | Age: 43
Discharge: HOME | End: 2023-07-13
Payer: COMMERCIAL

## 2023-07-13 DIAGNOSIS — G93.2 IIH (IDIOPATHIC INTRACRANIAL HYPERTENSION): ICD-10-CM

## 2023-07-13 DIAGNOSIS — G43.109 MIGRAINE WITH AURA AND WITHOUT STATUS MIGRAINOSUS, NOT INTRACTABLE: ICD-10-CM

## 2023-07-13 PROCEDURE — 70553 MRI BRAIN STEM W/O & W/DYE: CPT

## 2023-07-13 PROCEDURE — A9585 GADOBUTROL INJECTION: HCPCS | Performed by: PSYCHIATRY & NEUROLOGY

## 2023-07-13 PROCEDURE — G1004 CDSM NDSC: HCPCS

## 2023-07-13 RX ADMIN — GADOBUTROL 7 ML: 604.72 INJECTION INTRAVENOUS at 12:07

## 2023-07-18 ENCOUNTER — LAB REQUISITION (OUTPATIENT)
Dept: LAB | Facility: HOSPITAL | Age: 43
End: 2023-07-18
Payer: COMMERCIAL

## 2023-07-18 DIAGNOSIS — R20.8 OTHER DISTURBANCES OF SKIN SENSATION: ICD-10-CM

## 2023-07-18 DIAGNOSIS — D22.61 MELANOCYTIC NEVI OF RIGHT UPPER LIMB, INCLUDING SHOULDER: ICD-10-CM

## 2023-07-18 PROCEDURE — 88342 IMHCHEM/IMCYTCHM 1ST ANTB: CPT | Performed by: PATHOLOGY

## 2023-07-18 PROCEDURE — 88305 TISSUE EXAM BY PATHOLOGIST: CPT | Performed by: PATHOLOGY

## 2023-07-18 PROCEDURE — 88341 IMHCHEM/IMCYTCHM EA ADD ANTB: CPT | Performed by: PATHOLOGY

## 2023-07-19 ENCOUNTER — OFFICE VISIT (OUTPATIENT)
Dept: INTERNAL MEDICINE CLINIC | Facility: OTHER | Age: 43
End: 2023-07-19
Payer: COMMERCIAL

## 2023-07-19 VITALS
DIASTOLIC BLOOD PRESSURE: 64 MMHG | HEIGHT: 64 IN | TEMPERATURE: 98.7 F | HEART RATE: 71 BPM | BODY MASS INDEX: 26.91 KG/M2 | OXYGEN SATURATION: 100 % | WEIGHT: 157.6 LBS | SYSTOLIC BLOOD PRESSURE: 112 MMHG | RESPIRATION RATE: 18 BRPM

## 2023-07-19 DIAGNOSIS — M54.2 NECK PAIN: ICD-10-CM

## 2023-07-19 DIAGNOSIS — K58.1 IRRITABLE BOWEL SYNDROME WITH CONSTIPATION: ICD-10-CM

## 2023-07-19 DIAGNOSIS — E89.0 POSTOPERATIVE HYPOTHYROIDISM: ICD-10-CM

## 2023-07-19 DIAGNOSIS — F51.01 PRIMARY INSOMNIA: ICD-10-CM

## 2023-07-19 DIAGNOSIS — M62.838 MUSCLE SPASM: Primary | ICD-10-CM

## 2023-07-19 DIAGNOSIS — E78.5 HYPERLIPIDEMIA, UNSPECIFIED HYPERLIPIDEMIA TYPE: ICD-10-CM

## 2023-07-19 DIAGNOSIS — G43.709 CHRONIC MIGRAINE W/O AURA W/O STATUS MIGRAINOSUS, NOT INTRACTABLE: ICD-10-CM

## 2023-07-19 PROBLEM — E04.1 THYROID NODULE: Status: RESOLVED | Noted: 2021-12-28 | Resolved: 2023-07-19

## 2023-07-19 PROBLEM — R79.89 ABNORMAL TSH: Status: RESOLVED | Noted: 2021-12-28 | Resolved: 2023-07-19

## 2023-07-19 PROBLEM — H93.8X3 SENSATION OF FULLNESS IN BOTH EARS: Status: RESOLVED | Noted: 2022-02-10 | Resolved: 2023-07-19

## 2023-07-19 PROCEDURE — 99214 OFFICE O/P EST MOD 30 MIN: CPT | Performed by: NURSE PRACTITIONER

## 2023-07-19 NOTE — PROGRESS NOTES
Assessment/Plan:    Irritable bowel syndrome with constipation  Currently follows GI,  continues on Amitiza     Postoperative hypothyroidism  S/P thyroidectomy, follows endocrine, continues on levothyroxine     Chronic migraine w/o aura w/o status migrainosus, not intractable  Uncontrolled, follows neurology. Insomnia  Controlled on Ambien   Controlled sub agreement on file   Has consult scheduled with sleep medicine     Neck pain  Referral to PT   Continue with Mobic   Ice and heat as needed    Hyperlipidemia  Recommend healthy lifestyle choices for your cholesterol. Low fat/low cholesterol diet. Limit/avoid red meat. Eat more lean meat - chicken breast, ground turkey, fish. Exercise 30 mins at least 5 times a week as tolerated. Depression Screening and Follow-up Plan: Patient was screened for depression during today's encounter. They screened negative with a PHQ-2 score of 0. Diagnoses and all orders for this visit:    Muscle spasm  -     Ambulatory Referral to Physical Therapy; Future    Irritable bowel syndrome with constipation    Postoperative hypothyroidism    Chronic migraine w/o aura w/o status migrainosus, not intractable    Primary insomnia    Neck pain    Hyperlipidemia, unspecified hyperlipidemia type          Subjective:      Patient ID: Chioma Ochoa is a 38 y.o. female. Patient presents today for a 6 month follow up    She is a [employer] Employee and works as an .   Denies any new concerns.     IBS-constipation - currently followed by GI. Managed on Amatizia. She is s/p colonoscopy and EGD, advised routine follow up at age 41. Has been getting worse lately, she reports that she is due for follow up. Small hiatal hernia with reflux - EGD 7/26/2021.  Controlled on omeprazole 20mg daily      Migraines -as been worse since since having thyroidectomy, recently had MRI and follow neurology     Insomnia- she is currently on Ambien 10mg prn     Hypothyroidism- take levothyroxine 125mcg, and follows endocrine     Was seen in June for low back pain which occurred after sitting at a concert,  and given Robaxin and prednisone with some relief of symptoms, sitting seems to make it worse, has been using lidocaine patches with no relief  Xray of lumbar and thoracic spine were done and both normal      She reports that she has right shoulder pain, she feels like she has "muscle pulling"  Started Sunday, denies injury, had injury in the past at work           The following portions of the patient's history were reviewed and updated as appropriate: allergies, current medications, past family history, past medical history, past social history, past surgical history and problem list.    Review of Systems   Constitutional: Negative for activity change, appetite change, chills, diaphoresis and fever. HENT: Negative for congestion, ear discharge, ear pain, postnasal drip, rhinorrhea, sinus pressure, sinus pain and sore throat. Eyes: Negative for pain, discharge, itching and visual disturbance. Respiratory: Negative for cough, chest tightness, shortness of breath and wheezing. Cardiovascular: Negative for chest pain, palpitations and leg swelling. Gastrointestinal: Negative for abdominal pain, constipation, diarrhea, nausea and vomiting. Endocrine: Negative for polydipsia, polyphagia and polyuria. Genitourinary: Negative for difficulty urinating, dysuria and urgency. Musculoskeletal: Positive for arthralgias. Negative for back pain and neck pain. Skin: Negative for rash and wound. Neurological: Positive for headaches. Negative for dizziness, weakness and numbness. Psychiatric/Behavioral: Negative for dysphoric mood. The patient is not nervous/anxious.           Past Medical History:   Diagnosis Date   • Allergic    • Anxiety    • Cancer ([address]) 2/1/2023    Thyroid- Papillary carcinoma   • COVID-19 01/2022    mild s/s-not hospitalized, not vaccinated   • GERD (gastroesophageal reflux disease)    • Headache(784.0)    • History of IBS     with constipation   • Ingrown toenail    • Irritable bowel syndrome    • Migraines     Chronic per pt   • Plantar fasciitis    • Thyroid carcinoma (HCC)    • Varicella    • Venereal wart 08/07/2014         Current Outpatient Medications:   •  acetaminophen (TYLENOL) 500 mg tablet, Take 1,000 mg by mouth every 6 (six) hours as needed for mild pain, Disp: , Rfl:   •  acetaZOLAMIDE (DIAMOX) 125 mg tablet, 125 mg PO nightly for 1 week, then increase to 125 mg BID for 1 week, then 125 mg in am and 250 mg in pm for 1 week, then 250 mg BID (Patient taking differently: Take 125 mg by mouth 2 (two) times a day 125 mg PO nightly for 1 week, then increase to 125 mg BID for 1 week, then 125 mg in am and 250 mg in pm for 1 week, then 250 mg BID), Disp: 120 tablet, Rfl: 6  •  bisacodyl (DULCOLAX) 5 mg EC tablet, Take 2 tablets (10 mg total) by mouth 2 (two) times a day as needed for constipation, Disp: 60 tablet, Rfl: 1  •  calcitriol (ROCALTROL) 0.25 mcg capsule, Take 1 tablet twice daily, Disp: 180 capsule, Rfl: 0  •  cholecalciferol (VITAMIN D3) 1,000 units tablet, Take 1 tablet (1,000 Units total) by mouth daily Do not start before March 1, 2023., Disp: 30 tablet, Rfl: 0  •  clindamycin (CLEOCIN T) 1 % lotion, in the morning, Disp: , Rfl:   •  Galcanezumab-gnlm 120 MG/ML SOAJ, Inject 120 mg under the skin every 28 days, Disp: 1 mL, Rfl: 11  •  Levocetirizine Dihydrochloride (XYZAL PO), Take by mouth daily at bedtime, Disp: , Rfl:   •  levothyroxine (Synthroid) 125 mcg tablet, Take 1 tablet (125 mcg total) by mouth daily, Disp: 30 tablet, Rfl: 3  •  lubiprostone (AMITIZA) 24 mcg capsule, Take 1 capsule (24 mcg total) by mouth 2 (two) times a day with meals, Disp: 60 capsule, Rfl: 2  •  Melatonin 5 MG TABS, Take by mouth as needed, Disp: , Rfl:   •  meloxicam (MOBIC) 15 mg tablet, Take 15 mg by mouth daily as needed, Disp: , Rfl:   •  omeprazole (PriLOSEC) 40 MG capsule, Take 1 capsule (40 mg total) by mouth daily, Disp: 30 capsule, Rfl: 0  •  propranolol (INDERAL) 40 mg tablet, 2 (two) times a day, Disp: , Rfl:   •  rimegepant sulfate (Nurtec) 75 mg TBDP, Take one NURTEC 75 mg at onset under tongue. Limit 1 in 24 hours. , Disp: 16 tablet, Rfl: 11  •  rizatriptan (MAXALT) 10 mg tablet, Take 1 tablet (10 mg total) by mouth once as needed for migraine May repeat in 2 hours if needed.  Max 2/24 hours, 9/month., Disp: 9 tablet, Rfl: 12  •  zolpidem (AMBIEN) 10 mg tablet, Take 10 mg by mouth daily at bedtime as needed, Disp: , Rfl:   •  calcium citrate (CALCITRATE) 950 (200 Ca) MG tablet, Take 1 tablet (950 mg total) by mouth 2 (two) times a day, Disp: 120 tablet, Rfl: 0    No Known Allergies    Social History   Past Surgical History:   Procedure Laterality Date   • CERVICAL BIOPSY  W/ LOOP ELECTRODE EXCISION  2017   • COLONOSCOPY     • NASAL SEPTUM SURGERY     • GA OSTEOT W/WO [facility] SHRT/CORRJ 1ST METAR Left 12/30/2022    Procedure: OSTEOTOMY METATARSAL 1st;  Surgeon: Shaye Bhatia DPM;  Location: [facility];  Service: Podiatry   • THYROIDECTOMY N/A 2/24/2023    Procedure: TOTAL THYROIDECTOMY;  Surgeon: Donnita Aschoff, MD;  Location:  MAIN OR;  Service: Surgical Oncology   • TONSILLECTOMY     • UPPER GASTROINTESTINAL ENDOSCOPY     • US GUIDED THYROID BIOPSY  1/31/2023   • WISDOM TOOTH EXTRACTION       Family History   Problem Relation Age of Onset   • Hypertension Mother    • Arthritis Mother    • Thyroid disease Mother    • Osteoarthritis Mother    • Transient ischemic attack Father    • Hypertension Father    • Stroke Father    • Vision loss Father    • Rashes / Skin problems Sister         Shingles   • Migraines Sister    • No Known Problems Sister    • Uterine cancer Maternal Grandmother    • Cancer Maternal Grandmother         Uterine   • Colon cancer Maternal Grandfather    • Cancer Maternal Grandfather         Colon   • Lung cancer Paternal Grandmother 61   • Cancer Paternal Grandmother         Lung   • Heart attack Paternal Grandfather    • Colon cancer Cousin 27   • Breast cancer Maternal Aunt    • Breast cancer Maternal Aunt    • Breast cancer Maternal Aunt        Objective:  /64 (BP Location: Left arm, Patient Position: Sitting, Cuff Size: Standard)   Pulse 71   Temp 98.7 °F (37.1 °C) (Temporal)   Resp 18   Ht 5' 4" (1.626 m)   Wt 71.5 kg (157 lb 9.6 oz)   LMP 06/12/2023 (Exact Date)   SpO2 100%   BMI 27.05 kg/m²     Recent Results (from the past 1344 hour(s))   T4, free Lab Collect    Collection Time: 05/31/23  7:07 AM   Result Value Ref Range    Free T4 1.60 (H) 0.61 - 1.12 ng/dL   TSH, 3rd generation Lab Collect    Collection Time: 05/31/23  7:07 AM   Result Value Ref Range    TSH 3RD GENERATON 3.873 0.450 - 4.500 uIU/mL   Thyroglobulin w/ab Lab Collect    Collection Time: 05/31/23  7:07 AM   Result Value Ref Range    Thyroglobulin Ab <1.0 0.0 - 0.9 IU/mL   Thyroglobulin by MACHO    Collection Time: 05/31/23  7:07 AM   Result Value Ref Range    Thyroglobulin-MACHO 0.3 (L) 1.5 - 38.5 ng/mL   TSH, 3rd generation Lab Collect    Collection Time: 07/05/23  6:45 AM   Result Value Ref Range    TSH 3RD GENERATON 1.176 0.450 - 4.500 uIU/mL   T4, free Lab Collect    Collection Time: 07/05/23  6:45 AM   Result Value Ref Range    Free T4 1.11 0.61 - 1.12 ng/dL   Vitamin D 25 hydroxy Lab Collect    Collection Time: 07/05/23  6:45 AM   Result Value Ref Range    Vit D, 25-Hydroxy 36.1 30.0 - 100.0 ng/mL   Comprehensive metabolic panel Lab Collect    Collection Time: 07/05/23  6:45 AM   Result Value Ref Range    Sodium 139 135 - 147 mmol/L    Potassium 3.6 3.5 - 5.3 mmol/L    Chloride 110 (H) 96 - 108 mmol/L    CO2 26 21 - 32 mmol/L    ANION GAP 3 mmol/L    BUN 18 5 - 25 mg/dL    Creatinine 0.84 0.60 - 1.30 mg/dL    Glucose, Fasting 93 65 - 99 mg/dL    Calcium 8.3 8.3 - 10.1 mg/dL    AST 16 5 - 45 U/L    ALT 24 12 - 78 U/L    Alkaline Phosphatase 32 (L) 46 - 116 U/L    Total Protein 6.9 6.4 - 8.4 g/dL    Albumin 3.5 3.5 - 5.0 g/dL    Total Bilirubin 0.35 0.20 - 1.00 mg/dL    eGFR 87 ml/min/1.73sq m            Physical Exam  Constitutional:       General: She is not in acute distress. Appearance: She is well-developed. She is not diaphoretic. HENT:      Head: Normocephalic and atraumatic. Right Ear: External ear normal.      Left Ear: External ear normal.      Nose: Nose normal.      Mouth/Throat:      Pharynx: No oropharyngeal exudate. Eyes:      General:         Right eye: No discharge. Left eye: No discharge. Conjunctiva/sclera: Conjunctivae normal.      Pupils: Pupils are equal, round, and reactive to light. Neck:      Thyroid: No thyromegaly. Cardiovascular:      Rate and Rhythm: Normal rate and regular rhythm. Heart sounds: Normal heart sounds. No murmur heard. No friction rub. No gallop. Pulmonary:      Effort: Pulmonary effort is normal. No respiratory distress. Breath sounds: Normal breath sounds. No stridor. No wheezing or rales. Abdominal:      General: Bowel sounds are normal. There is no distension. Palpations: Abdomen is soft. Tenderness: There is no abdominal tenderness. Musculoskeletal:        Arms:       Cervical back: Normal range of motion and neck supple. Lymphadenopathy:      Cervical: No cervical adenopathy. Skin:     General: Skin is warm and dry. Findings: No erythema or rash. Neurological:      Mental Status: She is alert and oriented to person, place, and time. Psychiatric:         Behavior: Behavior normal.         Thought Content:  Thought content normal.         Judgment: Judgment normal.

## 2023-07-19 NOTE — ASSESSMENT & PLAN NOTE
Recommend healthy lifestyle choices for your cholesterol. Low fat/low cholesterol diet. Limit/avoid red meat. Eat more lean meat - chicken breast, ground turkey, fish. Exercise 30 mins at least 5 times a week as tolerated.

## 2023-07-21 DIAGNOSIS — K59.04 CHRONIC IDIOPATHIC CONSTIPATION: ICD-10-CM

## 2023-07-21 RX ORDER — LUBIPROSTONE 24 UG/1
24 CAPSULE ORAL 2 TIMES DAILY WITH MEALS
Qty: 180 CAPSULE | Refills: 2 | Status: SHIPPED | OUTPATIENT
Start: 2023-07-21

## 2023-07-27 PROCEDURE — 88341 IMHCHEM/IMCYTCHM EA ADD ANTB: CPT | Performed by: PATHOLOGY

## 2023-07-27 PROCEDURE — 88342 IMHCHEM/IMCYTCHM 1ST ANTB: CPT | Performed by: PATHOLOGY

## 2023-07-27 PROCEDURE — 88305 TISSUE EXAM BY PATHOLOGIST: CPT | Performed by: PATHOLOGY

## 2023-08-01 ENCOUNTER — APPOINTMENT (OUTPATIENT)
Dept: LAB | Facility: CLINIC | Age: 43
End: 2023-08-01
Payer: COMMERCIAL

## 2023-08-01 ENCOUNTER — OFFICE VISIT (OUTPATIENT)
Dept: SLEEP CENTER | Facility: CLINIC | Age: 43
End: 2023-08-01
Payer: COMMERCIAL

## 2023-08-01 VITALS
WEIGHT: 155 LBS | HEIGHT: 64 IN | BODY MASS INDEX: 26.46 KG/M2 | DIASTOLIC BLOOD PRESSURE: 63 MMHG | HEART RATE: 51 BPM | SYSTOLIC BLOOD PRESSURE: 103 MMHG

## 2023-08-01 DIAGNOSIS — G47.19 EXCESSIVE DAYTIME SLEEPINESS: Primary | ICD-10-CM

## 2023-08-01 DIAGNOSIS — G47.00 INSOMNIA, UNSPECIFIED TYPE: ICD-10-CM

## 2023-08-01 DIAGNOSIS — G25.81 RESTLESS LEG SYNDROME: ICD-10-CM

## 2023-08-01 LAB
FERRITIN SERPL-MCNC: 9 NG/ML (ref 11–307)
IRON SATN MFR SERPL: 19 % (ref 15–50)
IRON SERPL-MCNC: 62 UG/DL (ref 50–170)
TIBC SERPL-MCNC: 335 UG/DL (ref 250–450)

## 2023-08-01 PROCEDURE — 36415 COLL VENOUS BLD VENIPUNCTURE: CPT

## 2023-08-01 PROCEDURE — 99244 OFF/OP CNSLTJ NEW/EST MOD 40: CPT | Performed by: PSYCHIATRY & NEUROLOGY

## 2023-08-01 PROCEDURE — 82728 ASSAY OF FERRITIN: CPT

## 2023-08-01 PROCEDURE — 83550 IRON BINDING TEST: CPT

## 2023-08-01 PROCEDURE — 83540 ASSAY OF IRON: CPT

## 2023-08-01 NOTE — PROGRESS NOTES
Assessment/Plan:    1. Excessive daytime sleepiness    2. Insomnia, unspecified type  -     Ambulatory referral to Sleep Medicine    3. Restless leg syndrome  -     Iron Panel (Includes Ferritin, Iron Sat%, Iron, and TIBC); Future    We discussed that she has chronic insomnia which is significantly impacting her. Presently she is managing this with zolpidem and melatonin which is not an ideal long-term strategy and moreover, it is possible these medications could be contributing to sleepiness. We discussed that in women, the optimal dose of zolpidem is 5 mg as it is more slowly metabolized. If medication is used long-term, 1 consideration be to change medication. That said, we discussed another treatment for insomnia is cognitive behavioral therapy for insomnia. She has never tried this before and seems open to this. She describes many symptoms that would indicate she may benefit from this, as she describes rumination, some worry regarding anticipation regarding sleep, and in general poor sleep at night. She is open to this program and I will therefore refer her. We discussed that it takes a significant time commitment, she notes at the end of the summer she will likely have more flexibility. We discussed that sleep restriction is a part of this program.  As she describes significant sleepiness, we discussed there is a concern this degree of sleepiness could worsen, she will need to be very mindful of this. We discussed the importance of avoiding drowsy driving, she understands this. If she proceeds with CBT-I, it would be ideal to wean her hypnotics further. As a starting point, I asked her to try to limit zolpidem at present to 5 mg. If she starts CBT-I, likely I will change her back to a 5 mg pill with perhaps a future dose reduction of 2.5 mg for a while before stopping this medication. She identifies symptoms of restless legs that were present prior to her thyroid surgery.   She is not anemic but I would like to assess for iron deficiency and have therefore ordered an iron panel. It is unclear present how much this is contributing to symptoms of insomnia. This seems to occur somewhat frequently, in the future may consider medication such as gabapentin. In addition, we discussed that she is scheduled for a polysomnogram in several months. I do not suspect obstructive sleep apnea as she does not describe loud snoring and overall her risk for NUNU is low. That said, for now we will keep this test on the books. If excessive sleepiness persist, I may consider adding a multiple sleep latency test in the future. Narcolepsy would be unlikely although she did have significant sleep hallucinations a few years ago and certainly describes excessive sleepiness. Subjective:      Patient ID: Carmen Miranda is a 38 y.o. female. HPI    This is a 51-year-old female who presents as a new patient referred by Dr. Maximino Chen. The patient was referred at an office visit in June. In brief, the patient has a history of papillary thyroid cancer, status post total thyroidectomy, follows with Dr. Maximino Chen for migraine headaches. Dr. Maximino Chen has suspected idiopathic intracranial hypertension as a contributing factor for her headaches. At that visit, Dr. Maximino Chen obtained a history that the patient awakens frequently throughout the night, feels tired all the time, and sleeps 5-6 hours a night, also has snoring. A home sleep test was completed, that test showed a respiratory event index of 0.7 with normal oxygenation. The patient took zolpidem 10 mg the night of that test and estimated 4-5 hours of sleep during the home sleep test.    The patient describes that she has difficulty sleeping, problems staying asleep, lack of concentration and headaches. 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     She typically goes to bed at 10 PM, is asleep by 11, awakens around 6 AM.  When not working she awakens around 8 AM.  She has 4 awakenings during the night. She describes feeling sleepy during the day, does not intentionally take naps. [place] Sleepiness Scale score is 14. She denies drowsy driving. For sleep, she takes zolpidem 5-10 mg and/or melatonin 5-10 mg. She denies sleepwalking or acting out dreams. She has not had sleep paralysis, sleep hallucinations, or symptoms of cataplexy. She drinks 8-16 ounces of coffee a day. She has 1-2 glasses of red wine a week. She does not smoke cigarettes. She has had problems sleeping since her mid 25s. She started zolpidem in her late 25s, took it more starting in her late 35s. She identifies falling asleep is a problem "I can't get my mind to stop" and describes "anticipation regarding sleep" as a problem. She also describes awakenings    Takes melatonin 5 mg at 8-830 pm   She goes to bed at 10 pm.  Will read or will relax first. Tries to avoid using phone in bed. Tries to sleep when she feels sleepy after 30 minutes. Does not fall asleep quickly. If she does not fall asleep easily will take zolpidem 5-10 mg at 11 pm, takes this 6 nights a week. Asleep by 1130 pm if taking zolpidem    When she is unable to sleep she lays in bed. Will sometimes leave the room to read or do journeying. Worries in bed , worries about sleep. Looks at the time often on her cell phone. Wakes around 3 AM, then a few times after that. A few nights a week it is hard for her to return to sleep, can take an hour to return to sleep    Not refreshed when she wakes. Feels sleepy, sluggish, and drained. Does not take naps, does not like napping and also does not fall asleep with naps. Dozes if inactive on weekends. Rarely dozes with meeting. Does not get sleepy driving    Has not had sleep paralysis. A few years had sleep hallucinations seeing bugs in bed, less so now.  No cataplexy    She had an urge that she needs to move her legs, occurs each night. Not sure if this keeps her awake. Movement would help this. This was present before her thyroid surgery. Now has tingling post-thyroid surgery which is separate from this. No known fam hx of RLS       She sleeps alone. Has been told she snores lightly, not loud. No witnessed apneas, gasping in sleep. [place] Sleepiness Scale  Sitting and reading: High chance of dozing  Watching TV: Moderate chance of dozing  Sitting, inactive in a public place (e.g. a theatre or a meeting): Moderate chance of dozing  As a passenger in a car for an hour without a break: Moderate chance of dozing  Lying down to rest in the afternoon when circumstances permit: Moderate chance of dozing  Sitting and talking to someone: Slight chance of dozing  Sitting quietly after a lunch without alcohol: Moderate chance of dozing  In a car, while stopped for a few minutes in traffic: Would never doze  Total score: 14      Review of Systems   Constitutional: Positive for fatigue. HENT: Positive for postnasal drip. Respiratory: Positive for cough. Negative for shortness of breath and wheezing. Cardiovascular: Negative for palpitations and leg swelling. Musculoskeletal: Positive for back pain. Negative for arthralgias and neck pain. Allergic/Immunologic: Positive for environmental allergies. Neurological: Positive for headaches. Psychiatric/Behavioral: Negative for dysphoric mood. The patient is not nervous/anxious. No hx walter           Objective:      /63 (BP Location: Left arm, Patient Position: Sitting, Cuff Size: Adult)   Pulse (!) 51   Ht 5' 4" (1.626 m)   Wt 70.3 kg (155 lb)   BMI 26.61 kg/m²          Physical Exam  Constitutional:       Appearance: Normal appearance. HENT:      Head: Normocephalic and atraumatic. Mouth/Throat:      Mouth: Mucous membranes are moist.   Eyes:      Extraocular Movements: Extraocular movements intact.       Pupils: Pupils are equal, round, and reactive to light. Cardiovascular:      Rate and Rhythm: Normal rate. Pulses: Normal pulses. Heart sounds: Normal heart sounds. Pulmonary:      Effort: Pulmonary effort is normal.      Breath sounds: Normal breath sounds. Musculoskeletal:      Right lower leg: No edema. Left lower leg: No edema. Neurological:      Mental Status: She is alert. Psychiatric:         Mood and Affect: Mood normal.         Behavior: Behavior normal.         Thought Content:  Thought content normal.         Judgment: Judgment normal.       mallampati 3-4 airway

## 2023-08-01 NOTE — PATIENT INSTRUCTIONS
It is very important to avoid driving while drowsy, this can be very dangerous or even cause serious injury or death. If sleepy, it is not safe to get behind the wheel. If you are driving and feels sleepy, it is very important to pull over right away. Even losing control of the car for a split second can be deadly. If you feel you cannot control when sleepiness occurs and cannot prevented, it is important to not drive at all until this improves. Please let me know if you experience this as it is very important. I will refer you to Dr Kaitlin Ortiz for cognitive behavioral therapy for insomnia    Nursing Support:  When: Monday through Friday 7A-5PM except holidays  Where: Our direct line is [phone]. If you are having a true emergency please call 911. In the event that the line is busy or it is after hours please leave a voice message and we will return your call. Please speak clearly, leaving your full name, birth date, best number to reach you and the reason for your call. Medication refills: We will need the name of the medication, the dosage, the ordering provider, whether you get a 30 or 90 day refill, and the pharmacy name and address. Medications will be ordered by the provider only. Nurses cannot call in prescriptions. Please allow 7 days for medication refills. Physician requested updates: If your provider requested that you call with an update after starting medication, please be ready to provide us the medication and dosage, what time you take your medication, the time you attempt to fall asleep, time you fall asleep, when you wake up, and what time you get out of bed. Sleep Study Results: We will contact you with sleep study results and/or next steps after the physician has reviewed your testing.

## 2023-08-02 DIAGNOSIS — E61.1 IRON DEFICIENCY: Primary | ICD-10-CM

## 2023-08-03 ENCOUNTER — TELEPHONE (OUTPATIENT)
Dept: HEMATOLOGY ONCOLOGY | Facility: CLINIC | Age: 43
End: 2023-08-03

## 2023-08-03 NOTE — TELEPHONE ENCOUNTER
I called Arabella Oakes in response to a referral that was received for patient to establish care with Hematology. Outreach was made to schedule a consultation. .    A consultation was scheduled for patient during this call.  Patient is scheduled on 08/04/23 at [address] with Dunia Jang at the [facility]

## 2023-08-04 ENCOUNTER — CONSULT (OUTPATIENT)
Dept: HEMATOLOGY ONCOLOGY | Facility: CLINIC | Age: 43
End: 2023-08-04

## 2023-08-04 ENCOUNTER — TELEPHONE (OUTPATIENT)
Dept: HEMATOLOGY ONCOLOGY | Facility: CLINIC | Age: 43
End: 2023-08-04

## 2023-08-04 VITALS
DIASTOLIC BLOOD PRESSURE: 70 MMHG | WEIGHT: 153.88 LBS | RESPIRATION RATE: 17 BRPM | BODY MASS INDEX: 26.27 KG/M2 | HEIGHT: 64 IN | TEMPERATURE: 97.9 F | OXYGEN SATURATION: 99 % | HEART RATE: 64 BPM | SYSTOLIC BLOOD PRESSURE: 110 MMHG

## 2023-08-04 DIAGNOSIS — E61.1 IRON DEFICIENCY: Primary | ICD-10-CM

## 2023-08-04 RX ORDER — SODIUM CHLORIDE 9 MG/ML
20 INJECTION, SOLUTION INTRAVENOUS ONCE
Status: CANCELLED | OUTPATIENT
Start: 2023-08-11

## 2023-08-04 NOTE — PROGRESS NOTES
Hematology/Oncology Outpatient Follow-up  Elayne Ponce 38 y.o. female 1980 [id number]    Date:  8/4/2023      Assessment and Plan:  1. Iron Deficiency   Patient understands her diagnosis of iron deficiency. She failed oral iron supplementation due to worsening of her chronic constipation. Plan is to undergo 6 iron infusions venofer 200 mg and recheck labs in 4 months. Patient had an endoscopy and colonoscopy in 2021 which were WNL. Patient is to continue following with GI and inform them of this new diagnosis of iron deficiency. She also has a follow up with GYN but by history does not sound like she has menorrhagia. We reviewed that iron def is not a primary hematological problem. We just intervene with replenishing iron stores as to why GYN and GI follow up is important. HPI:  Patient is a 38year old female being seen for consult for iron deficiency. She was referred by sleep medicine due to restless legs and low ferritin levels. Patient recalls her PCP telling her that she was iron deficient in her past and she took some oral iron supplementation. She does not remember anyone actually checking her iron levels. Explains that she has heavier menses during the first two days of her cycle, she changes her pad and tampon every 2-3 hours. Patient is experiencing fatigue, difficulty concentrating, shortness of breath, headaches, dizziness, and tingling in her hands and feet. States that she has some decreased appetite. Patient reports that these symptoms have worsened since her Thyroidectomy in February 2023. Patient also suffers from chronic constipation and occasional diarrhea. She is up to date on age appropriate cancer screenings including endoscopy and colonoscopy, mammogram, and pap smear. ROS: Review of Systems   Constitutional: Positive for appetite change. Negative for chills and fever. HENT: Negative for congestion and nosebleeds. Respiratory: Positive for shortness of breath.  Negative for cough. Cardiovascular: Positive for chest pain (minimal with shortness of breath on exertion). Gastrointestinal: Positive for constipation and diarrhea. Negative for blood in stool, nausea and vomiting. Genitourinary: Negative for difficulty urinating and dysuria. Musculoskeletal: Positive for myalgias. Skin: Negative for pallor. Neurological: Positive for dizziness, numbness and headaches. Past Medical History:   Diagnosis Date   • Allergic    • Anxiety    • Cancer ([address]) 2/1/2023    Thyroid- Papillary carcinoma   • COVID-19 01/2022    mild s/s-not hospitalized, not vaccinated   • GERD (gastroesophageal reflux disease)    • Headache(784.0)    • History of IBS     with constipation   • Ingrown toenail    • Irritable bowel syndrome    • Migraines     Chronic per pt   • Plantar fasciitis    • Thyroid carcinoma ([address])    • Varicella    • Venereal wart 08/07/2014       Past Surgical History:   Procedure Laterality Date   • CERVICAL BIOPSY  W/ LOOP ELECTRODE EXCISION  2017   • COLONOSCOPY     • NASAL SEPTUM SURGERY     • WA OSTEOT W/WO [facility] SHRT/CORRJ 1ST METAR Left 12/30/2022    Procedure: OSTEOTOMY METATARSAL 1st;  Surgeon: Esteban Dumont DPM;  Location: [facility];  Service: Podiatry   • THYROIDECTOMY N/A 2/24/2023    Procedure: TOTAL THYROIDECTOMY;  Surgeon: Mayda Naylor MD;  Location:  MAIN OR;  Service: Surgical Oncology   • TONSILLECTOMY     • UPPER GASTROINTESTINAL ENDOSCOPY     • US GUIDED THYROID BIOPSY  1/31/2023   • WISDOM TOOTH EXTRACTION         Social History     Socioeconomic History   • Marital status: Single     Spouse name: Not on file   • Number of children: Not on file   • Years of education: Not on file   • Highest education level: Not on file   Occupational History   • Not on file   Tobacco Use   • Smoking status: Never   • Smokeless tobacco: Never   Vaping Use   • Vaping Use: Never used   Substance and Sexual Activity   • Alcohol use:  Yes     Alcohol/week: 2.0 standard drinks of alcohol     Types: 2 Glasses of wine per week     Comment: 2 glasses wine a week   • Drug use: Never   • Sexual activity: Not Currently   Other Topics Concern   • Not on file   Social History Narrative   • Not on file     Social Determinants of Health     Financial Resource Strain: Not on file   Food Insecurity: Not on file   Transportation Needs: Not on file   Physical Activity: Not on file   Stress: Not on file   Social Connections: Not on file   Intimate Partner Violence: Not on file   Housing Stability: Not on file       Family History   Problem Relation Age of Onset   • Hypertension Mother    • Arthritis Mother    • Thyroid disease Mother    • Osteoarthritis Mother    • Transient ischemic attack Father    • Hypertension Father    • Stroke Father    • Vision loss Father    • Rashes / Skin problems Sister         Shingles   • Migraines Sister    • No Known Problems Sister    • Uterine cancer Maternal Grandmother    • Cancer Maternal Grandmother         Uterine   • Colon cancer Maternal Grandfather    • Cancer Maternal Grandfather         Colon   • Lung cancer Paternal Grandmother 61   • Cancer Paternal Grandmother         Lung   • Heart attack Paternal Grandfather    • Colon cancer Cousin 27   • Breast cancer Maternal Aunt    • Breast cancer Maternal Aunt    • Breast cancer Maternal Aunt        No Known Allergies      Current Outpatient Medications:   •  acetaminophen (TYLENOL) 500 mg tablet, Take 1,000 mg by mouth every 6 (six) hours as needed for mild pain, Disp: , Rfl:   •  acetaZOLAMIDE (DIAMOX) 125 mg tablet, 125 mg PO nightly for 1 week, then increase to 125 mg BID for 1 week, then 125 mg in am and 250 mg in pm for 1 week, then 250 mg BID (Patient taking differently: Take 125 mg by mouth 2 (two) times a day 125 mg PO nightly for 1 week, then increase to 125 mg BID for 1 week, then 125 mg in am and 250 mg in pm for 1 week, then 250 mg BID), Disp: 120 tablet, Rfl: 6  •  bisacodyl (DULCOLAX) 5 mg EC tablet, Take 2 tablets (10 mg total) by mouth 2 (two) times a day as needed for constipation, Disp: 60 tablet, Rfl: 1  •  calcitriol (ROCALTROL) 0.25 mcg capsule, Take 1 tablet twice daily, Disp: 180 capsule, Rfl: 0  •  calcium citrate (CALCITRATE) 950 (200 Ca) MG tablet, Take 1 tablet (950 mg total) by mouth 2 (two) times a day, Disp: 120 tablet, Rfl: 0  •  cholecalciferol (VITAMIN D3) 1,000 units tablet, Take 1 tablet (1,000 Units total) by mouth daily Do not start before March 1, 2023., Disp: 30 tablet, Rfl: 0  •  clindamycin (CLEOCIN T) 1 % lotion, in the morning, Disp: , Rfl:   •  Galcanezumab-gnlm 120 MG/ML SOAJ, Inject 120 mg under the skin every 28 days, Disp: 1 mL, Rfl: 11  •  Levocetirizine Dihydrochloride (XYZAL PO), Take by mouth daily at bedtime, Disp: , Rfl:   •  levothyroxine (Synthroid) 125 mcg tablet, Take 1 tablet (125 mcg total) by mouth daily, Disp: 30 tablet, Rfl: 3  •  lubiprostone (AMITIZA) 24 mcg capsule, Take 1 capsule (24 mcg total) by mouth 2 (two) times a day with meals, Disp: 180 capsule, Rfl: 2  •  Melatonin 5 MG TABS, Take by mouth as needed, Disp: , Rfl:   •  meloxicam (MOBIC) 15 mg tablet, Take 15 mg by mouth daily as needed, Disp: , Rfl:   •  omeprazole (PriLOSEC) 40 MG capsule, Take 1 capsule (40 mg total) by mouth daily, Disp: 30 capsule, Rfl: 0  •  propranolol (INDERAL) 40 mg tablet, 2 (two) times a day, Disp: , Rfl:   •  rimegepant sulfate (Nurtec) 75 mg TBDP, Take one NURTEC 75 mg at onset under tongue. Limit 1 in 24 hours. , Disp: 16 tablet, Rfl: 11  •  rizatriptan (MAXALT) 10 mg tablet, Take 1 tablet (10 mg total) by mouth once as needed for migraine May repeat in 2 hours if needed.  Max 2/24 hours, 9/month., Disp: 9 tablet, Rfl: 12  •  zolpidem (AMBIEN) 10 mg tablet, Take 10 mg by mouth daily at bedtime as needed, Disp: , Rfl:       Physical Exam:  /70 (BP Location: Left arm, Patient Position: Sitting, Cuff Size: Adult)   Pulse 64   Temp 97.9 °F (36.6 °C)   Resp 17 Ht 5' 4" (1.626 m)   Wt 69.8 kg (153 lb 14.1 oz)   SpO2 99%   BMI 26.41 kg/m²     Physical Exam  Constitutional:       Appearance: Normal appearance. Cardiovascular:      Rate and Rhythm: Normal rate and regular rhythm. Heart sounds: No murmur heard. Pulmonary:      Effort: Pulmonary effort is normal. No respiratory distress. Breath sounds: Normal breath sounds. No wheezing, rhonchi or rales. Abdominal:      General: Abdomen is flat. Palpations: Abdomen is soft. Tenderness: There is no abdominal tenderness. Skin:     General: Skin is warm and dry. Coloration: Skin is not pale. Neurological:      Mental Status: She is alert and oriented to person, place, and time. Labs:  Lab Results   Component Value Date    WBC 9.04 03/02/2023    HGB 12.7 03/02/2023    HCT 38.0 03/02/2023    MCV 93 03/02/2023     03/02/2023     I have spent 40 minutes with Patient  today in which greater than 50% of this time was spent in counseling/coordination of care regarding Diagnostic results, Risks and benefits of tx options, Instructions for management, Impressions, Counseling / Coordination of care, Documenting in the medical record, Reviewing / ordering tests, medicine, procedures   and Obtaining or reviewing history  . Patient voiced understanding and agreement in the above discussion. Aware to contact our office with questions/symptoms in the interim. This note has been generated by voice recognition software system. Therefore, there may be spelling, grammar, and or syntax errors. Please contact if questions arise.

## 2023-08-04 NOTE — TELEPHONE ENCOUNTER
Patient prefers Wednesday, Thursday or Friday for iron. Patient will like to be scheduled 2-230. Thanks!

## 2023-08-07 DIAGNOSIS — K21.9 GASTROESOPHAGEAL REFLUX DISEASE WITHOUT ESOPHAGITIS: ICD-10-CM

## 2023-08-07 RX ORDER — OMEPRAZOLE 40 MG/1
40 CAPSULE, DELAYED RELEASE ORAL DAILY
Qty: 30 CAPSULE | Refills: 4 | Status: SHIPPED | OUTPATIENT
Start: 2023-08-07

## 2023-08-10 ENCOUNTER — TELEPHONE (OUTPATIENT)
Dept: INFUSION CENTER | Facility: HOSPITAL | Age: 43
End: 2023-08-10

## 2023-08-11 ENCOUNTER — HOSPITAL ENCOUNTER (OUTPATIENT)
Dept: INFUSION CENTER | Facility: HOSPITAL | Age: 43
Discharge: HOME | End: 2023-08-11
Attending: INTERNAL MEDICINE
Payer: COMMERCIAL

## 2023-08-11 VITALS
HEART RATE: 61 BPM | SYSTOLIC BLOOD PRESSURE: 119 MMHG | TEMPERATURE: 97.9 F | RESPIRATION RATE: 18 BRPM | DIASTOLIC BLOOD PRESSURE: 74 MMHG

## 2023-08-11 DIAGNOSIS — E61.1 IRON DEFICIENCY: Primary | ICD-10-CM

## 2023-08-11 PROCEDURE — 96365 THER/PROPH/DIAG IV INF INIT: CPT

## 2023-08-11 RX ORDER — SODIUM CHLORIDE 9 MG/ML
20 INJECTION, SOLUTION INTRAVENOUS ONCE
Status: COMPLETED | OUTPATIENT
Start: 2023-08-11 | End: 2023-08-11

## 2023-08-11 RX ORDER — SODIUM CHLORIDE 9 MG/ML
20 INJECTION, SOLUTION INTRAVENOUS ONCE
Status: CANCELLED | OUTPATIENT
Start: 2023-08-17

## 2023-08-11 RX ADMIN — SODIUM CHLORIDE 20 ML/HR: 0.9 INJECTION, SOLUTION INTRAVENOUS at 14:28

## 2023-08-11 RX ADMIN — IRON SUCROSE 200 MG: 20 INJECTION, SOLUTION INTRAVENOUS at 14:28

## 2023-08-14 ENCOUNTER — TELEPHONE (OUTPATIENT)
Dept: PSYCHIATRY | Facility: CLINIC | Age: 43
End: 2023-08-14

## 2023-08-14 ENCOUNTER — EVALUATION (OUTPATIENT)
Dept: PHYSICAL THERAPY | Facility: REHABILITATION | Age: 43
End: 2023-08-14
Payer: COMMERCIAL

## 2023-08-14 DIAGNOSIS — M62.838 MUSCLE SPASM: ICD-10-CM

## 2023-08-14 DIAGNOSIS — M54.6 LEFT-SIDED THORACIC BACK PAIN, UNSPECIFIED CHRONICITY: Primary | ICD-10-CM

## 2023-08-14 PROCEDURE — 97161 PT EVAL LOW COMPLEX 20 MIN: CPT

## 2023-08-14 PROCEDURE — 97110 THERAPEUTIC EXERCISES: CPT

## 2023-08-14 PROCEDURE — 97112 NEUROMUSCULAR REEDUCATION: CPT

## 2023-08-14 NOTE — PROGRESS NOTES
PT Evaluation     Today's date: 2023  Patient name: Ky Chavez  : 1980  MRN: [id number]  Referring provider: CHRISTOPHER Sifuentes  Dx:   Encounter Diagnosis     ICD-10-CM    1. Left-sided thoracic back pain, unspecified chronicity  M54.6       2. Muscle spasm  M62.838 Ambulatory Referral to Physical Therapy          Start Time: 1530  Stop Time: 1630  Total time in clinic (min): 60 minutes    Assessment  Assessment details: Ky Chavez is a 38y.o. year old female with a referred dx of muscle spasm and thoracic pain from [facility], [address] [place] [address]. No further referral appears necessary at this time based upon examination results. Physical exam is most consistent with scapulothoracic pain secondary to poor postural endurance and strength. Upon further clinical testing, patient presents with the following deficits: decrease thoracic mobility, joint hypermobility, strength deficits of UE and periscapular musculature, limited postural endurance, decrease thoracic region motor control, pain with joint palpation. These deficits and impairments result in pain with prolonged positions such as sitting, difficulty lifting, and inability to maximally participate in recreational exercise to promote health and wellness. Pt was provided with a basic HEP focused on thoracic mobility which will be reviewed in the upcoming session. Pt able to demonstrate HEP with good technique and no pain. Educated pt to stop any exercises causing pain increase, pt verbalizes understanding. Pt was educated on anatomy and physiology of diagnosis and demonstrated verbal understanding. Pt would benefit from skilled OP physical therapy to address these, and the below impairments in order to optimize outcomes and promote return to functional baseline. Primary Impairments:  1. Poor Scapulothoracic Strength and Endurance      Patient verbalized understanding of POC.     Please contact me if you have any questions or recommendations. Thank you for the referral and the opportunity to share in Asmita''s care. Impairments: abnormal muscle firing, abnormal muscle tone, abnormal or restricted ROM, activity intolerance, impaired physical strength, lacks appropriate home exercise program, pain with function, weight-bearing intolerance, poor posture  and poor body mechanics    Symptom irritability: moderateUnderstanding of Dx/Px/POC: good   Prognosis: good    Goals    Short Term Goals: In 4 weeks, the patient will:  1. Pt will report at least a 25% reduction in subjective pain complaints/symptoms to better manage ADLs and household chores. 2. Pt will have atleast half a grade improvement in her UE MMT  3. Pt independent with initial HEP, rationale, technique and frequency, for ROM and pain control. 4. Pt will gain an additional 5-10 degrees in her thoracic AROM   5. Pt will begin to return to light/gradual strength training      Long Term Goals: In 10+ weeks, the patient will:  1. Pt will have thoracic mobility and UE strength WNL bilaterally  2. FOTO to greater than predicted value. 3. Independent with comprehensive HEP upon discharge. 4. Pt will be able to perform ADLs, iADLS, and household duties with minimal restriction indicating return to PLOF. 5. Pt independent with rationale, technique and plan for performance of advanced HEP to ensure independent self-management of symptoms upon discharge. 6. Pt will return to max participation in strength training program  7.  Pt report no increase thoracic pain with prolonged sitting       Plan  Patient would benefit from: skilled physical therapy  Referral necessary: No  Planned therapy interventions: abdominal trunk stabilization, activity modification, ADL retraining, manual therapy, motor coordination training, behavior modification, body mechanics training, neuromuscular re-education, patient education, self care, postural training, strengthening, therapeutic activities, therapeutic exercise, therapeutic training, IADL retraining, home exercise program, graded motor, graded exercise, graded activity and functional ROM exercises  Frequency: 1x week  Plan of Care beginning date: 2023  Plan of Care expiration date: 10/9/2023  Treatment plan discussed with: patient        Subjective Evaluation    History of Present Illness  Mechanism of injury: Patient presents to physical therapy with concerns regarding subacute left-sided thoracic pain that began approx 1 month ago. She states she was at a concert in  sitting in a prolonged position, pain started after with most pain on the left side of thoracic spine. Patient notes since having it for awhile it's beginning to radiate to right side. Denies any other distinct mechanism of injury or trauma. Patient has history of muscle spasms in cervical region in addition to right shoulder pain. Denies SOB. Has numbness/tingling in hands and feet - recently tested low for iron and just started iron infusions. Aggrivating: sitting, slouching   Relieves: lying on her stomach, massage    Continues to use heat and Meloxicam prn.           Not a recurrent problem   Quality of life: good    Patient Goals  Patient goals for therapy: return to sport/leisure activities, decreased pain and increased motion  Patient goal: be able to relax due to back pain, strength training   Pain  Current pain ratin  At best pain ratin  At worst pain ratin  Location: Left Side Thoracic  Quality: pulling, dull ache, discomfort, radiating and tight  Relieving factors: change in position, heat and medications  Aggravating factors: sitting and lifting    Social Support  Lives with: spouse    Employment status: working ( and [employer] per [employer] )  Exercise history: Light strength training,       Diagnostic Tests  X-ray: normal  Treatments  No previous or current treatments  Previous treatment: medication and massage  Current treatment: physical therapy        Objective       Standing         Head Position  Protracted x Neutral  Retracted   Scapular Position  Protracted x Neutral  Retracted   Thoracic Spine  Inc Kyphosis x Neutral       Strength and ROM evaluated B from a regional biomechanical perspective and values relevant to this episode recorded in table below    Active ROM: Goniometric measurement revealed the following findings. Shoulder ROM Right Left   Flexion [facility] WFL   Abduction [facility] WFL   BTH WFL WFL   BTB [facility] WFL       Thoracic AROM  Flexion: WNL  Extension: Very Limited  Rotation: Very Limited B/L P!, able to further be pushed with OP   Side-Flexion: Very Limited, stiffness      Strength: MMT revealed the following findings. Joint Motion Right Left   Shoulder Elevation 5/5 5/5   Sh. Flexion 4+/5 P! 5/5 P! Sh. Abduction 4+/5 5/5   Sh. ER 4/5 P! 5/5   Sh. IR 5/5 5/5   Shoulder extension 5/5 P! 5/5 P! Additional Assessments:  Palpation: Tenderness with PAVIMS at T7-T9, increase on left lateral facets  Joint mobility: Hypermobile     C/s Screen:   ROM: WFL, tightness with rotation/SB due to muscular tissue restriction        Neuro Screen:   Dermatomes: Intact  Myotomes: Intact  Reflexes:    Biceps: 2   Brachioradialis: 2   Triceps: 2  Clonus: N  Medellin's: N      Pertinent Findings:                                                                                                                                                     Test / Measure  8/14/2023   FOTO 52   Thoracic Mobility Very Limited   R UE  4+/5   Periscapular Endurance and Motor Control Poor        Precautions: Hypothyroidism, Hypocalcemia, GERD, IBS-C, Chronic Migraine       Access Code: [email or web address]  URL: [email or web address]  Date: 08/14/2023  Prepared by: Jarad Dunbar    Exercises  - Prone Press Up On Elbows  - 1 x daily - 7 x weekly - 2 sets - 10 reps  - Sidelying Open Book Thoracic Lumbar Rotation and Extension  - 1 x daily - 7 x weekly - 2 sets - 10 reps    Manuals 8/14                                                                Neuro Re-Ed             Modified Prone Press-Up             Open Book             Payloff Press             Resisted Rotation in staggered stance             Rows             Shoulder Extension                          Ther Ex             UBE             SL Shoulder Flexion             SL Shoulder ER             Repeated Thoracic Extension             Thoracic Protocol                                                    Ther Activity                                       Gait Training                                       Modalities

## 2023-08-14 NOTE — TELEPHONE ENCOUNTER
Writer ZEKE for patient to return call to office regarding a referral for CBT-I therapy. Please transfer call to writer. Thank you.

## 2023-08-17 ENCOUNTER — HOSPITAL ENCOUNTER (OUTPATIENT)
Dept: INFUSION CENTER | Facility: HOSPITAL | Age: 43
Discharge: HOME | End: 2023-08-17
Attending: INTERNAL MEDICINE
Payer: COMMERCIAL

## 2023-08-17 VITALS
TEMPERATURE: 98.3 F | HEART RATE: 50 BPM | RESPIRATION RATE: 18 BRPM | DIASTOLIC BLOOD PRESSURE: 67 MMHG | SYSTOLIC BLOOD PRESSURE: 103 MMHG

## 2023-08-17 DIAGNOSIS — E61.1 IRON DEFICIENCY: Primary | ICD-10-CM

## 2023-08-17 PROCEDURE — 96365 THER/PROPH/DIAG IV INF INIT: CPT

## 2023-08-17 RX ORDER — SODIUM CHLORIDE 9 MG/ML
20 INJECTION, SOLUTION INTRAVENOUS ONCE
Status: CANCELLED | OUTPATIENT
Start: 2023-08-25

## 2023-08-17 RX ORDER — SODIUM CHLORIDE 9 MG/ML
20 INJECTION, SOLUTION INTRAVENOUS ONCE
Status: COMPLETED | OUTPATIENT
Start: 2023-08-17 | End: 2023-08-17

## 2023-08-17 RX ADMIN — SODIUM CHLORIDE 20 ML/HR: 0.9 INJECTION, SOLUTION INTRAVENOUS at 14:45

## 2023-08-17 RX ADMIN — IRON SUCROSE 200 MG: 20 INJECTION, SOLUTION INTRAVENOUS at 14:46

## 2023-08-18 ENCOUNTER — TELEPHONE (OUTPATIENT)
Dept: PSYCHIATRY | Facility: CLINIC | Age: 43
End: 2023-08-18

## 2023-08-18 NOTE — TELEPHONE ENCOUNTER
Writer spoke with patient and schedule her for CBT-I therapy sessions beginning 8/23 at 5pm. Confirmed insurance and new patient packet will be mailed out.   Patient is unable to schedule 8/30 due to no available times and will resume appointments on [email or web address].

## 2023-08-21 ENCOUNTER — DOCUMENTATION (OUTPATIENT)
Dept: HEMATOLOGY ONCOLOGY | Facility: CLINIC | Age: 43
End: 2023-08-21

## 2023-08-21 ENCOUNTER — OFFICE VISIT (OUTPATIENT)
Dept: PHYSICAL THERAPY | Facility: REHABILITATION | Age: 43
End: 2023-08-21
Payer: COMMERCIAL

## 2023-08-21 DIAGNOSIS — M54.6 LEFT-SIDED THORACIC BACK PAIN, UNSPECIFIED CHRONICITY: ICD-10-CM

## 2023-08-21 DIAGNOSIS — M62.838 MUSCLE SPASM: Primary | ICD-10-CM

## 2023-08-21 PROCEDURE — 97112 NEUROMUSCULAR REEDUCATION: CPT

## 2023-08-21 NOTE — PROGRESS NOTES
Daily Note     Today's date: 2023  Patient name: Antelmo Rivas  : 1980  MRN: [id number]  Referring provider: CHRISTOPHER Wells  Dx:   Encounter Diagnosis     ICD-10-CM    1. Muscle spasm  M62.838       2. Left-sided thoracic back pain, unspecified chronicity  M54.6           Start Time: 1530  Stop Time: 1615  Total time in clinic (min): 45 minutes    Subjective: Patient report no changes in symptoms, left-side back pain especially with sitting. Objective: See treatment diary below      Assessment: Patient tolerated treatment session well today with focus on thoracic mobility and periscapular strength. Patient reported slight pain with exercises involving thoracic flexion and protraction. She responded the best with repeated thoracic extension and muscle activation. Updated HEP and reviewed with patient. Patient will continue to be appropriate for skilled outpatient physical therapy in order to address impairments. 1:1 with Lilibeth Bruno, PT, DPT for entirety of treatment session. Plan: Continue per plan of care. Progress treatment as tolerated. Precautions: Hypothyroidism, Hypocalcemia, GERD, IBS-C, Chronic Migraine       Pertinent Findings:                                                                                                                                                     Test / Measure  2023   FOTO 52   Thoracic Mobility Very Limited   R UE  4+/5   Periscapular Endurance and Motor Control Poor     Access Code: [email or web address]  URL: [email or web address]  Date: 2023  Prepared by: Lilibeth Bruno    Exercises  - Prone Press Up On Elbows  - 1 x daily - 7 x weekly - 2 sets - 10 reps  - Sidelying Open Book Thoracic Lumbar Rotation and Extension  - 1 x daily - 7 x weekly - 2 sets - 10 reps  - Prone Single Arm Shoulder Y  - 1 x daily - 7 x weekly - 3 sets - 10 reps  - Prone Shoulder Row  - 1 x daily - 7 x weekly - 3 sets - 10 reps  - Seated Thoracic Lumbar Extension  - 1 x daily - 7 x weekly - 3 sets - 10 reps    Manuals 8/21                                                                Neuro Re-Ed             Open Book             Payloff Press GTB  30x B            Resisted Rotation in staggered stance             Rows BTB  3"  2x10            Shoulder Extension Prone  #3 DB  20x  B/L                         Ther Ex             UBE Retro  3'/3'   Lvl 1             SL Shoulder Flexion             SL Shoulder ER             Repeated Thoracic Extension             Thoracic Protocol                                                    Ther Activity                                       Gait Training                                       Modalities

## 2023-08-21 NOTE — PROGRESS NOTES
Oncology Finance Advocacy Intake and Intervention  Oncology Finance Counselor/Advocate placed call to patient. This writer informed patient that this writer is here to assist patient with billing questions, financial assistance, payment/payment plans, quotes, copayment assistance, insurance optimization, and insurance navigation.    This writer conducted a thorough benefit review of copayment, deductible, and out of pocket cost. This information is documented below and has been reviewed with patient.     Copayment: N/A  Deductible: N/A  Out of Pocket Cost: N/A  Insurance optimization (Limited benefit vs self-pay): N/A  Patient assistance status: N/A  Free Drug Applications: N/A  Interventions:  [facility]  Added to [facility]        Information above was review thoroughly with patient and patient was advise of possible assistance programs/interventions. If any question arise patient can contact this writer at below information. This information was given to patient at time of contact.      Arlen Emanuel  Phone:[phone]  Email: [email or web address]

## 2023-08-22 ENCOUNTER — TELEPHONE (OUTPATIENT)
Dept: NEUROLOGY | Facility: CLINIC | Age: 43
End: 2023-08-22

## 2023-08-22 ENCOUNTER — DOCUMENTATION (OUTPATIENT)
Dept: HEMATOLOGY ONCOLOGY | Facility: CLINIC | Age: 43
End: 2023-08-22

## 2023-08-22 NOTE — TELEPHONE ENCOUNTER
Called patient and left voicemail to confirm their upcoming appointment with Dr. Moisés Hutton. Informed patient about arriving in the [place] location 15 minutes prior to appointment to get checked in and go over chart.

## 2023-08-23 ENCOUNTER — SOCIAL WORK (OUTPATIENT)
Dept: BEHAVIORAL/MENTAL HEALTH CLINIC | Facility: CLINIC | Age: 43
End: 2023-08-23
Payer: COMMERCIAL

## 2023-08-23 DIAGNOSIS — F51.01 PRIMARY INSOMNIA: Primary | ICD-10-CM

## 2023-08-23 PROCEDURE — 90791 PSYCH DIAGNOSTIC EVALUATION: CPT | Performed by: COUNSELOR

## 2023-08-23 NOTE — BH TREATMENT PLAN
Outpatient Behavioral Health Psychotherapy Treatment Plan    Aline Mattson  1980     Date of Initial Psychotherapy Assessment: 8/23/23   Date of Current Treatment Plan: 08/23/23  Treatment Plan Target Date: 2/23/24  Treatment Plan Expiration Date: 2/23/24    Diagnosis:   1. Primary insomnia            Area(s) of Need: insomnia    Long Term Goal 1 (in the client's own words): improve sleep    Stage of Change: Action    Target Date for completion: 2/23/24     Anticipated therapeutic modalities: Cognitive Behavior for Insomnia (CBT-I)     People identified to complete this goal: patient and therapist      Objective 1: (identify the means of measuring success in meeting the objective): complete CBT-I program      I am currently under the care of a [facility] psychiatric provider: no    My [facility] psychiatric provider is: n/a    I am currently taking psychiatric medications: Yes, as prescribed    I feel that I will be ready for discharge from mental health care when I reach the following (measurable goal/objective): When I better control over my insomnia    For children and adults who have a legal guardian:   Has there been any change to custody orders and/or guardianship status? NA. If yes, attach updated documentation. I have created my Crisis Plan and have been offered a copy of this plan    [address]: Diagnosis and Treatment Plan explained to Ezekiel Martin acknowledges an understanding of their diagnosis. Aline Mattson agrees to this treatment plan.     I have been offered a copy of this Treatment Plan. yes

## 2023-08-23 NOTE — PSYCH
Behavioral Health Psychotherapy Assessment    Date of Initial Psychotherapy Assessment: 08/23/23  Referral Source: Meryle Alberta, MD       Has a release of information been signed for the referral source? Yes    Preferred Name: Karlee Ochoa Bem  Preferred Pronouns: She/her  YOB: 1980 Age: 38 y.o. Sex assigned at birth: female   Gender Identity: female  Race:   Preferred Language: English    Emergency Contact:  Full Name: Horatio Hashimoto  Relationship to Client: sister  Contact information: [phone]    Primary Care Physician:  HENRY Galeano  [address]  [address]  [phone]  Has a release of information been signed? No    Physical Health History:  Past surgical procedures: see medical chart  Do you have a history of any of the following: other see medical chart  Do you have any mobility issues? No    Relevant Family History:  n/a    Presenting Problem (What brings you in?)  insomnia    Mental Health Advance Directive:  Do you currently have a Mental Health Advance Directive? no    Diagnosis:   Diagnosis ICD-10-CM Associated Orders   1. Primary insomnia  F51.01           Initial Assessment:     Current Mental Status:    Appearance: appropriate      Behavior/Manner: cooperative      Affect/Mood:  Hopeful    Speech:  Normal    Sleep:   Insomnia    Oriented to: oriented to self, oriented to place and oriented to time       Clinical Symptoms    Depression: yes      Anxiety: yes      Depression Symptoms: fatigue, sleep disturbance and insomnia      Anxiety Symptoms: fatigues easily and nervous/anxious      Have you ever been assaultive to others or the environment: No      Have you ever been self-injurious: No      Counseling History:  Previous Counseling or Treatment  (Mental Health or Drug & Alcohol): No    Have you previously taken psychiatric medications: Yes    Previous Medications Attempted:  Ambien    Suicide Risk Assessment  Have you ever had a suicide attempt: No    Have you had incidents of suicidal ideation: No    Are you currently experiencing suicidal thoughts: No      Disordered Eating History:  Do you have a history of disordered eating: No      Trauma and Abuse History:    Have you ever been abused: No      Legal History:    Have you ever been arrested  or had a DUI: No      Have you been incarcerated: No      Are you currently on parole/probation: No      Any current Children and Youth involvement: No      Any pending legal charges: No      Relationship History:    Current marital status: single      Natural Supports:  Siblings    Employment History    Are you currently employed: Yes      Sources of income/financial support:  Work     History:      Status: no history of  duty    Recommended Treatment:     Psychotherapy:  Individual sessions    Frequency:  1 time    Session frequency:  Weekly      DATA:  Patient is 38year old female presenting with primary insomnia referred by Dr. Pillo Navas. She reports it takes her hours to fall asleep and has day time sleepiness but not so much daytime fatigue. She currently takes Ambien some nights but want to work with Dr. Pillo Navas to discontinue the medication. Patient meets all 3 criteria from Disorders of Initiation and Maintenance (DIMS) for primary insomnia. Introduce CBT-I and answered patient questions. Discussed need for baseline and provided and explained sleep diary to be brought back to next session. Patient did not receive intake package but was given the forms to fill out and return. Discussed Homeostatic Disruption, Circadian Disruption, and Arousal-Cognitive behavior factors that can interfere with sleep. The following data are reported:    [place] SS  11  PHQ-9   15  SHAHZAD-7   10    ASSESS:  Primary insomnia is present. PLAN:  Continue individual psychotherapy. Complete baseline sleep diary and return to next session.        Visit start and stop times:    08/23/23  Start Time: 0456  Stop Time: 7156  Total Visit Time: 34 minutes

## 2023-08-25 ENCOUNTER — HOSPITAL ENCOUNTER (OUTPATIENT)
Dept: INFUSION CENTER | Facility: HOSPITAL | Age: 43
End: 2023-08-25
Attending: INTERNAL MEDICINE
Payer: COMMERCIAL

## 2023-08-25 VITALS
TEMPERATURE: 98.7 F | HEART RATE: 55 BPM | RESPIRATION RATE: 18 BRPM | DIASTOLIC BLOOD PRESSURE: 64 MMHG | SYSTOLIC BLOOD PRESSURE: 102 MMHG

## 2023-08-25 DIAGNOSIS — E61.1 IRON DEFICIENCY: Primary | ICD-10-CM

## 2023-08-25 PROCEDURE — 96365 THER/PROPH/DIAG IV INF INIT: CPT

## 2023-08-25 RX ORDER — SODIUM CHLORIDE 9 MG/ML
20 INJECTION, SOLUTION INTRAVENOUS ONCE
Status: COMPLETED | OUTPATIENT
Start: 2023-08-25 | End: 2023-08-25

## 2023-08-25 RX ORDER — SODIUM CHLORIDE 9 MG/ML
20 INJECTION, SOLUTION INTRAVENOUS ONCE
Status: CANCELLED | OUTPATIENT
Start: 2023-09-01

## 2023-08-25 RX ADMIN — IRON SUCROSE 200 MG: 20 INJECTION, SOLUTION INTRAVENOUS at 14:27

## 2023-08-25 RX ADMIN — SODIUM CHLORIDE 20 ML/HR: 0.9 INJECTION, SOLUTION INTRAVENOUS at 14:27

## 2023-08-28 ENCOUNTER — OFFICE VISIT (OUTPATIENT)
Dept: PHYSICAL THERAPY | Facility: REHABILITATION | Age: 43
End: 2023-08-28
Payer: COMMERCIAL

## 2023-08-28 DIAGNOSIS — M62.838 MUSCLE SPASM: Primary | ICD-10-CM

## 2023-08-28 DIAGNOSIS — M54.6 LEFT-SIDED THORACIC BACK PAIN, UNSPECIFIED CHRONICITY: ICD-10-CM

## 2023-08-28 PROCEDURE — 97112 NEUROMUSCULAR REEDUCATION: CPT

## 2023-08-28 PROCEDURE — 97110 THERAPEUTIC EXERCISES: CPT

## 2023-08-28 NOTE — PROGRESS NOTES
Daily Note     Today's date: 2023  Patient name: Arnie Rose  : 1980  MRN: [id number]  Referring provider: Saintclair Shu, CR*  Dx:   Encounter Diagnosis     ICD-10-CM    1. Muscle spasm  M62.838       2. Left-sided thoracic back pain, unspecified chronicity  M54.6           Start Time: 1800  Stop Time: 1900  Total time in clinic (min): 60 minutes    Subjective: Patient reported no worsening of symptoms. Patient continues to have most pain with prolonged sitting. Objective: See treatment diary below      Assessment: Patient tolerated treatment session well today with focus on thoracic mobility and periscapular strength. She responds better to standing exercises. Patient demonstrated limitations with UE strengthening exercises due to decrease motor control and LUE weakness vs RUE. Patient reported no worsening of thoracic symptoms, mostly muscle soreness. She continues to have hesitation and limitations with right thoracic rotation/lateral flexion. Improved cervical AROM at the end of the session. Patient will continue to be appropriate for skilled outpatient physical therapy in order to address impairments. 1:1 with Gregg Medina, PT, DPT for entirety of treatment session. Plan: Continue per plan of care. Progress treatment as tolerated. Precautions: Hypothyroidism, Hypocalcemia, GERD, IBS-C, Chronic Migraine       Pertinent Findings:                                                                                                                                                     Test / Measure  2023   FOTO 52   Thoracic Mobility Very Limited   R UE  4+/5   Periscapular Endurance and Motor Control Poor     Access Code: [email or web address]  URL: [email or web address]  Date: 2023  Prepared by: Gregg Medina    Exercises  - Prone Press Up On Elbows  - 1 x daily - 7 x weekly - 2 sets - 10 reps  - Sidelying Open Book Thoracic Lumbar Rotation and Extension  - 1 x daily - 7 x weekly - 2 sets - 10 reps  - Prone Single Arm Shoulder Y  - 1 x daily - 7 x weekly - 3 sets - 10 reps  - Prone Shoulder Row  - 1 x daily - 7 x weekly - 3 sets - 10 reps  - Seated Thoracic Lumbar Extension  - 1 x daily - 7 x weekly - 3 sets - 10 reps    Manuals 8/21 8/28                            Neuro Re-Ed      [facility] Press GTB  30x B #5  30x B    Rows BTB  3"  2x10 Rotation + Row  #5  20x B    Shoulder Extension Prone  #3 DB  20x  B/L Standing  #5  Unilateral  3x10 B    Ther Ex      UBE Retro  3'/3'   Lvl 1  6'  Lvl 2     Shoulder ER  #3 R  #2 L   3x10 B    Repeated Thoracic Extension      Thoracic Protocol  1' eac    Bent-Over Foam Rollout  3x10                 Ther Activity                  Gait Training                  Modalities

## 2023-08-29 ENCOUNTER — OFFICE VISIT (OUTPATIENT)
Dept: NEUROLOGY | Facility: CLINIC | Age: 43
End: 2023-08-29
Payer: COMMERCIAL

## 2023-08-29 VITALS
BODY MASS INDEX: 26.12 KG/M2 | HEART RATE: 76 BPM | HEIGHT: 64 IN | DIASTOLIC BLOOD PRESSURE: 78 MMHG | TEMPERATURE: 97.8 F | SYSTOLIC BLOOD PRESSURE: 122 MMHG | WEIGHT: 153 LBS

## 2023-08-29 DIAGNOSIS — G43.109 MIGRAINE WITH AURA AND WITHOUT STATUS MIGRAINOSUS, NOT INTRACTABLE: Primary | ICD-10-CM

## 2023-08-29 PROCEDURE — 99215 OFFICE O/P EST HI 40 MIN: CPT | Performed by: PSYCHIATRY & NEUROLOGY

## 2023-08-29 RX ORDER — PROPRANOLOL HYDROCHLORIDE 10 MG/1
TABLET ORAL
Qty: 108 TABLET | Refills: 4 | Status: SHIPPED | OUTPATIENT
Start: 2023-08-29

## 2023-08-29 RX ORDER — MULTIVITAMIN
1 TABLET ORAL DAILY
COMMUNITY

## 2023-08-29 RX ORDER — DEXAMETHASONE 4 MG/1
TABLET ORAL
Qty: 9 TABLET | Refills: 0 | Status: SHIPPED | OUTPATIENT
Start: 2023-08-29

## 2023-08-29 NOTE — PATIENT INSTRUCTIONS
No estrogen due to migraine aura    "why we sleep" by Félix Saavedra - good read or listen on audible    - "Rewire Your Anxious Brain: How to Use the Neuroscience of Fear to End Anxiety, Panic, and Worry" By Kimmy Velarde PhD    - consider reading or listening to the book "The body keeps the score" - interesting book on how PTSD and stress can impact the body and cause physical symptoms    Our  will reach out to you with a list of providers as well that are covered by your insurance  - [facility] [address] have had patients recommend the following female therapists. (I am assuming female by their names of course and I do not know them myself): - Kiara Preston PhD in   [address] with Lino Hollingsworth in [address] [facility] in [address] in [address] in [address] in [address] [facility] in [facility]     I am placing a referral to the sleep medicine specialist team to further evaluate your sleep issues. Please call [phone] to schedule and I recommend you see one of the following providers:  - Jakob Levine MD - sleep doctor who is a neurologist and is excellent       Headache/migraine treatment:   Abortive medications (for immediate treatment of a headache): It is ok to take ibuprofen, acetaminophen or naproxen (Advil, Tylenol,  Aleve, Excedrin) if they help your headaches you should limit these to No more than 3 times a week to avoid medication overuse/rebound headaches. -     rimegepant sulfate (Nurtec) 75 mg TBDP; Take one NURTEC 75 mg at onset under tongue. Limit 1 in 24 hours.   Prior auth, coupon card for copay     -     dexamethasone (DECADRON) 4 mg tablet; Can take 8 mg for 1-2 days PO with breakfast, then if needed can take 4 mg with breakfast for 1-5 more days if needed for headache      For your more moderate to severe migraines take this medication early   Maxalt (rizatriptan) 10mg tabs - take one at the onset of headache. May repeat one time after 1-2 hours if pain has not resolved. (Max 2 a day and 9 a month)     Prescription preventive medications for headaches/migraines   (to take every day to help prevent headaches - not to take at the time of headache):  [x] - decrease propranolol to 20 mg   -     propranolol (INDERAL) 10 mg tablet; Decrease to 30 mg PO BID for 1 week, then 20 mg BID for 1 week, then 10 mg BID for 1 week and then can stop      Emgality/Galcanezumab -  120 mg injections every 28 days     READ INSTRUCTIONS that come with the medication. REFRIGERATE. Keep out of direct sunlight. Prior to administration, allow to come to room temperature for 30 minutes. Do not warm using a heat source (eg, microwave or hot water). Do not shake. Administer in preferably abdomen (avoiding 2 inches around the navel), thigh, upper arm, or buttocks avoiding areas of skin that are tender, bruised, red or hard. Deliver entire contents of single-use prefilled pen or syringe. Unknown impact in pregnancy therefore would recommend stopping 6 months prior to considering pregnancy.    ---------------      Diamox   125 mg at 6 or 8 am time daniella - 1 hour away from synthroid  250 mg midday 1130/12  125 mg at 4 pm   250 mg at 9/9:30    In 1 week if needed/tolerated add 125 mg to the first dose  In 2 weeks add 125 mg to the afternoon dose    At any time you can add 250 mg at night if you wake up    - max 4000 mg a day    - if a lower dose is helpful, can stay lower, if higher dose causes side effects, go back to last tolerated dose.   If you get all the way up to the dose recommended and is not helping enough let me know as I will absolutely go higher.    - make sure to stay hydrated while on this as can cause dehydration since that is it's purpose to take fluid off.   - most common side effect is tingling of the nerves at times  - can cause electrolyte disturbances, but not typically in any significant way. However, be cautious if taking with other meds that lower potassium like hydrochlorothiazide etc      *Typically these types of medications take time untill you see the benefit, although some may see improvement in days, often it may take weeks, especially if the medication is being titrated up to a beneficial level. Please contact us if there are any concerns or questions regarding the medication. Lifestyle Recommendations:  [x] SLEEP - Maintain a regular sleep schedule: Adults need at least 7-8 hours of uninterrupted a night. Maintain good sleep hygiene:  Going to bed and waking up at consistent times, avoiding excessive daytime naps, avoiding caffeinated beverages in the evening, avoid excessive stimulation in the evening and generally using bed primarily for sleeping. One hour before bedtime would recommend turning lights down lower, decreasing your activity (may read quietly, listen to music at a low volume). When you get into bed, should eliminate all technology (no texting, emailing, playing with your phone, iPad or tablet in bed). [x] HYDRATION - Maintain good hydration. Drink  2L of fluid a day (4 typical small water bottles)  [x] DIET - Maintain good nutrition. In particular don't skip meals and try and eat healthy balanced meals regularly. [x] TRIGGERS - Look for other triggers and avoid them: Limit caffeine to 1-2 cups a day or less. Avoid dietary triggers that you have noticed bring on your headaches (this could include aged cheese, peanuts, MSG, aspartame and nitrates). [x] EXERCISE - physical exercise as we all know is good for you in many ways, and not only is good for your heart, but also is beneficial for your mental health, cognitive health and  chronic pain/headaches. I would encourage at the least 5 days of physical exercise weekly for at least 30 minutes.      Education and Follow-up  [x] Please call with any questions or concerns. Of course if any new concerning symptoms go to the emergency department.   [x] Follow up  2-3 months,  sooner if needed

## 2023-08-29 NOTE — PROGRESS NOTES
[facility] Neurology Concussion[facility]Headache [facility] Consult - Follow up   PATIENT:  Car Boyle  MRN:  [id number]  :  1980  DATE OF SERVICE:  2023  REFERRED BY: No ref. provider found  PMD: HENRY Yang    Assessment/Plan:   Car Boyle is a deligthful 38 y.o. female with a past medical history that includes papillary thyroid cancer status post total thyroidectomy 2023 with postop hypoparathyroidism and significantly low calcium now on replacement, IBS with constipation, allergic rhinitis, chronic sinusitis, migraines, TOSHIA II, insomnia, neck pain, vasovagal syncope in 20s, anxiety, Intestinal cystitis, childhood asthma  referred here for evaluation of headache. My initial evaluation 3/28/2022    Migraine with aura and without status migrainosus, not intractable  Post traumatic stress disorder   Features of idiopathic intracranial hypertension on imaging without papilledema  She reports a long history of headaches and migraines dating back to 10or 8years old. Family history of migraines. She reports she has followed with multiple neurologists in the past.  Pain is typically unilateral more often left-sided with visual aura at times and with typical associated migrainous features as well as autonomic features that do not seem to be unilateral to suggest trigeminal autonomic cephalalgia.  -as of 2022 on average over 15 migraine days per month. Trial of emgality for prevention rizatriptan for abortive. - as of 2022:  She reports she has had significant improvement on emgality down from 15 to 7-8 times a month and severity has improved as well. They respond to rizatriptan which she feels she tolerates more than sumatriptan. - as of 2023: She reports she continues to do very well on Emgality with about 4 migraines a month that resolve with rizatriptan. Some wearing off when due for next dose and recommended taking it at 28 days rather than 30 or 31.   She would like to stay on propranolol 40 mg twice daily for now as she feels this is helping prevent tachycardia from causing migraine. She was able to get off nortriptyline summer 2022 which she was on for an interstitial cystitis without increase.    - as of 4/5/2023:  She returns sooner with daily migraines now following papillary thyroid cancer status post total thyroidectomy 2/24/2023 with postop hypoparathyroidism and significantly low calcium now on replacement with some symptoms possibly consistent with high calcium and upcoming recheck of labs through endocrinology may suggest lowering calcium meds, but would defer to endocrine. She certainly has had increase in her migrainous symptoms as well as some symptoms possibly consistent with sleep apnea and I ordered sleep study. She reports she has had improvement in symptoms since the surgery but still was hoping there is something that could improve them as all the other doctors just told her to wait it out. We will start low-dose topiramate at night to see if this can help if tolerated. We also discussed PTSD and how it can cause intrusion, avoidance, negative impact on cognition and mood as well as arousal and reactivity changes and certainly sounds like she has symptoms of this as well and recommend counseling. We will have our  reach out to see if she can help. - as of 6/27/2023: She reports increasing frequency and severity of headaches overall and currently 3-4 a week. She did not tolerate trial of topiramate as it worsened mood which is slightly improved from its low point, but she plans to follow-up with counselor. 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 Diagnostic sleep study ordered due to suspected false negative home study.   Has Decadron available for worse symptoms if needed. Rizatriptan side effects and will add trial of Nurtec which also could help with prevention the more she takes it.  - as of 8/29/2023: Reviewed MRI brain which shows some features of possible slight increased intracranial pressure without IIH diagnosis and no papilledema. She reports 9-10 headaches per month that typically improve with Nurtec, which is a significant improvement on Emgality but has wearing off effect and we discussed increasing acetazolamide/Diamox from 250 mg twice daily since well-tolerated gradually up until it decreases more of the symptoms that might be related as thoroughly instructed and discussed with patient today. If side effects or not effective we will wean off. Also will wean off propranolol as we wean up acetazolamide/Diamox. Refill of Decadron for backup. She is currently undergoing cognitive behavioral therapy for insomnia soon through the sleep medicine department. Workup:  -MRI brain with without contrast 7/13/2023: No acute infarction, intracranial hemorrhage or mass. I do see some findings consistent with subtle changes that could be related to idiopathic intracranial hypertension including partially flattened sella(not empty), prominent optic nerve sheath with some tortuosity bilaterally (slightly) and as well as what appears to be slight enlargement of the optic nerve head     Preventative:  - we discussed headache hygiene and lifestyle factors that may improve headaches  - continue  emgality every 28 days. Discussed proper use, possible side effects and risks. -   Trial of  acetaZOLAMIDE (DIAMOX) as below. Discussed proper use, possible side effects and risks.   - wean off    propranolol (INDERAL) 10 mg tablet; Decrease to 30 mg PO BID for 1 week, then 20 mg BID for 1 week, then 10 mg BID for 1 week and then can stop  - Currently on through other providers:  ambien 10 mg- (off and on 6 years), melatonin  - Past/ failed/contraindicated: topiramate, gabapentin, propranolol 40 mg BID, nortriptyline, amitriptyline, prosac/fluoxetine, aimovig contraindicated due to constiptation  - future options: alternative CGRP, botox     Abortive:  - discussed not taking over-the-counter or prescription pain medications more than 3 days per week to prevent medication overuse/rebound headache  -  Trial of    dexamethasone (DECADRON) 4 mg tablet; Can take 8 mg for 1-2 days PO with breakfast, then if needed can take 4 mg with breakfast for 1-5 more days if needed for headache. Discussed proper use, possible side effects and risks. -   rimegepant sulfate (Nurtec) 75 mg TBDP; Take one NURTEC 75 mg at onset under tongue. Limit 1 in 24 hours. . Discussed proper use, possible side effects and risks. - Past/ failed/contraindicated: sumatriptan 100 mg works sometimes and not others, rizatriptan   - past/helped:  Steroids have helped temporarily, toradol IM in the past helped   - future options:   prochlorperazine, Toradol IM or p.o., could consider trial for 5 days of Depakote or dexamethasone for prolonged migraine, ubrelvy, reyvow    Insomnia, prolonged Ambien use  Home sleep study June 2023 2 obstructive apneas, 2 hypopneas, following with sleep medicine, undergoing CBT for insomnia      Patient instructions    No estrogen due to migraine aura    "why we sleep" by Mike Jordan - good read or listen on audible    - "Rewire Your Anxious Brain: How to Use the Neuroscience of Fear to End Anxiety, Panic, and Worry" By Kashif Brandt PhD    - consider reading or listening to the book "The body keeps the score" - interesting book on how PTSD and stress can impact the body and cause physical symptoms    Our  will reach out to you with a list of providers as well that are covered by your insurance  - Dr Tommy Donovan at [address] [place] [address],[address] have had patients recommend the following female therapists.  (I am assuming female by their names of course and I do not know them myself):  - Cachorro Santiago PhD in   [address] with [employer] in [address] in [address] in [address] in [address] in [address] [facility] in [facility]     I am placing a referral to the sleep medicine specialist team to further evaluate your sleep issues. Please call [phone] to schedule and I recommend you see one of the following providers:  - Eligio Andrews MD - sleep doctor who is a neurologist and is excellent       Headache/migraine treatment:   Abortive medications (for immediate treatment of a headache): It is ok to take ibuprofen, acetaminophen or naproxen (Advil, Tylenol,  Aleve, Excedrin) if they help your headaches you should limit these to No more than 3 times a week to avoid medication overuse/rebound headaches. -     rimegepant sulfate (Nurtec) 75 mg TBDP; Take one NURTEC 75 mg at onset under tongue. Limit 1 in 24 hours. Prior auth, coupon card for copay     -     dexamethasone (DECADRON) 4 mg tablet; Can take 8 mg for 1-2 days PO with breakfast, then if needed can take 4 mg with breakfast for 1-5 more days if needed for headache      For your more moderate to severe migraines take this medication early   Maxalt (rizatriptan) 10mg tabs - take one at the onset of headache. May repeat one time after 1-2 hours if pain has not resolved. (Max 2 a day and 9 a month)     Prescription preventive medications for headaches/migraines   (to take every day to help prevent headaches - not to take at the time of headache):  [x] - decrease propranolol to 20 mg   -     propranolol (INDERAL) 10 mg tablet; Decrease to 30 mg PO BID for 1 week, then 20 mg BID for 1 week, then 10 mg BID for 1 week and then can stop      Emgality/Galcanezumab -  120 mg injections every 28 days     READ INSTRUCTIONS that come with the medication. REFRIGERATE.  Keep out of direct sunlight. Prior to administration, allow to come to room temperature for 30 minutes. Do not warm using a heat source (eg, microwave or hot water). Do not shake. Administer in preferably abdomen (avoiding 2 inches around the navel), thigh, upper arm, or buttocks avoiding areas of skin that are tender, bruised, red or hard. Deliver entire contents of single-use prefilled pen or syringe. Unknown impact in pregnancy therefore would recommend stopping 6 months prior to considering pregnancy.    ---------------      Diamox   125 mg at 6 or 8 am time daniella - 1 hour away from synthroid  250 mg midday 1130/12  125 mg at 4 pm   250 mg at 9/9:30    In 1 week if needed/tolerated add 125 mg to the first dose  In 2 weeks add 125 mg to the afternoon dose    At any time you can add 250 mg at night if you wake up    - max 4000 mg a day    - if a lower dose is helpful, can stay lower, if higher dose causes side effects, go back to last tolerated dose. If you get all the way up to the dose recommended and is not helping enough let me know as I will absolutely go higher.    - make sure to stay hydrated while on this as can cause dehydration since that is it's purpose to take fluid off.   - most common side effect is tingling of the nerves at times  - can cause electrolyte disturbances, but not typically in any significant way. However, be cautious if taking with other meds that lower potassium like hydrochlorothiazide etc      *Typically these types of medications take time untill you see the benefit, although some may see improvement in days, often it may take weeks, especially if the medication is being titrated up to a beneficial level. Please contact us if there are any concerns or questions regarding the medication. Lifestyle Recommendations:  [x] SLEEP - Maintain a regular sleep schedule: Adults need at least 7-8 hours of uninterrupted a night.  Maintain good sleep hygiene:  Going to bed and waking up at consistent times, avoiding excessive daytime naps, avoiding caffeinated beverages in the evening, avoid excessive stimulation in the evening and generally using bed primarily for sleeping. One hour before bedtime would recommend turning lights down lower, decreasing your activity (may read quietly, listen to music at a low volume). When you get into bed, should eliminate all technology (no texting, emailing, playing with your phone, iPad or tablet in bed). [x] HYDRATION - Maintain good hydration. Drink  2L of fluid a day (4 typical small water bottles)  [x] DIET - Maintain good nutrition. In particular don't skip meals and try and eat healthy balanced meals regularly. [x] TRIGGERS - Look for other triggers and avoid them: Limit caffeine to 1-2 cups a day or less. Avoid dietary triggers that you have noticed bring on your headaches (this could include aged cheese, peanuts, MSG, aspartame and nitrates). [x] EXERCISE - physical exercise as we all know is good for you in many ways, and not only is good for your heart, but also is beneficial for your mental health, cognitive health and  chronic pain/headaches. I would encourage at the least 5 days of physical exercise weekly for at least 30 minutes. Education and Follow-up  [x] Please call with any questions or concerns. Of course if any new concerning symptoms go to the emergency department. [x] Follow up  2-3 months,  sooner if needed        CC: We had the pleasure of evaluating Arley Harrison in neurological consultation today. Arley Harrison is a   right handed female who presents today for evaluation of headaches. History obtained from patient as well as available medical record review.   History of Present Illness:   Interval history as of 8/29/2023  - no significant new or concerning neurologic symptoms since last visit   - eye doctor May and vision is great and better than 20/20   - doing PT for back and has been having left mid back pain and has to stand to feel comfortable, steroids by mouth didn't help, MSK relaxors just briefly helped  - stomach sleeper is best    -MRI brain with without contrast 7/13/2023: No acute infarction, intracranial hemorrhage or mass.   I do see some findings consistent with subtle changes that could be related to idiopathic intracranial hypertension including partially flattened sella(not empty), prominent optic nerve sheath with some tortuosity bilaterally (slightly) and as well as what appears to be slight enlargement of the optic nerve head     Headaches and migraines   9-10 headaches per month can be days in a row or once a week  Left temporal region to the back of skull on the left  Often due to wearing off effect for emgality    At night eyes watering and nose runs, if laughs or smiles eyes tear up, BM too sometimes   - evening gets progressively worse    Preventative:   -Trial of acetazolamide/Diamox tolerating well without side effects   250 mg at lunch 1130 and before bed 9/930    - trial of topiramate and was at 75 mg for 3 weeks and then stopped because she was having weird dark thoughts - 50 mg at night, mood is better   - emgality helping - PA renewed last time, every 28 days - life changing   - Currently on through other providers: propranolol 40 mg BID, ambien 10 mg- (off and on 6 years), melatonin    Abortive:   -Nurtec is helping dramatically, but will not extinguish the migraine but will significantly reduce the severity and then will take 400 mg advil or 500 mg tylenol   - decadron for back up   Denies bothersome side effects       Sleep   -Follow-up with sleep medicine Dr Sherry Martinez 8/1/23 and started on an iron infusion after iron was found to be low - no change  - doesn't sleep well, but improvement in sleep  - CBT started last week     study   The patient had a total of 5 respiratory events made up of 2 obstructive apneas, 0 central apneas, 0 mixed apneas and 2 hypopneas resulting in a respiratory event index (BRYCE) of 0.7. The lowest SpO2 recorded is 92%. The snore index was 0.2%. IMPRESSION:  This study did not demonstrate evidence for obstructive sleep apnea. Interval history as of 6/27/2023  - no significant new or concerning neurologic symptoms since last visit  - dealing with hypocalcemia following surgery and mood symptoms, fatigue,   - eye doctor May and vision is great and better than 20/20   - since last visit hurt her back, mid back muscle spasm and was bad so was put on steroids and MSK relaxor    Headaches and migraines   3-4 headaches a week rizatriptan helps   Worse the past month   Weather     Preventative:   - trial of topiramate and was at 75 mg for 3 weeks and then stopped because she was having weird dark thoughts - 50 mg at night, mood is better   - emgality helping - PA renewed last time, every 28 days     Abortive:   Rizatriptan - nauseated and lethargic   - decadron not needed since last visit, but took other steroids   Denies bothersome side effects      Sleep study    The patient had a total of 5 respiratory events made up of 2 obstructive apneas, 0 central apneas, 0 mixed apneas and 2 hypopneas resulting in a respiratory event index (BRYCE) of 0.7. The lowest SpO2 recorded is 92%. The snore index was 0.2%. IMPRESSION:  This study did not demonstrate evidence for obstructive sleep apnea.       Interval history as of 4/5/2023  - 2/24/23 thyroid resection and post op had severe hypoparathyroidism and they wanted to readmit her and then followed up with endocrine and continue calcium dose   - symptoms of hypoparathyroid intially and now symptoms of hyperparathyroidism    - all of the symptoms of PTSD, overwhelmed with noises   - was not on phone for a week because could not look at it     Headaches and migraines   - A lot better than she was, could not speak for a while and spasms, tetany, couldn't speak, much better    - daily headaches since the surgery, first in the hospital was way worse and steroids helped, and then headache never resolved. Worse at night before bed, comes and goes, not all day, better   - Mild headache now, they are daily, migraines nearly daily, not last night, every other night   - Dizziness better, but still there, may have had vertigo in the past.   Tinnitus both ears, Right >left 30 seconds of muffled hearing at times, rarely in the past     Preventative:   - emgality monthly - [employer] - a few days ago     Abortive:   - rizatriptan once dose often helps   Denies bothersome side effects       Sleep   - averages: 5-6 hours, sometimes takes ambien, snores   Problems falling asleep?:   Yes  Problems staying asleep?:  Yes, 3-4 a night  - tired all the time    How likely are you to doze off or fall sleep during the following situations?     0 - would never doze  1 - slight chance of dozing  2 - moderate chance of dozing  3 - high chance of dozing  [place] sleepiness scale   Sitting and reading - 2  Watching TV - 3  Sitting, inactive in a public place such as a theater 1  As a passenger in a car for an hour without a break 1  Lying down to rest in afternoon when circumstances permit 3  Sitting and talking to someone 0  Sitting quietly after lunch without alcohol 2  In a car while stopped for few minutes in traffic - 0      Interval history as of 1/11/2023  - denies any new or concerning neurologic symptoms since last visit   - foot surgery 12/30/22  - thyroid bx coming up    Headaches and migraines   She is doing much better, now about 4 a month and rizatriptan works well, "its amazing"  Nov had one that lasted a week  Last was Dec 29th  Wearing off effect when due for next dose, maybe 3 days before     Preventative:    - emgality     Denies bothersome side effects  - Currently on through other providers: propranolol 40 mg BID (could consider gradual wean off in the future),   nortriptyline 20 mg (for Intestinal cystitis) - stopped around July 2022 - IC not worse off of it, ambien 10 mg (previously on 5 and plans on going back, moved in April and loud) - (off and on 5 years)    Abortive:   - rizatriptan - working well now, rarely needs two  Denies bothersome side effects        Interval history as of 7/1/2022  - denies any new or concerning neurologic symptoms since last visit      Headaches and migraines   She reports improvement on emgality which has brought migraines down from 15 days a month to 7-8 a month. Less severe as well   The first month did the best, "almost forgot I had migraines for a while"   Some wearing off effect when due for next shot   - triggered by weather changes     Preventative:   - Trial of emgality - approved 4/5/22 - 4th shot soon   Denies bothersome side effects     Abortive:   - Trial of rizatriptan 10 mg - makes her less nauseated than sumatriptan, does not work quiet as well as sumatriptan, may not completely get rid of it but improves and then eventually goes away   Denies bothersome side effects     History as of initial visit 3/28/22:   Headaches started at what age? 89 years old  How often do the headaches occur?   - as of 3/28/2022: on average over 15 days a month   What time of the day do the headaches start? Occasionally wakes up with them, or afternoon or evening  How long do the headaches last? All day, up to weeks   Are you ever headache free? Yes    Aura? Sometimes aura  Blurred, floaters, lines, flashing orange, 20 mins or more     Last eye exam: goes early for monitoring, no issues except     Where is your headache located and pain quality?   - most of the time unilateral and usually on the left  - left side of head and neck - temporal and parietal and occipital and down the neck  - stabbing, pressure, pulsating  - head warm to touch  - sinus pressure   What is the intensity of pain?  Average: 5-6/10, worst 9/10  Associated symptoms:   [x] Nausea       [] Vomiting        [x] Stiff or sore neck   [x] Photophobia     [x]Phonophobia      [x] Osmophobia  [x] Blurred vision    [x] Light-headed or dizzy     [x] Tinnitus  - both  [x] Hands or feet tingle or feel numb/paresthesias - tingling in both legs and fingers sometimes without focal weakness   [] Ptosis      [] Facial droop  [x] Lacrimation - both  [x] Nasal congestion/rhinorrhea - chronically       Things that make the headache worse? No specific movements, any movement if bad enough    Headache triggers:  Hormonal/menses, weather changes, stress, certain foods, alcohol, sinus congestion      Have you seen someone else for headaches or pain? Yes multiple neurologists, last about 10 years ago   Have you had trigger point injection performed and how often? No  Have you had Botox injection performed and how often? No   Have you had epidural injections or transforaminal injections performed? No  Are you current pregnant or planning on getting pregnant? No future pregnancy, not active   Have you ever had any Brain imaging? yes - MRI Brain in her teens was normal     What medications do you take or have you taken for your headaches?    ABORTIVE:      Sumatriptan 100 mg - makes her nauseated      Past  Steroids In Jan 2021 with COVID, and then again in Feb for unrelenting migraine helps   Rizatriptan/maxalt - thinks may not as been as effective as imitrex    PREVENTIVE:       Propranolol 40 mg BID (4 years has helped)  ambien 5  Nortriptyline 20 mg (started a couple of weeks ago for Intestinal cystitis)    Past/ failed/contraindicated:  nortriptyline  Amitriptyline  topiramate - maybe 1-2 months   Gabapentin  prosac/fluoxetine      LIFESTYLE  Sleep   - averages: 6 hours, sometimes takes ambien  Problems falling asleep?:   Yes  Problems staying asleep?:  Yes, 3-4 a night    Water: 40-60 oz per day  Caffeine: coffee - 1 cups in am, 1 at lunch, per day    Mood:   Anxiety maybe more in her 25s, not much now     The following portions of the patient's history were reviewed and updated as appropriate: allergies, current medications, past family history, past medical history, past social history, past surgical history and problem list.     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) and US at [employer] alone normally      Past Medical History:     Past Medical History:   Diagnosis Date   • Allergic    • Anxiety    • Cancer ([address] [facility]) 2/1/2023    Thyroid- Papillary carcinoma   • COVID-19 01/2022    mild s/s-not hospitalized, not vaccinated   • GERD (gastroesophageal reflux disease)    • Headache(784.0)    • History of IBS     with constipation   • Ingrown toenail    • Irritable bowel syndrome    • Migraines     Chronic per pt   • Plantar fasciitis    • Thyroid carcinoma ([facility])    • Varicella    • Venereal wart 08/07/2014       Patient Active Problem List   Diagnosis   • Chronic migraine w/o aura w/o status migrainosus, not intractable   • TOSHIA II (cervical intraepithelial neoplasia II)   • Irritable bowel syndrome with constipation   • Primary insomnia   • Neck pain   • Vestibulitis, vulvar   • Overweight (BMI 25.0-29. 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)   • Hyperlipidemia   • Iron deficiency       Medications:      Current Outpatient Medications   Medication Sig Dispense Refill   • acetaminophen (TYLENOL) 500 mg tablet Take 1,000 mg by mouth every 6 (six) hours as needed for mild pain     • acetaZOLAMIDE (DIAMOX) 125 mg tablet 125 mg PO nightly for 1 week, then increase to 125 mg BID for 1 week, then 125 mg in am and 250 mg in pm for 1 week, then 250 mg BID (Patient taking differently: Take 250 mg by mouth 2 (two) times a day 125 mg PO nightly for 1 week, then increase to 125 mg BID for 1 week, then 125 mg in am and 250 mg in pm for 1 week, then 250 mg BID) 120 tablet 6   • bisacodyl (DULCOLAX) 5 mg EC tablet Take 2 tablets (10 mg total) by mouth 2 (two) times a day as needed for constipation 60 tablet 1   • calcitriol (ROCALTROL) 0.25 mcg capsule Take 1 tablet twice daily 180 capsule 0   • cholecalciferol (VITAMIN D3) 1,000 units tablet Take 1 tablet (1,000 Units total) by mouth daily Do not start before March 1, 2023. 30 tablet 0   • clindamycin (CLEOCIN T) 1 % lotion in the morning     • dexamethasone (DECADRON) 4 mg tablet Can take 8 mg for 1-2 days PO with breakfast, then if needed can take 4 mg with breakfast for 1-5 more days if needed for headache 9 tablet 0   • Galcanezumab-gnlm 120 MG/ML SOAJ Inject 120 mg under the skin every 28 days 1 mL 11   • Levocetirizine Dihydrochloride (XYZAL PO) Take by mouth daily at bedtime     • levothyroxine (Synthroid) 125 mcg tablet Take 1 tablet (125 mcg total) by mouth daily 30 tablet 3   • lubiprostone (AMITIZA) 24 mcg capsule Take 1 capsule (24 mcg total) by mouth 2 (two) times a day with meals 180 capsule 2   • Melatonin 5 MG TABS Take by mouth as needed     • meloxicam (MOBIC) 15 mg tablet Take 15 mg by mouth daily as needed     • Multiple Vitamin (multivitamin) tablet Take 1 tablet by mouth daily     • omeprazole (PriLOSEC) 40 MG capsule Take 1 capsule (40 mg total) by mouth daily 30 capsule 4   • propranolol (INDERAL) 10 mg tablet Decrease to 30 mg PO BID for 1 week, then 20 mg BID for 1 week, then 10 mg BID for 1 week and then can stop 108 tablet 4   • rimegepant sulfate (Nurtec) 75 mg TBDP Take one NURTEC 75 mg at onset under tongue. Limit 1 in 24 hours. 16 tablet 11   • rizatriptan (MAXALT) 10 mg tablet Take 1 tablet (10 mg total) by mouth once as needed for migraine May repeat in 2 hours if needed. Max 2/24 hours, 9/month.  9 tablet 12   • zolpidem (AMBIEN) 10 mg tablet Take 10 mg by mouth daily at bedtime as needed     • calcium citrate (CALCITRATE) 950 (200 Ca) MG tablet Take 1 tablet (950 mg total) by mouth 2 (two) times a day 120 tablet 0     No current facility-administered medications for this visit. Allergies:    No Known Allergies    Family History:     Family History   Problem Relation Age of Onset   • Hypertension Mother    • Arthritis Mother    • Thyroid disease Mother    • Osteoarthritis Mother    • Transient ischemic attack Father    • Hypertension Father    • Stroke Father    • Vision loss Father    • Rashes / Skin problems Sister         Shingles   • Migraines Sister    • No Known Problems Sister    • Uterine cancer Maternal Grandmother    • Cancer Maternal Grandmother         Uterine   • Colon cancer Maternal Grandfather    • Cancer Maternal Grandfather         Colon   • Lung cancer Paternal Grandmother 61   • Cancer Paternal Grandmother         Lung   • Heart attack Paternal Grandfather    • Colon cancer Cousin 27   • Breast cancer Maternal Aunt    • Breast cancer Maternal Aunt    • Breast cancer Maternal Aunt        Social History:     Social History     Socioeconomic History   • Marital status: Single     Spouse name: Not on file   • Number of children: Not on file   • Years of education: Not on file   • Highest education level: Not on file   Occupational History   • Not on file   Tobacco Use   • Smoking status: Never   • Smokeless tobacco: Never   Vaping Use   • Vaping Use: Never used   Substance and Sexual Activity   • Alcohol use:  Yes     Alcohol/week: 2.0 standard drinks of alcohol     Types: 2 Glasses of wine per week     Comment: 2 glasses wine a week   • Drug use: Never   • Sexual activity: Not Currently   Other Topics Concern   • Not on file   Social History Narrative   • Not on file     Social Determinants of Health     Financial Resource Strain: Not on file   Food Insecurity: Not on file   Transportation Needs: Not on file   Physical Activity: Not on file   Stress: Not on file   Social Connections: Not on file   Intimate Partner Violence: Not on file   Housing Stability: Not on file         Objective:       Physical Exam:                                                                 Vitals:            Constitutional:    /78 (BP Location: Right arm, Patient Position: Sitting, Cuff Size: Standard)   Pulse 76   Temp 97.8 °F (36.6 °C) (Tympanic)   Ht 5' 4" (1.626 m)   Wt 69.4 kg (153 lb)   BMI 26.26 kg/m²   BP Readings from Last 3 Encounters:   08/29/23 122/78   08/25/23 102/64   08/17/23 103/67     Pulse Readings from Last 3 Encounters:   08/29/23 76   08/25/23 55   08/17/23 (!) 48         Well developed, well nourished, well groomed. Psychiatric:  Normal behavior and appropriate affect        Neurological Examination:     Mental status/cognitive function:   Recent and remote memory appear intact. Attention span and concentration as well as fund of knowledge are appropriate for age. Normal language and spontaneous speech. Cranial Nerves:   VII-facial expression symmetric  Motor Exam: symmetric bulk throughout. no atrophy, fasciculations or abnormal movements noted. Coordination:  no apparent dysmetria, ataxia or tremor noted  Gait: steady casual gait          Pertinent lab results:   See EMR for recent labs  - 01/14/2022 COVID-19 positive  - 8/19/21 CMP and CBC unremarkable   TSH elevated at 4.09 with normal Free T4     Imaging: I have personally reviewed imaging and radiology read   -MRI brain with without contrast 7/13/2023: No acute infarction, intracranial hemorrhage or mass.   I do see some findings consistent with subtle changes that could be related to idiopathic intracranial hypertension including partially flattened sella(not empty), prominent optic nerve sheath with some tortuosity bilaterally (slightly) and as well as what appears to be slight enlargement of the optic nerve head      MRI brain in her teens was reportedly unremarkable  Review of Systems:   ROS obtained by medical assistant and personally reviewed, but if any symptoms listed below say negative, does not mean patient has not had this symptom since last visit, please see HPI for details of symptoms discussed this visit. I recommended PCP follow up for non neurologic problems. Review of Systems   Constitutional: Negative for appetite change and fever. HENT: Negative. Negative for hearing loss, tinnitus, trouble swallowing and voice change. Eyes: Negative. Negative for photophobia and pain. Respiratory: Negative. Negative for shortness of breath. Cardiovascular: Negative. Negative for palpitations. Gastrointestinal: Negative. Negative for nausea and vomiting. Endocrine: Negative. Negative for cold intolerance. Genitourinary: Negative. Negative for dysuria, frequency and urgency. Musculoskeletal: Negative. Negative for myalgias and neck pain. Skin: Negative. Negative for rash. Neurological: Positive for headaches. Negative for dizziness, tremors, seizures, syncope, facial asymmetry, speech difficulty, weakness, light-headedness and numbness. Hematological: Negative. Does not bruise/bleed easily. Psychiatric/Behavioral: Negative. Negative for confusion, hallucinations and sleep disturbance. All other systems reviewed and are negative.       I have spent 30 minutes with Patient  today in which greater than 50% of this time was spent in counseling/coordination of care regarding Diagnostic results, Prognosis, Risks and benefits of tx options, Instructions for management, Patient education, Importance of tx compliance, Risk factor reductions, Impressions, Counseling / Coordination of care, Documenting in the medical record, Reviewing / ordering tests, medicine, procedures   and Obtaining or reviewing history  . I also spent 12 minutes non face to face for this patient the same day.        Author:  Ambar Root MD 8/29/2023 7:34 PM

## 2023-08-31 ENCOUNTER — OFFICE VISIT (OUTPATIENT)
Dept: GASTROENTEROLOGY | Facility: CLINIC | Age: 43
End: 2023-08-31
Payer: COMMERCIAL

## 2023-08-31 VITALS
DIASTOLIC BLOOD PRESSURE: 68 MMHG | WEIGHT: 152.2 LBS | BODY MASS INDEX: 25.99 KG/M2 | SYSTOLIC BLOOD PRESSURE: 100 MMHG | HEIGHT: 64 IN | TEMPERATURE: 98.5 F

## 2023-08-31 DIAGNOSIS — K58.1 IRRITABLE BOWEL SYNDROME WITH CONSTIPATION: Primary | ICD-10-CM

## 2023-08-31 DIAGNOSIS — E61.1 IRON DEFICIENCY: ICD-10-CM

## 2023-08-31 DIAGNOSIS — K21.9 GASTROESOPHAGEAL REFLUX DISEASE, UNSPECIFIED WHETHER ESOPHAGITIS PRESENT: ICD-10-CM

## 2023-08-31 PROCEDURE — 99214 OFFICE O/P EST MOD 30 MIN: CPT | Performed by: INTERNAL MEDICINE

## 2023-08-31 NOTE — PROGRESS NOTES
[facility] - Outpatient Consultation  Ivet Hines Bem 38 y.o. female MRN: [id number]  Encounter: [id number]          ASSESSMENT AND PLAN:      1. Irritable bowel syndrome with constipation  Given lack of response to Amitiza and high dose Linzess discussed different options moving forward. We will try Motegrity 2 mg daily. Advised that this will likely require prior authorization. If unable to get this approved could consider Trulance, or continuing Amitiza with addition of twice daily MiraLAX consistently. Low suspicion for dyssynergia contributing to her symptoms  - Prucalopride Succinate 2 MG TABS; Take 2 mg by mouth in the morning  Dispense: 30 tablet; Refill: 1    2. Gastroesophageal reflux disease, unspecified whether esophagitis present  Stable, recommend continuing PPI for now, can reassess continuation after other GI complaints are improved    3. Iron deficiency  Reviewed labs, low ferritin, no evidence of anemia. She has no new GI symptoms, or overt GI bleeding. Occult blood loss is possible however given her history of irregular uterine bleeding in between her periods, would like this to be evaluated with OB/GYN prior to repeating EGD and colonoscopy or capsule endoscopy. Given that her EGD and colonoscopy were less than 2 years prior, low suspicion for interim development of abnormality although this may need to be ruled out if no other etiology for iron deficiency is identified. We will discuss this at her follow-up appointment.    ______________________________________________________________________    HPI: 51-year-old female with GERD, IBS-C, chronic migraines, papillary thyroid cancer status post thyroidectomy, presenting for follow-up regarding severe chronic constipation and now with iron deficiency. Iron deficiency is fairly new identified earlier this month. She has no overt GI bleeding. No other new GI symptoms other than her known chronic constipation.   She had an EGD and colonoscopy in 2021 which were both unremarkable. She does report significant almost daily vaginal spotting or bleeding that has been new over the past few months. Summary of presenting illness:  Initial recent appointment in March 2023, the pt says that she was doing fine on omeprzole 20 mg and linzess daily however she was recently found to have thyroid cancer and underwent thyroid removal. She had subsequent hypocalcemia, so she was started on calcium supple,ments. Since being on calcium, the linzess is no longer helping and she is only moving her bowels once every week or so. She says this causes her to get very bloated, has increased flatulence and gas and will eventually have lower abdominal discomfort. She notes her omeprazole was stopped when she was admitted so when she was d/c home, she started OTC omeprazole (she suspects was 10 mg) which is not alleviate her reflux anymore. She says the higher doses of omeprazole "Always helped" in the past.  Otherwise she denies unintentional weight loss, fevers, chills, night sweats, vomiting, bloody or black BMs.      Non-related to GI: the pt complains of blurry vision that comes and goes a few times/week, brain fog in that she feels "in a haze" constantly, and short-term memory loss. She also notes that whenever she goes to take a deep breath, she gets short of breath and has a dry cough after. Pt says these all started after her thyroidectomy. REVIEW OF SYSTEMS:    CONSTITUTIONAL: Denies any fever, chills, rigors, and weight loss. HEENT: Denies odynophagia, tinnitus  CARDIOVASCULAR: No chest pain or palpitations. RESPIRATORY: Denies any cough, hemoptysis, shortness of breath or dyspnea on exertion. GASTROINTESTINAL: As noted in the History of Present Illness. GENITOURINARY: No problems with urination. Denies any hematuria or dysuria. NEUROLOGIC: No dizziness or vertigo, denies headaches.    MUSCULOSKELETAL: Denies any muscle or joint pain.   SKIN: Denies skin rashes or itching. ENDOCRINE:  Denies intolerance to heat or cold. PSYCHOSOCIAL: Denies depression or anxiety. Denies any recent memory loss.        Historical Information   Past Medical History:   Diagnosis Date   • Allergic    • Anxiety    • Cancer ([address]) 2/1/2023    Thyroid- Papillary carcinoma   • COVID-19 01/2022    mild s/s-not hospitalized, not vaccinated   • GERD (gastroesophageal reflux disease)    • Headache(784.0)    • History of IBS     with constipation   • Ingrown toenail    • Irritable bowel syndrome    • Migraines     Chronic per pt   • Plantar fasciitis    • Thyroid carcinoma ([address])    • Varicella    • Venereal wart 08/07/2014     Past Surgical History:   Procedure Laterality Date   • CERVICAL BIOPSY  W/ LOOP ELECTRODE EXCISION  2017   • COLONOSCOPY     • NASAL SEPTUM SURGERY     • NH OSTEOT W/WO [address] [facility] SHRT/CORRJ 1ST METAR Left 12/30/2022    Procedure: OSTEOTOMY METATARSAL 1st;  Surgeon: Zoie Cedeno DPM;  Location: [facility];  Service: Podiatry   • THYROIDECTOMY N/A 2/24/2023    Procedure: TOTAL THYROIDECTOMY;  Surgeon: Victoria Simon MD;  Location:  MAIN OR;  Service: Surgical Oncology   • TONSILLECTOMY     • UPPER GASTROINTESTINAL ENDOSCOPY     • US GUIDED THYROID BIOPSY  1/31/2023   • WISDOM TOOTH EXTRACTION       Social History   Social History     Substance and Sexual Activity   Alcohol Use Yes   • Alcohol/week: 2.0 standard drinks of alcohol   • Types: 2 Glasses of wine per week    Comment: 2 glasses wine a week     Social History     Substance and Sexual Activity   Drug Use Never     Social History     Tobacco Use   Smoking Status Never   Smokeless Tobacco Never     Family History   Problem Relation Age of Onset   • Hypertension Mother    • Arthritis Mother    • Thyroid disease Mother    • Osteoarthritis Mother    • Transient ischemic attack Father    • Hypertension Father    • Stroke Father    • Vision loss Father    • Rashes / Skin problems Sister Shingles   • Migraines Sister    • No Known Problems Sister    • Uterine cancer Maternal Grandmother    • Cancer Maternal Grandmother         Uterine   • Colon cancer Maternal Grandfather    • Cancer Maternal Grandfather         Colon   • Lung cancer Paternal Grandmother 61   • Cancer Paternal Grandmother         Lung   • Heart attack Paternal Grandfather    • Colon cancer Cousin 27   • Breast cancer Maternal Aunt    • Breast cancer Maternal Aunt    • Breast cancer Maternal Aunt        Meds/Allergies       Current Outpatient Medications:   •  acetaminophen (TYLENOL) 500 mg tablet  •  acetaZOLAMIDE (DIAMOX) 125 mg tablet  •  bisacodyl (DULCOLAX) 5 mg EC tablet  •  calcitriol (ROCALTROL) 0.25 mcg capsule  •  clindamycin (CLEOCIN T) 1 % lotion  •  dexamethasone (DECADRON) 4 mg tablet  •  Galcanezumab-gnlm 120 MG/ML SOAJ  •  Levocetirizine Dihydrochloride (XYZAL PO)  •  levothyroxine (Synthroid) 125 mcg tablet  •  lubiprostone (AMITIZA) 24 mcg capsule  •  Melatonin 5 MG TABS  •  meloxicam (MOBIC) 15 mg tablet  •  Multiple Vitamin (multivitamin) tablet  •  omeprazole (PriLOSEC) 40 MG capsule  •  propranolol (INDERAL) 10 mg tablet  •  Prucalopride Succinate 2 MG TABS  •  rimegepant sulfate (Nurtec) 75 mg TBDP  •  rizatriptan (MAXALT) 10 mg tablet  •  zolpidem (AMBIEN) 10 mg tablet  •  calcium citrate (CALCITRATE) 950 (200 Ca) MG tablet  •  cholecalciferol (VITAMIN D3) 1,000 units tablet    No Known Allergies        Objective     Blood pressure 100/68, temperature 98.5 °F (36.9 °C), temperature source Tympanic, height 5' 4" (1.626 m), weight 69 kg (152 lb 3.2 oz). Body mass index is 26.13 kg/m². PHYSICAL EXAM:      General Appearance:   Alert, cooperative, no distress   HEENT:   Normocephalic, atraumatic, anicteric.      Neck:  Supple, symmetrical, trachea midline   Lungs:   Clear to auscultation bilaterally; no rales, rhonchi or wheezing; respirations unlabored    Heart[de-identified]   Regular rate and rhythm; no murmur, rub, or gallop. Abdomen:   Soft, non-tender, non-distended; normal bowel sounds; no masses, no organomegaly    Genitalia:   Deferred    Rectal:   Deferred    Extremities:  No cyanosis, clubbing or edema    Pulses:  2+ and symmetric    Skin:  No jaundice, rashes, or lesions          Lab Results:   No visits with results within 1 Day(s) from this visit. Latest known visit with results is:   Appointment on 08/01/2023   Component Date Value   • Iron Saturation 08/01/2023 19    • TIBC 08/01/2023 335    • Iron 08/01/2023 62    • Ferritin 08/01/2023 9 (L)          Radiology Results:   No results found.   Answers for HPI/ROS submitted by the patient on 8/25/2023  Chronicity: recurrent  Onset: more than 1 year ago  Onset quality: gradual  Frequency: daily  Episode duration: 2 Hours  Progression since onset: gradually worsening  Pain location: LLQ, RLQ, periumbilical region, suprapubic region  Pain - numeric: 4/10  Pain quality: aching, cramping, a sensation of fullness  Radiates to: suprapubic region  anorexia: Yes  arthralgias: Yes  belching: Yes  constipation: Yes  diarrhea: Yes  dysuria: No  fever: No  flatus: Yes  frequency: No  headaches: Yes  hematochezia: No  hematuria: No  melena: No  myalgias: Yes  nausea: Yes  weight loss: Yes  vomiting: No  Aggravated by: certain positions, coughing, deep breathing, eating  Relieved by: bowel movements, passing flatus, standing

## 2023-09-01 ENCOUNTER — OFFICE VISIT (OUTPATIENT)
Age: 43
End: 2023-09-01
Payer: COMMERCIAL

## 2023-09-01 ENCOUNTER — HOSPITAL ENCOUNTER (OUTPATIENT)
Dept: INFUSION CENTER | Facility: HOSPITAL | Age: 43
End: 2023-09-01
Attending: INTERNAL MEDICINE
Payer: COMMERCIAL

## 2023-09-01 VITALS
DIASTOLIC BLOOD PRESSURE: 62 MMHG | SYSTOLIC BLOOD PRESSURE: 112 MMHG | WEIGHT: 152 LBS | HEIGHT: 64 IN | BODY MASS INDEX: 25.95 KG/M2

## 2023-09-01 VITALS
SYSTOLIC BLOOD PRESSURE: 116 MMHG | HEART RATE: 68 BPM | DIASTOLIC BLOOD PRESSURE: 74 MMHG | RESPIRATION RATE: 18 BRPM | TEMPERATURE: 98.2 F

## 2023-09-01 DIAGNOSIS — E61.1 IRON DEFICIENCY: Primary | ICD-10-CM

## 2023-09-01 DIAGNOSIS — N94.6 DYSMENORRHEA: ICD-10-CM

## 2023-09-01 DIAGNOSIS — N93.9 ABNORMAL UTERINE BLEEDING (AUB): ICD-10-CM

## 2023-09-01 DIAGNOSIS — R10.2 PELVIC CRAMPING: Primary | ICD-10-CM

## 2023-09-01 PROCEDURE — 99213 OFFICE O/P EST LOW 20 MIN: CPT | Performed by: OBSTETRICS & GYNECOLOGY

## 2023-09-01 PROCEDURE — 96365 THER/PROPH/DIAG IV INF INIT: CPT

## 2023-09-01 RX ORDER — SODIUM CHLORIDE 9 MG/ML
20 INJECTION, SOLUTION INTRAVENOUS ONCE
Status: COMPLETED | OUTPATIENT
Start: 2023-09-01 | End: 2023-09-01

## 2023-09-01 RX ORDER — SODIUM CHLORIDE 9 MG/ML
20 INJECTION, SOLUTION INTRAVENOUS ONCE
Status: CANCELLED | OUTPATIENT
Start: 2023-09-08

## 2023-09-01 RX ADMIN — SODIUM CHLORIDE 20 ML/HR: 0.9 INJECTION, SOLUTION INTRAVENOUS at 14:30

## 2023-09-01 RX ADMIN — IRON SUCROSE 200 MG: 20 INJECTION, SOLUTION INTRAVENOUS at 14:30

## 2023-09-01 NOTE — PROGRESS NOTES
Assessment/Plan:    Suspect possible adenomyosis. Discussed treatment options - only progesterone candidate - specifically POP or Depo Provera. Other options she has c/i for medically. Could also consider hyst.     For now will obtain pelvic US and follow up pending results. Pain management - can try ATC NSAIDS with next cycle, to start just before cycle starts if possible. This may help both flow and cramping. Pelvic cramping  -     US pelvis complete w transvaginal; Future    Dysmenorrhea  -     US pelvis complete w transvaginal; Future    Abnormal uterine bleeding (AUB)  -     US pelvis complete w transvaginal; Future        Subjective:      Patient ID: Tami Pike is a 38 y.o. female. Mica Fontana presents for an office visit. For the last few months:  (Since May) - cycles are becoming more painful. Regular in timing every 28d. Throughout month will have 3-4 days brown discharge with odor. Has started iron infusions for iron deficiency. Can bleed through a tampon in 1-3 hours on a heavy day. Pain will be for 2-3 days and start just before her cycles starts. One sexual partner, no STD concerns.   + Bicornuate uterus  + migraine w/aura  + ICH on diamox      The following portions of the patient's history were reviewed and updated as appropriate: allergies, current medications and problem list.    Review of Systems      Objective:      /62 (BP Location: Right arm, Patient Position: Sitting, Cuff Size: Large)   Ht 5' 3.75" (1.619 m)   Wt 68.9 kg (152 lb)   LMP 08/08/2023 (Exact Date)   BMI 26.30 kg/m²          Physical Exam  Vitals reviewed. Constitutional:       General: She is not in acute distress. Appearance: Normal appearance. She is not ill-appearing. Genitourinary:     General: Normal vulva. Labia:         Right: No rash or lesion. Left: No rash or lesion. Vagina: No vaginal discharge. Cervix: Normal.      Uterus: Tender.  Not enlarged and not fixed. Adnexa:         Right: No mass or tenderness. Left: No mass or tenderness. Comments: Midline tenderness    Neurological:      Mental Status: She is alert.

## 2023-09-06 ENCOUNTER — APPOINTMENT (OUTPATIENT)
Dept: PHYSICAL THERAPY | Facility: REHABILITATION | Age: 43
End: 2023-09-06
Payer: COMMERCIAL

## 2023-09-06 ENCOUNTER — SOCIAL WORK (OUTPATIENT)
Dept: BEHAVIORAL/MENTAL HEALTH CLINIC | Facility: CLINIC | Age: 43
End: 2023-09-06
Payer: COMMERCIAL

## 2023-09-06 DIAGNOSIS — F51.01 PRIMARY INSOMNIA: Primary | ICD-10-CM

## 2023-09-06 PROCEDURE — 90837 PSYTX W PT 60 MINUTES: CPT | Performed by: COUNSELOR

## 2023-09-06 NOTE — PSYCH
Behavioral Health Psychotherapy Progress Note    Psychotherapy Provided: Individual Psychotherapy     1. Primary insomnia            Goals addressed in session: Goal 1     DATA: Reviewed base line sleep diary and noted areas of concern such as Sleep Latency and Wake After Sleep Onset. Introduced and explained Stimulus Control and Sleep Restriction. Reviewed sleep hygiene and perpetuating behaviors. Patient presented 2 weeks of Sleep Diaries. The weekly average data are: Insomnia Severity Index (SASCHA) 27 out of 28  Sleep Efficiency  75% and 58%  Sleep Latency   71 min and 66 min  Total Sleep Time  371 min and 309 min  Wake After Sleep Onset 158 min and 51 min  [place] SS   14 and 13  PHQ-9    16 and 13  SHAHZAD-7    13 and 12    New Sleep Plan  1:00 a.m. - 6:00 a.m. (5 hours sleep opportunity)    During this session, this clinician used the following therapeutic modalities: Cognitive Behavior Therapy for Insomnia (CBT-I)    Substance Abuse was not addressed during this session. If the client is diagnosed with a co-occurring substance use disorder, please indicate any changes in the frequency or amount of use: n/a. Stage of change for addressing substance use diagnoses: No substance use/Not applicable    ASSESSMENT:  Arnie Rose presents with a Euthymic/ normal mood. her affect is Normal range and intensity, which is congruent, with her mood and the content of the session. The client has made progress on their goals. Arnie Rose presents with a none risk of suicide, none risk of self-harm, and none risk of harm to others. For any risk assessment that surpasses a "low" rating, a safety plan must be developed. A safety plan was indicated: no  If yes, describe in detail n/a    PLAN: Between sessions, Arnie Rose will complete and return sleep diary to next session. At the next session, the therapist will use Cognitive Behavior Therapy for Insomnia (CBT-I) to address insomnia.     Behavioral Health Treatment Plan and Discharge Planning: Hansel Almanza is aware of and agrees to continue to work on their treatment plan. They have identified and are working toward their discharge goals.  yes    Visit start and stop times:    09/07/23  Start Time: 0457  Stop Time: 8817  Total Visit Time: 53 minutes

## 2023-09-08 ENCOUNTER — HOSPITAL ENCOUNTER (OUTPATIENT)
Dept: INFUSION CENTER | Facility: HOSPITAL | Age: 43
End: 2023-09-08
Attending: INTERNAL MEDICINE
Payer: COMMERCIAL

## 2023-09-08 ENCOUNTER — HOSPITAL ENCOUNTER (OUTPATIENT)
Dept: RADIOLOGY | Age: 43
Discharge: HOME | End: 2023-09-08
Attending: SURGERY
Payer: COMMERCIAL

## 2023-09-08 VITALS
RESPIRATION RATE: 18 BRPM | HEART RATE: 69 BPM | TEMPERATURE: 97.4 F | SYSTOLIC BLOOD PRESSURE: 110 MMHG | DIASTOLIC BLOOD PRESSURE: 70 MMHG

## 2023-09-08 DIAGNOSIS — C73 PAPILLARY THYROID CARCINOMA (HCC): ICD-10-CM

## 2023-09-08 DIAGNOSIS — E61.1 IRON DEFICIENCY: Primary | ICD-10-CM

## 2023-09-08 PROCEDURE — 76536 US EXAM OF HEAD AND NECK: CPT

## 2023-09-08 PROCEDURE — 96365 THER/PROPH/DIAG IV INF INIT: CPT

## 2023-09-08 RX ORDER — SODIUM CHLORIDE 9 MG/ML
20 INJECTION, SOLUTION INTRAVENOUS ONCE
Status: CANCELLED | OUTPATIENT
Start: 2023-09-15

## 2023-09-08 RX ORDER — SODIUM CHLORIDE 9 MG/ML
20 INJECTION, SOLUTION INTRAVENOUS ONCE
Status: COMPLETED | OUTPATIENT
Start: 2023-09-08 | End: 2023-09-08

## 2023-09-08 RX ADMIN — IRON SUCROSE 200 MG: 20 INJECTION, SOLUTION INTRAVENOUS at 10:24

## 2023-09-08 RX ADMIN — SODIUM CHLORIDE 20 ML/HR: 0.9 INJECTION, SOLUTION INTRAVENOUS at 10:24

## 2023-09-11 ENCOUNTER — OFFICE VISIT (OUTPATIENT)
Dept: PHYSICAL THERAPY | Facility: REHABILITATION | Age: 43
End: 2023-09-11
Payer: COMMERCIAL

## 2023-09-11 ENCOUNTER — APPOINTMENT (OUTPATIENT)
Dept: LAB | Age: 43
End: 2023-09-11
Payer: COMMERCIAL

## 2023-09-11 DIAGNOSIS — M54.6 LEFT-SIDED THORACIC BACK PAIN, UNSPECIFIED CHRONICITY: ICD-10-CM

## 2023-09-11 DIAGNOSIS — M62.838 MUSCLE SPASM: Primary | ICD-10-CM

## 2023-09-11 DIAGNOSIS — C73 PAPILLARY THYROID CARCINOMA (HCC): ICD-10-CM

## 2023-09-11 LAB
CALCIUM SERPL-MCNC: 8.1 MG/DL (ref 8.4–10.2)
PTH-INTACT SERPL-MCNC: 10.7 PG/ML (ref 12–88)

## 2023-09-11 PROCEDURE — 84432 ASSAY OF THYROGLOBULIN: CPT

## 2023-09-11 PROCEDURE — 82306 VITAMIN D 25 HYDROXY: CPT

## 2023-09-11 PROCEDURE — 83970 ASSAY OF PARATHORMONE: CPT

## 2023-09-11 PROCEDURE — 86800 THYROGLOBULIN ANTIBODY: CPT

## 2023-09-11 PROCEDURE — 36415 COLL VENOUS BLD VENIPUNCTURE: CPT

## 2023-09-11 PROCEDURE — 97110 THERAPEUTIC EXERCISES: CPT

## 2023-09-11 PROCEDURE — 82310 ASSAY OF CALCIUM: CPT

## 2023-09-11 PROCEDURE — 97112 NEUROMUSCULAR REEDUCATION: CPT

## 2023-09-11 NOTE — PROGRESS NOTES
Daily Note     Today's date: 2023  Patient name: [place] Aparicio  : 1980  MRN: [id number]  Referring provider: CHRISTOPHER Lam  Dx:   Encounter Diagnosis     ICD-10-CM    1. Muscle spasm  M62.838       2. Left-sided thoracic back pain, unspecified chronicity  M54.6            Start Time: 1706  Stop Time: 1800  Total time in clinic (min): 54 minutes    Subjective: Patient reported had a 2-day period with no pain. However, pain increased over the last few nights. Objective: See treatment diary below      Assessment: Patient tolerated treatment session well today with focus on thoracic mobility and periscapular strength. Patient reported no radiating pain with any of the postural exercises. She was challenged with end-range shoulder flexion in standing due to restricted thoracicextension. Patient fatigued fast with repetitive shoulder external rotation. Patient will continue to be appropriate for skilled outpatient physical therapy in order to address impairments. 1:1 with Meryl Dwyer, PT, DPT for entirety of treatment session. Plan: Continue per plan of care. Progress treatment as tolerated. Precautions: Hypothyroidism, Hypocalcemia, GERD, IBS-C, Chronic Migraine       Pertinent Findings:                                                                                                                                                     Test / Measure  2023   FOTO 52   Thoracic Mobility Very Limited   R UE  4+/5   Periscapular Endurance and Motor Control Poor     Access Code: [email or web address]  URL: [email or web address]  Date: 2023  Prepared by: Meryl Dwyer    Exercises  - Prone Press Up On Elbows  - 1 x daily - 7 x weekly - 2 sets - 10 reps  - Sidelying Open Book Thoracic Lumbar Rotation and Extension  - 1 x daily - 7 x weekly - 2 sets - 10 reps  - Prone Single Arm Shoulder Y  - 1 x daily - 7 x weekly - 3 sets - 10 reps  - Prone Shoulder Row  - 1 x daily - 7 x weekly - 3 sets - 10 reps  - Seated Thoracic Lumbar Extension  - 1 x daily - 7 x weekly - 3 sets - 10 reps    Manuals 8/21 8/28 9/11                           Neuro Re-Ed      [facility] Press GTB  30x B #5  30x B    Rows BTB  3"  2x10 Rotation + Row  #5  20x B    Prone Y-T-Is on physioball   #2  10x each   Shoulder Extension Prone  #3 DB  20x  B/L Standing  #5  Unilateral  3x10 B Standing  BTB  Unilateral  3x10 B   Ther Ex      UBE 3'/3'   Lvl 1  6' Retro   Lvl 2  4'/4   Lvl 2   Shoulder ER  #3 R  #2 L   3x10 B #3 B  3x10   Repeated Thoracic Extension   With fulcrum  20x   Thoracic Protocol  1' eac 1' eac   4 positions   Bent-Over Foam Rollout  3x10  Standingat Wall  ytb  3x10     AAROM with Cane Flexion   #3 aw  20x         Ther Activity                  Gait Training                  Modalities

## 2023-09-12 ENCOUNTER — TELEPHONE (OUTPATIENT)
Dept: HEMATOLOGY ONCOLOGY | Facility: CLINIC | Age: 43
End: 2023-09-12

## 2023-09-12 ENCOUNTER — OFFICE VISIT (OUTPATIENT)
Dept: SURGICAL ONCOLOGY | Facility: CLINIC | Age: 43
End: 2023-09-12
Payer: COMMERCIAL

## 2023-09-12 VITALS
BODY MASS INDEX: 26.12 KG/M2 | HEART RATE: 74 BPM | SYSTOLIC BLOOD PRESSURE: 116 MMHG | WEIGHT: 151 LBS | TEMPERATURE: 97.6 F | OXYGEN SATURATION: 100 % | DIASTOLIC BLOOD PRESSURE: 72 MMHG

## 2023-09-12 DIAGNOSIS — C73 PAPILLARY THYROID CARCINOMA (HCC): ICD-10-CM

## 2023-09-12 DIAGNOSIS — Z85.850 ENCOUNTER FOR FOLLOW-UP SURVEILLANCE OF THYROID CANCER: Primary | ICD-10-CM

## 2023-09-12 DIAGNOSIS — Z08 ENCOUNTER FOR FOLLOW-UP SURVEILLANCE OF THYROID CANCER: Primary | ICD-10-CM

## 2023-09-12 LAB
THYROGLOB AB SERPL-ACNC: <1 IU/ML (ref 0–0.9)
THYROGLOB SERPL-MCNC: 0.2 NG/ML (ref 1.5–38.5)

## 2023-09-12 PROCEDURE — 99213 OFFICE O/P EST LOW 20 MIN: CPT

## 2023-09-12 NOTE — PROGRESS NOTES
Surgical Oncology Follow Up       [facility]  [address]  [facility] [id number] [phone]    Villalobos Batter  1980  [id number]  [address] [facility]  [address]  [facility] [id number] [phone]    Diagnoses and all orders for this visit:    Encounter for follow-up surveillance of thyroid cancer    Papillary thyroid carcinoma ([address] [facility])  -     US head neck lymph node mapping; Future        Chief Complaint   Patient presents with   • Follow-up       Return in about 6 months (around 3/12/2024) for Imaging - See orders, Office Visit with Dr Dennise Morales. Oncology History   Papillary thyroid carcinoma ([address] [facility])   1/31/2023 Biopsy    Thyroid, Left, lower (Thin-prep and smear preparations): Malignant ([facility] Category VI)  Papillary carcinoma. 2/24/2023 Surgery    Thyroid; total thyroidectomy:       - Papillary thyroid carcinoma, classic, 1.2 cm      - Carcinoma involves left thyroid lobe      - Lymphovascular invasion: Not identified       - Extrathyroidal extension: Not identified       - All margins free of carcinoma       - Background thyroid with previous biopsy site changes  pT1b     2/24/2023 -  Cancer Staged    Staging form: Thyroid - Differentiated and Anaplastic, AJCC 8th Edition  - Pathologic stage from 2/24/2023: Stage I (pT1b, pNX, cM0, Age at diagnosis: < 54 years) - Signed by HENRY Dee on 8/21/2023  Stage prefix: Initial diagnosis  Lymph node metastasis: Unknown             History of Present Illness: The patient returns to the office today in follow-up for thyroid cancer. She reports she continues to feel fatigued, but states the iron infusions are helping. She denies any new lumps in her neck or changes in her voice. She does have some swallowing issues and occasional cough which she attributes to significant reflux.   She states she has lost some weight, and her endocrinologist lowered her levothyroxine dose by having her take only a half-dose on Sundays. She continues to experience tingling in her hands, feet, and around her mouth, and does report taking calcium twice daily in addition to vitamin D.  US of the head and neck was performed on September 1. She has also had bloodwork done, but unfortunately her thyroglobulin panel and vitamin D are still pending. I have reviewed her results myself and discussed them with her today. Review of Systems   Constitutional: Positive for fatigue and unexpected weight change (weight loss). Negative for activity change and appetite change. HENT: Positive for trouble swallowing. Negative for voice change. Respiratory: Positive for cough and shortness of breath. Gastrointestinal: Positive for constipation. Negative for abdominal pain, diarrhea, nausea and vomiting. Skin: Negative. Neurological:        Tingling in hands, feet, around mouth   Hematological: Negative. Negative for adenopathy. Psychiatric/Behavioral: Negative. Patient Active Problem List   Diagnosis   • Chronic migraine w/o aura w/o status migrainosus, not intractable   • TOSHIA II (cervical intraepithelial neoplasia II)   • Irritable bowel syndrome with constipation   • Primary insomnia   • Neck pain   • Vestibulitis, vulvar   • Overweight (BMI 25.0-29. 9)   • Abnormal thyroid biopsy   • Papillary thyroid carcinoma (HCC)   • Hypothyroidism   • Gastroesophageal reflux disease   • Hypocalcemia   • Postoperative hypothyroidism   • 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 [facility])   • Hyperlipidemia   • Iron deficiency   • Encounter for follow-up surveillance of thyroid cancer     Past Medical History:   Diagnosis Date   • Allergic    • Anxiety    • Cancer ([address] [facility]) 2/1/2023    Thyroid- Papillary carcinoma   • COVID-19 01/2022    mild s/s-not hospitalized, not vaccinated   • GERD (gastroesophageal reflux disease)    • Headache(784.0)    • History of IBS     with constipation   • Ingrown toenail    • Irritable bowel syndrome    • Migraines     Chronic per pt   • Plantar fasciitis    • Thyroid carcinoma ([address])    • Varicella    • Venereal wart 08/07/2014     Past Surgical History:   Procedure Laterality Date   • CERVICAL BIOPSY  W/ LOOP ELECTRODE EXCISION  2017   • COLONOSCOPY     • NASAL SEPTUM SURGERY     • OR OSTEOT W/WO [address] SHRT/CORRJ 1ST METAR Left 12/30/2022    Procedure: OSTEOTOMY METATARSAL 1st;  Surgeon: Ilya Zarate DPM;  Location: [facility];  Service: Podiatry   • THYROIDECTOMY N/A 2/24/2023    Procedure: TOTAL THYROIDECTOMY;  Surgeon: Rylee Lang MD;  Location: [facility];  Service: Surgical Oncology   • TONSILLECTOMY     • UPPER GASTROINTESTINAL ENDOSCOPY     • US GUIDED THYROID BIOPSY  1/31/2023   • WISDOM TOOTH EXTRACTION       Family History   Problem Relation Age of Onset   • Hypertension Mother    • Arthritis Mother    • Thyroid disease Mother    • Osteoarthritis Mother    • Transient ischemic attack Father    • Hypertension Father    • Stroke Father    • Vision loss Father    • Rashes / Skin problems Sister         Shingles   • Migraines Sister    • No Known Problems Sister    • Uterine cancer Maternal Grandmother    • Cancer Maternal Grandmother         Uterine   • Colon cancer Maternal Grandfather    • Cancer Maternal Grandfather         Colon   • Lung cancer Paternal Grandmother 61   • Cancer Paternal Grandmother         Lung   • Heart attack Paternal Grandfather    • Colon cancer Cousin 27   • Breast cancer Maternal Aunt    • Breast cancer Maternal Aunt    • Breast cancer Maternal Aunt      Social History     Socioeconomic History   • Marital status: Single     Spouse name: Not on file   • Number of children: Not on file   • Years of education: Not on file   • Highest education level: Not on file   Occupational History   • Not on file   Tobacco Use   • Smoking status: Never   • Smokeless tobacco: Never   Vaping Use   • Vaping Use: Never used   Substance and Sexual Activity   • Alcohol use:  Yes     Alcohol/week: 2.0 standard drinks of alcohol     Types: 2 Glasses of wine per week     Comment: 2 glasses wine a week   • Drug use: Never   • Sexual activity: Not Currently   Other Topics Concern   • Not on file   Social History Narrative   • Not on file     Social Determinants of Health     Financial Resource Strain: Not on file   Food Insecurity: Not on file   Transportation Needs: Not on file   Physical Activity: Not on file   Stress: Not on file   Social Connections: Not on file   Intimate Partner Violence: Not on file   Housing Stability: Not on file       Current Outpatient Medications:   •  acetaminophen (TYLENOL) 500 mg tablet, Take 1,000 mg by mouth every 6 (six) hours as needed for mild pain, Disp: , Rfl:   •  acetaZOLAMIDE (DIAMOX) 125 mg tablet, 125 mg PO nightly for 1 week, then increase to 125 mg BID for 1 week, then 125 mg in am and 250 mg in pm for 1 week, then 250 mg BID (Patient taking differently: Take 250 mg by mouth 2 (two) times a day 125 mg PO nightly for 1 week, then increase to 125 mg BID for 1 week, then 125 mg in am and 250 mg in pm for 1 week, then 250 mg BID), Disp: 120 tablet, Rfl: 6  •  bisacodyl (DULCOLAX) 5 mg EC tablet, Take 2 tablets (10 mg total) by mouth 2 (two) times a day as needed for constipation, Disp: 60 tablet, Rfl: 1  •  calcitriol (ROCALTROL) 0.25 mcg capsule, Take 1 tablet twice daily, Disp: 180 capsule, Rfl: 0  •  clindamycin (CLEOCIN T) 1 % lotion, in the morning, Disp: , Rfl:   •  Galcanezumab-gnlm 120 MG/ML SOAJ, Inject 120 mg under the skin every 28 days, Disp: 1 mL, Rfl: 11  •  Levocetirizine Dihydrochloride (XYZAL PO), Take by mouth daily at bedtime, Disp: , Rfl:   •  levothyroxine (Synthroid) 125 mcg tablet, Take 1 tablet (125 mcg total) by mouth daily, Disp: 30 tablet, Rfl: 3  •  lubiprostone (AMITIZA) 24 mcg capsule, Take 1 capsule (24 mcg total) by mouth 2 (two) times a day with meals, Disp: 180 capsule, Rfl: 2  •  Melatonin 5 MG TABS, Take by mouth as needed, Disp: , Rfl:   •  meloxicam (MOBIC) 15 mg tablet, Take 15 mg by mouth daily as needed, Disp: , Rfl:   •  Multiple Vitamin (multivitamin) tablet, Take 1 tablet by mouth daily, Disp: , Rfl:   •  omeprazole (PriLOSEC) 40 MG capsule, Take 1 capsule (40 mg total) by mouth daily, Disp: 30 capsule, Rfl: 4  •  propranolol (INDERAL) 10 mg tablet, Decrease to 30 mg PO BID for 1 week, then 20 mg BID for 1 week, then 10 mg BID for 1 week and then can stop, Disp: 108 tablet, Rfl: 4  •  Prucalopride Succinate 2 MG TABS, Take 2 mg by mouth in the morning, Disp: 30 tablet, Rfl: 1  •  rimegepant sulfate (Nurtec) 75 mg TBDP, Take one NURTEC 75 mg at onset under tongue. Limit 1 in 24 hours. , Disp: 16 tablet, Rfl: 11  •  rizatriptan (MAXALT) 10 mg tablet, Take 1 tablet (10 mg total) by mouth once as needed for migraine May repeat in 2 hours if needed. Max 2/24 hours, 9/month., Disp: 9 tablet, Rfl: 12  •  zolpidem (AMBIEN) 10 mg tablet, Take 10 mg by mouth daily at bedtime as needed, Disp: , Rfl:   •  calcium citrate (CALCITRATE) 950 (200 Ca) MG tablet, Take 1 tablet (950 mg total) by mouth 2 (two) times a day, Disp: 120 tablet, Rfl: 0  •  cholecalciferol (VITAMIN D3) 1,000 units tablet, Take 1 tablet (1,000 Units total) by mouth daily Do not start before March 1, 2023., Disp: 30 tablet, Rfl: 0  No Known Allergies  Vitals:    09/12/23 0820   BP: 116/72   Pulse: 74   Temp: 97.6 °F (36.4 °C)   SpO2: 100%       Physical Exam  Vitals reviewed. Constitutional:       General: She is not in acute distress. Appearance: Normal appearance. She is normal weight. She is not ill-appearing or toxic-appearing. HENT:      Head: Normocephalic and atraumatic. Nose: Nose normal.      Mouth/Throat:      Mouth: Mucous membranes are moist.   Eyes:      General: No scleral icterus.      Conjunctiva/sclera: Conjunctivae normal.   Neck: Thyroid: No thyroid mass. Cardiovascular:      Rate and Rhythm: Normal rate. Pulmonary:      Effort: Pulmonary effort is normal.   Musculoskeletal:         General: Normal range of motion. Cervical back: Normal range of motion and neck supple. Lymphadenopathy:      Head:      Right side of head: No submental, submandibular or occipital adenopathy. Left side of head: No submental, submandibular or occipital adenopathy. Cervical: No cervical adenopathy. Upper Body:      Right upper body: No supraclavicular adenopathy. Left upper body: No supraclavicular adenopathy. Skin:     General: Skin is warm and dry. Neurological:      General: No focal deficit present. Mental Status: She is alert and oriented to person, place, and time. Psychiatric:         Mood and Affect: Mood normal.         Behavior: Behavior normal.         Thought Content: Thought content normal.         Judgment: Judgment normal.             Labs:     Collected Updated Procedure    09/11/2023 0724 09/11/2023 1521 PTH, intact [[id number]]   (Abnormal)   Blood    Component Value Units   PTH 10.7 Low  pg/mL          09/11/2023 0724 09/11/2023 1721 Calcium [[id number]]   (Abnormal)   Blood    Component Value Units   Calcium 8.1 Low  mg/dL        Collected Updated Procedure    07/05/2023 0645 07/05/2023 1319 T4, free Lab Collect [[id number]]   Blood from Arm, Left    Component Value Units   Free T4 1.11  ng/dL          07/05/2023 0645 07/05/2023 1319 TSH, 3rd generation Lab Collect [[id number]]   Blood from Arm, Left    Component Value Units   TSH 3RD GENERATON 1.176              Imaging  US head neck lymph node mapping    Result Date: 9/8/2023  Narrative: NECK ULTRASOUND INDICATION:     C73: Malignant neoplasm of thyroid gland. COMPARISON: 2/10/2023 FINDINGS: Ultrasound of the thyroidectomy bed and cervical lymph node chains was performed with a high frequency linear transducer.  In the right thyroid bed is a small amount of soft tissue with minimal vascularity measuring 6 x 5 x 5 mm, possibly residual thyroid tissue. No discrete abnormality in the left thyroid bed. Lymph nodes maintain normal morphologic contour, echogenicity and short axis dimensions of less than 0.7 cm. No evidence for microcalcification or focal nodularity. Impression: In the right thyroid bed is a small amount of soft tissue with minimal vascularity measuring 6 x 5 x 5 mm, possibly residual thyroid tissue. This may be correlated clinically and reassessed on follow-up ultrasound. No pathologic adenopathy. Workstation performed: [id number]     I personally reviewed and interpreted the above laboratory and imaging data. Discussion/Summary: This is a 40 y/o female who presents today for thyroid cancer surveillance. There is no obvious evidence of disease recurrence on imaging, although there is potentially residual thyroid tissue in the right surgical bed. I will await results of her thyroglobulin testing for correlation. Ideally, we would like her TSH further suppressed. I have advised the patient to have a discussion with her endocrinologist about her levothyroxine dosing. Her calcium is still low, but the PTH appears to be trending back toward normal range. We will plan to see her again in 6 months with repeat US. I will defer to endocrinology for all labs. She is agreeable to the plan, all questions have been answered.

## 2023-09-12 NOTE — LETTER
September 12, 2023     Carmen Chapman, [place]    Patient: Marycruz Townsend   YOB: 1980   Date of Visit: 9/12/2023       Dear Dr. Sherry Hernandez: Thank you for referring Genesis Jaeger to me for evaluation. Below are my notes for this consultation. If you have questions, please do not hesitate to call me. I look forward to following your patient along with you. Sincerely,        HENRY Rodriguez        CC: No Recipients    HENRY Gaming  9/12/2023  9:40 AM  Sign when Signing Visit               Surgical Oncology Follow Up       [address] [facility] [place] [address] [address] [facility] [address] [phone]    Marycruz Townsend  1980  [id number]  [address] [facility] Rochelle Alejandra  [facility] [address] [facility] [address] [phone]    Diagnoses and all orders for this visit:    Encounter for follow-up surveillance of thyroid cancer    Papillary thyroid carcinoma ([address] [facility])  -     US head neck lymph node mapping; Future        Chief Complaint   Patient presents with   • Follow-up       Return in about 6 months (around 3/12/2024) for Imaging - See orders, Office Visit with Dr Carmen Sandra. Oncology History   Papillary thyroid carcinoma ([address])   1/31/2023 Biopsy    Thyroid, Left, lower (Thin-prep and smear preparations): Malignant (Wallace Category VI)  Papillary carcinoma.      2/24/2023 Surgery    Thyroid; total thyroidectomy:       - Papillary thyroid carcinoma, classic, 1.2 cm      - Carcinoma involves left thyroid lobe      - Lymphovascular invasion: Not identified       - Extrathyroidal extension: Not identified       - All margins free of carcinoma       - Background thyroid with previous biopsy site changes  pT1b     2/24/2023 -  Cancer Staged    Staging form: Thyroid - Differentiated and Anaplastic, AJCC 8th Edition  - Pathologic stage from 2/24/2023: Stage I (pT1b, pNX, cM0, Age at diagnosis: < 54 years) - Signed by HENRY Jacob on 8/21/2023  Stage prefix: Initial diagnosis  Lymph node metastasis: Unknown             History of Present Illness: The patient returns to the office today in follow-up for thyroid cancer. She reports she continues to feel fatigued, but states the iron infusions are helping. She denies any new lumps in her neck or changes in her voice. She does have some swallowing issues and occasional cough which she attributes to significant reflux. She states she has lost some weight, and her endocrinologist lowered her levothyroxine dose by having her take only a half-dose on Sundays. She continues to experience tingling in her hands, feet, and around her mouth, and does report taking calcium twice daily in addition to vitamin D.  US of the head and neck was performed on September 1. She has also had bloodwork done, but unfortunately her thyroglobulin panel and vitamin D are still pending. I have reviewed her results myself and discussed them with her today. Review of Systems   Constitutional: Positive for fatigue and unexpected weight change (weight loss). Negative for activity change and appetite change. HENT: Positive for trouble swallowing. Negative for voice change. Respiratory: Positive for cough and shortness of breath. Gastrointestinal: Positive for constipation. Negative for abdominal pain, diarrhea, nausea and vomiting. Skin: Negative. Neurological:        Tingling in hands, feet, around mouth   Hematological: Negative. Negative for adenopathy. Psychiatric/Behavioral: Negative. Patient Active Problem List   Diagnosis   • Chronic migraine w/o aura w/o status migrainosus, not intractable   • TOSHIA II (cervical intraepithelial neoplasia II)   • Irritable bowel syndrome with constipation   • Primary insomnia   • Neck pain   • Vestibulitis, vulvar   • Overweight (BMI 25.0-29. 9)   • Abnormal thyroid biopsy   • Papillary thyroid carcinoma (HCC)   • Hypothyroidism   • Gastroesophageal reflux disease   • Hypocalcemia   • Postoperative hypothyroidism   • 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 ([address])   • Hyperlipidemia   • Iron deficiency   • Encounter for follow-up surveillance of thyroid cancer     Past Medical History:   Diagnosis Date   • Allergic    • Anxiety    • Cancer ([address]) 2/1/2023    Thyroid- Papillary carcinoma   • COVID-19 01/2022    mild s/s-not hospitalized, not vaccinated   • GERD (gastroesophageal reflux disease)    • Headache(784.0)    • History of IBS     with constipation   • Ingrown toenail    • Irritable bowel syndrome    • Migraines     Chronic per pt   • Plantar fasciitis    • Thyroid carcinoma ([address])    • Varicella    • Venereal wart 08/07/2014     Past Surgical History:   Procedure Laterality Date   • CERVICAL BIOPSY  W/ LOOP ELECTRODE EXCISION  2017   • COLONOSCOPY     • NASAL SEPTUM SURGERY     • ID OSTEOT W/WO LNGTH SHRT/CORRJ 1ST METAR Left 12/30/2022    Procedure: OSTEOTOMY METATARSAL 1st;  Surgeon: Cathy Barr DPM;  Location: [facility];  Service: Podiatry   • THYROIDECTOMY N/A 2/24/2023    Procedure: TOTAL THYROIDECTOMY;  Surgeon: Shell Quintana MD;  Location: [facility];  Service: Surgical Oncology   • TONSILLECTOMY     • UPPER GASTROINTESTINAL ENDOSCOPY     • US GUIDED THYROID BIOPSY  1/31/2023   • WISDOM TOOTH EXTRACTION       Family History   Problem Relation Age of Onset   • Hypertension Mother    • Arthritis Mother    • Thyroid disease Mother    • Osteoarthritis Mother    • Transient ischemic attack Father    • Hypertension Father    • Stroke Father    • Vision loss Father    • Rashes / Skin problems Sister         Shingles   • Migraines Sister    • No Known Problems Sister    • Uterine cancer Maternal Grandmother    • Cancer Maternal Grandmother         Uterine   • Colon cancer Maternal Grandfather    • Cancer Maternal Grandfather         Colon   • Lung cancer Paternal Grandmother 61   • Cancer Paternal Grandmother         Lung   • Heart attack Paternal Grandfather    • Colon cancer Cousin 27   • Breast cancer Maternal Aunt    • Breast cancer Maternal Aunt    • Breast cancer Maternal Aunt      Social History     Socioeconomic History   • Marital status: Single     Spouse name: Not on file   • Number of children: Not on file   • Years of education: Not on file   • Highest education level: Not on file   Occupational History   • Not on file   Tobacco Use   • Smoking status: Never   • Smokeless tobacco: Never   Vaping Use   • Vaping Use: Never used   Substance and Sexual Activity   • Alcohol use:  Yes     Alcohol/week: 2.0 standard drinks of alcohol     Types: 2 Glasses of wine per week     Comment: 2 glasses wine a week   • Drug use: Never   • Sexual activity: Not Currently   Other Topics Concern   • Not on file   Social History Narrative   • Not on file     Social Determinants of Health     Financial Resource Strain: Not on file   Food Insecurity: Not on file   Transportation Needs: Not on file   Physical Activity: Not on file   Stress: Not on file   Social Connections: Not on file   Intimate Partner Violence: Not on file   Housing Stability: Not on file       Current Outpatient Medications:   •  acetaminophen (TYLENOL) 500 mg tablet, Take 1,000 mg by mouth every 6 (six) hours as needed for mild pain, Disp: , Rfl:   •  acetaZOLAMIDE (DIAMOX) 125 mg tablet, 125 mg PO nightly for 1 week, then increase to 125 mg BID for 1 week, then 125 mg in am and 250 mg in pm for 1 week, then 250 mg BID (Patient taking differently: Take 250 mg by mouth 2 (two) times a day 125 mg PO nightly for 1 week, then increase to 125 mg BID for 1 week, then 125 mg in am and 250 mg in pm for 1 week, then 250 mg BID), Disp: 120 tablet, Rfl: 6  •  bisacodyl (DULCOLAX) 5 mg EC tablet, Take 2 tablets (10 mg total) by mouth 2 (two) times a day as needed for constipation, Disp: 60 tablet, Rfl: 1  •  calcitriol (ROCALTROL) 0.25 mcg capsule, Take 1 tablet twice daily, Disp: 180 capsule, Rfl: 0  •  clindamycin (CLEOCIN T) 1 % lotion, in the morning, Disp: , Rfl:   •  Galcanezumab-gnlm 120 MG/ML SOAJ, Inject 120 mg under the skin every 28 days, Disp: 1 mL, Rfl: 11  •  Levocetirizine Dihydrochloride (XYZAL PO), Take by mouth daily at bedtime, Disp: , Rfl:   •  levothyroxine (Synthroid) 125 mcg tablet, Take 1 tablet (125 mcg total) by mouth daily, Disp: 30 tablet, Rfl: 3  •  lubiprostone (AMITIZA) 24 mcg capsule, Take 1 capsule (24 mcg total) by mouth 2 (two) times a day with meals, Disp: 180 capsule, Rfl: 2  •  Melatonin 5 MG TABS, Take by mouth as needed, Disp: , Rfl:   •  meloxicam (MOBIC) 15 mg tablet, Take 15 mg by mouth daily as needed, Disp: , Rfl:   •  Multiple Vitamin (multivitamin) tablet, Take 1 tablet by mouth daily, Disp: , Rfl:   •  omeprazole (PriLOSEC) 40 MG capsule, Take 1 capsule (40 mg total) by mouth daily, Disp: 30 capsule, Rfl: 4  •  propranolol (INDERAL) 10 mg tablet, Decrease to 30 mg PO BID for 1 week, then 20 mg BID for 1 week, then 10 mg BID for 1 week and then can stop, Disp: 108 tablet, Rfl: 4  •  Prucalopride Succinate 2 MG TABS, Take 2 mg by mouth in the morning, Disp: 30 tablet, Rfl: 1  •  rimegepant sulfate (Nurtec) 75 mg TBDP, Take one NURTEC 75 mg at onset under tongue. Limit 1 in 24 hours. , Disp: 16 tablet, Rfl: 11  •  rizatriptan (MAXALT) 10 mg tablet, Take 1 tablet (10 mg total) by mouth once as needed for migraine May repeat in 2 hours if needed.  Max 2/24 hours, 9/month., Disp: 9 tablet, Rfl: 12  •  zolpidem (AMBIEN) 10 mg tablet, Take 10 mg by mouth daily at bedtime as needed, Disp: , Rfl:   •  calcium citrate (CALCITRATE) 950 (200 Ca) MG tablet, Take 1 tablet (950 mg total) by mouth 2 (two) times a day, Disp: 120 tablet, Rfl: 0  •  cholecalciferol (VITAMIN D3) 1,000 units tablet, Take 1 tablet (1,000 Units total) by mouth daily Do not start before March 1, 2023., Disp: 30 tablet, Rfl: 0  No Known Allergies  Vitals:    09/12/23 0820   BP: 116/72   Pulse: 74   Temp: 97.6 °F (36.4 °C)   SpO2: 100%       Physical Exam  Vitals reviewed. Constitutional:       General: She is not in acute distress. Appearance: Normal appearance. She is normal weight. She is not ill-appearing or toxic-appearing. HENT:      Head: Normocephalic and atraumatic. Nose: Nose normal.      Mouth/Throat:      Mouth: Mucous membranes are moist.   Eyes:      General: No scleral icterus. Conjunctiva/sclera: Conjunctivae normal.   Neck:      Thyroid: No thyroid mass. Cardiovascular:      Rate and Rhythm: Normal rate. Pulmonary:      Effort: Pulmonary effort is normal.   Musculoskeletal:         General: Normal range of motion. Cervical back: Normal range of motion and neck supple. Lymphadenopathy:      Head:      Right side of head: No submental, submandibular or occipital adenopathy. Left side of head: No submental, submandibular or occipital adenopathy. Cervical: No cervical adenopathy. Upper Body:      Right upper body: No supraclavicular adenopathy. Left upper body: No supraclavicular adenopathy. Skin:     General: Skin is warm and dry. Neurological:      General: No focal deficit present. Mental Status: She is alert and oriented to person, place, and time. Psychiatric:         Mood and Affect: Mood normal.         Behavior: Behavior normal.         Thought Content:  Thought content normal.         Judgment: Judgment normal.             Labs:     Collected Updated Procedure    09/11/2023 0724 09/11/2023 1521 PTH, intact [[id number]]   (Abnormal)   Blood    Component Value Units   PTH 10.7 Low  pg/mL          09/11/2023 0724 09/11/2023 1721 Calcium [[id number]]   (Abnormal)   Blood    Component Value Units   Calcium 8.1 Low  mg/dL        Collected Updated Procedure    07/05/2023 0645 07/05/2023 1319 T4, free Lab Collect [[id number]]   Blood from Arm, Left    Component Value Units   Free T4 1.11  ng/dL          07/05/2023 0645 07/05/2023 1319 TSH, 3rd generation Lab Collect [[id number]]   Blood from Arm, Left    Component Value Units   TSH 3RD GENERATON 1.176              Imaging  US head neck lymph node mapping    Result Date: 9/8/2023  Narrative: NECK ULTRASOUND INDICATION:     C73: Malignant neoplasm of thyroid gland. COMPARISON: 2/10/2023 FINDINGS: Ultrasound of the thyroidectomy bed and cervical lymph node chains was performed with a high frequency linear transducer. In the right thyroid bed is a small amount of soft tissue with minimal vascularity measuring 6 x 5 x 5 mm, possibly residual thyroid tissue. No discrete abnormality in the left thyroid bed. Lymph nodes maintain normal morphologic contour, echogenicity and short axis dimensions of less than 0.7 cm. No evidence for microcalcification or focal nodularity. Impression: In the right thyroid bed is a small amount of soft tissue with minimal vascularity measuring 6 x 5 x 5 mm, possibly residual thyroid tissue. This may be correlated clinically and reassessed on follow-up ultrasound. No pathologic adenopathy. Workstation performed: [id number]     I personally reviewed and interpreted the above laboratory and imaging data. Discussion/Summary: This is a 40 y/o female who presents today for thyroid cancer surveillance. There is no obvious evidence of disease recurrence on imaging, although there is potentially residual thyroid tissue in the right surgical bed. I will await results of her thyroglobulin testing for correlation. Ideally, we would like her TSH further suppressed. I have advised the patient to have a discussion with her endocrinologist about her levothyroxine dosing. Her calcium is still low, but the PTH appears to be trending back toward normal range. We will plan to see her again in 6 months with repeat US. I will defer to endocrinology for all labs.   She is agreeable to the plan, all questions have been answered.

## 2023-09-12 NOTE — TELEPHONE ENCOUNTER
Patient Running Late       Who are you speaking with? Patient   If it is not the patient, are they listed on an active communication consent form? N/A   When is the appointment scheduled? Please list date and time 9/12/23 8am   At which location is the appointment scheduled to take place? [place]   If patient is running less than 15 minutes late, have patient proceed to office. Appointment may need to be rescheduled at providers discretion. If provider cannot see patient today, the office will reschedule the visit. Was this relayed to patient?  Yes

## 2023-09-13 ENCOUNTER — SOCIAL WORK (OUTPATIENT)
Dept: BEHAVIORAL/MENTAL HEALTH CLINIC | Facility: CLINIC | Age: 43
End: 2023-09-13
Payer: COMMERCIAL

## 2023-09-13 DIAGNOSIS — F51.01 PRIMARY INSOMNIA: Primary | ICD-10-CM

## 2023-09-13 PROCEDURE — 90832 PSYTX W PT 30 MINUTES: CPT | Performed by: COUNSELOR

## 2023-09-13 NOTE — PSYCH
Behavioral Health Psychotherapy Progress Note    Psychotherapy Provided: Individual Psychotherapy     1. Primary insomnia            Goals addressed in session: Goal 1     DATA: Patient reports the sleep plan is difficult and daytime sleepiness has increased. She finds it is nice to have some quiet time at the end of the day to get things done. She is trying to do her planning and worrying before going into the bedroom but still has problems falling to sleep in less than 20 minutes. Average weekly data are:    Sleep Efficiency  85%  Sleep Latency   31 min  Total Sleep Time  284 min  Wake After Sleep Onset 17 min  [place] SS   17  PHQ-9    13  SHAHZAD-7    10    Same Sleep Plan   1:00-6:00      During this session, this clinician used the following therapeutic modalities: Cognitive Behavior for Insomnia (CBT-I)    Substance Abuse was not addressed during this session. If the client is diagnosed with a co-occurring substance use disorder, please indicate any changes in the frequency or amount of use: N/A. Stage of change for addressing substance use diagnoses: No substance use/Not applicable    ASSESSMENT:  Gayle Dailey presents with a Euthymic/ normal mood. her affect is Normal range and intensity, which is congruent, with her mood and the content of the session. The client has made progress on their goals. Gayle Dailey presents with a none risk of suicide, none risk of self-harm, and none risk of harm to others. For any risk assessment that surpasses a "low" rating, a safety plan must be developed. A safety plan was indicated: no  If yes, describe in detail N/A    PLAN: Between sessions, Gayle Dailey will complete and return sleep diary at next session. At the next session, the therapist will use Cognitive Behavior for Insomnia (CBT-I) to address insomnia. Behavioral Health Treatment Plan and Discharge Planning: Gayle Dailey is aware of and agrees to continue to work on their treatment plan.  They have identified and are working toward their discharge goals.  yes    Visit start and stop times:    09/13/23  Start Time: 0458  Stop Time: 0532  Total Visit Time: 34 minutes

## 2023-09-15 ENCOUNTER — HOSPITAL ENCOUNTER (OUTPATIENT)
Dept: INFUSION CENTER | Facility: HOSPITAL | Age: 43
End: 2023-09-15
Attending: INTERNAL MEDICINE
Payer: COMMERCIAL

## 2023-09-15 VITALS
HEART RATE: 84 BPM | SYSTOLIC BLOOD PRESSURE: 121 MMHG | TEMPERATURE: 98 F | DIASTOLIC BLOOD PRESSURE: 78 MMHG | RESPIRATION RATE: 18 BRPM

## 2023-09-15 DIAGNOSIS — E61.1 IRON DEFICIENCY: Primary | ICD-10-CM

## 2023-09-15 PROCEDURE — 96366 THER/PROPH/DIAG IV INF ADDON: CPT

## 2023-09-15 PROCEDURE — 96365 THER/PROPH/DIAG IV INF INIT: CPT

## 2023-09-15 RX ORDER — SODIUM CHLORIDE 9 MG/ML
20 INJECTION, SOLUTION INTRAVENOUS ONCE
Status: COMPLETED | OUTPATIENT
Start: 2023-09-15 | End: 2023-09-15

## 2023-09-15 RX ORDER — SODIUM CHLORIDE 9 MG/ML
20 INJECTION, SOLUTION INTRAVENOUS ONCE
Status: CANCELLED | OUTPATIENT
Start: 2023-09-15

## 2023-09-15 RX ADMIN — IRON SUCROSE 200 MG: 20 INJECTION, SOLUTION INTRAVENOUS at 14:20

## 2023-09-15 RX ADMIN — SODIUM CHLORIDE 20 ML/HR: 0.9 INJECTION, SOLUTION INTRAVENOUS at 14:20

## 2023-09-16 LAB
25(OH)D2 SERPL-MCNC: <1 NG/ML
25(OH)D3 SERPL-MCNC: 54 NG/ML
25(OH)D3+25(OH)D2 SERPL-MCNC: 54 NG/ML

## 2023-09-18 ENCOUNTER — OFFICE VISIT (OUTPATIENT)
Dept: PHYSICAL THERAPY | Facility: REHABILITATION | Age: 43
End: 2023-09-18
Payer: COMMERCIAL

## 2023-09-18 DIAGNOSIS — M54.6 LEFT-SIDED THORACIC BACK PAIN, UNSPECIFIED CHRONICITY: ICD-10-CM

## 2023-09-18 DIAGNOSIS — M62.838 MUSCLE SPASM: Primary | ICD-10-CM

## 2023-09-18 PROCEDURE — 97112 NEUROMUSCULAR REEDUCATION: CPT

## 2023-09-18 PROCEDURE — 97110 THERAPEUTIC EXERCISES: CPT

## 2023-09-18 NOTE — PROGRESS NOTES
Daily Note     Today's date: 2023  Patient name: Brittany Fuentes  : 1980  MRN: [id number]  Referring provider: CHRISTOPHER Rothman  Dx:   Encounter Diagnosis     ICD-10-CM    1. Muscle spasm  M62.838       2. Left-sided thoracic back pain, unspecified chronicity  M54.6            Start Time: 1720  Stop Time: 1802  Total time in clinic (min): 42 minutes    Subjective: Patient reports no new complaints or major changes since last session. Objective: See treatment diary below      Assessment: Tolerated treatment well. Patient did not experience any worsening of symptoms throughout today's session. Patient displayed proper form and had a good recall/understanding of all exercises in her program. Muscle fatigue present specifically while completing the final set of each exercise throughout today's session. Improvements noted at the conclusion of session. Patient would benefit from continued PT. Plan: Continue per plan of care. Progress treatment as tolerated.        Precautions: Hypothyroidism, Hypocalcemia, GERD, IBS-C, Chronic Migraine       Pertinent Findings:                                                                                                                                                     Test / Measure  2023   FOTO 52   Thoracic Mobility Very Limited   R UE  4+/5   Periscapular Endurance and Motor Control Poor         Manuals                                Neuro Re-Ed       Payloff Press GTB  30x B #5  30x B  #5  30x B   Rows BTB  3"  2x10 Rotation + Row  #5  20x B     Prone Y-T-Is on physioball   #2  10x each #2  10x each   Shoulder Extension Prone  #3 DB  20x  B/L Standing  #5  Unilateral  3x10 B Standing  BTB  Unilateral  3x10 B Standing  BTB  Unilateral  3x10 B   Ther Ex       UBE 3'/3'   Lvl 1  6' Retro   Lvl 2  4'/4   Lvl 2 4'/4   Lvl 2   Shoulder ER  #3 R  #2 L   3x10 B #3 B  3x10 #3 B  3x10   Repeated Thoracic Extension   With fulcrum  20x Thoracic Protocol  1' eac 1' eac   4 positions    Bent-Over Foam Rollout  3x10  Standingat Wall  ytb  3x10 Standingat Wall  ytb  3x10     AAROM with Cane Flexion   #3 aw  20x #3 aw  20x          Ther Activity                     Gait Training                     Modalities                        [id number]

## 2023-09-19 ENCOUNTER — OFFICE VISIT (OUTPATIENT)
Dept: ENDOCRINOLOGY | Facility: CLINIC | Age: 43
End: 2023-09-19
Payer: COMMERCIAL

## 2023-09-19 VITALS
RESPIRATION RATE: 18 BRPM | HEIGHT: 64 IN | HEART RATE: 71 BPM | OXYGEN SATURATION: 99 % | BODY MASS INDEX: 26.02 KG/M2 | SYSTOLIC BLOOD PRESSURE: 110 MMHG | WEIGHT: 152.38 LBS | TEMPERATURE: 97.9 F | DIASTOLIC BLOOD PRESSURE: 70 MMHG

## 2023-09-19 DIAGNOSIS — E89.0 POSTOPERATIVE HYPOTHYROIDISM: Primary | ICD-10-CM

## 2023-09-19 DIAGNOSIS — E83.51 HYPOCALCEMIA: ICD-10-CM

## 2023-09-19 DIAGNOSIS — C73 PAPILLARY THYROID CARCINOMA (HCC): ICD-10-CM

## 2023-09-19 DIAGNOSIS — E89.2 HYPOPARATHYROIDISM AFTER PROCEDURE (HCC): ICD-10-CM

## 2023-09-19 PROCEDURE — 99214 OFFICE O/P EST MOD 30 MIN: CPT | Performed by: STUDENT IN AN ORGANIZED HEALTH CARE EDUCATION/TRAINING PROGRAM

## 2023-09-19 NOTE — PROGRESS NOTES
Saskia Diop 41 y.o. female MRN: [id number]    Encounter: [id number]      Assessment/Plan     1. Postoperative hypothyroidism  She currently is taking levothyroxine 125 mcg daily, and recent TFT showed TSH: 1.176 and Free T4 of 1.11 ng/dl. She is clinically euthyroid,   TSH goal is 0.5 - 2 given low risk recurrence,  I increased levothyroxine to 125 mcg daily x 6 and on 7th day 1.5 pill.  - TSH, 3rd generation Lab Collect; Future  - T4, free Lab Collect; Future    2. Papillary thyroid carcinoma ([address])  status post total thyroidectomy for thyroid nodule with FNA biopsy result [place] category VI. Pathology showed unifocal papillary carcinoma, classic, greatest dimension of 1.2 cm, with no angioinvasion, no lymphatic invasion, no extrathyroidal extension and margin negative for carcinoma. With low risk of recurrence, radioactive iodine ablation was not pursued. Post thyroidectomy survielence showed Tg of 0.3 and negative Tg antibody. Neck ultrasound with lymph node mapping showed In the right thyroid bed is a small amount of soft tissue with minimal vascularity measuring 6 x 5 x 5 mm, possibly residual thyroid tissue  A repeated Tg decreased to 0.2. We reviewed findings with Asmita, neck ultrasound showed possible small thyroid tissue remnant, considering small size, low vascularity and decreased Tg Level, she is agreeable to defer WBS for now. We will continue monitor closely with Tg and Neck Ultrasound to determine need for WBS and HERNANDEZ.    - TSH, 3rd generation Lab Collect; Future  - T4, free Lab Collect; Future    3. Hypocalcemia  she is taking Calcium citrate 950 mg twice daily, calcium 8.1 mg/dl ( albumin of 3.5, corrected calcium 8.5 mg/dl) with PTH of 10.7 pg/ml. She has no hypocalcemia symptoms,  Calcium goal is 8.0 - 9.0 mg/dl. We will continue with current calcium supplementation.    - Comprehensive metabolic panel Lab Collect; Future  - PTH, intact Lab Collect Lab Collect; Future    4. 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 [facility])  See plan for #3      CC: Thyroid cancer    History of Present Illness      HPI:    Bianca Mello is a 41year old female with history of papillary thyroid carcinoma who presented for follow up. Vamsi Limon is status post total thyroidectomy for thyroid nodule with FNA biopsy result [place] class VI. Pathology showed unifocal papillary carcinoma, classic, greatest dimension of 1.2 cm, with no angioinvasion, no lymphatic invasion, no extrathyroidal extension and margin negative for carcinoma. Considering low risk for recurrence of cancer, total thyroidectomy was not pursued with radioactive iodine ablation,  Post thyroidectomy survielence showed Tg of 0.3 and negative Tg antibody. Neck ultrasound with lymph node mapping showed In the right thyroid bed is a small amount of soft tissue with minimal vascularity measuring 6 x 5 x 5 mm, possibly residual thyroid tissue  A repeated Tg decreased to 0.2. For post-operative hypothyroidism, she is taking levothyroxine 125 mcg daily, and recent TFT showed TSH: 1.176 and Free T4 of 1.11 ng/dl. For hypocalemia for post procedure hypoparathyroidism, she is taking Calcium citrate 950 mg twice daily, calcium 8.1 mg/dl ( albumin of 3.5, corrected calcium 8.5 mg/dl) with PTH of 10.7 pg/ml. Review of Systems   Constitutional: Negative for activity change, appetite change, chills, diaphoresis, fatigue, fever and unexpected weight change. HENT: Negative for congestion, drooling, ear discharge, ear pain, trouble swallowing and voice change. Eyes: Negative for photophobia, pain, discharge, redness, itching and visual disturbance. Respiratory: Negative for cough, chest tightness, shortness of breath and wheezing. Cardiovascular: Negative for chest pain, palpitations and leg swelling. Gastrointestinal: Negative for abdominal distention, abdominal pain, blood in stool, diarrhea, nausea and vomiting.    Endocrine: Negative for cold intolerance, heat intolerance, polydipsia, polyphagia and polyuria. Genitourinary: Negative for dysuria, flank pain, frequency and hematuria. Musculoskeletal: Negative for back pain, gait problem, joint swelling, myalgias, neck pain and neck stiffness. Skin: Negative for color change, pallor, rash and wound. Neurological: Negative for dizziness, tremors, seizures, syncope, speech difficulty, weakness, numbness and headaches. Psychiatric/Behavioral: Negative for agitation. All other systems reviewed and are negative.       Historical Information   Past Medical History:   Diagnosis Date   • Allergic    • Anxiety    • Cancer ([address]) 2/1/2023    Thyroid- Papillary carcinoma   • COVID-19 01/2022    mild s/s-not hospitalized, not vaccinated   • GERD (gastroesophageal reflux disease)    • Headache(784.0)    • History of IBS     with constipation   • Ingrown toenail    • Irritable bowel syndrome    • Migraines     Chronic per pt   • Plantar fasciitis    • Thyroid carcinoma ([address])    • Varicella    • Venereal wart 08/07/2014     Past Surgical History:   Procedure Laterality Date   • CERVICAL BIOPSY  W/ LOOP ELECTRODE EXCISION  2017   • COLONOSCOPY     • NASAL SEPTUM SURGERY     • RI OSTEOT W/WO [address] [facility] SHRT/CORRJ 1ST METAR Left 12/30/2022    Procedure: OSTEOTOMY METATARSAL 1st;  Surgeon: Jessie Brown DPM;  Location: [facility];  Service: Podiatry   • THYROIDECTOMY N/A 2/24/2023    Procedure: TOTAL THYROIDECTOMY;  Surgeon: Michelle Beal MD;  Location:  MAIN OR;  Service: Surgical Oncology   • TONSILLECTOMY     • UPPER GASTROINTESTINAL ENDOSCOPY     • US GUIDED THYROID BIOPSY  1/31/2023   • WISDOM TOOTH EXTRACTION       Social History   Social History     Substance and Sexual Activity   Alcohol Use Yes   • Alcohol/week: 2.0 standard drinks of alcohol   • Types: 2 Glasses of wine per week    Comment: 2 glasses wine a week     Social History     Substance and Sexual Activity   Drug Use Never     Social History Tobacco Use   Smoking Status Never   Smokeless Tobacco Never     Family History:   Family History   Problem Relation Age of Onset   • Hypertension Mother    • Arthritis Mother    • Thyroid disease Mother    • Osteoarthritis Mother    • Transient ischemic attack Father    • Hypertension Father    • Stroke Father    • Vision loss Father    • Rashes / Skin problems Sister         Shingles   • Migraines Sister    • No Known Problems Sister    • Uterine cancer Maternal Grandmother    • Cancer Maternal Grandmother         Uterine   • Colon cancer Maternal Grandfather    • Cancer Maternal Grandfather         Colon   • Lung cancer Paternal Grandmother 61   • Cancer Paternal Grandmother         Lung   • Heart attack Paternal Grandfather    • Colon cancer Cousin 27   • Breast cancer Maternal Aunt    • Breast cancer Maternal Aunt    • Breast cancer Maternal Aunt        Meds/Allergies   Current Outpatient Medications   Medication Sig Dispense Refill   • acetaminophen (TYLENOL) 500 mg tablet Take 1,000 mg by mouth every 6 (six) hours as needed for mild pain     • acetaZOLAMIDE (DIAMOX) 125 mg tablet 125 mg PO nightly for 1 week, then increase to 125 mg BID for 1 week, then 125 mg in am and 250 mg in pm for 1 week, then 250 mg BID (Patient taking differently: Take 250 mg by mouth 2 (two) times a day 125 mg PO nightly for 1 week, then increase to 125 mg BID for 1 week, then 125 mg in am and 250 mg in pm for 1 week, then 250 mg BID) 120 tablet 6   • bisacodyl (DULCOLAX) 5 mg EC tablet Take 2 tablets (10 mg total) by mouth 2 (two) times a day as needed for constipation 60 tablet 1   • calcitriol (ROCALTROL) 0.25 mcg capsule Take 1 tablet twice daily 180 capsule 0   • clindamycin (CLEOCIN T) 1 % lotion in the morning     • Galcanezumab-gnlm 120 MG/ML SOAJ Inject 120 mg under the skin every 28 days 1 mL 11   • Levocetirizine Dihydrochloride (XYZAL PO) Take by mouth daily at bedtime     • levothyroxine (Synthroid) 125 mcg tablet Take 1 tablet (125 mcg total) by mouth daily 30 tablet 3   • lubiprostone (AMITIZA) 24 mcg capsule Take 1 capsule (24 mcg total) by mouth 2 (two) times a day with meals 180 capsule 2   • Melatonin 5 MG TABS Take by mouth as needed     • meloxicam (MOBIC) 15 mg tablet Take 15 mg by mouth daily as needed     • Multiple Vitamin (multivitamin) tablet Take 1 tablet by mouth daily     • omeprazole (PriLOSEC) 40 MG capsule Take 1 capsule (40 mg total) by mouth daily 30 capsule 4   • propranolol (INDERAL) 10 mg tablet Decrease to 30 mg PO BID for 1 week, then 20 mg BID for 1 week, then 10 mg BID for 1 week and then can stop 108 tablet 4   • Prucalopride Succinate 2 MG TABS Take 2 mg by mouth in the morning 30 tablet 1   • rimegepant sulfate (Nurtec) 75 mg TBDP Take one NURTEC 75 mg at onset under tongue. Limit 1 in 24 hours. 16 tablet 11   • rizatriptan (MAXALT) 10 mg tablet Take 1 tablet (10 mg total) by mouth once as needed for migraine May repeat in 2 hours if needed. Max 2/24 hours, 9/month. 9 tablet 12   • zolpidem (AMBIEN) 10 mg tablet Take 10 mg by mouth daily at bedtime as needed     • calcium citrate (CALCITRATE) 950 (200 Ca) MG tablet Take 1 tablet (950 mg total) by mouth 2 (two) times a day 120 tablet 0   • cholecalciferol (VITAMIN D3) 1,000 units tablet Take 1 tablet (1,000 Units total) by mouth daily Do not start before March 1, 2023. 30 tablet 0     No current facility-administered medications for this visit. No Known Allergies    Objective   Vitals: Blood pressure 110/70, pulse 71, temperature 97.9 °F (36.6 °C), temperature source Temporal, resp. rate 18, height 5' 3.75" (1.619 m), weight 69.1 kg (152 lb 6 oz), last menstrual period 08/08/2023, SpO2 99 %. Physical Exam  Constitutional:       Appearance: She is well-developed. HENT:      Head: Normocephalic and atraumatic. Nose: Nose normal.   Eyes:      Pupils: Pupils are equal, round, and reactive to light. Neck:      Thyroid: No thyromegaly. Vascular: No JVD. Comments: thyroidectomy scare, well healed. Cardiovascular:      Rate and Rhythm: Normal rate and regular rhythm. Heart sounds: Normal heart sounds. No murmur heard. No friction rub. No gallop. Pulmonary:      Effort: Pulmonary effort is normal. No respiratory distress. Breath sounds: Normal breath sounds. No stridor. No wheezing or rales. Chest:      Chest wall: No tenderness. Abdominal:      General: Bowel sounds are normal. There is no distension. Palpations: Abdomen is soft. There is no mass. Tenderness: There is no abdominal tenderness. There is no guarding. Musculoskeletal:         General: No deformity. Normal range of motion. Cervical back: Normal range of motion. Skin:     General: Skin is warm. Neurological:      Mental Status: She is alert and oriented to person, place, and time. The history was obtained from the review of the chart, patient. Lab Results:   Lab Results   Component Value Date/Time    TSH 3RD GENERATON 1.176 07/05/2023 06:45 AM    TSH 3RD GENERATON 3.873 05/31/2023 07:07 AM    TSH 3RD GENERATON 0.167 (L) 03/02/2023 04:29 PM    Free T4 1.11 07/05/2023 06:45 AM    Free T4 1.60 (H) 05/31/2023 07:07 AM    Free T4 1.59 (H) 03/02/2023 04:29 PM    Thyroglobulin Ab <1.0 09/11/2023 07:24 AM    Thyroglobulin Ab <1.0 05/31/2023 07:07 AM    Thyroglobulin Ab <1.0 02/03/2023 01:34 PM     Imaging Studies:  Results for orders placed during the hospital encounter of 09/08/23    US head neck lymph node mapping    Impression  In the right thyroid bed is a small amount of soft tissue with minimal vascularity measuring 6 x 5 x 5 mm, possibly residual thyroid tissue. This may be correlated clinically and reassessed on follow-up ultrasound. No pathologic adenopathy. Workstation performed: [id number]         I have personally reviewed pertinent reports.       Portions of the record may have been created with voice recognition software. Occasional wrong word or "sound a like" substitutions may have occurred due to the inherent limitations of voice recognition software. Read the chart carefully and recognize, using context, where substitutions have occurred.

## 2023-09-20 ENCOUNTER — SOCIAL WORK (OUTPATIENT)
Dept: BEHAVIORAL/MENTAL HEALTH CLINIC | Facility: CLINIC | Age: 43
End: 2023-09-20
Payer: COMMERCIAL

## 2023-09-20 DIAGNOSIS — F51.01 PRIMARY INSOMNIA: Primary | ICD-10-CM

## 2023-09-20 PROCEDURE — 90832 PSYTX W PT 30 MINUTES: CPT | Performed by: COUNSELOR

## 2023-09-20 NOTE — PSYCH
Behavioral Health Psychotherapy Progress Note    Psychotherapy Provided: Individual Psychotherapy     1. Primary insomnia            Goals addressed in session: Goal 1     DATA: Patient presents sleep diary that very much follow the sleep plan. She reports increased daytime sleepiness. After review of good numbers the Sleep Plan was Extended 12:00 a.m. to 6:00 a.m. or 6 hours of sleep opportunity an increase of 1 hour. There were no adherence issues to discuss as patient is very compliant. Patient questions were answered and she states this week she feels like she is making progress. The following weekly average data is reports:    Sleep Efficiency  92%  Sleep Latency   19 min  Total Sleep Time  283 min  Wake After Sleep Onset 7 min  [place] SS   15  PHQ-9    12  SHAHZAD-7    19    New Sleep Plan is 12:00 a.m. to 60 a.m. During this session, this clinician used the following therapeutic modalities: Cognitive Behavoral Therapy for Insomnia (CBT-I)    Substance Abuse was not addressed during this session. If the client is diagnosed with a co-occurring substance use disorder, please indicate any changes in the frequency or amount of use: n/a. Stage of change for addressing substance use diagnoses: Action    ASSESSMENT:  Codie Cortez presents with a Euthymic/ normal mood. her affect is Normal range and intensity, which is congruent, with her mood and the content of the session. The client has made progress on their goals. Codie oCrtez presents with a none risk of suicide, none risk of self-harm, and none risk of harm to others. For any risk assessment that surpasses a "low" rating, a safety plan must be developed. A safety plan was indicated: no  If yes, describe in detail n/a    PLAN: Between sessions, Codie Cortez will complete and return sleep diary. At the next session, the therapist will use Cognitive Behavoral Therapy for Insomnia (CBT-I) to address insomnia.     Behavioral Health Treatment Plan and Discharge Planning: Myra Baez is aware of and agrees to continue to work on their treatment plan. They have identified and are working toward their discharge goals.  yes    Visit start and stop times:    09/20/23  Start Time: 0457  Stop Time: 0530  Total Visit Time: 33 minutes

## 2023-09-21 ENCOUNTER — TELEPHONE (OUTPATIENT)
Age: 43
End: 2023-09-21

## 2023-09-21 NOTE — TELEPHONE ENCOUNTER
Patients GI provider:  Dr. Bonnie Womack    Number to return call: [phone]     Reason for call: Pt called and stated a prior auth is required for medication Prucalopride Succinate please review and reach out wants it has been done thank you       Scheduled procedure/appointment date if applicable: n/a

## 2023-09-25 ENCOUNTER — OFFICE VISIT (OUTPATIENT)
Dept: PHYSICAL THERAPY | Facility: REHABILITATION | Age: 43
End: 2023-09-25
Payer: COMMERCIAL

## 2023-09-25 DIAGNOSIS — M54.6 LEFT-SIDED THORACIC BACK PAIN, UNSPECIFIED CHRONICITY: ICD-10-CM

## 2023-09-25 DIAGNOSIS — M62.838 MUSCLE SPASM: Primary | ICD-10-CM

## 2023-09-25 PROCEDURE — 97112 NEUROMUSCULAR REEDUCATION: CPT

## 2023-09-25 PROCEDURE — 97110 THERAPEUTIC EXERCISES: CPT

## 2023-09-25 NOTE — PROGRESS NOTES
Daily Note     Today's date: 2023  Patient name: Emilia Hernandez  : 1980  MRN: [id number]  Referring provider: CHRISTOPHER Saravia  Dx:   Encounter Diagnosis     ICD-10-CM    1. Muscle spasm  M62.838       2. Left-sided thoracic back pain, unspecified chronicity  M54.6                       Subjective: Patient reports she was doing a lot of lifting and moving over the weekend which caused soreness in her back. Does report overall some improvement. Objective: See treatment diary below      Assessment: Tolerated treatment well. Patient did not experience any worsening of symptoms throughout today's session. Patient displayed proper form and had a good recall/understanding of all exercises in her program.  Appropriate fatigue noted with exercises. Progressed with thoracic ext and rotation stretches with good tolerance. Patient would benefit from continued PT. Plan: Continue per plan of care. Progress treatment as tolerated.        Precautions: Hypothyroidism, Hypocalcemia, GERD, IBS-C, Chronic Migraine       Pertinent Findings:                                                                                                                                                     Test / Measure  2023   FOTO 52   Thoracic Mobility Very Limited   R UE  4+/5   Periscapular Endurance and Motor Control Poor         Manuals                                    Neuro Re-Ed        [email or web address] Press GTB  30x B #5  30x B  #5  30x B    Rows BTB  3"  2x10 Rotation + Row  #5  20x B      Prone Y-T-Is on physioball   #2  10x each #2  10x each 2# 10x each   Shoulder Extension Prone  #3 DB  20x  B/L Standing  #5  Unilateral  3x10 B Standing  BTB  Unilateral  3x10 B Standing  BTB  Unilateral  3x10 B Standing BTB  Unilateral 3x10 B   Ther Ex        UBE 3'/3'   Lvl 1  6' Retro   Lvl 2  4'/4   Lvl 2 4'/4   Lvl 2 4'/4'  lvl 2   Shoulder ER  #3 R  #2 L   3x10 B #3 B  3x10 #3 B  3x10 3# B  3x10 Repeated Thoracic Extension   With fulcrum  20x     Thoracic Protocol  1' eac 1' eac   4 positions     Bent-Over Foam Rollout  3x10  Standingat Wall  ytb  3x10 Standingat Wall  ytb  3x10   Standing at wall ytb 3x10   AAROM with Cane Flexion   #3 aw  20x #3 aw  20x 3# AW 20x 1  /2 foam roll mid t spine   Open book stretch     10" x10   Kneeling lat stretch w/ thoracic ext     10" x10                   Ther Activity                        Gait Training                        Modalities                           [id number]

## 2023-09-26 ENCOUNTER — TELEPHONE (OUTPATIENT)
Dept: PSYCHIATRY | Facility: CLINIC | Age: 43
End: 2023-09-26

## 2023-09-26 NOTE — TELEPHONE ENCOUNTER
Writer  that tomorrows appointment, 9/27, has been cancelled due to provider being out of office.  CBT-I appt has been rescheduled for [email or web address]

## 2023-09-27 ENCOUNTER — HOSPITAL ENCOUNTER (OUTPATIENT)
Dept: RADIOLOGY | Facility: IMAGING CENTER | Age: 43
Discharge: HOME | End: 2023-09-27
Payer: COMMERCIAL

## 2023-09-27 DIAGNOSIS — N94.6 DYSMENORRHEA: ICD-10-CM

## 2023-09-27 DIAGNOSIS — N93.9 ABNORMAL UTERINE BLEEDING (AUB): ICD-10-CM

## 2023-09-27 DIAGNOSIS — R10.2 PELVIC CRAMPING: ICD-10-CM

## 2023-09-27 PROCEDURE — 76830 TRANSVAGINAL US NON-OB: CPT

## 2023-09-27 PROCEDURE — 76856 US EXAM PELVIC COMPLETE: CPT

## 2023-09-28 ENCOUNTER — TELEPHONE (OUTPATIENT)
Age: 43
End: 2023-09-28

## 2023-09-28 DIAGNOSIS — N93.9 ABNORMAL UTERINE AND VAGINAL BLEEDING, UNSPECIFIED: Primary | ICD-10-CM

## 2023-09-28 NOTE — TELEPHONE ENCOUNTER
Contacted patient to review Pelvic ultrasound results including uterine septum and right ovarian cyst. Patient previously aware of septum and understand this would not be a cause of her pain or bleeding. Reviewed that cyst will likely resolve on its own over her next 1-2 cycles. Discussed recommendation for repeat ultrasound in 8-12 weeks per Dr. Jeannette Betts. Patient agreeable to repeat ultrasound but declines appointment at this time and would like to wait until after her ultrasound. Pelvic ultrasound ordered, patient aware to contact central scheduling.

## 2023-10-01 DIAGNOSIS — R20.2 PARESTHESIAS: ICD-10-CM

## 2023-10-02 ENCOUNTER — OFFICE VISIT (OUTPATIENT)
Dept: PHYSICAL THERAPY | Facility: REHABILITATION | Age: 43
End: 2023-10-02
Payer: COMMERCIAL

## 2023-10-02 ENCOUNTER — PATIENT MESSAGE (OUTPATIENT)
Dept: INTERNAL MEDICINE CLINIC | Facility: OTHER | Age: 43
End: 2023-10-02

## 2023-10-02 DIAGNOSIS — M62.838 MUSCLE SPASM: Primary | ICD-10-CM

## 2023-10-02 DIAGNOSIS — M54.6 LEFT-SIDED THORACIC BACK PAIN, UNSPECIFIED CHRONICITY: ICD-10-CM

## 2023-10-02 DIAGNOSIS — F51.01 PRIMARY INSOMNIA: Primary | ICD-10-CM

## 2023-10-02 PROCEDURE — 97110 THERAPEUTIC EXERCISES: CPT

## 2023-10-02 RX ORDER — CALCITRIOL 0.25 UG/1
CAPSULE, LIQUID FILLED ORAL
Qty: 180 CAPSULE | Refills: 0 | Status: SHIPPED | OUTPATIENT
Start: 2023-10-02

## 2023-10-02 NOTE — PROGRESS NOTES
Daily Note     Today's date: 10/2/2023  Patient name: Brittany Fuentes  : 1980  MRN: [id number]  Referring provider: CHRISTOPHER Rothman  Dx:   Encounter Diagnosis     ICD-10-CM    1. Muscle spasm  M62.838       2. Left-sided thoracic back pain, unspecified chronicity  M54.6            Start Time: 1715  Stop Time: 1800  Total time in clinic (min): 45 minutes    Subjective: Patient states last week was a more fatiguing week due to initiating of a group fitness classes 2x a week. Objective: See treatment diary below      Assessment: Patient tolerated treatment session well today with focus on functional strengthening of thoracic spine. Patient was challenged with progression of ER strengthening due to shoulder weakness. Patient reported no increase in pain with thoracic pain. Educated on addition of recreational exercise dosing and modification. Patient will continue to be appropriate for skilled outpatient physical therapy in order to address impairment. 1:1 with Ania Beal, PT, DPT for entirety of treatment session. Plan: Continue per plan of care. Progress treatment as tolerated.        Precautions: Hypothyroidism, Hypocalcemia, GERD, IBS-C, Chronic Migraine       Pertinent Findings:                                                                                                                                                     Test / Measure  2023   FOTO 52   Thoracic Mobility Very Limited   R UE  4+/5   Periscapular Endurance and Motor Control Poor         Manuals 8/21 8/28 9/11 9/18 9/25 10/2                                       Neuro Re-Ed         [place] Press GTB  30x B #5  30x B  #5  30x B  BTB  30x B/L   Rows BTB  3"  2x10 Rotation + Row  #5  20x B       Prone Y-T-Is on physioball   #2  10x each #2  10x each 2# 10x each    Shoulder Extension Prone  #3 DB  20x  B/L Standing  #5  Unilateral  3x10 B Standing  BTB  Unilateral  3x10 B Standing  BTB  Unilateral  3x10 B Standing BTB  Unilateral 3x10 B    Ther Ex         UBE 3'/3'   Lvl 1  6' Retro   Lvl 2  4'/4   Lvl 2 4'/4   Lvl 2 4'/4'  lvl 2 4'/4'  Lvl 3   Shoulder ER  #3 R  #2 L   3x10 B #3 B  3x10 #3 B  3x10 3# B  3x10 At 90 degrees   #2  2x10    Repeated Thoracic Extension   With fulcrum  20x      Thoracic Protocol  1' eac 1' eac   4 positions      Kneeling  Diagonal t/s Rotation      GTB   20x B   Thoracic Extension Press with Long TB Around Back      BTB  20x    Bent-Over Foam Rollout  3x10  Standingat Wall  ytb  3x10 Standingat Wall  ytb  3x10   Standing at wall ytb 3x10    AAROM with Cane Flexion   #3 aw  20x #3 aw  20x 3# AW 20x 1  /2 foam roll mid t spine    Open book stretch     10" x10    Kneeling lat stretch w/ thoracic ext     10" x10 With block iso   10" x 10                      Ther Activity                           Gait Training                           Modalities

## 2023-10-03 DIAGNOSIS — C73 PAPILLARY THYROID CARCINOMA (HCC): ICD-10-CM

## 2023-10-03 RX ORDER — ZOLPIDEM TARTRATE 5 MG/1
5 TABLET ORAL
Qty: 30 TABLET | Refills: 0 | Status: SHIPPED | OUTPATIENT
Start: 2023-10-03

## 2023-10-03 RX ORDER — LEVOTHYROXINE SODIUM 0.12 MG/1
125 TABLET ORAL DAILY
Qty: 30 TABLET | Refills: 3 | Status: SHIPPED | OUTPATIENT
Start: 2023-10-03

## 2023-10-04 ENCOUNTER — SOCIAL WORK (OUTPATIENT)
Dept: BEHAVIORAL/MENTAL HEALTH CLINIC | Facility: CLINIC | Age: 43
End: 2023-10-04
Payer: COMMERCIAL

## 2023-10-04 DIAGNOSIS — F51.01 PRIMARY INSOMNIA: Primary | ICD-10-CM

## 2023-10-04 PROCEDURE — 90832 PSYTX W PT 30 MINUTES: CPT | Performed by: COUNSELOR

## 2023-10-04 NOTE — PSYCH
Behavioral Health Psychotherapy Progress Note    Psychotherapy Provided: Individual Psychotherapy     1. Primary insomnia            Goals addressed in session: Goal 1     DATA: Patient reports she still has some daytime sleepiness but overall feels very good about progress. Numbers are good, no problems with adherence or compliance. New Sleep Plan is 11:00 to 6:00 with 7 hours of sleep opportunity. Sleep Efficiency  94%  Sleep Latency   11 min  Total Sleep Time  341 min  Wake After Sleep Onset 9 min  [place] SS   12  PHQ-9    10  SHAHZAD-7    9    New Sleep Plan  11:00-6:00    During this session, this clinician used the following therapeutic modalities: Cognitive Behavioral Therapy for Insomnia (CBT-I)    Substance Abuse was not addressed during this session. If the client is diagnosed with a co-occurring substance use disorder, please indicate any changes in the frequency or amount of use: n/a. Stage of change for addressing substance use diagnoses: No substance use/Not applicable    ASSESSMENT:  Gayle Dailey presents with a Euthymic/ normal mood. her affect is Normal range and intensity, which is congruent, with her mood and the content of the session. The client has made progress on their goals. Gayle Dailey presents with a none risk of suicide, none risk of self-harm, and none risk of harm to others. For any risk assessment that surpasses a "low" rating, a safety plan must be developed. A safety plan was indicated: no  If yes, describe in detail n/a    PLAN: Between sessions, Gayle Dailey will complete and return sleep diary. At the next session, the therapist will use Cognitive Behavioral Therapy for Insomnia (CBT-I) to address insomnia. Behavioral Health Treatment Plan and Discharge Planning: Gayle Dailey is aware of and agrees to continue to work on their treatment plan. They have identified and are working toward their discharge goals.  yes    Visit start and stop times:    10/04/23  Start Time: 0505  Stop Time: 0923  Total Visit Time: 30 minutes

## 2023-10-09 ENCOUNTER — OFFICE VISIT (OUTPATIENT)
Dept: PHYSICAL THERAPY | Facility: REHABILITATION | Age: 43
End: 2023-10-09
Payer: COMMERCIAL

## 2023-10-09 DIAGNOSIS — M54.6 LEFT-SIDED THORACIC BACK PAIN, UNSPECIFIED CHRONICITY: ICD-10-CM

## 2023-10-09 DIAGNOSIS — M62.838 MUSCLE SPASM: Primary | ICD-10-CM

## 2023-10-09 PROCEDURE — 97110 THERAPEUTIC EXERCISES: CPT

## 2023-10-09 PROCEDURE — 97112 NEUROMUSCULAR REEDUCATION: CPT

## 2023-10-09 NOTE — PROGRESS NOTES
Daily Note     Today's date: 10/9/2023  Patient name: Antelmo Rivas  : 1980  MRN: [id number]  Referring provider: CHRISTOPHER Wells  Dx:   Encounter Diagnosis     ICD-10-CM    1. Muscle spasm  M62.838       2. Left-sided thoracic back pain, unspecified chronicity  M54.6            Start Time: 1747  Stop Time: 1830  Total time in clinic (min): 43 minutes    Subjective: Patient states she continues to feel good when performing PT and gym exercises. Patient gets left-sided thoracic throbbing pain when performing any thoracic flexion, relieves with extension, at times gets a sensation of "something is tapping."     Objective: See treatment diary below      Assessment: Patient tolerated treatment session well today with focus on functional strengthening and thoracic mobility. Reassessed thoracic rotation, she had limited R rotation. Performed rib mobilization with thoracic rotation and PT OP - had good carryover and able to gain 8-10 degrees of R rotation. Patient was significantly challenged with quadruped thoracic extension with physioball due to UE/core strength deficits and poor neuromuscular control. Initiated trunk engagement with LE work, patient performed well. Patient will continue to be appropriate for skilled outpatient physical therapy in order to address impairment. 1:1 with Sergio Sullivan, PT, DPT for entirety of treatment session. Plan: Continue per plan of care. Progress treatment as tolerated.        Precautions: Hypothyroidism, Hypocalcemia, GERD, IBS-C, Chronic Migraine       Pertinent Findings:                                                                                                                                                     Test / Measure  2023   FOTO 52   Thoracic Mobility Very Limited   R UE  4+/5   Periscapular Endurance and Motor Control Poor         Manuals 8/21 8/28 9/11 9/18 9/25 10/2 10/9                                           Neuro Re-Ed [place]loff Press GTB  30x B #5  30x B  #5  30x B  BTB  30x B/L Staggered Stance with Contralateral March  #5  20x B     Rows BTB  3"  2x10 Rotation + Row  #5  20x B        Prone Y-T-Is on physioball   #2  10x each #2  10x each 2# 10x each     Pball Kneeling Thoracic Roll-Outs        2x10   Thoracic Rotation with PT Compression and Rib Mobilization        AR  3 breaths at end-range  2-3 rounds     AROM thoracic rotation 5x   Shoulder Extension Prone  #3 DB  20x  B/L Standing  #5  Unilateral  3x10 B Standing  BTB  Unilateral  3x10 B Standing  BTB  Unilateral  3x10 B Standing BTB  Unilateral 3x10 B     Ther Ex          UBE 3'/3'   Lvl 1  6' Retro   Lvl 2  4'/4   Lvl 2 4'/4   Lvl 2 4'/4'  lvl 2 4'/4'  Lvl 3 4'/4'   Lvl 4   Shoulder ER  #3 R  #2 L   3x10 B #3 B  3x10 #3 B  3x10 3# B  3x10 At 90 degrees   #2  2x10     Scapular A-T-Y-Is       5 reps each  2 rounds   Shoulder Flexion with Band Pull-Apart         2x10   Repeated Thoracic Extension   With fulcrum  20x       Thoracic Protocol  1' eac 1' eac   4 positions       Kneeling  Diagonal t/s Rotation      GTB   20x B    Thoracic Extension Press with Long TB Around Back      BTB  20x     Bent-Over Foam Rollout  3x10  Standingat Wall  ytb  3x10 Standingat Wall  ytb  3x10   Standing at wall ytb 3x10     AAROM with Cane Flexion   #3 aw  20x #3 aw  20x 3# AW 20x 1  /2 foam roll mid t spine     Open book stretch     10" x10     Kneeling lat stretch w/ thoracic ext     10" x10 With block iso   10" x 10                         Ther Activity                              Gait Training                              Modalities

## 2023-10-11 ENCOUNTER — DOCUMENTATION (OUTPATIENT)
Dept: PSYCHIATRY | Facility: CLINIC | Age: 43
End: 2023-10-11

## 2023-10-11 ENCOUNTER — SOCIAL WORK (OUTPATIENT)
Dept: BEHAVIORAL/MENTAL HEALTH CLINIC | Facility: CLINIC | Age: 43
End: 2023-10-11
Payer: COMMERCIAL

## 2023-10-11 DIAGNOSIS — F51.01 PRIMARY INSOMNIA: Primary | ICD-10-CM

## 2023-10-11 PROCEDURE — 90832 PSYTX W PT 30 MINUTES: CPT | Performed by: COUNSELOR

## 2023-10-11 NOTE — PSYCH
Behavioral Health Psychotherapy Progress Note    Psychotherapy Provided: Individual Psychotherapy     1. Primary insomnia            Goals addressed in session: Goal 1     DATA: Patient reports getting good sleep consistently. Her fatigue is higher on the weekend but she believes that she is not busy enough and has more time to think about being tired. Patient is discharged at this time. Weekly average data are:    Sleep Efficiency  95% up from 58%  Sleep Latency   13 min down from 71 min  Total Sleep Time  378 min up from 283 min  Wake After Sleep Onset 7 min down from 158 min  Bruno SS   12 down from high of 17  PHQ-9    12 down from high of 16  SHAHZAD-7    14    Explored relapse plan with patient and she feels she has her insomnia under control. She recently decreased her sleep medication but still had good sleep. During this session, this clinician used the following therapeutic modalities: Cognitive Behavioral Therapy    Substance Abuse was not addressed during this session. If the client is diagnosed with a co-occurring substance use disorder, please indicate any changes in the frequency or amount of use: n/a. Stage of change for addressing substance use diagnoses: No substance use/Not applicable    ASSESSMENT:  Ulises Butcher presents with a Euthymic/ normal mood. her affect is Normal range and intensity, which is congruent, with her mood and the content of the session. The client has made progress on their goals. Ulises Butcher presents with a none risk of suicide, none risk of self-harm, and none risk of harm to others. For any risk assessment that surpasses a "low" rating, a safety plan must be developed. A safety plan was indicated: no  If yes, describe in detail n/a    PLAN: Between sessions, Ulises Butcher will return to treatment if needed in the future. Behavioral Health Treatment Plan and Discharge Planning: Ulises Butcher is aware of and agrees to continue to work on their treatment plan.  They have identified and are working toward their discharge goals.  yes    Visit start and stop times:    10/11/23  Start Time: 0500  Stop Time: 7649  Total Visit Time: 17 minutes

## 2023-10-11 NOTE — PROGRESS NOTES
Psychotherapy Discharge Summary    Preferred Name: Zoraida Santillan  YOB: 1980    Admission date to psychotherapy: 8/23/23    Referred by: Dr. Kobe Hodges    Presenting Problem: primary insomnia    Course of treatment included :  7 CBT-I sessions    Progress/Outcome of Treatment Goals (brief summary of course of treatment) met goals    Treatment Complications (if any): none    Treatment Progress: excellent    Current [facility] Provider: HENRY Johnston    Discharge Medications include: see medical chart    Discharge Date: 10/11/23    Discharge Diagnosis: none    Criteria for Discharge: completed treatment goals and objectives and is no longer in need of services    Aftercare recommendations include (include specific referral names and phone numbers, if appropriate): return to treatment if needed    Prognosis: excellent

## 2023-10-13 ENCOUNTER — TELEPHONE (OUTPATIENT)
Dept: PSYCHIATRY | Facility: CLINIC | Age: 43
End: 2023-10-13

## 2023-10-13 NOTE — TELEPHONE ENCOUNTER
Patient is calling regarding cancelling an appointment.     Date/Time: 10/18/23 at 5:00p & 10/25/23 at 3:00 pm    Reason: Patient stated she finished the program.    Patient was rescheduled: YES [] NO [x]  If yes, when was Patient reschedule for:     Patient requesting call back to reschedule: YES [] NO [x]

## 2023-10-16 ENCOUNTER — TELEPHONE (OUTPATIENT)
Dept: PSYCHIATRY | Facility: CLINIC | Age: 43
End: 2023-10-16

## 2023-10-16 ENCOUNTER — OFFICE VISIT (OUTPATIENT)
Dept: PHYSICAL THERAPY | Facility: REHABILITATION | Age: 43
End: 2023-10-16
Payer: COMMERCIAL

## 2023-10-16 DIAGNOSIS — M62.838 MUSCLE SPASM: Primary | ICD-10-CM

## 2023-10-16 DIAGNOSIS — M54.6 LEFT-SIDED THORACIC BACK PAIN, UNSPECIFIED CHRONICITY: ICD-10-CM

## 2023-10-16 PROCEDURE — 97110 THERAPEUTIC EXERCISES: CPT

## 2023-10-16 NOTE — PROGRESS NOTES
Daily Note     Today's date: 10/16/2023  Patient name: Shaheen Mccollum  : 1980  MRN: [id number]  Referring provider: CHRISTOPHER Mcclellan  Dx:   Encounter Diagnosis     ICD-10-CM    1. Muscle spasm  M62.838       2. 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 Patient notes most discomfort when she is either relaxing, perform certain day to day and/or sitting for long periods when at work. Objective: See treatment diary below      Assessment: Patient tolerated treatment session well today with focus on progressing in her functional thoracic strength. Patient demonstrated good improvement and ROM gains with her thoracic rotation today, was able to be slightly loaded with a UE D2 PNF pattern. She notes when she activates her lower trap is when she "feels" the throbbing sensation the most. Patient reported some left-sided discomfort with neutral wall slide, however, states wasn't as painful as it was in the past. Educated her on some prolonged thoracic extension-biased positions she may trial to see if reduces symptoms when at work. Patient will continue to be appropriate for skilled outpatient physical therapy in order to address impairment. 1:1 with Juarez Calderon PT, DPT for entirety of treatment session. Plan: Continue per plan of care. Progress treatment as tolerated.        Precautions: Hypothyroidism, Hypocalcemia, GERD, IBS-C, Chronic Migraine       Pertinent Findings:                                                                                                                                                     Test / Measure  2023   FOTO 52   Thoracic Mobility Very Limited   R UE  4+/5   Periscapular Endurance and Motor Control Poor         Manuals  9/25 10/2 10/9 10/16                                               Neuro Re-Ed           [place] Press GTB  30x B #5  30x B  #5  30x B  BTB  30x B/L Staggered Stance with Contralateral March  #5  20x B   Staggered Stance with Contralateral March  #5  20x B   Rows BTB  3"  2x10 Rotation + Row  #5  20x B         Prone Y-T-Is on physioball   #2  10x each #2  10x each 2# 10x each      Pball Kneeling Thoracic Roll-Outs        2x10    Thoracic Rotation with PT Compression and Rib Mobilization        AR  3 breaths at end-range  2-3 rounds     AROM thoracic rotation 5x    Shoulder Extension Prone  #3 DB  20x  B/L Standing  #5  Unilateral  3x10 B Standing  BTB  Unilateral  3x10 B Standing  BTB  Unilateral  3x10 B Standing BTB  Unilateral 3x10 B      Ther Ex           UBE 3'/3'   Lvl 1  6' Retro   Lvl 2  4'/4   Lvl 2 4'/4   Lvl 2 4'/4'  lvl 2 4'/4'  Lvl 3 4'/4'   Lvl 4 4'/4' L4   Shoulder ER  #3 R  #2 L   3x10 B #3 B  3x10 #3 B  3x10 3# B  3x10 At 90 degrees   #2  2x10      Scapular A-T-Y-Is       5 reps each  2 rounds YTB  5 reps each  2 rounds   Shoulder Flexion with Band Pull-Apart       YTB  2x10 YTB  20,15   Neutral Wall Slides        20x   Kesier D2 Flexion/Extension         #4  20x B/L   Repeated Thoracic Extension   With fulcrum  20x        Thoracic Protocol  1' eac 1' eac   4 positions        Kneeling  Diagonal t/s Rotation      GTB   20x B     Thoracic Extension Press with Long TB Around Back      BTB  20x      Bent-Over Foam Rollout  3x10  Standingat Wall  ytb  3x10 Standingat Wall  ytb  3x10   Standing at wall ytb 3x10      AAROM with Cane Flexion   #3 aw  20x #3 aw  20x 3# AW 20x 1  /2 foam roll mid t spine      Open book stretch     10" x10      Kneeling lat stretch w/ thoracic ext     10" x10 With block iso   10" x 10                            Ther Activity                                 Gait Training                                 Modalities

## 2023-10-18 NOTE — TELEPHONE ENCOUNTER
DISCHARGE LETTER for [facility] (certified and regular) placed in outgoing mail on 10/18/23.     Article #:  [id number] [address] [place] [address] Street    Address:  [address] [place] [phone]

## 2023-10-23 ENCOUNTER — OFFICE VISIT (OUTPATIENT)
Dept: PHYSICAL THERAPY | Facility: REHABILITATION | Age: 43
End: 2023-10-23
Payer: COMMERCIAL

## 2023-10-23 DIAGNOSIS — M54.6 LEFT-SIDED THORACIC BACK PAIN, UNSPECIFIED CHRONICITY: ICD-10-CM

## 2023-10-23 DIAGNOSIS — M62.838 MUSCLE SPASM: Primary | ICD-10-CM

## 2023-10-23 PROCEDURE — 97112 NEUROMUSCULAR REEDUCATION: CPT

## 2023-10-23 PROCEDURE — 97110 THERAPEUTIC EXERCISES: CPT

## 2023-10-23 NOTE — PROGRESS NOTES
Daily Note     Today's date: 10/23/2023  Patient name: Marcial Mariscal  : 1980  MRN: [id number]  Referring provider: CHRISTOPHER Islas  Dx:   Encounter Diagnosis     ICD-10-CM    1. Muscle spasm  M62.838       2. Left-sided thoracic back pain, unspecified chronicity  M54.6                Start Time: 1630  Stop Time: 1715  Total time in clinic (min): 45 minutes    Subjective: Patient presets today with no pain. 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 Objective: See treatment diary below      Assessment: Patient tolerated treatment session well today. She demonstrated slightly limited mobility with left-sided windmill exercise due to restricted thoracic rotation. She improved with increase duration. Challenged with OH presses secondary to fatigue at the end of the session. We introduced pain neuroscience education and she may benefit from further discussions on this topic to have better understanding of her pain. Plan to give her further readings on this topic. Patient will continue to be appropriate for skilled outpatient physical therapy in order to address impairment. 1:1 with Reyes Cage, PT, DPT for entirety of treatment session. Plan: Continue per plan of care. Progress treatment as tolerated.        Precautions: Hypothyroidism, Hypocalcemia, GERD, IBS-C, Chronic Migraine       Pertinent Findings:                                                                                                                                                     Test / Measure  2023   FOTO 52   Thoracic Mobility Very Limited   R UE  4+/5   Periscapular Endurance and Motor Control Poor         Manuals 9/25 10/2 10/9 10/16 10/23                                   Neuro Re-Ed        TruQu Press  BTB  30x B/L Staggered Stance with Contralateral March  #5  20x B   Staggered Stance with Contralateral March  #5  20x B    Rows        Prone Y-T-Is on iLost 2# 10x each       Pball Kneeling Thoracic Roll-Outs    2x10     Thoracic Rotation with PT Compression and Rib Mobilization    AR  3 breaths at end-range  2-3 rounds     AROM thoracic rotation 5x     Shoulder Extension Standing BTB  Unilateral 3x10 B       Ther Ex        UBE 4'/4'  lvl 2 4'/4'  Lvl 3 4'/4'   Lvl 4 4'/4' L4 4'/4' L4   Shoulder ER 3# B  3x10 At 90 degrees   #2  2x10       Scapular A-T-Y-Is   5 reps each  2 rounds YTB  5 reps each  2 rounds    Shoulder Flexion with Band Pull-Apart   YTB  2x10 YTB  20,15    Neutral Wall Slides    20x    Kesier Chops Flexion/Extension     #4  20x B/L #4  20x B/L   Rows      #10  20x   Thoracic Extension Walk Outs     20x   Unilateral Shoulder Extension      #8   2x8   OH Press with Cane + Loop Band      Black Loop   10x   Kneeling  Diagonal t/s Rotation  GTB   20x B      Windmill      #10 x 10  #4 DB x 4   Thoracic Extension Press with Long TB Around Back  BTB  20x       Bent-Over Foam Rollout Standing at wall ytb 3x10       AAROM with Cane Flexion 3# AW 20x 1  /2 foam roll mid t spine       Open book stretch 10" x10       Kneeling lat stretch w/ thoracic ext 10" x10 With block iso   10" x 10                       Ther Activity                        Gait Training                        Modalities

## 2023-10-24 NOTE — TELEPHONE ENCOUNTER
Certificate for the Discharge Letter was signed/received on 10/24/2023. A copy has been scanned into [employer].

## 2023-10-25 ENCOUNTER — TELEPHONE (OUTPATIENT)
Dept: PSYCHIATRY | Facility: CLINIC | Age: 43
End: 2023-10-25

## 2023-10-25 NOTE — TELEPHONE ENCOUNTER
Shaheen Mccollum and/or Patient requested a call back to discuss KAREN appt. They can be reached at P# [phone]. Thank you.

## 2023-10-30 ENCOUNTER — OFFICE VISIT (OUTPATIENT)
Dept: PHYSICAL THERAPY | Facility: REHABILITATION | Age: 43
End: 2023-10-30
Payer: COMMERCIAL

## 2023-10-30 DIAGNOSIS — M54.6 LEFT-SIDED THORACIC BACK PAIN, UNSPECIFIED CHRONICITY: ICD-10-CM

## 2023-10-30 DIAGNOSIS — F51.01 PRIMARY INSOMNIA: ICD-10-CM

## 2023-10-30 DIAGNOSIS — M62.838 MUSCLE SPASM: Primary | ICD-10-CM

## 2023-10-30 PROCEDURE — 97112 NEUROMUSCULAR REEDUCATION: CPT

## 2023-10-30 PROCEDURE — 97110 THERAPEUTIC EXERCISES: CPT

## 2023-10-30 NOTE — PROGRESS NOTES
Daily Note     Today's date: 10/30/2023  Patient name: Guerline Greenberg  : 1980  MRN: [id number]  Referring provider: CHRISTOPHER Schneider  Dx:   Encounter Diagnosis     ICD-10-CM    1. Muscle spasm  M62.838       2. Left-sided thoracic back pain, unspecified chronicity  M54.6                  Start Time: 1715  Stop Time: 1815  Total time in clinic (min): 60 minutes    Subjective: Patient states this past Saturday her back was significantly bothering her, unknown of cause. Patient continues to have no difficulty with work tasks. Most pain happens in the second-half of the day. Patient still notes consistent sensation of tapping and tingling along right-side of spine, describes it as very localized. Objective: See treatment diary below      Assessment: Patient tolerated treatment session well today. Patient was able to be further challenged with co-contraction exercises of periscapular musculature. Patient's thoracic pain was most provoked with OH lifting today. We continued the discussion of pain neuroscience education and the impact of mindfulness in regards to overcoming chronic pain. Patient will continue to be appropriate for skilled outpatient physical therapy in order to address impairment. 1:1 with Jessica Rubio, PT, DPT for entirety of treatment session. Plan: Continue per plan of care. Progress treatment as tolerated.        Precautions: Hypothyroidism, Hypocalcemia, GERD, IBS-C, Chronic Migraine       Pertinent Findings:                                                                                                                                                     Test / Measure  2023   FOTO 52   Thoracic Mobility Very Limited   R UE  4+/5   Periscapular Endurance and Motor Control Poor         Manuals  10/2 10/9 10/16 10/23 10/30                                       Neuro Re-Ed         [place] Press  BTB  30x B/L Staggered Stance with Contralateral March  #5  20x B   Staggered Stance with Contralateral March  #5  20x B  PNE AR '15   Rows         Prone Y-T-Is on physioball 2# 10x each        Pball Kneeling Thoracic Roll-Outs    2x10      Thoracic Rotation with PT Compression and Rib Mobilization    AR  3 breaths at end-range  2-3 rounds     AROM thoracic rotation 5x      Shoulder Extension Standing BTB  Unilateral 3x10 B        Ther Ex         UBE 4'/4'  lvl 2 4'/4'  Lvl 3 4'/4'   Lvl 4 4'/4' L4 4'/4' L4 4'/4' L4   Shoulder ER 3# B  3x10 At 90 degrees   #2  2x10        Scapular A-T-Y-Is   5 reps each  2 rounds YTB  5 reps each  2 rounds     Shoulder Flexion with Band Pull-Apart   YTB  2x10 YTB  20,15     Neutral Wall Slides    20x     Kesier Chops Flexion/Extension     #4  20x B/L #4  20x B/L    Rows      #10  20x #14  3x10   Windshield Wipe with Isometric March      1kg at knee  2x10   B/L   Thoracic Extension Walk Outs     20x 10x   Unilateral Shoulder Extension      #8   2x8 #6  + 180   10x   OH Press with Cane + Loop Band      Black Loop   10x Black   Loop  2x10     Kneeling  Diagonal t/s Rotation  GTB   20x B       Windmill      #10 x 10  #4 DB x 4 #4 DB  X10 B/L   Thoracic Extension Press with Long TB Around Back  BTB  20x        Bent-Over Foam Rollout Standing at wall ytb 3x10        AAROM with Cane Flexion 3# AW 20x 1  /2 foam roll mid t spine        Open book stretch 10" x10        Kneeling lat stretch w/ thoracic ext 10" x10 With block iso   10" x 10                          Ther Activity                           Gait Training                           Modalities

## 2023-10-31 RX ORDER — ZOLPIDEM TARTRATE 5 MG/1
5 TABLET ORAL
Qty: 30 TABLET | Refills: 0 | Status: SHIPPED | OUTPATIENT
Start: 2023-10-31

## 2023-11-06 ENCOUNTER — OFFICE VISIT (OUTPATIENT)
Dept: GASTROENTEROLOGY | Facility: CLINIC | Age: 43
End: 2023-11-06
Payer: COMMERCIAL

## 2023-11-06 VITALS
HEART RATE: 64 BPM | DIASTOLIC BLOOD PRESSURE: 60 MMHG | SYSTOLIC BLOOD PRESSURE: 98 MMHG | WEIGHT: 150 LBS | HEIGHT: 64 IN | BODY MASS INDEX: 25.61 KG/M2

## 2023-11-06 DIAGNOSIS — K21.9 GASTROESOPHAGEAL REFLUX DISEASE, UNSPECIFIED WHETHER ESOPHAGITIS PRESENT: ICD-10-CM

## 2023-11-06 DIAGNOSIS — K58.1 IRRITABLE BOWEL SYNDROME WITH CONSTIPATION: Primary | ICD-10-CM

## 2023-11-06 PROCEDURE — 99214 OFFICE O/P EST MOD 30 MIN: CPT | Performed by: INTERNAL MEDICINE

## 2023-11-06 RX ORDER — PLECANATIDE 3 MG/1
3 TABLET ORAL DAILY
Qty: 30 TABLET | Refills: 1 | Status: SHIPPED | OUTPATIENT
Start: 2023-11-06

## 2023-11-08 ENCOUNTER — OFFICE VISIT (OUTPATIENT)
Dept: PHYSICAL THERAPY | Facility: REHABILITATION | Age: 43
End: 2023-11-08
Payer: COMMERCIAL

## 2023-11-08 DIAGNOSIS — M54.6 LEFT-SIDED THORACIC BACK PAIN, UNSPECIFIED CHRONICITY: ICD-10-CM

## 2023-11-08 DIAGNOSIS — M62.838 MUSCLE SPASM: Primary | ICD-10-CM

## 2023-11-08 PROCEDURE — 97164 PT RE-EVAL EST PLAN CARE: CPT

## 2023-11-08 PROCEDURE — 97110 THERAPEUTIC EXERCISES: CPT

## 2023-11-08 NOTE — PROGRESS NOTES
PT Discharge    Today's date: 2023  Patient name: Lorenza Chaves  : 1980  MRN: [id number]  Referring provider: CHRISTOPHER Bob  Dx:   Encounter Diagnosis     ICD-10-CM    1. Muscle spasm  M62.838       2. Left-sided thoracic back pain, unspecified chronicity  M54.6           Start Time: 1730  Stop Time: 1820  Total time in clinic (min): 50 minutes    Assessment  Assessment details: Lorenza Chaves is a 38year old female who has been participating in skilled physical therapy over the last 3 months with a referred diagnosis of muscle spasm and left-sided thoracic pain from HENRY Bob. Upon further clinical re-testing, patient demonstrated significant improvements in her UE activity tolerance and strength, thoracic mobility, periscapular motor control, and postural endurance. She is currently performing WNL for thoracic ROM and shoulder strength with reporting no pain as she originally had at initial evaluation. Due to these improvement in these deficits, patient has now been able have increase tolerance to prolonged positions for her occupation and ability to maximally participate in recreational exercise to promote health and wellness. She was able to perform and be educated on advance thoracic mobility exercise to continue to target residual mobility deficits and end-range discomfort. At this time, she has met mostly all short-term and long-term goals with unattained goals being addressed with HEP. Due to patient having no further concerns regarding her current presentation and pleased with her progress, patient will appropriate for discharge at this time. Discussed if pain worsens or deficits return with upcoming change in full time job, she may return to me for a re-evaluation. Patient verbally agreeing with plan.      Impairments: abnormal muscle tone and poor posture     Symptom irritability: lowUnderstanding of Dx/Px/POC: good   Prognosis: good    Goals  In 4 weeks, the patient will:  1. Pt will report at least a 25% reduction in subjective pain complaints/symptoms to better manage ADLs and household chores. MET  2. Pt will have atleast half a grade improvement in her UE MMT MET  3. Pt independent with initial HEP, rationale, technique and frequency, for ROM and pain control. MET  4. Pt will gain an additional 5-10 degrees in her thoracic AROM  MET  5. Pt will begin to return to light/gradual strength training MET      Long Term Goals: In 10+ weeks, the patient will:  1. Pt will have thoracic mobility and UE strength WNL bilaterally MET  2. FOTO to greater than predicted value. MET  3. Independent with comprehensive HEP upon discharge. MET  4. Pt will be able to perform ADLs, iADLS, and household duties with minimal restriction indicating return to PLOF. MET  5. Pt independent with rationale, technique and plan for performance of advanced HEP to ensure independent self-management of symptoms upon discharge. MET  6. Pt will return to max participation in strength training program MET  7. Pt report no increase thoracic pain with prolonged sitting NOT MET - potentially will improve with HEP      Plan  Plan details: Discharged  Patient would benefit from: skilled physical therapy  Referral necessary: No  Planned therapy interventions: home exercise program, graded activity, graded exercise and graded motor  Treatment plan discussed with: patient    Subjective Evaluation    History of Present Illness  Mechanism of injury: Patient presents for her last day after participating consistently in skilled rehab due to chronic thoracic pain and muscle spasms. Patient states she has been doing better and notes gradual improvements in her symptoms. She has returned after a year + hiatus to a consistent gym routine at a group fitness establishment. She notes majority of her thoracic pain increases after prolonged sitting in an altered posture such as work duties and with certain exercises.  She notes no spasms since PT, mostly discomfort. She states she is pleased with her progress and knows with time and consistent HEP things will continue to improve. Not a recurrent problem   Quality of life: good    Pain  Current pain ratin  At best pain ratin  At worst pain ratin  Location: Left Sided Thoracic  Quality: throbbing and discomfort  Aggravating factors: lifting and keyboarding  Progression: improved    Social Support    Employment status: working  Exercise history: Gym and HEP    Treatments  Current treatment: massage, medication and physical therapy    Objective    Thoracic AROM  Flexion: WNL  Extension: WNL no P! Rotation: WNL B/L, slightly more with left, decrease discomfort with right   Side-Flexion: WNL      Strength: MMT revealed the following findings. Joint Motion Right: 23 Left 23 Right 23 Left 23   Shoulder Elevation 5/5 5/5 5/5 5/5   Sh. Flexion 4+/5 P! 5/5 P! 5/5 5/5   Sh. Abduction 4+/5 5/5 5/5 5/5   Sh. ER 4/5 P! 5/5 5/5 5/5   Sh. IR 5/5 5/5 5/5 5/5   Shoulder extension 5/5 P! 5/5 P! 5/5 5/5          Precautions: Hypothyroidism, Hypocalcemia, GERD, IBS-C, Chronic Migraine      Access Code: [email or web address]  URL: [email or web address]  Date: 2023  Prepared by: Aidan Caba    Exercises  - Seated Windmill Trunk Rotation Stretch  - 1 x daily - 7 x weekly - 3 sets - 10 reps  - Cat Cow to Child's Pose  - 1 x daily - 7 x weekly - 2 sets - 10 reps  - Quadruped Thoracic Rotation Full Range with Hand on Neck  - 1 x daily - 7 x weekly - 2 sets - 10 reps  - Prone Chest Stretch on Chair  - 1 x daily - 7 x weekly - 3-4 sets - 1 reps - 30 seconds hold  - Quadruped Thoracic Spine Extension  - 1 x daily - 7 x weekly - 1 sets - 10 reps - 5 second hold  - Seated Thoracic Self-Mobilization  - 1 x daily - 7 x weekly - 2 sets - 10 reps    Manuals 10/2 10/9 10/16 10/23 10/30 11/8                                                               Neuro Re-Ed Payloff Press BTB  30x B/L Staggered Stance with Contralateral March  #5  20x B    Staggered Stance with Contralateral March  #5  20x B   PNE AR '15    Rows              Prone Y-T-Is on physioball              Pball Kneeling Thoracic Roll-Outs    2x10          Thoracic Rotation with PT Compression and Rib Mobilization    AR  3 breaths at end-range  2-3 rounds      AROM thoracic rotation 5x          Shoulder Extension              Ther Ex              UBE 4'/4'  Lvl 3 4'/4'   Lvl 4 4'/4' L4 4'/4' L4 4'/4' L4 4'/4' L2   Shoulder ER At 90 degrees   #2  2x10             Scapular A-T-Y-Is   5 reps each  2 rounds YTB  5 reps each  2 rounds        Shoulder Flexion with Band Pull-Apart   YTB  2x10 YTB  20,15        Neutral Wall Slides     20x        Kesier Chops Flexion/Extension      #4  20x B/L #4  20x B/L      Rows        #10  20x #14  3x10    Windshield Wipe with Isometric March         1kg at knee  2x10   B/L    Thoracic Extension Walk Outs       20x 10x    Unilateral Shoulder Extension        #8   2x8 #6  + 180   10x    OH Press with Cane + Loop Band        Black Loop   10x Black   Loop  2x10       Kneeling  Diagonal t/s Rotation GTB   20x B            Windmill        #10 x 10  #4 DB x 4 #4 DB  X10 B/L    Thoracic Extension Press with Long TB Around Back BTB  20x             Bent-Over Foam Rollout              AAROM with Cane Flexion              Open book stretch              Kneeling lat stretch w/ thoracic ext With block iso   10" x 10                                           Ther Activity                                            Gait Training                                            Modalities

## 2023-11-08 NOTE — PROGRESS NOTES
[facility] - Outpatient Consultation  Bennett Heimlich Bem 41 y.o. female MRN: [id number]  Encounter: [id number]          ASSESSMENT AND PLAN:      1. Irritable bowel syndrome with constipation  - Plecanatide (Trulance) 3 MG TABS; Take 3 mg by mouth in the morning  Dispense: 30 tablet; Refill: 1  2. Gastroesophageal reflux disease, unspecified whether esophagitis present    61-year-old female presenting for follow-up regarding chronic constipation likely secondary to irritable bowel syndrome as well as GERD. Given incomplete response to high-dose of Amitiza, will plan for trial of Trulance. Discussed that additional laxatives can be used for synergistic effect, may be reasonable to add MiraLAX or magnesium in the future. EGD and colonoscopy in 2021 overall unremarkable/negative structural abnormalities contributing to constipation. Can continue PPI for GERD    Follow-up in 3 to 4 months    ______________________________________________________________________    HPI:  61-year-old female with GERD, IBS-C, chronic migraines, papillary thyroid cancer status post thyroidectomy, presenting for follow-up regarding chronic GI complaints    During her last visit there was an attempt to get Leory Fluke approved but unfortunately this was not covered. Continued on Amitiza. She reports being able to have bowel movement but this is only occurring after every several days. She would ideally like to have more frequent stools although does feel that this is somewhat of an improvement. Summary of presenting illness:  Initial recent appointment in March 2023, the pt says that she was doing fine on omeprzole 20 mg and linzess daily however she was recently found to have thyroid cancer and underwent thyroid removal. She had subsequent hypocalcemia, so she was started on calcium supple,ments.  Since being on calcium, the linzess is no longer helping and she is only moving her bowels once every week or so. She says this causes her to get very bloated, has increased flatulence and gas and will eventually have lower abdominal discomfort. She notes her omeprazole was stopped when she was admitted so when she was d/c home, she started OTC omeprazole (she suspects was 10 mg) which is not alleviate her reflux anymore. She says the higher doses of omeprazole "Always helped" in the past.  Otherwise she denies unintentional weight loss, fevers, chills, night sweats, vomiting, bloody or black BMs. Non-related to GI: the pt complains of blurry vision that comes and goes a few times/week, brain fog in that she feels "in a haze" constantly, and short-term memory loss. She also notes that whenever she goes to take a deep breath, she gets short of breath and has a dry cough after. Pt says these all started after her thyroidectomy. REVIEW OF SYSTEMS:    CONSTITUTIONAL: Denies any fever, chills, rigors, and weight loss. HEENT: Denies odynophagia, tinnitus  CARDIOVASCULAR: No chest pain or palpitations. RESPIRATORY: Denies any cough, hemoptysis, shortness of breath or dyspnea on exertion. GASTROINTESTINAL: As noted in the History of Present Illness. GENITOURINARY: No problems with urination. Denies any hematuria or dysuria. NEUROLOGIC: No dizziness or vertigo, denies headaches. MUSCULOSKELETAL: Denies any muscle or joint pain. SKIN: Denies skin rashes or itching. ENDOCRINE:  Denies intolerance to heat or cold. PSYCHOSOCIAL: Denies depression or anxiety. Denies any recent memory loss.        Historical Information   Past Medical History:   Diagnosis Date   • Allergic    • Anxiety    • Cancer ([address] [facility]) 2/1/2023    Thyroid- Papillary carcinoma   • COVID-19 01/2022    mild s/s-not hospitalized, not vaccinated   • GERD (gastroesophageal reflux disease)    • Headache(784.0)    • History of IBS     with constipation   • Ingrown toenail    • Irritable bowel syndrome    • Migraines     Chronic per pt   • Plantar fasciitis    • Thyroid carcinoma ([address])    • Varicella    • Venereal wart 08/07/2014     Past Surgical History:   Procedure Laterality Date   • CERVICAL BIOPSY  W/ LOOP ELECTRODE EXCISION  2017   • COLONOSCOPY     • NASAL SEPTUM SURGERY     • AZ OSTEOT W/WO LNGTH SHRT/CORRJ 1ST METAR Left 12/30/2022    Procedure: OSTEOTOMY METATARSAL 1st;  Surgeon: Esther Brooks DPM;  Location: [facility];  Service: Podiatry   • THYROIDECTOMY N/A 2/24/2023    Procedure: TOTAL THYROIDECTOMY;  Surgeon: Edith Bowden MD;  Location: [facility];  Service: Surgical Oncology   • TONSILLECTOMY     • UPPER GASTROINTESTINAL ENDOSCOPY     • US GUIDED THYROID BIOPSY  1/31/2023   • WISDOM TOOTH EXTRACTION       Social History   Social History     Substance and Sexual Activity   Alcohol Use Yes   • Alcohol/week: 2.0 standard drinks of alcohol   • Types: 2 Glasses of wine per week    Comment: 2 glasses wine a week     Social History     Substance and Sexual Activity   Drug Use Never     Social History     Tobacco Use   Smoking Status Never   Smokeless Tobacco Never     Family History   Problem Relation Age of Onset   • Hypertension Mother    • Arthritis Mother    • Thyroid disease Mother    • Osteoarthritis Mother    • Transient ischemic attack Father    • Hypertension Father    • Stroke Father    • Vision loss Father    • Rashes / Skin problems Sister         Shingles   • Migraines Sister    • No Known Problems Sister    • Uterine cancer Maternal Grandmother    • Cancer Maternal Grandmother         Uterine   • Colon cancer Maternal Grandfather    • Cancer Maternal Grandfather         Colon   • Lung cancer Paternal Grandmother 61   • Cancer Paternal Grandmother         Lung   • Heart attack Paternal Grandfather    • Colon cancer Cousin 27   • Breast cancer Maternal Aunt    • Breast cancer Maternal Aunt    • Breast cancer Maternal Aunt        Meds/Allergies       Current Outpatient Medications:   •  acetaminophen (TYLENOL) 500 mg tablet  • bisacodyl (DULCOLAX) 5 mg EC tablet  •  calcitriol (ROCALTROL) 0.25 mcg capsule  •  calcium citrate (CALCITRATE) 950 (200 Ca) MG tablet  •  cholecalciferol (VITAMIN D3) 1,000 units tablet  •  clindamycin (CLEOCIN T) 1 % lotion  •  Galcanezumab-gnlm 120 MG/ML SOAJ  •  Levocetirizine Dihydrochloride (XYZAL PO)  •  levothyroxine (Synthroid) 125 mcg tablet  •  lubiprostone (AMITIZA) 24 mcg capsule  •  Melatonin 5 MG TABS  •  meloxicam (MOBIC) 15 mg tablet  •  Multiple Vitamin (multivitamin) tablet  •  omeprazole (PriLOSEC) 40 MG capsule  •  Plecanatide (Trulance) 3 MG TABS  •  rimegepant sulfate (Nurtec) 75 mg TBDP  •  rizatriptan (MAXALT) 10 mg tablet  •  zolpidem (AMBIEN) 5 mg tablet  •  acetaZOLAMIDE (DIAMOX) 125 mg tablet  •  propranolol (INDERAL) 10 mg tablet  •  Prucalopride Succinate 2 MG TABS    No Known Allergies        Objective     Blood pressure 98/60, pulse 64, height 5' 3.7" (1.618 m), weight 68 kg (150 lb). Body mass index is 25.99 kg/m². PHYSICAL EXAM:      General Appearance:   Alert, cooperative, no distress   HEENT:   Normocephalic, atraumatic, anicteric. Neck:  Supple, symmetrical, trachea midline   Lungs:   Clear to auscultation bilaterally; no rales, rhonchi or wheezing; respirations unlabored    Heart[de-identified]   Regular rate and rhythm; no murmur, rub, or gallop. Abdomen:   Soft, non-tender, non-distended; normal bowel sounds; no masses, no organomegaly    Genitalia:   Deferred    Rectal:   Deferred    Extremities:  No cyanosis, clubbing or edema    Pulses:  2+ and symmetric    Skin:  No jaundice, rashes, or lesions          Lab Results:   No visits with results within 1 Day(s) from this visit.    Latest known visit with results is:   Appointment on 09/11/2023   Component Date Value   • Calcium 09/11/2023 8.1 (L)    • PTH 09/11/2023 10.7 (L)    • 25-HYDROXY VIT D 09/11/2023 54    • 25-Hydroxy D2 09/11/2023 <1.0    • 25-HYDROXY VIT D3 09/11/2023 54    • Thyroglobulin Ab 09/11/2023 <1.0    • Thyroglobulin-MACHO 09/11/2023 0.2 (L)          Radiology Results:   No results found.   Answers submitted by the patient for this visit:  Abdominal Pain Questionnaire (Submitted on 10/31/2023)  Chief Complaint: Abdominal pain  Chronicity: chronic  Onset: more than 1 year ago  Onset quality: gradual  Frequency: every several days  Episode duration: 2 Hours  Progression since onset: waxing and waning  Pain location: LLQ, RLQ, periumbilical region, suprapubic region  Pain - numeric: 5/10  Pain quality: aching, cramping, a sensation of fullness, sharp  Radiates to: LLQ, RLQ, epigastric region, periumbilical region, suprapubic region, pelvis  anorexia: No  arthralgias: Yes  belching: Yes  constipation: Yes  diarrhea: Yes  dysuria: No  fever: No  flatus: Yes  frequency: No  headaches: Yes  hematochezia: No  hematuria: No  melena: No  myalgias: Yes  nausea: Yes  weight loss: No  vomiting: No  Aggravated by: certain positions, deep breathing, eating, palpation  Relieved by: bowel movements, certain positions, passing flatus, standing  Diagnostic workup: lower endoscopy

## 2023-11-09 ENCOUNTER — COSMETIC (OUTPATIENT)
Dept: PLASTIC SURGERY | Facility: CLINIC | Age: 43
End: 2023-11-09

## 2023-11-09 DIAGNOSIS — Z41.1 ENCOUNTER FOR COSMETIC PROCEDURE: Primary | ICD-10-CM

## 2023-11-09 PROCEDURE — BOTOX3 THREE OR MORE AREAS OR GREATER THAN 75 UNITS: Performed by: STUDENT IN AN ORGANIZED HEALTH CARE EDUCATION/TRAINING PROGRAM

## 2023-11-09 PROCEDURE — BOTOX1U PR BOTOX BY THE UNIT: Performed by: STUDENT IN AN ORGANIZED HEALTH CARE EDUCATION/TRAINING PROGRAM

## 2023-11-09 NOTE — PROGRESS NOTES
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 Particular areas of concern:  11s. Will proceed with 40 units of botox     Risks and benefits discussed, patient agreed to proceed.      10 units to each crows feet  14 units to glabellum  6 units to forehead     Total 40 units, 30% off employee     Patient tolerated well, f/u in 3 months        Yuan Hernandez MD   [facility]   [facility] [address]   Office: [phone]

## 2023-11-24 ENCOUNTER — TELEPHONE (OUTPATIENT)
Dept: NEUROLOGY | Facility: CLINIC | Age: 43
End: 2023-11-24

## 2023-11-24 NOTE — TELEPHONE ENCOUNTER
Called and spoke to patient to confirm their upcoming appointment with Dr. Flavia Cr. Informed patient about arriving in the [place] location 15 minutes prior to their appointment to get checked in and going over chart.

## 2023-11-24 NOTE — TELEPHONE ENCOUNTER
Received fax from [facility].  [facility] req [facility]    PA initiated on [facility]    Awaiting determination

## 2023-11-26 DIAGNOSIS — F51.01 PRIMARY INSOMNIA: ICD-10-CM

## 2023-11-28 ENCOUNTER — OFFICE VISIT (OUTPATIENT)
Dept: NEUROLOGY | Facility: CLINIC | Age: 43
End: 2023-11-28
Payer: COMMERCIAL

## 2023-11-28 VITALS
HEIGHT: 63 IN | BODY MASS INDEX: 26.22 KG/M2 | WEIGHT: 148 LBS | SYSTOLIC BLOOD PRESSURE: 104 MMHG | DIASTOLIC BLOOD PRESSURE: 81 MMHG | TEMPERATURE: 98.3 F | HEART RATE: 67 BPM

## 2023-11-28 DIAGNOSIS — G43.109 MIGRAINE WITH AURA AND WITHOUT STATUS MIGRAINOSUS, NOT INTRACTABLE: Primary | ICD-10-CM

## 2023-11-28 DIAGNOSIS — J32.9 SINUSITIS: ICD-10-CM

## 2023-11-28 DIAGNOSIS — G89.29 CHRONIC LEFT-SIDED THORACIC BACK PAIN: ICD-10-CM

## 2023-11-28 DIAGNOSIS — M54.6 CHRONIC LEFT-SIDED THORACIC BACK PAIN: ICD-10-CM

## 2023-11-28 PROCEDURE — 99215 OFFICE O/P EST HI 40 MIN: CPT | Performed by: PSYCHIATRY & NEUROLOGY

## 2023-11-28 NOTE — PATIENT INSTRUCTIONS
No estrogen due to migraine aura    Referral to pain management spine and pain ordered for the back pain and we discussed certainly could consider physiatry as an alternative option as both can help with such issues typically    Referral to ENT for the 2 months of sinus issues and I recommend [facility]      Headache/migraine treatment:   Abortive medications (for immediate treatment of a headache): It is ok to take ibuprofen, acetaminophen or naproxen (Advil, Tylenol,  Aleve, Excedrin) if they help your headaches you should limit these to No more than 3 times a week to avoid medication overuse/rebound headaches. -     rimegepant sulfate (Nurtec) 75 mg TBDP; Take one NURTEC 75 mg at onset under tongue. Limit 1 in 24 hours. Prior auth, coupon card for copay     -     dexamethasone (DECADRON) 4 mg tablet; Can take 8 mg for 1-2 days PO with breakfast, then if needed can take 4 mg with breakfast for 1-5 more days if needed for headache      For your more moderate to severe migraines take this medication early   Maxalt (rizatriptan) 10mg tabs - take one at the onset of headache. May repeat one time after 1-2 hours if pain has not resolved. (Max 2 a day and 9 a month)     Prescription preventive medications for headaches/migraines   (to take every day to help prevent headaches - not to take at the time of headache):  [x] -let me know if you ever would like to add back any other preventatives    Emgality/Galcanezumab -  120 mg injections every 28 days     READ INSTRUCTIONS that come with the medication. REFRIGERATE. Keep out of direct sunlight. Prior to administration, allow to come to room temperature for 30 minutes. Do not warm using a heat source (eg, microwave or hot water). Do not shake. Administer in preferably abdomen (avoiding 2 inches around the navel), thigh, upper arm, or buttocks avoiding areas of skin that are tender, bruised, red or hard.  Deliver entire contents of single-use prefilled pen or syringe. Unknown impact in pregnancy therefore would recommend stopping 6 months prior to considering pregnancy. *Typically these types of medications take time untill you see the benefit, although some may see improvement in days, often it may take weeks, especially if the medication is being titrated up to a beneficial level. Please contact us if there are any concerns or questions regarding the medication. Lifestyle Recommendations:  [x] SLEEP - Maintain a regular sleep schedule: Adults need at least 7-8 hours of uninterrupted a night. Maintain good sleep hygiene:  Going to bed and waking up at consistent times, avoiding excessive daytime naps, avoiding caffeinated beverages in the evening, avoid excessive stimulation in the evening and generally using bed primarily for sleeping. One hour before bedtime would recommend turning lights down lower, decreasing your activity (may read quietly, listen to music at a low volume). When you get into bed, should eliminate all technology (no texting, emailing, playing with your phone, iPad or tablet in bed). [x] HYDRATION - Maintain good hydration. Drink  2L of fluid a day (4 typical small water bottles)  [x] DIET - Maintain good nutrition. In particular don't skip meals and try and eat healthy balanced meals regularly. [x] TRIGGERS - Look for other triggers and avoid them: Limit caffeine to 1-2 cups a day or less. Avoid dietary triggers that you have noticed bring on your headaches (this could include aged cheese, peanuts, MSG, aspartame and nitrates). [x] EXERCISE - physical exercise as we all know is good for you in many ways, and not only is good for your heart, but also is beneficial for your mental health, cognitive health and  chronic pain/headaches. I would encourage at the least 5 days of physical exercise weekly for at least 30 minutes. Education and Follow-up  [x] Please call with any questions or concerns.  Of course if any new concerning symptoms go to the emergency department.   [x] Follow up  3-4 months if needed, if doing well at that time and not needed absolutely okay to push to 6-month,  sooner if needed

## 2023-11-28 NOTE — PROGRESS NOTES
[facility] Consult - Follow up   PATIENT:  Trino Oneill  MRN:  [id number]  :  1980  DATE OF SERVICE:  2023  REFERRED BY: No ref. provider found  PMD: HENRY Jacques    Assessment/Plan:   Trino Oneill is a deligthful 41 y.o. female with a past medical history that includes papillary thyroid cancer status post total thyroidectomy 2023 with postop hypoparathyroidism and significantly low calcium now on replacement, IBS with constipation, PTSD, allergic rhinitis, chronic sinusitis, migraines, TOSHIA II, insomnia, neck pain, vasovagal syncope in 20s, anxiety, Intestinal cystitis, childhood asthma  referred here for evaluation of headache. My initial evaluation 3/28/2022    Migraine with aura and without status migrainosus, not intractable  Features of idiopathic intracranial hypertension on imaging without papilledema not meeting criteria for IIH   She reports a long history of headaches and migraines dating back to 10or 8years old. Family history of migraines. She reports she has followed with multiple neurologists in the past.  Pain is typically unilateral more often left-sided with visual aura at times and with typical associated migrainous features as well as autonomic features that do not seem to be unilateral to suggest trigeminal autonomic cephalalgia.  -as of 2022 on average over 15 migraine days per month. Trial of emgality for prevention rizatriptan for abortive. - as of 2022:  She reports she has had significant improvement on emgality down from 15 to 7-8 times a month and severity has improved as well. They respond to rizatriptan which she feels she tolerates more than sumatriptan. - as of 2023: She reports she continues to do very well on Emgality with about 4 migraines a month that resolve with rizatriptan. Some wearing off when due for next dose and recommended taking it at 28 days rather than 30 or 31.   She would like to stay on propranolol 40 mg twice daily for now as she feels this is helping prevent tachycardia from causing migraine. She was able to get off nortriptyline summer 2022 which she was on for an interstitial cystitis without increase.    - as of 4/5/2023:  She returns sooner with daily migraines now following papillary thyroid cancer status post total thyroidectomy 2/24/2023 with postop hypoparathyroidism and significantly low calcium now on replacement with some symptoms possibly consistent with high calcium and upcoming recheck of labs through endocrinology may suggest lowering calcium meds, but would defer to endocrine. She certainly has had increase in her migrainous symptoms as well as some symptoms possibly consistent with sleep apnea and I ordered sleep study. She reports she has had improvement in symptoms since the surgery but still was hoping there is something that could improve them as all the other doctors just told her to wait it out. We will start low-dose topiramate at night to see if this can help if tolerated. We also discussed PTSD and how it can cause intrusion, avoidance, negative impact on cognition and mood as well as arousal and reactivity changes and certainly sounds like she has symptoms of this as well and recommend counseling. We will have our  reach out to see if she can help. - as of 6/27/2023: She reports increasing frequency and severity of headaches overall and currently 3-4 a week. She did not tolerate trial of topiramate as it worsened mood which is slightly improved from its low point, but she plans to follow-up with counselor. 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 Diagnostic sleep study ordered due to suspected false negative home study.   Has Decadron available for worse symptoms if needed. Rizatriptan side effects and will add trial of Nurtec which also could help with prevention the more she takes it.  - as of 8/29/2023: Reviewed MRI brain which shows some features of possible slight increased intracranial pressure without IIH diagnosis and no papilledema. She reports 9-10 headaches per month that typically improve with Nurtec, which is a significant improvement on Emgality but has wearing off effect and we discussed increasing acetazolamide/Diamox from 250 mg twice daily since well-tolerated gradually up until it decreases more of the symptoms that might be related as thoroughly instructed and discussed with patient today. If side effects or not effective we will wean off. Also will wean off propranolol as we wean up acetazolamide/Diamox. Refill of Decadron for backup. She is currently undergoing cognitive behavioral therapy for insomnia soon through the sleep medicine department. - as of 11/28/2023: She reports overall doing much better and so many regards since last visit. Better emotionally physically and mentally. CBT for insomnia was almost magical she reports and she is doing much better with sleep although still taking low-dose Ambien half the month which we discussed I do not recommend, especially in the long run, as this does not produce the quality sleep. She is having 10-12 migraines a month and Nurtec helps sometimes and when it does not rizatriptan always does although makes her nauseated so she avoids this as first option. Emgality continues to help with prevention although has wearing off effect and she is currently delayed due to prior Auth needing to be redone. At last visit she was taking acetazolamide, but at some point soon after reports she discontinued it, as she wanted less meds.   She was weaned off propranolol since we were adding this medication and subsequently has had some increased pressure in the head which we discussed would be expected and we could add back acetazolamide, verapamil or propranolol at any time. Potentially could be related to current sinus issues which she request referral to ENT for which was placed. Workup:  -MRI brain with without contrast 7/13/2023: No acute infarction, intracranial hemorrhage or mass. *As of my retrospective review 8/29/2023 and 11/28/2023 we discussed she has in my opinion partially flattened sella(not empty), prominent optic nerve sheath with some tortuosity bilaterally potentially slightly worse right greater than left, slightly tighter ventricle on the right, cerebellar tonsils overlie the foramen magnum with tight junction bilaterally    Preventative:  - we discussed headache hygiene and lifestyle factors that may improve headaches  - continue  emgality every 28 days. Discussed proper use, possible side effects and risks. -She is not interested in adding an additional preventative at this time  - Currently on through other providers: In October we weaned off propranolol 40 mg BID as we were weaning up acetazolamide but she has had increased pressure headaches and sinus issues since, ambien 10 mg- (off and on 6 years and as of 11/28/2023 taking 5 to 7.5 mg maybe half the month), melatonin  - Past/ failed/contraindicated: topiramate, gabapentin, propranolol 40 mg BID, nortriptyline, amitriptyline, prosac/fluoxetine, aimovig contraindicated due to constiptation  - future options: alternative CGRP, botox, could consider verapamil however she already has constipation, could resume propranolol if need, acetazolamide/Diamox could still help    Abortive:  - discussed not taking over-the-counter or prescription pain medications more than 3 days per week to prevent medication overuse/rebound headache  -   rimegepant sulfate (Nurtec) 75 mg TBDP; Take one NURTEC 75 mg at onset under tongue. Limit 1 in 24 hours. . Discussed proper use, possible side effects and risks.   -  for back up: dexamethasone (DECADRON) 4 mg tablet; Can take 8 mg for 1-2 days PO with breakfast, then if needed can take 4 mg with breakfast for 1-5 more days if needed for headache. Discussed proper use, possible side effects and risks. - Past/ failed/contraindicated: sumatriptan 100 mg works sometimes and not others, rizatriptan helps but also makes her nauseous  - past/helped:  Steroids have helped temporarily, toradol IM in the past helped   - future options:   prochlorperazine, Toradol IM or p.o., could consider trial for 5 days of Depakote or dexamethasone for prolonged migraine, ubrelvy, reyvow    Insomnia, prolonged Ambien use  Home sleep study June 2023 2 obstructive apneas, 2 hypopneas, following with sleep medicine, did CBT for insomnia   -- as of 11/28/2023:  still using ambien a 5 mg -7.5 mg maybe half the month which I would not recommend    Sinusitis  -     Ambulatory Referral to Otolaryngology; Future    Chronic left-sided thoracic back pain  -     Ambulatory referral to Spine & Pain Management; Future  -     Ambulatory Referral to Physiatry; Future    Patient instructions    No estrogen due to migraine aura    Referral to pain management spine and pain ordered for the back pain and we discussed certainly could consider physiatry as an alternative option as both can help with such issues typically    Referral to ENT for the 2 months of sinus issues and I recommend [facility]      Headache/migraine treatment:   Abortive medications (for immediate treatment of a headache): It is ok to take ibuprofen, acetaminophen or naproxen (Advil, Tylenol,  Aleve, Excedrin) if they help your headaches you should limit these to No more than 3 times a week to avoid medication overuse/rebound headaches. -     rimegepant sulfate (Nurtec) 75 mg TBDP; Take one NURTEC 75 mg at onset under tongue. Limit 1 in 24 hours.   Prior auth, coupon card for copay     -     dexamethasone (DECADRON) 4 mg tablet; Can take 8 mg for 1-2 days PO with breakfast, then if needed can take 4 mg with breakfast for 1-5 more days if needed for headache      For your more moderate to severe migraines take this medication early   Maxalt (rizatriptan) 10mg tabs - take one at the onset of headache. May repeat one time after 1-2 hours if pain has not resolved. (Max 2 a day and 9 a month)     Prescription preventive medications for headaches/migraines   (to take every day to help prevent headaches - not to take at the time of headache):  [x] -let me know if you ever would like to add back any other preventatives    Emgality/Galcanezumab -  120 mg injections every 28 days     READ INSTRUCTIONS that come with the medication. REFRIGERATE. Keep out of direct sunlight. Prior to administration, allow to come to room temperature for 30 minutes. Do not warm using a heat source (eg, microwave or hot water). Do not shake. Administer in preferably abdomen (avoiding 2 inches around the navel), thigh, upper arm, or buttocks avoiding areas of skin that are tender, bruised, red or hard. Deliver entire contents of single-use prefilled pen or syringe. Unknown impact in pregnancy therefore would recommend stopping 6 months prior to considering pregnancy. *Typically these types of medications take time untill you see the benefit, although some may see improvement in days, often it may take weeks, especially if the medication is being titrated up to a beneficial level. Please contact us if there are any concerns or questions regarding the medication. Lifestyle Recommendations:  [x] SLEEP - Maintain a regular sleep schedule: Adults need at least 7-8 hours of uninterrupted a night. Maintain good sleep hygiene:  Going to bed and waking up at consistent times, avoiding excessive daytime naps, avoiding caffeinated beverages in the evening, avoid excessive stimulation in the evening and generally using bed primarily for sleeping.   One hour before bedtime would recommend turning lights down lower, decreasing your activity (may read quietly, listen to music at a low volume). When you get into bed, should eliminate all technology (no texting, emailing, playing with your phone, iPad or tablet in bed). [x] HYDRATION - Maintain good hydration. Drink  2L of fluid a day (4 typical small water bottles)  [x] DIET - Maintain good nutrition. In particular don't skip meals and try and eat healthy balanced meals regularly. [x] TRIGGERS - Look for other triggers and avoid them: Limit caffeine to 1-2 cups a day or less. Avoid dietary triggers that you have noticed bring on your headaches (this could include aged cheese, peanuts, MSG, aspartame and nitrates). [x] EXERCISE - physical exercise as we all know is good for you in many ways, and not only is good for your heart, but also is beneficial for your mental health, cognitive health and  chronic pain/headaches. I would encourage at the least 5 days of physical exercise weekly for at least 30 minutes. Education and Follow-up  [x] Please call with any questions or concerns. Of course if any new concerning symptoms go to the emergency department. [x] Follow up  3-4 months if needed, if doing well at that time and not needed absolutely okay to push to 6-month,  sooner if needed        CC: We had the pleasure of evaluating Antelmo Rivas in neurological consultation today. Antelmo Rivas is a   right handed female who presents today for evaluation of headaches. History obtained from patient as well as available medical record review.   History of Present Illness:   Interval history as of 11/28/2023  - no significant new or concerning neurologic symptoms since last visit   - plans to follow up with ENT for post nasal sinus issues the last two months - huge black chunks with blood on it comes out and then feels so much better    Sleep - CBT for insomnia best thing she has done - like magical, Dr. Carson Plummer also helped with the iron infusions, night and day different from August, was so tired and miserable, finished the CBT in Oct  - joined a gym and classes twice a week, so much stronger   - still using ambien half the month 5-7.5 mg     - mood way better - feels way better in general - emotionally, physically mentally     - Left lower back nerve pain like someone is lightly tapping - saw pT, more annoying than painful   Tingling started about a year ago and the pain since June, pain improved with PT, towards the end of the day bad nightly, heating pads on it - constant since June -we discussed I would be happy to place referral    Headaches and migraines   She reports she stopped acetazolamide/Diamox after she got up to  twice daily and did not see a significant difference and did not want to take too many meds -did not increase to 4 times a day after last visit    She reports 10-12 migraines a month, Nurtec helps sometimes and when it does not she takes rizatriptan at always helps    Preventative:   -Trial of acetazolamide/Diamox as of last visit she had stated she was taking 250 mg at lunch 1130 and before bed 9/930 and then the plan was to increase to 4 times a day and she reports she did not do this but did wean off propranolol in OCt    - emgality helping - every 28 days - life changing - wears off at day 25  Delayed    - Currently on through other providers: propranolol 40 mg BID - stopped around Oct and had increased pressure headaches/sinus, ambien 10 mg- (off and on 6 years), melatonin    Abortive:   Nurtec 10 times a month - usually knocks out if mild  Rizatriptan -works if needed - twice a month, makes her nauseated  Has not steroid rescue   Denies bothersome side effects      Interval history as of 8/29/2023  - no significant new or concerning neurologic symptoms since last visit   - eye doctor May and vision is great and better than 20/20   - doing PT for back and has been having left mid back pain and has to stand to feel comfortable, steroids by mouth didn't help, MSK relaxors just briefly helped  - stomach sleeper is best    -MRI brain with without contrast 7/13/2023: No acute infarction, intracranial hemorrhage or mass. I do see some findings consistent with subtle changes that could be related to idiopathic intracranial hypertension including partially flattened sella(not empty), prominent optic nerve sheath with some tortuosity bilaterally (slightly) and as well as what appears to be slight enlargement of the optic nerve head     Headaches and migraines   9-10 headaches per month can be days in a row or once a week  Left temporal region to the back of skull on the left  Often due to wearing off effect for emgality    At night eyes watering and nose runs, if laughs or smiles eyes tear up, BM too sometimes   - evening gets progressively worse    Preventative:   -Trial of acetazolamide/Diamox tolerating well without side effects   250 mg at lunch 1130 and before bed 9/930    - trial of topiramate and was at 75 mg for 3 weeks and then stopped because she was having weird dark thoughts - 50 mg at night, mood is better   - emgality helping - every 28 days - life changing   - Currently on through other providers: propranolol 40 mg BID, ambien 10 mg- (off and on 6 years), melatonin    Abortive:   -Nurtec is helping dramatically, but will not extinguish the migraine but will significantly reduce the severity and then will take 400 mg advil or 500 mg tylenol   - decadron for back up   Denies bothersome side effects       Sleep   -Follow-up with sleep medicine Dr Mary Ann Craig 8/1/23 and started on an iron infusion after iron was found to be low - no change  - doesn't sleep well, but improvement in sleep  - CBT started last week     study   The patient had a total of 5 respiratory events made up of 2 obstructive apneas, 0 central apneas, 0 mixed apneas and 2 hypopneas resulting in a respiratory event index (BRYCE) of 0.7. The lowest SpO2 recorded is 92%. The snore index was 0.2%.   IMPRESSION:  This study did not demonstrate evidence for obstructive sleep apnea. Interval history as of 6/27/2023  - no significant new or concerning neurologic symptoms since last visit  - dealing with hypocalcemia following surgery and mood symptoms, fatigue,   - eye doctor May and vision is great and better than 20/20   - since last visit hurt her back, mid back muscle spasm and was bad so was put on steroids and MSK relaxor    Headaches and migraines   3-4 headaches a week rizatriptan helps   Worse the past month   Weather     Preventative:   - trial of topiramate and was at 75 mg for 3 weeks and then stopped because she was having weird dark thoughts - 50 mg at night, mood is better   - emgality helping - PA renewed last time, every 28 days     Abortive:   Rizatriptan - nauseated and lethargic   - decadron not needed since last visit, but took other steroids   Denies bothersome side effects      Sleep study    The patient had a total of 5 respiratory events made up of 2 obstructive apneas, 0 central apneas, 0 mixed apneas and 2 hypopneas resulting in a respiratory event index (BRYCE) of 0.7. The lowest SpO2 recorded is 92%. The snore index was 0.2%. IMPRESSION:  This study did not demonstrate evidence for obstructive sleep apnea. Interval history as of 4/5/2023  - 2/24/23 thyroid resection and post op had severe hypoparathyroidism and they wanted to readmit her and then followed up with endocrine and continue calcium dose   - symptoms of hypoparathyroid intially and now symptoms of hyperparathyroidism    - all of the symptoms of PTSD, overwhelmed with noises   - was not on phone for a week because could not look at it     Headaches and migraines   - A lot better than she was, could not speak for a while and spasms, tetany, couldn't speak, much better    - daily headaches since the surgery, first in the hospital was way worse and steroids helped, and then headache never resolved.  Worse at night before bed, comes and goes, not all day, better   - Mild headache now, they are daily, migraines nearly daily, not last night, every other night   - Dizziness better, but still there, may have had vertigo in the past.   Tinnitus both ears, Right >left 30 seconds of muffled hearing at times, rarely in the past     Preventative:   - emgality monthly - [employer] - a few days ago     Abortive:   - rizatriptan once dose often helps   Denies bothersome side effects       Sleep   - averages: 5-6 hours, sometimes takes ambien, snores   Problems falling asleep?:   Yes  Problems staying asleep?:  Yes, 3-4 a night  - tired all the time    How likely are you to doze off or fall sleep during the following situations?     0 - would never doze  1 - slight chance of dozing  2 - moderate chance of dozing  3 - high chance of dozing  [place] sleepiness scale   Sitting and reading - 2  Watching TV - 3  Sitting, inactive in a public place such as a theater 1  As a passenger in a car for an hour without a break 1  Lying down to rest in afternoon when circumstances permit 3  Sitting and talking to someone 0  Sitting quietly after lunch without alcohol 2  In a car while stopped for few minutes in traffic - 0      Interval history as of 1/11/2023  - denies any new or concerning neurologic symptoms since last visit   - foot surgery 12/30/22  - thyroid bx coming up    Headaches and migraines   She is doing much better, now about 4 a month and rizatriptan works well, "its amazing"  Nov had one that lasted a week  Last was Dec 29th  Wearing off effect when due for next dose, maybe 3 days before     Preventative:    - emgality     Denies bothersome side effects  - Currently on through other providers: propranolol 40 mg BID (could consider gradual wean off in the future),   nortriptyline 20 mg (for Intestinal cystitis) - stopped around July 2022 - IC not worse off of it, ambien 10 mg (previously on 5 and plans on going back, moved in April and loud) - (off and on 5 years)    Abortive:   - rizatriptan - working well now, rarely needs two  Denies bothersome side effects        Interval history as of 7/1/2022  - denies any new or concerning neurologic symptoms since last visit      Headaches and migraines   She reports improvement on emgality which has brought migraines down from 15 days a month to 7-8 a month. Less severe as well   The first month did the best, "almost forgot I had migraines for a while"   Some wearing off effect when due for next shot   - triggered by weather changes     Preventative:   - Trial of emgality - approved 4/5/22 - 4th shot soon   Denies bothersome side effects     Abortive:   - Trial of rizatriptan 10 mg - makes her less nauseated than sumatriptan, does not work quiet as well as sumatriptan, may not completely get rid of it but improves and then eventually goes away   Denies bothersome side effects     History as of initial visit 3/28/22:   Headaches started at what age? 89 years old  How often do the headaches occur?   - as of 3/28/2022: on average over 15 days a month   What time of the day do the headaches start? Occasionally wakes up with them, or afternoon or evening  How long do the headaches last? All day, up to weeks   Are you ever headache free? Yes    Aura? Sometimes aura  Blurred, floaters, lines, flashing orange, 20 mins or more     Last eye exam: goes early for monitoring, no issues except     Where is your headache located and pain quality?   - most of the time unilateral and usually on the left  - left side of head and neck - temporal and parietal and occipital and down the neck  - stabbing, pressure, pulsating  - head warm to touch  - sinus pressure   What is the intensity of pain?  Average: 5-6/10, worst 9/10  Associated symptoms:   [x] Nausea       [] Vomiting        [x] Stiff or sore neck   [x] Photophobia     [x]Phonophobia      [x] Osmophobia  [x] Blurred vision    [x] Light-headed or dizzy     [x] Tinnitus  - both  [x] Hands or feet tingle or feel numb/paresthesias - tingling in both legs and fingers sometimes without focal weakness   [] Ptosis      [] Facial droop  [x] Lacrimation - both  [x] Nasal congestion/rhinorrhea - chronically       Things that make the headache worse? No specific movements, any movement if bad enough    Headache triggers:  Hormonal/menses, weather changes, stress, certain foods, alcohol, sinus congestion      Have you seen someone else for headaches or pain? Yes multiple neurologists, last about 10 years ago   Have you had trigger point injection performed and how often? No  Have you had Botox injection performed and how often? No   Have you had epidural injections or transforaminal injections performed? No  Are you current pregnant or planning on getting pregnant? No future pregnancy, not active   Have you ever had any Brain imaging? yes - MRI Brain in her teens was normal     What medications do you take or have you taken for your headaches?    ABORTIVE:      Sumatriptan 100 mg - makes her nauseated      Past  Steroids In Jan 2021 with COVID, and then again in Feb for unrelenting migraine helps   Rizatriptan/maxalt - thinks may not as been as effective as imitrex    PREVENTIVE:       Propranolol 40 mg BID (4 years has helped)  ambien 5  Nortriptyline 20 mg (started a couple of weeks ago for Intestinal cystitis)    Past/ failed/contraindicated:  nortriptyline  Amitriptyline  topiramate - maybe 1-2 months   Gabapentin  prosac/fluoxetine      LIFESTYLE  Sleep   - averages: 6 hours, sometimes takes ambien  Problems falling asleep?:   Yes  Problems staying asleep?:  Yes, 3-4 a night    Water: 40-60 oz per day  Caffeine: coffee - 1 cups in am, 1 at lunch, per day    Mood:   Anxiety maybe more in her 25s, not much now     The following portions of the patient's history were reviewed and updated as appropriate: allergies, current medications, past family history, past medical history, past social history, past surgical history and problem list.     Pertinent family history:  Family history of headaches:  migraine headaches in sister, and mom has headaches that are likely migraines   Any family history of aneurysms - No    Pertinent social history:  Work: [employer]  in [employer] department for [employer] (builds out work flows) and US at [employer] alone normally      Past Medical History:     Past Medical History:   Diagnosis Date    Allergic     Anxiety     Cancer ([address]) 2/1/2023    Thyroid- Papillary carcinoma    COVID-19 01/2022    mild s/s-not hospitalized, not vaccinated    GERD (gastroesophageal reflux disease)     Headache(784.0)     History of IBS     with constipation    Ingrown toenail     Irritable bowel syndrome     Migraines     Chronic per pt    Plantar fasciitis     Thyroid carcinoma ([facility])     Varicella     Venereal wart 08/07/2014       Patient Active Problem List   Diagnosis    Chronic migraine w/o aura w/o status migrainosus, not intractable    TOSHIA II (cervical intraepithelial neoplasia II)    Irritable bowel syndrome with constipation    Primary insomnia    Neck pain    Vestibulitis, vulvar    Overweight (BMI 25.0-29. 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 ([address] )    Hyperlipidemia    Iron deficiency    Encounter for follow-up surveillance of thyroid cancer       Medications:      Current Outpatient Medications   Medication Sig Dispense Refill    acetaminophen (TYLENOL) 500 mg tablet Take 1,000 mg by mouth every 6 (six) hours as needed for mild pain      bisacodyl (DULCOLAX) 5 mg EC tablet Take 2 tablets (10 mg total) by mouth 2 (two) times a day as needed for constipation 60 tablet 1    calcitriol (ROCALTROL) 0.25 mcg capsule TAKE ONE CAPSULE BY MOUTH 2 TIMES A  capsule 0    clindamycin (CLEOCIN T) 1 % lotion in the morning Galcanezumab-gnlm 120 MG/ML SOAJ Inject 120 mg under the skin every 28 days 1 mL 11    Levocetirizine Dihydrochloride (XYZAL PO) Take by mouth daily at bedtime      levothyroxine (Synthroid) 125 mcg tablet Take 1 tablet (125 mcg total) by mouth daily 30 tablet 3    Melatonin 5 MG TABS Take by mouth as needed      meloxicam (MOBIC) 15 mg tablet Take 15 mg by mouth daily as needed      Multiple Vitamin (multivitamin) tablet Take 1 tablet by mouth daily      omeprazole (PriLOSEC) 40 MG capsule Take 1 capsule (40 mg total) by mouth daily 30 capsule 4    Plecanatide (Trulance) 3 MG TABS Take 3 mg by mouth in the morning 30 tablet 1    rimegepant sulfate (Nurtec) 75 mg TBDP Take one NURTEC 75 mg at onset under tongue. Limit 1 in 24 hours. 16 tablet 11    rizatriptan (MAXALT) 10 mg tablet Take 1 tablet (10 mg total) by mouth once as needed for migraine May repeat in 2 hours if needed. Max 2/24 hours, 9/month.  9 tablet 12    zolpidem (AMBIEN) 5 mg tablet Take 1 tablet (5 mg total) by mouth daily at bedtime as needed for sleep 30 tablet 0    acetaZOLAMIDE (DIAMOX) 125 mg tablet 125 mg PO nightly for 1 week, then increase to 125 mg BID for 1 week, then 125 mg in am and 250 mg in pm for 1 week, then 250 mg BID (Patient not taking: Reported on 11/6/2023) 120 tablet 6    calcium citrate (CALCITRATE) 950 (200 Ca) MG tablet Take 1 tablet (950 mg total) by mouth 2 (two) times a day 120 tablet 0    cholecalciferol (VITAMIN D3) 1,000 units tablet Take 1 tablet (1,000 Units total) by mouth daily Do not start before March 1, 2023. 30 tablet 0    lubiprostone (AMITIZA) 24 mcg capsule Take 1 capsule (24 mcg total) by mouth 2 (two) times a day with meals (Patient not taking: Reported on 11/28/2023) 180 capsule 2    propranolol (INDERAL) 10 mg tablet Decrease to 30 mg PO BID for 1 week, then 20 mg BID for 1 week, then 10 mg BID for 1 week and then can stop 108 tablet 4    Prucalopride Succinate 2 MG TABS Take 2 mg by mouth in the morning (Patient not taking: Reported on 11/6/2023) 30 tablet 1     No current facility-administered medications for this visit. Allergies:    No Known Allergies    Family History:     Family History   Problem Relation Age of Onset    Hypertension Mother     Arthritis Mother     Thyroid disease Mother     Osteoarthritis Mother     Transient ischemic attack Father     Hypertension Father     Stroke Father     Vision loss Father     Rashes / Skin problems Sister         Shingles    Migraines Sister     No Known Problems Sister     Uterine cancer Maternal Grandmother     Cancer Maternal Grandmother         Uterine    Colon cancer Maternal Grandfather     Cancer Maternal Grandfather         Colon    Lung cancer Paternal Grandmother 61    Cancer Paternal Grandmother         Lung    Heart attack Paternal Grandfather     Colon cancer Cousin 27    Breast cancer Maternal Aunt     Breast cancer Maternal Aunt     Breast cancer Maternal Aunt        Social History:     Social History     Socioeconomic History    Marital status: Single     Spouse name: Not on file    Number of children: Not on file    Years of education: Not on file    Highest education level: Not on file   Occupational History    Not on file   Tobacco Use    Smoking status: Never    Smokeless tobacco: Never   Vaping Use    Vaping Use: Never used   Substance and Sexual Activity    Alcohol use:  Yes     Alcohol/week: 2.0 standard drinks of alcohol     Types: 2 Glasses of wine per week     Comment: 2 glasses wine a week    Drug use: Never    Sexual activity: Not Currently   Other Topics Concern    Not on file   Social History Narrative    Not on file     Social Determinants of Health     Financial Resource Strain: Not on file   Food Insecurity: Not on file   Transportation Needs: Not on file   Physical Activity: Not on file   Stress: Not on file   Social Connections: Not on file   Intimate Partner Violence: Not on file   Housing Stability: Not on file Objective:       Physical Exam:                                                                 Vitals:            Constitutional:    /81 (BP Location: Right arm, Patient Position: Sitting, Cuff Size: Standard)   Pulse 67   Temp 98.3 °F (36.8 °C) (Tympanic)   Ht 5' 3" (1.6 m)   Wt 67.1 kg (148 lb)   BMI 26.22 kg/m²   BP Readings from Last 3 Encounters:   11/28/23 104/81   11/06/23 98/60   09/19/23 110/70     Pulse Readings from Last 3 Encounters:   11/28/23 67   11/06/23 64   09/19/23 71         Well developed, well nourished, well groomed. Psychiatric:  Normal behavior and appropriate affect        Neurological Examination:     Mental status/cognitive function:   Recent and remote memory appear intact. Attention span and concentration as well as fund of knowledge are appropriate for age. Normal language and spontaneous speech. Cranial Nerves:   VII-facial expression symmetric  Motor Exam: symmetric bulk throughout. no atrophy, fasciculations or abnormal movements noted. Coordination:  no apparent dysmetria, ataxia or tremor noted  Gait: steady casual gait           Pertinent lab results:   See EMR for recent labs  - 01/14/2022 COVID-19 positive  - 8/19/21 CMP and CBC unremarkable   TSH elevated at 4.09 with normal Free T4     Imaging: I have personally reviewed imaging and radiology read   -MRI brain with without contrast 7/13/2023: No acute infarction, intracranial hemorrhage or mass. *As of my retrospective review 8/29/2023 and 11/28/2023 we discussed she has in my opinion partially flattened sella(not empty), prominent optic nerve sheath with some tortuosity bilaterally potentially slightly worse right greater than left, slightly tighter ventricle on the right, cerebellar tonsils overlie the foramen magnum with tight junction bilaterally     MRI brain in her teens was reportedly unremarkable    Review of Systems:   ROS obtained by medical assistant and personally reviewed, but if any symptoms listed below say negative, does not mean patient has not had this symptom since last visit, please see HPI for details of symptoms discussed this visit. I recommended PCP follow up for non neurologic problems. Review of Systems   Constitutional:  Negative for appetite change and fever. HENT: Negative. Negative for hearing loss, tinnitus, trouble swallowing and voice change. Eyes: Negative. Negative for photophobia and pain. Respiratory: Negative. Negative for shortness of breath. Cardiovascular: Negative. Negative for palpitations. Gastrointestinal: Negative. Negative for nausea and vomiting. Endocrine: Negative. Negative for cold intolerance. Genitourinary: Negative. Negative for dysuria, frequency and urgency. Musculoskeletal: Negative. Negative for myalgias and neck pain. Skin: Negative. Negative for rash. Neurological:  Positive for headaches. Negative for dizziness, tremors, seizures, syncope, facial asymmetry, speech difficulty, weakness, light-headedness and numbness. Hematological: Negative. Does not bruise/bleed easily. Psychiatric/Behavioral:  Positive for sleep disturbance. Negative for confusion and hallucinations. All other systems reviewed and are negative. I have spent 37 minutes with Patient  today in which greater than 50% of this time was spent in counseling/coordination of care regarding Diagnostic results, Prognosis, Risks and benefits of tx options, Instructions for management, Patient and family education, Importance of tx compliance, Risk factor reductions, Impressions, Counseling / Coordination of care, Documenting in the medical record, Reviewing / ordering tests, medicine, procedures  , and Obtaining or reviewing history  . I also spent 5 minutes non face to face for this patient the same day.        Author:  Chester Ferrera MD 11/28/2023 6:31 PM

## 2023-11-28 NOTE — TELEPHONE ENCOUNTER
Kaycee Pacheco MD  [id number]  [address]  It looks like the prior Auth for [facility] came back yesterday, does this need to be sent to [facility] or does it go to [facility] automatically?   Thank you

## 2023-11-29 RX ORDER — ZOLPIDEM TARTRATE 5 MG/1
5 TABLET ORAL
Qty: 30 TABLET | Refills: 0 | Status: SHIPPED | OUTPATIENT
Start: 2023-11-29

## 2023-12-05 ENCOUNTER — APPOINTMENT (OUTPATIENT)
Dept: LAB | Age: 43
End: 2023-12-05
Payer: COMMERCIAL

## 2023-12-05 ENCOUNTER — TELEPHONE (OUTPATIENT)
Dept: HEMATOLOGY ONCOLOGY | Facility: CLINIC | Age: 43
End: 2023-12-05

## 2023-12-05 DIAGNOSIS — E83.51 HYPOCALCEMIA: ICD-10-CM

## 2023-12-05 DIAGNOSIS — E61.1 IRON DEFICIENCY: ICD-10-CM

## 2023-12-05 DIAGNOSIS — C73 PAPILLARY THYROID CARCINOMA (HCC): ICD-10-CM

## 2023-12-05 DIAGNOSIS — E89.0 POSTOPERATIVE HYPOTHYROIDISM: ICD-10-CM

## 2023-12-05 LAB
BASOPHILS # BLD AUTO: 0.06 THOUSANDS/ÂΜL (ref 0–0.1)
BASOPHILS NFR BLD AUTO: 1 % (ref 0–1)
EOSINOPHIL # BLD AUTO: 0.19 THOUSAND/ÂΜL (ref 0–0.61)
EOSINOPHIL NFR BLD AUTO: 2 % (ref 0–6)
ERYTHROCYTE [DISTWIDTH] IN BLOOD BY AUTOMATED COUNT: 11.8 % (ref 11.6–15.1)
FERRITIN SERPL-MCNC: 108 NG/ML (ref 11–307)
HCT VFR BLD AUTO: 37.3 % (ref 34.8–46.1)
HGB BLD-MCNC: 12.7 G/DL (ref 11.5–15.4)
IMM GRANULOCYTES # BLD AUTO: 0.04 THOUSAND/UL (ref 0–0.2)
IMM GRANULOCYTES NFR BLD AUTO: 1 % (ref 0–2)
IRON SATN MFR SERPL: 21 % (ref 15–50)
IRON SERPL-MCNC: 63 UG/DL (ref 50–212)
LYMPHOCYTES # BLD AUTO: 2.17 THOUSANDS/ÂΜL (ref 0.6–4.47)
LYMPHOCYTES NFR BLD AUTO: 27 % (ref 14–44)
MCH RBC QN AUTO: 33 PG (ref 26.8–34.3)
MCHC RBC AUTO-ENTMCNC: 34 G/DL (ref 31.4–37.4)
MCV RBC AUTO: 97 FL (ref 82–98)
MONOCYTES # BLD AUTO: 0.49 THOUSAND/ÂΜL (ref 0.17–1.22)
MONOCYTES NFR BLD AUTO: 6 % (ref 4–12)
NEUTROPHILS # BLD AUTO: 5.16 THOUSANDS/ÂΜL (ref 1.85–7.62)
NEUTS SEG NFR BLD AUTO: 63 % (ref 43–75)
NRBC BLD AUTO-RTO: 0 /100 WBCS
PLATELET # BLD AUTO: 327 THOUSANDS/UL (ref 149–390)
PMV BLD AUTO: 10.2 FL (ref 8.9–12.7)
RBC # BLD AUTO: 3.85 MILLION/UL (ref 3.81–5.12)
TIBC SERPL-MCNC: 303 UG/DL (ref 250–450)
UIBC SERPL-MCNC: 240 UG/DL (ref 155–355)
WBC # BLD AUTO: 8.11 THOUSAND/UL (ref 4.31–10.16)

## 2023-12-05 PROCEDURE — 85025 COMPLETE CBC W/AUTO DIFF WBC: CPT

## 2023-12-05 PROCEDURE — 83540 ASSAY OF IRON: CPT

## 2023-12-05 PROCEDURE — 36415 COLL VENOUS BLD VENIPUNCTURE: CPT

## 2023-12-05 PROCEDURE — 83550 IRON BINDING TEST: CPT

## 2023-12-05 PROCEDURE — 82728 ASSAY OF FERRITIN: CPT

## 2023-12-08 ENCOUNTER — APPOINTMENT (OUTPATIENT)
Dept: LAB | Facility: CLINIC | Age: 43
End: 2023-12-08
Payer: COMMERCIAL

## 2023-12-08 ENCOUNTER — HOSPITAL ENCOUNTER (OUTPATIENT)
Dept: RADIOLOGY | Facility: IMAGING CENTER | Age: 43
End: 2023-12-08
Payer: COMMERCIAL

## 2023-12-08 ENCOUNTER — OFFICE VISIT (OUTPATIENT)
Dept: HEMATOLOGY ONCOLOGY | Facility: CLINIC | Age: 43
End: 2023-12-08
Payer: COMMERCIAL

## 2023-12-08 VITALS
HEIGHT: 63 IN | OXYGEN SATURATION: 99 % | SYSTOLIC BLOOD PRESSURE: 112 MMHG | DIASTOLIC BLOOD PRESSURE: 70 MMHG | BODY MASS INDEX: 26.84 KG/M2 | WEIGHT: 151.5 LBS | TEMPERATURE: 98.7 F | RESPIRATION RATE: 17 BRPM | HEART RATE: 68 BPM

## 2023-12-08 DIAGNOSIS — E89.0 POSTOPERATIVE HYPOTHYROIDISM: Primary | ICD-10-CM

## 2023-12-08 DIAGNOSIS — N93.9 ABNORMAL UTERINE AND VAGINAL BLEEDING, UNSPECIFIED: ICD-10-CM

## 2023-12-08 DIAGNOSIS — E61.1 IRON DEFICIENCY: Primary | ICD-10-CM

## 2023-12-08 LAB
ALBUMIN SERPL BCP-MCNC: 4.1 G/DL (ref 3.5–5)
ALP SERPL-CCNC: 34 U/L (ref 34–104)
ALT SERPL W P-5'-P-CCNC: 20 U/L (ref 7–52)
ANION GAP SERPL CALCULATED.3IONS-SCNC: 3 MMOL/L
AST SERPL W P-5'-P-CCNC: 20 U/L (ref 13–39)
BILIRUB SERPL-MCNC: 0.31 MG/DL (ref 0.2–1)
BUN SERPL-MCNC: 18 MG/DL (ref 5–25)
CALCIUM SERPL-MCNC: 8.5 MG/DL (ref 8.4–10.2)
CHLORIDE SERPL-SCNC: 106 MMOL/L (ref 96–108)
CO2 SERPL-SCNC: 32 MMOL/L (ref 21–32)
CREAT SERPL-MCNC: 0.63 MG/DL (ref 0.6–1.3)
GFR SERPL CREATININE-BSD FRML MDRD: 111 ML/MIN/1.73SQ M
GLUCOSE P FAST SERPL-MCNC: 91 MG/DL (ref 65–99)
POTASSIUM SERPL-SCNC: 3.8 MMOL/L (ref 3.5–5.3)
PROT SERPL-MCNC: 6.3 G/DL (ref 6.4–8.4)
PTH-INTACT SERPL-MCNC: 12.4 PG/ML (ref 12–88)
SODIUM SERPL-SCNC: 141 MMOL/L (ref 135–147)
T4 FREE SERPL-MCNC: 1.33 NG/DL (ref 0.61–1.12)
TSH SERPL DL<=0.05 MIU/L-ACNC: 0.08 UIU/ML (ref 0.45–4.5)

## 2023-12-08 PROCEDURE — 84443 ASSAY THYROID STIM HORMONE: CPT

## 2023-12-08 PROCEDURE — 80053 COMPREHEN METABOLIC PANEL: CPT

## 2023-12-08 PROCEDURE — 36415 COLL VENOUS BLD VENIPUNCTURE: CPT

## 2023-12-08 PROCEDURE — 83970 ASSAY OF PARATHORMONE: CPT

## 2023-12-08 PROCEDURE — 84439 ASSAY OF FREE THYROXINE: CPT

## 2023-12-08 PROCEDURE — 99214 OFFICE O/P EST MOD 30 MIN: CPT | Performed by: PHYSICIAN ASSISTANT

## 2023-12-08 PROCEDURE — 76856 US EXAM PELVIC COMPLETE: CPT

## 2023-12-08 PROCEDURE — 76830 TRANSVAGINAL US NON-OB: CPT

## 2023-12-08 NOTE — PROGRESS NOTES
Hematology/Oncology Outpatient Follow-up  Hansel Almanza 41 y.o. female 1980 [id number]    Date:  12/8/2023      Assessment and Plan:  1. Iron deficiency  41year old female presents for a follow up regarding iron deficiency. She previously had 6 Venofer infusions since August 2023, which she tolerated well. Prior to her infusions, labs on 8/1/2023 showed a Ferritin of 9. As of 12/5/2023, her Ferritin has increased to 108. Patient is currently following up with OBGYN and GI to investigate sources of iron deficiency. She has been taking Prilosec for 2-3 years for GERD. Discussed that this could potentially be a source of iron malabsorption in addition to her menstrual cycles. As the cause of iron deficiency is currently undetermined, will monitor labs Q3 months and consider iron infusion as necessary.     - CBC and differential; Standing  - Iron Panel (Includes Ferritin, Iron Sat%, Iron, and TIBC); Standing  - CBC and differential  - Iron Panel (Includes Ferritin, Iron Sat%, Iron, and TIBC)   - Continue to follow up with OBGYN and GI   - Get labs every 3 months or if symptoms return  - Follow up in 1 year       HPI:  41year old female presents for a follow up regarding iron deficiency. She was first seen in consultation for iron deficiency on 8/4/2023. She previously failed PO iron secondary to chronic constipation with IBS-C. At this time she had a Ferritin of 9 (on 8/1/2023). She received 6 Venofer infusions that she overall tolerated well. She did note feeling lightheaded during one of the infusions, but felt okay since she was sitting down. She also noted feeling slightly jittery after some infusions. She did not have any allergic reactions or itchiness. Today she notes drastic improvements in her symptoms. She has less restless legs and is sleeping better at night. She also notes less fatigue and is able to exercise again, which she is happy about.      Per patient, GI provider is aware of her new iron deficiency and recommends getting another routine colonoscopy in 3 years. Her last colonoscopy and EGD was in 2021, both WNL. She continues to follow up with OBGYN as well. Her menstrual cycles start out heavy, but she notes this is not any change from her baseline. She has a follow up [address] with OBGYN today. Interval history:   RLS has resolved   Has been on PPI for about 2 years   Able to exercise now   Last infusion on 9/15/23     ROS: Review of Systems   Constitutional:  Negative for activity change and fatigue. Respiratory:  Negative for cough and shortness of breath. Cardiovascular:  Negative for chest pain. Gastrointestinal:  Positive for abdominal pain (related to IBS-C). Negative for blood in stool. Skin:  Negative for color change and pallor. Neurological:  Positive for headaches (hx of migraines). Negative for dizziness and light-headedness. Hematological:  Does not bruise/bleed easily.        Past Medical History:   Diagnosis Date    Allergic     Anxiety     Cancer ([address]) 2/1/2023    Thyroid- Papillary carcinoma    COVID-19 01/2022    mild s/s-not hospitalized, not vaccinated    GERD (gastroesophageal reflux disease)     Headache(784.0)     History of IBS     with constipation    Ingrown toenail     Irritable bowel syndrome     Migraines     Chronic per pt    Plantar fasciitis     Thyroid carcinoma ([address])     Varicella     Venereal wart 08/07/2014       Past Surgical History:   Procedure Laterality Date    CERVICAL BIOPSY  W/ LOOP ELECTRODE EXCISION  2017    COLONOSCOPY      NASAL SEPTUM SURGERY      CT OSTEOT W/WO [facility] SHRT/CORRJ 1ST METAR Left 12/30/2022    Procedure: OSTEOTOMY METATARSAL 1st;  Surgeon: La Howell DPM;  Location: [facility];  Service: Podiatry    THYROIDECTOMY N/A 2/24/2023    Procedure: TOTAL THYROIDECTOMY;  Surgeon: Slade Staley MD;  Location: [facility];  Service: Surgical Oncology    TONSILLECTOMY      UPPER GASTROINTESTINAL ENDOSCOPY      US GUIDED THYROID BIOPSY  1/31/2023    WISDOM TOOTH EXTRACTION         Social History     Socioeconomic History    Marital status: Single     Spouse name: None    Number of children: None    Years of education: None    Highest education level: None   Occupational History    None   Tobacco Use    Smoking status: Never    Smokeless tobacco: Never   Vaping Use    Vaping Use: Never used   Substance and Sexual Activity    Alcohol use:  Yes     Alcohol/week: 2.0 standard drinks of alcohol     Types: 2 Glasses of wine per week     Comment: 2 glasses wine a week    Drug use: Never    Sexual activity: Not Currently   Other Topics Concern    None   Social History Narrative    None     Social Determinants of Health     Financial Resource Strain: Not on file   Food Insecurity: Not on file   Transportation Needs: Not on file   Physical Activity: Not on file   Stress: Not on file   Social Connections: Not on file   Intimate Partner Violence: Not on file   Housing Stability: Not on file       Family History   Problem Relation Age of Onset    Hypertension Mother     Arthritis Mother     Thyroid disease Mother     Osteoarthritis Mother     Transient ischemic attack Father     Hypertension Father     Stroke Father     Vision loss Father     Rashes / Skin problems Sister         Shingles    Migraines Sister     No Known Problems Sister     Uterine cancer Maternal Grandmother     Cancer Maternal Grandmother         Uterine    Colon cancer Maternal Grandfather     Cancer Maternal Grandfather         Colon    Lung cancer Paternal Grandmother 61    Cancer Paternal Grandmother         Lung    Heart attack Paternal Grandfather     Colon cancer Cousin 27    Breast cancer Maternal Aunt     Breast cancer Maternal Aunt     Breast cancer Maternal Aunt        No Known Allergies      Current Outpatient Medications:     acetaminophen (TYLENOL) 500 mg tablet, Take 1,000 mg by mouth every 6 (six) hours as needed for mild pain, Disp: , Rfl:     bisacodyl (DULCOLAX) 5 mg EC tablet, Take 2 tablets (10 mg total) by mouth 2 (two) times a day as needed for constipation, Disp: 60 tablet, Rfl: 1    calcitriol (ROCALTROL) 0.25 mcg capsule, TAKE ONE CAPSULE BY MOUTH 2 TIMES A DAY, Disp: 180 capsule, Rfl: 0    clindamycin (CLEOCIN T) 1 % lotion, in the morning, Disp: , Rfl:     Galcanezumab-gnlm 120 MG/ML SOAJ, Inject 120 mg under the skin every 28 days, Disp: 1 mL, Rfl: 11    Levocetirizine Dihydrochloride (XYZAL PO), Take by mouth daily at bedtime, Disp: , Rfl:     levothyroxine (Synthroid) 125 mcg tablet, Take 1 tablet (125 mcg total) by mouth daily, Disp: 30 tablet, Rfl: 3    Melatonin 5 MG TABS, Take by mouth as needed, Disp: , Rfl:     meloxicam (MOBIC) 15 mg tablet, Take 15 mg by mouth daily as needed, Disp: , Rfl:     Multiple Vitamin (multivitamin) tablet, Take 1 tablet by mouth daily, Disp: , Rfl:     omeprazole (PriLOSEC) 40 MG capsule, Take 1 capsule (40 mg total) by mouth daily, Disp: 30 capsule, Rfl: 4    Plecanatide (Trulance) 3 MG TABS, Take 3 mg by mouth in the morning, Disp: 30 tablet, Rfl: 1    propranolol (INDERAL) 10 mg tablet, Decrease to 30 mg PO BID for 1 week, then 20 mg BID for 1 week, then 10 mg BID for 1 week and then can stop, Disp: 108 tablet, Rfl: 4    rimegepant sulfate (Nurtec) 75 mg TBDP, Take one NURTEC 75 mg at onset under tongue. Limit 1 in 24 hours. , Disp: 16 tablet, Rfl: 11    rizatriptan (MAXALT) 10 mg tablet, Take 1 tablet (10 mg total) by mouth once as needed for migraine May repeat in 2 hours if needed.  Max 2/24 hours, 9/month., Disp: 9 tablet, Rfl: 12    zolpidem (AMBIEN) 5 mg tablet, Take 1 tablet (5 mg total) by mouth daily at bedtime as needed for sleep, Disp: 30 tablet, Rfl: 0    acetaZOLAMIDE (DIAMOX) 125 mg tablet, 125 mg PO nightly for 1 week, then increase to 125 mg BID for 1 week, then 125 mg in am and 250 mg in pm for 1 week, then 250 mg BID (Patient not taking: Reported on 11/6/2023), Disp: 120 tablet, Rfl: 6    calcium citrate (CALCITRATE) 950 (200 Ca) MG tablet, Take 1 tablet (950 mg total) by mouth 2 (two) times a day, Disp: 120 tablet, Rfl: 0    cholecalciferol (VITAMIN D3) 1,000 units tablet, Take 1 tablet (1,000 Units total) by mouth daily Do not start before March 1, 2023., Disp: 30 tablet, Rfl: 0    lubiprostone (AMITIZA) 24 mcg capsule, Take 1 capsule (24 mcg total) by mouth 2 (two) times a day with meals (Patient not taking: Reported on 11/28/2023), Disp: 180 capsule, Rfl: 2    Prucalopride Succinate 2 MG TABS, Take 2 mg by mouth in the morning (Patient not taking: Reported on 11/6/2023), Disp: 30 tablet, Rfl: 1      Physical Exam:  /70 (BP Location: Left arm, Patient Position: Sitting, Cuff Size: Adult)   Pulse 68   Temp 98.7 °F (37.1 °C)   Resp 17   Ht 5' 3" (1.6 m)   Wt 68.7 kg (151 lb 8 oz)   SpO2 99%   BMI 26.84 kg/m²     Physical Exam  Constitutional:       Appearance: Normal appearance. HENT:      Head: Normocephalic and atraumatic. Eyes:      General: No scleral icterus. Cardiovascular:      Rate and Rhythm: Normal rate and regular rhythm. Pulses: Normal pulses. Heart sounds: Normal heart sounds. No murmur heard. Pulmonary:      Effort: Pulmonary effort is normal. No respiratory distress. Breath sounds: Normal breath sounds. No wheezing. Abdominal:      General: There is no distension. Palpations: Abdomen is soft. Tenderness: There is no abdominal tenderness. Musculoskeletal:      Cervical back: Normal range of motion. Skin:     General: Skin is warm and dry. Coloration: Skin is not jaundiced or pale. Neurological:      General: No focal deficit present. Mental Status: She is alert.    Psychiatric:         Mood and Affect: Mood normal.         Behavior: Behavior normal.       Labs:  Lab Results   Component Value Date    WBC 8.11 12/05/2023    HGB 12.7 12/05/2023    HCT 37.3 12/05/2023    MCV 97 12/05/2023     12/05/2023     I have spent 20 minutes with Patient  today in which greater than 50% of this time was spent in counseling/coordination of care regarding Diagnostic results, Risks and benefits of tx options, Instructions for management, Impressions, Documenting in the medical record, Reviewing / ordering tests, medicine, procedures  , and Obtaining or reviewing history  . Patient voiced understanding and agreement in the above discussion. Aware to contact our office with questions/symptoms in the interim. This note has been generated by voice recognition software system. Therefore, there may be spelling, grammar, and or syntax errors. Please contact if questions arise.

## 2023-12-22 ENCOUNTER — CONSULT (OUTPATIENT)
Dept: PAIN MEDICINE | Facility: CLINIC | Age: 43
End: 2023-12-22
Payer: COMMERCIAL

## 2023-12-22 ENCOUNTER — APPOINTMENT (OUTPATIENT)
Dept: RADIOLOGY | Age: 43
End: 2023-12-22
Payer: COMMERCIAL

## 2023-12-22 VITALS
WEIGHT: 145.5 LBS | HEIGHT: 63 IN | DIASTOLIC BLOOD PRESSURE: 69 MMHG | HEART RATE: 72 BPM | BODY MASS INDEX: 25.78 KG/M2 | SYSTOLIC BLOOD PRESSURE: 105 MMHG

## 2023-12-22 DIAGNOSIS — G89.29 CHRONIC LEFT-SIDED THORACIC BACK PAIN: Primary | ICD-10-CM

## 2023-12-22 DIAGNOSIS — M25.521 RIGHT ELBOW PAIN: ICD-10-CM

## 2023-12-22 DIAGNOSIS — M54.6 CHRONIC LEFT-SIDED THORACIC BACK PAIN: Primary | ICD-10-CM

## 2023-12-22 DIAGNOSIS — M79.18 MYOFASCIAL PAIN: ICD-10-CM

## 2023-12-22 PROCEDURE — 99244 OFF/OP CNSLTJ NEW/EST MOD 40: CPT | Performed by: ANESTHESIOLOGY

## 2023-12-22 PROCEDURE — 73080 X-RAY EXAM OF ELBOW: CPT

## 2023-12-22 RX ORDER — TIZANIDINE 2 MG/1
2 TABLET ORAL 2 TIMES DAILY PRN
Qty: 60 TABLET | Refills: 1 | Status: SHIPPED | OUTPATIENT
Start: 2023-12-22

## 2023-12-22 NOTE — PROGRESS NOTES
Assessment  1. Chronic left-sided thoracic back pain    2. Right elbow pain    3. Myofascial pain        Plan  43-year-old female with a history of papillary thyroid cancer status post thyroidectomy, migraines, referred by Dr. Jaffe of neurology, presenting for initial consultation regarding left-sided lower thoracic back pain with occasional numbness and paresthesias.  She will also occasionally get some numbness and paresthesias in her legs as well as occasional shooting pains in the posterior aspects of her knees.  She has a secondary complaint of localized right elbow pain.  She has had the left-sided thoracic back pain for the past 7 months and denies any trauma or inciting event.  Right elbow pain has waxed and waned over the years.  X-ray of the thoracic and lumbar spine were unremarkable.  She does not have any imaging of the right elbow.  The patient did find some relief with physical therapy although this was transient.  She attended from August 14, 2023 through November 8, 2023.  She has tried Tylenol and meloxicam without much relief.  He has also tried some muscle relaxants in the past without much relief.  She is unable to recall the names of them.    1. I will order an MRI of the thoracic spine with and without contrast considering history of thyroid cancer  2.  I will order an x-ray of the right elbow  3.  I will prescribe a trial of tizanidine 2 mg twice daily as needed for her myofascial pain  4.  I did offer the patient a referral back to physical therapy, however the patient declined as she is currently working quite a bit, therefore she will continue with her home exercise program  5.  I will follow-up with the patient in 6 weeks      My impressions and treatment recommendations were discussed in detail with the patient who verbalized understanding and had no further questions.  Discharge instructions were provided. I personally saw and examined the patient and I agree with the above  discussed plan of care.    No orders of the defined types were placed in this encounter.    No orders of the defined types were placed in this encounter.      History of Present Illness    Asmita ESPINOZA Bem is a 43 y.o. female with a history of papillary thyroid cancer status post thyroidectomy, migraines, referred by Dr. Jaffe of neurology, presenting for initial consultation regarding left-sided lower thoracic back pain with occasional numbness and paresthesias.  She will also occasionally get some numbness and paresthesias in her legs as well as occasional shooting pains in the posterior aspects of her knees.  She has a secondary complaint of localized right elbow pain.  She has had the left-sided thoracic back pain for the past 7 months and denies any trauma or inciting event.  Right elbow pain has waxed and waned over the years.  She denies any bladder or bowel incontinence or saddle anesthesia.  Denies any lower extremity weakness.  X-ray of the thoracic and lumbar spine were unremarkable.  She does not have any imaging of the right elbow.  The patient did find some relief with physical therapy although this was transient.  She has tried Tylenol and meloxicam without much relief.  He has also tried some muscle relaxants in the past without much relief.  She is unable to recall the names of them.  The patient rates her pain a 6 out of 10 and the pain is nearly constant.  The pain is worse in the evening and at night.  The pain is described as shooting, numbness, sharp, pins-and-needles, pressure-like, throbbing, dull, aching.  The pain is decreased with lying down, standing, walking, and exercise.  The pain is increased with bending, sitting, coughing, and sneezing.  She does find some relief with heat and ice application as well.    Other than as stated above, the patient denies any interval changes in medications, medical condition, mental condition, symptoms, or allergies since the last office visit.    I have  personally reviewed and/or updated the patient's past medical history, past surgical history, family history, social history, current medications, allergies, and vital signs today.     Review of Systems   Constitutional:  Positive for unexpected weight change. Negative for fever.   HENT:  Negative for trouble swallowing.    Eyes:  Positive for visual disturbance.   Respiratory:  Negative for shortness of breath and wheezing.    Cardiovascular:  Negative for chest pain and palpitations.   Gastrointestinal:  Negative for constipation, diarrhea, nausea and vomiting.   Endocrine: Positive for polydipsia. Negative for cold intolerance and heat intolerance.   Genitourinary:  Negative for difficulty urinating and frequency.   Musculoskeletal:  Positive for arthralgias and myalgias. Negative for gait problem and joint swelling.   Skin:  Negative for rash.   Neurological:  Positive for numbness and headaches. Negative for dizziness, seizures, syncope and weakness.   Hematological:  Does not bruise/bleed easily.   Psychiatric/Behavioral:  Positive for decreased concentration. Negative for dysphoric mood.    All other systems reviewed and are negative.      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)    Hyperlipidemia    Iron deficiency    Encounter for follow-up surveillance of thyroid cancer       Past Medical History:   Diagnosis Date    Allergic     Anxiety     Cancer (HCC) 2/1/2023    Thyroid- Papillary carcinoma    COVID-19 01/2022    mild s/s-not hospitalized, not  vaccinated    GERD (gastroesophageal reflux disease)     Headache(784.0)     History of IBS     with constipation    Ingrown toenail     Irritable bowel syndrome     Migraines     Chronic per pt    Plantar fasciitis     Thyroid carcinoma (HCC)     Varicella     Venereal wart 08/07/2014       Past Surgical History:   Procedure Laterality Date    CERVICAL BIOPSY  W/ LOOP ELECTRODE EXCISION  2017    COLONOSCOPY      NASAL SEPTUM SURGERY      ME OSTEOT W/WO LNGTH SHRT/CORRJ 1ST METAR Left 12/30/2022    Procedure: OSTEOTOMY METATARSAL 1st;  Surgeon: aTiwo Harrington DPM;  Location: [facility];  Service: Podiatry    THYROIDECTOMY N/A 2/24/2023    Procedure: TOTAL THYROIDECTOMY;  Surgeon: Vince Carey MD;  Location: [facility];  Service: Surgical Oncology    TONSILLECTOMY      UPPER GASTROINTESTINAL ENDOSCOPY      US GUIDED THYROID BIOPSY  1/31/2023    WISDOM TOOTH EXTRACTION         Family History   Problem Relation Age of Onset    Hypertension Mother     Arthritis Mother     Thyroid disease Mother     Osteoarthritis Mother     Transient ischemic attack Father     Hypertension Father     Stroke Father     Vision loss Father     Rashes / Skin problems Sister         Shingles    Migraines Sister     No Known Problems Sister     Uterine cancer Maternal Grandmother 20    Cancer Maternal Grandmother         Uterine    Colon cancer Maternal Grandfather 60    Cancer Maternal Grandfather         Colon    Lung cancer Paternal Grandmother 60    Cancer Paternal Grandmother         Lung    Heart attack Paternal Grandfather     Colon cancer Cousin 30    Breast cancer Maternal Aunt     Breast cancer Maternal Aunt     Breast cancer Maternal Aunt        Social History     Occupational History    Not on file   Tobacco Use    Smoking status: Never    Smokeless tobacco: Never   Vaping Use    Vaping status: Never Used   Substance and Sexual Activity    Alcohol use: Yes     Alcohol/week: 2.0 standard drinks of alcohol     Types: 2 Glasses  of wine per week     Comment: 2 glasses wine a week    Drug use: Never    Sexual activity: Not Currently       Current Outpatient Medications on File Prior to Visit   Medication Sig    acetaminophen (TYLENOL) 500 mg tablet Take 1,000 mg by mouth every 6 (six) hours as needed for mild pain    acetaZOLAMIDE (DIAMOX) 125 mg tablet 125 mg PO nightly for 1 week, then increase to 125 mg BID for 1 week, then 125 mg in am and 250 mg in pm for 1 week, then 250 mg BID (Patient not taking: Reported on 11/6/2023)    bisacodyl (DULCOLAX) 5 mg EC tablet Take 2 tablets (10 mg total) by mouth 2 (two) times a day as needed for constipation    calcitriol (ROCALTROL) 0.25 mcg capsule TAKE ONE CAPSULE BY MOUTH 2 TIMES A DAY    calcium citrate (CALCITRATE) 950 (200 Ca) MG tablet Take 1 tablet (950 mg total) by mouth 2 (two) times a day    cholecalciferol (VITAMIN D3) 1,000 units tablet Take 1 tablet (1,000 Units total) by mouth daily Do not start before March 1, 2023.    clindamycin (CLEOCIN T) 1 % lotion in the morning    Galcanezumab-gnlm 120 MG/ML SOAJ Inject 120 mg under the skin every 28 days    Levocetirizine Dihydrochloride (XYZAL PO) Take by mouth daily at bedtime    levothyroxine (Synthroid) 125 mcg tablet Take 1 tablet (125 mcg total) by mouth daily    lubiprostone (AMITIZA) 24 mcg capsule Take 1 capsule (24 mcg total) by mouth 2 (two) times a day with meals (Patient not taking: Reported on 11/28/2023)    Melatonin 5 MG TABS Take by mouth as needed    meloxicam (MOBIC) 15 mg tablet Take 15 mg by mouth daily as needed    Multiple Vitamin (multivitamin) tablet Take 1 tablet by mouth daily    omeprazole (PriLOSEC) 40 MG capsule Take 1 capsule (40 mg total) by mouth daily    Plecanatide (Trulance) 3 MG TABS Take 3 mg by mouth in the morning    propranolol (INDERAL) 10 mg tablet Decrease to 30 mg PO BID for 1 week, then 20 mg BID for 1 week, then 10 mg BID for 1 week and then can stop    Prucalopride Succinate 2 MG TABS Take 2 mg  "by mouth in the morning (Patient not taking: Reported on 11/6/2023)    rimegepant sulfate (Nurtec) 75 mg TBDP Take one NURTEC 75 mg at onset under tongue. Limit 1 in 24 hours.    rizatriptan (MAXALT) 10 mg tablet Take 1 tablet (10 mg total) by mouth once as needed for migraine May repeat in 2 hours if needed. Max 2/24 hours, 9/month.    zolpidem (AMBIEN) 5 mg tablet Take 1 tablet (5 mg total) by mouth daily at bedtime as needed for sleep     No current facility-administered medications on file prior to visit.       No Known Allergies    Physical Exam    /69   Pulse 72   Ht 5' 3\" (1.6 m)   Wt 66 kg (145 lb 8 oz)   BMI 25.77 kg/m²     Constitutional: normal, well developed, well nourished, alert, in no distress and non-toxic and no overt pain behavior.  Eyes: anicteric  HEENT: grossly intact  Neck: supple, symmetric, trachea midline and no masses   Pulmonary:even and unlabored  Cardiovascular:No edema or pitting edema present  Skin:Normal without rashes or lesions and well hydrated  Psychiatric:Mood and affect appropriate  Neurologic:Cranial Nerves II-XII grossly intact  Musculoskeletal:normal gait.  Bilateral thoracic paraspinal musculature tender to palpation worse on the left from T7-T12.  Bilateral lumbar paraspinal musculature and SI joints nontender to palpation.  Bilateral patellar and Achilles reflexes were 2 out of 4 and symmetrical.  No clonus is noted bilaterally.  Bilateral lower extremity strength 5 out of 5 in all muscle groups.  Sensation intact to light touch in L3-S1 dermatomes bilaterally.  Negative straight leg raise bilaterally.  Negative Vu's test bilaterally.  Full range of motion of right elbow in all planes.  No evidence of edema or erythema of the elbow.    Imaging      PACS Images     Show images for XR spine lumbar minimum 4 views non injury  Study Result    Narrative & Impression   LUMBAR SPINE     INDICATION:   M54.50: Low back pain, unspecified.     COMPARISON:  None   "   VIEWS:  XR SPINE LUMBAR MINIMUM 4 VIEWS NON INJURY        FINDINGS:     There are 5 non rib bearing lumbar vertebral bodies.     There is no evidence of acute fracture or destructive osseous lesion.     Alignment is unremarkable.     No significant lumbar degenerative change noted.     The pedicles appear intact.     Soft tissues are unremarkable.     IMPRESSION:     Normal examination.        Workstation performed: [id number]       Study Result    Narrative & Impression   THORACIC SPINE     INDICATION:   M54.6: Pain in thoracic spine.     COMPARISON:  None     VIEWS:  XR SPINE THORACIC 3 VW        FINDINGS:     There is no fracture or pathologic bone lesion.     Thoracic vertebral alignment is within normal limits.     No significant degenerative changes.     There is no displacement of the paraspinal line.     The pedicles appear intact.     IMPRESSION:     No acute osseous abnormality.     Workstation performed: [id number]

## 2023-12-25 DIAGNOSIS — F51.01 PRIMARY INSOMNIA: ICD-10-CM

## 2023-12-27 RX ORDER — ZOLPIDEM TARTRATE 5 MG/1
5 TABLET ORAL
Qty: 30 TABLET | Refills: 0 | Status: SHIPPED | OUTPATIENT
Start: 2023-12-27

## 2024-01-01 DIAGNOSIS — R20.2 PARESTHESIAS: ICD-10-CM

## 2024-01-03 RX ORDER — CALCITRIOL 0.25 UG/1
CAPSULE, LIQUID FILLED ORAL
Qty: 180 CAPSULE | Refills: 0 | Status: SHIPPED | OUTPATIENT
Start: 2024-01-03

## 2024-01-12 ENCOUNTER — HOSPITAL ENCOUNTER (OUTPATIENT)
Dept: RADIOLOGY | Age: 44
Discharge: HOME | End: 2024-01-12
Payer: COMMERCIAL

## 2024-01-12 ENCOUNTER — HOSPITAL ENCOUNTER (OUTPATIENT)
Dept: RADIOLOGY | Facility: HOSPITAL | Age: 44
Discharge: HOME | End: 2024-01-12
Attending: ANESTHESIOLOGY
Payer: COMMERCIAL

## 2024-01-12 VITALS — BODY MASS INDEX: 25.69 KG/M2 | WEIGHT: 145 LBS | HEIGHT: 63 IN

## 2024-01-12 DIAGNOSIS — G89.29 CHRONIC LEFT-SIDED THORACIC BACK PAIN: ICD-10-CM

## 2024-01-12 DIAGNOSIS — Z12.31 VISIT FOR SCREENING MAMMOGRAM: ICD-10-CM

## 2024-01-12 DIAGNOSIS — M54.6 CHRONIC LEFT-SIDED THORACIC BACK PAIN: ICD-10-CM

## 2024-01-12 PROCEDURE — 77063 BREAST TOMOSYNTHESIS BI: CPT

## 2024-01-12 PROCEDURE — 77067 SCR MAMMO BI INCL CAD: CPT

## 2024-01-12 PROCEDURE — 72157 MRI CHEST SPINE W/O & W/DYE: CPT

## 2024-01-12 PROCEDURE — A9585 GADOBUTROL INJECTION: HCPCS | Performed by: ANESTHESIOLOGY

## 2024-01-12 PROCEDURE — G1004 CDSM NDSC: HCPCS

## 2024-01-12 RX ORDER — GADOBUTROL 604.72 MG/ML
7 INJECTION INTRAVENOUS
Status: COMPLETED | OUTPATIENT
Start: 2024-01-12 | End: 2024-01-12

## 2024-01-12 RX ADMIN — GADOBUTROL 7 ML: 604.72 INJECTION INTRAVENOUS at 13:50

## 2024-01-20 DIAGNOSIS — G43.109 MIGRAINE WITH AURA AND WITHOUT STATUS MIGRAINOSUS, NOT INTRACTABLE: ICD-10-CM

## 2024-01-22 RX ORDER — GALCANEZUMAB 120 MG/ML
INJECTION, SOLUTION SUBCUTANEOUS
Qty: 1 ML | Refills: 11 | Status: SHIPPED | OUTPATIENT
Start: 2024-01-22

## 2024-01-23 ENCOUNTER — OFFICE VISIT (OUTPATIENT)
Dept: SLEEP CENTER | Facility: CLINIC | Age: 44
End: 2024-01-23
Payer: COMMERCIAL

## 2024-01-23 VITALS
HEIGHT: 63 IN | DIASTOLIC BLOOD PRESSURE: 69 MMHG | WEIGHT: 148 LBS | SYSTOLIC BLOOD PRESSURE: 108 MMHG | BODY MASS INDEX: 26.22 KG/M2

## 2024-01-23 DIAGNOSIS — F51.01 PRIMARY INSOMNIA: ICD-10-CM

## 2024-01-23 DIAGNOSIS — F51.04 CHRONIC INSOMNIA: Primary | ICD-10-CM

## 2024-01-23 PROCEDURE — 99214 OFFICE O/P EST MOD 30 MIN: CPT | Performed by: PSYCHIATRY & NEUROLOGY

## 2024-01-23 RX ORDER — ZOLPIDEM TARTRATE 5 MG/1
5 TABLET ORAL
Qty: 30 TABLET | Refills: 0 | Status: SHIPPED | OUTPATIENT
Start: 2024-01-23

## 2024-01-23 NOTE — PROGRESS NOTES
Assessment/Plan:    1. Chronic insomnia        Asmita is much improved since her last visit, she has had marked improvement from cognitive behavioral therapy for insomnia.  I am happy that she was able to reduce her dose of zolpidem to 5 mg without worsening of her sleep.  We discussed that if she continues to do well, it would be reasonable to try to reduce her dose to 2.5 mg in the late spring.  Ultimately, certainly it would be ideal to discontinue this open at some point in the future, if possible.    We discussed that if her sleep worsens, I will review refer her back to a sleep therapist.  Pedro Jansen has left the network but there is a new provider who has taken his place.  It is unlikely that Asmita would need a full course of CBT-I in the future if she had worsening but sometimes follow-up visits are sometimes helpful    She rates herself as excessively sleepy but it does not cause her much problem.  Therefore we will watch this symptom.  Of note, she is very slightly sleep deprived due to an early start time at work which could be contributing.    With regard to iron deficiency, she should continue to follow-up with hematology, she noted significant improvement after iron infusions last year.    We discussed that with regard to zolpidem, at present this is prescribed by her primary care provider.  I could take over this prescription if needed as long as Asmita follows up with me long-term.  I will plan to see her back in 6 months    Subjective:      Patient ID: Asmita ESPINOZA Bem is a 43 y.o. female.    HPI  Chief [id number] of insomnia, for the most part she does not have much concern after improving through CBT-I    This is a 43-year-old female who returns in a follow-up visit, I last saw her in August 2023.  At that visit we discussed that she was referred by Dr. Jaffe, the patient has headaches and suspected idiopathic intracranial hypertension.  A home sleep study did not show obstructive sleep  "apnea as a respiratory event index was 0.7, she took zolpidem the night of that test.    We discussed chronic insomnia at her previous visit, we discussed that the optimal dose for zolpidem in women was 5 mg, not 10 mg.  We also discussed cognitive behavioral therapy for insomnia as a treatment option.  I referred her to Pedro Jansen and she completed the program with him, she describes a very significant improvement.    She also had some degree of restless legs, I ordered an iron panel.  This showed iron deficiency, she was referred to hematology and underwent intravenous iron.      She last saw Dr. Jaffe in November..  Notes from Dr. Jaffe describe that the patient completed CBT-I and it was \"almost magical\", she was sleeping much better.    After she completed CBT-I , Asmita reduced zolpidem to 5 mg and that transition went well, she continues to take zolpidem 5 mg each night.  She notes that she has been on zolpidem for many years.    She has changed to a full-time 7 AM to 5 PM schedule IV days a week, and ultrasound.  She is happy with this change.    Overall she is going to bed at 10:30 PM, asleep around 11 pm.  Wakes 2-3 times, returns to sleep in minutes.  Wakes at 5 am to an alarm.  Weekends goes to sleep around  pm .  up at 730 am     Mostly refreshed when she wakes. [employer] Sleepiness Scale score is 11.  Rarely naps.  Not dozing.  No drowsy driving    She notes she falls asleep faster and stays asleep longer than before.  Sleep worsened after the holidays.     She has not had restless legs lately.  She notes that she is on her feet a lot and has spider veins in her legs, there is some tingling around those areas.    Caffeine- 2 cups of coffee a day  Occaisonal soda  Alcohol- 2 glasses of wine a week       [employer] Sleepiness Scale  Sitting and reading: Moderate chance of dozing  Watching TV: Slight chance of dozing  Sitting, inactive in a public place (e.g. a theatre or a meeting): Moderate " "chance of dozing  As a passenger in a car for an hour without a break: Moderate chance of dozing  Lying down to rest in the afternoon when circumstances permit: High chance of dozing  Sitting and talking to someone: Would never doze  Sitting quietly after a lunch without alcohol: Slight chance of dozing  In a car, while stopped for a few minutes in traffic: Would never doze  Total score: 11      Review of Systems    Headaches not as good.  Worse with weather changes.   No anxiety, no depression     Objective:      /69 (BP Location: Left arm, Patient Position: Sitting, Cuff Size: Large)   Ht 5' 3\" (1.6 m)   Wt 67.1 kg (148 lb)   LMP 12/26/2023 (Exact Date)   BMI 26.22 kg/m²          Physical Exam    RRR  Lungs CTA b/l    I have spent a total time of 32 minutes on 01/23/24 in caring for this patient including Prognosis, Risks and benefits of tx options, Instructions for management, Patient and family education, Importance of tx compliance, Risk factor reductions, Impressions, Counseling / Coordination of care, Documenting in the medical record, Reviewing / ordering tests, medicine, procedures  , and Obtaining or reviewing history  .   "

## 2024-01-31 ENCOUNTER — OFFICE VISIT (OUTPATIENT)
Dept: INTERNAL MEDICINE CLINIC | Facility: OTHER | Age: 44
End: 2024-01-31
Payer: COMMERCIAL

## 2024-01-31 VITALS
DIASTOLIC BLOOD PRESSURE: 70 MMHG | HEART RATE: 66 BPM | HEIGHT: 63 IN | OXYGEN SATURATION: 98 % | BODY MASS INDEX: 26.4 KG/M2 | SYSTOLIC BLOOD PRESSURE: 112 MMHG | WEIGHT: 149 LBS | TEMPERATURE: 97.8 F

## 2024-01-31 DIAGNOSIS — K21.9 GASTROESOPHAGEAL REFLUX DISEASE WITHOUT ESOPHAGITIS: ICD-10-CM

## 2024-01-31 DIAGNOSIS — F51.01 PRIMARY INSOMNIA: ICD-10-CM

## 2024-01-31 DIAGNOSIS — E89.2 HYPOPARATHYROIDISM AFTER PROCEDURE (HCC): ICD-10-CM

## 2024-01-31 DIAGNOSIS — K64.9 HEMORRHOIDS, UNSPECIFIED HEMORRHOID TYPE: Primary | ICD-10-CM

## 2024-01-31 DIAGNOSIS — K58.1 IRRITABLE BOWEL SYNDROME WITH CONSTIPATION: ICD-10-CM

## 2024-01-31 DIAGNOSIS — E78.5 HYPERLIPIDEMIA, UNSPECIFIED HYPERLIPIDEMIA TYPE: ICD-10-CM

## 2024-01-31 DIAGNOSIS — E89.0 POSTOPERATIVE HYPOTHYROIDISM: ICD-10-CM

## 2024-01-31 DIAGNOSIS — G43.709 CHRONIC MIGRAINE W/O AURA W/O STATUS MIGRAINOSUS, NOT INTRACTABLE: ICD-10-CM

## 2024-01-31 PROCEDURE — 99214 OFFICE O/P EST MOD 30 MIN: CPT | Performed by: NURSE PRACTITIONER

## 2024-01-31 RX ORDER — OMEPRAZOLE 40 MG/1
40 CAPSULE, DELAYED RELEASE ORAL DAILY
Qty: 90 CAPSULE | Refills: 1 | Status: SHIPPED | OUTPATIENT
Start: 2024-01-31

## 2024-01-31 RX ORDER — HYDROCORTISONE ACETATE 25 MG/1
25 SUPPOSITORY RECTAL 2 TIMES DAILY
Qty: 12 SUPPOSITORY | Refills: 0 | Status: SHIPPED | OUTPATIENT
Start: 2024-01-31

## 2024-01-31 NOTE — PROGRESS NOTES
Assessment/Plan:    Irritable bowel syndrome with constipation  Currently follows GI,  continues on Amitiza     Gastroesophageal reflux disease  Continue Prilosec     Postoperative hypothyroidism  S/P thyroidectomy, follows endocrine, continues on levothyroxine     Chronic migraine w/o aura w/o status migrainosus, not intractable  Uncontrolled, follows neurology.    Primary insomnia  Controlled on Ambien   Controlled sub agreement on file   Has consult scheduled with sleep medicine     Hyperlipidemia  Recommend healthy lifestyle choices for your cholesterol.  Low fat/low cholesterol diet.  Limit/avoid red meat.  Eat more lean meat - chicken breast, ground turkey, fish.  Exercise 30 mins at least 5 times a week as tolerated.                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 female.    Patient presents today for a 6 month follow up    She is a [employer] Employee and works as an .   Denies any new concerns.     IBS-constipation - currently followed by GI. Managed on Amatizia.  She is s/p colonoscopy and EGD, advised routine follow up at age 47.  Has been getting worse lately, she reports that she is due for follow up.    Small hiatal hernia with reflux - EGD 7/26/2021. Controlled on omeprazole 20mg daily      Migraines -as been worse since since having thyroidectomy, recently  had MRI and follow neurology     Insomnia- she is currently on Ambien 10mg prn     Hypothyroidism- take levothyroxine and follows endocrine     Was seen in June 2022 for low back pain which occurred after sitting at a concert,  and given Robaxin and prednisone with some relief of symptoms, sitting seems to make it worse, has been using lidocaine patches with no relief  Xray of lumbar and thoracic spine were done and both normal    Patient states she still has back pain  MRI done on 1/12- patient is seeing spine and pain    Patient states she has mouth and jaw pain after dental procedure(cracked filling)about a week ago, Patient states she has pain when biting down.                 The following portions of the patient's history were reviewed and updated as appropriate: allergies, current medications, past family history, past medical history, past social history, past surgical history, and problem list.    Review of Systems   Constitutional:  Negative for activity change, appetite change, chills, diaphoresis and fever.   HENT:  Negative for congestion, ear discharge, ear pain, postnasal drip, rhinorrhea, sinus pressure, sinus pain and sore throat.    Eyes:  Negative for pain, discharge, itching and visual disturbance.   Respiratory:  Negative for cough, chest tightness, shortness of breath and wheezing.    Cardiovascular:  Negative for chest pain, palpitations and leg swelling.   Gastrointestinal:  Negative for abdominal pain, constipation, diarrhea, nausea and vomiting.   Endocrine: Negative for polydipsia, polyphagia and polyuria.   Genitourinary:  Negative for difficulty urinating, dysuria and urgency.   Musculoskeletal:  Positive for arthralgias and back pain. Negative for neck pain.   Skin:  Negative for rash and wound.   Neurological:  Negative for dizziness, weakness, numbness and headaches.         Past Medical History:   Diagnosis Date    Allergic     Anxiety     Cancer (HCC) 2/1/2023    Thyroid- Papillary  carcinoma    COVID-19 01/2022    mild s/s-not hospitalized, not vaccinated    GERD (gastroesophageal reflux disease)     Headache(784.0)     History of IBS     with constipation    Ingrown toenail     Irritable bowel syndrome     Migraines     Chronic per pt    Plantar fasciitis     Thyroid carcinoma (HCC)     Varicella     Venereal wart 08/07/2014         Current Outpatient Medications:     acetaminophen (TYLENOL) 500 mg tablet, Take 1,000 mg by mouth every 6 (six) hours as needed for mild pain, Disp: , Rfl:     bisacodyl (DULCOLAX) 5 mg EC tablet, Take 2 tablets (10 mg total) by mouth 2 (two) times a day as needed for constipation, Disp: 60 tablet, Rfl: 1    calcitriol (ROCALTROL) 0.25 mcg capsule, TAKE ONE CAPSULE BY MOUTH 2 TIMES A DAY, Disp: 180 capsule, Rfl: 0    calcium citrate (CALCITRATE) 950 (200 Ca) MG tablet, Take 1 tablet (950 mg total) by mouth 2 (two) times a day, Disp: 120 tablet, Rfl: 0    cholecalciferol (VITAMIN D3) 1,000 units tablet, Take 1 tablet (1,000 Units total) by mouth daily Do not start before March 1, 2023., Disp: 30 tablet, Rfl: 0    clindamycin (CLEOCIN T) 1 % lotion, in the morning, Disp: , Rfl:     Emgality 120 MG/ML SOAJ, INJECT 120MG SUBCUTANEOUSLY (UNDER THE SKIN) EVERY 28 DAYS, Disp: 1 mL, Rfl: 11    hydrocortisone (ANUSOL-HC) 25 mg suppository, Insert 1 suppository (25 mg total) into the rectum 2 (two) times a day, Disp: 12 suppository, Rfl: 0    Levocetirizine Dihydrochloride (XYZAL PO), Take by mouth daily at bedtime, Disp: , Rfl:     levothyroxine (Synthroid) 125 mcg tablet, Take 1 tablet (125 mcg total) by mouth daily, Disp: 30 tablet, Rfl: 3    lubiprostone (AMITIZA) 24 mcg capsule, Take 1 capsule (24 mcg total) by mouth 2 (two) times a day with meals, Disp: 180 capsule, Rfl: 2    Melatonin 5 MG TABS, Take by mouth as needed, Disp: , Rfl:     Multiple Vitamin (multivitamin) tablet, Take 1 tablet by mouth daily, Disp: , Rfl:     omeprazole (PriLOSEC) 40 MG capsule, Take 1  capsule (40 mg total) by mouth daily, Disp: 90 capsule, Rfl: 1    rimegepant sulfate (Nurtec) 75 mg TBDP, Take one NURTEC 75 mg at onset under tongue. Limit 1 in 24 hours., Disp: 16 tablet, Rfl: 11    rizatriptan (MAXALT) 10 mg tablet, Take 1 tablet (10 mg total) by mouth once as needed for migraine May repeat in 2 hours if needed. Max 2/24 hours, 9/month., Disp: 9 tablet, Rfl: 12    tiZANidine (ZANAFLEX) 2 mg tablet, Take 1 tablet (2 mg total) by mouth 2 (two) times a day as needed for muscle spasms, Disp: 60 tablet, Rfl: 1    zolpidem (AMBIEN) 5 mg tablet, Take 1 tablet (5 mg total) by mouth daily at bedtime as needed for sleep, Disp: 30 tablet, Rfl: 0    acetaZOLAMIDE (DIAMOX) 125 mg tablet, 125 mg PO nightly for 1 week, then increase to 125 mg BID for 1 week, then 125 mg in am and 250 mg in pm for 1 week, then 250 mg BID (Patient not taking: Reported on 11/6/2023), Disp: 120 tablet, Rfl: 6    propranolol (INDERAL) 10 mg tablet, Decrease to 30 mg PO BID for 1 week, then 20 mg BID for 1 week, then 10 mg BID for 1 week and then can stop, Disp: 108 tablet, Rfl: 4    No Known Allergies    Social History   Past Surgical History:   Procedure Laterality Date    CERVICAL BIOPSY  W/ LOOP ELECTRODE EXCISION  2017    COLONOSCOPY      NASAL SEPTUM SURGERY      [place] OSTEOT W/WO LNGTH SHRT/CORRJ 1ST METAR Left 12/30/2022    Procedure: OSTEOTOMY METATARSAL 1st;  Surgeon: Taiwo Harrington DPM;  Location: [facility];  Service: Podiatry    THYROIDECTOMY N/A 2/24/2023    Procedure: TOTAL THYROIDECTOMY;  Surgeon: Vince Carey MD;  [facility]: [facility];  Service: Surgical Oncology    TONSILLECTOMY      UPPER GASTROINTESTINAL ENDOSCOPY      US GUIDED THYROID BIOPSY  1/31/2023    WISDOM TOOTH EXTRACTION       Family History   Problem Relation Age of Onset    Hypertension Mother     Arthritis Mother     Thyroid disease Mother     Osteoarthritis Mother     Transient ischemic attack Father     Hypertension Father     Stroke Father      "Vision loss Father     Rashes / Skin problems Sister         Shingles    Migraines Sister     No Known Problems Sister     Uterine cancer Maternal Grandmother 20    Cancer Maternal Grandmother         Uterine    Colon cancer Maternal Grandfather 60    Cancer Maternal Grandfather         Colon    Lung cancer Paternal Grandmother 60    Cancer Paternal Grandmother         Lung    Heart attack Paternal Grandfather     Colon cancer Cousin 30    Breast cancer Maternal Aunt     Breast cancer Maternal Aunt     Breast cancer Maternal Aunt        Objective:  /70 (BP Location: Left arm, Patient Position: Sitting, Cuff Size: Standard)   Pulse 66   Temp 97.8 °F (36.6 °C) (Temporal)   Ht 5' 3\" (1.6 m)   Wt 67.6 kg (149 lb)   LMP 12/26/2023 (Exact Date)   SpO2 98% Comment: room air  BMI 26.39 kg/m²     Recent Results (from the past 1344 hour(s))   Comprehensive metabolic panel Lab Collect    Collection Time: 12/08/23  8:44 AM   Result Value Ref Range    Sodium 141 135 - 147 mmol/L    Potassium 3.8 3.5 - 5.3 mmol/L    Chloride 106 96 - 108 mmol/L    CO2 32 21 - 32 mmol/L    ANION GAP 3 mmol/L    BUN 18 5 - 25 mg/dL    Creatinine 0.63 0.60 - 1.30 mg/dL    Glucose, Fasting 91 65 - 99 mg/dL    Calcium 8.5 8.4 - 10.2 mg/dL    AST 20 13 - 39 U/L    ALT 20 7 - 52 U/L    Alkaline Phosphatase 34 34 - 104 U/L    Total Protein 6.3 (L) 6.4 - 8.4 g/dL    Albumin 4.1 3.5 - 5.0 g/dL    Total Bilirubin 0.31 0.20 - 1.00 mg/dL    eGFR 111 ml/min/1.73sq m   PTH, intact Lab Collect Lab Collect    Collection Time: 12/08/23  8:44 AM   Result Value Ref Range    PTH 12.4 12.0 - 88.0 pg/mL   TSH, 3rd generation Lab Collect    Collection Time: 12/08/23  8:44 AM   Result Value Ref Range    TSH 3RD GENERATON 0.077 (L) 0.450 - 4.500 uIU/mL   T4, free Lab Collect    Collection Time: 12/08/23  8:44 AM   Result Value Ref Range    Free T4 1.33 (H) 0.61 - 1.12 ng/dL            Physical Exam  Constitutional:       General: She is not in acute " distress.     Appearance: She is well-developed. She is not diaphoretic.   HENT:      Head: Normocephalic and atraumatic.      Right Ear: External ear normal.      Left Ear: External ear normal.      Nose: Nose normal.      Mouth/Throat:      Mouth: Mucous membranes are moist.      Pharynx: No oropharyngeal exudate or posterior oropharyngeal erythema.   Eyes:      General:         Right eye: No discharge.         Left eye: No discharge.      Conjunctiva/sclera: Conjunctivae normal.      Pupils: Pupils are equal, round, and reactive to light.   Neck:      Thyroid: No thyromegaly.   Cardiovascular:      Rate and Rhythm: Normal rate and regular rhythm.      Heart sounds: Normal heart sounds. No murmur heard.     No friction rub. No gallop.   Pulmonary:      Effort: Pulmonary effort is normal. No respiratory distress.      Breath sounds: Normal breath sounds. No stridor. No wheezing or rales.   Abdominal:      General: Bowel sounds are normal. There is no distension.      Palpations: Abdomen is soft.      Tenderness: There is no abdominal tenderness.   Musculoskeletal:      Cervical back: Normal range of motion and neck supple.   Lymphadenopathy:      Cervical: No cervical adenopathy.   Skin:     General: Skin is warm and dry.      Findings: No erythema or rash.   Neurological:      Mental Status: She is alert and oriented to person, place, and time.   Psychiatric:         Behavior: Behavior normal.         Thought Content: Thought content normal.         Judgment: Judgment normal.

## 2024-01-31 NOTE — ASSESSMENT & PLAN NOTE
Controlled on Ambien   Controlled sub agreement on file   Has consult scheduled with sleep medicine

## 2024-02-02 ENCOUNTER — OFFICE VISIT (OUTPATIENT)
Dept: PAIN MEDICINE | Facility: CLINIC | Age: 44
End: 2024-02-02
Payer: COMMERCIAL

## 2024-02-02 DIAGNOSIS — M54.6 CHRONIC LEFT-SIDED THORACIC BACK PAIN: Primary | ICD-10-CM

## 2024-02-02 DIAGNOSIS — M25.512 CHRONIC LEFT SHOULDER PAIN: ICD-10-CM

## 2024-02-02 DIAGNOSIS — M75.52 SUBACROMIAL BURSITIS OF LEFT SHOULDER JOINT: ICD-10-CM

## 2024-02-02 DIAGNOSIS — G89.29 CHRONIC LEFT SHOULDER PAIN: ICD-10-CM

## 2024-02-02 DIAGNOSIS — G89.29 CHRONIC LEFT-SIDED THORACIC BACK PAIN: Primary | ICD-10-CM

## 2024-02-02 DIAGNOSIS — M79.18 MYOFASCIAL PAIN SYNDROME: ICD-10-CM

## 2024-02-02 PROCEDURE — 99214 OFFICE O/P EST MOD 30 MIN: CPT | Performed by: NURSE PRACTITIONER

## 2024-02-02 NOTE — PROGRESS NOTES
Assessment:  1. Chronic left-sided thoracic back pain    2. Myofascial pain syndrome    3. Chronic left shoulder pain    4. Subacromial bursitis of left shoulder joint        Plan:  Patient will be scheduled for trigger point injections into the thoracic paraspinal and latissimus dorsi musculature  Patient will be scheduled for left subacromial bursa injection to address the inflammatory component of the patient's pain  Patient given referral to chiropractic therapy to address both mid back and left shoulder complaints  Patient may continue tizanidine as prescribed  Patient may continue Tylenol as needed and should not exceed more than 3000 mg in 24 hours  Patient may continue over-the-counter analgesic patches  Follow-up after injections or sooner if needed    History of Present Illness:    The patient is a 43 y.o. female with a history of thyroid cancer status post thyroidectomy, migraines last seen on 12/22/2023  who presents for a follow up office visit in regards to chronic bilateral thoracic back pain with associated paresthesias, left anterior shoulder pain, localized right elbow pain.  MRI of the thoracic spine completed in January 2024 was unremarkable.  X-ray of the right elbow was normal.  She has completed physical therapy in the past without much relief.  She has not found much relief with tizanidine and has found that it has caused sedation.  She has not found relief with NSAIDs such as ibuprofen or meloxicam or Tylenol.  She does find some relief with over-the-counter analgesic patches and heating pad.    The patient rates her pain a 6 out of 10 on the numeric pain rating scale.  Her pain is constant in the evening and at night and it is described as dull aching, throbbing, pressure-like, numbness and pins-and-needles    I have personally reviewed and/or updated the patient's past medical history, past surgical history, family history, social history, current medications, allergies, and vital signs  today.       Review of Systems:    Review of Systems   Respiratory:  Negative for shortness of breath.    Cardiovascular:  Negative for chest pain.   Gastrointestinal:  Positive for constipation. Negative for diarrhea, nausea and vomiting.   Musculoskeletal:  Positive for gait problem. Negative for arthralgias, joint swelling and myalgias.   Skin:  Negative for rash.   Neurological:  Negative for dizziness, seizures and weakness.   All other systems reviewed and are negative.        Past Medical History:   Diagnosis Date    Allergic     Anxiety     Cancer (HCC) 2/1/2023    Thyroid- Papillary carcinoma    COVID-19 01/2022    mild s/s-not hospitalized, not vaccinated    GERD (gastroesophageal reflux disease)     Headache(784.0)     History of IBS     with constipation    Ingrown toenail     Irritable bowel syndrome     Migraines     Chronic per pt    Plantar fasciitis     Thyroid carcinoma (HCC)     Varicella     Venereal wart 08/07/2014       Past Surgical History:   Procedure Laterality Date    CERVICAL BIOPSY  W/ LOOP ELECTRODE EXCISION  2017    COLONOSCOPY      NASAL SEPTUM SURGERY      [place] OSTEOT W/WO LNGTH SHRT/CORRJ 1ST METAR Left 12/30/2022    Procedure: OSTEOTOMY METATARSAL 1st;  Surgeon: Taiwo Harrington DPM;  Location: [facility];  Service: Podiatry    THYROIDECTOMY N/A 2/24/2023    Procedure: TOTAL THYROIDECTOMY;  Surgeon: Vince Carey MD;  [facility];  Service: Surgical Oncology    TONSILLECTOMY      UPPER GASTROINTESTINAL ENDOSCOPY      US GUIDED THYROID BIOPSY  1/31/2023    WISDOM TOOTH EXTRACTION         Family History   Problem Relation Age of Onset    Hypertension Mother     Arthritis Mother     Thyroid disease Mother     Osteoarthritis Mother     Transient ischemic attack Father     Hypertension Father     Stroke Father     Vision loss Father     Rashes / Skin problems Sister         Shingles    Migraines Sister     No Known Problems Sister     Uterine cancer Maternal Grandmother 20     Cancer Maternal Grandmother         Uterine    Colon cancer Maternal Grandfather 60    Cancer Maternal Grandfather         Colon    Lung cancer Paternal Grandmother 60    Cancer Paternal Grandmother         Lung    Heart attack Paternal Grandfather     Colon cancer Cousin 30    Breast cancer Maternal Aunt     Breast cancer Maternal Aunt     Breast cancer Maternal Aunt        Social History     Occupational History    Not on file   Tobacco Use    Smoking status: Never    Smokeless tobacco: Never   Vaping Use    Vaping status: Never Used   Substance and Sexual Activity    Alcohol use: Yes     Alcohol/week: 2.0 standard drinks of alcohol     Types: 2 Glasses of wine per week     Comment: 2 glasses wine a week    Drug use: Never    Sexual activity: Not Currently         Current Outpatient Medications:     acetaminophen (TYLENOL) 500 mg tablet, Take 1,000 mg by mouth every 6 (six) hours as needed for mild pain, Disp: , Rfl:     acetaZOLAMIDE (DIAMOX) 125 mg tablet, 125 mg PO nightly for 1 week, then increase to 125 mg BID for 1 week, then 125 mg in am and 250 mg in pm for 1 week, then 250 mg BID (Patient not taking: Reported on 11/6/2023), Disp: 120 tablet, Rfl: 6    bisacodyl (DULCOLAX) 5 mg EC tablet, Take 2 tablets (10 mg total) by mouth 2 (two) times a day as needed for constipation, Disp: 60 tablet, Rfl: 1    calcitriol (ROCALTROL) 0.25 mcg capsule, TAKE ONE CAPSULE BY MOUTH 2 TIMES A DAY, Disp: 180 capsule, Rfl: 0    calcium citrate (CALCITRATE) 950 (200 Ca) MG tablet, Take 1 tablet (950 mg total) by mouth 2 (two) times a day, Disp: 120 tablet, Rfl: 0    cholecalciferol (VITAMIN D3) 1,000 units tablet, Take 1 tablet (1,000 Units total) by mouth daily Do not start before March 1, 2023., Disp: 30 tablet, Rfl: 0    clindamycin (CLEOCIN T) 1 % lotion, in the morning, Disp: , Rfl:     Emgality 120 MG/ML SOAJ, INJECT 120MG SUBCUTANEOUSLY (UNDER THE SKIN) EVERY 28 DAYS, Disp: 1 mL, Rfl: 11    hydrocortisone (ANUSOL-HC) 25  mg suppository, Insert 1 suppository (25 mg total) into the rectum 2 (two) times a day, Disp: 12 suppository, Rfl: 0    Levocetirizine Dihydrochloride (XYZAL PO), Take by mouth daily at bedtime, Disp: , Rfl:     levothyroxine (Synthroid) 125 mcg tablet, Take 1 tablet (125 mcg total) by mouth daily, Disp: 30 tablet, Rfl: 3    lubiprostone (AMITIZA) 24 mcg capsule, Take 1 capsule (24 mcg total) by mouth 2 (two) times a day with meals, Disp: 180 capsule, Rfl: 2    Melatonin 5 MG TABS, Take by mouth as needed, Disp: , Rfl:     Multiple Vitamin (multivitamin) tablet, Take 1 tablet by mouth daily, Disp: , Rfl:     omeprazole (PriLOSEC) 40 MG capsule, Take 1 capsule (40 mg total) by mouth daily, Disp: 90 capsule, Rfl: 1    propranolol (INDERAL) 10 mg tablet, Decrease to 30 mg PO BID for 1 week, then 20 mg BID for 1 week, then 10 mg BID for 1 week and then can stop, Disp: 108 tablet, Rfl: 4    rimegepant sulfate (Nurtec) 75 mg TBDP, Take one NURTEC 75 mg at onset under tongue. Limit 1 in 24 hours., Disp: 16 tablet, Rfl: 11    rizatriptan (MAXALT) 10 mg tablet, Take 1 tablet (10 mg total) by mouth once as needed for migraine May repeat in 2 hours if needed. Max 2/24 hours, 9/month., Disp: 9 tablet, Rfl: 12    tiZANidine (ZANAFLEX) 2 mg tablet, Take 1 tablet (2 mg total) by mouth 2 (two) times a day as needed for muscle spasms, Disp: 60 tablet, Rfl: 1    zolpidem (AMBIEN) 5 mg tablet, Take 1 tablet (5 mg total) by mouth daily at bedtime as needed for sleep, Disp: 30 tablet, Rfl: 0    No Known Allergies    Physical Exam:    [facility] 12/26/2023 (Exact Date)     Constitutional:normal, well developed, well nourished, alert, in no distress and non-toxic and no overt pain behavior.  Eyes:anicteric  HEENT:grossly intact  Neck:supple, symmetric, trachea midline and no masses   Pulmonary:even and unlabored  Cardiovascular:No edema or pitting edema present  Skin:Normal without rashes or lesions and well hydrated  Psychiatric:Mood and affect  appropriate  Neurologic:Cranial Nerves II-XII grossly intact  Musculoskeletal:normal gait. Thoracic paraspinal musculature tender to palpation T6-T7 area. Right anterior shoulder tender to palpation.      Imaging  No orders to display         Orders Placed This Encounter   Procedures    Ambulatory Referral to Chiropractic

## 2024-02-08 ENCOUNTER — TELEPHONE (OUTPATIENT)
Age: 44
End: 2024-02-08

## 2024-02-08 NOTE — TELEPHONE ENCOUNTER
Caller: Asmita    Doctor: dr riggs    Reason for call: pt calling to reschedule her procedure. Xfer call    Call back#: [phone]

## 2024-02-08 NOTE — TELEPHONE ENCOUNTER
Caller: Marck Tian    Doctor: Ree    Reason for call: pt calling to reschedule injection.     Call back#: [phone]

## 2024-02-08 NOTE — TELEPHONE ENCOUNTER
Caller: Asmita MOSQUEDA    Doctor: Dr Keenan    Reason for call: Pt needs to be rescheduled    Call back#: [phone] before 10 am

## 2024-02-14 ENCOUNTER — APPOINTMENT (OUTPATIENT)
Dept: LAB | Facility: HOSPITAL | Age: 44
End: 2024-02-14
Payer: COMMERCIAL

## 2024-02-14 DIAGNOSIS — E89.0 POSTOPERATIVE HYPOTHYROIDISM: ICD-10-CM

## 2024-02-14 DIAGNOSIS — C73 PAPILLARY THYROID CARCINOMA (HCC): Primary | ICD-10-CM

## 2024-02-14 LAB
T4 FREE SERPL-MCNC: 1.12 NG/DL (ref 0.61–1.12)
TSH SERPL DL<=0.05 MIU/L-ACNC: 0.41 UIU/ML (ref 0.45–4.5)

## 2024-02-14 PROCEDURE — 84439 ASSAY OF FREE THYROXINE: CPT

## 2024-02-14 PROCEDURE — 84443 ASSAY THYROID STIM HORMONE: CPT

## 2024-02-14 PROCEDURE — 36415 COLL VENOUS BLD VENIPUNCTURE: CPT

## 2024-02-15 ENCOUNTER — OFFICE VISIT (OUTPATIENT)
Dept: GASTROENTEROLOGY | Facility: CLINIC | Age: 44
End: 2024-02-15
Payer: COMMERCIAL

## 2024-02-15 VITALS
DIASTOLIC BLOOD PRESSURE: 62 MMHG | WEIGHT: 147 LBS | SYSTOLIC BLOOD PRESSURE: 106 MMHG | HEIGHT: 63 IN | BODY MASS INDEX: 26.05 KG/M2 | TEMPERATURE: 98.6 F

## 2024-02-15 DIAGNOSIS — K59.04 CHRONIC IDIOPATHIC CONSTIPATION: Primary | ICD-10-CM

## 2024-02-15 DIAGNOSIS — K64.9 HEMORRHOIDS, UNSPECIFIED HEMORRHOID TYPE: ICD-10-CM

## 2024-02-15 PROCEDURE — 99214 OFFICE O/P EST MOD 30 MIN: CPT | Performed by: PHYSICIAN ASSISTANT

## 2024-02-15 RX ORDER — PRUCALOPRIDE 2 MG/1
2 TABLET, FILM COATED ORAL DAILY
Qty: 30 TABLET | Refills: 2 | Status: SHIPPED | OUTPATIENT
Start: 2024-02-15

## 2024-02-15 RX ORDER — HYDROCORTISONE 25 MG/G
CREAM TOPICAL 2 TIMES DAILY
Qty: 28 G | Refills: 2 | Status: SHIPPED | OUTPATIENT
Start: 2024-02-15

## 2024-02-15 NOTE — PROGRESS NOTES
[facility] - Outpatient Follow-up Note  Asmita ESPINOZA Bem 43 y.o. female MRN: [id number]  Encounter: [id number]          ASSESSMENT AND PLAN:      Asmita is a 42 y/o female with GERD, IBS-C, hypothyroid, papillary thyroid carcinoma s/p thyroidectomy who presents for follow-up.     IBS-C  Pt was briefly controlled on linzess 290 but it stopped working so she was tried on amitiza 24 mcg BID and amitiza 8 mcg without relief. She was then tried on trulance, which initially caused diarrhea but then stopped working and actually worsened her constipation in that she was not moving her bowels for almost 2 weeks. She tried trulance for remained this way for about 2 months, so she went back to amitiza 24 mcg BID with meals and is moving her bowels once every 1-1.5 weeks. She says as she has already tried and failed miralax, magnesium, stool softeners and fiber supplements, as well, she would like to trial motegrity at this time and see if it is covered.   -stop amitiza  -motegrity 2 mg daily sent in (take 2 dulcolax the night before starting this to clear yourself out)  -if motegrity is not covered: would recommend going back to amitiza BID but adding daily miralax or Mg oxide     2. GERD  Stable on daily PPI  -continue omeprazole 40 mg daily   ______________________________________________________________________    SUBJECTIVE:  Asmita is a 42 y/o female with GERD, IBS-C, hypothyroid, papillary thyroid carcinoma s/p thyroidectomy who presents for follow-up. Pt was briefly controlled on linzess 290 but it stopped working so she was tried on amitiza 24 mcg BID and amitiza 8 mcg without relief. She was then tried on trulance, which initially caused diarrhea but then stopped working and actually worsened her constipation in that she was not moving her bowels for almost 2 weeks. She tried trulance for remained this way for about 2 months, so she went back to amitiza 24 mcg BID with meals and is moving her  bowels once every 1-1.5 weeks. She says as she has already tried and failed miralax, magnesium, stool softeners and fiber supplements, as well, she would like to trial motegrity at this time and see if it is covered. Pt denies n/v, further dysphagia, abdominal pain, constipation, diarrhea, NSAID use, bloody or black BM.       REVIEW OF SYSTEMS IS OTHERWISE NEGATIVE.      Historical Information   Past Medical History:   Diagnosis Date    Allergic     Anxiety     Cancer (HCC) 2/1/2023    Thyroid- Papillary carcinoma    COVID-19 01/2022    mild s/s-not hospitalized, not vaccinated    GERD (gastroesophageal reflux disease)     Headache(784.0)     History of IBS     with constipation    Ingrown toenail     Irritable bowel syndrome     Migraines     Chronic per pt    Plantar fasciitis     Thyroid carcinoma (HCC)     Varicella     Venereal wart 08/07/2014     Past Surgical History:   Procedure Laterality Date    CERVICAL BIOPSY  W/ LOOP ELECTRODE EXCISION  2017    COLONOSCOPY      NASAL SEPTUM SURGERY      MO OSTEOT W/WO LNGTH SHRT/CORRJ 1ST METAR Left 12/30/2022    Procedure: OSTEOTOMY METATARSAL 1st;  Surgeon: Taiwo Harrington DPM;  Location: [facility];  Service: Podiatry    THYROIDECTOMY N/A 2/24/2023    Procedure: TOTAL THYROIDECTOMY;  Surgeon: Vince Carey MD;  [facility]: [facility];  Service: Surgical Oncology    TONSILLECTOMY      UPPER GASTROINTESTINAL ENDOSCOPY      US GUIDED THYROID BIOPSY  1/31/2023    WISDOM TOOTH EXTRACTION       Social History   Social History     Substance and Sexual Activity   Alcohol Use Yes    Alcohol/week: 2.0 standard drinks of alcohol    Types: 2 Glasses of wine per week    Comment: 2 glasses wine a week     Social History     Substance and Sexual Activity   Drug Use Never     Social History     Tobacco Use   Smoking Status Never   Smokeless Tobacco Never     Family History   Problem Relation Age of Onset    Hypertension Mother     Arthritis Mother     Thyroid disease Mother      Osteoarthritis Mother     Transient ischemic attack Father     Hypertension Father     Stroke Father     Vision loss Father     Rashes / Skin problems Sister         Shingles    Migraines Sister     No Known Problems Sister     Uterine cancer Maternal Grandmother 20    Cancer Maternal Grandmother         Uterine    Colon cancer Maternal Grandfather 60    Cancer Maternal Grandfather         Colon    Lung cancer Paternal Grandmother 60    Cancer Paternal Grandmother         Lung    Heart attack Paternal Grandfather     Colon cancer Cousin 30    Breast cancer Maternal Aunt     Breast cancer Maternal Aunt     Breast cancer Maternal Aunt        Meds/Allergies       Current Outpatient Medications:     acetaminophen (TYLENOL) 500 mg tablet    acetaZOLAMIDE (DIAMOX) 125 mg tablet    bisacodyl (DULCOLAX) 5 mg EC tablet    calcitriol (ROCALTROL) 0.25 mcg capsule    calcium citrate (CALCITRATE) 950 (200 Ca) MG tablet    cholecalciferol (VITAMIN D3) 1,000 units tablet    clindamycin (CLEOCIN T) 1 % lotion    Emgality 120 MG/ML SOAJ    hydrocortisone (ANUSOL-HC) 25 mg suppository    Levocetirizine Dihydrochloride (XYZAL PO)    levothyroxine (Synthroid) 125 mcg tablet    lubiprostone (AMITIZA) 24 mcg capsule    Melatonin 5 MG TABS    Multiple Vitamin (multivitamin) tablet    omeprazole (PriLOSEC) 40 MG capsule    propranolol (INDERAL) 10 mg tablet    rimegepant sulfate (Nurtec) 75 mg TBDP    rizatriptan (MAXALT) 10 mg tablet    tiZANidine (ZANAFLEX) 2 mg tablet    zolpidem (AMBIEN) 5 mg tablet    No Known Allergies        Objective     There were no vitals taken for this visit. There is no height or weight on file to calculate BMI.      PHYSICAL EXAM:      General Appearance:   Alert, cooperative, no distress   HEENT:   Normocephalic, atraumatic, anicteric.     Neck:  Supple, symmetrical, trachea midline   Lungs:   Clear to auscultation bilaterally; no rales, rhonchi or wheezing; respirations unlabored    Heart::   Regular rate  and rhythm; no murmur, rub, or gallop.   Abdomen:   Soft, non-tender, non-distended; normal bowel sounds; no masses, no organomegaly    Genitalia:   Deferred    Rectal:   Deferred    Extremities:  No cyanosis, clubbing or edema    Pulses:  2+ and symmetric    Skin:  No jaundice, rashes, or lesions    Lymph nodes:  No palpable cervical lymphadenopathy        Lab Results:   No visits with results within 1 Day(s) from this visit.   Latest known visit with results is:   Appointment on 02/14/2024   Component Date Value    Free T4 02/14/2024 1.12     TSH 3RD GENERATON 02/14/2024 0.413 (L)          Radiology Results:   No results found.

## 2024-02-16 ENCOUNTER — TELEPHONE (OUTPATIENT)
Age: 44
End: 2024-02-16

## 2024-02-16 NOTE — TELEPHONE ENCOUNTER
Caller: Patient     Doctor: Ree     Reason for call: Wanting to reschedule TPI injection and push her back injection to 3/21/24 and then push back the one shceduled for 3/1/24 at a later date.     Call back#: [phone]

## 2024-02-19 DIAGNOSIS — F51.01 PRIMARY INSOMNIA: ICD-10-CM

## 2024-02-20 ENCOUNTER — APPOINTMENT (OUTPATIENT)
Dept: LAB | Facility: CLINIC | Age: 44
End: 2024-02-20
Payer: COMMERCIAL

## 2024-02-20 DIAGNOSIS — E61.1 IRON DEFICIENCY: ICD-10-CM

## 2024-02-20 LAB
BASOPHILS # BLD AUTO: 0.06 THOUSANDS/ÂΜL (ref 0–0.1)
BASOPHILS NFR BLD AUTO: 1 % (ref 0–1)
EOSINOPHIL # BLD AUTO: 0.14 THOUSAND/ÂΜL (ref 0–0.61)
EOSINOPHIL NFR BLD AUTO: 2 % (ref 0–6)
ERYTHROCYTE [DISTWIDTH] IN BLOOD BY AUTOMATED COUNT: 12.3 % (ref 11.6–15.1)
FERRITIN SERPL-MCNC: 92 NG/ML (ref 11–307)
HCT VFR BLD AUTO: 40.1 % (ref 34.8–46.1)
HGB BLD-MCNC: 13 G/DL (ref 11.5–15.4)
IMM GRANULOCYTES # BLD AUTO: 0.01 THOUSAND/UL (ref 0–0.2)
IMM GRANULOCYTES NFR BLD AUTO: 0 % (ref 0–2)
IRON SATN MFR SERPL: 39 % (ref 15–50)
IRON SERPL-MCNC: 116 UG/DL (ref 50–212)
LYMPHOCYTES # BLD AUTO: 1.62 THOUSANDS/ÂΜL (ref 0.6–4.47)
LYMPHOCYTES NFR BLD AUTO: 26 % (ref 14–44)
MCH RBC QN AUTO: 32.2 PG (ref 26.8–34.3)
MCHC RBC AUTO-ENTMCNC: 32.4 G/DL (ref 31.4–37.4)
MCV RBC AUTO: 99 FL (ref 82–98)
MONOCYTES # BLD AUTO: 0.51 THOUSAND/ÂΜL (ref 0.17–1.22)
MONOCYTES NFR BLD AUTO: 8 % (ref 4–12)
NEUTROPHILS # BLD AUTO: 4.01 THOUSANDS/ÂΜL (ref 1.85–7.62)
NEUTS SEG NFR BLD AUTO: 63 % (ref 43–75)
NRBC BLD AUTO-RTO: 0 /100 WBCS
PLATELET # BLD AUTO: 287 THOUSANDS/UL (ref 149–390)
PMV BLD AUTO: 10.6 FL (ref 8.9–12.7)
RBC # BLD AUTO: 4.04 MILLION/UL (ref 3.81–5.12)
TIBC SERPL-MCNC: 297 UG/DL (ref 250–450)
UIBC SERPL-MCNC: 181 UG/DL (ref 155–355)
WBC # BLD AUTO: 6.35 THOUSAND/UL (ref 4.31–10.16)

## 2024-02-22 RX ORDER — ZOLPIDEM TARTRATE 5 MG/1
5 TABLET ORAL
Qty: 30 TABLET | Refills: 0 | Status: SHIPPED | OUTPATIENT
Start: 2024-02-22

## 2024-02-23 ENCOUNTER — TELEPHONE (OUTPATIENT)
Age: 44
End: 2024-02-23

## 2024-02-23 ENCOUNTER — OFFICE VISIT (OUTPATIENT)
Age: 44
End: 2024-02-23
Payer: COMMERCIAL

## 2024-02-23 VITALS
OXYGEN SATURATION: 99 % | WEIGHT: 147 LBS | DIASTOLIC BLOOD PRESSURE: 62 MMHG | HEIGHT: 63 IN | HEART RATE: 76 BPM | SYSTOLIC BLOOD PRESSURE: 104 MMHG | BODY MASS INDEX: 26.05 KG/M2

## 2024-02-23 DIAGNOSIS — M54.59 MECHANICAL LOW BACK PAIN: ICD-10-CM

## 2024-02-23 DIAGNOSIS — M99.01 SEGMENTAL DYSFUNCTION OF CERVICAL REGION: ICD-10-CM

## 2024-02-23 DIAGNOSIS — M99.03 SEGMENTAL DYSFUNCTION OF LUMBAR REGION: Primary | ICD-10-CM

## 2024-02-23 DIAGNOSIS — G89.29 CHRONIC LEFT-SIDED THORACIC BACK PAIN: ICD-10-CM

## 2024-02-23 DIAGNOSIS — M75.52 SUBACROMIAL BURSITIS OF LEFT SHOULDER JOINT: ICD-10-CM

## 2024-02-23 DIAGNOSIS — M99.02 SEGMENTAL DYSFUNCTION OF THORACIC REGION: ICD-10-CM

## 2024-02-23 DIAGNOSIS — M54.6 CHRONIC LEFT-SIDED THORACIC BACK PAIN: ICD-10-CM

## 2024-02-23 DIAGNOSIS — M99.04 SEGMENTAL DYSFUNCTION OF SACRAL REGION: ICD-10-CM

## 2024-02-23 PROCEDURE — 97110 THERAPEUTIC EXERCISES: CPT | Performed by: CHIROPRACTOR

## 2024-02-23 PROCEDURE — 99203 OFFICE O/P NEW LOW 30 MIN: CPT | Performed by: CHIROPRACTOR

## 2024-02-23 PROCEDURE — 98941 CHIROPRACT MANJ 3-4 REGIONS: CPT | Performed by: CHIROPRACTOR

## 2024-02-23 NOTE — PROGRESS NOTES
Diagnoses and all orders for this visit:    Segmental dysfunction of lumbar region    Chronic left-sided thoracic back pain  -     Ambulatory Referral to Chiropractic    Subacromial bursitis of left shoulder joint  -     Ambulatory Referral to Chiropractic    Segmental dysfunction of sacral region    Segmental dysfunction of thoracic region    Segmental dysfunction of cervical region    Mechanical low back pain       ASSESSMENT:  No red flags, radiculopathy or neurologic deficit appreciated clinically. Pt's symptoms and exam findings consistent with mechanical lbp  and hip bursitis secondary to repetitive st/sp injury, exacerbated by postural/ergonomic stressors. Pt responded well to flexion biased stretches and manual mobilization of the affected spinal and myofascial tissues with increased ROM; trial of conservative tx recommended consisting of stretching, graded mobilization/manipulation of the affected spinal and myofascial jt dysfunction, postural/ergonomic education and take home stretches/exercises. If symptoms fail to improve with short trial of conservative care, appropriate imaging and referral will be coordinated.  Spent greater than 30 min c pt discussing hx, pe, ddx, tx options and reviewing notes/imaging    PROCEDURE CODES: 58504 and 12288, 47407    TREATMENT:  Fear avoidance behavior discussion; encouraged and reassured pt that natural course of condition is to improve over time with adherence to tx plan and home care strategies. Home care recommendations: avoid bed rest, walk (but avoid trails and uneven surfaces), gradual return to activity to tolerance (avoid anything that peripheralizes symptoms), call if symptoms peripheralize, worsen, or neurologic deficit progresses. Ther-ex: IASTM; discussed post procedure soreness and/or ecchymosis for up to 36 hrs, applied to affected mm hypertonicities; supine hamstring stretch, supine gluteal stretch, side laying QL stretch, single knee to chest stretch,  hip flexor pin-and-stretch, alternating prone hip extension, glute bridge, transitional mvmt education, abdominal bracing; greater than 15 min spent performing above mentioned ther-ex to improve ROM/flexibility. Thoracic mobilization/manipulation: prone P-A mob, supine A-P manip; Lumbar mobilization/manipulation: diversified side laying graded HVLA, flexion-traction; SIJ Manipulation/Mobilization: R/L SIJ HVLA - long axis distraction, ahn drop table maneuver to affected SIJ    HPI:  Asmita ESPINOZA Bem is a 43 y.o. female  Chief Complaint   Patient presents with   • Back Pain     Middle back pain-8  Lower back pain-8   • Neck Pain     Neck pain-5  Worse on left side      The patient presents to the office with lower back pain that started without incident of trauma on June 1 while at a concert. She was sitting on the pain but has no pain until they were driving home. The patient does remember having mild tingling in the lower back about 6 months prior to that. The patient now has tingling across lower back. Pain is constant 4 with sitting can go up to a 8/10. Pt went to PT for a course with some improvements with working out., and did help overall but pain is still significant in the evenings. The patient was also given mm relaxers which she think she not help but also makes her too tired.  Massages once a month that helps temporarily. Exercise- metabolic and strength training. Pt sleeps a little better after Sleep therapy program but slightly more worse after last visit. Also gets pain with a bump behind legs but and notices more spider veins and most tingling into the legs but mentions wearing compression stockings for work.     Back Pain  The problem has been waxing and waning since onset. The pain is present in the lumbar spine. Pertinent negatives include no chest pain, fever, headaches, numbness or weakness.   Neck Pain   Pertinent negatives include no chest pain, fever, headaches, numbness or weakness.     Past  Medical History:   Diagnosis Date   • Allergic    • Anxiety    • Cancer (HCC) 2/1/2023    Thyroid- Papillary carcinoma   • COVID-19 01/2022    mild s/s-not hospitalized, not vaccinated   • GERD (gastroesophageal reflux disease)    • Headache(784.0)    • History of IBS     with constipation   • Ingrown toenail    • Irritable bowel syndrome    • Migraines     Chronic per pt   • Plantar fasciitis    • Thyroid carcinoma (HCC)    • Varicella    • Venereal wart 08/07/2014      Past Surgical History:   Procedure Laterality Date   • CERVICAL BIOPSY  W/ LOOP ELECTRODE EXCISION  2017   • COLONOSCOPY     • NASAL SEPTUM SURGERY     • SC OSTEOT W/WO LNGTH SHRT/CORRJ 1ST METAR Left 12/30/2022    Procedure: OSTEOTOMY METATARSAL 1st;  Surgeon: Taiwo Harrington DPM;  Location: [facility];  Service: Podiatry   • THYROIDECTOMY N/A 2/24/2023    Procedure: TOTAL THYROIDECTOMY;  Surgeon: Vince Carey MD;  [facility];  Service: Surgical Oncology   • TONSILLECTOMY     • UPPER GASTROINTESTINAL ENDOSCOPY     • US GUIDED THYROID BIOPSY  1/31/2023   • WISDOM TOOTH EXTRACTION       The following portions of the patient's history were reviewed and updated as appropriate: allergies, past family history, past medical history, past social history, past surgical history, and problem list.  Review of Systems   Constitutional:  Negative for activity change, fatigue, fever and unexpected weight change.   HENT:  Negative for ear pain, hearing loss, sinus pressure, sinus pain, sore throat and tinnitus.    Respiratory:  Negative for chest tightness, shortness of breath, wheezing and stridor.    Cardiovascular:  Negative for chest pain.   Genitourinary:  Negative for flank pain and frequency.   Musculoskeletal:  Positive for back pain, myalgias and neck pain. Negative for joint swelling and neck stiffness.   Skin:  Negative for color change and pallor.   Neurological:  Negative for dizziness, speech difficulty, weakness, numbness and  headaches.   Psychiatric/Behavioral:  Negative for agitation and sleep disturbance. The patient is not nervous/anxious.      Physical Exam  Constitutional:       General: She is not in acute distress.     Appearance: Normal appearance.   HENT:      Head: Normocephalic.      Mouth/Throat:      Mouth: Mucous membranes are moist.   Eyes:      Extraocular Movements: Extraocular movements intact.      Conjunctiva/sclera: Conjunctivae normal.      Pupils: Pupils are equal, round, and reactive to light.   Neck:      Vascular: No carotid bruit.   Pulmonary:      Effort: Pulmonary effort is normal.   Chest:      Chest wall: No tenderness.   Abdominal:      General: Abdomen is flat.      Palpations: Abdomen is soft.   Musculoskeletal:         General: Tenderness present. No swelling, deformity or signs of injury.      Cervical back: Normal range of motion. No rigidity or tenderness.      Thoracic back: Spasms and tenderness present. No swelling, edema, deformity, signs of trauma, lacerations or bony tenderness. Decreased range of motion. No scoliosis.      Lumbar back: Spasms and tenderness present. No swelling, edema, deformity, signs of trauma, lacerations or bony tenderness. Decreased range of motion. No scoliosis.        Back:       Right lower leg: No edema.      Left lower leg: No edema.   Lymphadenopathy:      Cervical: No cervical adenopathy.   Skin:     General: Skin is warm.      Coloration: Skin is not jaundiced or pale.      Findings: No bruising or erythema.   Neurological:      Mental Status: She is alert and oriented to person, place, and time.      Cranial Nerves: No cranial nerve deficit.      Sensory: No sensory deficit.      Motor: No weakness.      Gait: Gait is intact.      Deep Tendon Reflexes: Reflexes are normal and symmetric.   Psychiatric:         Attention and Perception: Attention normal.         Mood and Affect: Mood and affect normal.         Speech: Speech normal.         Behavior: Behavior  normal. Behavior is cooperative.         Thought Content: Thought content normal.         Cognition and Memory: Cognition normal.         Judgment: Judgment normal.       SOFT TISSUE ASSESSMENT Hypertonicity and tenderness palpated B C4-T1, T10-S1 erector spinae, upper trap, SCM, Lev scap, hip flexor, glute med/min, QL, hamstring JOINT RESTRICTIONS: C4-T1, T10-S1 and R/L SIJ ORTHO: SI jt point tenderness: +; Mary unremarkable for centralization/peripheralization; michelle's, iliac compression, thigh thrust elicit lbp in R/L SIJ; prone femoral nerve stretch neg for upper lumbar neural tension, elicits R/L SIJ stiffness; sitting root elicits no lbp on R/L; slump test elicits no neural tension R/L, Cervical distraction- Neg, Maximal Foraminal stenosis- neg, Spurling's- neg    Return in about 1 week (around 3/1/2024) for Recheck.

## 2024-02-23 NOTE — TELEPHONE ENCOUNTER
Reason for call:   [x] Refill   [] Prior Auth  [] Other:     Office:   [] PCP/Provider -   [x] Specialty/Provider - Gastro    Medication: [facility]   Medication  lubiprostone (AMITIZA) 24 mcg capsule [[id number]]  lubiprostone (AMITIZA) 24 mcg capsule [[id number]]  DISCONTINUED    Order Details  Dose: 24 mcg Route: Oral Frequency: 2 times daily with meals   Dispense Quantity: 180 capsule Refills: 2          Sig: Take 1 capsule (24 mcg total) by mouth 2 (two) times a day with meals         Start Date: 07/21/23 End Date: 02/15/24   Discontinued by: Princess Coronel PA-C on 2/15/2024 13:18         Written Date: 07/21/23 Expiration Date: 07/20/24       Associated Diagnoses: Chronic idiopathic constipation [K59.04]   Original Order: lubiprostone (AMITIZA) 24 mcg capsule [[id number]]   Providers    Ordering and Authorizing Provider:  Princess Coronel PA-C  [address] [place] [id number]  Phone: [phone]   Fax: [phone]  MARCELA #: [id number]   NPI: [id number] Supervising Provider:  Otis Bridges MD  [address] [place] [id number]  Phone: [phone]   Fax: [phone]  MARCELA #: [id number]   NPI: [id number]       Ordering User: SHU JORGENSEN          Pharmacy    [facility] [place] - [address]  [address], [place] [address]  Phone: [phone]  Fax: [phone]           Does the patient have enough for 3 days?   [] Yes   [x] No - Send as HP to POD

## 2024-03-06 DIAGNOSIS — C73 PAPILLARY THYROID CARCINOMA (HCC): Primary | ICD-10-CM

## 2024-03-08 ENCOUNTER — PROCEDURE VISIT (OUTPATIENT)
Dept: PAIN MEDICINE | Facility: CLINIC | Age: 44
End: 2024-03-08
Payer: COMMERCIAL

## 2024-03-08 ENCOUNTER — HOSPITAL ENCOUNTER (OUTPATIENT)
Dept: RADIOLOGY | Age: 44
Discharge: HOME | End: 2024-03-08
Payer: COMMERCIAL

## 2024-03-08 ENCOUNTER — APPOINTMENT (OUTPATIENT)
Dept: LAB | Age: 44
End: 2024-03-08
Payer: COMMERCIAL

## 2024-03-08 VITALS
SYSTOLIC BLOOD PRESSURE: 109 MMHG | HEIGHT: 63 IN | WEIGHT: 149 LBS | BODY MASS INDEX: 26.4 KG/M2 | DIASTOLIC BLOOD PRESSURE: 73 MMHG

## 2024-03-08 DIAGNOSIS — C73 PAPILLARY THYROID CARCINOMA (HCC): ICD-10-CM

## 2024-03-08 DIAGNOSIS — E89.0 POSTOPERATIVE HYPOTHYROIDISM: ICD-10-CM

## 2024-03-08 DIAGNOSIS — M79.18 MYOFASCIAL PAIN SYNDROME: Primary | ICD-10-CM

## 2024-03-08 PROBLEM — Z85.850 HISTORY OF THYROID CANCER: Status: ACTIVE | Noted: 2023-02-06

## 2024-03-08 PROCEDURE — 76536 US EXAM OF HEAD AND NECK: CPT

## 2024-03-08 PROCEDURE — 86800 THYROGLOBULIN ANTIBODY: CPT

## 2024-03-08 PROCEDURE — 84432 ASSAY OF THYROGLOBULIN: CPT

## 2024-03-08 PROCEDURE — 20553 NJX 1/MLT TRIGGER POINTS 3/>: CPT | Performed by: ANESTHESIOLOGY

## 2024-03-08 RX ORDER — METHYLPREDNISOLONE ACETATE 40 MG/ML
40 INJECTION, SUSPENSION INTRA-ARTICULAR; INTRALESIONAL; INTRAMUSCULAR; SOFT TISSUE ONCE
Status: COMPLETED | OUTPATIENT
Start: 2024-03-08 | End: 2024-03-08

## 2024-03-08 RX ORDER — BUPIVACAINE HYDROCHLORIDE 2.5 MG/ML
10 INJECTION, SOLUTION EPIDURAL; INFILTRATION; INTRACAUDAL ONCE
Status: COMPLETED | OUTPATIENT
Start: 2024-03-08 | End: 2024-03-08

## 2024-03-08 RX ADMIN — BUPIVACAINE HYDROCHLORIDE 10 ML: 2.5 INJECTION, SOLUTION EPIDURAL; INFILTRATION; INTRACAUDAL at 09:10

## 2024-03-08 RX ADMIN — METHYLPREDNISOLONE ACETATE 40 MG: 40 INJECTION, SUSPENSION INTRA-ARTICULAR; INTRALESIONAL; INTRAMUSCULAR; SOFT TISSUE at 09:11

## 2024-03-08 NOTE — PROGRESS NOTES
43-year-old female with a history of chronic myofascial pain presenting for trigger point injections into bilateral thoracic and lumbar paraspinal musculature and bilateral rhomboids.      After discussing the risks, benefits, and alternatives to the procedure, the patient expressed understanding and wished to proceed. Procedural pause conducted to verify: correct patient identity, procedure to be performed and as applicable, correct side and site, correct patient position, and availability of implants, special equipment and special requirements. The appropriate hyper irritable musculoskeletal tender points were marked with a sterile pen, prepped with alcohol and sterilely draped. After appropriate reproduction of pain at each of the tender points marked, a total of 10 mL of 0.25% bupivacaine and 40 mg of Depo-Medrol was injected after negative aspiration of air, CSF, heme, or other bodily fluids with a 1.5 inch 25-gauge needle. A total number of 3 muscle groups were injected. The patient was discharged with no apparent complications on their own power after an appropriate observation.

## 2024-03-09 LAB
THYROGLOB AB SERPL-ACNC: <1 IU/ML (ref 0–0.9)
THYROGLOB SERPL-MCNC: 0.2 NG/ML (ref 1.5–38.5)

## 2024-03-13 ENCOUNTER — OFFICE VISIT (OUTPATIENT)
Dept: SURGICAL ONCOLOGY | Facility: CLINIC | Age: 44
End: 2024-03-13
Payer: COMMERCIAL

## 2024-03-13 VITALS
WEIGHT: 147.8 LBS | DIASTOLIC BLOOD PRESSURE: 60 MMHG | OXYGEN SATURATION: 99 % | BODY MASS INDEX: 26.19 KG/M2 | HEART RATE: 72 BPM | HEIGHT: 63 IN | SYSTOLIC BLOOD PRESSURE: 120 MMHG | TEMPERATURE: 99 F

## 2024-03-13 DIAGNOSIS — Z08 ENCOUNTER FOR FOLLOW-UP SURVEILLANCE OF THYROID CANCER: Primary | ICD-10-CM

## 2024-03-13 DIAGNOSIS — Z85.850 HISTORY OF THYROID CANCER: ICD-10-CM

## 2024-03-13 DIAGNOSIS — Z85.850 ENCOUNTER FOR FOLLOW-UP SURVEILLANCE OF THYROID CANCER: Primary | ICD-10-CM

## 2024-03-13 PROCEDURE — 99214 OFFICE O/P EST MOD 30 MIN: CPT | Performed by: SURGERY

## 2024-03-13 NOTE — PROGRESS NOTES
Surgical Oncology Follow Up       [facility] [place]  [address] [facility] [address]  [phone]    Asmita ESPINOZA Bem  1980  [id number]  [facility] [place]  [address] [facility] [address]  [phone]    Chief Complaint   Patient presents with    Follow-up       Assessment/Plan:    No problem-specific Assessment & Plan notes found for this encounter.       Diagnoses and all orders for this visit:    Encounter for follow-up surveillance of thyroid cancer    History of thyroid cancer      Advance Care Planning/Advance Directives:  Discussed disease status, cancer treatment plans and/or cancer treatment goals with the patient.     Oncology History   History of thyroid cancer   1/31/2023 Biopsy    Thyroid, Left, lower (Thin-prep and smear preparations):    Malignant ([place] Category VI)  Papillary carcinoma.     2/24/2023 Surgery    Thyroid; total thyroidectomy:       - Papillary thyroid carcinoma, classic, 1.2 cm      - Carcinoma involves left thyroid lobe      - Lymphovascular invasion: Not identified       - Extrathyroidal extension: Not identified       - All margins free of carcinoma       - Background thyroid with previous biopsy site changes  pT1b     2/24/2023 -  Cancer Staged    Staging form: Thyroid - Differentiated and Anaplastic, AJCC 8th Edition  - Pathologic stage from 2/24/2023: Stage I (pT1b, pNX, cM0, Age at diagnosis: < 55 years) - Signed by HENRY Queen on 8/21/2023  Stage prefix: Initial diagnosis  Lymph node metastasis: Unknown           History of Present Illness: 43-year-old woman with a history of stage I thyroid cancer here for surveillance visit.  -Interval History: No new complaints to report.  She follows closely with endocrinology for adjustments to her thyroid medications.  She had blood work and ultrasound done in antipation of  today's visit.    Review of Systems:  Review of Systems   Constitutional: Negative.    HENT: Negative.     Eyes: Negative.    Respiratory: Negative.     Cardiovascular: Negative.    Gastrointestinal: Negative.    Endocrine: Negative.    Genitourinary: Negative.    Musculoskeletal: Negative.    Skin: Negative.    Allergic/Immunologic: Negative.    Neurological: Negative.    Hematological: Negative.    Psychiatric/Behavioral: Negative.     All other systems reviewed and are negative.      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)    Hyperlipidemia    Iron deficiency    Encounter for follow-up surveillance of thyroid cancer    Myofascial pain syndrome     Past Medical History:   Diagnosis Date    Allergic     Anxiety     Cancer (HCC) 2/1/2023    Thyroid- Papillary carcinoma    COVID-19 01/2022    mild s/s-not hospitalized, not vaccinated    GERD (gastroesophageal reflux disease)     Headache(784.0)     History of IBS     with constipation    Ingrown toenail     Irritable bowel syndrome     Migraines     Chronic per pt    Plantar fasciitis     Thyroid carcinoma (HCC)     Varicella     Venereal wart 08/07/2014     Past Surgical History:   Procedure Laterality Date    CERVICAL BIOPSY  W/ LOOP ELECTRODE EXCISION  2017    COLONOSCOPY      NASAL SEPTUM SURGERY      DC OSTEOT W/WO LNGTH SHRT/CORRJ 1ST METAR Left 12/30/2022    Procedure: OSTEOTOMY METATARSAL 1st;  Surgeon: Taiwo Harrington DPM;  Location: [facility];  Service: Podiatry    THYROIDECTOMY N/A 2/24/2023    Procedure: TOTAL  THYROIDECTOMY;  Surgeon: Vince Carey MD;  Location: [facility];  Service: Surgical Oncology    TONSILLECTOMY      UPPER GASTROINTESTINAL ENDOSCOPY      US GUIDED THYROID BIOPSY  1/31/2023    WISDOM TOOTH EXTRACTION       Family History   Problem Relation Age of Onset    Hypertension Mother     Arthritis Mother     Thyroid disease Mother     Osteoarthritis Mother     Transient ischemic attack Father     Hypertension Father     Stroke Father     Vision loss Father     Rashes / Skin problems Sister         Shingles    Migraines Sister     No Known Problems Sister     Uterine cancer Maternal Grandmother 20    Cancer Maternal Grandmother         Uterine    Colon cancer Maternal Grandfather 60    Cancer Maternal Grandfather         Colon    Lung cancer Paternal Grandmother 60    Cancer Paternal Grandmother         Lung    Heart attack Paternal Grandfather     Colon cancer Cousin 30    Breast cancer Maternal Aunt     Breast cancer Maternal Aunt     Breast cancer Maternal Aunt      Social History     Socioeconomic History    Marital status: Single     Spouse name: Not on file    Number of children: Not on file    Years of education: Not on file    Highest education level: Not on file   Occupational History    Not on file   Tobacco Use    Smoking status: Never    Smokeless tobacco: Never   Vaping Use    Vaping status: Never Used   Substance and Sexual Activity    Alcohol use: Yes     Alcohol/week: 2.0 standard drinks of alcohol     Types: 2 Glasses of wine per week     Comment: 2 glasses wine a week    Drug use: Never    Sexual activity: Not Currently   Other Topics Concern    Not on file   Social History Narrative    Not on file     Social Determinants of Health     Financial Resource Strain: Not on file   Food Insecurity: Not on file   Transportation Needs: Not on file   Physical Activity: Not on file   Stress: Not on file   Social Connections: Not on file   Intimate Partner Violence: Not on file   Housing Stability:  Not on file       Current Outpatient Medications:     acetaminophen (TYLENOL) 500 mg tablet, Take 1,000 mg by mouth every 6 (six) hours as needed for mild pain, Disp: , Rfl:     bisacodyl (DULCOLAX) 5 mg EC tablet, Take 2 tablets (10 mg total) by mouth 2 (two) times a day as needed for constipation, Disp: 60 tablet, Rfl: 1    calcitriol (ROCALTROL) 0.25 mcg capsule, TAKE ONE CAPSULE BY MOUTH 2 TIMES A DAY, Disp: 180 capsule, Rfl: 0    clindamycin (CLEOCIN T) 1 % lotion, in the morning, Disp: , Rfl:     Emgality 120 MG/ML SOAJ, INJECT 120MG SUBCUTANEOUSLY (UNDER THE SKIN) EVERY 28 DAYS, Disp: 1 mL, Rfl: 11    hydrocortisone (ANUSOL-HC) 2.5 % rectal cream, Apply topically 2 (two) times a day, Disp: 28 g, Rfl: 2    Levocetirizine Dihydrochloride (XYZAL PO), Take by mouth daily at bedtime, Disp: , Rfl:     levothyroxine (Synthroid) 125 mcg tablet, Take 1 tablet (125 mcg total) by mouth daily, Disp: 30 tablet, Rfl: 3    Melatonin 5 MG TABS, Take by mouth as needed, Disp: , Rfl:     Multiple Vitamin (multivitamin) tablet, Take 1 tablet by mouth daily, Disp: , Rfl:     omeprazole (PriLOSEC) 40 MG capsule, Take 1 capsule (40 mg total) by mouth daily, Disp: 90 capsule, Rfl: 1    Prucalopride Succinate (Motegrity) 2 MG TABS, Take 2 mg by mouth in the morning, Disp: 30 tablet, Rfl: 2    rimegepant sulfate (Nurtec) 75 mg TBDP, Take one NURTEC 75 mg at onset under tongue. Limit 1 in 24 hours., Disp: 16 tablet, Rfl: 11    rizatriptan (MAXALT) 10 mg tablet, Take 1 tablet (10 mg total) by mouth once as needed for migraine May repeat in 2 hours if needed. Max 2/24 hours, 9/month., Disp: 9 tablet, Rfl: 12    tiZANidine (ZANAFLEX) 2 mg tablet, Take 1 tablet (2 mg total) by mouth 2 (two) times a day as needed for muscle spasms, Disp: 60 tablet, Rfl: 1    zolpidem (AMBIEN) 5 mg tablet, Take 1 tablet (5 mg total) by mouth daily at bedtime as needed for sleep, Disp: 30 tablet, Rfl: 0    acetaZOLAMIDE (DIAMOX) 125 mg tablet, 125 mg PO  nightly for 1 week, then increase to 125 mg BID for 1 week, then 125 mg in am and 250 mg in pm for 1 week, then 250 mg BID (Patient not taking: Reported on 11/6/2023), Disp: 120 tablet, Rfl: 6    calcium citrate (CALCITRATE) 950 (200 Ca) MG tablet, Take 1 tablet (950 mg total) by mouth 2 (two) times a day, Disp: 120 tablet, Rfl: 0    cholecalciferol (VITAMIN D3) 1,000 units tablet, Take 1 tablet (1,000 Units total) by mouth daily Do not start before March 1, 2023., Disp: 30 tablet, Rfl: 0    propranolol (INDERAL) 10 mg tablet, Decrease to 30 mg PO BID for 1 week, then 20 mg BID for 1 week, then 10 mg BID for 1 week and then can stop (Patient not taking: Reported on 2/23/2024), Disp: 108 tablet, Rfl: 4  No Known Allergies  Vitals:    03/13/24 1556   BP: 120/60   Pulse: 72   Temp: 99 °F (37.2 °C)   SpO2: 99%       Physical Exam  Vitals reviewed.   Constitutional:       Appearance: Normal appearance.   HENT:      Head: Normocephalic and atraumatic.      Right Ear: External ear normal.      Left Ear: External ear normal.   Eyes:      Extraocular Movements: Extraocular movements intact.      Pupils: Pupils are equal, round, and reactive to light.   Neck:      Vascular: No carotid bruit.   Cardiovascular:      Rate and Rhythm: Normal rate and regular rhythm.      Pulses: Normal pulses.      Heart sounds: Normal heart sounds.   Abdominal:      General: Abdomen is flat.      Palpations: Abdomen is soft.   Musculoskeletal:         General: Normal range of motion.      Cervical back: Normal range of motion and neck supple. No rigidity or tenderness.   Lymphadenopathy:      Cervical: No cervical adenopathy.   Skin:     General: Skin is warm and dry.   Neurological:      General: No focal deficit present.      Mental Status: She is alert and oriented to person, place, and time.   Psychiatric:         Mood and Affect: Mood normal.         Behavior: Behavior normal.           Results:  Labs:  Lab Results   Component Value Date     AUU8GKQXYVRQ 0.413 (L) 02/14/2024    TSH 2.73 08/13/2020    Q8YBQPT 1.10 02/03/2023            Component  Ref Range & Units 3/8/24 11:36 AM 9/11/23  7:24 AM 5/31/23  7:07 AM 2/3/23  1:34 PM   Thyroglobulin-MACHO  1.5 - 38.5 ng/mL 0.2               Component  Ref Range & Units 3/8/24 11:36 AM 9/11/23  7:24 AM 5/31/23  7:07 AM 2/3/23  1:34 PM   Thyroglobulin Ab  0.0 - 0.9 IU/mL <1.0           Imaging  US head neck lymph node mapping    Result Date: 3/8/2024  Narrative: NECK ULTRASOUND INDICATION: C73: Malignant neoplasm of thyroid gland. Status post total thyroidectomy [id number] papillary thyroid carcinoma COMPARISON: Ultrasound 2/10/2023 and 9/8/2023 FINDINGS: Ultrasound of the thyroidectomy bed and cervical lymph node chains was performed with a high frequency linear transducer. Patient noted to be status post total thyroidectomy. Small stable nodule in the right thyroidectomy bed again noted measuring 6 x 5 x 5 mm which may reflect residual thyroid tissue. No suspicious abnormality identified. Lymph nodes maintain normal morphologic contour, echogenicity and short axis dimensions of less than 0.7 cm. No evidence for microcalcification or focal nodularity.     Impression: 1. Status post total thyroidectomy. 2. Stable residual small amount of soft tissue in the right thyroidectomy bed measuring 6 x 5 x 5 mm possibly reflecting residual thyroid tissue. 3. No cervical adenopathy noted. Workstation performed: [id number]     I reviewed the above laboratory and imaging data.    Discussion/Summary: 43-year-old woman, history of stage I thyroid cancer, presently JAAML.  Follow-up in 6 months with blood work and ultrasound for continued surveillance per protocol.

## 2024-03-15 ENCOUNTER — TELEPHONE (OUTPATIENT)
Dept: PAIN MEDICINE | Facility: CLINIC | Age: 44
End: 2024-03-15

## 2024-03-15 ENCOUNTER — PROCEDURE VISIT (OUTPATIENT)
Age: 44
End: 2024-03-15
Payer: COMMERCIAL

## 2024-03-15 VITALS
OXYGEN SATURATION: 98 % | DIASTOLIC BLOOD PRESSURE: 66 MMHG | HEART RATE: 78 BPM | SYSTOLIC BLOOD PRESSURE: 108 MMHG | WEIGHT: 147 LBS | BODY MASS INDEX: 26.05 KG/M2 | HEIGHT: 63 IN

## 2024-03-15 DIAGNOSIS — M99.03 SEGMENTAL DYSFUNCTION OF LUMBAR REGION: ICD-10-CM

## 2024-03-15 DIAGNOSIS — M99.02 SEGMENTAL DYSFUNCTION OF THORACIC REGION: ICD-10-CM

## 2024-03-15 DIAGNOSIS — M99.01 SEGMENTAL DYSFUNCTION OF CERVICAL REGION: ICD-10-CM

## 2024-03-15 DIAGNOSIS — M99.04 SEGMENTAL DYSFUNCTION OF SACRAL REGION: ICD-10-CM

## 2024-03-15 DIAGNOSIS — G89.29 CHRONIC LEFT-SIDED THORACIC BACK PAIN: Primary | ICD-10-CM

## 2024-03-15 DIAGNOSIS — M75.52 SUBACROMIAL BURSITIS OF LEFT SHOULDER JOINT: ICD-10-CM

## 2024-03-15 DIAGNOSIS — M54.6 CHRONIC LEFT-SIDED THORACIC BACK PAIN: Primary | ICD-10-CM

## 2024-03-15 DIAGNOSIS — M54.59 MECHANICAL LOW BACK PAIN: ICD-10-CM

## 2024-03-15 PROCEDURE — 97110 THERAPEUTIC EXERCISES: CPT | Performed by: CHIROPRACTOR

## 2024-03-15 PROCEDURE — 98941 CHIROPRACT MANJ 3-4 REGIONS: CPT | Performed by: CHIROPRACTOR

## 2024-03-15 NOTE — PROGRESS NOTES
Diagnoses and all orders for this visit:    Chronic left-sided thoracic back pain    Subacromial bursitis of left shoulder joint    Segmental dysfunction of lumbar region    Segmental dysfunction of sacral region    Segmental dysfunction of thoracic region    Segmental dysfunction of cervical region    Mechanical low back pain       ASSESSMENT:   Pt's symptoms and exam findings consistent with mechanical lbp  and hip bursitis secondary to repetitive st/sp injury, exacerbated by postural/ergonomic stressors. Pt responded well to flexion biased stretches and manual mobilization of the affected spinal and myofascial tissues with increased ROM; trial of conservative tx recommended consisting of stretching, graded mobilization/manipulation of the affected spinal and myofascial jt dysfunction, postural/ergonomic education and take home stretches/exercises. If symptoms fail to improve with short trial of conservative care, appropriate imaging and referral will be coordinated.  - The patient is feeling a little better post-treatment/.     PROCEDURE CODES: 55623 and 49523    TREATMENT:  Fear avoidance behavior discussion; encouraged and reassured pt that natural course of condition is to improve over time with adherence to tx plan and home care strategies. Home care recommendations: avoid bed rest, walk (but avoid trails and uneven surfaces), gradual return to activity to tolerance (avoid anything that peripheralizes symptoms), call if symptoms peripheralize, worsen, or neurologic deficit progresses. Ther-ex: IASTM; discussed post procedure soreness and/or ecchymosis for up to 36 hrs, applied to affected mm hypertonicities; supine hamstring stretch, supine gluteal stretch, side laying QL stretch, single knee to chest stretch, hip flexor pin-and-stretch, alternating prone hip extension, glute bridge, transitional mvmt education, abdominal bracing; greater than 15 min spent performing above mentioned ther-ex to improve  ROM/flexibility. Thoracic mobilization/manipulation: prone P-A mob, supine A-P manip; Lumbar mobilization/manipulation: diversified side laying graded HVLA, flexion-traction; SIJ Manipulation/Mobilization: R/L SIJ HVLA - long axis distraction, ahn drop table maneuver to affected SIJ    HPI:  Asmita ESPINOZA Bem is a 43 y.o. female  Chief Complaint   Patient presents with   • Back Pain     Middle back pain-7  Lower back pain-7   • Neck Pain     Neck pain-5       The patient presents to the office with lower back pain that started without incident of trauma on June 1 while at a concert. She was sitting on the pain but has no pain until they were driving home. The patient does remember having mild tingling in the lower back about 6 months prior to that. The patient now has tingling across lower back. Pain is constant 4 with sitting can go up to a 8/10. Pt went to PT for a course with some improvements with working out., and did help overall but pain is still significant in the evenings. The patient was also given mm relaxers which she think she not help but also makes her too tired.  Massages once a month that helps temporarily. Exercise- metabolic and strength training. Pt sleeps a little better after Sleep therapy program but slightly more worse after last visit. Also gets pain with a bump behind legs but and notices more spider veins and most tingling into the legs but mentions wearing compression stockings for work.   - The patient is feeling the same after last visit, had injections in the lower back on Friday    Back Pain  The problem has been waxing and waning since onset. The pain is present in the lumbar spine.   Neck Pain       Past Medical History:   Diagnosis Date   • Allergic    • Anxiety    • Cancer (HCC) 2/1/2023    Thyroid- Papillary carcinoma   • COVID-19 01/2022    mild s/s-not hospitalized, not vaccinated   • GERD (gastroesophageal reflux disease)    • Headache(784.0)    • History of IBS     with  constipation   • Ingrown toenail    • Irritable bowel syndrome    • Migraines     Chronic per pt   • Plantar fasciitis    • Thyroid carcinoma (HCC)    • Varicella    • Venereal wart 08/07/2014      Past Surgical History:   Procedure Laterality Date   • CERVICAL BIOPSY  W/ LOOP ELECTRODE EXCISION  2017   • COLONOSCOPY     • NASAL SEPTUM SURGERY     • VA OSTEOT W/WO LNGTH SHRT/CORRJ 1ST METAR Left 12/30/2022    Procedure: OSTEOTOMY METATARSAL 1st;  Surgeon: Taiwo Harrington DPM;  Location: [facility];  Service: Podiatry   • THYROIDECTOMY N/A 2/24/2023    Procedure: TOTAL THYROIDECTOMY;  Surgeon: Vince Carey MD;  Location: [facility];  Service: Surgical Oncology   • TONSILLECTOMY     • UPPER GASTROINTESTINAL ENDOSCOPY     • US GUIDED THYROID BIOPSY  1/31/2023   • WISDOM TOOTH EXTRACTION       The following portions of the patient's history were reviewed and updated as appropriate: allergies, past family history, past medical history, past social history, past surgical history, and problem list.  Review of Systems   Musculoskeletal:  Positive for back pain, myalgias and neck pain.     Physical Exam  Musculoskeletal:         General: Tenderness present.      Cervical back: Tenderness present.      Thoracic back: Spasms and tenderness present. No swelling, edema, deformity, signs of trauma, lacerations or bony tenderness. Decreased range of motion. No scoliosis.      Lumbar back: Spasms and tenderness present. No swelling, edema, deformity, signs of trauma, lacerations or bony tenderness. Decreased range of motion. No scoliosis.        Back:    Neurological:      Gait: Gait is intact.      Deep Tendon Reflexes: Reflexes are normal and symmetric.   Psychiatric:         Attention and Perception: Attention normal.         Mood and Affect: Affect normal.         Speech: Speech normal.         Behavior: Behavior is cooperative.         Cognition and Memory: Cognition normal.       SOFT TISSUE ASSESSMENT Hypertonicity and  tenderness palpated B C4-T1, T10-S1 erector spinae, upper trap, SCM, Lev scap, hip flexor, glute med/min, QL, hamstring JOINT RESTRICTIONS: C4-T1, T10-S1 and R/L SIJ ORTHO: SI jt point tenderness: +; Mary unremarkable for centralization/peripheralization; michelle's, iliac compression, thigh thrust elicit lbp in R/L SIJ; prone femoral nerve stretch neg for upper lumbar neural tension, elicits R/L SIJ stiffness; sitting root elicits no lbp on R/L; slump test elicits no neural tension R/L, Cervical distraction- Neg, Maximal Foraminal stenosis- neg, Spurling's- neg    Return in about 1 week (around 3/22/2024) for Recheck.

## 2024-03-19 DIAGNOSIS — F51.01 PRIMARY INSOMNIA: ICD-10-CM

## 2024-03-20 RX ORDER — ZOLPIDEM TARTRATE 5 MG/1
5 TABLET ORAL
Qty: 30 TABLET | Refills: 0 | Status: SHIPPED | OUTPATIENT
Start: 2024-03-20

## 2024-03-21 ENCOUNTER — PROCEDURE VISIT (OUTPATIENT)
Dept: PAIN MEDICINE | Facility: CLINIC | Age: 44
End: 2024-03-21
Payer: COMMERCIAL

## 2024-03-21 VITALS
HEIGHT: 63 IN | SYSTOLIC BLOOD PRESSURE: 124 MMHG | WEIGHT: 150 LBS | DIASTOLIC BLOOD PRESSURE: 74 MMHG | BODY MASS INDEX: 26.58 KG/M2

## 2024-03-21 DIAGNOSIS — M75.52 SUBACROMIAL BURSITIS OF LEFT SHOULDER JOINT: Primary | ICD-10-CM

## 2024-03-21 PROCEDURE — 20611 DRAIN/INJ JOINT/BURSA W/US: CPT | Performed by: ANESTHESIOLOGY

## 2024-03-21 RX ORDER — ROPIVACAINE HYDROCHLORIDE 2 MG/ML
40 INJECTION, SOLUTION EPIDURAL; INFILTRATION; PERINEURAL ONCE
Status: COMPLETED | OUTPATIENT
Start: 2024-03-21 | End: 2024-03-21

## 2024-03-21 RX ORDER — METHYLPREDNISOLONE ACETATE 40 MG/ML
40 INJECTION, SUSPENSION INTRA-ARTICULAR; INTRALESIONAL; INTRAMUSCULAR; SOFT TISSUE ONCE
Status: COMPLETED | OUTPATIENT
Start: 2024-03-21 | End: 2024-03-21

## 2024-03-21 RX ADMIN — ROPIVACAINE HYDROCHLORIDE 40 MG: 2 INJECTION, SOLUTION EPIDURAL; INFILTRATION; PERINEURAL at 14:19

## 2024-03-21 RX ADMIN — METHYLPREDNISOLONE ACETATE 40 MG: 40 INJECTION, SUSPENSION INTRA-ARTICULAR; INTRALESIONAL; INTRAMUSCULAR; SOFT TISSUE at 14:19

## 2024-03-21 NOTE — PROGRESS NOTES
"Large joint arthrocentesis: L subacromial bursa  Universal Protocol:  Consent: Verbal consent obtained. Written consent obtained.  Risks and benefits: risks, benefits and alternatives were discussed  Consent given by: patient  Time out: Immediately prior to procedure a \"time out\" was called to verify the correct patient, procedure, equipment, support staff and site/side marked as required.  Timeout called at: 3/21/2024 2:23 PM.  Patient understanding: patient states understanding of the procedure being performed  Patient consent: the patient's understanding of the procedure matches consent given  Procedure consent: procedure consent matches procedure scheduled  Site marked: the operative site was marked  Required items: required blood products, implants, devices, and special equipment available  Patient identity confirmed: verbally with patient  Supporting Documentation  Indications: pain   Procedure Details  Location: shoulder - L subacromial bursa  Preparation: Patient was prepped and draped in the usual sterile fashion  Needle size: 25 G  Ultrasound guidance: yes          Ultrasound-guided Left subacromial subdeltoid bursa injection  Indication: left shoulder pain  Preoperative diagnosis: left shoulder bursitis  Postoperative diagnosis: left shoulder bursitis  Procedure: Ultrasound-guided left subacromial subdeltoid bursa injection  After discussing the risks, benefits, and alternatives to the procedure, the patient expressed understanding and wished to proceed. The patient was brought to the procedure suite and placed in the side lying position. A procedural pause was conducted to verify: correct patient identity, procedure to be performed and as applicable, correct side and site, correct patient position, and availability of implants, special equipment, or special requirements. A simple surgical tray was used. Prior to the procedure, the left shoulder was examined with a 12MHz linear transducer to visualize the " subacromial-subdeltoid bursa and determine the optimal needle path. Following this, the left shoulder was prepared with a Chloraprep scrub, then re-examined using the same transducer, a sterile utrasound transducer cover, and sterile ultrasound transducer gel. Thereafter, using ultrasound guidance, a 2.5 inch 25 guage needle was advanced into the left subacromial-subdeltoid bursa. After visualization of the tip of the needle in the target area and negative aspiration for blood, a mixture of 40mg of Depo-Medrol in 3cc of 0.2% ropivicaine was injected into the left subacromial subdeltoid bursa. The patient tolerated the procedure well and there were no apparent complications. After an appropriate amount of observation, the patient was dismissed from the recovery area in good condition under their own power.  COMMENTS:  The patient received a total steroid dose of 40mg of Depo-Medrol.

## 2024-03-22 ENCOUNTER — OFFICE VISIT (OUTPATIENT)
Dept: ENDOCRINOLOGY | Facility: CLINIC | Age: 44
End: 2024-03-22
Payer: COMMERCIAL

## 2024-03-22 VITALS
OXYGEN SATURATION: 99 % | WEIGHT: 151 LBS | BODY MASS INDEX: 26.75 KG/M2 | DIASTOLIC BLOOD PRESSURE: 70 MMHG | HEART RATE: 81 BPM | SYSTOLIC BLOOD PRESSURE: 100 MMHG | HEIGHT: 63 IN | TEMPERATURE: 97.7 F

## 2024-03-22 DIAGNOSIS — E89.2 HYPOPARATHYROIDISM AFTER PROCEDURE (HCC): Primary | ICD-10-CM

## 2024-03-22 DIAGNOSIS — E89.0 POSTOPERATIVE HYPOTHYROIDISM: ICD-10-CM

## 2024-03-22 DIAGNOSIS — Z85.850 HISTORY OF THYROID CANCER: ICD-10-CM

## 2024-03-22 PROCEDURE — 99214 OFFICE O/P EST MOD 30 MIN: CPT | Performed by: STUDENT IN AN ORGANIZED HEALTH CARE EDUCATION/TRAINING PROGRAM

## 2024-03-22 NOTE — PROGRESS NOTES
Asmita ESPINOZA Bem 43 y.o. 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  Pathology showed unifocal papillary carcinoma, classic, greatest dimension of 1.2 cm, with no angioinvasion, no lymphatic invasion, no extrathyroidal extension and margin negative for carcinoma. AJCC stage pT1b, pNX, no radioactive iodine ablation was given.  Recent neck ultrasound showed Stable residual small amount of soft tissue in the right thyroidectomy bed measuring 6 x 5 x 5 mm possibly reflecting residual thyroid tissue. Negative Tg antibody, and Tg of 0.2,   I agree to monitor with lymph node mapping and as well as Tg monitoring ,   I defer WBS for now,                CC:   Thyroid carcinoma     History of Present Illness      HPI:  Asmita ESPINOZA Bem is a 43 year old female who presented for follow-up for papillary thyroid carcinoma, postoperative hypothyroidism and hypoparathyroidism after procedure.    For post procedure hypocalcemia, she is on calcium citrate 500 mg twice a day   For post operative hypothyroidism she is on Levothyroxine 100 mcg daily x 6.    Review of Systems   Constitutional:  Positive for fatigue. Negative for appetite change and unexpected weight change.   HENT:  Negative for trouble swallowing and voice change.    Eyes:  Negative for visual disturbance.   Respiratory:  Negative for cough, shortness of breath and wheezing.     Cardiovascular:  Negative for palpitations and leg swelling.   Gastrointestinal:  Negative for abdominal pain, constipation, diarrhea, nausea and vomiting.   Endocrine: Negative for cold intolerance, heat intolerance, polyphagia and polyuria.   Musculoskeletal:  Negative for arthralgias.   Skin:  Negative for color change, rash and wound.   Neurological:  Negative for dizziness, tremors, weakness, light-headedness, numbness and headaches.   Psychiatric/Behavioral:  Negative for agitation and sleep disturbance. The patient is not nervous/anxious.        Historical Information   Past Medical History:   Diagnosis Date    Allergic     Anxiety     Cancer (HCC) 2/1/2023    Thyroid- Papillary carcinoma    COVID-19 01/2022    mild s/s-not hospitalized, not vaccinated    GERD (gastroesophageal reflux disease)     Headache(784.0)     History of IBS     with constipation    Ingrown toenail     Irritable bowel syndrome     Migraines     Chronic per pt    Plantar fasciitis     Thyroid carcinoma (HCC)     Varicella     Venereal wart 08/07/2014     Past Surgical History:   Procedure Laterality Date    CERVICAL BIOPSY  W/ LOOP ELECTRODE EXCISION  2017    COLONOSCOPY      NASAL SEPTUM SURGERY      HI OSTEOT W/WO LNGTH SHRT/CORRJ 1ST METAR Left 12/30/2022    Procedure: OSTEOTOMY METATARSAL 1st;  Surgeon: Taiwo Harrington DPM;  Location: [facility];  Service: Podiatry    THYROIDECTOMY N/A 2/24/2023    Procedure: TOTAL THYROIDECTOMY;  Surgeon: Vince Carey MD;  Location: [facility];  Service: Surgical Oncology    TONSILLECTOMY      UPPER GASTROINTESTINAL ENDOSCOPY      US GUIDED THYROID BIOPSY  1/31/2023    WISDOM TOOTH EXTRACTION       Social History   Social History     Substance and Sexual Activity   Alcohol Use Yes    Alcohol/week: 2.0 standard drinks of alcohol    Types: 2 Glasses of wine per week    Comment: 2 glasses wine a week     Social History     Substance and Sexual Activity   Drug Use Never     Social History      Tobacco Use   Smoking Status Never   Smokeless Tobacco Never     Family History:   Family History   Problem Relation Age of Onset    Hypertension Mother     Arthritis Mother     Thyroid disease Mother     Osteoarthritis Mother     Transient ischemic attack Father     Hypertension Father     Stroke Father     Vision loss Father     Rashes / Skin problems Sister         Shingles    Migraines Sister     No Known Problems Sister     Uterine cancer Maternal Grandmother 20    Cancer Maternal Grandmother         Uterine    Colon cancer Maternal Grandfather 60    Cancer Maternal Grandfather         Colon    Lung cancer Paternal Grandmother 60    Cancer Paternal Grandmother         Lung    Heart attack Paternal Grandfather     Colon cancer Cousin 30    Breast cancer Maternal Aunt     Breast cancer Maternal Aunt     Breast cancer Maternal Aunt        Meds/Allergies   Current Outpatient Medications   Medication Sig Dispense Refill    acetaminophen (TYLENOL) 500 mg tablet Take 1,000 mg by mouth every 6 (six) hours as needed for mild pain      acetaZOLAMIDE (DIAMOX) 125 mg tablet 125 mg PO nightly for 1 week, then increase to 125 mg BID for 1 week, then 125 mg in am and 250 mg in pm for 1 week, then 250 mg BID (Patient not taking: Reported on 11/6/2023) 120 tablet 6    bisacodyl (DULCOLAX) 5 mg EC tablet Take 2 tablets (10 mg total) by mouth 2 (two) times a day as needed for constipation 60 tablet 1    calcitriol (ROCALTROL) 0.25 mcg capsule TAKE ONE CAPSULE BY MOUTH 2 TIMES A  capsule 0    calcium citrate (CALCITRATE) 950 (200 Ca) MG tablet Take 1 tablet (950 mg total) by mouth 2 (two) times a day 120 tablet 0    cholecalciferol (VITAMIN D3) 1,000 units tablet Take 1 tablet (1,000 Units total) by mouth daily Do not start before March 1, 2023. 30 tablet 0    clindamycin (CLEOCIN T) 1 % lotion in the morning      Emgality 120 MG/ML SOAJ INJECT 120MG SUBCUTANEOUSLY (UNDER THE SKIN) EVERY 28 DAYS 1 mL 11     hydrocortisone (ANUSOL-HC) 2.5 % rectal cream Apply topically 2 (two) times a day 28 g 2    Levocetirizine Dihydrochloride (XYZAL PO) Take by mouth daily at bedtime      levothyroxine (Synthroid) 125 mcg tablet Take 1 tablet (125 mcg total) by mouth daily 30 tablet 3    Melatonin 5 MG TABS Take by mouth as needed      Multiple Vitamin (multivitamin) tablet Take 1 tablet by mouth daily      omeprazole (PriLOSEC) 40 MG capsule Take 1 capsule (40 mg total) by mouth daily 90 capsule 1    propranolol (INDERAL) 10 mg tablet Decrease to 30 mg PO BID for 1 week, then 20 mg BID for 1 week, then 10 mg BID for 1 week and then can stop (Patient not taking: Reported on 2/23/2024) 108 tablet 4    Prucalopride Succinate (Motegrity) 2 MG TABS Take 2 mg by mouth in the morning 30 tablet 2    rimegepant sulfate (Nurtec) 75 mg TBDP Take one NURTEC 75 mg at onset under tongue. Limit 1 in 24 hours. 16 tablet 11    rizatriptan (MAXALT) 10 mg tablet Take 1 tablet (10 mg total) by mouth once as needed for migraine May repeat in 2 hours if needed. Max 2/24 hours, 9/month. 9 tablet 12    tiZANidine (ZANAFLEX) 2 mg tablet Take 1 tablet (2 mg total) by mouth 2 (two) times a day as needed for muscle spasms 60 tablet 1    zolpidem (AMBIEN) 5 mg tablet Take 1 tablet (5 mg total) by mouth daily at bedtime as needed for sleep 30 tablet 0     No current facility-administered medications for this visit.     No Known Allergies    Objective   Vitals: There were no vitals taken for this visit.    Physical Exam  Constitutional:       General: She is not in acute distress.     Appearance: Normal appearance. She is not ill-appearing, toxic-appearing or diaphoretic.   HENT:      Head: Normocephalic and atraumatic.   Eyes:      Extraocular Movements: Extraocular movements intact.   Neck:      Comments: Thyroidectomy scar  Pulmonary:      Effort: Pulmonary effort is normal. No respiratory distress.   Neurological:      Mental Status: She is alert and oriented  to person, place, and time.         The history was obtained from the review of the chart, patient.    Lab Results:   Lab Results   Component Value Date/Time    TSH 3RD GENERATON 0.413 (L) 02/14/2024 07:19 AM    TSH 3RD GENERATON 0.077 (L) 12/08/2023 08:44 AM    TSH 3RD GENERATON 1.176 07/05/2023 06:45 AM    Free T4 1.12 02/14/2024 07:19 AM    Free T4 1.33 (H) 12/08/2023 08:44 AM    Free T4 1.11 07/05/2023 06:45 AM    Thyroglobulin Ab <1.0 03/08/2024 11:36 AM    Thyroglobulin Ab <1.0 09/11/2023 07:24 AM    Thyroglobulin Ab <1.0 05/31/2023 07:07 AM     Component      Latest Ref Rng 3/8/2024   THYROGLOBULIN AB      0.0 - 0.9 IU/mL <1.0    Thyroglobulin-MACHO      1.5 - 38.5 ng/mL 0.2 (L)       Legend:  (L) Low  Final Diagnosis   A. Thyroid; total thyroidectomy:       - Papillary thyroid carcinoma, classic, 1.2 cm, see note       - Carcinoma involves left thyroid lobe      - Lymphovascular invasion: Not identified       - Extrathyroidal extension: Not identified       - All margins free of carcinoma       - Background thyroid with previous biopsy site changes      - See synoptic report   Electronically signed by Osiel Ortiz MD on 2/28/2023 at 11:37 AM   Note    If clinically indicated, the most appropriate tissue block(s) for ancillary testing are block(s): A8     Intradepartmental consultation is in agreement ().     Dr. Carey is notified of the diagnosis in [facility] via [employer] on 2/28/23 at 11:28AM.    Additional Information    Interpretation performed at [facility], [facility] [id number]     All reported additional testing was performed with appropriately reactive controls.  These tests were developed and their performance characteristics determined by [facility] or appropriate performing facility, though some tests may be performed on tissues which have not been validated for performance characteristics (such as staining performed on alcohol exposed cell blocks and  decalcified tissues).  Results should be interpreted with caution and in the context of the patients’ clinical condition. These tests may not be cleared or approved by the U.S. Food and Drug Administration, though the FDA has determined that such clearance or approval is not necessary. These tests are used for clinical purposes and they should not be regarded as investigational or for research. This laboratory has been approved by [facility], designated as a high-complexity laboratory and is qualified to perform these tests.  .   Synoptic Checklist   THYROID GLAND   8th Edition - Protocol posted: 6/30/2021THYROID GLAND: RESECTION - All Specimens       SPECIMEN   Procedure  Total thyroidectomy   TUMOR   Tumor Focality  Unifocal   Tumor Characteristics     Tumor Site  Left lobe   Tumor Size  Greatest Dimension (Centimeters): 1.2 cm   Histologic Type  Papillary carcinoma, classic (usual, conventional)   Angioinvasion (vascular invasion)  Not identified   Lymphatic Invasion  Not identified   Extrathyroidal Extension  Not identified   Margin Status  All margins negative for carcinoma   Distance from Invasive Carcinoma to Closest Margin  1 mm   REGIONAL LYMPH NODES   Regional Lymph Node Status  Not applicable (no regional lymph nodes submitted or found)   PATHOLOGIC STAGE CLASSIFICATION (pTNM, AJCC 8th Edition)   Reporting of pT, pN, and (when applicable) pM categories is based on information available to the pathologist at the time the report is issued. As per the AJCC (Chapter 1, 8th Ed.) it is the managing physician’s responsibility to establish the final pathologic stage based upon all pertinent information, including but potentially not limited to this pathology report.        pT Category  pT1b   pN Category  pN not assigned (no nodes submitted or found)   ADDITIONAL FINDINGS   Additional Findings  None identified   .        Imaging Studies:  Results for orders placed during the hospital encounter of 03/08/24    US head  "neck lymph node mapping    Impression  1. Status post total thyroidectomy.  2. Stable residual small amount of soft tissue in the right thyroidectomy bed measuring 6 x 5 x 5 mm possibly reflecting residual thyroid tissue.  3. No cervical adenopathy noted.        Workstation performed: [id number]         I have personally reviewed pertinent reports.      Portions of the record may have been created with voice recognition software. Occasional wrong word or \"sound a like\" substitutions may have occurred due to the inherent limitations of voice recognition software. Read the chart carefully and recognize, using context, where substitutions have occurred.    "

## 2024-03-28 ENCOUNTER — TELEPHONE (OUTPATIENT)
Dept: PAIN MEDICINE | Facility: CLINIC | Age: 44
End: 2024-03-28

## 2024-03-31 DIAGNOSIS — R20.2 PARESTHESIAS: ICD-10-CM

## 2024-03-31 DIAGNOSIS — C73 PAPILLARY THYROID CARCINOMA (HCC): ICD-10-CM

## 2024-03-31 NOTE — ASSESSMENT & PLAN NOTE
S/p total thyroidectomy for thyroid nodule with FNA biopsy result [place] category VI.  Pathology showed unifocal papillary carcinoma, classic, greatest dimension of 1.2 cm, with no angioinvasion, no lymphatic invasion, no extrathyroidal extension and margin negative for carcinoma. AJCC stage pT1b, pNX, no radioactive iodine ablation was given.  Recent neck ultrasound showed Stable residual small amount of soft tissue in the right thyroidectomy bed measuring 6 x 5 x 5 mm possibly reflecting residual thyroid tissue. Negative Tg antibody, and Tg of 0.2,   I agree to monitor with lymph node mapping and as well as Tg monitoring ,   I defer WBS for now,

## 2024-03-31 NOTE — ASSESSMENT & PLAN NOTE
TSH goal 0.5 - 2.0  Current LT4 therapy with levothyroxine 100 mcg daily for 6 days, will maintain current regimen,   Check TFT in 8 wks,

## 2024-04-01 RX ORDER — LEVOTHYROXINE SODIUM 0.12 MG/1
125 TABLET ORAL DAILY
Qty: 90 TABLET | Refills: 1 | Status: SHIPPED | OUTPATIENT
Start: 2024-04-01

## 2024-04-01 RX ORDER — CALCITRIOL 0.25 UG/1
CAPSULE, LIQUID FILLED ORAL
Qty: 180 CAPSULE | Refills: 1 | Status: SHIPPED | OUTPATIENT
Start: 2024-04-01

## 2024-04-05 ENCOUNTER — PROCEDURE VISIT (OUTPATIENT)
Age: 44
End: 2024-04-05
Payer: COMMERCIAL

## 2024-04-05 ENCOUNTER — OFFICE VISIT (OUTPATIENT)
Dept: NEUROLOGY | Facility: CLINIC | Age: 44
End: 2024-04-05
Payer: COMMERCIAL

## 2024-04-05 VITALS
SYSTOLIC BLOOD PRESSURE: 122 MMHG | HEIGHT: 63 IN | HEART RATE: 75 BPM | WEIGHT: 151 LBS | DIASTOLIC BLOOD PRESSURE: 70 MMHG | BODY MASS INDEX: 26.75 KG/M2 | OXYGEN SATURATION: 98 %

## 2024-04-05 VITALS
HEIGHT: 63 IN | TEMPERATURE: 98.4 F | SYSTOLIC BLOOD PRESSURE: 131 MMHG | HEART RATE: 69 BPM | WEIGHT: 150 LBS | BODY MASS INDEX: 26.58 KG/M2 | DIASTOLIC BLOOD PRESSURE: 79 MMHG

## 2024-04-05 DIAGNOSIS — G89.29 CHRONIC LEFT-SIDED THORACIC BACK PAIN: Primary | ICD-10-CM

## 2024-04-05 DIAGNOSIS — M75.52 SUBACROMIAL BURSITIS OF LEFT SHOULDER JOINT: ICD-10-CM

## 2024-04-05 DIAGNOSIS — M99.04 SEGMENTAL DYSFUNCTION OF SACRAL REGION: ICD-10-CM

## 2024-04-05 DIAGNOSIS — M54.59 MECHANICAL LOW BACK PAIN: ICD-10-CM

## 2024-04-05 DIAGNOSIS — M99.01 SEGMENTAL DYSFUNCTION OF CERVICAL REGION: ICD-10-CM

## 2024-04-05 DIAGNOSIS — G43.109 MIGRAINE WITH AURA AND WITHOUT STATUS MIGRAINOSUS, NOT INTRACTABLE: ICD-10-CM

## 2024-04-05 DIAGNOSIS — M99.02 SEGMENTAL DYSFUNCTION OF THORACIC REGION: ICD-10-CM

## 2024-04-05 DIAGNOSIS — M99.03 SEGMENTAL DYSFUNCTION OF LUMBAR REGION: ICD-10-CM

## 2024-04-05 DIAGNOSIS — M54.6 CHRONIC LEFT-SIDED THORACIC BACK PAIN: Primary | ICD-10-CM

## 2024-04-05 DIAGNOSIS — G43.E09 CHRONIC MIGRAINE WITH AURA WITHOUT STATUS MIGRAINOSUS, NOT INTRACTABLE: Primary | ICD-10-CM

## 2024-04-05 PROCEDURE — 97110 THERAPEUTIC EXERCISES: CPT | Performed by: CHIROPRACTOR

## 2024-04-05 PROCEDURE — 96372 THER/PROPH/DIAG INJ SC/IM: CPT | Performed by: PSYCHIATRY & NEUROLOGY

## 2024-04-05 PROCEDURE — 98941 CHIROPRACT MANJ 3-4 REGIONS: CPT | Performed by: CHIROPRACTOR

## 2024-04-05 PROCEDURE — 99417 PROLNG OP E/M EACH 15 MIN: CPT | Performed by: PSYCHIATRY & NEUROLOGY

## 2024-04-05 PROCEDURE — 99215 OFFICE O/P EST HI 40 MIN: CPT | Performed by: PSYCHIATRY & NEUROLOGY

## 2024-04-05 RX ORDER — KETOROLAC TROMETHAMINE 30 MG/ML
60 INJECTION, SOLUTION INTRAMUSCULAR; INTRAVENOUS ONCE
Status: COMPLETED | OUTPATIENT
Start: 2024-04-05 | End: 2024-04-05

## 2024-04-05 RX ORDER — RIMEGEPANT SULFATE 75 MG/75MG
TABLET, ORALLY DISINTEGRATING ORAL
Qty: 16 TABLET | Refills: 11 | Status: SHIPPED | OUTPATIENT
Start: 2024-04-05

## 2024-04-05 RX ORDER — VERAPAMIL HYDROCHLORIDE 40 MG/1
TABLET ORAL
Qty: 180 TABLET | Refills: 3 | Status: SHIPPED | OUTPATIENT
Start: 2024-04-05

## 2024-04-05 RX ADMIN — KETOROLAC TROMETHAMINE 60 MG: 30 INJECTION, SOLUTION INTRAMUSCULAR; INTRAVENOUS at 16:49

## 2024-04-05 NOTE — PATIENT INSTRUCTIONS
"  No estrogen due to migraine aura      I wrote a message to the Botox team today about authorizing the Botox.  If you do not hear about starting Botox in the next few weeks please call me to check in and see what the status is.       Continue to follow with pain management for back pain    Referral to ENT for the 2 months of sinus issues and I recommend [facility] - seeing Dr. Fredi Stevens to consider  - \"Rewire Your Anxious Brain: How to Use the Neuroscience of Fear to End Anxiety, Panic, and Worry\" By Marguerite PhD  - \"atomic habits\" - By Ronen Hunter   - \"The real self care\"  By Candida Beal  - \"why we sleep\" - By Ismael Devlin   - \"the happiness advantage\" - By Leonidas Carey     I have had patients recommend the following female therapists. (I am assuming female by their names of course and I do not know them myself):  - Inna Contreras   - Martha Andres PhD in [place]  - Alicia Momin in [place]  - Zoie Baugh in [place]  - Tasha Behnke in [place]  - Nakia Paiz in [place]   - Claudio Kunz in [place]  - Caitie Brock PhD  - Rosa Maria Moore MS   - Lashawn Aldrich in [place]         Headache/migraine treatment:   Abortive medications (for immediate treatment of a headache):   It is ok to take ibuprofen, acetaminophen or naproxen (Advil, Tylenol,  Aleve, Excedrin) if they help your headaches you should limit these to No more than 3 times a week to avoid medication overuse/rebound headaches.     -     rimegepant sulfate (Nurtec) 75 mg TBDP; Take one NURTEC 75 mg at onset under tongue. Limit 1 in 24 hours.  Prior auth, coupon card for copay     -     dexamethasone (DECADRON) 4 mg tablet; Can take 8 mg for 1-2 days PO with breakfast, then if needed can take 4 mg with breakfast for 1-5 more days if needed for headache      For your more moderate to severe migraines take this medication early   Maxalt (rizatriptan) 10mg tabs - take one at the onset of headache. May repeat one time " after 1-2 hours if pain has not resolved.   (Max 2 a day and 9 a month)     Prescription preventive medications for headaches/migraines   (to take every day to help prevent headaches - not to take at the time of headache):  [x] -    -     verapamil (CALAN) 40 mg tablet; 40 mg BID for 1 week, then if needed/tolerate increase to 40 mg in am and 80 mg in pm for 1 week, then 80 mg BID for 1 week, then 80 mg in am and 120 mg in pm for 1 week, then 120 mg BID, stay on lowest effective tolerated dose    As we discussed I prescribed this twice a day but if you feel any unusual symptoms or side effects midday we could have you take a midday dose as well    ---------------------      Emgality/Galcanezumab -  120 mg injections every 28 days     READ INSTRUCTIONS that come with the medication. REFRIGERATE. Keep out of direct sunlight. Prior to administration, allow to come to room temperature for 30 minutes. Do not warm using a heat source (eg, microwave or hot water). Do not shake. Administer in preferably abdomen (avoiding 2 inches around the navel), thigh, upper arm, or buttocks avoiding areas of skin that are tender, bruised, red or hard. Deliver entire contents of single-use prefilled pen or syringe.  Unknown impact in pregnancy therefore would recommend stopping 6 months prior to considering pregnancy.      *Typically these types of medications take time untill you see the benefit, although some may see improvement in days, often it may take weeks, especially if the medication is being titrated up to a beneficial level. Please contact us if there are any concerns or questions regarding the medication.     Lifestyle Recommendations:  [x] SLEEP - Maintain a regular sleep schedule: Adults need at least 7-8 hours of uninterrupted a night. Maintain good sleep hygiene:  Going to bed and waking up at consistent times, avoiding excessive daytime naps, avoiding caffeinated beverages in the evening, avoid excessive stimulation in  the evening and generally using bed primarily for sleeping.  One hour before bedtime would recommend turning lights down lower, decreasing your activity (may read quietly, listen to music at a low volume). When you get into bed, should eliminate all technology (no texting, emailing, playing with your phone, iPad or tablet in bed).  [x] HYDRATION - Maintain good hydration.  Drink  2L of fluid a day (4 typical small water bottles)  [x] DIET - Maintain good nutrition. In particular don't skip meals and try and eat healthy balanced meals regularly.  [x] TRIGGERS - Look for other triggers and avoid them: Limit caffeine to 1-2 cups a day or less. Avoid dietary triggers that you have noticed bring on your headaches (this could include aged cheese, peanuts, MSG, aspartame and nitrates).  [x] EXERCISE - physical exercise as we all know is good for you in many ways, and not only is good for your heart, but also is beneficial for your mental health, cognitive health and  chronic pain/headaches. I would encourage at the least 5 days of physical exercise weekly for at least 30 minutes.     Education and Follow-up  [x] Please call with any questions or concerns. Of course if any new concerning symptoms go to the emergency department.  [x] Follow up  3-4 months if needed,  sooner if needed

## 2024-04-05 NOTE — PROGRESS NOTES
Diagnoses and all orders for this visit:    Chronic left-sided thoracic back pain    Subacromial bursitis of left shoulder joint    Segmental dysfunction of lumbar region    Segmental dysfunction of sacral region    Segmental dysfunction of thoracic region    Segmental dysfunction of cervical region    Mechanical low back pain       ASSESSMENT:   Pt's symptoms and exam findings consistent with mechanical lbp  and hip bursitis secondary to repetitive st/sp injury, exacerbated by postural/ergonomic stressors. Pt responded well to flexion biased stretches and manual mobilization of the affected spinal and myofascial tissues with increased ROM; trial of conservative tx recommended consisting of stretching, graded mobilization/manipulation of the affected spinal and myofascial jt dysfunction, postural/ergonomic education and take home stretches/exercises. If symptoms fail to improve with short trial of conservative care, appropriate imaging and referral will be coordinated.  - The patient status is unchanged, slight decrease in mm spasm in the neck and back after treatment.     PROCEDURE CODES: 25112 and 11398    TREATMENT:  Fear avoidance behavior discussion; encouraged and reassured pt that natural course of condition is to improve over time with adherence to tx plan and home care strategies. Home care recommendations: avoid bed rest, walk (but avoid trails and uneven surfaces), gradual return to activity to tolerance (avoid anything that peripheralizes symptoms), call if symptoms peripheralize, worsen, or neurologic deficit progresses. Ther-ex: IASTM; discussed post procedure soreness and/or ecchymosis for up to 36 hrs, applied to affected mm hypertonicities; supine hamstring stretch, supine gluteal stretch, side laying QL stretch, single knee to chest stretch, hip flexor pin-and-stretch, alternating prone hip extension, glute bridge, transitional mvmt education, abdominal bracing; greater than 15 min spent performing  above mentioned ther-ex to improve ROM/flexibility. Thoracic mobilization/manipulation: prone P-A mob, supine A-P manip; Lumbar mobilization/manipulation: diversified side laying graded HVLA, flexion-traction; SIJ Manipulation/Mobilization: R/L SIJ HVLA - long axis distraction, ahn drop table maneuver to affected SIJ    HPI:  Asmita ESPINOZA Bem is a 43 y.o. female  Chief Complaint   Patient presents with   • Back Pain     Middle back pain-5  Lower back pain-5   • Neck Pain     Neck pain-3       The patient presents to the office with lower back pain that started without incident of trauma on June 1 while at a concert. She was sitting on the pain but has no pain until they were driving home. The patient does remember having mild tingling in the lower back about 6 months prior to that. The patient now has tingling across lower back. Pain is constant 4 with sitting can go up to a 8/10. Pt went to PT for a course with some improvements with working out., and did help overall but pain is still significant in the evenings. The patient was also given mm relaxers which she think she not help but also makes her too tired.  Massages once a month that helps temporarily. Exercise- metabolic and strength training. Pt sleeps a little better after Sleep therapy program but slightly more worse after last visit. Also gets pain with a bump behind legs but and notices more spider veins and most tingling into the legs but mentions wearing compression stockings for work.   - The patient is feeling the same after last visit, had injections in the lower back on Friday 4/5- The patient is feeling the same in the lower back, she mentions mid day the pain is starting and by the evening the pain is the worst. The pt tolerated shoulder injection is better.     Back Pain  The problem has been waxing and waning since onset. The pain is present in the lumbar spine.   Neck Pain       Past Medical History:   Diagnosis Date   • Allergic    • Anxiety     • Cancer (HCC) 2/1/2023    Thyroid- Papillary carcinoma   • COVID-19 01/2022    mild s/s-not hospitalized, not vaccinated   • GERD (gastroesophageal reflux disease)    • Headache(784.0)    • History of IBS     with constipation   • Ingrown toenail    • Irritable bowel syndrome    • Migraines     Chronic per pt   • Plantar fasciitis    • Thyroid carcinoma (HCC)    • Varicella    • Venereal wart 08/07/2014      Past Surgical History:   Procedure Laterality Date   • CERVICAL BIOPSY  W/ LOOP ELECTRODE EXCISION  2017   • COLONOSCOPY     • NASAL SEPTUM SURGERY     • SD OSTEOT W/WO LNGTH SHRT/CORRJ 1ST METAR Left 12/30/2022    Procedure: OSTEOTOMY METATARSAL 1st;  Surgeon: Taiwo Harrington DPM;  Location: [facility];  Service: Podiatry   • THYROIDECTOMY N/A 2/24/2023    Procedure: TOTAL THYROIDECTOMY;  Surgeon: Vince Carey MD;  Location: [facility];  Service: Surgical Oncology   • TONSILLECTOMY     • UPPER GASTROINTESTINAL ENDOSCOPY     • US GUIDED THYROID BIOPSY  1/31/2023   • WISDOM TOOTH EXTRACTION       The following portions of the patient's history were reviewed and updated as appropriate: allergies, past family history, past medical history, past social history, past surgical history, and problem list.  Review of Systems   Musculoskeletal:  Positive for back pain, myalgias and neck pain.     Physical Exam  Musculoskeletal:         General: Tenderness present.      Cervical back: Tenderness present.      Thoracic back: Spasms and tenderness present. No swelling, edema, deformity, signs of trauma, lacerations or bony tenderness. Decreased range of motion. No scoliosis.      Lumbar back: Spasms and tenderness present. No swelling, edema, deformity, signs of trauma, lacerations or bony tenderness. Decreased range of motion. No scoliosis.        Back:    Neurological:      Gait: Gait is intact.      Deep Tendon Reflexes: Reflexes are normal and symmetric.   Psychiatric:         Attention and Perception: Attention  normal.         Mood and Affect: Affect normal.         Speech: Speech normal.         Behavior: Behavior is cooperative.         Cognition and Memory: Cognition normal.       SOFT TISSUE ASSESSMENT Hypertonicity and tenderness palpated B C4-T1, T10-S1 erector spinae, upper trap, SCM, Lev scap, hip flexor, glute med/min, QL, hamstring JOINT RESTRICTIONS: C4-T1, T10-S1 and R/L SIJ ORTHO: SI jt point tenderness: +; Mary unremarkable for centralization/peripheralization; michelle's, iliac compression, thigh thrust elicit lbp in R/L SIJ; prone femoral nerve stretch neg for upper lumbar neural tension, elicits R/L SIJ stiffness; sitting root elicits no lbp on R/L; slump test elicits no neural tension R/L, Cervical distraction- Neg, Maximal Foraminal stenosis- neg, Spurling's- neg    Return in about 1 week (around 4/12/2024) for Recheck.

## 2024-04-05 NOTE — PROGRESS NOTES
[facility] Consult - Follow up   PATIENT:  Asmita ESPINOZA Bem  MRN:  [id number]  :  1980  DATE OF SERVICE:  2024  REFERRED BY: No ref. provider found  PMD: HENRY Cosby    Assessment/Plan:   Asmita ESPINOZA Bem is a deligthful 43 y.o. female with a past medical history that includes papillary thyroid cancer status post total thyroidectomy 2023 with postop hypoparathyroidism and significantly low calcium now on replacement, IBS with constipation, PTSD, allergic rhinitis, chronic sinusitis, migraines, TOSHIA II, insomnia, neck pain, vasovagal syncope in 20s, anxiety, Intestinal cystitis, childhood asthma  referred here for evaluation of headache.  My initial evaluation 3/28/2022    Migraine with aura and without status migrainosus, not intractable  Features of idiopathic intracranial hypertension on imaging without papilledema not meeting criteria for IIH   Apneas not meeting criteria for NUNU, insomnia (home study 2023, 2 obstructive apneas, 2 hypopneas, BRYCE 0.7, O2 down to 92%) in lab sleep study recommended and ordered and not scheduled, but she did follow-up with sleep medicine and had improvement with CBT-I  She reports a long history of headaches and migraines dating back to 12 or 13 years old.  Family history of migraines.  She reports she has followed with multiple neurologists in the past.  Pain is typically unilateral more often left-sided with visual aura at times and with typical associated migrainous features as well as autonomic features that do not seem to be unilateral to suggest trigeminal autonomic cephalalgia.  -as of 2022 on average over 15 migraine days per month.  Trial of emgality for prevention rizatriptan for abortive.  - as of 2022:  She reports she has had significant improvement on emgality down from 15 to 7-8 times a month and severity has improved as well. They respond to rizatriptan which she feels she tolerates more than  sumatriptan.   - as of 1/11/2023: She reports she continues to do very well on Emgality with about 4 migraines a month that resolve with rizatriptan.  Some wearing off when due for next dose and recommended taking it at 28 days rather than 30 or 31.  She would like to stay on propranolol 40 mg twice daily for now as she feels this is helping prevent tachycardia from causing migraine.  She was able to get off nortriptyline summer 2022 which she was on for an interstitial cystitis without increase.    - as of 4/5/2023:  She returns sooner with daily migraines now following papillary thyroid cancer status post total thyroidectomy 2/24/2023 with postop hypoparathyroidism and significantly low calcium now on replacement with some symptoms possibly consistent with high calcium and upcoming recheck of labs through endocrinology may suggest lowering calcium meds, but would defer to endocrine.  She certainly has had increase in her migrainous symptoms as well as some symptoms possibly consistent with sleep apnea and I ordered sleep study.  She reports she has had improvement in symptoms since the surgery but still was hoping there is something that could improve them as all the other doctors just told her to wait it out.  We will start low-dose topiramate at night to see if this can help if tolerated.  We also discussed PTSD and how it can cause intrusion, avoidance, negative impact on cognition and mood as well as arousal and reactivity changes and certainly sounds like she has symptoms of this as well and recommend counseling.  We will have our  reach out to see if she can help.  - as of 6/27/2023: She reports increasing frequency and severity of headaches overall and currently 3-4 a week.  She did not tolerate trial of topiramate as it worsened mood which is slightly improved from its low point, but she plans to follow-up with counselor.  We discussed trial of acetazolamide/Diamox for suspected subclinical  IIH with cervical medullary compression as well as MRI brain to rule out other causes of intracranial hypertension and look for signs of such.  Diagnostic sleep study ordered due to suspected false negative home study.  Has Decadron available for worse symptoms if needed.  Rizatriptan side effects and will add trial of Nurtec which also could help with prevention the more she takes it.  - as of 8/29/2023: Reviewed MRI brain which shows some features of possible slight increased intracranial pressure without IIH diagnosis and no papilledema.  She reports 9-10 headaches per month that typically improve with Nurtec, which is a significant improvement on Emgality but has wearing off effect and we discussed increasing acetazolamide/Diamox from 250 mg twice daily since well-tolerated gradually up until it decreases more of the symptoms that might be related as thoroughly instructed and discussed with patient today.  If side effects or not effective we will wean off.  Also will wean off propranolol as we wean up acetazolamide/Diamox.  Refill of Decadron for backup.  She is currently undergoing cognitive behavioral therapy for insomnia soon through the sleep medicine department.  - as of 11/28/2023: She reports overall doing much better and so many regards since last visit.  Better emotionally physically and mentally.  CBT for insomnia was almost magical she reports and she is doing much better with sleep although still taking low-dose Ambien half the month which we discussed I do not recommend, especially in the long run, as this does not produce the quality sleep.    She is having 10-12 migraines a month and Nurtec helps sometimes and when it does not rizatriptan always does although makes her nauseated so she avoids this as first option.  Emgality continues to help with prevention although has wearing off effect and she is currently delayed due to prior Auth needing to be redone.  At last visit she was taking  acetazolamide, but at some point soon after reports she discontinued it, as she wanted less meds.  She was weaned off propranolol since we were adding this medication and subsequently has had some increased pressure in the head which we discussed would be expected and we could add back acetazolamide, verapamil or propranolol at any time.  Potentially could be related to current sinus issues which she request referral to ENT for which was placed.  - as of 4/5/2024: She reports 3-4 headaches a week that she wonders is related to change of season.  Also could be related to stress, less sleep, thyroid/parathyroid issues, off propranolol and we discussed trial of Botox for migraine prevention send she has been having over 15 migraine days per month for over 3 months.  In the meantime, we will add verapamil as tolerated although blood pressure hangs low and history of constipation she reports constipation could not get any worse and she does not have a bowel movement without laxative at this time, following with GI.  Follow with sleep medicine but lately only getting 5 to 6 hours of sleep.  Nurtec helps for migraine rescue and if it does not rizatriptan typically does and she has dexamethasone if needed for backup.  Also discussed stress, she asked for list of therapists again and will have our  reach out as well with resources.  Ketorolac/Toradol IM for today's pain.    Workup:  -MRI brain with without contrast 7/13/2023: No acute infarction, intracranial hemorrhage or mass.*As of my retrospective review 8/29/2023 and 11/28/2023 we discussed she has in my opinion partially flattened sella(not empty), prominent optic nerve sheath with some tortuosity bilaterally potentially slightly worse right greater than left, slightly tighter ventricle on the right, cerebellar tonsils overlie the foramen magnum with tight junction bilaterally    Preventative:  - we discussed headache hygiene and lifestyle factors that may  improve headaches  - continue  emgality every 28 days. Discussed proper use, possible side effects and risks.  - trial of botox every 91 days. Discussed proper use, possible side effects and risks.  -   trial of  verapamil (CALAN) 40 mg tablet; 40 mg BID for 1 week, then if needed/tolerate increase to 40 mg in am and 80 mg in pm for 1 week, then 80 mg BID for 1 week, then 80 mg in am and 120 mg in pm for 1 week, then 120 mg BID, stay on lowest effective tolerated dose. Discussed proper use, possible side effects and risks.  - Currently on through other providers: ambien 10 mg- (off and on 6 years and as of 11/28/2023 taking 5 to 7.5 mg maybe half the month), melatonin, tizanidine 2 mg as needed for muscle spasm or back pain, levothyroxine 125 mcg, vitamin D 1000 units daily, Calcitrol 0.25 mcg capsule, multivitamin  - Past/ failed/contraindicated: topiramate, gabapentin, propranolol 40 mg BID, nortriptyline, amitriptyline, prosac/fluoxetine, aimovig contraindicated due to constiptation  - future options: alternative CGRP, botox    Abortive:  - discussed not taking over-the-counter or prescription pain medications more than 3 days per week to prevent medication overuse/rebound headache  -     ketorolac (TORADOL) 60 mg/2 mL IM injection 60 mg. Discussed proper use, possible side effects and risks.  -   rimegepant sulfate (Nurtec) 75 mg TBDP; Take one NURTEC 75 mg at onset under tongue. Limit 1 in 24 hours. Discussed proper use, possible side effects and risks.  -  for back up: dexamethasone (DECADRON) 4 mg tablet; Can take 8 mg for 1-2 days PO with breakfast, then if needed can take 4 mg with breakfast for 1-5 more days if needed for headache. Discussed proper use, possible side effects and risks.  - Past/ failed/contraindicated: sumatriptan 100 mg works sometimes and not others, rizatriptan helps but also makes her nauseous  - past/helped:  Steroids have helped temporarily, toradol IM in the past helped   - future  "options:   prochlorperazine, Toradol IM or p.o., could consider trial for 5 days of Depakote or dexamethasone for prolonged migraine, ubrelvy, reyvow    Insomnia, prolonged Ambien use  Home sleep study June 2023 2 obstructive apneas, 2 hypopneas, following with sleep medicine, did CBT for insomnia   -- as of 11/28/2023:  still using ambien a 5 mg -7.5 mg maybe half the month which I would not recommend    Sinusitis  -     Ambulatory Referral to Otolaryngology; Future    Chronic left-sided thoracic back pain  -     Ambulatory referral to Spine & Pain Management; Future  -     Ambulatory Referral to Physiatry; Future    Patient instructions      No estrogen due to migraine aura      I wrote a message to the Botox team today about authorizing the Botox.  If you do not hear about starting Botox in the next few weeks please call me to check in and see what the status is.       Continue to follow with pain management for back pain    Referral to ENT for the 2 months of sinus issues and I recommend [facility] - seeing Dr. Fredi Stevens to consider on audible since you had asked for resources  - \"Rewire Your Anxious Brain: How to Use the Neuroscience of Fear to End Anxiety, Panic, and Worry\" By Marguerite Yanez  - \"atomic habits\" - By Ronen Hunter   - \"The real self care\"  By Candida Beal  - \"why we sleep\" - By Ismael Devlin   - \"the happiness advantage\" - By Leonidas Carey     I have had patients recommend the following female therapists. (I am assuming female by their names of course and I do not know them myself):  - Inna Contreras   - Martha Andres PhD in [place]  - Alicia Momin in [place]  - Zoie Baugh in [place]  - Tasha Behnke in [place]  - Nakia Paiz in [place]   - Claudio Kunz in [place]  - Caitie Brock PhD  - Rosa Maria Moore MS   - Lashawn Aldrich in [place]         Headache/migraine treatment:   Abortive medications (for immediate treatment of a headache):   It is ok to take " ibuprofen, acetaminophen or naproxen (Advil, Tylenol,  Aleve, Excedrin) if they help your headaches you should limit these to No more than 3 times a week to avoid medication overuse/rebound headaches.     -     rimegepant sulfate (Nurtec) 75 mg TBDP; Take one NURTEC 75 mg at onset under tongue. Limit 1 in 24 hours.  Prior auth, coupon card for copay     -     dexamethasone (DECADRON) 4 mg tablet; Can take 8 mg for 1-2 days PO with breakfast, then if needed can take 4 mg with breakfast for 1-5 more days if needed for headache      For your more moderate to severe migraines take this medication early   Maxalt (rizatriptan) 10mg tabs - take one at the onset of headache. May repeat one time after 1-2 hours if pain has not resolved.   (Max 2 a day and 9 a month)     Prescription preventive medications for headaches/migraines   (to take every day to help prevent headaches - not to take at the time of headache):  [x] -    -     verapamil (CALAN) 40 mg tablet; 40 mg BID for 1 week, then if needed/tolerate increase to 40 mg in am and 80 mg in pm for 1 week, then 80 mg BID for 1 week, then 80 mg in am and 120 mg in pm for 1 week, then 120 mg BID, stay on lowest effective tolerated dose    As we discussed I prescribed this twice a day but if you feel any unusual symptoms or side effects midday we could have you take a midday dose as well    ---------------------      Emgality/Galcanezumab -  120 mg injections every 28 days     READ INSTRUCTIONS that come with the medication. REFRIGERATE. Keep out of direct sunlight. Prior to administration, allow to come to room temperature for 30 minutes. Do not warm using a heat source (eg, microwave or hot water). Do not shake. Administer in preferably abdomen (avoiding 2 inches around the navel), thigh, upper arm, or buttocks avoiding areas of skin that are tender, bruised, red or hard. Deliver entire contents of single-use prefilled pen or syringe.  Unknown impact in pregnancy therefore  would recommend stopping 6 months prior to considering pregnancy.      *Typically these types of medications take time untill you see the benefit, although some may see improvement in days, often it may take weeks, especially if the medication is being titrated up to a beneficial level. Please contact us if there are any concerns or questions regarding the medication.     Lifestyle Recommendations:  [x] SLEEP - Maintain a regular sleep schedule: Adults need at least 7-8 hours of uninterrupted a night. Maintain good sleep hygiene:  Going to bed and waking up at consistent times, avoiding excessive daytime naps, avoiding caffeinated beverages in the evening, avoid excessive stimulation in the evening and generally using bed primarily for sleeping.  One hour before bedtime would recommend turning lights down lower, decreasing your activity (may read quietly, listen to music at a low volume). When you get into bed, should eliminate all technology (no texting, emailing, playing with your phone, iPad or tablet in bed).  [x] HYDRATION - Maintain good hydration.  Drink  2L of fluid a day (4 typical small water bottles)  [x] DIET - Maintain good nutrition. In particular don't skip meals and try and eat healthy balanced meals regularly.  [x] TRIGGERS - Look for other triggers and avoid them: Limit caffeine to 1-2 cups a day or less. Avoid dietary triggers that you have noticed bring on your headaches (this could include aged cheese, peanuts, MSG, aspartame and nitrates).  [x] EXERCISE - physical exercise as we all know is good for you in many ways, and not only is good for your heart, but also is beneficial for your mental health, cognitive health and  chronic pain/headaches. I would encourage at the least 5 days of physical exercise weekly for at least 30 minutes.     Education and Follow-up  [x] Please call with any questions or concerns. Of course if any new concerning symptoms go to the emergency department.  [x] Follow  up  3-4 months if needed,  sooner if needed        CC:   We had the pleasure of evaluating Asmita ESPINOZA Bem in neurological consultation today. Asmita ESPINOZA Bem is a   right handed female who presents today for evaluation of headaches.     History obtained from patient as well as available medical record review.  History of Present Illness:   Interval history as of 4/5/2024  - no significant new or concerning neurologic symptoms since last visit   - chiropractor today metal tool and pressure on tight areas, through [facility], massage and telling her what she can do, so tight and worse after getting, but maybe soon helping, TPIs didn't help through pain management, they did 10 spots close to spine TPI  - saw endo 3/22/24, TSH was too suppressed and they lowered her med   - left side bothers her   - US sonographer M-TH 4 10s   - sleep - a lot better since sleep therapy, maybe 5-6 hours   - stress and thinking about talking to a therapist, trouble with attention at times, struggled with learning her whole life, loves to read, bugs her, trying to study for boards    Headaches and migraines   3-4 headaches a week   Change of season    She has had over 15 migraine days per month for over 3 months lasting over 4 hours an episode    Preventative:   - continue  emgality every 28 days -      Abortive:   -   rimegepant sulfate (Nurtec) 75 mg TBDP; Take one NURTEC 75 mg at onset under tongue. Limit 1 in 24 hours.  Rizatriptan helps if Nurtec does not enough  Denies bothersome side effects       Interval history as of 11/28/2023  - no significant new or concerning neurologic symptoms since last visit   - plans to follow up with ENT for post nasal sinus issues the last two months - huge black chunks with blood on it comes out and then feels so much better    Sleep - CBT for insomnia best thing she has done - like magical, Dr. Ovalle also helped with the iron infusions, night and day different from August, was so tired and miserable, finished  the CBT in Oct  - joined a gym and classes twice a week, so much stronger   - still using ambien half the month 5-7.5 mg     - mood way better - feels way better in general - emotionally, physically mentally     - Left lower back nerve pain like someone is lightly tapping - saw pT, more annoying than painful   Tingling started about a year ago and the pain since June, pain improved with PT, towards the end of the day bad nightly, heating pads on it - constant since June -we discussed I would be happy to place referral    Headaches and migraines   She reports she stopped acetazolamide/Diamox after she got up to  twice daily and did not see a significant difference and did not want to take too many meds -did not increase to 4 times a day after last visit    She reports 10-12 migraines a month, Nurtec helps sometimes and when it does not she takes rizatriptan at always helps    Preventative:   -Trial of acetazolamide/Diamox as of last visit she had stated she was taking 250 mg at lunch 1130 and before bed 9/930 and then the plan was to increase to 4 times a day and she reports she did not do this but did wean off propranolol in OCt    - emgality helping - every 28 days - life changing - wears off at day 25  Delayed    - Currently on through other providers: propranolol 40 mg BID - stopped around Oct and had increased pressure headaches/sinus, ambien 10 mg- (off and on 6 years), melatonin    Abortive:   Nurtec 10 times a month - usually knocks out if mild  Rizatriptan -works if needed - twice a month, makes her nauseated  Has not steroid rescue   Denies bothersome side effects      Interval history as of 8/29/2023  - no significant new or concerning neurologic symptoms since last visit   - eye doctor May and vision is great and better than 20/20   - doing PT for back and has been having left mid back pain and has to stand to feel comfortable, steroids by mouth didn't help, MSK relaxors just briefly helped  - stomach  sleeper is best    -MRI brain with without contrast 7/13/2023: No acute infarction, intracranial hemorrhage or mass.  I do see some findings consistent with subtle changes that could be related to idiopathic intracranial hypertension including partially flattened sella(not empty), prominent optic nerve sheath with some tortuosity bilaterally (slightly) and as well as what appears to be slight enlargement of the optic nerve head     Headaches and migraines   9-10 headaches per month can be days in a row or once a week  Left temporal region to the back of skull on the left  Often due to wearing off effect for emgality    At night eyes watering and nose runs, if laughs or smiles eyes tear up, BM too sometimes   - evening gets progressively worse    Preventative:   -Trial of acetazolamide/Diamox tolerating well without side effects   250 mg at lunch 1130 and before bed 9/930    - trial of topiramate and was at 75 mg for 3 weeks and then stopped because she was having weird dark thoughts - 50 mg at night, mood was awful   - emgality helping - every 28 days - life changing   - Currently on through other providers: propranolol 40 mg BID, ambien 10 mg- (off and on 6 years), melatonin    Abortive:   -Nurtec is helping dramatically, but will not extinguish the migraine but will significantly reduce the severity and then will take 400 mg advil or 500 mg tylenol   - decadron for back up   Denies bothersome side effects       Sleep   -Follow-up with sleep medicine Dr Ovalle 8/1/23 and started on an iron infusion after iron was found to be low - no change  - doesn't sleep well, but improvement in sleep  - CBT started last week     study   The patient had a total of 5 respiratory events made up of 2 obstructive apneas, 0 central apneas, 0 mixed apneas and 2 hypopneas resulting in a respiratory event index (BRYCE) of 0.7.  The lowest SpO2 recorded is 92%.  The snore index was 0.2%.  IMPRESSION:  This study did not demonstrate evidence  for obstructive sleep apnea.      Interval history as of 6/27/2023  - no significant new or concerning neurologic symptoms since last visit  - dealing with hypocalcemia following surgery and mood symptoms, fatigue,   - eye doctor May and vision is great and better than 20/20   - since last visit hurt her back, mid back muscle spasm and was bad so was put on steroids and MSK relaxor    Headaches and migraines   3-4 headaches a week rizatriptan helps   Worse the past month   Weather     Preventative:   - trial of topiramate and was at 75 mg for 3 weeks and then stopped because she was having weird dark thoughts - 50 mg at night, mood is better   - emgality helping - PA renewed last time, every 28 days     Abortive:   Rizatriptan - nauseated and lethargic   - decadron not needed since last visit, but took other steroids   Denies bothersome side effects      Sleep study    The patient had a total of 5 respiratory events made up of 2 obstructive apneas, 0 central apneas, 0 mixed apneas and 2 hypopneas resulting in a respiratory event index (BRYCE) of 0.7.  The lowest SpO2 recorded is 92%.  The snore index was 0.2%.  IMPRESSION:  This study did not demonstrate evidence for obstructive sleep apnea.      Interval history as of 4/5/2023  - 2/24/23 thyroid resection and post op had severe hypoparathyroidism and they wanted to readmit her and then followed up with endocrine and continue calcium dose   - symptoms of hypoparathyroid intially and now symptoms of hyperparathyroidism    - all of the symptoms of PTSD, overwhelmed with noises   - was not on phone for a week because could not look at it     Headaches and migraines   - A lot better than she was, could not speak for a while and spasms, tetany, couldn't speak, much better    - daily headaches since the surgery, first in the hospital was way worse and steroids helped, and then headache never resolved. Worse at night before bed, comes and goes, not all day, better   - Mild  "headache now, they are daily, migraines nearly daily, not last night, every other night   - Dizziness better, but still there, may have had vertigo in the past.   Tinnitus both ears, Right >left 30 seconds of muffled hearing at times, rarely in the past     Preventative:   - emgality monthly - [employer] - a few days ago     Abortive:   - rizatriptan once dose often helps   Denies bothersome side effects       Sleep   - averages: 5-6 hours, sometimes takes ambien, snores   Problems falling asleep?:   Yes  Problems staying asleep?:  Yes, 3-4 a night  - tired all the time    How likely are you to doze off or fall sleep during the following situations?    0 - would never doze  1 - slight chance of dozing  2 - moderate chance of dozing  3 - high chance of dozing  [place] sleepiness scale   Sitting and reading - 2  Watching TV - 3  Sitting, inactive in a public place such as a theater 1  As a passenger in a car for an hour without a break 1  Lying down to rest in afternoon when circumstances permit 3  Sitting and talking to someone 0  Sitting quietly after lunch without alcohol 2  In a car while stopped for few minutes in traffic - 0      Interval history as of 1/11/2023  - denies any new or concerning neurologic symptoms since last visit   - foot surgery 12/30/22  - thyroid bx coming up    Headaches and migraines   She is doing much better, now about 4 a month and rizatriptan works well, \"its amazing\"  Nov had one that lasted a week  Last was Dec 29th  Wearing off effect when due for next dose, maybe 3 days before     Preventative:    - emgality     Denies bothersome side effects  - Currently on through other providers: propranolol 40 mg BID (could consider gradual wean off in the future),   nortriptyline 20 mg (for Intestinal cystitis) - stopped around July 2022 - IC not worse off of it, ambien 10 mg (previously on 5 and plans on going back, moved in April and loud) - (off and on 5 years)    Abortive:   - rizatriptan - " "working well now, rarely needs two  Denies bothersome side effects        Interval history as of 7/1/2022  - denies any new or concerning neurologic symptoms since last visit      Headaches and migraines   She reports improvement on emgality which has brought migraines down from 15 days a month to 7-8 a month.   Less severe as well   The first month did the best, \"almost forgot I had migraines for a while\"   Some wearing off effect when due for next shot   - triggered by weather changes     Preventative:   - Trial of emgality - approved 4/5/22 - 4th shot soon   Denies bothersome side effects     Abortive:   - Trial of rizatriptan 10 mg - makes her less nauseated than sumatriptan, does not work quiet as well as sumatriptan, may not completely get rid of it but improves and then eventually goes away   Denies bothersome side effects     History as of initial visit 3/28/22:   Headaches started at what age? 10-11 years old  How often do the headaches occur?   - as of 3/28/2022: on average over 15 days a month   What time of the day do the headaches start? Occasionally wakes up with them, or afternoon or evening  How long do the headaches last? All day, up to weeks   Are you ever headache free? Yes    Aura? Sometimes aura  Blurred, floaters, lines, flashing orange, 20 mins or more     Last eye exam: goes early for monitoring, no issues except     Where is your headache located and pain quality?   - most of the time unilateral and usually on the left  - left side of head and neck - temporal and parietal and occipital and down the neck  - stabbing, pressure, pulsating  - head warm to touch  - sinus pressure   What is the intensity of pain? Average: 5-6/10, worst 9/10  Associated symptoms:   [x] Nausea       [] Vomiting        [x] Stiff or sore neck   [x] Photophobia     [x]Phonophobia      [x] Osmophobia  [x] Blurred vision    [x] Light-headed or dizzy     [x] Tinnitus  - both  [x] Hands or feet tingle or feel " numb/paresthesias - tingling in both legs and fingers sometimes without focal weakness   [] Ptosis      [] Facial droop  [x] Lacrimation - both  [x] Nasal congestion/rhinorrhea - chronically       Things that make the headache worse? No specific movements, any movement if bad enough    Headache triggers:  Hormonal/menses, weather changes, stress, certain foods, alcohol, sinus congestion      Have you seen someone else for headaches or pain? Yes multiple neurologists, last about 10 years ago   Have you had trigger point injection performed and how often? No  Have you had Botox injection performed and how often? No   Have you had epidural injections or transforaminal injections performed? No  Are you current pregnant or planning on getting pregnant? No future pregnancy, not active   Have you ever had any Brain imaging? yes - MRI Brain in her teens was normal     What medications do you take or have you taken for your headaches?   ABORTIVE:      Sumatriptan 100 mg - makes her nauseated      Past  Steroids In Jan 2021 with COVID, and then again in Feb for unrelenting migraine helps   Rizatriptan/maxalt - thinks may not as been as effective as imitrex    PREVENTIVE:       Propranolol 40 mg BID (4 years has helped)  ambien 5  Nortriptyline 20 mg (started a couple of weeks ago for Intestinal cystitis)    Past/ failed/contraindicated:  nortriptyline  Amitriptyline  topiramate - maybe 1-2 months   Gabapentin  prosac/fluoxetine      LIFESTYLE  Sleep   - averages: 6 hours, sometimes takes ambien  Problems falling asleep?:   Yes  Problems staying asleep?:  Yes, 3-4 a night    Water: 40-60 oz per day  Caffeine: coffee - 1 cups in am, 1 at lunch, per day    Mood:   Anxiety maybe more in her 20s, not much now     The following portions of the patient's history were reviewed and updated as appropriate: allergies, current medications, past family history, past medical history, past social history, past surgical history and problem  list.     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 [employer] and now - US sonographer M-TH 4 10s   Lives alone normally      Past Medical History:     Past Medical History:   Diagnosis Date    Allergic     Anxiety     Cancer (HCC) 2/1/2023    Thyroid- Papillary carcinoma    COVID-19 01/2022    mild s/s-not hospitalized, not vaccinated    GERD (gastroesophageal reflux disease)     Headache(784.0)     History of IBS     with constipation    Ingrown toenail     Irritable bowel syndrome     Migraines     Chronic per pt    Plantar fasciitis     Thyroid carcinoma (HCC)     Varicella     Venereal wart 08/07/2014       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)    Hyperlipidemia    Iron deficiency    Encounter for follow-up surveillance of thyroid cancer    Myofascial pain syndrome       Medications:      Current Outpatient Medications   Medication Sig Dispense Refill    acetaminophen (TYLENOL) 500 mg tablet Take 1,000 mg by mouth every 6 (six) hours as needed for mild pain      bisacodyl (DULCOLAX) 5 mg EC tablet Take 2 tablets (10 mg total) by mouth 2 (two) times a day as needed for constipation 60 tablet 1    calcitriol (ROCALTROL) 0.25 mcg capsule TAKE ONE CAPSULE BY MOUTH 2 TIMES A  capsule 1    calcium citrate  (CALCITRATE) 950 (200 Ca) MG tablet Take 1 tablet (950 mg total) by mouth 2 (two) times a day 120 tablet 0    cholecalciferol (VITAMIN D3) 1,000 units tablet Take 1 tablet (1,000 Units total) by mouth daily Do not start before March 1, 2023. 30 tablet 0    clindamycin (CLEOCIN T) 1 % lotion in the morning      Emgality 120 MG/ML SOAJ INJECT 120MG SUBCUTANEOUSLY (UNDER THE SKIN) EVERY 28 DAYS 1 mL 11    hydrocortisone (ANUSOL-HC) 2.5 % rectal cream Apply topically 2 (two) times a day (Patient taking differently: Apply topically 2 (two) times a day PRN) 28 g 2    Levocetirizine Dihydrochloride (XYZAL PO) Take by mouth daily at bedtime      levothyroxine 125 mcg tablet TAKE ONE TABLET BY MOUTH DAILY 90 tablet 1    Melatonin 5 MG TABS Take by mouth as needed      Multiple Vitamin (multivitamin) tablet Take 1 tablet by mouth daily      omeprazole (PriLOSEC) 40 MG capsule Take 1 capsule (40 mg total) by mouth daily 90 capsule 1    rimegepant sulfate (Nurtec) 75 mg TBDP Take one NURTEC 75 mg at onset under tongue. Limit 1 in 24 hours. 16 tablet 11    rizatriptan (MAXALT) 10 mg tablet Take 1 tablet (10 mg total) by mouth once as needed for migraine May repeat in 2 hours if needed. Max 2/24 hours, 9/month. 9 tablet 12    tiZANidine (ZANAFLEX) 2 mg tablet Take 1 tablet (2 mg total) by mouth 2 (two) times a day as needed for muscle spasms (Patient taking differently: Take 2 mg by mouth 2 (two) times a day as needed for muscle spasms PRN) 60 tablet 1    verapamil (CALAN) 40 mg tablet 40 mg BID for 1 week, then if needed/tolerate increase to 40 mg in am and 80 mg in pm for 1 week, then 80 mg BID for 1 week, then 80 mg in am and 120 mg in pm for 1 week, then 120 mg BID, stay on lowest effective tolerated dose 180 tablet 3    zolpidem (AMBIEN) 5 mg tablet Take 1 tablet (5 mg total) by mouth daily at bedtime as needed for sleep 30 tablet 0    Prucalopride Succinate (Motegrity) 2 MG TABS Take 2 mg by mouth in the morning (Patient  not taking: Reported on 4/5/2024) 30 tablet 2     No current facility-administered medications for this visit.        Allergies:    No Known Allergies    Family History:     Family History   Problem Relation Age of Onset    Hypertension Mother     Arthritis Mother     Thyroid disease Mother     Osteoarthritis Mother     Transient ischemic attack Father     Hypertension Father     Stroke Father     Vision loss Father     Rashes / Skin problems Sister         Shingles    Migraines Sister     No Known Problems Sister     Uterine cancer Maternal Grandmother 20    Cancer Maternal Grandmother         Uterine    Colon cancer Maternal Grandfather 60    Cancer Maternal Grandfather         Colon    Lung cancer Paternal Grandmother 60    Cancer Paternal Grandmother         Lung    Heart attack Paternal Grandfather     Colon cancer Cousin 30    Breast cancer Maternal Aunt     Breast cancer Maternal Aunt     Breast cancer Maternal Aunt        Social History:     Social History     Socioeconomic History    Marital status: Single     Spouse name: Not on file    Number of children: Not on file    Years of education: Not on file    Highest education level: Not on file   Occupational History    Not on file   Tobacco Use    Smoking status: Never    Smokeless tobacco: Never   Vaping Use    Vaping status: Never Used   Substance and Sexual Activity    Alcohol use: Yes     Alcohol/week: 2.0 standard drinks of alcohol     Types: 2 Glasses of wine per week     Comment: 2 glasses wine a week    Drug use: Never    Sexual activity: Not Currently   Other Topics Concern    Not on file   Social History Narrative    Not on file     Social Determinants of Health     Financial Resource Strain: Not on file   Food Insecurity: Not on file   Transportation Needs: Not on file   Physical Activity: Not on file   Stress: Not on file   Social Connections: Not on file   Intimate Partner Violence: Not on file   Housing Stability: Not on file         Objective:  "        Physical Exam:                                                                 Vitals:            Constitutional:    /79 (BP Location: Right arm, Patient Position: Sitting, Cuff Size: Standard)   Pulse 69   Temp 98.4 °F (36.9 °C) (Temporal)   Ht 5' 3\" (1.6 m)   Wt 68 kg (150 lb)   BMI 26.57 kg/m²   BP Readings from Last 3 Encounters:   04/05/24 131/79   04/05/24 122/70   03/22/24 100/70     Pulse Readings from Last 3 Encounters:   04/05/24 69   04/05/24 75   03/22/24 81         Well developed, well nourished, well groomed.        Psychiatric:  Normal behavior and appropriate affect        Neurological Examination:     Mental status/cognitive function:   Recent and remote memory appear intact. Attention span and concentration as well as fund of knowledge are appropriate for age. Normal language and spontaneous speech.  Cranial Nerves:   VII-facial expression symmetric  Motor Exam: symmetric bulk throughout. no atrophy, fasciculations or abnormal movements noted.   Coordination:  no apparent dysmetria, ataxia or tremor noted  Gait: steady casual gait            Pertinent lab results:   See EMR for recent labs  - 01/14/2022 COVID-19 positive  - 8/19/21 CMP and CBC unremarkable   TSH elevated at 4.09 with normal Free T4     Imaging: I have personally reviewed imaging and radiology read   -MRI brain with without contrast 7/13/2023: No acute infarction, intracranial hemorrhage or mass.*As of my retrospective review 8/29/2023 and 11/28/2023 we discussed she has in my opinion partially flattened sella(not empty), prominent optic nerve sheath with some tortuosity bilaterally potentially slightly worse right greater than left, slightly tighter ventricle on the right, cerebellar tonsils overlie the foramen magnum with tight junction bilaterally     MRI brain in her teens was reportedly unremarkable  Review of Systems:   ROS obtained by medical assistant and reviewed, but if any symptoms listed below say " negative, does not mean patient has not had this symptom since last visit, please see HPI for details of symptoms discussed this visit.  Regarding any abnormal or positive findings in ROS that are not neurologic related, patient instructed to address these issues with PCP or go to the ER if they are severe.      Review of Systems   Constitutional:  Negative for appetite change and fever.   HENT:  Positive for tinnitus. Negative for hearing loss, trouble swallowing and voice change.    Eyes: Negative.  Negative for photophobia and pain.   Respiratory: Negative.  Negative for shortness of breath.    Cardiovascular: Negative.  Negative for palpitations.   Gastrointestinal:  Positive for nausea. Negative for vomiting.   Endocrine: Negative.  Negative for cold intolerance.   Genitourinary: Negative.  Negative for dysuria, frequency and urgency.   Musculoskeletal: Negative.  Negative for myalgias and neck pain.   Skin: Negative.  Negative for rash.   Neurological:  Positive for dizziness and headaches. Negative for tremors, seizures, syncope, facial asymmetry, speech difficulty, weakness, light-headedness and numbness.   Hematological: Negative.  Does not bruise/bleed easily.   Psychiatric/Behavioral:  Positive for sleep disturbance. Negative for confusion and hallucinations.    All other systems reviewed and are negative.       I have spent 51 minutes with Patient  today in which greater than 50% of this time was spent in counseling/coordination of care regarding Diagnostic results, Prognosis, Risks and benefits of tx options, Instructions for management, Patient  education, Importance of tx compliance, Risk factor reductions, Impressions, Counseling / Coordination of care, Documenting in the medical record , Obtaining or reviewing history  , and Communicating with other healthcare professionals . I also spent 11 minutes non face to face for this patient the same day.       Author:  Nadege Jaffe MD 4/5/2024 5:53  PM

## 2024-04-05 NOTE — PROGRESS NOTES
Review of Systems   Constitutional:  Negative for appetite change and fever.   HENT:  Positive for tinnitus. Negative for hearing loss, trouble swallowing and voice change.    Eyes: Negative.  Negative for photophobia and pain.   Respiratory: Negative.  Negative for shortness of breath.    Cardiovascular: Negative.  Negative for palpitations.   Gastrointestinal:  Positive for nausea. Negative for vomiting.   Endocrine: Negative.  Negative for cold intolerance.   Genitourinary: Negative.  Negative for dysuria, frequency and urgency.   Musculoskeletal: Negative.  Negative for myalgias and neck pain.   Skin: Negative.  Negative for rash.   Neurological:  Positive for dizziness and headaches. Negative for tremors, seizures, syncope, facial asymmetry, speech difficulty, weakness, light-headedness and numbness.   Hematological: Negative.  Does not bruise/bleed easily.   Psychiatric/Behavioral:  Positive for sleep disturbance. Negative for confusion and hallucinations.    All other systems reviewed and are negative.

## 2024-04-08 ENCOUNTER — TELEPHONE (OUTPATIENT)
Dept: NEUROLOGY | Facility: CLINIC | Age: 44
End: 2024-04-08

## 2024-04-08 NOTE — TELEPHONE ENCOUNTER
ANA LUISA called patient at [phone].  Patient is interested in getting a list of psychologists that do talk therapy closest to home and also preferably female.  Requested the listing be sent to her at her email address- [email or web address].  ANA LUISA advised patient that she may need to call providers more than once and get on wait lists until an appointment can be made.  Expressed understanding.      Asmita,       It was nice talking with you today.  Please see the attached list of providers for your review.  When calling to schedule, please verify that that the provider you selected still accepts your insurance as lists like these are subject to change.  Let me know if I can answer any questions or help you any further.  Take care.  Thank you,    Veronica Lopez  Outpatient   [facility]  [address] [place] [address], [address]# [phone]/Fax# [phone]  [email or web address]    Uploaded listing to media this date.  ANA LUISA will follow up with patient to see how she made out.  ANA LUISA remains available.

## 2024-04-08 NOTE — TELEPHONE ENCOUNTER
----- Message from Nadege Jaffe MD sent at 4/5/2024  4:52 PM EDT -----  Could you please help this patient with a list of therapists or psychologists covered by their insurance?

## 2024-04-17 DIAGNOSIS — F51.01 PRIMARY INSOMNIA: ICD-10-CM

## 2024-04-18 ENCOUNTER — NURSE TRIAGE (OUTPATIENT)
Age: 44
End: 2024-04-18

## 2024-04-18 DIAGNOSIS — K58.1 IRRITABLE BOWEL SYNDROME WITH CONSTIPATION: ICD-10-CM

## 2024-04-18 DIAGNOSIS — K59.04 CHRONIC IDIOPATHIC CONSTIPATION: Primary | ICD-10-CM

## 2024-04-18 RX ORDER — LUBIPROSTONE 24 UG/1
24 CAPSULE ORAL 2 TIMES DAILY WITH MEALS
Qty: 60 CAPSULE | Refills: 2 | Status: SHIPPED | OUTPATIENT
Start: 2024-04-18

## 2024-04-18 NOTE — TELEPHONE ENCOUNTER
"Motegrity not covered. Would like to go back on Amitiza per 2/16/24 OV recommendations.     Pt uses Ewa Crockett.    Answer Assessment - Initial Assessment Questions  1. DRUG NAME: \"What medicine do you need to have refilled?\"      Amitiza 24 mcg    Protocols used: Medication Refill and Renewal Call-ADULT-    "

## 2024-04-19 RX ORDER — ZOLPIDEM TARTRATE 5 MG/1
5 TABLET ORAL
Qty: 30 TABLET | Refills: 0 | Status: SHIPPED | OUTPATIENT
Start: 2024-04-19

## 2024-04-25 ENCOUNTER — PROCEDURE VISIT (OUTPATIENT)
Dept: PAIN MEDICINE | Facility: CLINIC | Age: 44
End: 2024-04-25
Payer: COMMERCIAL

## 2024-04-25 VITALS — BODY MASS INDEX: 26.58 KG/M2 | HEIGHT: 63 IN | WEIGHT: 150 LBS

## 2024-04-25 DIAGNOSIS — M25.511 RIGHT SHOULDER PAIN, UNSPECIFIED CHRONICITY: Primary | ICD-10-CM

## 2024-04-25 PROCEDURE — 20611 DRAIN/INJ JOINT/BURSA W/US: CPT | Performed by: ANESTHESIOLOGY

## 2024-04-25 RX ORDER — METHYLPREDNISOLONE ACETATE 40 MG/ML
40 INJECTION, SUSPENSION INTRA-ARTICULAR; INTRALESIONAL; INTRAMUSCULAR; SOFT TISSUE ONCE
Status: COMPLETED | OUTPATIENT
Start: 2024-04-25 | End: 2024-04-25

## 2024-04-25 RX ORDER — ROPIVACAINE HYDROCHLORIDE 2 MG/ML
40 INJECTION, SOLUTION EPIDURAL; INFILTRATION; PERINEURAL ONCE
Status: COMPLETED | OUTPATIENT
Start: 2024-04-25 | End: 2024-04-25

## 2024-04-25 RX ADMIN — ROPIVACAINE HYDROCHLORIDE 40 MG: 2 INJECTION, SOLUTION EPIDURAL; INFILTRATION; PERINEURAL at 14:33

## 2024-04-25 RX ADMIN — METHYLPREDNISOLONE ACETATE 40 MG: 40 INJECTION, SUSPENSION INTRA-ARTICULAR; INTRALESIONAL; INTRAMUSCULAR; SOFT TISSUE at 14:33

## 2024-04-25 NOTE — PROGRESS NOTES
"Large joint arthrocentesis: R subacromial bursa  Universal Protocol:  Consent: Verbal consent obtained. Written consent obtained.  Risks and benefits: risks, benefits and alternatives were discussed  Consent given by: patient  Time out: Immediately prior to procedure a \"time out\" was called to verify the correct patient, procedure, equipment, support staff and site/side marked as required.  Timeout called at: 4/25/2024 2:26 PM.  Patient understanding: patient states understanding of the procedure being performed  Patient consent: the patient's understanding of the procedure matches consent given  Procedure consent: procedure consent matches procedure scheduled  Site marked: the operative site was marked  Required items: required blood products, implants, devices, and special equipment available  Patient identity confirmed: verbally with patient  Supporting Documentation  Indications: pain   Procedure Details  Location: shoulder - R subacromial bursa  Preparation: Patient was prepped and draped in the usual sterile fashion  Needle size: 25 G  Ultrasound guidance: yes          Ultrasound-guided right subacromial subdeltoid bursa injection  Indication: Right shoulder pain  Preoperative diagnosis: Right shoulder bursitis  Postoperative diagnosis: Right shoulder bursitis  Procedure: Ultrasound-guided right subacromial subdeltoid bursa injection  After discussing the risks, benefits, and alternatives to the procedure, the patient expressed understanding and wished to proceed. The patient was brought to the procedure suite and placed in the side lying position. A procedural pause was conducted to verify: correct patient identity, procedure to be performed and as applicable, correct side and site, correct patient position, and availability of implants, special equipment, or special requirements. A simple surgical tray was used. Prior to the procedure, the right shoulder was examined with a 12MHz linear transducer to " visualize the subacromial-subdeltoid bursa and determine the optimal needle path. Following this, the right shoulder was prepared with a Chloraprep scrub, then re-examined using the same transducer, a sterile utrasound transducer cover, and sterile ultrasound transducer gel. Thereafter, using ultrasound guidance, a 2.5 inch 25 guage needle was advanced into the right subacromial-subdeltoid bursa. After visualization of the tip of the needle in the target area and negative aspiration for blood, a mixture of 40mg of Depo-Medrol in 3cc of 0.2% ropivacaine was injected into the right subacromial subdeltoid bursa. The patient tolerated the procedure well and there were no apparent complications. After an appropriate amount of observation, the patient was dismissed from the recovery area in good condition under their own power.  COMMENTS:  The patient received a total steroid dose of 40mg of Depo-Medrol.

## 2024-05-01 NOTE — PROGRESS NOTES
Assessment:  1. Myofascial pain syndrome        Plan:  Patient will be scheduled for repeat left thoracic trigger point injections to address the myofascial component of the patient's pain  Patient may continue to have overdose with tizanidine as needed   Patient wishes to avoid oral medications at this time  We can repeat bilateral subacromial bursa injections as needed    Patient may continue chiropractic therapy as scheduled  Patient may benefit from acupuncture therapy  Follow-up after procedure or sooner if needed    History of Present Illness:    The patient is a 43 y.o. female with a history of thyroid cancer status post thyroidectomy and migraines last seen on 2/2/2024 who presents for a follow up office visit in regards to chronic left-sided thoracic back pain that is nonradiating into the thorax or abdomen.  MRI of the thoracic spine was normal.  Patient did have trigger point injections into the thoracic paraspinal musculature in March 8, 2024 which provided a few weeks of relief.  She is also status post bilateral subacromial bursa injections with ongoing 80 to 90% improvement of her pain.  She is involved in chiropractic therapy but her schedule has not allowed her to be consistent with attending.  She usestizanidine with some mild relief.  She has not found relief with oral NSAIDs, oral steroids, or topical analgesic patches.    Patient rates her pain a 6 out of 10 on the numeric pain rating scale.  Pain is constant throughout the day and is described as pressure-like and numbness      I have personally reviewed and/or updated the patient's past medical history, past surgical history, family history, social history, current medications, allergies, and vital signs today.       Review of Systems:    Review of Systems      Past Medical History:   Diagnosis Date    Allergic     Anxiety     Cancer (HCC) 2/1/2023    Thyroid- Papillary carcinoma    COVID-19 01/2022    mild s/s-not hospitalized, not vaccinated     GERD (gastroesophageal reflux disease)     Headache(784.0)     History of IBS     with constipation    Ingrown toenail     Irritable bowel syndrome     Migraines     Chronic per pt    Plantar fasciitis     Thyroid carcinoma (HCC)     Varicella     Venereal wart 08/07/2014       Past Surgical History:   Procedure Laterality Date    CERVICAL BIOPSY  W/ LOOP ELECTRODE EXCISION  2017    COLONOSCOPY      NASAL SEPTUM SURGERY      MI OSTEOT W/WO LNGTH SHRT/CORRJ 1ST METAR Left 12/30/2022    Procedure: OSTEOTOMY METATARSAL 1st;  Surgeon: Taiwo Harrington DPM;  Location: [facility];  Service: Podiatry    THYROIDECTOMY N/A 2/24/2023    Procedure: TOTAL THYROIDECTOMY;  Surgeon: Vince Carey MD;  [facility]: [facility];  Service: Surgical Oncology    TONSILLECTOMY      UPPER GASTROINTESTINAL ENDOSCOPY      US GUIDED THYROID BIOPSY  1/31/2023    WISDOM TOOTH EXTRACTION         Family History   Problem Relation Age of Onset    Hypertension Mother     Arthritis Mother     Thyroid disease Mother     Osteoarthritis Mother     Transient ischemic attack Father     Hypertension Father     Stroke Father     Vision loss Father     Rashes / Skin problems Sister         Shingles    Migraines Sister     No Known Problems Sister     Uterine cancer Maternal Grandmother 20    Cancer Maternal Grandmother         Uterine    Colon cancer Maternal Grandfather 60    Cancer Maternal Grandfather         Colon    Lung cancer Paternal Grandmother 60    Cancer Paternal Grandmother         Lung    Heart attack Paternal Grandfather     Colon cancer Cousin 30    Breast cancer Maternal Aunt     Breast cancer Maternal Aunt     Breast cancer Maternal Aunt        Social History     Occupational History    Not on file   Tobacco Use    Smoking status: Never    Smokeless tobacco: Never   Vaping Use    Vaping status: Never Used   Substance and Sexual Activity    Alcohol use: Yes     Alcohol/week: 2.0 standard drinks of alcohol     Types: 2 Glasses of wine per  week     Comment: 2 glasses wine a week    Drug use: Never    Sexual activity: Not Currently         Current Outpatient Medications:     acetaminophen (TYLENOL) 500 mg tablet, Take 1,000 mg by mouth every 6 (six) hours as needed for mild pain, Disp: , Rfl:     bisacodyl (DULCOLAX) 5 mg EC tablet, Take 2 tablets (10 mg total) by mouth 2 (two) times a day as needed for constipation, Disp: 60 tablet, Rfl: 1    calcitriol (ROCALTROL) 0.25 mcg capsule, TAKE ONE CAPSULE BY MOUTH 2 TIMES A DAY, Disp: 180 capsule, Rfl: 1    Emgality 120 MG/ML SOAJ, INJECT 120MG SUBCUTANEOUSLY (UNDER THE SKIN) EVERY 28 DAYS, Disp: 1 mL, Rfl: 11    Levocetirizine Dihydrochloride (XYZAL PO), Take by mouth daily at bedtime, Disp: , Rfl:     levothyroxine 125 mcg tablet, TAKE ONE TABLET BY MOUTH DAILY, Disp: 90 tablet, Rfl: 1    lubiprostone (AMITIZA) 24 mcg capsule, Take 1 capsule (24 mcg total) by mouth 2 (two) times a day with meals, Disp: 60 capsule, Rfl: 2    Melatonin 5 MG TABS, Take by mouth as needed, Disp: , Rfl:     Multiple Vitamin (multivitamin) tablet, Take 1 tablet by mouth daily, Disp: , Rfl:     omeprazole (PriLOSEC) 40 MG capsule, Take 1 capsule (40 mg total) by mouth daily, Disp: 90 capsule, Rfl: 1    rimegepant sulfate (Nurtec) 75 mg TBDP, Take one NURTEC 75 mg at onset under tongue. Limit 1 in 24 hours., Disp: 16 tablet, Rfl: 11    rizatriptan (MAXALT) 10 mg tablet, Take 1 tablet (10 mg total) by mouth once as needed for migraine May repeat in 2 hours if needed. Max 2/24 hours, 9/month., Disp: 9 tablet, Rfl: 12    tiZANidine (ZANAFLEX) 2 mg tablet, Take 1 tablet (2 mg total) by mouth 2 (two) times a day as needed for muscle spasms (Patient taking differently: Take 2 mg by mouth 2 (two) times a day as needed for muscle spasms PRN), Disp: 60 tablet, Rfl: 1    verapamil (CALAN) 40 mg tablet, 40 mg BID for 1 week, then if needed/tolerate increase to 40 mg in am and 80 mg in pm for 1 week, then 80 mg BID for 1 week, then 80 mg in  am and 120 mg in pm for 1 week, then 120 mg BID, stay on lowest effective tolerated dose, Disp: 180 tablet, Rfl: 3    zolpidem (AMBIEN) 5 mg tablet, Take 1 tablet (5 mg total) by mouth daily at bedtime as needed for sleep, Disp: 30 tablet, Rfl: 0    calcium citrate (CALCITRATE) 950 (200 Ca) MG tablet, Take 1 tablet (950 mg total) by mouth 2 (two) times a day, Disp: 120 tablet, Rfl: 0    cholecalciferol (VITAMIN D3) 1,000 units tablet, Take 1 tablet (1,000 Units total) by mouth daily Do not start before March 1, 2023., Disp: 30 tablet, Rfl: 0    clindamycin (CLEOCIN T) 1 % lotion, in the morning, Disp: , Rfl:     hydrocortisone (ANUSOL-HC) 2.5 % rectal cream, Apply topically 2 (two) times a day (Patient taking differently: Apply topically 2 (two) times a day PRN), Disp: 28 g, Rfl: 2    No Known Allergies    Physical Exam:    There were no vitals taken for this visit.    Constitutional:normal, well developed, well nourished, alert, in no distress and non-toxic and no overt pain behavior.  Eyes:anicteric  HEENT:grossly intact  Neck:supple, symmetric, trachea midline and no masses   Pulmonary:even and unlabored  Cardiovascular:No edema or pitting edema present  Skin:Normal without rashes or lesions and well hydrated  Psychiatric:Mood and affect appropriate  Neurologic:Cranial Nerves II-XII grossly intact  Musculoskeletal:normal gait. Left thoracic paraspinal musculature tender to palpation near T6      Imaging  No orders to display         No orders of the defined types were placed in this encounter.

## 2024-05-02 ENCOUNTER — TELEPHONE (OUTPATIENT)
Dept: PAIN MEDICINE | Facility: CLINIC | Age: 44
End: 2024-05-02

## 2024-05-02 ENCOUNTER — APPOINTMENT (OUTPATIENT)
Dept: LAB | Facility: HOSPITAL | Age: 44
End: 2024-05-02
Payer: COMMERCIAL

## 2024-05-02 DIAGNOSIS — E89.2 HYPOPARATHYROIDISM AFTER PROCEDURE (HCC): ICD-10-CM

## 2024-05-02 DIAGNOSIS — Z00.8 HEALTH EXAMINATION IN POPULATION SURVEY: ICD-10-CM

## 2024-05-02 LAB
ALBUMIN SERPL BCP-MCNC: 4.4 G/DL (ref 3.5–5)
CALCIUM SERPL-MCNC: 8.3 MG/DL (ref 8.4–10.2)
CHOLEST SERPL-MCNC: 157 MG/DL
EST. AVERAGE GLUCOSE BLD GHB EST-MCNC: 108 MG/DL
HBA1C MFR BLD: 5.4 %
HDLC SERPL-MCNC: 53 MG/DL
LDLC SERPL CALC-MCNC: 94 MG/DL (ref 0–100)
NONHDLC SERPL-MCNC: 104 MG/DL
TRIGL SERPL-MCNC: 50 MG/DL

## 2024-05-02 PROCEDURE — 36415 COLL VENOUS BLD VENIPUNCTURE: CPT

## 2024-05-02 PROCEDURE — 80061 LIPID PANEL: CPT

## 2024-05-02 PROCEDURE — 83036 HEMOGLOBIN GLYCOSYLATED A1C: CPT

## 2024-05-02 PROCEDURE — 82040 ASSAY OF SERUM ALBUMIN: CPT

## 2024-05-03 ENCOUNTER — PROCEDURE VISIT (OUTPATIENT)
Age: 44
End: 2024-05-03
Payer: COMMERCIAL

## 2024-05-03 ENCOUNTER — OFFICE VISIT (OUTPATIENT)
Dept: PAIN MEDICINE | Facility: CLINIC | Age: 44
End: 2024-05-03
Payer: COMMERCIAL

## 2024-05-03 VITALS
OXYGEN SATURATION: 98 % | WEIGHT: 150 LBS | HEIGHT: 63 IN | BODY MASS INDEX: 26.58 KG/M2 | DIASTOLIC BLOOD PRESSURE: 78 MMHG | SYSTOLIC BLOOD PRESSURE: 118 MMHG | HEART RATE: 63 BPM

## 2024-05-03 DIAGNOSIS — M99.02 SEGMENTAL DYSFUNCTION OF THORACIC REGION: ICD-10-CM

## 2024-05-03 DIAGNOSIS — M75.52 SUBACROMIAL BURSITIS OF LEFT SHOULDER JOINT: ICD-10-CM

## 2024-05-03 DIAGNOSIS — G89.29 CHRONIC LEFT-SIDED THORACIC BACK PAIN: Primary | ICD-10-CM

## 2024-05-03 DIAGNOSIS — M99.03 SEGMENTAL DYSFUNCTION OF LUMBAR REGION: ICD-10-CM

## 2024-05-03 DIAGNOSIS — M79.18 MYOFASCIAL PAIN SYNDROME: Primary | ICD-10-CM

## 2024-05-03 DIAGNOSIS — M99.04 SEGMENTAL DYSFUNCTION OF SACRAL REGION: ICD-10-CM

## 2024-05-03 DIAGNOSIS — M54.59 MECHANICAL LOW BACK PAIN: ICD-10-CM

## 2024-05-03 DIAGNOSIS — M99.01 SEGMENTAL DYSFUNCTION OF CERVICAL REGION: ICD-10-CM

## 2024-05-03 DIAGNOSIS — M54.6 CHRONIC LEFT-SIDED THORACIC BACK PAIN: Primary | ICD-10-CM

## 2024-05-03 PROCEDURE — 98941 CHIROPRACT MANJ 3-4 REGIONS: CPT | Performed by: CHIROPRACTOR

## 2024-05-03 PROCEDURE — 97110 THERAPEUTIC EXERCISES: CPT | Performed by: CHIROPRACTOR

## 2024-05-03 PROCEDURE — 99214 OFFICE O/P EST MOD 30 MIN: CPT | Performed by: NURSE PRACTITIONER

## 2024-05-03 NOTE — PROGRESS NOTES
Diagnoses and all orders for this visit:    Chronic left-sided thoracic back pain    Subacromial bursitis of left shoulder joint    Segmental dysfunction of lumbar region    Segmental dysfunction of sacral region    Segmental dysfunction of thoracic region    Segmental dysfunction of cervical region    Mechanical low back pain       ASSESSMENT:   Pt's symptoms and exam findings consistent with mechanical lbp  and hip bursitis secondary to repetitive st/sp injury, exacerbated by postural/ergonomic stressors. Pt responded well to flexion biased stretches and manual mobilization of the affected spinal and myofascial tissues with increased ROM; trial of conservative tx recommended consisting of stretching, graded mobilization/manipulation of the affected spinal and myofascial jt dysfunction, postural/ergonomic education and take home stretches/exercises. If symptoms fail to improve with short trial of conservative care, appropriate imaging and referral will be coordinated.  - The patient status is unchanged, slight decrease in mm spasm in the neck and back after treatment.     PROCEDURE CODES: 20470 and 87928    TREATMENT:  Fear avoidance behavior discussion; encouraged and reassured pt that natural course of condition is to improve over time with adherence to tx plan and home care strategies. Home care recommendations: avoid bed rest, walk (but avoid trails and uneven surfaces), gradual return to activity to tolerance (avoid anything that peripheralizes symptoms), call if symptoms peripheralize, worsen, or neurologic deficit progresses. Ther-ex: IASTM; discussed post procedure soreness and/or ecchymosis for up to 36 hrs, applied to affected mm hypertonicities; supine hamstring stretch, supine gluteal stretch, side laying QL stretch, single knee to chest stretch, hip flexor pin-and-stretch, alternating prone hip extension, glute bridge, transitional mvmt education, abdominal bracing; greater than 15 min spent performing  above mentioned ther-ex to improve ROM/flexibility. Thoracic mobilization/manipulation: prone P-A mob, supine A-P manip; Lumbar mobilization/manipulation: diversified side laying graded HVLA, flexion-traction; SIJ Manipulation/Mobilization: R/L SIJ HVLA - long axis distraction, ahn drop table maneuver to affected SIJ    HPI:  Asmita ESPINOZA Bem is a 43 y.o. female  Chief Complaint   Patient presents with   • Back Pain     Middle back pain-5  Lower back pain-5   • Neck Pain     Neck pain-5       The patient presents to the office with lower back pain that started without incident of trauma on June 1 while at a concert. She was sitting on the pain but has no pain until they were driving home. The patient does remember having mild tingling in the lower back about 6 months prior to that. The patient now has tingling across lower back. Pain is constant 4 with sitting can go up to a 8/10. Pt went to PT for a course with some improvements with working out., and did help overall but pain is still significant in the evenings. The patient was also given mm relaxers which she think she not help but also makes her too tired.  Massages once a month that helps temporarily. Exercise- metabolic and strength training. Pt sleeps a little better after Sleep therapy program but slightly more worse after last visit. Also gets pain with a bump behind legs but and notices more spider veins and most tingling into the legs but mentions wearing compression stockings for work.   - The patient is feeling the same after last visit, had injections in the lower back on Friday 4/5- The patient is feeling the same in the lower back, she mentions mid day the pain is starting and by the evening the pain is the worst. The pt tolerated shoulder injection is better.   5/3- The patient is feeling about the same, it felt better for a couple days with less visit and pain was rated 6/10.    Back Pain  The problem has been waxing and waning since onset. The  pain is present in the lumbar spine.   Neck Pain       Past Medical History:   Diagnosis Date   • Allergic    • Anxiety    • Cancer (HCC) 2/1/2023    Thyroid- Papillary carcinoma   • COVID-19 01/2022    mild s/s-not hospitalized, not vaccinated   • GERD (gastroesophageal reflux disease)    • Headache(784.0)    • History of IBS     with constipation   • Ingrown toenail    • Irritable bowel syndrome    • Migraines     Chronic per pt   • Plantar fasciitis    • Thyroid carcinoma (HCC)    • Varicella    • Venereal wart 08/07/2014      Past Surgical History:   Procedure Laterality Date   • CERVICAL BIOPSY  W/ LOOP ELECTRODE EXCISION  2017   • COLONOSCOPY     • NASAL SEPTUM SURGERY     • SD OSTEOT W/WO LNGTH SHRT/CORRJ 1ST METAR Left 12/30/2022    Procedure: OSTEOTOMY METATARSAL 1st;  Surgeon: Taiwo Harrington DPM;  Location: [facility];  Service: Podiatry   • THYROIDECTOMY N/A 2/24/2023    Procedure: TOTAL THYROIDECTOMY;  Surgeon: Vince Carey MD;  Location: [facility];  Service: Surgical Oncology   • TONSILLECTOMY     • UPPER GASTROINTESTINAL ENDOSCOPY     • US GUIDED THYROID BIOPSY  1/31/2023   • WISDOM TOOTH EXTRACTION       The following portions of the patient's history were reviewed and updated as appropriate: allergies, past family history, past medical history, past social history, past surgical history, and problem list.  Review of Systems   Musculoskeletal:  Positive for back pain, myalgias and neck pain.     Physical Exam  Musculoskeletal:         General: Tenderness present.      Cervical back: Tenderness present.      Thoracic back: Spasms and tenderness present. No swelling, edema, deformity, signs of trauma, lacerations or bony tenderness. Decreased range of motion. No scoliosis.      Lumbar back: Spasms and tenderness present. No swelling, edema, deformity, signs of trauma, lacerations or bony tenderness. Decreased range of motion. No scoliosis.        Back:    Neurological:      Gait: Gait is intact.       Deep Tendon Reflexes: Reflexes are normal and symmetric.   Psychiatric:         Attention and Perception: Attention normal.         Mood and Affect: Affect normal.         Speech: Speech normal.         Behavior: Behavior is cooperative.         Cognition and Memory: Cognition normal.       SOFT TISSUE ASSESSMENT Hypertonicity and tenderness palpated B C4-T1, T10-S1 erector spinae, upper trap, SCM, Lev scap, hip flexor, glute med/min, QL, hamstring JOINT RESTRICTIONS: C4-T1, T10-S1 and R/L SIJ ORTHO: SI jt point tenderness: +; Mary unremarkable for centralization/peripheralization; michelle's, iliac compression, thigh thrust elicit lbp in R/L SIJ; prone femoral nerve stretch neg for upper lumbar neural tension, elicits R/L SIJ stiffness; sitting root elicits no lbp on R/L; slump test elicits no neural tension R/L, Cervical distraction- Neg, Maximal Foraminal stenosis- neg, Spurling's- neg    Return in about 1 week (around 5/10/2024) for Recheck.

## 2024-05-07 ENCOUNTER — TELEPHONE (OUTPATIENT)
Age: 44
End: 2024-05-07

## 2024-05-10 ENCOUNTER — PROCEDURE VISIT (OUTPATIENT)
Age: 44
End: 2024-05-10
Payer: COMMERCIAL

## 2024-05-10 VITALS — SYSTOLIC BLOOD PRESSURE: 126 MMHG | DIASTOLIC BLOOD PRESSURE: 72 MMHG | HEART RATE: 66 BPM | OXYGEN SATURATION: 99 %

## 2024-05-10 DIAGNOSIS — M99.01 SEGMENTAL DYSFUNCTION OF CERVICAL REGION: ICD-10-CM

## 2024-05-10 DIAGNOSIS — M99.02 SEGMENTAL DYSFUNCTION OF THORACIC REGION: ICD-10-CM

## 2024-05-10 DIAGNOSIS — M75.52 SUBACROMIAL BURSITIS OF LEFT SHOULDER JOINT: ICD-10-CM

## 2024-05-10 DIAGNOSIS — M99.03 SEGMENTAL DYSFUNCTION OF LUMBAR REGION: ICD-10-CM

## 2024-05-10 DIAGNOSIS — G89.29 CHRONIC LEFT-SIDED THORACIC BACK PAIN: Primary | ICD-10-CM

## 2024-05-10 DIAGNOSIS — M54.59 MECHANICAL LOW BACK PAIN: ICD-10-CM

## 2024-05-10 DIAGNOSIS — M99.04 SEGMENTAL DYSFUNCTION OF SACRAL REGION: ICD-10-CM

## 2024-05-10 DIAGNOSIS — M54.6 CHRONIC LEFT-SIDED THORACIC BACK PAIN: Primary | ICD-10-CM

## 2024-05-10 PROCEDURE — 98941 CHIROPRACT MANJ 3-4 REGIONS: CPT | Performed by: CHIROPRACTOR

## 2024-05-10 PROCEDURE — 97110 THERAPEUTIC EXERCISES: CPT | Performed by: CHIROPRACTOR

## 2024-05-10 NOTE — PROGRESS NOTES
Diagnoses and all orders for this visit:    Chronic left-sided thoracic back pain    Subacromial bursitis of left shoulder joint    Segmental dysfunction of lumbar region    Segmental dysfunction of sacral region    Segmental dysfunction of thoracic region    Segmental dysfunction of cervical region    Mechanical low back pain         ASSESSMENT:   Pt's symptoms and exam findings consistent with mechanical lbp  and hip bursitis secondary to repetitive st/sp injury, exacerbated by postural/ergonomic stressors. Pt responded well to flexion biased stretches and manual mobilization of the affected spinal and myofascial tissues with increased ROM; trial of conservative tx recommended consisting of stretching, graded mobilization/manipulation of the affected spinal and myofascial jt dysfunction, postural/ergonomic education and take home stretches/exercises. If symptoms fail to improve with short trial of conservative care, appropriate imaging and referral will be coordinated.  - The patient status is improving with lower back pain but migraine today, slight decrease in mm spasm in the neck and back after treatment.     PROCEDURE CODES: 54527 and 63399    TREATMENT:  Fear avoidance behavior discussion; encouraged and reassured pt that natural course of condition is to improve over time with adherence to tx plan and home care strategies. Home care recommendations: avoid bed rest, walk (but avoid trails and uneven surfaces), gradual return to activity to tolerance (avoid anything that peripheralizes symptoms), call if symptoms peripheralize, worsen, or neurologic deficit progresses. Ther-ex: IASTM; discussed post procedure soreness and/or ecchymosis for up to 36 hrs, applied to affected mm hypertonicities; supine hamstring stretch, supine gluteal stretch, side laying QL stretch, single knee to chest stretch, hip flexor pin-and-stretch, alternating prone hip extension, glute bridge, transitional mvmt education, abdominal  bracing; greater than 15 min spent performing above mentioned ther-ex to improve ROM/flexibility. Thoracic mobilization/manipulation: prone P-A mob, supine A-P manip; Lumbar mobilization/manipulation: diversified side laying graded HVLA, flexion-traction; SIJ Manipulation/Mobilization: R/L SIJ HVLA - long axis distraction, ahn drop table maneuver to affected SIJ    HPI:  Asmita ESPINOZA Bem is a 43 y.o. female  Mid to low back pain; Pain Scale: 5    The patient presents to the office with lower back pain that started without incident of trauma on June 1 while at a concert. She was sitting on the pain but has no pain until they were driving home. The patient does remember having mild tingling in the lower back about 6 months prior to that. The patient now has tingling across lower back. Pain is constant 4 with sitting can go up to a 8/10. Pt went to PT for a course with some improvements with working out., and did help overall but pain is still significant in the evenings. The patient was also given mm relaxers which she think she not help but also makes her too tired.  Massages once a month that helps temporarily. Exercise- metabolic and strength training. Pt sleeps a little better after Sleep therapy program but slightly more worse after last visit. Also gets pain with a bump behind legs but and notices more spider veins and most tingling into the legs but mentions wearing compression stockings for work.   - The patient is feeling the same after last visit, had injections in the lower back on Friday 4/5- The patient is feeling the same in the lower back, she mentions mid day the pain is starting and by the evening the pain is the worst. The pt tolerated shoulder injection is better.   5/3- The patient is feeling about the same, it felt better for a couple days with less visit and pain was rated 6/10.  5/9- Migraine started at 4am today/ Lower back has been feeling better.    Back Pain  The problem has been waxing and  waning since onset. The pain is present in the lumbar spine.   Neck Pain       Past Medical History:   Diagnosis Date   • Allergic    • Anxiety    • Cancer (HCC) 2/1/2023    Thyroid- Papillary carcinoma   • COVID-19 01/2022    mild s/s-not hospitalized, not vaccinated   • GERD (gastroesophageal reflux disease)    • Headache(784.0)    • History of IBS     with constipation   • Ingrown toenail    • Irritable bowel syndrome    • Migraines     Chronic per pt   • Plantar fasciitis    • Thyroid carcinoma (HCC)    • Varicella    • Venereal wart 08/07/2014      Past Surgical History:   Procedure Laterality Date   • CERVICAL BIOPSY  W/ LOOP ELECTRODE EXCISION  2017   • COLONOSCOPY     • NASAL SEPTUM SURGERY     • TN OSTEOT W/WO LNGTH SHRT/CORRJ 1ST METAR Left 12/30/2022    Procedure: OSTEOTOMY METATARSAL 1st;  Surgeon: Taiwo Harrington DPM;  Location: [facility];  Service: Podiatry   • THYROIDECTOMY N/A 2/24/2023    Procedure: TOTAL THYROIDECTOMY;  Surgeon: Vince Carey MD;  [facility];  Service: Surgical Oncology   • TONSILLECTOMY     • UPPER GASTROINTESTINAL ENDOSCOPY     • US GUIDED THYROID BIOPSY  1/31/2023   • WISDOM TOOTH EXTRACTION       The following portions of the patient's history were reviewed and updated as appropriate: allergies, past family history, past medical history, past social history, past surgical history, and problem list.  Review of Systems   Musculoskeletal:  Positive for back pain, myalgias and neck pain.     Physical Exam  Musculoskeletal:         General: Tenderness present.      Cervical back: Tenderness present.      Thoracic back: Spasms and tenderness present. No swelling, edema, deformity, signs of trauma, lacerations or bony tenderness. Decreased range of motion. No scoliosis.      Lumbar back: Spasms and tenderness present. No swelling, edema, deformity, signs of trauma, lacerations or bony tenderness. Decreased range of motion. No scoliosis.        Back:    Neurological:       Gait: Gait is intact.      Deep Tendon Reflexes: Reflexes are normal and symmetric.   Psychiatric:         Attention and Perception: Attention normal.         Mood and Affect: Affect normal.         Speech: Speech normal.         Behavior: Behavior is cooperative.         Cognition and Memory: Cognition normal.       SOFT TISSUE ASSESSMENT Hypertonicity and tenderness palpated B C4-T1, T10-S1 erector spinae, upper trap, SCM, Lev scap, hip flexor, glute med/min, QL, hamstring JOINT RESTRICTIONS: C4-T1, T10-S1 and R/L SIJ ORTHO: SI jt point tenderness: +; Mary unremarkable for centralization/peripheralization; michelle's, iliac compression, thigh thrust elicit lbp in R/L SIJ; prone femoral nerve stretch neg for upper lumbar neural tension, elicits R/L SIJ stiffness; sitting root elicits no lbp on R/L; slump test elicits no neural tension R/L, Cervical distraction- Neg, Maximal Foraminal stenosis- neg, Spurling's- neg    Return in about 1 week (around 5/17/2024) for Recheck.

## 2024-05-13 ENCOUNTER — TELEPHONE (OUTPATIENT)
Dept: NEUROLOGY | Facility: CLINIC | Age: 44
End: 2024-05-13

## 2024-05-13 NOTE — TELEPHONE ENCOUNTER
Pt is a new start to Botox. A new pa has been sent to pt plan electronically.     Will await approval/denial determination.

## 2024-05-13 NOTE — TELEPHONE ENCOUNTER
----- Message from Nadege Jaffe MD sent at 4/5/2024  4:40 PM EDT -----  New start botox and will need REAGAN Serrato to inject     Botox 200 UNITS  Qty.1  DX: G43.709, Chronic Migraine.  Sig: Inject up to 200 UNITS I.M into the various sites in the head and neck once every three months for one year with  Refills: 3    I authorize this order to be used as a verbal/written order for now and future use

## 2024-05-16 NOTE — TELEPHONE ENCOUNTER
Received fax from [facility] with pt approval details below.     Approved   Botox 200 UNITS  Qty.1  Auth#[id number]   Valid:5/13/2024-11/13/2024  Visits: 2    Please use Stock       Please contact pt to schedule BINJ.

## 2024-05-17 ENCOUNTER — PROCEDURE VISIT (OUTPATIENT)
Age: 44
End: 2024-05-17
Payer: COMMERCIAL

## 2024-05-17 VITALS
OXYGEN SATURATION: 98 % | BODY MASS INDEX: 26.58 KG/M2 | HEIGHT: 63 IN | HEART RATE: 78 BPM | SYSTOLIC BLOOD PRESSURE: 110 MMHG | WEIGHT: 150 LBS | DIASTOLIC BLOOD PRESSURE: 64 MMHG

## 2024-05-17 DIAGNOSIS — M99.03 SEGMENTAL DYSFUNCTION OF LUMBAR REGION: ICD-10-CM

## 2024-05-17 DIAGNOSIS — G89.29 CHRONIC LEFT-SIDED THORACIC BACK PAIN: Primary | ICD-10-CM

## 2024-05-17 DIAGNOSIS — M54.59 MECHANICAL LOW BACK PAIN: ICD-10-CM

## 2024-05-17 DIAGNOSIS — M99.02 SEGMENTAL DYSFUNCTION OF THORACIC REGION: ICD-10-CM

## 2024-05-17 DIAGNOSIS — M99.01 SEGMENTAL DYSFUNCTION OF CERVICAL REGION: ICD-10-CM

## 2024-05-17 DIAGNOSIS — M54.6 CHRONIC LEFT-SIDED THORACIC BACK PAIN: Primary | ICD-10-CM

## 2024-05-17 DIAGNOSIS — M99.04 SEGMENTAL DYSFUNCTION OF SACRAL REGION: ICD-10-CM

## 2024-05-17 DIAGNOSIS — M75.52 SUBACROMIAL BURSITIS OF LEFT SHOULDER JOINT: ICD-10-CM

## 2024-05-17 PROCEDURE — 97110 THERAPEUTIC EXERCISES: CPT | Performed by: CHIROPRACTOR

## 2024-05-17 PROCEDURE — 98941 CHIROPRACT MANJ 3-4 REGIONS: CPT | Performed by: CHIROPRACTOR

## 2024-05-17 RX ORDER — MELOXICAM 15 MG/1
15 TABLET ORAL DAILY
COMMUNITY
Start: 2024-05-16

## 2024-05-17 NOTE — PROGRESS NOTES
Diagnoses and all orders for this visit:    Chronic left-sided thoracic back pain    Subacromial bursitis of left shoulder joint    Segmental dysfunction of lumbar region    Segmental dysfunction of sacral region    Segmental dysfunction of thoracic region    Segmental dysfunction of cervical region    Mechanical low back pain    Other orders  -     meloxicam (MOBIC) 15 mg tablet; Take 15 mg by mouth daily         ASSESSMENT:   Pt's symptoms and exam findings consistent with mechanical lbp  and hip bursitis secondary to repetitive st/sp injury, exacerbated by postural/ergonomic stressors. Pt responded well to flexion biased stretches and manual mobilization of the affected spinal and myofascial tissues with increased ROM; trial of conservative tx recommended consisting of stretching, graded mobilization/manipulation of the affected spinal and myofascial jt dysfunction, postural/ergonomic education and take home stretches/exercises. If symptoms fail to improve with short trial of conservative care, appropriate imaging and referral will be coordinated.  - The patient status is improving with lower back pain but migraine today, slight decrease in mm spasm in the neck and back after treatment.     PROCEDURE CODES: 08494 and 43319    TREATMENT:  Fear avoidance behavior discussion; encouraged and reassured pt that natural course of condition is to improve over time with adherence to tx plan and home care strategies. Home care recommendations: avoid bed rest, walk (but avoid trails and uneven surfaces), gradual return to activity to tolerance (avoid anything that peripheralizes symptoms), call if symptoms peripheralize, worsen, or neurologic deficit progresses. Ther-ex: IASTM; discussed post procedure soreness and/or ecchymosis for up to 36 hrs, applied to affected mm hypertonicities; supine hamstring stretch, supine gluteal stretch, side laying QL stretch, single knee to chest stretch, hip flexor pin-and-stretch,  alternating prone hip extension, glute bridge, transitional mvmt education, abdominal bracing; greater than 15 min spent performing above mentioned ther-ex to improve ROM/flexibility. Thoracic mobilization/manipulation: prone P-A mob, supine A-P manip; Lumbar mobilization/manipulation: diversified side laying graded HVLA, flexion-traction; SIJ Manipulation/Mobilization: R/L SIJ HVLA - long axis distraction, ahn drop table maneuver to affected SIJ    HPI:  Asmita ESPINOZA Bem is a 43 y.o. female  Mid to low back pain; Pain Scale: 5    The patient presents to the office with lower back pain that started without incident of trauma on June 1 while at a concert. She was sitting on the pain but has no pain until they were driving home. The patient does remember having mild tingling in the lower back about 6 months prior to that. The patient now has tingling across lower back. Pain is constant 4 with sitting can go up to a 8/10. Pt went to PT for a course with some improvements with working out., and did help overall but pain is still significant in the evenings. The patient was also given mm relaxers which she think she not help but also makes her too tired.  Massages once a month that helps temporarily. Exercise- metabolic and strength training. Pt sleeps a little better after Sleep therapy program but slightly more worse after last visit. Also gets pain with a bump behind legs but and notices more spider veins and most tingling into the legs but mentions wearing compression stockings for work.   - The patient is feeling the same after last visit, had injections in the lower back on Friday 4/5- The patient is feeling the same in the lower back, she mentions mid day the pain is starting and by the evening the pain is the worst. The pt tolerated shoulder injection is better.   5/3- The patient is feeling about the same, it felt better for a couple days with less visit and pain was rated 6/10.  5/9- Migraine started at 4am  today/ Lower back has been feeling better.  5/17- The patient is feeling a little better    Back Pain  The problem has been waxing and waning since onset. The pain is present in the lumbar spine.   Neck Pain       Past Medical History:   Diagnosis Date   • Allergic    • Anxiety    • Cancer (HCC) 2/1/2023    Thyroid- Papillary carcinoma   • COVID-19 01/2022    mild s/s-not hospitalized, not vaccinated   • GERD (gastroesophageal reflux disease)    • Headache(784.0)    • History of IBS     with constipation   • Ingrown toenail    • Irritable bowel syndrome    • Migraines     Chronic per pt   • Plantar fasciitis    • Thyroid carcinoma (HCC)    • Varicella    • Venereal wart 08/07/2014      Past Surgical History:   Procedure Laterality Date   • CERVICAL BIOPSY  W/ LOOP ELECTRODE EXCISION  2017   • COLONOSCOPY     • NASAL SEPTUM SURGERY     • AL OSTEOT W/WO LNGTH SHRT/CORRJ 1ST METAR Left 12/30/2022    Procedure: OSTEOTOMY METATARSAL 1st;  Surgeon: Taiwo Harrington DPM;  Location: [facility];  Service: Podiatry   • THYROIDECTOMY N/A 2/24/2023    Procedure: TOTAL THYROIDECTOMY;  Surgeon: Vince Carey MD;  Location: [facility];  Service: Surgical Oncology   • TONSILLECTOMY     • UPPER GASTROINTESTINAL ENDOSCOPY     • US GUIDED THYROID BIOPSY  1/31/2023   • WISDOM TOOTH EXTRACTION       The following portions of the patient's history were reviewed and updated as appropriate: allergies, past family history, past medical history, past social history, past surgical history, and problem list.  Review of Systems   Musculoskeletal:  Positive for back pain, myalgias and neck pain.     Physical Exam  Musculoskeletal:         General: Tenderness present.      Cervical back: Tenderness present.      Thoracic back: Spasms and tenderness present. No swelling, edema, deformity, signs of trauma, lacerations or bony tenderness. Decreased range of motion. No scoliosis.      Lumbar back: Spasms and tenderness present. No swelling, edema,  deformity, signs of trauma, lacerations or bony tenderness. Decreased range of motion. No scoliosis.        Back:    Neurological:      Gait: Gait is intact.      Deep Tendon Reflexes: Reflexes are normal and symmetric.   Psychiatric:         Attention and Perception: Attention normal.         Mood and Affect: Affect normal.         Speech: Speech normal.         Behavior: Behavior is cooperative.         Cognition and Memory: Cognition normal.       SOFT TISSUE ASSESSMENT Hypertonicity and tenderness palpated B C4-T1, T10-S1 erector spinae, upper trap, SCM, Lev scap, hip flexor, glute med/min, QL, hamstring JOINT RESTRICTIONS: C4-T1, T10-S1 and R/L SIJ ORTHO: SI jt point tenderness: +; Mary unremarkable for centralization/peripheralization; michelle's, iliac compression, thigh thrust elicit lbp in R/L SIJ; prone femoral nerve stretch neg for upper lumbar neural tension, elicits R/L SIJ stiffness; sitting root elicits no lbp on R/L; slump test elicits no neural tension R/L, Cervical distraction- Neg, Maximal Foraminal stenosis- neg, Spurling's- neg    Return in about 1 week (around 5/24/2024) for Recheck.

## 2024-05-30 ENCOUNTER — APPOINTMENT (OUTPATIENT)
Dept: LAB | Facility: HOSPITAL | Age: 44
End: 2024-05-30
Payer: COMMERCIAL

## 2024-05-30 LAB
T4 FREE SERPL-MCNC: 1.04 NG/DL (ref 0.61–1.12)
TSH SERPL DL<=0.05 MIU/L-ACNC: 4.15 UIU/ML (ref 0.45–4.5)

## 2024-05-31 ENCOUNTER — PROCEDURE VISIT (OUTPATIENT)
Age: 44
End: 2024-05-31
Payer: COMMERCIAL

## 2024-05-31 VITALS
SYSTOLIC BLOOD PRESSURE: 108 MMHG | BODY MASS INDEX: 26.58 KG/M2 | WEIGHT: 150 LBS | DIASTOLIC BLOOD PRESSURE: 64 MMHG | HEIGHT: 63 IN | OXYGEN SATURATION: 98 % | HEART RATE: 63 BPM

## 2024-05-31 DIAGNOSIS — G89.29 CHRONIC LEFT-SIDED THORACIC BACK PAIN: Primary | ICD-10-CM

## 2024-05-31 DIAGNOSIS — M99.03 SEGMENTAL DYSFUNCTION OF LUMBAR REGION: ICD-10-CM

## 2024-05-31 DIAGNOSIS — M54.59 MECHANICAL LOW BACK PAIN: ICD-10-CM

## 2024-05-31 DIAGNOSIS — M75.52 SUBACROMIAL BURSITIS OF LEFT SHOULDER JOINT: ICD-10-CM

## 2024-05-31 DIAGNOSIS — M54.6 CHRONIC LEFT-SIDED THORACIC BACK PAIN: Primary | ICD-10-CM

## 2024-05-31 DIAGNOSIS — M99.01 SEGMENTAL DYSFUNCTION OF CERVICAL REGION: ICD-10-CM

## 2024-05-31 DIAGNOSIS — M99.04 SEGMENTAL DYSFUNCTION OF SACRAL REGION: ICD-10-CM

## 2024-05-31 DIAGNOSIS — M99.02 SEGMENTAL DYSFUNCTION OF THORACIC REGION: ICD-10-CM

## 2024-05-31 PROCEDURE — 98941 CHIROPRACT MANJ 3-4 REGIONS: CPT | Performed by: CHIROPRACTOR

## 2024-05-31 PROCEDURE — 97110 THERAPEUTIC EXERCISES: CPT | Performed by: CHIROPRACTOR

## 2024-05-31 NOTE — PROGRESS NOTES
Diagnoses and all orders for this visit:    Chronic left-sided thoracic back pain    Subacromial bursitis of left shoulder joint    Segmental dysfunction of lumbar region    Segmental dysfunction of sacral region    Segmental dysfunction of thoracic region    Segmental dysfunction of cervical region    Mechanical low back pain         ASSESSMENT:   Pt's symptoms and exam findings consistent with mechanical lbp  and hip bursitis secondary to repetitive st/sp injury, exacerbated by postural/ergonomic stressors. Pt responded well to flexion biased stretches and manual mobilization of the affected spinal and myofascial tissues with increased ROM; trial of conservative tx recommended consisting of stretching, graded mobilization/manipulation of the affected spinal and myofascial jt dysfunction, postural/ergonomic education and take home stretches/exercises. If symptoms fail to improve with short trial of conservative care, appropriate imaging and referral will be coordinated.  - The patient status is improving with lower back pain but migraine today, slight decrease in mm spasm in the neck and back after treatment.     PROCEDURE CODES: 15278 and 38476    TREATMENT:  Fear avoidance behavior discussion; encouraged and reassured pt that natural course of condition is to improve over time with adherence to tx plan and home care strategies. Home care recommendations: avoid bed rest, walk (but avoid trails and uneven surfaces), gradual return to activity to tolerance (avoid anything that peripheralizes symptoms), call if symptoms peripheralize, worsen, or neurologic deficit progresses. Ther-ex: IASTM; discussed post procedure soreness and/or ecchymosis for up to 36 hrs, applied to affected mm hypertonicities; supine hamstring stretch, supine gluteal stretch, side laying QL stretch, single knee to chest stretch, hip flexor pin-and-stretch, alternating prone hip extension, glute bridge, transitional mvmt education, abdominal  bracing; greater than 15 min spent performing above mentioned ther-ex to improve ROM/flexibility. Thoracic mobilization/manipulation: prone P-A mob, supine A-P manip; Lumbar mobilization/manipulation: diversified side laying graded HVLA, flexion-traction; SIJ Manipulation/Mobilization: R/L SIJ HVLA - long axis distraction, ahn drop table maneuver to affected SIJ    HPI:  Asmita ESPINOZA Bem is a 43 y.o. female  Mid to low back pain; Pain Scale: 5    The patient presents to the office with lower back pain that started without incident of trauma on June 1 while at a concert. She was sitting on the pain but has no pain until they were driving home. The patient does remember having mild tingling in the lower back about 6 months prior to that. The patient now has tingling across lower back. Pain is constant 4 with sitting can go up to a 8/10. Pt went to PT for a course with some improvements with working out., and did help overall but pain is still significant in the evenings. The patient was also given mm relaxers which she think she not help but also makes her too tired.  Massages once a month that helps temporarily. Exercise- metabolic and strength training. Pt sleeps a little better after Sleep therapy program but slightly more worse after last visit. Also gets pain with a bump behind legs but and notices more spider veins and most tingling into the legs but mentions wearing compression stockings for work.   - The patient is feeling the same after last visit, had injections in the lower back on Friday 4/5- The patient is feeling the same in the lower back, she mentions mid day the pain is starting and by the evening the pain is the worst. The pt tolerated shoulder injection is better.   5/3- The patient is feeling about the same, it felt better for a couple days with less visit and pain was rated 6/10.  5/9- Migraine started at 4am today/ Lower back has been feeling better.  5/17- The patient is feeling a little  better  5/31- The patient is feeling better overall with mild pain still but its not running her life. Its been 1 yr since pain started.    Back Pain  The problem has been waxing and waning since onset. The pain is present in the lumbar spine.   Neck Pain     Past Medical History:   Diagnosis Date   • Allergic    • Anxiety    • Cancer (HCC) 2/1/2023    Thyroid- Papillary carcinoma   • COVID-19 01/2022    mild s/s-not hospitalized, not vaccinated   • GERD (gastroesophageal reflux disease)    • Headache(784.0)    • History of IBS     with constipation   • Ingrown toenail    • Irritable bowel syndrome    • Migraines     Chronic per pt   • Plantar fasciitis    • Thyroid carcinoma (HCC)    • Varicella    • Venereal wart 08/07/2014      Past Surgical History:   Procedure Laterality Date   • CERVICAL BIOPSY  W/ LOOP ELECTRODE EXCISION  2017   • COLONOSCOPY     • NASAL SEPTUM SURGERY     • NV OSTEOT W/WO LNGTH SHRT/CORRJ 1ST METAR Left 12/30/2022    Procedure: OSTEOTOMY METATARSAL 1st;  Surgeon: Taiwo Harrington DPM;  Location: [facility];  Service: Podiatry   • THYROIDECTOMY N/A 2/24/2023    Procedure: TOTAL THYROIDECTOMY;  Surgeon: Vince Carey MD;  [facility]: [facility];  Service: Surgical Oncology   • TONSILLECTOMY     • UPPER GASTROINTESTINAL ENDOSCOPY     • US GUIDED THYROID BIOPSY  1/31/2023   • WISDOM TOOTH EXTRACTION       The following portions of the patient's history were reviewed and updated as appropriate: allergies, past family history, past medical history, past social history, past surgical history, and problem list.  Review of Systems   Musculoskeletal:  Positive for back pain, myalgias and neck pain.     Physical Exam  Musculoskeletal:         General: Tenderness present.      Cervical back: Tenderness present.      Thoracic back: Spasms and tenderness present. No swelling, edema, deformity, signs of trauma, lacerations or bony tenderness. Decreased range of motion. No scoliosis.      Lumbar back:  Spasms and tenderness present. No swelling, edema, deformity, signs of trauma, lacerations or bony tenderness. Decreased range of motion. No scoliosis.        Back:    Neurological:      Gait: Gait is intact.      Deep Tendon Reflexes: Reflexes are normal and symmetric.   Psychiatric:         Attention and Perception: Attention normal.         Mood and Affect: Affect normal.         Speech: Speech normal.         Behavior: Behavior is cooperative.         Cognition and Memory: Cognition normal.     SOFT TISSUE ASSESSMENT Hypertonicity and tenderness palpated B C4-T1, T10-S1 erector spinae, upper trap, SCM, Lev scap, hip flexor, glute med/min, QL, hamstring JOINT RESTRICTIONS: C4-T1, T10-S1 and R/L SIJ ORTHO: SI jt point tenderness: +; Mary unremarkable for centralization/peripheralization; michelle's, iliac compression, thigh thrust elicit lbp in R/L SIJ; prone femoral nerve stretch neg for upper lumbar neural tension, elicits R/L SIJ stiffness; sitting root elicits no lbp on R/L; slump test elicits no neural tension R/L, Cervical distraction- Neg, Maximal Foraminal stenosis- neg, Spurling's- neg    Return in about 2 weeks (around 6/14/2024), or 1-2 wk F/U, for Recheck.

## 2024-06-06 ENCOUNTER — PROCEDURE VISIT (OUTPATIENT)
Dept: PAIN MEDICINE | Facility: CLINIC | Age: 44
End: 2024-06-06
Payer: COMMERCIAL

## 2024-06-06 VITALS
DIASTOLIC BLOOD PRESSURE: 79 MMHG | SYSTOLIC BLOOD PRESSURE: 121 MMHG | WEIGHT: 148.2 LBS | HEART RATE: 78 BPM | BODY MASS INDEX: 26.26 KG/M2 | HEIGHT: 63 IN

## 2024-06-06 DIAGNOSIS — M79.18 MYOFASCIAL PAIN SYNDROME: Primary | ICD-10-CM

## 2024-06-06 PROCEDURE — 20552 NJX 1/MLT TRIGGER POINT 1/2: CPT | Performed by: ANESTHESIOLOGY

## 2024-06-06 PROCEDURE — 76942 ECHO GUIDE FOR BIOPSY: CPT | Performed by: ANESTHESIOLOGY

## 2024-06-06 RX ORDER — ROPIVACAINE HYDROCHLORIDE 2 MG/ML
40 INJECTION, SOLUTION EPIDURAL; INFILTRATION; PERINEURAL ONCE
Status: COMPLETED | OUTPATIENT
Start: 2024-06-06 | End: 2024-06-06

## 2024-06-06 RX ORDER — METHYLPREDNISOLONE ACETATE 40 MG/ML
40 INJECTION, SUSPENSION INTRA-ARTICULAR; INTRALESIONAL; INTRAMUSCULAR; SOFT TISSUE ONCE
Status: COMPLETED | OUTPATIENT
Start: 2024-06-06 | End: 2024-06-06

## 2024-06-06 RX ADMIN — METHYLPREDNISOLONE ACETATE 40 MG: 40 INJECTION, SUSPENSION INTRA-ARTICULAR; INTRALESIONAL; INTRAMUSCULAR; SOFT TISSUE at 15:11

## 2024-06-06 RX ADMIN — ROPIVACAINE HYDROCHLORIDE 40 MG: 2 INJECTION, SOLUTION EPIDURAL; INFILTRATION; PERINEURAL at 15:10

## 2024-06-06 NOTE — PROGRESS NOTES
43-year-old female with a history of chronic myofascial pain presenting for trigger point injections into the left thoracic paraspinal musculature and lumbar paraspinal musculature under ultrasound guidance.    Indication: Back pain  Preoperative diagnosis: Myofascial pain  Postoperative diagnosis: Myofascial pain  Procedure: Ultrasound guided left thoracic and lumbar paraspinal trigger point injection(s)    After discussing the risks, benefits, and alternatives to the procedure, the patient expressed understanding and wished to proceed. The patient was brought to the procedure suite and placed in the prone position. A procedural pause was conducted to verify: Correct patient identity, procedure to be performed and as applicable, correct side and site, correct patient position, and availability of implants, special equipment or special requirements. A simple surgical tray was used. Prior to the procedure, the left thoracic and lumbar paraspinal musculature was examined with a 12MHz linear transducer to visualize the left thoracic and lumbar paraspinal musculature and determine the optimal needle. Following this, the area was prepped with ChloraPrep scrub, then reexamined using the same transducer, a sterile ultrasound transducer cover, and sterile ultrasound transducer gel. Thereafter, using ultrasound guidance, a 1.5 inch 25 guage needle was advanced into the left thoracic and lumbar paraspinal musculature x 8. After visualization of the tip and negative aspiration of blood, air, or bodily fluids; 1cc of 0.2% ropivacaine and 4 mg of Depo-Medrol was injected into the paraspinal musculature x 8. The patient tolerated the procedure well and there were no apparent complications. After an appropriate amount of observation, the patient was dismissed from the recovery area in good condition under their own power.    The patient received a total steroid dose today of 32 mg of Depo-Medrol.

## 2024-06-07 ENCOUNTER — ANNUAL EXAM (OUTPATIENT)
Age: 44
End: 2024-06-07
Payer: COMMERCIAL

## 2024-06-07 VITALS
WEIGHT: 147.8 LBS | HEIGHT: 63 IN | BODY MASS INDEX: 26.19 KG/M2 | SYSTOLIC BLOOD PRESSURE: 120 MMHG | DIASTOLIC BLOOD PRESSURE: 78 MMHG

## 2024-06-07 DIAGNOSIS — R10.2 PELVIC PAIN: ICD-10-CM

## 2024-06-07 DIAGNOSIS — Z12.39 BREAST CANCER SCREENING, HIGH RISK PATIENT: ICD-10-CM

## 2024-06-07 DIAGNOSIS — Z01.419 ENCOUNTER FOR ANNUAL ROUTINE GYNECOLOGICAL EXAMINATION: Primary | ICD-10-CM

## 2024-06-07 DIAGNOSIS — Z11.51 SCREENING FOR HUMAN PAPILLOMAVIRUS (HPV): ICD-10-CM

## 2024-06-07 DIAGNOSIS — R92.343 EXTREMELY DENSE TISSUE OF BOTH BREASTS ON MAMMOGRAPHY: ICD-10-CM

## 2024-06-07 DIAGNOSIS — Z12.4 SCREENING FOR CERVICAL CANCER: ICD-10-CM

## 2024-06-07 DIAGNOSIS — Z12.31 SCREENING MAMMOGRAM FOR BREAST CANCER: ICD-10-CM

## 2024-06-07 PROCEDURE — G0476 HPV COMBO ASSAY CA SCREEN: HCPCS | Performed by: OBSTETRICS & GYNECOLOGY

## 2024-06-07 PROCEDURE — G0145 SCR C/V CYTO,THINLAYER,RESCR: HCPCS | Performed by: OBSTETRICS & GYNECOLOGY

## 2024-06-07 PROCEDURE — S0612 ANNUAL GYNECOLOGICAL EXAMINA: HCPCS | Performed by: OBSTETRICS & GYNECOLOGY

## 2024-06-07 RX ORDER — ZOLPIDEM TARTRATE 10 MG/1
10 TABLET ORAL
COMMUNITY
Start: 2024-05-15

## 2024-06-07 NOTE — PATIENT INSTRUCTIONS
- Can consider Lysteda ( tranexamic acid) for heavy periods   - If your bleeding continues to be heavy and with inter-menstrual bleeding please return in the fall for an endometrial biopsy.

## 2024-06-07 NOTE — PROGRESS NOTES
Asmita ESPINOZA Tyson  1980      CC:  Yearly exam    S:  43 y.o. female here for yearly exam.     Periods have been painful and heavy, spotting between cycles.     Period Cycle (Days): 24  Period Duration (Days): 5-6  Period Pattern: Regular  Menstrual Flow: Heavy  Menstrual Control: Maxi pad, Tampon  Menstrual Control Change Freq (Hours): 1.5  Dysmenorrhea: (!) Severe  Dysmenorrhea Symptoms: Cramping, Headache, Other (Comment), Throbbing (dizziness/lightheaded)    She denies vaginal discharge, itching.   Struggling with chronic constipation.   History of IC - sometimes has pain associated with this.     Sexual activity: She is not currently sexually active without pain, bleeding or dryness.     Contraception: Nothing     Last Pap: 11/4/2021 - NILM, Neg HPV ( LEEP 2017 - TOSHIA II)  Last Mammo:  1/12/24 - BIRad 1    We reviewed [facility]CCP guidelines for Pap testing today.     Family hx of breast cancer: M Aunt x 3  Family hx of ovarian cancer: no  Family hx of colon cancer: MGF, cousin      Current Outpatient Medications:     acetaminophen (TYLENOL) 500 mg tablet, Take 1,000 mg by mouth every 6 (six) hours as needed for mild pain, Disp: , Rfl:     bisacodyl (DULCOLAX) 5 mg EC tablet, Take 2 tablets (10 mg total) by mouth 2 (two) times a day as needed for constipation, Disp: 60 tablet, Rfl: 1    calcitriol (ROCALTROL) 0.25 mcg capsule, TAKE ONE CAPSULE BY MOUTH 2 TIMES A DAY, Disp: 180 capsule, Rfl: 1    calcium citrate (CALCITRATE) 950 (200 Ca) MG tablet, Take 1 tablet (950 mg total) by mouth 2 (two) times a day, Disp: 120 tablet, Rfl: 0    cholecalciferol (VITAMIN D3) 1,000 units tablet, Take 1 tablet (1,000 Units total) by mouth daily Do not start before March 1, 2023., Disp: 30 tablet, Rfl: 0    clindamycin (CLEOCIN T) 1 % lotion, in the morning, Disp: , Rfl:     Emgality 120 MG/ML SOAJ, INJECT 120MG SUBCUTANEOUSLY (UNDER THE SKIN) EVERY 28 DAYS, Disp: 1 mL, Rfl: 11    hydrocortisone (ANUSOL-HC) 2.5 % rectal cream, Apply  topically 2 (two) times a day (Patient taking differently: Apply topically 2 (two) times a day PRN), Disp: 28 g, Rfl: 2    Levocetirizine Dihydrochloride (XYZAL PO), Take by mouth daily at bedtime, Disp: , Rfl:     levothyroxine 125 mcg tablet, TAKE ONE TABLET BY MOUTH DAILY, Disp: 90 tablet, Rfl: 1    lubiprostone (AMITIZA) 24 mcg capsule, Take 1 capsule (24 mcg total) by mouth 2 (two) times a day with meals, Disp: 60 capsule, Rfl: 2    Melatonin 5 MG TABS, Take by mouth as needed, Disp: , Rfl:     meloxicam (MOBIC) 15 mg tablet, Take 15 mg by mouth daily, Disp: , Rfl:     Multiple Vitamin (multivitamin) tablet, Take 1 tablet by mouth daily, Disp: , Rfl:     omeprazole (PriLOSEC) 40 MG capsule, Take 1 capsule (40 mg total) by mouth daily, Disp: 90 capsule, Rfl: 1    rimegepant sulfate (Nurtec) 75 mg TBDP, Take one NURTEC 75 mg at onset under tongue. Limit 1 in 24 hours., Disp: 16 tablet, Rfl: 11    rizatriptan (MAXALT) 10 mg tablet, Take 1 tablet (10 mg total) by mouth once as needed for migraine May repeat in 2 hours if needed. Max 2/24 hours, 9/month., Disp: 9 tablet, Rfl: 12    tiZANidine (ZANAFLEX) 2 mg tablet, Take 1 tablet (2 mg total) by mouth 2 (two) times a day as needed for muscle spasms (Patient taking differently: Take 2 mg by mouth 2 (two) times a day as needed for muscle spasms PRN), Disp: 60 tablet, Rfl: 1    verapamil (CALAN) 40 mg tablet, 40 mg BID for 1 week, then if needed/tolerate increase to 40 mg in am and 80 mg in pm for 1 week, then 80 mg BID for 1 week, then 80 mg in am and 120 mg in pm for 1 week, then 120 mg BID, stay on lowest effective tolerated dose, Disp: 180 tablet, Rfl: 3    zolpidem (AMBIEN) 10 mg tablet, Take 10 mg by mouth daily at bedtime as needed for sleep, Disp: , Rfl:     zolpidem (AMBIEN) 5 mg tablet, Take 1 tablet (5 mg total) by mouth daily at bedtime as needed for sleep (Patient not taking: Reported on 6/7/2024), Disp: 30 tablet, Rfl: 0  No current facility-administered  medications for this visit.  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)    Hyperlipidemia    Iron deficiency    Encounter for follow-up surveillance of thyroid cancer    Myofascial pain syndrome     Past Medical History:   Diagnosis Date    Allergic     Anxiety     Cancer (HCC) 2/1/2023    Thyroid- Papillary carcinoma    COVID-19 01/2022    mild s/s-not hospitalized, not vaccinated    GERD (gastroesophageal reflux disease)     Headache(784.0)     History of IBS     with constipation    Ingrown toenail     Irritable bowel syndrome     Migraines     Chronic per pt    Plantar fasciitis     Thyroid carcinoma (HCC)     Varicella     Venereal wart 08/07/2014     Family History   Problem Relation Age of Onset    Hypertension Mother     Arthritis Mother     Thyroid disease Mother     Osteoarthritis Mother     Transient ischemic attack Father     Hypertension Father     Stroke Father     Vision loss Father     Rashes / Skin problems Sister         Shingles    Migraines Sister     No Known Problems Sister     Uterine cancer Maternal Grandmother 20    Cancer Maternal Grandmother         Uterine    Colon cancer Maternal Grandfather 60    Cancer Maternal Grandfather         Colon    Lung cancer Paternal Grandmother 60    Cancer Paternal Grandmother         Lung    Heart attack Paternal Grandfather     Colon cancer Cousin 30    Breast cancer Maternal Aunt     Breast cancer Maternal Aunt     Breast cancer Maternal Aunt           Review of Systems   Respiratory: Negative.    Cardiovascular:  "Negative.    Gastrointestinal: Negative for constipation and diarrhea.     O:  Blood pressure 120/78, height 5' 3\" (1.6 m), weight 67 kg (147 lb 12.8 oz), last menstrual period 05/13/2024.    Patient appears well and is not in distress  Breasts are symmetrical without mass, tenderness, nipple discharge, skin changes or adenopathy.   Abdomen is soft and nontender without masses.   External genitals are normal without lesions or rashes.  Urethral meatus and urethra are normal  Bladder is normal to palpation  Vagina is normal without discharge or bleeding.   Cervix is normal without discharge or lesion.   Uterus is normal, mobile, nontender without palpable mass.  Adnexa are normal, nontender, without palpable mass.     A:  Yearly exam.     P:   Pap & HPV today, q3y due to LEEP    Mammo ordered, MRI ordered   Consider Lysteda.   Not interested in birth control or hyst    Pelvic floor PT referral placed.        RTO one year for yearly exam or sooner as needed.      "

## 2024-06-13 ENCOUNTER — OFFICE VISIT (OUTPATIENT)
Dept: GASTROENTEROLOGY | Facility: CLINIC | Age: 44
End: 2024-06-13
Payer: COMMERCIAL

## 2024-06-13 VITALS
DIASTOLIC BLOOD PRESSURE: 60 MMHG | BODY MASS INDEX: 26.15 KG/M2 | WEIGHT: 147.6 LBS | TEMPERATURE: 98.7 F | HEIGHT: 63 IN | SYSTOLIC BLOOD PRESSURE: 118 MMHG

## 2024-06-13 DIAGNOSIS — K21.9 GASTROESOPHAGEAL REFLUX DISEASE, UNSPECIFIED WHETHER ESOPHAGITIS PRESENT: ICD-10-CM

## 2024-06-13 DIAGNOSIS — K58.1 IRRITABLE BOWEL SYNDROME WITH CONSTIPATION: Primary | ICD-10-CM

## 2024-06-13 DIAGNOSIS — R14.0 BLOATING: ICD-10-CM

## 2024-06-13 PROCEDURE — 99214 OFFICE O/P EST MOD 30 MIN: CPT | Performed by: INTERNAL MEDICINE

## 2024-06-14 ENCOUNTER — PROCEDURE VISIT (OUTPATIENT)
Age: 44
End: 2024-06-14
Payer: COMMERCIAL

## 2024-06-14 VITALS
OXYGEN SATURATION: 99 % | DIASTOLIC BLOOD PRESSURE: 64 MMHG | SYSTOLIC BLOOD PRESSURE: 110 MMHG | HEART RATE: 92 BPM | HEIGHT: 63 IN | BODY MASS INDEX: 26.05 KG/M2 | WEIGHT: 147 LBS

## 2024-06-14 DIAGNOSIS — M99.02 SEGMENTAL DYSFUNCTION OF THORACIC REGION: ICD-10-CM

## 2024-06-14 DIAGNOSIS — M54.6 CHRONIC LEFT-SIDED THORACIC BACK PAIN: Primary | ICD-10-CM

## 2024-06-14 DIAGNOSIS — M99.01 SEGMENTAL DYSFUNCTION OF CERVICAL REGION: ICD-10-CM

## 2024-06-14 DIAGNOSIS — M54.59 MECHANICAL LOW BACK PAIN: ICD-10-CM

## 2024-06-14 DIAGNOSIS — M99.04 SEGMENTAL DYSFUNCTION OF SACRAL REGION: ICD-10-CM

## 2024-06-14 DIAGNOSIS — M99.03 SEGMENTAL DYSFUNCTION OF LUMBAR REGION: ICD-10-CM

## 2024-06-14 DIAGNOSIS — G89.29 CHRONIC LEFT-SIDED THORACIC BACK PAIN: Primary | ICD-10-CM

## 2024-06-14 DIAGNOSIS — M75.52 SUBACROMIAL BURSITIS OF LEFT SHOULDER JOINT: ICD-10-CM

## 2024-06-14 LAB
LAB AP GYN PRIMARY INTERPRETATION: NORMAL
LAB AP LMP: NORMAL
Lab: NORMAL

## 2024-06-14 PROCEDURE — 97110 THERAPEUTIC EXERCISES: CPT | Performed by: CHIROPRACTOR

## 2024-06-14 PROCEDURE — 98941 CHIROPRACT MANJ 3-4 REGIONS: CPT | Performed by: CHIROPRACTOR

## 2024-06-14 NOTE — PROGRESS NOTES
[facility] - Outpatient Consultation  Asmita ESPINOZA Bem 43 y.o. female MRN: [id number]  Encounter: [id number]          ASSESSMENT AND PLAN:      1. Irritable bowel syndrome with constipation  2. Bloating  3. Gastroesophageal reflux disease, unspecified whether esophagitis present    43-year-old female presenting for follow-up regarding chronic constipation likely secondary to irritable bowel syndrome as well as GERD.     Given incomplete response to high-dose of Amitiza, recommending addition of standing MiraLAX twice daily to take alongside this.  Discussed that she will likely need long-term treatment with both for better symptom control.  She is agreeable to trialing this plan.  Unfortunately Motegrity was unable to be approved however if she does not have response to above regimen, would need to consider appeal of denied prior authorization.    Agree also with pelvic floor physical therapy for which she is already starting for urinary complaints, I recommended she address defecation concerns with PT during her appointment as well.    Could also consider testing for SIBO to rule out intestinal methanogen  overgrowth is contributing factor to her constipation.    Can continue PPI for GERD     Follow-up in 3 to 4 months       ______________________________________________________________________    HPI:  During her last visit there was an attempt to get Motegrity approved but unfortunately this was not covered.  Continued on Amitiza.  She reports being able to have bowel movement but this is only occurring after every several days.  She would ideally like to have more frequent stools although does feel that this is somewhat of an improvement.    She is planning to go to pelvic PT for urinary complaints as well.       Summary of initial presentation in March 2023-  She was doing fine on omeprzole 20 mg and linzess daily however she was recently found to have thyroid cancer and underwent  "thyroid removal. She had subsequent hypocalcemia, so she was started on calcium supple,ments. Since being on calcium, the linzess is no longer helping and she is only moving her bowels once every week or so. She says this causes her to get very bloated, has increased flatulence and gas and will eventually have lower abdominal discomfort. She notes her omeprazole was stopped when she was admitted so when she was d/c home, she started OTC omeprazole (she suspects was 10 mg) which is not alleviate her reflux anymore. She says the higher doses of omeprazole \"Always helped\" in the past.  Otherwise she denies unintentional weight loss, fevers, chills, night sweats, vomiting, bloody or black BMs.          REVIEW OF SYSTEMS:    CONSTITUTIONAL: Denies any fever, chills, rigors, and weight loss.  HEENT: Denies odynophagia, tinnitus  CARDIOVASCULAR: No chest pain or palpitations.   RESPIRATORY: Denies any cough, hemoptysis, shortness of breath or dyspnea on exertion.  GASTROINTESTINAL: As noted in the History of Present Illness.   GENITOURINARY: No problems with urination. Denies any hematuria or dysuria.  NEUROLOGIC: No dizziness or vertigo, denies headaches.   MUSCULOSKELETAL: Denies any muscle or joint pain.   SKIN: Denies skin rashes or itching.   ENDOCRINE:  Denies intolerance to heat or cold.  PSYCHOSOCIAL: Denies depression or anxiety. Denies any recent memory loss.       Historical Information   Past Medical History:   Diagnosis Date   • Allergic    • Anxiety    • Cancer (HCC) 2/1/2023    Thyroid- Papillary carcinoma   • COVID-19 01/2022    mild s/s-not hospitalized, not vaccinated   • GERD (gastroesophageal reflux disease)    • Headache(784.0)    • History of IBS     with constipation   • Ingrown toenail    • Irritable bowel syndrome    • Migraines     Chronic per pt   • Plantar fasciitis    • Thyroid carcinoma (HCC)    • Varicella    • Venereal wart 08/07/2014     Past Surgical History:   Procedure Laterality Date "   • CERVICAL BIOPSY  W/ LOOP ELECTRODE EXCISION  2017   • COLONOSCOPY     • NASAL SEPTUM SURGERY     • ID OSTEOT W/WO LNGTH SHRT/CORRJ 1ST METAR Left 12/30/2022    Procedure: OSTEOTOMY METATARSAL 1st;  Surgeon: Taiwo Harrington DPM;  Location: [facility];  Service: Podiatry   • THYROIDECTOMY N/A 2/24/2023    Procedure: TOTAL THYROIDECTOMY;  Surgeon: Vince Carey MD;  Location: [facility];  Service: Surgical Oncology   • TONSILLECTOMY     • UPPER GASTROINTESTINAL ENDOSCOPY     • US GUIDED THYROID BIOPSY  1/31/2023   • WISDOM TOOTH EXTRACTION       Social History   Social History     Substance and Sexual Activity   Alcohol Use Yes   • Alcohol/week: 2.0 standard drinks of alcohol   • Types: 2 Glasses of wine per week    Comment: 2 glasses wine a week     Social History     Substance and Sexual Activity   Drug Use Never     Social History     Tobacco Use   Smoking Status Never   Smokeless Tobacco Never     Family History   Problem Relation Age of Onset   • Hypertension Mother    • Arthritis Mother    • Thyroid disease Mother    • Osteoarthritis Mother    • Transient ischemic attack Father    • Hypertension Father    • Stroke Father    • Vision loss Father    • Rashes / Skin problems Sister         Shingles   • Migraines Sister    • No Known Problems Sister    • Uterine cancer Maternal Grandmother 20   • Cancer Maternal Grandmother         Uterine   • Colon cancer Maternal Grandfather 60   • Cancer Maternal Grandfather         Colon   • Lung cancer Paternal Grandmother 60   • Cancer Paternal Grandmother         Lung   • Heart attack Paternal Grandfather    • Colon cancer Cousin 30   • Breast cancer Maternal Aunt    • Breast cancer Maternal Aunt    • Breast cancer Maternal Aunt        Meds/Allergies       Current Outpatient Medications:   •  acetaminophen (TYLENOL) 500 mg tablet  •  bisacodyl (DULCOLAX) 5 mg EC tablet  •  calcitriol (ROCALTROL) 0.25 mcg capsule  •  clindamycin (CLEOCIN T) 1 % lotion  •  Emgality 120  "MG/ML SOAJ  •  hydrocortisone (ANUSOL-HC) 2.5 % rectal cream  •  Levocetirizine Dihydrochloride (XYZAL PO)  •  levothyroxine 125 mcg tablet  •  lubiprostone (AMITIZA) 24 mcg capsule  •  Melatonin 5 MG TABS  •  meloxicam (MOBIC) 15 mg tablet  •  Multiple Vitamin (multivitamin) tablet  •  omeprazole (PriLOSEC) 40 MG capsule  •  rimegepant sulfate (Nurtec) 75 mg TBDP  •  rizatriptan (MAXALT) 10 mg tablet  •  tiZANidine (ZANAFLEX) 2 mg tablet  •  verapamil (CALAN) 40 mg tablet  •  zolpidem (AMBIEN) 10 mg tablet  •  calcium citrate (CALCITRATE) 950 (200 Ca) MG tablet  •  cholecalciferol (VITAMIN D3) 1,000 units tablet  •  zolpidem (AMBIEN) 5 mg tablet    No Known Allergies        Objective     Blood pressure 118/60, temperature 98.7 °F (37.1 °C), temperature source Tympanic, height 5' 3\" (1.6 m), weight 67 kg (147 lb 9.6 oz), last menstrual period 05/13/2024. Body mass index is 26.15 kg/m².        PHYSICAL EXAM:      General Appearance:   Alert, cooperative, no distress   HEENT:   Normocephalic, atraumatic, anicteric.     Neck:  Supple, symmetrical, trachea midline   Lungs:   Clear to auscultation bilaterally; no rales, rhonchi or wheezing; respirations unlabored    Heart::   Regular rate and rhythm; no murmur, rub, or gallop.   Abdomen:   Soft, non-tender, non-distended; normal bowel sounds; no masses, no organomegaly    Genitalia:   Deferred    Rectal:   Deferred    Extremities:  No cyanosis, clubbing or edema    Pulses:  2+ and symmetric    Skin:  No jaundice, rashes, or lesions          Lab Results:   No visits with results within 1 Day(s) from this visit.   Latest known visit with results is:   Annual Exam on 06/07/2024   Component Date Value   • HPV Other HR 06/07/2024 Negative    • HPV16 06/07/2024 Negative    • HPV18 06/07/2024 Negative          Radiology Results:   No results found.  Answers submitted by the patient for this visit:  Abdominal Pain Questionnaire (Submitted on 6/12/2024)  Chief Complaint: " Abdominal pain  Chronicity: chronic  Onset: more than 1 year ago  Onset quality: gradual  Frequency: every several days  Episode duration: 2 Hours  Progression since onset: unchanged  Pain location: LLQ, RLQ, suprapubic region  Pain - numeric: 6/10  Pain quality: aching, cramping, a sensation of fullness  Radiates to: LLQ, RLQ, epigastric region, pelvis  anorexia: No  arthralgias: Yes  belching: Yes  constipation: Yes  diarrhea: No  dysuria: No  fever: No  flatus: No  frequency: Yes  headaches: Yes  hematochezia: No  hematuria: No  melena: No  myalgias: Yes  nausea: Yes  weight loss: No  vomiting: No  Aggravated by: certain positions  Relieved by: bowel movements, certain positions, passing flatus

## 2024-06-14 NOTE — PROGRESS NOTES
Diagnoses and all orders for this visit:    Chronic left-sided thoracic back pain    Subacromial bursitis of left shoulder joint    Segmental dysfunction of lumbar region    Segmental dysfunction of sacral region    Segmental dysfunction of thoracic region    Segmental dysfunction of cervical region    Mechanical low back pain         ASSESSMENT:   Pt's symptoms and exam findings consistent with mechanical lbp  and hip bursitis secondary to repetitive st/sp injury, exacerbated by postural/ergonomic stressors. Pt responded well to flexion biased stretches and manual mobilization of the affected spinal and myofascial tissues with increased ROM; trial of conservative tx recommended consisting of stretching, graded mobilization/manipulation of the affected spinal and myofascial jt dysfunction, postural/ergonomic education and take home stretches/exercises. If symptoms fail to improve with short trial of conservative care, appropriate imaging and referral will be coordinated.  - The patient status is improving with lower back pain but migraine today, slight decrease in mm spasm in the neck and back after treatment.     PROCEDURE CODES: 58961 and 02853    TREATMENT:  Fear avoidance behavior discussion; encouraged and reassured pt that natural course of condition is to improve over time with adherence to tx plan and home care strategies. Home care recommendations: avoid bed rest, walk (but avoid trails and uneven surfaces), gradual return to activity to tolerance (avoid anything that peripheralizes symptoms), call if symptoms peripheralize, worsen, or neurologic deficit progresses. Ther-ex: IASTM; discussed post procedure soreness and/or ecchymosis for up to 36 hrs, applied to affected mm hypertonicities; supine hamstring stretch, supine gluteal stretch, side laying QL stretch, single knee to chest stretch, hip flexor pin-and-stretch, alternating prone hip extension, glute bridge, transitional mvmt education, abdominal  bracing; greater than 15 min spent performing above mentioned ther-ex to improve ROM/flexibility. Thoracic mobilization/manipulation: prone P-A mob, supine A-P manip; Lumbar mobilization/manipulation: diversified side laying graded HVLA, flexion-traction; SIJ Manipulation/Mobilization: R/L SIJ HVLA - long axis distraction, ahn drop table maneuver to affected SIJ    HPI:  Asmita ESPINOZA Bem is a 43 y.o. female  Mid to low back pain; Pain Scale: 5    The patient presents to the office with lower back pain that started without incident of trauma on June 1 while at a concert. She was sitting on the pain but has no pain until they were driving home. The patient does remember having mild tingling in the lower back about 6 months prior to that. The patient now has tingling across lower back. Pain is constant 4 with sitting can go up to a 8/10. Pt went to PT for a course with some improvements with working out., and did help overall but pain is still significant in the evenings. The patient was also given mm relaxers which she think she not help but also makes her too tired.  Massages once a month that helps temporarily. Exercise- metabolic and strength training. Pt sleeps a little better after Sleep therapy program but slightly more worse after last visit. Also gets pain with a bump behind legs but and notices more spider veins and most tingling into the legs but mentions wearing compression stockings for work.   - The patient is feeling the same after last visit, had injections in the lower back on Friday 4/5- The patient is feeling the same in the lower back, she mentions mid day the pain is starting and by the evening the pain is the worst. The pt tolerated shoulder injection is better.   5/3- The patient is feeling about the same, it felt better for a couple days with less visit and pain was rated 6/10.  5/9- Migraine started at 4am today/ Lower back has been feeling better.  5/17- The patient is feeling a little  better  5/31- The patient is feeling better overall with mild pain still but its not running her life. Its been 1 yr since pain started.  6/14- The patient has been worse in the lower back pain     Back Pain  The problem has been waxing and waning since onset. The pain is present in the lumbar spine.   Neck Pain       Past Medical History:   Diagnosis Date   • Allergic    • Anxiety    • Cancer (HCC) 2/1/2023    Thyroid- Papillary carcinoma   • COVID-19 01/2022    mild s/s-not hospitalized, not vaccinated   • GERD (gastroesophageal reflux disease)    • Headache(784.0)    • History of IBS     with constipation   • Ingrown toenail    • Irritable bowel syndrome    • Migraines     Chronic per pt   • Plantar fasciitis    • Thyroid carcinoma (HCC)    • Varicella    • Venereal wart 08/07/2014      Past Surgical History:   Procedure Laterality Date   • CERVICAL BIOPSY  W/ LOOP ELECTRODE EXCISION  2017   • COLONOSCOPY     • NASAL SEPTUM SURGERY     • NE OSTEOT W/WO LNGTH SHRT/CORRJ 1ST METAR Left 12/30/2022    Procedure: OSTEOTOMY METATARSAL 1st;  Surgeon: Taiwo Harrington DPM;  Location: [facility];  Service: Podiatry   • THYROIDECTOMY N/A 2/24/2023    Procedure: TOTAL THYROIDECTOMY;  Surgeon: Vince Carey MD;  [facility]: [facility];  Service: Surgical Oncology   • TONSILLECTOMY     • UPPER GASTROINTESTINAL ENDOSCOPY     • US GUIDED THYROID BIOPSY  1/31/2023   • WISDOM TOOTH EXTRACTION       The following portions of the patient's history were reviewed and updated as appropriate: allergies, past family history, past medical history, past social history, past surgical history, and problem list.  Review of Systems   Musculoskeletal:  Positive for back pain, myalgias and neck pain.     Physical Exam  Musculoskeletal:         General: Tenderness present.      Cervical back: Tenderness present.      Thoracic back: Spasms and tenderness present. No swelling, edema, deformity, signs of trauma, lacerations or bony tenderness.  Decreased range of motion. No scoliosis.      Lumbar back: Spasms and tenderness present. No swelling, edema, deformity, signs of trauma, lacerations or bony tenderness. Decreased range of motion. No scoliosis.        Back:    Neurological:      Gait: Gait is intact.      Deep Tendon Reflexes: Reflexes are normal and symmetric.   Psychiatric:         Attention and Perception: Attention normal.         Mood and Affect: Affect normal.         Speech: Speech normal.         Behavior: Behavior is cooperative.         Cognition and Memory: Cognition normal.       SOFT TISSUE ASSESSMENT Hypertonicity and tenderness palpated B C4-T1, T10-S1 erector spinae, upper trap, SCM, Lev scap, hip flexor, glute med/min, QL, hamstring JOINT RESTRICTIONS: C4-T1, T10-S1 and R/L SIJ ORTHO: SI jt point tenderness: +; Mary unremarkable for centralization/peripheralization; michelle's, iliac compression, thigh thrust elicit lbp in R/L SIJ; prone femoral nerve stretch neg for upper lumbar neural tension, elicits R/L SIJ stiffness; sitting root elicits no lbp on R/L; slump test elicits no neural tension R/L, Cervical distraction- Neg, Maximal Foraminal stenosis- neg, Spurling's- neg    Return in about 1 week (around 6/21/2024) for Recheck.

## 2024-06-20 ENCOUNTER — TELEPHONE (OUTPATIENT)
Dept: PAIN MEDICINE | Facility: CLINIC | Age: 44
End: 2024-06-20

## 2024-06-21 ENCOUNTER — EVALUATION (OUTPATIENT)
Dept: PHYSICAL THERAPY | Facility: REHABILITATION | Age: 44
End: 2024-06-21
Payer: COMMERCIAL

## 2024-06-21 ENCOUNTER — PROCEDURE VISIT (OUTPATIENT)
Age: 44
End: 2024-06-21
Payer: COMMERCIAL

## 2024-06-21 VITALS
DIASTOLIC BLOOD PRESSURE: 62 MMHG | HEART RATE: 87 BPM | HEIGHT: 63 IN | OXYGEN SATURATION: 99 % | WEIGHT: 147 LBS | BODY MASS INDEX: 26.05 KG/M2 | SYSTOLIC BLOOD PRESSURE: 110 MMHG

## 2024-06-21 DIAGNOSIS — G89.29 CHRONIC LEFT-SIDED THORACIC BACK PAIN: Primary | ICD-10-CM

## 2024-06-21 DIAGNOSIS — M54.6 CHRONIC LEFT-SIDED THORACIC BACK PAIN: Primary | ICD-10-CM

## 2024-06-21 DIAGNOSIS — M99.03 SEGMENTAL DYSFUNCTION OF LUMBAR REGION: ICD-10-CM

## 2024-06-21 DIAGNOSIS — M99.02 SEGMENTAL DYSFUNCTION OF THORACIC REGION: ICD-10-CM

## 2024-06-21 DIAGNOSIS — M75.52 SUBACROMIAL BURSITIS OF LEFT SHOULDER JOINT: ICD-10-CM

## 2024-06-21 DIAGNOSIS — M54.59 MECHANICAL LOW BACK PAIN: ICD-10-CM

## 2024-06-21 DIAGNOSIS — M99.01 SEGMENTAL DYSFUNCTION OF CERVICAL REGION: ICD-10-CM

## 2024-06-21 DIAGNOSIS — R10.2 PELVIC PAIN: ICD-10-CM

## 2024-06-21 DIAGNOSIS — M99.04 SEGMENTAL DYSFUNCTION OF SACRAL REGION: ICD-10-CM

## 2024-06-21 PROCEDURE — 98941 CHIROPRACT MANJ 3-4 REGIONS: CPT | Performed by: CHIROPRACTOR

## 2024-06-21 PROCEDURE — 97162 PT EVAL MOD COMPLEX 30 MIN: CPT

## 2024-06-21 PROCEDURE — 97530 THERAPEUTIC ACTIVITIES: CPT

## 2024-06-21 PROCEDURE — 97110 THERAPEUTIC EXERCISES: CPT | Performed by: CHIROPRACTOR

## 2024-06-21 NOTE — PROGRESS NOTES
PT Evaluation     Today's date: 2024  Patient name: Asmita ESPINOZA Bem  : 1980  MRN: [id number]  Referring provider: Janet Martins MD  Dx:   Encounter Diagnosis     ICD-10-CM    1. Pelvic pain  R10.2 Ambulatory Referral to Physical Therapy          Start Time: 1400  Stop Time: 1500  Total time in clinic (min): 60 minutes    Assessment    Assessment details: Asmita ESPINOZA Bem is a 43 y.o. nulliparous female with complaints of urgency, frequency, and constipation.  Patient's presentation is consistent with moderate PFMD with the following notable impairments found on evaluation: PFM weakness, impaired PFM coordination , poor PFM endurance, diminished PFM relaxation ROM, high resting tone , anterior laxity of the vaginal canal, poor breathing mechanics, poor bladder practices, poor bear down mechanics, and poor TA activation. These impairments contribute to the following functional limitations: decreased tolerance to appropriate urinary retention , social gatherings, sexual function, gynecologic care, impaired personal hygiene, increases pt distress, impaired bowel function, and impairs QOL.  Asmita ESPINOZA Bem is a good candidate for physical therapy and would benefit from skilled physical therapy to guide progressive interventions to address the above impairments in order to allow the patient to achieve the goals listed and return to prior level of function. POC: urge deferral, PFM coordination, PFM strengthening, PFM down training, defecation mechanics, manual cueing, breathing mechanics, functional strengthening, abdominal strengthening, and balloon training.    During initial evaluation, education was provided on anatomy and function of the pelvic floor muscles and provided written and verbal consent for pelvic floor muscle exam. Patient also educated on diagnosis, plan of care and prognosis. Pt is in agreement with recommended plan of care and goals for therapy, and demonstrates motivation for active  participation in proposed plan of care.      Goals  Dysfunction:   In 10 weeks, patient will demonstrate improved PFM strength to at least 4/5.  In 10 weeks, patient will demonstrate improved PFM endurance to 8 sec of 3/5 strength or greater.   In 10 weeks, patient will demonstrate improved PFM relaxation to at least 70% or greater.     Bladder:  In 10 weeks, patient will report improvement in urge incontinence as measured by 0  leaks in 14 days.  In 10 weeks, patient will demonstrate normalized daytime void interval of 2-4 hours with adequate fluid intake.   In 10 weeks, patient will reduce nocturia to 0 per night.    Pain:   In 10 weeks, patient will report painfree intravaginal PFM exam.    Bowel:  In 10 weeks, patient will report improvement in defecatory function as evidenced by no pain during 100% of BM.  In 4 weeks, patient will perform x2 consecutive bear downs with proper breathing mechanics and 50% or greater PFM relaxation.    Outcome Measure:  In 10 weeks, patient will score at least 10 or less on PFDI to indicate meaningful change from 139 at IE.       Plan    Frequency: 1x week  Duration in weeks: 10  Plan of Care beginning date: 6/21/2024  Plan of Care expiration date: 8/30/2024        Subjective      Chief Complaint: Constipation and Urgency/ Frequency   HPI: Pt presents to PT with longstanding history of urgency and constipation. Pt hx positive for Insterstitial cystitis dx in mid 20s. Bladder sxs have experienced sessions of flare in remission but recent flare began in Feb/March 2024 ideopathically.   Constipation has been long standing for ~3 years but worsened after thyroidectomy in Feb of 2023. Has been trailing various pharmacologic interventions w/o great success.   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 Panty Liner everyday   Frequency of leakage: x1 hour   Fluid intake: Water 36- 40 oz; 16 oz coffee a day    Bowel:     Onset: ~3 year w// exacerbation Hx of IBS  February 25, 2023, Emetiza and Murelax (past year - not much effect)   Symptoms Present:   Painful evacuation   Straining - 100%   Fecal smearing (only 2x in past year and only w/ laxatives use)   Difficulty holding gas   Reports type: 1 w/ 100% .   GYN:     Nulliparous    Sexual Function:     Currently sexually active? No   Sexual dysfunction? Hx of pain w/ penetration (penile and speculum, but speculum has improved)    MSK:      Current exercise: Strength training, HIIT   Pain:      Location: LBP  Onset: ~ 1 year   Worst: 7/10   Previous Tx: PT and receiving injections    Goals:     Eliminate UUI  Decrease Urgency   Decrease Frequency   Improve bowel function         Objective       Abdominal Assessment:      Abdominal Assessment: Tenderness in bilateral lower quadrants and to deep palpation along large intestine   Curl up: 3.5 width x 6cm length    Diastatis   Connective tissue integrity at linea alba: boggy     Visual Inspection of Perineum:     Pelvic Organ Prolapse   With bearing down: none  Comments: Hyper mobility         Pelvic Floor Muscle Exam:       40% pelvic floor relaxation        PERFECT Score   Power right: 2+/5   Power left: 2+/5   Endurance (seconds to max): 4    Fast flicks (in 10 seconds): 4   Perfect Score: High resting tone - notably rectally   TTP in layer 1 -3 vaginally and rectally   Able to contract but poor endurance and limited by ROM   Abdominal pressure during bear down and poor relaxation       pelvic floor exam consent given by patient    Pelvic exam completed: vaginally and rectally                Precautions:   Patient Active Problem List   Diagnosis    Chronic migraine w/o aura  w/o status migrainosus, not intractable    TOSIHA II (cervical intraepithelial neoplasia II)    Irritable bowel syndrome with constipation    Primary insomnia    Neck pain    Vestibulitis, vulvar    Overweight (BMI 25.0-29.9)    Abnormal thyroid biopsy    History of thyroid cancer    Hypothyroidism    Gastroesophageal reflux disease    Hypocalcemia    Postoperative hypothyroidism    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 (BOMN, approved, pending) Unit limit (Daily) Auth Start date Expiration date PT/OT + Visit Limit?   8/30/2024  BOMN    99     Date of Service 6/21        Visits Used 1        Visits Remaining           [employer] Created 6/21                 Neuro Re-Ed         Urge deferral         Breathing Mechanics Reviewed proper TA activation w/ breathing  **       PFM Coordination         PFM Down Training   **       Internal Cueing                  Ther Ex         PFM Strengthening  **       Hip strengthening         Functional Strengthening         Abdominal Strengthening         Aerobic         Therapeutic Rest Breaks                  Ther Activity         PFM Education         Voiding Diaries          Defecation Mechanics Educated and provided handout         Fluid Intake          Review of Sxs                  Manual Ther         PFM exam Performed rectally and vaginally         Ortho exam         PFM Manuals         Bowel Massage Performed 8'  ** provide handout                Modalities                           Patient Education                  Outcome Measure           PFDI - 139

## 2024-06-21 NOTE — PROGRESS NOTES
Diagnoses and all orders for this visit:    Chronic left-sided thoracic back pain    Subacromial bursitis of left shoulder joint    Segmental dysfunction of lumbar region    Segmental dysfunction of sacral region    Segmental dysfunction of thoracic region    Segmental dysfunction of cervical region    Mechanical low back pain         ASSESSMENT:   Pt's symptoms and exam findings consistent with mechanical lbp  and hip bursitis secondary to repetitive st/sp injury, exacerbated by postural/ergonomic stressors. Pt responded well to flexion biased stretches and manual mobilization of the affected spinal and myofascial tissues with increased ROM; trial of conservative tx recommended consisting of stretching, graded mobilization/manipulation of the affected spinal and myofascial jt dysfunction, postural/ergonomic education and take home stretches/exercises. If symptoms fail to improve with short trial of conservative care, appropriate imaging and referral will be coordinated.  - The patient status is improving with lower back pain but migraine today, slight decrease in mm spasm in the neck and back after treatment.     PROCEDURE CODES: 24970 and 23229    TREATMENT:  Fear avoidance behavior discussion; encouraged and reassured pt that natural course of condition is to improve over time with adherence to tx plan and home care strategies. Home care recommendations: avoid bed rest, walk (but avoid trails and uneven surfaces), gradual return to activity to tolerance (avoid anything that peripheralizes symptoms), call if symptoms peripheralize, worsen, or neurologic deficit progresses. Ther-ex: IASTM; discussed post procedure soreness and/or ecchymosis for up to 36 hrs, applied to affected mm hypertonicities; supine hamstring stretch, supine gluteal stretch, side laying QL stretch, single knee to chest stretch, hip flexor pin-and-stretch, alternating prone hip extension, glute bridge, transitional mvmt education, abdominal  bracing; greater than 15 min spent performing above mentioned ther-ex to improve ROM/flexibility. Thoracic mobilization/manipulation: prone P-A mob, supine A-P manip; Lumbar mobilization/manipulation: diversified side laying graded HVLA, flexion-traction; SIJ Manipulation/Mobilization: R/L SIJ HVLA - long axis distraction, ahn drop table maneuver to affected SIJ    HPI:  Asmita ESPINOZA Bem is a 43 y.o. female  Mid to low back pain; Pain Scale: 5    The patient presents to the office with lower back pain that started without incident of trauma on June 1 while at a concert. She was sitting on the pain but has no pain until they were driving home. The patient does remember having mild tingling in the lower back about 6 months prior to that. The patient now has tingling across lower back. Pain is constant 4 with sitting can go up to a 8/10. Pt went to PT for a course with some improvements with working out., and did help overall but pain is still significant in the evenings. The patient was also given mm relaxers which she think she not help but also makes her too tired.  Massages once a month that helps temporarily. Exercise- metabolic and strength training. Pt sleeps a little better after Sleep therapy program but slightly more worse after last visit. Also gets pain with a bump behind legs but and notices more spider veins and most tingling into the legs but mentions wearing compression stockings for work.   - The patient is feeling the same after last visit, had injections in the lower back on Friday 4/5- The patient is feeling the same in the lower back, she mentions mid day the pain is starting and by the evening the pain is the worst. The pt tolerated shoulder injection is better.   5/3- The patient is feeling about the same, it felt better for a couple days with less visit and pain was rated 6/10.  5/9- Migraine started at 4am today/ Lower back has been feeling better.  5/17- The patient is feeling a little  better  5/31- The patient is feeling better overall with mild pain still but its not running her life. Its been 1 yr since pain started.  6/14- The patient has been worse in the lower back pain   6/21- The patient feels better than last visit 4/10.    Back Pain  The problem has been waxing and waning since onset. The pain is present in the lumbar spine.   Neck Pain       Past Medical History:   Diagnosis Date   • Allergic    • Anxiety    • Cancer (HCC) 2/1/2023    Thyroid- Papillary carcinoma   • COVID-19 01/2022    mild s/s-not hospitalized, not vaccinated   • GERD (gastroesophageal reflux disease)    • Headache(784.0)    • History of IBS     with constipation   • Ingrown toenail    • Irritable bowel syndrome    • Migraines     Chronic per pt   • Plantar fasciitis    • Thyroid carcinoma (HCC)    • Varicella    • Venereal wart 08/07/2014      Past Surgical History:   Procedure Laterality Date   • CERVICAL BIOPSY  W/ LOOP ELECTRODE EXCISION  2017   • COLONOSCOPY     • NASAL SEPTUM SURGERY     • RI OSTEOT W/WO LNGTH SHRT/CORRJ 1ST METAR Left 12/30/2022    Procedure: OSTEOTOMY METATARSAL 1st;  Surgeon: Taiwo Harrington DPM;  Location: [facility];  Service: Podiatry   • THYROIDECTOMY N/A 2/24/2023    Procedure: TOTAL THYROIDECTOMY;  Surgeon: Vince Carey MD;  Location: [facility];  Service: Surgical Oncology   • TONSILLECTOMY     • UPPER GASTROINTESTINAL ENDOSCOPY     • US GUIDED THYROID BIOPSY  1/31/2023   • WISDOM TOOTH EXTRACTION       The following portions of the patient's history were reviewed and updated as appropriate: allergies, past family history, past medical history, past social history, past surgical history, and problem list.  Review of Systems   Musculoskeletal:  Positive for back pain, myalgias and neck pain.     Physical Exam  Musculoskeletal:         General: Tenderness present.      Cervical back: Tenderness present.      Thoracic back: Spasms and tenderness present. No swelling, edema, deformity,  signs of trauma, lacerations or bony tenderness. Decreased range of motion. No scoliosis.      Lumbar back: Spasms and tenderness present. No swelling, edema, deformity, signs of trauma, lacerations or bony tenderness. Decreased range of motion. No scoliosis.        Back:    Neurological:      Gait: Gait is intact.      Deep Tendon Reflexes: Reflexes are normal and symmetric.   Psychiatric:         Attention and Perception: Attention normal.         Mood and Affect: Affect normal.         Speech: Speech normal.         Behavior: Behavior is cooperative.         Cognition and Memory: Cognition normal.       SOFT TISSUE ASSESSMENT Hypertonicity and tenderness palpated B C4-T1, T10-S1 erector spinae, upper trap, SCM, Lev scap, hip flexor, glute med/min, QL, hamstring JOINT RESTRICTIONS: C4-T1, T10-S1 and R/L SIJ ORTHO: SI jt point tenderness: +; Mary unremarkable for centralization/peripheralization; michelle's, iliac compression, thigh thrust elicit lbp in R/L SIJ; prone femoral nerve stretch neg for upper lumbar neural tension, elicits R/L SIJ stiffness; sitting root elicits no lbp on R/L; slump test elicits no neural tension R/L, Cervical distraction- Neg, Maximal Foraminal stenosis- neg, Spurling's- neg    Return in about 1 week (around 6/28/2024) for Recheck.

## 2024-06-30 ENCOUNTER — HOSPITAL ENCOUNTER (OUTPATIENT)
Dept: RADIOLOGY | Facility: HOSPITAL | Age: 44
Discharge: HOME | End: 2024-06-30
Attending: OBSTETRICS & GYNECOLOGY
Payer: COMMERCIAL

## 2024-06-30 DIAGNOSIS — Z12.39 BREAST CANCER SCREENING, HIGH RISK PATIENT: ICD-10-CM

## 2024-06-30 DIAGNOSIS — R92.343 EXTREMELY DENSE TISSUE OF BOTH BREASTS ON MAMMOGRAPHY: ICD-10-CM

## 2024-06-30 PROCEDURE — C8937 CAD BREAST MRI: HCPCS

## 2024-06-30 PROCEDURE — A9585 GADOBUTROL INJECTION: HCPCS | Performed by: OBSTETRICS & GYNECOLOGY

## 2024-06-30 PROCEDURE — C8908 MRI W/O FOL W/CONT, BREAST,: HCPCS

## 2024-06-30 RX ORDER — GADOBUTROL 604.72 MG/ML
6 INJECTION INTRAVENOUS
Status: COMPLETED | OUTPATIENT
Start: 2024-06-30 | End: 2024-06-30

## 2024-06-30 RX ADMIN — GADOBUTROL 6 ML: 604.72 INJECTION INTRAVENOUS at 14:50

## 2024-07-05 ENCOUNTER — TELEPHONE (OUTPATIENT)
Age: 44
End: 2024-07-05

## 2024-07-05 DIAGNOSIS — R92.8 ABNORMAL MRI, BREAST: Primary | ICD-10-CM

## 2024-07-05 NOTE — TELEPHONE ENCOUNTER
Patient calling to follow up with breast MRI results received earlier this week. Results show a mass with recommendation for biopsy. She is especially anxious because she had cancer last year. HP in basket message sent to Dr. Martins for review. Patient verbalizes understanding and appreciation.

## 2024-07-05 NOTE — TELEPHONE ENCOUNTER
Team message sent to Adilia Castro women's imaging nurse navigator for assistance with setting up imaging/biopsy.  Message viewed by Adilia

## 2024-07-08 DIAGNOSIS — K58.1 IRRITABLE BOWEL SYNDROME WITH CONSTIPATION: ICD-10-CM

## 2024-07-08 DIAGNOSIS — K59.04 CHRONIC IDIOPATHIC CONSTIPATION: ICD-10-CM

## 2024-07-10 RX ORDER — LUBIPROSTONE 24 UG/1
24 CAPSULE ORAL 2 TIMES DAILY WITH MEALS
Qty: 60 CAPSULE | Refills: 5 | Status: SHIPPED | OUTPATIENT
Start: 2024-07-10

## 2024-07-10 NOTE — PROGRESS NOTES
Call placed to patient regarding recommendation for;    __x___ RIGHT ______LEFT      __X___Ultrasound guided  ______Stereotactic breast biopsy.    Pt states that procedure was explained to her, additional questions answered at this time    __X___Verbalized understanding.      Blood thinners: No: ___x__ Yes: _____ What:     Biopsy teaching sheet given:  _____yes __X__no (All teaching points discussed during call.    Pt given name/# for any further questions/needs    Pt agreeable to a post procedure call and states we can give her biopsy results to her over the phone

## 2024-07-12 ENCOUNTER — PROCEDURE VISIT (OUTPATIENT)
Dept: NEUROLOGY | Facility: CLINIC | Age: 44
End: 2024-07-12
Payer: COMMERCIAL

## 2024-07-12 ENCOUNTER — TELEPHONE (OUTPATIENT)
Dept: PAIN MEDICINE | Facility: CLINIC | Age: 44
End: 2024-07-12

## 2024-07-12 VITALS — DIASTOLIC BLOOD PRESSURE: 69 MMHG | SYSTOLIC BLOOD PRESSURE: 118 MMHG | HEART RATE: 63 BPM | TEMPERATURE: 98.9 F

## 2024-07-12 DIAGNOSIS — G43.709 CHRONIC MIGRAINE WITHOUT AURA WITHOUT STATUS MIGRAINOSUS, NOT INTRACTABLE: Primary | ICD-10-CM

## 2024-07-12 PROCEDURE — 64615 CHEMODENERV MUSC MIGRAINE: CPT | Performed by: PHYSICIAN ASSISTANT

## 2024-07-12 NOTE — TELEPHONE ENCOUNTER
Caller: Asmita    Doctor: Kavitha    Reason for call: patient called to apologize had an another doctors appt that ran later she just got out now, called to reschedule for 7/26 at 7:30 am with STEPHANY

## 2024-07-12 NOTE — PROGRESS NOTES
"Universal Protocol   Consent: Verbal consent obtained. Written consent obtained.  Risks and benefits: risks, benefits and alternatives were discussed  Consent given by: patient  Time out: Immediately prior to procedure a \"time out\" was called to verify the correct patient, procedure, equipment, support staff and site/side marked as required.  Patient understanding: patient states understanding of the procedure being performed  Patient consent: the patient's understanding of the procedure matches consent given  Procedure consent: procedure consent matches procedure scheduled  Relevant documents: relevant documents present and verified  Patient identity confirmed: verbally with patient      Chemodenervation     Date/Time  7/12/2024 12:00 PM     Performed by  Christine Serrato PA-C   Authorized by  Christine Serrato PA-C     Pre-procedure details      Preparation: Patient was prepped and draped in usual sterile fashion     Anesthesia  (see MAR for exact dosages):     Anesthesia method:  None   Procedure details      Position:  Upright   Botox      Botox Type:  Type A    Brand:  Botox    mL's of Botulinum Toxin:  155    mL's of preservative free sterile saline:  4    Final Concentration per CC:  50 units    Needle Gauge:  30 G 2.5 inch   Procedures      Botox Procedures: chronic headache     Injection Location      Head / Face:  L superior cervical paraspinal, R superior cervical paraspinal, L , R , R frontalis, L frontalis, R medial occipitalis, L medial occipitalis, procerus, R temporalis, L temporalis, L superior trapezius and R superior trapezius    L  injection amount:  5 unit(s)    R  injection amount:  5 unit(s)    L lateral frontalis:  5 unit(s)    R lateral frontalis:  5 unit(s)    L medial frontalis:  5 unit(s)    R medial frontalis:  5 unit(s)    L temporalis injection amount:  20 unit(s)    R temporalis injection amount:  20 unit(s)    Procerus injection amount:  5 unit(s)    " L medial occipitalis injection amount:  15 unit(s)    R medial occipitalis injection amount:  15 unit(s)    L superior cervical paraspinal injection amount:  10 unit(s)    R superior cervical paraspinal injection amount:  10 unit(s)    L superior trapezius injection amount:  15 unit(s)    R superior trapezius injection amount:  15 unit(s)   Total Units      Total units used:  155    Total units discarded:  45   Post-procedure details      Chemodenervation:  Chronic migraine    Facial Nerve Location::  Bilateral facial nerve    Patient tolerance of procedure:  Tolerated well, no immediate complications   Comments       All medically necessary

## 2024-07-17 ENCOUNTER — HOSPITAL ENCOUNTER (OUTPATIENT)
Dept: ULTRASOUND IMAGING | Facility: CLINIC | Age: 44
Discharge: HOME | End: 2024-07-17

## 2024-07-17 ENCOUNTER — HOSPITAL ENCOUNTER (OUTPATIENT)
Dept: ULTRASOUND IMAGING | Facility: CLINIC | Age: 44
Discharge: HOME | End: 2024-07-17
Payer: COMMERCIAL

## 2024-07-17 VITALS — HEIGHT: 63 IN | BODY MASS INDEX: 26.22 KG/M2 | WEIGHT: 148 LBS

## 2024-07-17 VITALS — DIASTOLIC BLOOD PRESSURE: 73 MMHG | SYSTOLIC BLOOD PRESSURE: 115 MMHG | HEART RATE: 76 BPM

## 2024-07-17 DIAGNOSIS — R92.8 ABNORMAL MRI, BREAST: ICD-10-CM

## 2024-07-17 PROCEDURE — 76642 ULTRASOUND BREAST LIMITED: CPT

## 2024-07-17 RX ORDER — LIDOCAINE HYDROCHLORIDE 10 MG/ML
5 INJECTION, SOLUTION EPIDURAL; INFILTRATION; INTRACAUDAL; PERINEURAL ONCE
Status: DISCONTINUED | OUTPATIENT
Start: 2024-07-17 | End: 2024-07-18 | Stop reason: HOSPADM

## 2024-07-17 NOTE — PROGRESS NOTES
Patient arrived via:    __X___ambulatory    _____wheelchair    _____stretcher      Domestic violence screen    ___X___negative______positive    Breast Implants:    _______yes ____X____no   patient

## 2024-07-19 ENCOUNTER — PROCEDURE VISIT (OUTPATIENT)
Age: 44
End: 2024-07-19
Payer: COMMERCIAL

## 2024-07-19 ENCOUNTER — OFFICE VISIT (OUTPATIENT)
Dept: PHYSICAL THERAPY | Facility: REHABILITATION | Age: 44
End: 2024-07-19
Payer: COMMERCIAL

## 2024-07-19 VITALS
OXYGEN SATURATION: 98 % | HEIGHT: 63 IN | WEIGHT: 148 LBS | DIASTOLIC BLOOD PRESSURE: 68 MMHG | BODY MASS INDEX: 26.22 KG/M2 | HEART RATE: 82 BPM | SYSTOLIC BLOOD PRESSURE: 116 MMHG

## 2024-07-19 DIAGNOSIS — M75.52 SUBACROMIAL BURSITIS OF LEFT SHOULDER JOINT: ICD-10-CM

## 2024-07-19 DIAGNOSIS — G89.29 CHRONIC LEFT-SIDED THORACIC BACK PAIN: Primary | ICD-10-CM

## 2024-07-19 DIAGNOSIS — R92.8 ABNORMAL MRI, BREAST: Primary | ICD-10-CM

## 2024-07-19 DIAGNOSIS — R10.2 PELVIC PAIN: Primary | ICD-10-CM

## 2024-07-19 DIAGNOSIS — M54.6 CHRONIC LEFT-SIDED THORACIC BACK PAIN: Primary | ICD-10-CM

## 2024-07-19 DIAGNOSIS — M99.01 SEGMENTAL DYSFUNCTION OF CERVICAL REGION: ICD-10-CM

## 2024-07-19 DIAGNOSIS — M99.04 SEGMENTAL DYSFUNCTION OF SACRAL REGION: ICD-10-CM

## 2024-07-19 DIAGNOSIS — M99.02 SEGMENTAL DYSFUNCTION OF THORACIC REGION: ICD-10-CM

## 2024-07-19 DIAGNOSIS — M99.03 SEGMENTAL DYSFUNCTION OF LUMBAR REGION: ICD-10-CM

## 2024-07-19 DIAGNOSIS — M54.59 MECHANICAL LOW BACK PAIN: ICD-10-CM

## 2024-07-19 PROCEDURE — 97110 THERAPEUTIC EXERCISES: CPT | Performed by: CHIROPRACTOR

## 2024-07-19 PROCEDURE — 98941 CHIROPRACT MANJ 3-4 REGIONS: CPT | Performed by: CHIROPRACTOR

## 2024-07-19 PROCEDURE — 97112 NEUROMUSCULAR REEDUCATION: CPT

## 2024-07-19 PROCEDURE — 97110 THERAPEUTIC EXERCISES: CPT

## 2024-07-19 NOTE — PROGRESS NOTES
Daily Note     Today's date: 2024  Patient name: Asmita ESPINOZA Bem  : 1980  MRN: [id number]  Referring provider: Janet Martins MD  Dx:   Encounter Diagnosis     ICD-10-CM    1. Pelvic pain  R10.2           Start Time: 0800  Stop Time: 0900  Total time in clinic (min): 60 minutes    Chief Complaint: Constipation and Urgency/ Frequency   HPI: Pt presents to PT with longstanding history of urgency and constipation. Pt hx positive for Insterstitial cystitis dx in mid 20s. Bladder sxs have experienced sessions of flare in remission but recent flare began in 2024 ideopathically.   Constipation has been long standing for ~3 years but worsened after thyroidectomy in . Has been trailing various pharmacologic interventions w/o great success.   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       Objective: See treatment diary below      Assessment: Tolerated treatment well. Pt educated throughout PFMC vs relaxation w/ reportd good performance. Added TE to HEP w/ PFMC and educated on urge delay w/ verbalized understanding. Pt would continue to benefit from skilled physical therapy to guide progression of interventions related to deficits contributing to bladder urgency, limiting pt's QOL. Next visit, continue to progress exercises as tolerated and educate on bowel massage.         Plan: Continue per plan of care.      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 (BOMN, approved, pending) Unit limit (Daily) Auth Start date Expiration date PT/OT + Visit Limit?   8/30/2024  BOMN    99     Date of Service 6/21        Visits Used 1        Visits Remaining           [employer] Created 6/21 7/19         Yes       Neuro Re-Ed         Urge deferral  Educated        Breathing Mechanics Reviewed proper TA activation w/ breathing  External towel cues or breath w/ PFMC     Sitting an hook lying   15'       PFM Coordination         PFM Down Training    **      Internal Cueing                  Ther Ex         PFM Strengthening  PFMC w/:    Bridges x20     Straight leg raise x20     Bear pose 5 sec hold x15           Hip strengthening         Functional Strengthening         Abdominal Strengthening  TA contraction x20        Aerobic         Therapeutic Rest Breaks                  Ther Activity         PFM Education         Voiding Diaries          Defecation Mechanics Educated and provided handout         Fluid Intake          Review of Sxs                  Manual Ther         PFM exam Performed rectally and vaginally         Ortho exam         PFM Manuals         Bowel Massage Performed 8'   **               Modalities                           Patient Education                  Outcome Measure           PFDI - 139

## 2024-07-19 NOTE — PROGRESS NOTES
Diagnoses and all orders for this visit:    Chronic left-sided thoracic back pain    Subacromial bursitis of left shoulder joint    Segmental dysfunction of lumbar region    Segmental dysfunction of sacral region    Segmental dysfunction of thoracic region    Segmental dysfunction of cervical region    Mechanical low back pain         ASSESSMENT:   Pt's symptoms and exam findings consistent with mechanical lbp  and hip bursitis secondary to repetitive st/sp injury, exacerbated by postural/ergonomic stressors. Pt responded well to flexion biased stretches and manual mobilization of the affected spinal and myofascial tissues with increased ROM; trial of conservative tx recommended consisting of stretching, graded mobilization/manipulation of the affected spinal and myofascial jt dysfunction, postural/ergonomic education and take home stretches/exercises. If symptoms fail to improve with short trial of conservative care, appropriate imaging and referral will be coordinated.  - The patient status is improving with lower back pain but migraine today, slight decrease in mm spasm in the neck and back after treatment.     PROCEDURE CODES: 44718 and 72749    TREATMENT:  Fear avoidance behavior discussion; encouraged and reassured pt that natural course of condition is to improve over time with adherence to tx plan and home care strategies. Home care recommendations: avoid bed rest, walk (but avoid trails and uneven surfaces), gradual return to activity to tolerance (avoid anything that peripheralizes symptoms), call if symptoms peripheralize, worsen, or neurologic deficit progresses. Ther-ex: IASTM; discussed post procedure soreness and/or ecchymosis for up to 36 hrs, applied to affected mm hypertonicities; supine hamstring stretch, supine gluteal stretch, side laying QL stretch, single knee to chest stretch, hip flexor pin-and-stretch, alternating prone hip extension, glute bridge, transitional mvmt education, abdominal  bracing; greater than 15 min spent performing above mentioned ther-ex to improve ROM/flexibility. Thoracic mobilization/manipulation: prone P-A mob, supine A-P manip; Lumbar mobilization/manipulation: diversified side laying graded HVLA, flexion-traction; SIJ Manipulation/Mobilization: R/L SIJ HVLA - long axis distraction, ahn drop table maneuver to affected SIJ    HPI:  Asmita ESPINOZA Bem is a 43 y.o. female  Mid to low back pain; Pain Scale: 5    The patient presents to the office with lower back pain that started without incident of trauma on June 1 while at a concert. She was sitting on the pain but has no pain until they were driving home. The patient does remember having mild tingling in the lower back about 6 months prior to that. The patient now has tingling across lower back. Pain is constant 4 with sitting can go up to a 8/10. Pt went to PT for a course with some improvements with working out., and did help overall but pain is still significant in the evenings. The patient was also given mm relaxers which she think she not help but also makes her too tired.  Massages once a month that helps temporarily. Exercise- metabolic and strength training. Pt sleeps a little better after Sleep therapy program but slightly more worse after last visit. Also gets pain with a bump behind legs but and notices more spider veins and most tingling into the legs but mentions wearing compression stockings for work.   - The patient is feeling the same after last visit, had injections in the lower back on Friday 4/5- The patient is feeling the same in the lower back, she mentions mid day the pain is starting and by the evening the pain is the worst. The pt tolerated shoulder injection is better.   5/3- The patient is feeling about the same, it felt better for a couple days with less visit and pain was rated 6/10.  5/9- Migraine started at 4am today/ Lower back has been feeling better.  5/17- The patient is feeling a little  better  5/31- The patient is feeling better overall with mild pain still but its not running her life. Its been 1 yr since pain started.  6/14- The patient has been worse in the lower back pain   6/21- The patient feels better than last visit 4/10.  7/19- The patient is feeling a little better today bc she has been off of work but still 5/10 pain.     Back Pain  The problem has been waxing and waning since onset. The pain is present in the lumbar spine.   Neck Pain       Past Medical History:   Diagnosis Date   • Allergic    • Anxiety    • Cancer (HCC) 2/1/2023    Thyroid- Papillary carcinoma   • COVID-19 01/2022    mild s/s-not hospitalized, not vaccinated   • GERD (gastroesophageal reflux disease)    • Headache(784.0)    • History of IBS     with constipation   • Ingrown toenail    • Irritable bowel syndrome    • Migraines     Chronic per pt   • Plantar fasciitis    • Thyroid carcinoma (HCC)    • Varicella    • Venereal wart 08/07/2014      Past Surgical History:   Procedure Laterality Date   • CERVICAL BIOPSY  W/ LOOP ELECTRODE EXCISION  2017   • COLONOSCOPY     • NASAL SEPTUM SURGERY     • CT OSTEOT W/WO LNGTH SHRT/CORRJ 1ST METAR Left 12/30/2022    Procedure: OSTEOTOMY METATARSAL 1st;  Surgeon: Taiwo Harrington DPM;  Location: [facility];  Service: Podiatry   • THYROIDECTOMY N/A 2/24/2023    Procedure: TOTAL THYROIDECTOMY;  Surgeon: Vince Carey MD;  Location:  [facility];  Service: Surgical Oncology   • TONSILLECTOMY     • UPPER GASTROINTESTINAL ENDOSCOPY     • US GUIDED THYROID BIOPSY  1/31/2023   • WISDOM TOOTH EXTRACTION       The following portions of the patient's history were reviewed and updated as appropriate: allergies, past family history, past medical history, past social history, past surgical history, and problem list.  Review of Systems   Musculoskeletal:  Positive for back pain, myalgias and neck pain.     Physical Exam  Musculoskeletal:         General: Tenderness present.      Cervical back:  Tenderness present.      Thoracic back: Spasms and tenderness present. No swelling, edema, deformity, signs of trauma, lacerations or bony tenderness. Decreased range of motion. No scoliosis.      Lumbar back: Spasms and tenderness present. No swelling, edema, deformity, signs of trauma, lacerations or bony tenderness. Decreased range of motion. No scoliosis.        Back:    Neurological:      Gait: Gait is intact.      Deep Tendon Reflexes: Reflexes are normal and symmetric.   Psychiatric:         Attention and Perception: Attention normal.         Mood and Affect: Affect normal.         Speech: Speech normal.         Behavior: Behavior is cooperative.         Cognition and Memory: Cognition normal.       SOFT TISSUE ASSESSMENT Hypertonicity and tenderness palpated B C4-T1, T10-S1 erector spinae, upper trap, SCM, Lev scap, hip flexor, glute med/min, QL, hamstring JOINT RESTRICTIONS: C4-T1, T10-S1 and R/L SIJ ORTHO: SI jt point tenderness: +; Mary unremarkable for centralization/peripheralization; michelle's, iliac compression, thigh thrust elicit lbp in R/L SIJ; prone femoral nerve stretch neg for upper lumbar neural tension, elicits R/L SIJ stiffness; sitting root elicits no lbp on R/L; slump test elicits no neural tension R/L, Cervical distraction- Neg, Maximal Foraminal stenosis- neg, Spurling's- neg    Return in about 3 weeks (around 8/9/2024) for Recheck.

## 2024-07-26 ENCOUNTER — HOSPITAL ENCOUNTER (OUTPATIENT)
Dept: RADIOLOGY | Facility: HOSPITAL | Age: 44
Discharge: HOME | End: 2024-07-26
Payer: COMMERCIAL

## 2024-07-26 ENCOUNTER — APPOINTMENT (OUTPATIENT)
Dept: PHYSICAL THERAPY | Facility: REHABILITATION | Age: 44
End: 2024-07-26
Payer: COMMERCIAL

## 2024-07-26 VITALS — DIASTOLIC BLOOD PRESSURE: 70 MMHG | HEART RATE: 92 BPM | SYSTOLIC BLOOD PRESSURE: 121 MMHG | OXYGEN SATURATION: 100 %

## 2024-07-26 DIAGNOSIS — R92.8 ABNORMAL MRI, BREAST: ICD-10-CM

## 2024-07-26 PROCEDURE — 19085 BX BREAST 1ST LESION MR IMAG: CPT

## 2024-07-26 PROCEDURE — 88305 TISSUE EXAM BY PATHOLOGIST: CPT | Performed by: STUDENT IN AN ORGANIZED HEALTH CARE EDUCATION/TRAINING PROGRAM

## 2024-07-26 PROCEDURE — A4648 IMPLANTABLE TISSUE MARKER: HCPCS

## 2024-07-26 PROCEDURE — A9585 GADOBUTROL INJECTION: HCPCS | Performed by: OBSTETRICS & GYNECOLOGY

## 2024-07-26 RX ORDER — LIDOCAINE HYDROCHLORIDE AND EPINEPHRINE 10; 10 MG/ML; UG/ML
10 INJECTION, SOLUTION INFILTRATION; PERINEURAL ONCE
Status: COMPLETED | OUTPATIENT
Start: 2024-07-26 | End: 2024-07-26

## 2024-07-26 RX ORDER — GADOBUTROL 604.72 MG/ML
7 INJECTION INTRAVENOUS
Status: COMPLETED | OUTPATIENT
Start: 2024-07-26 | End: 2024-07-26

## 2024-07-26 RX ORDER — LIDOCAINE HYDROCHLORIDE 10 MG/ML
5 INJECTION, SOLUTION EPIDURAL; INFILTRATION; INTRACAUDAL; PERINEURAL ONCE
Status: COMPLETED | OUTPATIENT
Start: 2024-07-26 | End: 2024-07-26

## 2024-07-26 RX ADMIN — LIDOCAINE HYDROCHLORIDE AND EPINEPHRINE 10 ML: 10; 10 INJECTION, SOLUTION INFILTRATION; PERINEURAL at 10:12

## 2024-07-26 RX ADMIN — GADOBUTROL 7 ML: 604.72 INJECTION INTRAVENOUS at 09:56

## 2024-07-26 RX ADMIN — LIDOCAINE HYDROCHLORIDE 5 ML: 10 INJECTION, SOLUTION EPIDURAL; INFILTRATION; INTRACAUDAL; PERINEURAL at 10:10

## 2024-07-26 NOTE — PROGRESS NOTES
Met with patient Asmita following her Right Breast MRI biopsy. Physical exam of Right Breast completed. Patient does have a quarter-sized hematoma felt in central Right Breast spanning 12-1 o'clock.    Extended manual compression held above Right breast biopsy site. With manual compression, I was able to encourage additional drainage/ expression of serosanguinous fluid. Visible wheel in central Right breast notably diminished in size with additional manual compression.     An ace bandage compression wrap was applied around chest/ bilateral breasts to provide additional compression to the area that will have extended benefit to patient at home. She was educated to leave ace bandage compression wrap in place for 24 hours (do not remove or shower until after 11:30am on Saturday 07/27/2024).     Additional education was also provided to Asmita verbally, as well as in written format on her discharge instructions about the importance of ice pack use. She was educated that ice pack should be used multiple times this afternoon and this evening (keep tucked into ace bandage immediately above the breast biopsy site for 20 minutes, remove and place in freezer for 60 minutes until frozen). She was educated that ice pack should be used regardless of whether or not she is experiencing pain, as it will help to significantly decrease reoccurrence of bleeding at home/ hematoma development, as well as help to reduce swelling in the breast.    Asmita confirms understanding, and was appreciative of assistance prior to her discharge home today.

## 2024-07-26 NOTE — PROGRESS NOTES
Patient arrived via:    ___X__ambulatory    _____wheelchair    _____stretcher      Domestic violence screen    ___X___negative______positive    Breast Implants:    _______yes ____X____no

## 2024-07-26 NOTE — PROGRESS NOTES
Procedure type:    _____ Ultrasound guided   _____ Stereotactic    __x____ MRI Guided    Breast:    _____ Left Breast biopsy ___x__ Right Breast biopsy    Time-out called @ 0936 and procedure confirmed with patient, myself Farrah Hudson PCA, radiologist Rosie Andrews MD, Arlen Tsai RT(MR), and Lupe Lopez  RT(MR).      Location: 1:00    Needle: 9G x 14cm     # of passes: 23    Clip: stoplight    Performed by: Dr Darryl NIÑO    Pressure held for 5 minutes by: Farrah Hudson    Steri Strips:    __x___ yes _____ no    Band aid:    _____ yes __x___ no  *Gauze and tape in place above steri-strip adhesive bandages at breast biopsy incision site.    Tolerated procedure:    __x___ yes _____ no

## 2024-07-26 NOTE — DISCHARGE INSTR - OTHER ORDERS
POST MRI GUIDED BREAST BIOPSY PATIENT INFORMATION      Place an ice pack inside your bra over the top of the gauze dressing every hour for 20 minutes (20 minutes on, 60 minutes off). Do this until bedtime.    Do not shower or bathe until the following morning Saturday 07/27/2024 @ 11am.    You may shower/bathe your breast carefully with the steri-strips in place.  Be careful not to loosen them.  The steri-strips should remain in place 3-5 days to allow adequate time for your body to heal the breast biopsy incision. If steri-strips have not fallen off on their own by Tuesday evening 07/30/2024, it would be appropriate to remove them.    You may have mild discomfort, and you may have some bruising where the needle entered the skin. Following an MRI Guided breast biopsy, it can be very common to have bruising around the nipple & underside of the breast due to positioning during the procedure. This should clear within 1-2 weeks time.    If you need medicine for discomfort, take acetaminophen products such as Tylenol. You may also take Advil or Motrin products. Avoid using any aspirin based products/ Aleve/ Naproxen, as these medications can increase the risk for bleeding/ hematoma development at breast biopsy site.    Do not participate in strenuous activities such as-tennis, aerobics, skiing, weight lifting > 10 lbs, etc. for 24 hours. Refrain from swimming/soaking until biopsy incision is fully healed to reduce any risk for infection.    Wearing a bra for sleeping may be more comfortable for the first 24-48 hours.    Watch for continued bleeding, pain or fever over 101. If any of these symptoms occur, please contact our breast nurse navigator at the location where your biopsy was performed.    During normal business hours (7:30 am-4:00 pm) please call the nurse navigator at the site where your   procedure was performed:    [facility] [place]/ [place]:  [phone], [phone] or [phone]  [facility]  [facility]:   Reena Ruffin Radiology RN P# [phone] or MRI Technologist Work Area P# [phone]              After 4 PM - please call your physician or go to the nearest Emergency Department location.          9.         The final results of your biopsy are usually available within one week.

## 2024-07-26 NOTE — PROGRESS NOTES
Ice pack given:    ___x__yes _____no    Discharge instructions reviewed & given to patient:    __x___yes _____no    Discharged via:    ___x__ambulatory    _____wheelchair    _____stretcher    Stable on discharge:     ___x__yes ____no

## 2024-07-29 ENCOUNTER — TELEPHONE (OUTPATIENT)
Dept: RADIOLOGY | Facility: HOSPITAL | Age: 44
End: 2024-07-29

## 2024-07-29 PROCEDURE — 88305 TISSUE EXAM BY PATHOLOGIST: CPT | Performed by: STUDENT IN AN ORGANIZED HEALTH CARE EDUCATION/TRAINING PROGRAM

## 2024-07-29 NOTE — PROGRESS NOTES
Post-procedure call completed: Monday 07/29/2024    Bleeding: _____ yes __x__ no    Pain: __x___ yes ______ no    *Patient reports moderate soreness/ discomfort in breast since small post-procedure hematoma noted on Friday 07/26/2024. She reports using the ice pack Friday afternoon & evening 07/26/2024 to help with pain and risk reduction of post-procedural bruising/bleeding/ hematoma development. She is aware she may use OTC pain control medications on an as needed basis over the remainder of the week at her breast is continuing to heal post-procedure (tylenol, motrin, ibuprofen, advil all ok to use; avoid aspirin based products due to bleeding risk).    Redness/Swelling: ______ yes ___x___ no    Band aid removed: ___x__ yes _____ no    *Patient did remove gauze and tape over the weekend. She does report some swelling and bruising in the breast, but this has not continued to worsen over the weekend. She is aware post-procedural healing is different for each patient, but that bruising may take 1-2 weeks before resolution is seen. She is aware not to scrub near breast biopsy site, and to avoid soaking to the breast, swimming, or tub baths until biopsy incision is fully healed to reduce the risk for infection.    Steri-Strips intact: ___x___ yes _____ no    *Patient instructed to leave steri-strips in place until at least Tuesday evening 07/30/2024. If steri-strips do not fall off on their own at that time, it would be appropriate to remove.    Patient with no additional questions at this time. I relayed to her that either myself or Dr Andrews will call when her breast biopsy pathology results are available. Patient does have my name & office phone number if she has any questions or concerns in the interim of time until her pathology results are finalized.

## 2024-07-30 ENCOUNTER — TELEPHONE (OUTPATIENT)
Dept: RADIOLOGY | Facility: HOSPITAL | Age: 44
End: 2024-07-30

## 2024-08-01 DIAGNOSIS — K21.9 GASTROESOPHAGEAL REFLUX DISEASE WITHOUT ESOPHAGITIS: ICD-10-CM

## 2024-08-02 ENCOUNTER — PROCEDURE VISIT (OUTPATIENT)
Age: 44
End: 2024-08-02
Payer: COMMERCIAL

## 2024-08-02 ENCOUNTER — COSMETIC (OUTPATIENT)
Dept: PLASTIC SURGERY | Facility: CLINIC | Age: 44
End: 2024-08-02

## 2024-08-02 ENCOUNTER — OFFICE VISIT (OUTPATIENT)
Dept: PHYSICAL THERAPY | Facility: REHABILITATION | Age: 44
End: 2024-08-02
Payer: COMMERCIAL

## 2024-08-02 VITALS
BODY MASS INDEX: 26.22 KG/M2 | DIASTOLIC BLOOD PRESSURE: 68 MMHG | WEIGHT: 148 LBS | OXYGEN SATURATION: 98 % | HEART RATE: 85 BPM | HEIGHT: 63 IN | SYSTOLIC BLOOD PRESSURE: 112 MMHG

## 2024-08-02 DIAGNOSIS — M99.02 SEGMENTAL DYSFUNCTION OF THORACIC REGION: ICD-10-CM

## 2024-08-02 DIAGNOSIS — M99.01 SEGMENTAL DYSFUNCTION OF CERVICAL REGION: ICD-10-CM

## 2024-08-02 DIAGNOSIS — M54.59 MECHANICAL LOW BACK PAIN: ICD-10-CM

## 2024-08-02 DIAGNOSIS — M75.52 SUBACROMIAL BURSITIS OF LEFT SHOULDER JOINT: ICD-10-CM

## 2024-08-02 DIAGNOSIS — M99.04 SEGMENTAL DYSFUNCTION OF SACRAL REGION: ICD-10-CM

## 2024-08-02 DIAGNOSIS — R10.2 PELVIC PAIN: Primary | ICD-10-CM

## 2024-08-02 DIAGNOSIS — M54.6 CHRONIC LEFT-SIDED THORACIC BACK PAIN: Primary | ICD-10-CM

## 2024-08-02 DIAGNOSIS — Z41.1 ENCOUNTER FOR COSMETIC PROCEDURE: Primary | ICD-10-CM

## 2024-08-02 DIAGNOSIS — G89.29 CHRONIC LEFT-SIDED THORACIC BACK PAIN: Primary | ICD-10-CM

## 2024-08-02 DIAGNOSIS — M99.03 SEGMENTAL DYSFUNCTION OF LUMBAR REGION: ICD-10-CM

## 2024-08-02 PROCEDURE — 97112 NEUROMUSCULAR REEDUCATION: CPT

## 2024-08-02 PROCEDURE — BOTOX1U PR BOTOX BY THE UNIT: Performed by: STUDENT IN AN ORGANIZED HEALTH CARE EDUCATION/TRAINING PROGRAM

## 2024-08-02 PROCEDURE — BOTOX2 TWO AREAS OR 50 UNITS: Performed by: STUDENT IN AN ORGANIZED HEALTH CARE EDUCATION/TRAINING PROGRAM

## 2024-08-02 PROCEDURE — 97110 THERAPEUTIC EXERCISES: CPT | Performed by: CHIROPRACTOR

## 2024-08-02 PROCEDURE — 97140 MANUAL THERAPY 1/> REGIONS: CPT

## 2024-08-02 PROCEDURE — 98941 CHIROPRACT MANJ 3-4 REGIONS: CPT | Performed by: CHIROPRACTOR

## 2024-08-02 RX ORDER — OMEPRAZOLE 40 MG/1
40 CAPSULE, DELAYED RELEASE ORAL DAILY
Qty: 90 CAPSULE | Refills: 1 | Status: SHIPPED | OUTPATIENT
Start: 2024-08-02

## 2024-08-02 NOTE — PROGRESS NOTES
Daily Note     Today's date: 2024  Patient name: Asmita ESPINOZA Bem  : 1980  MRN: [id number]  Referring provider: Janet Martins MD  Dx:   No diagnosis found.      Start Time: 1300  Stop Time: 1400  Total time in clinic (min): 60 minutes    Chief Complaint: Constipation and Urgency/ Frequency   HPI: Pt presents to PT with longstanding history of urgency and constipation. Pt hx positive for Insterstitial cystitis dx in mid 20s. Bladder sxs have experienced sessions of flare in remission but recent flare began in 2024 ideopathically.   Constipation has been long standing for ~3 years but worsened after thyroidectomy in . Has been trailing various pharmacologic interventions w/o great success.   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       Objective: See treatment diary below      Assessment: Tolerated treatment well. Pt performed biofeedback training with good performance. Reinforced proper breathing mechanics w/ good coordination and min cueing. Additionally practiced over commode training w/ proper ability to relax PFM during bear down. Educated pt on and performed bowel massage - pt verbalized understanding and reported movement felt in bowels following massage.   Pt would continue to benefit from skilled physical therapy to guide progression of interventions related to deficits contributing to bladder urgency, limiting pt's QOL. Next visit, continue to progress exercises as tolerated and educate on bowel massage.         Plan: Continue per plan of care.      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 (BOMN, approved, pending) Unit limit (Daily) Auth Start date Expiration date PT/OT + Visit Limit?   8/30/2024  BOMN    99     Date of Service 6/21 7/19 8/2      Visits Used 1 2 3      Visits Remaining           [employer] Created 6/21 7/19 8/2        Yes       Neuro Re-Ed         Urge deferral  Educated        Breathing Mechanics Reviewed proper TA activation w/ breathing  External towel cues or breath w/ PFMC     Sitting an hook lying   15'       PFM Coordination         PFM Down Training          Internal Cueing            Biofeedback training (good performance)   30'       Ther Ex         PFM Strengthening  PFMC w/:    Bridges x20     Straight leg raise x20     Bear pose 5 sec hold x15           Hip strengthening         Functional Strengthening         Abdominal Strengthening  TA contraction x20        Aerobic         Therapeutic Rest Breaks                  Ther Activity         PFM Education         Voiding Diaries          Defecation Mechanics Educated and provided handout         Fluid Intake          Review of Sxs                  Manual Ther         PFM exam Performed rectally and vaginally         Ortho exam         PFM Manuals         Bowel Massage Performed 8'   Bowel massage 25'   [HEP]               Modalities                           Patient Education                  Outcome Measure           PFDI - 139

## 2024-08-02 NOTE — PROGRESS NOTES
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 Particular areas of concern: frontalis rhytids both lateral and centrally.    She received glabellar botox for migrains and does not need that area to be treated.     Will proceed with 28 units of botox     Risks and benefits discussed, patient agreed to proceed.     10 units to each crows feet  4 units to central frontalis  2 units to each lateral frontalis     Total 28 units, 30% off employee     Patient tolerated well, f/u in 3 months

## 2024-08-02 NOTE — PROGRESS NOTES
Diagnoses and all orders for this visit:    Chronic left-sided thoracic back pain    Subacromial bursitis of left shoulder joint    Segmental dysfunction of lumbar region    Segmental dysfunction of sacral region    Segmental dysfunction of thoracic region    Segmental dysfunction of cervical region    Mechanical low back pain         ASSESSMENT:   Pt's symptoms and exam findings consistent with mechanical lbp  and hip bursitis secondary to repetitive st/sp injury, exacerbated by postural/ergonomic stressors. Pt responded well to flexion biased stretches and manual mobilization of the affected spinal and myofascial tissues with increased ROM; trial of conservative tx recommended consisting of stretching, graded mobilization/manipulation of the affected spinal and myofascial jt dysfunction, postural/ergonomic education and take home stretches/exercises. If symptoms fail to improve with short trial of conservative care, appropriate imaging and referral will be coordinated.  - The patient status is improving with lower back pain but migraine today, slight decrease in mm spasm in the neck and back after treatment.     PROCEDURE CODES: 98252 and 02609    TREATMENT:  Fear avoidance behavior discussion; encouraged and reassured pt that natural course of condition is to improve over time with adherence to tx plan and home care strategies. Home care recommendations: avoid bed rest, walk (but avoid trails and uneven surfaces), gradual return to activity to tolerance (avoid anything that peripheralizes symptoms), call if symptoms peripheralize, worsen, or neurologic deficit progresses. Ther-ex: IASTM; discussed post procedure soreness and/or ecchymosis for up to 36 hrs, applied to affected mm hypertonicities; supine hamstring stretch, supine gluteal stretch, side laying QL stretch, single knee to chest stretch, hip flexor pin-and-stretch, alternating prone hip extension, glute bridge, transitional mvmt education, abdominal  bracing; greater than 15 min spent performing above mentioned ther-ex to improve ROM/flexibility. Thoracic mobilization/manipulation: prone P-A mob, supine A-P manip; Lumbar mobilization/manipulation: diversified side laying graded HVLA, flexion-traction; SIJ Manipulation/Mobilization: R/L SIJ HVLA - long axis distraction, ahn drop table maneuver to affected SIJ    HPI:  Asmita ESPINOZA Bem is a 43 y.o. female  Mid to low back pain; Pain Scale: 5    The patient presents to the office with lower back pain that started without incident of trauma on June 1 while at a concert. She was sitting on the pain but has no pain until they were driving home. The patient does remember having mild tingling in the lower back about 6 months prior to that. The patient now has tingling across lower back. Pain is constant 4 with sitting can go up to a 8/10. Pt went to PT for a course with some improvements with working out., and did help overall but pain is still significant in the evenings. The patient was also given mm relaxers which she think she not help but also makes her too tired.  Massages once a month that helps temporarily. Exercise- metabolic and strength training. Pt sleeps a little better after Sleep therapy program but slightly more worse after last visit. Also gets pain with a bump behind legs but and notices more spider veins and most tingling into the legs but mentions wearing compression stockings for work.   - The patient is feeling the same after last visit, had injections in the lower back on Friday 4/5- The patient is feeling the same in the lower back, she mentions mid day the pain is starting and by the evening the pain is the worst. The pt tolerated shoulder injection is better.   5/3- The patient is feeling about the same, it felt better for a couple days with less visit and pain was rated 6/10.  5/9- Migraine started at 4am today/ Lower back has been feeling better.  5/17- The patient is feeling a little  better  5/31- The patient is feeling better overall with mild pain still but its not running her life. Its been 1 yr since pain started.  6/14- The patient has been worse in the lower back pain   6/21- The patient feels better than last visit 4/10.  7/19- The patient is feeling a little better today bc she has been off of work but still 5/10 pain.   8/2- The patient is feeling sore in the lower back pain, slightly worse bc of MRI procedure.    Back Pain  The problem has been waxing and waning since onset. The pain is present in the lumbar spine.   Neck Pain       Past Medical History:   Diagnosis Date   • Allergic    • Anxiety    • Cancer (HCC) 2/1/2023    Thyroid- Papillary carcinoma   • COVID-19 01/2022    mild s/s-not hospitalized, not vaccinated   • GERD (gastroesophageal reflux disease)    • Headache(784.0)    • History of IBS     with constipation   • Ingrown toenail    • Irritable bowel syndrome    • Migraines     Chronic per pt   • Plantar fasciitis    • Thyroid carcinoma (HCC)    • Varicella    • Venereal wart 08/07/2014      Past Surgical History:   Procedure Laterality Date   • CERVICAL BIOPSY  W/ LOOP ELECTRODE EXCISION  2017   • COLONOSCOPY     • MRI BREAST BIOPSY RIGHT (ALL INCLUSIVE) Right 7/26/2024   • NASAL SEPTUM SURGERY     • NH OSTEOT W/WO LNGTH SHRT/CORRJ 1ST METAR Left 12/30/2022    Procedure: OSTEOTOMY METATARSAL 1st;  Surgeon: Taiwo Harrington DPM;  Location: [facility];  Service: Podiatry   • THYROIDECTOMY N/A 2/24/2023    Procedure: TOTAL THYROIDECTOMY;  Surgeon: Vince Carey MD;  Location:  MAIN OR;  Service: Surgical Oncology   • TONSILLECTOMY     • UPPER GASTROINTESTINAL ENDOSCOPY     • US GUIDED THYROID BIOPSY  1/31/2023   • WISDOM TOOTH EXTRACTION       The following portions of the patient's history were reviewed and updated as appropriate: allergies, past family history, past medical history, past social history, past surgical history, and problem list.  Review of Systems    Musculoskeletal:  Positive for back pain, myalgias and neck pain.     Physical Exam  Musculoskeletal:         General: Tenderness present.      Cervical back: Tenderness present.      Thoracic back: Spasms and tenderness present. No swelling, edema, deformity, signs of trauma, lacerations or bony tenderness. Decreased range of motion. No scoliosis.      Lumbar back: Spasms and tenderness present. No swelling, edema, deformity, signs of trauma, lacerations or bony tenderness. Decreased range of motion. No scoliosis.        Back:    Neurological:      Gait: Gait is intact.      Deep Tendon Reflexes: Reflexes are normal and symmetric.   Psychiatric:         Attention and Perception: Attention normal.         Mood and Affect: Affect normal.         Speech: Speech normal.         Behavior: Behavior is cooperative.         Cognition and Memory: Cognition normal.       SOFT TISSUE ASSESSMENT Hypertonicity and tenderness palpated B C4-T1, T10-S1 erector spinae, upper trap, SCM, Lev scap, hip flexor, glute med/min, QL, hamstring JOINT RESTRICTIONS: C4-T1, T10-S1 and R/L SIJ ORTHO: SI jt point tenderness: +; Mary unremarkable for centralization/peripheralization; michelle's, iliac compression, thigh thrust elicit lbp in R/L SIJ; prone femoral nerve stretch neg for upper lumbar neural tension, elicits R/L SIJ stiffness; sitting root elicits no lbp on R/L; slump test elicits no neural tension R/L, Cervical distraction- Neg, Maximal Foraminal stenosis- neg, Spurling's- neg    Return in about 1 week (around 8/9/2024) for Recheck.

## 2024-08-09 ENCOUNTER — APPOINTMENT (OUTPATIENT)
Dept: PHYSICAL THERAPY | Facility: REHABILITATION | Age: 44
End: 2024-08-09
Payer: COMMERCIAL

## 2024-08-14 ENCOUNTER — APPOINTMENT (OUTPATIENT)
Dept: LAB | Facility: HOSPITAL | Age: 44
End: 2024-08-14
Payer: COMMERCIAL

## 2024-08-14 DIAGNOSIS — E61.1 IRON DEFICIENCY: ICD-10-CM

## 2024-08-14 LAB
BASOPHILS # BLD AUTO: 0.06 THOUSANDS/ÂΜL (ref 0–0.1)
BASOPHILS NFR BLD AUTO: 1 % (ref 0–1)
EOSINOPHIL # BLD AUTO: 0.25 THOUSAND/ÂΜL (ref 0–0.61)
EOSINOPHIL NFR BLD AUTO: 4 % (ref 0–6)
ERYTHROCYTE [DISTWIDTH] IN BLOOD BY AUTOMATED COUNT: 11.9 % (ref 11.6–15.1)
FERRITIN SERPL-MCNC: 44 NG/ML (ref 11–307)
HCT VFR BLD AUTO: 38.4 % (ref 34.8–46.1)
HGB BLD-MCNC: 13.1 G/DL (ref 11.5–15.4)
IMM GRANULOCYTES # BLD AUTO: 0.02 THOUSAND/UL (ref 0–0.2)
IMM GRANULOCYTES NFR BLD AUTO: 0 % (ref 0–2)
IRON SATN MFR SERPL: 19 % (ref 15–50)
IRON SERPL-MCNC: 56 UG/DL (ref 50–212)
LYMPHOCYTES # BLD AUTO: 1.77 THOUSANDS/ÂΜL (ref 0.6–4.47)
LYMPHOCYTES NFR BLD AUTO: 30 % (ref 14–44)
MCH RBC QN AUTO: 33.2 PG (ref 26.8–34.3)
MCHC RBC AUTO-ENTMCNC: 34.1 G/DL (ref 31.4–37.4)
MCV RBC AUTO: 97 FL (ref 82–98)
MONOCYTES # BLD AUTO: 0.49 THOUSAND/ÂΜL (ref 0.17–1.22)
MONOCYTES NFR BLD AUTO: 8 % (ref 4–12)
NEUTROPHILS # BLD AUTO: 3.39 THOUSANDS/ÂΜL (ref 1.85–7.62)
NEUTS SEG NFR BLD AUTO: 57 % (ref 43–75)
NRBC BLD AUTO-RTO: 0 /100 WBCS
PLATELET # BLD AUTO: 303 THOUSANDS/UL (ref 149–390)
PMV BLD AUTO: 10.4 FL (ref 8.9–12.7)
RBC # BLD AUTO: 3.95 MILLION/UL (ref 3.81–5.12)
TIBC SERPL-MCNC: 298 UG/DL (ref 250–450)
UIBC SERPL-MCNC: 242 UG/DL (ref 155–355)
WBC # BLD AUTO: 5.98 THOUSAND/UL (ref 4.31–10.16)

## 2024-08-14 PROCEDURE — 85025 COMPLETE CBC W/AUTO DIFF WBC: CPT

## 2024-08-14 PROCEDURE — 83540 ASSAY OF IRON: CPT

## 2024-08-14 PROCEDURE — 36415 COLL VENOUS BLD VENIPUNCTURE: CPT

## 2024-08-14 PROCEDURE — 83550 IRON BINDING TEST: CPT

## 2024-08-14 PROCEDURE — 82728 ASSAY OF FERRITIN: CPT

## 2024-08-16 ENCOUNTER — APPOINTMENT (OUTPATIENT)
Dept: PHYSICAL THERAPY | Facility: REHABILITATION | Age: 44
End: 2024-08-16
Payer: COMMERCIAL

## 2024-08-23 ENCOUNTER — OFFICE VISIT (OUTPATIENT)
Dept: PHYSICAL THERAPY | Facility: REHABILITATION | Age: 44
End: 2024-08-23
Payer: COMMERCIAL

## 2024-08-23 DIAGNOSIS — R10.2 PELVIC PAIN: Primary | ICD-10-CM

## 2024-08-23 PROCEDURE — 97530 THERAPEUTIC ACTIVITIES: CPT

## 2024-08-23 PROCEDURE — 97110 THERAPEUTIC EXERCISES: CPT

## 2024-08-23 NOTE — PROGRESS NOTES
Daily Note     Today's date: 2024  Patient name: Asmita ESPINOZA Bem  : 1980  MRN: [id number]  Referring provider: Janet Martins MD  Dx:   Encounter Diagnosis     ICD-10-CM    1. Pelvic pain  R10.2             Start Time: 0900  Stop Time: 1000  Total time in clinic (min): 60 minutes    Chief Complaint: Constipation and Urgency/ Frequency   HPI: Pt presents to PT with longstanding history of urgency and constipation. Pt hx positive for Insterstitial cystitis dx in mid 20s. Bladder sxs have experienced sessions of flare in remission but recent flare began in 2024 ideopathically.   Constipation has been long standing for ~3 years but worsened after thyroidectomy in . Has been trailing various pharmacologic interventions w/o great success.   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 Notes that BM still do not feel complete. Urgency continues to be present w/ uses urge delay w/ fair improvement - notably mornings are difficult.       Objective: See treatment diary below      Assessment: Tolerated treatment well. Patient tolerated therapeutic exercises well with fatigue noted in target muscle groups. Progress abdominal and spinal strengthening w/ good performance. Pt would continue to benefit from skilled physical therapy to guide progression of interventions related to deficits contributing to bladder urgency, limiting pt's QOL. Next visit, continue to progress exercises as tolerated and promote PFM coordination.         Plan: Continue per plan of care.      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 (BOMN, approved, pending) Unit limit (Daily) Auth Start date Expiration date PT/OT + Visit Limit?   8/30/2024  BOMN    99     Date of Service 6/21 7/19 8/2 8/23     Visits Used 1 2 3 4     Visits Remaining           [employer] Created 6/21 7/19 8/2 8/23       Yes       Neuro Re-Ed         Urge deferral  Educated   Reinforced proper tactic - verbalized proper use     Breathing Mechanics Reviewed proper TA activation w/ breathing  External towel cues or breath w/ PFMC     Sitting an hook lying   15'       PFM Coordination         PFM Down Training          Internal Cueing            Biofeedback training (good performance)   30'       Ther Ex         PFM Strengthening  PFMC w/:    Bridges x20     Straight leg raise x20     Bear pose 5 sec hold x15      Curtsie squat X20 #15     Side plank w/ UE rotation x1 min each     Plank on bosu x1 min    Bear pose w/ lateral ambulation x1 min     Bear pose hold x1 min     Bear pose x1 min     Around the [place] #15 in tall kneel, half kneel, and standing    Anterior lat pull down Y spring x20     Half kneel forward bend #15 x20 each        Hip strengthening         Functional Strengthening         Abdominal Strengthening  TA contraction x20        Aerobic         Therapeutic Rest Breaks                  Ther Activity         PFM Education         Voiding Diaries          Defecation Mechanics Educated and provided handout         Fluid Intake          Review of Sxs                  Manual Ther         PFM exam Performed rectally and vaginally         Ortho exam         PFM Manuals         Bowel  Massage Performed 8'   Bowel massage 25'   [HEP]               Modalities                           Patient Education                  Outcome Measure           PFDI - 139

## 2024-08-28 DIAGNOSIS — Z85.850 HISTORY OF THYROID CANCER: Primary | ICD-10-CM

## 2024-08-29 NOTE — PROGRESS NOTES
Assessment:  1. Mid back pain    2. Lumbar radiculopathy    3. Myofascial pain syndrome        Plan:  Patient will be scheduled for repeat trigger point injections into the left thoracic paraspinal and lumbar paraspinal musculature to address the myofascial component of the patient's pain  I will order an MRI of the lumbar spine with and without contrast  We can repeat bilateral subacromial bursa injections as needed  Patient may continue meloxicam as prescribed  Continue with chiropractic therapy as scheduled  Follow-up after imaging or sooner if needed    History of Present Illness:    The patient is a 43 y.o. female with a history of thyroid cancer status post thyroidectomy and migraines last seen on 05/03/2024  who presents for a follow up office visit in regards to chronicneck pain, thoracic back pain, and a new complaint of low back pain with associated numbness and paresthesias into the posterior aspect of the bilateral lower extremities, left greater than right, and occasionally in the anterior aspect of the shins.  She denies bowel or bladder incontinence or saddle anesthesia.  MRI of the thoracic spine was normal.  X-ray of the lumbar spine was normal.  I do not have any advanced imaging of the lumbar spine to review.  She does receive trigger point injections into the thoracic and lumbar paraspinal musculature with 60% improvement of her pain for approximately 2 months as well as subacromial bursa injections which is ongoing.  She is also finding relief with chiropractic therapy.    The patient rates her pain a 4 out of 10 on the numeric pain rating scale.  Pain is constant throughout the day and is described as dull aching, throbbing, numbness and pins-and-needles    I have personally reviewed and/or updated the patient's past medical history, past surgical history, family history, social history, current medications, allergies, and vital signs today.       Review of Systems:    Review of  Systems      Past Medical History:   Diagnosis Date    Allergic     Anxiety     Cancer (HCC) 2/1/2023    Thyroid- Papillary carcinoma    COVID-19 01/2022    mild s/s-not hospitalized, not vaccinated    GERD (gastroesophageal reflux disease)     Headache(784.0)     History of IBS     with constipation    Ingrown toenail     Irritable bowel syndrome     Migraines     Chronic per pt    Plantar fasciitis     Thyroid carcinoma (HCC)     Varicella     Venereal wart 08/07/2014       Past Surgical History:   Procedure Laterality Date    CERVICAL BIOPSY  W/ LOOP ELECTRODE EXCISION  2017    COLONOSCOPY      MRI BREAST BIOPSY RIGHT (ALL INCLUSIVE) Right 7/26/2024    NASAL SEPTUM SURGERY      CA OSTEOT W/WO LNGTH SHRT/CORRJ 1ST METAR Left 12/30/2022    Procedure: OSTEOTOMY METATARSAL 1st;  Surgeon: Taiwo Harrington DPM;  Location: [facility];  Service: Podiatry    THYROIDECTOMY N/A 2/24/2023    Procedure: TOTAL THYROIDECTOMY;  Surgeon: Vince Carey MD;  Location: [facility];  Service: Surgical Oncology    TONSILLECTOMY      UPPER GASTROINTESTINAL ENDOSCOPY      US GUIDED THYROID BIOPSY  1/31/2023    WISDOM TOOTH EXTRACTION         Family History   Problem Relation Age of Onset    Hypertension Mother     Arthritis Mother     Thyroid disease Mother     Osteoarthritis Mother     Transient ischemic attack Father     Hypertension Father     Stroke Father     Vision loss Father     Rashes / Skin problems Sister         Shingles    Migraines Sister     No Known Problems Sister     Uterine cancer Maternal Grandmother 20    Cancer Maternal Grandmother         Uterine    Colon cancer Maternal Grandfather 60    Cancer Maternal Grandfather         Colon    Lung cancer Paternal Grandmother 60    Cancer Paternal Grandmother         Lung    Heart attack Paternal Grandfather     Colon cancer Cousin 30    Breast cancer Maternal Aunt     Breast cancer Maternal Aunt     Breast cancer Maternal Aunt        Social History     Occupational History     Not on file   Tobacco Use    Smoking status: Never    Smokeless tobacco: Never   Vaping Use    Vaping status: Never Used   Substance and Sexual Activity    Alcohol use: Yes     Alcohol/week: 2.0 standard drinks of alcohol     Types: 2 Glasses of wine per week     Comment: 2 glasses wine a week    Drug use: Never    Sexual activity: Not Currently         Current Outpatient Medications:     acetaminophen (TYLENOL) 500 mg tablet, Take 1,000 mg by mouth every 6 (six) hours as needed for mild pain, Disp: , Rfl:     bisacodyl (DULCOLAX) 5 mg EC tablet, Take 2 tablets (10 mg total) by mouth 2 (two) times a day as needed for constipation, Disp: 60 tablet, Rfl: 1    calcitriol (ROCALTROL) 0.25 mcg capsule, TAKE ONE CAPSULE BY MOUTH 2 TIMES A DAY, Disp: 180 capsule, Rfl: 1    clindamycin (CLEOCIN T) 1 % lotion, in the morning, Disp: , Rfl:     Emgality 120 MG/ML SOAJ, INJECT 120MG SUBCUTANEOUSLY (UNDER THE SKIN) EVERY 28 DAYS, Disp: 1 mL, Rfl: 11    Levocetirizine Dihydrochloride (XYZAL PO), Take by mouth daily at bedtime, Disp: , Rfl:     levothyroxine 125 mcg tablet, TAKE ONE TABLET BY MOUTH DAILY, Disp: 90 tablet, Rfl: 1    lubiprostone (AMITIZA) 24 mcg capsule, Take 1 capsule (24 mcg total) by mouth 2 (two) times a day with meals, Disp: 60 capsule, Rfl: 5    Melatonin 5 MG TABS, Take by mouth as needed, Disp: , Rfl:     meloxicam (MOBIC) 15 mg tablet, Take 15 mg by mouth daily, Disp: , Rfl:     Multiple Vitamin (multivitamin) tablet, Take 1 tablet by mouth daily, Disp: , Rfl:     omeprazole (PriLOSEC) 40 MG capsule, Take 1 capsule (40 mg total) by mouth daily, Disp: 90 capsule, Rfl: 1    rimegepant sulfate (Nurtec) 75 mg TBDP, Take one NURTEC 75 mg at onset under tongue. Limit 1 in 24 hours., Disp: 16 tablet, Rfl: 11    rizatriptan (MAXALT) 10 mg tablet, Take 1 tablet (10 mg total) by mouth once as needed for migraine May repeat in 2 hours if needed. Max 2/24 hours, 9/month., Disp: 9 tablet, Rfl: 12    zolpidem  "(AMBIEN) 10 mg tablet, Take 10 mg by mouth daily at bedtime as needed for sleep, Disp: , Rfl:     calcium citrate (CALCITRATE) 950 (200 Ca) MG tablet, Take 1 tablet (950 mg total) by mouth 2 (two) times a day, Disp: 120 tablet, Rfl: 0    cholecalciferol (VITAMIN D3) 1,000 units tablet, Take 1 tablet (1,000 Units total) by mouth daily Do not start before March 1, 2023., Disp: 30 tablet, Rfl: 0    hydrocortisone (ANUSOL-HC) 2.5 % rectal cream, Apply topically 2 (two) times a day (Patient taking differently: Apply topically 2 (two) times a day PRN), Disp: 28 g, Rfl: 2    verapamil (CALAN) 40 mg tablet, 40 mg BID for 1 week, then if needed/tolerate increase to 40 mg in am and 80 mg in pm for 1 week, then 80 mg BID for 1 week, then 80 mg in am and 120 mg in pm for 1 week, then 120 mg BID, stay on lowest effective tolerated dose, Disp: 180 tablet, Rfl: 3    No Known Allergies    Physical Exam:    /67   Pulse 66   Ht 5' 3\" (1.6 m)   Wt 67.1 kg (148 lb)   BMI 26.22 kg/m²     Constitutional:normal, well developed, well nourished, alert, in no distress and non-toxic and no overt pain behavior.  Eyes:anicteric  HEENT:grossly intact  Neck:supple, symmetric, trachea midline and no masses   Pulmonary:even and unlabored  Cardiovascular:No edema or pitting edema present  Skin:Normal without rashes or lesions and well hydrated  Psychiatric:Mood and affect appropriate  Neurologic:Cranial Nerves II-XII grossly intact  Musculoskeletal:normal gait.  Bilateral lower extremity strength 5 out of 5 in all muscle groups.  Sensation intact to light touch in L3-S1 dermatomes bilaterally.  Lumbar paraspinal musculature tender to palpation.  Positive straight leg raise bilaterally      Imaging  MRI lumbar spine w wo contrast    (Results Pending)     LUMBAR SPINE     INDICATION:   M54.50: Low back pain, unspecified.     COMPARISON:  None     VIEWS:  XR SPINE LUMBAR MINIMUM 4 VIEWS NON INJURY        FINDINGS:     There are 5 non rib " bearing lumbar vertebral bodies.     There is no evidence of acute fracture or destructive osseous lesion.     Alignment is unremarkable.     No significant lumbar degenerative change noted.     The pedicles appear intact.     Soft tissues are unremarkable.     IMPRESSION:     Normal examination.    Orders Placed This Encounter   Procedures    MRI lumbar spine w wo contrast

## 2024-08-30 ENCOUNTER — OFFICE VISIT (OUTPATIENT)
Dept: PAIN MEDICINE | Facility: CLINIC | Age: 44
End: 2024-08-30
Payer: COMMERCIAL

## 2024-08-30 ENCOUNTER — PROCEDURE VISIT (OUTPATIENT)
Age: 44
End: 2024-08-30
Payer: COMMERCIAL

## 2024-08-30 ENCOUNTER — APPOINTMENT (OUTPATIENT)
Dept: PHYSICAL THERAPY | Facility: REHABILITATION | Age: 44
End: 2024-08-30
Payer: COMMERCIAL

## 2024-08-30 ENCOUNTER — OFFICE VISIT (OUTPATIENT)
Dept: NEUROLOGY | Facility: CLINIC | Age: 44
End: 2024-08-30
Payer: COMMERCIAL

## 2024-08-30 VITALS
SYSTOLIC BLOOD PRESSURE: 105 MMHG | HEIGHT: 63 IN | DIASTOLIC BLOOD PRESSURE: 67 MMHG | HEART RATE: 66 BPM | WEIGHT: 148 LBS | BODY MASS INDEX: 26.22 KG/M2

## 2024-08-30 VITALS
SYSTOLIC BLOOD PRESSURE: 129 MMHG | HEART RATE: 64 BPM | TEMPERATURE: 98.1 F | WEIGHT: 148 LBS | HEIGHT: 63 IN | BODY MASS INDEX: 26.22 KG/M2 | DIASTOLIC BLOOD PRESSURE: 78 MMHG

## 2024-08-30 VITALS
OXYGEN SATURATION: 98 % | BODY MASS INDEX: 26.22 KG/M2 | WEIGHT: 148 LBS | DIASTOLIC BLOOD PRESSURE: 70 MMHG | HEART RATE: 78 BPM | SYSTOLIC BLOOD PRESSURE: 110 MMHG | HEIGHT: 63 IN

## 2024-08-30 DIAGNOSIS — M54.9 MID BACK PAIN: Primary | ICD-10-CM

## 2024-08-30 DIAGNOSIS — M99.04 SEGMENTAL DYSFUNCTION OF SACRAL REGION: ICD-10-CM

## 2024-08-30 DIAGNOSIS — M99.02 SEGMENTAL DYSFUNCTION OF THORACIC REGION: ICD-10-CM

## 2024-08-30 DIAGNOSIS — M54.59 MECHANICAL LOW BACK PAIN: ICD-10-CM

## 2024-08-30 DIAGNOSIS — G43.109 MIGRAINE WITH AURA AND WITHOUT STATUS MIGRAINOSUS, NOT INTRACTABLE: Primary | ICD-10-CM

## 2024-08-30 DIAGNOSIS — M99.03 SEGMENTAL DYSFUNCTION OF LUMBAR REGION: ICD-10-CM

## 2024-08-30 DIAGNOSIS — M99.01 SEGMENTAL DYSFUNCTION OF CERVICAL REGION: ICD-10-CM

## 2024-08-30 DIAGNOSIS — G89.29 CHRONIC LEFT-SIDED THORACIC BACK PAIN: Primary | ICD-10-CM

## 2024-08-30 DIAGNOSIS — M54.6 CHRONIC LEFT-SIDED THORACIC BACK PAIN: Primary | ICD-10-CM

## 2024-08-30 DIAGNOSIS — M79.18 MYOFASCIAL PAIN SYNDROME: ICD-10-CM

## 2024-08-30 DIAGNOSIS — M54.16 LUMBAR RADICULOPATHY: ICD-10-CM

## 2024-08-30 DIAGNOSIS — R20.2 PARESTHESIAS: ICD-10-CM

## 2024-08-30 DIAGNOSIS — M75.52 SUBACROMIAL BURSITIS OF LEFT SHOULDER JOINT: ICD-10-CM

## 2024-08-30 PROCEDURE — 98941 CHIROPRACT MANJ 3-4 REGIONS: CPT | Performed by: CHIROPRACTOR

## 2024-08-30 PROCEDURE — 97110 THERAPEUTIC EXERCISES: CPT | Performed by: CHIROPRACTOR

## 2024-08-30 PROCEDURE — 99214 OFFICE O/P EST MOD 30 MIN: CPT | Performed by: NURSE PRACTITIONER

## 2024-08-30 PROCEDURE — 99417 PROLNG OP E/M EACH 15 MIN: CPT | Performed by: PSYCHIATRY & NEUROLOGY

## 2024-08-30 PROCEDURE — 99215 OFFICE O/P EST HI 40 MIN: CPT | Performed by: PSYCHIATRY & NEUROLOGY

## 2024-08-30 RX ORDER — GALCANEZUMAB 120 MG/ML
INJECTION, SOLUTION SUBCUTANEOUS
Qty: 1 ML | Refills: 11 | Status: SHIPPED | OUTPATIENT
Start: 2024-08-30

## 2024-08-30 RX ORDER — RIZATRIPTAN BENZOATE 10 MG/1
10 TABLET ORAL ONCE AS NEEDED
Qty: 9 TABLET | Refills: 12 | Status: SHIPPED | OUTPATIENT
Start: 2024-08-30

## 2024-08-30 NOTE — PROGRESS NOTES
Review of Systems   Constitutional:  Negative for appetite change and fever.   HENT:  Positive for tinnitus (bilateral with migraine its worse). Negative for hearing loss, trouble swallowing and voice change.         Phonophobia   Eyes:  Positive for photophobia. Negative for pain.   Respiratory: Negative.  Negative for shortness of breath.    Cardiovascular: Negative.  Negative for palpitations.   Gastrointestinal:  Positive for nausea. Negative for vomiting.   Endocrine: Negative.  Negative for cold intolerance.   Genitourinary: Negative.  Negative for dysuria, frequency and urgency.   Musculoskeletal: Negative.  Negative for myalgias and neck pain.   Skin: Negative.  Negative for rash.   Neurological:  Positive for numbness (pins and needles feeling in both legs) and headaches. Negative for dizziness, tremors, seizures, syncope, facial asymmetry, speech difficulty, weakness and light-headedness.   Hematological: Negative.  Does not bruise/bleed easily.   Psychiatric/Behavioral:  Negative for confusion, hallucinations and sleep disturbance.

## 2024-08-30 NOTE — PATIENT INSTRUCTIONS
"EMG ordered     No estrogen due to migraine aura    Not my area expertise but your ferritin is 49 and the sleep doctors have thought me that they like to keep it above 75/80 as lower levels can contribute to restless leg syndrome/poor sleep.     If ever having runny nose in the future could always consider trying ipratropium bromide/Atrovent    Continue to follow with pain management for back pain     Books to consider on audible since you had asked for resources  - \"Rewire Your Anxious Brain: How to Use the Neuroscience of Fear to End Anxiety, Panic, and Worry\" By Marguerite PhD  - \"atomic habits\" - By Ronen Hunter   - \"The real self care\"  By Candida Beal  - \"why we sleep\" - By Ismael Devlin   - \"the happiness advantage\" - By Leonidas Carey     I have had patients recommend the following female therapists. (I am assuming female by their names of course and I do not know them myself):  - Inna Contreras   - Martha Andres PhD in [place]  - Alicia Momin in [place]  - Zoie Baugh in [place]  - Tasha Behnke in [place]  - Nakia Paiz in [place]   - Claudio Kunz in [place]  - Caitie Brock PhD  - Rosa Maria Moore MS   - Lashawn Aldrich in [place]         Headache/migraine treatment:   Abortive medications (for immediate treatment of a headache):   It is ok to take ibuprofen, acetaminophen or naproxen (Advil, Tylenol,  Aleve, Excedrin) if they help your headaches you should limit these to No more than 3 times a week to avoid medication overuse/rebound headaches.     -     rimegepant sulfate (Nurtec) 75 mg TBDP; Take one NURTEC 75 mg at onset under tongue. Limit 1 in 24 hours.  Prior auth, coupon card for copay     -     dexamethasone (DECADRON) 4 mg tablet; Can take 8 mg for 1-2 days PO with breakfast, then if needed can take 4 mg with breakfast for 1-5 more days if needed for headache      For your more moderate to severe migraines take this medication early   Maxalt (rizatriptan) 10mg tabs - take one " at the onset of headache. May repeat one time after 1-2 hours if pain has not resolved.   (Max 2 a day and 9 a month)     Prescription preventive medications for headaches/migraines   (to take every day to help prevent headaches - not to take at the time of headache):  [x] -      Emgality/Galcanezumab -  120 mg injections every 28 days     READ INSTRUCTIONS that come with the medication. REFRIGERATE. Keep out of direct sunlight. Prior to administration, allow to come to room temperature for 30 minutes. Do not warm using a heat source (eg, microwave or hot water). Do not shake. Administer in preferably abdomen (avoiding 2 inches around the navel), thigh, upper arm, or buttocks avoiding areas of skin that are tender, bruised, red or hard. Deliver entire contents of single-use prefilled pen or syringe.  Unknown impact in pregnancy therefore would recommend stopping 6 months prior to considering pregnancy.      *Typically these types of medications take time untill you see the benefit, although some may see improvement in days, often it may take weeks, especially if the medication is being titrated up to a beneficial level. Please contact us if there are any concerns or questions regarding the medication.     Lifestyle Recommendations:  [x] SLEEP - Maintain a regular sleep schedule: Adults need at least 7-8 hours of uninterrupted a night. Maintain good sleep hygiene:  Going to bed and waking up at consistent times, avoiding excessive daytime naps, avoiding caffeinated beverages in the evening, avoid excessive stimulation in the evening and generally using bed primarily for sleeping.  One hour before bedtime would recommend turning lights down lower, decreasing your activity (may read quietly, listen to music at a low volume). When you get into bed, should eliminate all technology (no texting, emailing, playing with your phone, iPad or tablet in bed).  [x] HYDRATION - Maintain good hydration.  Drink  2L of fluid a day (4  typical small water bottles)  [x] DIET - Maintain good nutrition. In particular don't skip meals and try and eat healthy balanced meals regularly.  [x] TRIGGERS - Look for other triggers and avoid them: Limit caffeine to 1-2 cups a day or less. Avoid dietary triggers that you have noticed bring on your headaches (this could include aged cheese, peanuts, MSG, aspartame and nitrates).  [x] EXERCISE - physical exercise as we all know is good for you in many ways, and not only is good for your heart, but also is beneficial for your mental health, cognitive health and  chronic pain/headaches. I would encourage at the least 5 days of physical exercise weekly for at least 30 minutes.     Education and Follow-up  [x] Please call with any questions or concerns. Of course if any new concerning symptoms go to the emergency department.  [x] Follow up  3-4 months if needed,  sooner if needed

## 2024-08-30 NOTE — PROGRESS NOTES
[facility] Consult - Follow up   PATIENT:  Asmita ESPINOZA Bem  MRN:  [id number]  :  1980  DATE OF SERVICE:  2024  REFERRED BY: No ref. provider found  PMD: Brandon Tello DO    Assessment/Plan:   Asmita ESPINOZA Bem is a deligthful 43 y.o. female with a past medical history that includes papillary thyroid cancer status post total thyroidectomy 2023 with postop hypoparathyroidism and significantly low calcium now on replacement, IBS with constipation, PTSD, allergic rhinitis, chronic sinusitis, migraines, TOSHIA II, insomnia, neck pain, vasovagal syncope in 20s, anxiety, Intestinal cystitis, childhood asthma  referred here for evaluation of headache.  My initial evaluation 3/28/2022    Migraine with aura and without status migrainosus, not intractable  Features of idiopathic intracranial hypertension on imaging without papilledema not meeting criteria for IIH   Apneas not meeting criteria for NUNU, insomnia (home study 2023, 2 obstructive apneas, 2 hypopneas, BRYCE 0.7, O2 down to 92%) in lab sleep study recommended and ordered and not scheduled, but she did follow-up with sleep medicine and had improvement with CBT-I - should follow again with them  She reports a long history of headaches and migraines dating back to 12 or 13 years old.  Family history of migraines.  She reports she has followed with multiple neurologists in the past.  Pain is typically unilateral more often left-sided with visual aura at times and with typical associated migrainous features as well as autonomic features that do not seem to be unilateral to suggest trigeminal autonomic cephalalgia.  -as of 2022 on average over 15 migraine days per month.  Trial of emgality for prevention rizatriptan for abortive.  - as of 2022:  She reports she has had significant improvement on emgality down from 15 to 7-8 times a month and severity has improved as well. They respond to rizatriptan which she feels  she tolerates more than sumatriptan.   - as of 1/11/2023: She reports she continues to do very well on Emgality with about 4 migraines a month that resolve with rizatriptan.  Some wearing off when due for next dose and recommended taking it at 28 days rather than 30 or 31.  She would like to stay on propranolol 40 mg twice daily for now as she feels this is helping prevent tachycardia from causing migraine.  She was able to get off nortriptyline summer 2022 which she was on for an interstitial cystitis without increase.    - as of 4/5/2023:  She returns sooner with daily migraines now following papillary thyroid cancer status post total thyroidectomy 2/24/2023 with postop hypoparathyroidism and significantly low calcium now on replacement with some symptoms possibly consistent with high calcium and upcoming recheck of labs through endocrinology may suggest lowering calcium meds, but would defer to endocrine.  She certainly has had increase in her migrainous symptoms as well as some symptoms possibly consistent with sleep apnea and I ordered sleep study.  She reports she has had improvement in symptoms since the surgery but still was hoping there is something that could improve them as all the other doctors just told her to wait it out.  We will start low-dose topiramate at night to see if this can help if tolerated.  We also discussed PTSD and how it can cause intrusion, avoidance, negative impact on cognition and mood as well as arousal and reactivity changes and certainly sounds like she has symptoms of this as well and recommend counseling.  We will have our  reach out to see if she can help.  - as of 6/27/2023: She reports increasing frequency and severity of headaches overall and currently 3-4 a week.  She did not tolerate trial of topiramate as it worsened mood which is slightly improved from its low point, but she plans to follow-up with counselor.  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  Diagnostic sleep study ordered due to suspected false negative home study.  Has Decadron available for worse symptoms if needed.  Rizatriptan side effects and will add trial of Nurtec which also could help with prevention the more she takes it.  - as of 8/29/2023: Reviewed MRI brain which shows some features of possible slight increased intracranial pressure without IIH diagnosis and no papilledema.  She reports 9-10 headaches per month that typically improve with Nurtec, which is a significant improvement on Emgality but has wearing off effect and we discussed increasing acetazolamide/Diamox from 250 mg twice daily since well-tolerated gradually up until it decreases more of the symptoms that might be related as thoroughly instructed and discussed with patient today.  If side effects or not effective we will wean off.  Also will wean off propranolol as we wean up acetazolamide/Diamox.  Refill of Decadron for backup.  She is currently undergoing cognitive behavioral therapy for insomnia soon through the sleep medicine department.  - as of 11/28/2023: She reports overall doing much better and so many regards since last visit.  Better emotionally physically and mentally.  CBT for insomnia was almost magical she reports and she is doing much better with sleep although still taking low-dose Ambien half the month which we discussed I do not recommend, especially in the long run, as this does not produce the quality sleep.    She is having 10-12 migraines a month and Nurtec helps sometimes and when it does not rizatriptan always does although makes her nauseated so she avoids this as first option.  Emgality continues to help with prevention although has wearing off effect and she is currently delayed due to prior Auth needing to be redone.  At last visit she  was taking acetazolamide, but at some point soon after reports she discontinued it, as she wanted less meds.  She was weaned off propranolol since we were adding this medication and subsequently has had some increased pressure in the head which we discussed would be expected and we could add back acetazolamide, verapamil or propranolol at any time.  Potentially could be related to current sinus issues which she request referral to ENT for which was placed.  - as of 4/5/2024: She reports 3-4 headaches a week that she wonders is related to change of season.  Also could be related to stress, less sleep, thyroid/parathyroid issues, off propranolol and we discussed trial of Botox for migraine prevention send she has been having over 15 migraine days per month for over 3 months.  In the meantime, we will add verapamil as tolerated although blood pressure hangs low and history of constipation she reports constipation could not get any worse and she does not have a bowel movement without laxative at this time, following with GI.  Follow with sleep medicine but lately only getting 5 to 6 hours of sleep.  Nurtec helps for migraine rescue and if it does not rizatriptan typically does and she has dexamethasone if needed for backup.  Also discussed stress, she asked for list of therapists again and will have our  reach out as well with resources.  Ketorolac/Toradol IM for today's pain.  - as of 8/30/2024: Asmita reports she tried the verapamil up to 80 mg twice daily and just weaned herself off approximately a week ago as she was having some side effects of dizziness/vertiginous at any time of day.  Trial of Botox after 1 round may have helped slightly initially and less so now, expected to help more after the second round coming up.  Hardly getting any sleep 5 to 6 hours, stress at home and at work all big reasons for frequent headaches and migraines still which she is aware.  3 to 4-week on average Nurtec typically  works well.  She will take rizatriptan for the most severe ones approximately 2 times a month.  Continue exercise, following with team of providers as much of what they are taking care of will also help with headache prevention.  She is following with pain management and they are evaluating for lumbar radiculopathy with lower extremity paresthesias with MRI and we discussed I would be happy to order EMG/NCS since these can both months out as it may be needed, although continue to follow with them, since not my area of subspecialty.  She plans to discuss with other providers as well to rule out vascular etiology.    Workup:  -MRI brain with without contrast 7/13/2023: No acute infarction, intracranial hemorrhage or mass.*As of my retrospective review 8/29/2023 and 11/28/2023 we discussed she has in my opinion partially flattened sella(not empty), prominent optic nerve sheath with some tortuosity bilaterally potentially slightly worse right greater than left, slightly tighter ventricle on the right, cerebellar tonsils overlie the foramen magnum with tight junction bilaterally    Preventative:  - we discussed headache hygiene and lifestyle factors that may improve headaches  - continue  emgality every 28 days. Discussed proper use, possible side effects and risks.  - botox every 91 days. Discussed proper use, possible side effects and risks.  - Currently on through other providers: ambien 10 mg- (off and on 6 years and as of 11/28/2023 taking 5 to 7.5 mg maybe half the month), melatonin, tizanidine 2 mg as needed for muscle spasm or back pain - doesn't take makes her feel tired/woozy, levothyroxine 125 mcg, vitamin D 1000 units daily, Calciitrate 950 (200 Ca) Mg tablet, multivitamin  - Past/ failed/contraindicated: topiramate, gabapentin, propranolol 40 mg BID, nortriptyline, amitriptyline, prosac/fluoxetine, aimovig contraindicated due to constiptation, verapamil  - future options: alternative CGRP,  "botox    Abortive:  - discussed not taking over-the-counter or prescription pain medications more than 3 days per week to prevent medication overuse/rebound headache  -   rimegepant sulfate (Nurtec) 75 mg TBDP; Take one NURTEC 75 mg at onset under tongue. Limit 1 in 24 hours. Discussed proper use, possible side effects and risks.  -  for back up rarely: dexamethasone (DECADRON) 4 mg tablet; Can take 8 mg for 1-2 days PO with breakfast, then if needed can take 4 mg with breakfast for 1-5 more days if needed for headache. Discussed proper use, possible side effects and risks.  - Past/ failed/contraindicated: sumatriptan 100 mg works sometimes and not others, rizatriptan helps but also makes her nauseous  - past/helped:  Steroids have helped temporarily, toradol IM in the past helped   - future options:   prochlorperazine, Toradol IM or p.o., could consider trial for 5 days of Depakote or dexamethasone for prolonged migraine, ubrelvy, reyvow          Chronic left-sided thoracic back pain  -     Ambulatory referral to Spine & Pain Management; Future  -     Ambulatory Referral to Physiatry; Future    Patient instructions        EMG ordered to expedite the workup, continue to follow with pain management    No estrogen due to migraine aura    Not my area expertise but your ferritin is 49 and the sleep doctors have thought me that they like to keep it above 75/80 as lower levels can contribute to restless leg syndrome/poor sleep.     If ever having runny nose in the future could always consider trying ipratropium bromide/Atrovent    Continue to follow with pain management for back pain     Books to consider on audible since you had asked for resources  - \"Rewire Your Anxious Brain: How to Use the Neuroscience of Fear to End Anxiety, Panic, and Worry\" By Marguerite PhD  - \"atomic habits\" - By Ronen Hunter   - \"The real self care\"  By Candida Beal  - \"why we sleep\" - By Ismael Devlin   - \"the happiness advantage\" - By Leonidas " [place]     I have had patients recommend the following female therapists. (I am assuming female by their names of course and I do not know them myself):  - Inna Contreras   - Martha Andres PhD in [place]  - Alicia Momin in [place]  - Zoie Baugh in [place]  - Tasha Behnke in [place]  - Nakia Paiz in [place]   - Claudio Kunz in [place]  - Caitie Brock PhD  - Rosa Maria Moore MS   - Lashawn Aldrich in [place]         Headache/migraine treatment:   Abortive medications (for immediate treatment of a headache):   It is ok to take ibuprofen, acetaminophen or naproxen (Advil, Tylenol,  Aleve, Excedrin) if they help your headaches you should limit these to No more than 3 times a week to avoid medication overuse/rebound headaches.     -     rimegepant sulfate (Nurtec) 75 mg TBDP; Take one NURTEC 75 mg at onset under tongue. Limit 1 in 24 hours.  Prior auth, coupon card for copay     -     dexamethasone (DECADRON) 4 mg tablet; Can take 8 mg for 1-2 days PO with breakfast, then if needed can take 4 mg with breakfast for 1-5 more days if needed for headache      For your more moderate to severe migraines take this medication early   Maxalt (rizatriptan) 10mg tabs - take one at the onset of headache. May repeat one time after 1-2 hours if pain has not resolved.   (Max 2 a day and 9 a month)     Prescription preventive medications for headaches/migraines   (to take every day to help prevent headaches - not to take at the time of headache):  [x] -      Emgality/Galcanezumab -  120 mg injections every 28 days     READ INSTRUCTIONS that come with the medication. REFRIGERATE. Keep out of direct sunlight. Prior to administration, allow to come to room temperature for 30 minutes. Do not warm using a heat source (eg, microwave or hot water). Do not shake. Administer in preferably abdomen (avoiding 2 inches around the navel), thigh, upper arm, or buttocks avoiding areas of skin that are tender, bruised, red or  hard. Deliver entire contents of single-use prefilled pen or syringe.  Unknown impact in pregnancy therefore would recommend stopping 6 months prior to considering pregnancy.      *Typically these types of medications take time untill you see the benefit, although some may see improvement in days, often it may take weeks, especially if the medication is being titrated up to a beneficial level. Please contact us if there are any concerns or questions regarding the medication.     Lifestyle Recommendations:  [x] SLEEP - Maintain a regular sleep schedule: Adults need at least 7-8 hours of uninterrupted a night. Maintain good sleep hygiene:  Going to bed and waking up at consistent times, avoiding excessive daytime naps, avoiding caffeinated beverages in the evening, avoid excessive stimulation in the evening and generally using bed primarily for sleeping.  One hour before bedtime would recommend turning lights down lower, decreasing your activity (may read quietly, listen to music at a low volume). When you get into bed, should eliminate all technology (no texting, emailing, playing with your phone, iPad or tablet in bed).  [x] HYDRATION - Maintain good hydration.  Drink  2L of fluid a day (4 typical small water bottles)  [x] DIET - Maintain good nutrition. In particular don't skip meals and try and eat healthy balanced meals regularly.  [x] TRIGGERS - Look for other triggers and avoid them: Limit caffeine to 1-2 cups a day or less. Avoid dietary triggers that you have noticed bring on your headaches (this could include aged cheese, peanuts, MSG, aspartame and nitrates).  [x] EXERCISE - physical exercise as we all know is good for you in many ways, and not only is good for your heart, but also is beneficial for your mental health, cognitive health and  chronic pain/headaches. I would encourage at the least 5 days of physical exercise weekly for at least 30 minutes.     Education and Follow-up  [x] Please call with any  questions or concerns. Of course if any new concerning symptoms go to the emergency department.  [x] Follow up  3-4 months if needed,  sooner if needed        CC:   We had the pleasure of evaluating Asmita ESPINOZA Bem in neurological consultation today. Asmita ESPINOZA Bem is a   right handed female who presents today for evaluation of headaches.     History obtained from patient as well as available medical record review.  History of Present Illness:   Interval history as of 8/30/2024  -Since last visit she continues to follow with pain management for back pain lumbar radiculopathy and discussed paresthesias both legs for years and worse in the last year L maybe slightly worse than right, pins and needles and pulling sensation in her legs that is painful, standing for 45 mins is the worse, can be still there when sitting but not as bad, not while laying, calcium has been a little low lately, no B12 lately- has been getting injections , I can order EMG since they are getting MRI spine, just since we know it will take some time   - following with pain management also for myofascial pain syndrome, PT for pelvic pain, plastic surgery cosmetic, chiropractor, workup for abnormal MRI breast, ENT for acute recurrent sinusitis (Dr. Rai said was GERD), GI for IBS, bloating, GERD  - last iron infusion Sept 2023   -Only getting 5 to 6 hours of sleep and does not appear she is followed back up with sleep medicine after she initially had some improvement with CBT-I and I highly recommend she do so, using less ambien - worked till the spring, work is stressful and at home is too, helps take care of niece a lot   - exercise - gym two days a week for small group personal training, at home 2 days a week as well, walks too on weekends   - if excited scared sad turns red and gets headache    Headaches and migraines   She reports 3-4 headaches a week and Nurtec typically works well  Approximately 2 times a month she will take rizatriptan for the  most severe ones as it works little bit better  They are worse with change of season    Preventative:   -Trial of verapamil- 80 mg BID - she reports she weaned herself off about a week ago as she was getting dizzy - spinning -this would happen not just in the middle of the day but also sometimes about an hour after taking it although she usually would take it right before bed if she took it a little earlier she would have symptoms afterwards which we discussed then would not necessarily be the wearing off effect  -Trial of Botox-reached out to the Botox coordinators after last visit in April and she called in 4/30/24 to check on status, approved 5/16/2024 -first injection 7/12/2024, next injection 10/18/2024, then 1/17/2025    Abortive:   -   rimegepant sulfate (Nurtec) 75 mg TBDP; Take one NURTEC 75 mg at onset under tongue. Limit 1 in 24 hours.  Rizatriptan helps if Nurtec does not enough  Denies bothersome side effects        Interval history as of 4/5/2024  - no significant new or concerning neurologic symptoms since last visit   - chiropractor today metal tool and pressure on tight areas, through [facility], massage and telling her what she can do, so tight and worse after getting, but maybe soon helping, TPIs didn't help through pain management, they did 10 spots close to spine TPI  - saw endo 3/22/24, TSH was too suppressed and they lowered her med   - left side bothers her   - US sonographer M- 4 10s   - sleep - a lot better since sleep therapy, maybe 5-6 hours   - stress and thinking about talking to a therapist, trouble with attention at times, struggled with learning her whole life, loves to read, bugs her, trying to study for boards    Headaches and migraines   3-4 headaches a week   Change of season    She has had over 15 migraine days per month for over 3 months lasting over 4 hours an episode    Preventative:   - continue  emgality every 28 days -      Abortive:   -   rimegepant sulfate (Nurtec) 75 mg  TBDP; Take one NURTEC 75 mg at onset under tongue. Limit 1 in 24 hours.  Rizatriptan helps if Nurtec does not enough  Denies bothersome side effects       Interval history as of 11/28/2023  - no significant new or concerning neurologic symptoms since last visit   - plans to follow up with ENT for post nasal sinus issues the last two months - huge black chunks with blood on it comes out and then feels so much better    Sleep - CBT for insomnia best thing she has done - like magical, Dr. Ovalle also helped with the iron infusions, night and day different from August, was so tired and miserable, finished the CBT in Oct  - joined a gym and classes twice a week, so much stronger   - still using ambien half the month 5-7.5 mg     - mood way better - feels way better in general - emotionally, physically mentally     - Left lower back nerve pain like someone is lightly tapping - saw pT, more annoying than painful   Tingling started about a year ago and the pain since June, pain improved with PT, towards the end of the day bad nightly, heating pads on it - constant since June -we discussed I would be happy to place referral    Headaches and migraines   She reports she stopped acetazolamide/Diamox after she got up to  twice daily and did not see a significant difference and did not want to take too many meds -did not increase to 4 times a day after last visit    She reports 10-12 migraines a month, Nurtec helps sometimes and when it does not she takes rizatriptan at always helps    Preventative:   -Trial of acetazolamide/Diamox as of last visit she had stated she was taking 250 mg at lunch 1130 and before bed 9/930 and then the plan was to increase to 4 times a day and she reports she did not do this but did wean off propranolol in OCt    - emgality helping - every 28 days - life changing - wears off at day 25  Delayed    - Currently on through other providers: propranolol 40 mg BID - stopped around Oct and had increased  pressure headaches/sinus, ambien 10 mg- (off and on 6 years), melatonin    Abortive:   Nurtec 10 times a month - usually knocks out if mild  Rizatriptan -works if needed - twice a month, makes her nauseated  Has not steroid rescue   Denies bothersome side effects      Interval history as of 8/29/2023  - no significant new or concerning neurologic symptoms since last visit   - eye doctor May and vision is great and better than 20/20   - doing PT for back and has been having left mid back pain and has to stand to feel comfortable, steroids by mouth didn't help, MSK relaxors just briefly helped  - stomach sleeper is best    -MRI brain with without contrast 7/13/2023: No acute infarction, intracranial hemorrhage or mass.  I do see some findings consistent with subtle changes that could be related to idiopathic intracranial hypertension including partially flattened sella(not empty), prominent optic nerve sheath with some tortuosity bilaterally (slightly) and as well as what appears to be slight enlargement of the optic nerve head     Headaches and migraines   9-10 headaches per month can be days in a row or once a week  Left temporal region to the back of skull on the left  Often due to wearing off effect for emgality    At night eyes watering and nose runs, if laughs or smiles eyes tear up, BM too sometimes   - evening gets progressively worse    Preventative:   -Trial of acetazolamide/Diamox tolerating well without side effects   250 mg at lunch 1130 and before bed 9/930    - trial of topiramate and was at 75 mg for 3 weeks and then stopped because she was having weird dark thoughts - 50 mg at night, mood was awful   - emgality helping - every 28 days - life changing   - Currently on through other providers: propranolol 40 mg BID, ambien 10 mg- (off and on 6 years), melatonin    Abortive:   -Nurtec is helping dramatically, but will not extinguish the migraine but will significantly reduce the severity and then will  take 400 mg advil or 500 mg tylenol   - decadron for back up   Denies bothersome side effects       Sleep   -Follow-up with sleep medicine Dr Ovalle 8/1/23 and started on an iron infusion after iron was found to be low - no change  - doesn't sleep well, but improvement in sleep  - CBT started last week     study   The patient had a total of 5 respiratory events made up of 2 obstructive apneas, 0 central apneas, 0 mixed apneas and 2 hypopneas resulting in a respiratory event index (BRYCE) of 0.7.  The lowest SpO2 recorded is 92%.  The snore index was 0.2%.  IMPRESSION:  This study did not demonstrate evidence for obstructive sleep apnea.      Interval history as of 6/27/2023  - no significant new or concerning neurologic symptoms since last visit  - dealing with hypocalcemia following surgery and mood symptoms, fatigue,   - eye doctor May and vision is great and better than 20/20   - since last visit hurt her back, mid back muscle spasm and was bad so was put on steroids and MSK relaxor    Headaches and migraines   3-4 headaches a week rizatriptan helps   Worse the past month   Weather     Preventative:   - trial of topiramate and was at 75 mg for 3 weeks and then stopped because she was having weird dark thoughts - 50 mg at night, mood is better   - emgality helping - PA renewed last time, every 28 days     Abortive:   Rizatriptan - nauseated and lethargic   - decadron not needed since last visit, but took other steroids   Denies bothersome side effects      Sleep study    The patient had a total of 5 respiratory events made up of 2 obstructive apneas, 0 central apneas, 0 mixed apneas and 2 hypopneas resulting in a respiratory event index (BRYCE) of 0.7.  The lowest SpO2 recorded is 92%.  The snore index was 0.2%.  IMPRESSION:  This study did not demonstrate evidence for obstructive sleep apnea.      Interval history as of 4/5/2023  - 2/24/23 thyroid resection and post op had severe hypoparathyroidism and they wanted to  readmit her and then followed up with endocrine and continue calcium dose   - symptoms of hypoparathyroid intially and now symptoms of hyperparathyroidism    - all of the symptoms of PTSD, overwhelmed with noises   - was not on phone for a week because could not look at it     Headaches and migraines   - A lot better than she was, could not speak for a while and spasms, tetany, couldn't speak, much better    - daily headaches since the surgery, first in the hospital was way worse and steroids helped, and then headache never resolved. Worse at night before bed, comes and goes, not all day, better   - Mild headache now, they are daily, migraines nearly daily, not last night, every other night   - Dizziness better, but still there, may have had vertigo in the past.   Tinnitus both ears, Right >left 30 seconds of muffled hearing at times, rarely in the past     Preventative:   - emgality monthly - [employer] - a few days ago     Abortive:   - rizatriptan once dose often helps   Denies bothersome side effects       Sleep   - averages: 5-6 hours, sometimes takes ambien, snores   Problems falling asleep?:   Yes  Problems staying asleep?:  Yes, 3-4 a night  - tired all the time    How likely are you to doze off or fall sleep during the following situations?    0 - would never doze  1 - slight chance of dozing  2 - moderate chance of dozing  3 - high chance of dozing  [place] sleepiness scale   Sitting and reading - 2  Watching TV - 3  Sitting, inactive in a public place such as a theater 1  As a passenger in a car for an hour without a break 1  Lying down to rest in afternoon when circumstances permit 3  Sitting and talking to someone 0  Sitting quietly after lunch without alcohol 2  In a car while stopped for few minutes in traffic - 0      Interval history as of 1/11/2023  - denies any new or concerning neurologic symptoms since last visit   - foot surgery 12/30/22  - thyroid bx coming up    Headaches and migraines   She is  "doing much better, now about 4 a month and rizatriptan works well, \"its amazing\"  Nov had one that lasted a week  Last was Dec 29th  Wearing off effect when due for next dose, maybe 3 days before     Preventative:    - emgality     Denies bothersome side effects  - Currently on through other providers: propranolol 40 mg BID (could consider gradual wean off in the future),   nortriptyline 20 mg (for Intestinal cystitis) - stopped around July 2022 - IC not worse off of it, ambien 10 mg (previously on 5 and plans on going back, moved in April and loud) - (off and on 5 years)    Abortive:   - rizatriptan - working well now, rarely needs two  Denies bothersome side effects        Interval history as of 7/1/2022  - denies any new or concerning neurologic symptoms since last visit      Headaches and migraines   She reports improvement on emgality which has brought migraines down from 15 days a month to 7-8 a month.   Less severe as well   The first month did the best, \"almost forgot I had migraines for a while\"   Some wearing off effect when due for next shot   - triggered by weather changes     Preventative:   - Trial of emgality - approved 4/5/22 - 4th shot soon   Denies bothersome side effects     Abortive:   - Trial of rizatriptan 10 mg - makes her less nauseated than sumatriptan, does not work quiet as well as sumatriptan, may not completely get rid of it but improves and then eventually goes away   Denies bothersome side effects     History as of initial visit 3/28/22:   Headaches started at what age? 10-11 years old  How often do the headaches occur?   - as of 3/28/2022: on average over 15 days a month   What time of the day do the headaches start? Occasionally wakes up with them, or afternoon or evening  How long do the headaches last? All day, up to weeks   Are you ever headache free? Yes    Aura? Sometimes aura  Blurred, floaters, lines, flashing orange, 20 mins or more     Last eye exam: goes early for " monitoring, no issues except     Where is your headache located and pain quality?   - most of the time unilateral and usually on the left  - left side of head and neck - temporal and parietal and occipital and down the neck  - stabbing, pressure, pulsating  - head warm to touch  - sinus pressure   What is the intensity of pain? Average: 5-6/10, worst 9/10  Associated symptoms:   [x] Nausea       [] Vomiting        [x] Stiff or sore neck   [x] Photophobia     [x]Phonophobia      [x] Osmophobia  [x] Blurred vision    [x] Light-headed or dizzy     [x] Tinnitus  - both  [x] Hands or feet tingle or feel numb/paresthesias - tingling in both legs and fingers sometimes without focal weakness   [] Ptosis      [] Facial droop  [x] Lacrimation - both  [x] Nasal congestion/rhinorrhea - chronically       Things that make the headache worse? No specific movements, any movement if bad enough    Headache triggers:  Hormonal/menses, weather changes, stress, certain foods, alcohol, sinus congestion      Have you seen someone else for headaches or pain? Yes multiple neurologists, last about 10 years ago   Have you had trigger point injection performed and how often? No  Have you had Botox injection performed and how often? No   Have you had epidural injections or transforaminal injections performed? No  Are you current pregnant or planning on getting pregnant? No future pregnancy, not active   Have you ever had any Brain imaging? yes - MRI Brain in her teens was normal     What medications do you take or have you taken for your headaches?   ABORTIVE:      Sumatriptan 100 mg - makes her nauseated      Past  Steroids In Jan 2021 with COVID, and then again in Feb for unrelenting migraine helps   Rizatriptan/maxalt - thinks may not as been as effective as imitrex    PREVENTIVE:       Propranolol 40 mg BID (4 years has helped)  ambien 5  Nortriptyline 20 mg (started a couple of weeks ago for Intestinal cystitis)    Past/  failed/contraindicated:  nortriptyline  Amitriptyline  topiramate - maybe 1-2 months   Gabapentin  prosac/fluoxetine      LIFESTYLE  Sleep   - averages: 6 hours, sometimes takes ambien  Problems falling asleep?:   Yes  Problems staying asleep?:  Yes, 3-4 a night    Water: 40-60 oz per day  Caffeine: coffee - 1 cups in am, 1 at lunch, per day    Mood:   Anxiety maybe more in her 20s, not much now     The following portions of the patient's history were reviewed and updated as appropriate: allergies, current medications, past family history, past medical history, past social history, past surgical history and problem list.     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   Lives alone normally           Pertinent lab results:   See EMR for recent labs  - 01/14/2022 COVID-19 positive  - 8/19/21 CMP and CBC unremarkable   TSH elevated at 4.09 with normal Free T4     Imaging: I have personally reviewed imaging and radiology read   -MRI brain with without contrast 7/13/2023: No acute infarction, intracranial hemorrhage or mass.*As of my retrospective review 8/29/2023 and 11/28/2023 we discussed she has in my opinion partially flattened sella(not empty), prominent optic nerve sheath with some tortuosity bilaterally potentially slightly worse right greater than left, slightly tighter ventricle on the right, cerebellar tonsils overlie the foramen magnum with tight junction bilaterally     MRI brain in her teens was reportedly unremarkable    Past Medical History:     Past Medical History:   Diagnosis Date    Allergic     Anxiety     Cancer (HCC) 2/1/2023    Thyroid- Papillary carcinoma    COVID-19 01/2022    mild s/s-not  hospitalized, not vaccinated    GERD (gastroesophageal reflux disease)     Headache(784.0)     History of IBS     with constipation    Ingrown toenail     Irritable bowel syndrome     Migraines     Chronic per pt    Plantar fasciitis     Thyroid carcinoma (HCC)     Varicella     Venereal wart 08/07/2014       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)    Hyperlipidemia    Iron deficiency    Encounter for follow-up surveillance of thyroid cancer    Myofascial pain syndrome       Medications:      Current Outpatient Medications   Medication Sig Dispense Refill    acetaminophen (TYLENOL) 500 mg tablet Take 1,000 mg by mouth every 6 (six) hours as needed for mild pain      bisacodyl (DULCOLAX) 5 mg EC tablet Take 2 tablets (10 mg total) by mouth 2 (two) times a day as needed for constipation 60 tablet 1    calcitriol (ROCALTROL) 0.25 mcg capsule TAKE ONE CAPSULE BY MOUTH 2 TIMES A  capsule 1    calcium citrate (CALCITRATE) 950 (200 Ca) MG tablet Take 1 tablet (950 mg total) by mouth 2 (two) times a day 120 tablet 0    cholecalciferol (VITAMIN D3) 1,000 units tablet Take 1 tablet (1,000 Units total) by mouth daily Do not start before March 1, 2023. 30 tablet 0    clindamycin (CLEOCIN T) 1 % lotion in the morning      Galcanezumab-gnlm (Emgality) 120 MG/ML SOAJ INJECT 120MG SUBCUTANEOUSLY (UNDER THE SKIN) EVERY 28 DAYS 1 mL 11    hydrocortisone (ANUSOL-HC) 2.5 % rectal cream Apply topically 2 (two) times a day (Patient taking differently: Apply topically 2 (two) times a day  PRN) 28 g 2    Levocetirizine Dihydrochloride (XYZAL PO) Take by mouth daily at bedtime      levothyroxine 125 mcg tablet TAKE ONE TABLET BY MOUTH DAILY 90 tablet 1    lubiprostone (AMITIZA) 24 mcg capsule Take 1 capsule (24 mcg total) by mouth 2 (two) times a day with meals 60 capsule 5    Melatonin 5 MG TABS Take by mouth as needed      meloxicam (MOBIC) 15 mg tablet Take 15 mg by mouth if needed      Multiple Vitamin (multivitamin) tablet Take 1 tablet by mouth daily      omeprazole (PriLOSEC) 40 MG capsule Take 1 capsule (40 mg total) by mouth daily 90 capsule 1    rimegepant sulfate (Nurtec) 75 mg TBDP Take one NURTEC 75 mg at onset under tongue. Limit 1 in 24 hours. 16 tablet 11    rizatriptan (MAXALT) 10 mg tablet Take 1 tablet (10 mg total) by mouth once as needed for migraine May repeat in 2 hours if needed. Max 2/24 hours, 9/month. 9 tablet 12    zolpidem (AMBIEN) 10 mg tablet Take 10 mg by mouth daily at bedtime as needed for sleep       No current facility-administered medications for this visit.        Allergies:    No Known Allergies    Family History:     Family History   Problem Relation Age of Onset    Hypertension Mother     Arthritis Mother     Thyroid disease Mother     Osteoarthritis Mother     Transient ischemic attack Father     Hypertension Father     Stroke Father     Vision loss Father     Rashes / Skin problems Sister         Shingles    Migraines Sister     No Known Problems Sister     Uterine cancer Maternal Grandmother 20    Cancer Maternal Grandmother         Uterine    Colon cancer Maternal Grandfather 60    Cancer Maternal Grandfather         Colon    Lung cancer Paternal Grandmother 60    Cancer Paternal Grandmother         Lung    Heart attack Paternal Grandfather     Colon cancer Cousin 30    Breast cancer Maternal Aunt     Breast cancer Maternal Aunt     Breast cancer Maternal Aunt        Social History:     Social History     Socioeconomic History    Marital status: Single      "Spouse name: Not on file    Number of children: Not on file    Years of education: Not on file    Highest education level: Not on file   Occupational History    Not on file   Tobacco Use    Smoking status: Never    Smokeless tobacco: Never   Vaping Use    Vaping status: Never Used   Substance and Sexual Activity    Alcohol use: Yes     Alcohol/week: 2.0 standard drinks of alcohol     Types: 2 Glasses of wine per week     Comment: 2 glasses wine a week    Drug use: Never    Sexual activity: Not Currently   Other Topics Concern    Not on file   Social History Narrative    Not on file     Social Determinants of Health     Financial Resource Strain: Not on file   Food Insecurity: Not on file   Transportation Needs: Not on file   Physical Activity: Not on file   Stress: Not on file   Social Connections: Not on file   Intimate Partner Violence: Not on file   Housing Stability: Not on file         Objective:         Physical Exam:                                                                 Vitals:            Constitutional:    /78 (BP Location: Right arm, Patient Position: Sitting, Cuff Size: Standard)   Pulse 64   Temp 98.1 °F (36.7 °C) (Temporal)   Ht 5' 3\" (1.6 m)   Wt 67.1 kg (148 lb)   BMI 26.22 kg/m²   BP Readings from Last 3 Encounters:   08/30/24 129/78   08/30/24 110/70   08/30/24 105/67     Pulse Readings from Last 3 Encounters:   08/30/24 64   08/30/24 78   08/30/24 66         Well developed, well nourished, well groomed.        Psychiatric:  Normal behavior and appropriate affect        Able to answer questions appropriately, provide history of recent events   Normal language and spontaneous speech.  facial expression symmetric  symmetric bulk throughout. no atrophy, fasciculations or significant abnormal movements noted during our visit from observation.   steady casual gait          Review of Systems:   ROS obtained by medical assistant and reviewed, but if any symptoms listed below say " negative, does not mean patient has not had this symptom since last visit, please see HPI for details of symptoms discussed this visit.  Regarding any abnormal or positive findings in ROS that are not neurologic related, patient instructed to address these issues with PCP or go to the ER if they are severe.      Review of Systems   Constitutional:  Negative for appetite change and fever.   HENT:  Negative for hearing loss, trouble swallowing and voice change.    Eyes:  Positive for photophobia. Negative for pain.   Respiratory: Negative.  Negative for shortness of breath.    Cardiovascular: Negative.  Negative for palpitations.   Gastrointestinal:  Positive for nausea. Negative for vomiting.   Endocrine: Negative.  Negative for cold intolerance.   Genitourinary: Negative.  Negative for dysuria, frequency and urgency.   Musculoskeletal: Negative.  Negative for myalgias and neck pain.   Skin: Negative.  Negative for rash.   Neurological:  Positive for  headaches. Negative for dizziness, tremors, seizures, syncope, facial asymmetry, speech difficulty, weakness and light-headedness.   Hematological: Negative.  Does not bruise/bleed easily.   Psychiatric/Behavioral:  Negative for confusion, hallucinations and sleep disturbance.            I have spent 40 minutes with Patient  today in which greater than 50% of this time was spent in counseling/coordination of care regarding Prognosis, Risks and benefits of tx options, Instructions for management, Patient education, Importance of tx compliance, Risk factor reductions, Impressions, Counseling / Coordination of care, Documenting in the medical record, Reviewing / ordering tests, medicine, procedures  , Obtaining or reviewing history  , and Communicating with other healthcare professionals . I also spent 16 minutes non face to face for this patient the same day.       Author:  Nadege Jaffe MD 8/30/2024 5:26 PM

## 2024-08-30 NOTE — PROGRESS NOTES
Diagnoses and all orders for this visit:    Chronic left-sided thoracic back pain    Subacromial bursitis of left shoulder joint    Segmental dysfunction of lumbar region    Segmental dysfunction of sacral region    Segmental dysfunction of thoracic region    Segmental dysfunction of cervical region    Mechanical low back pain         ASSESSMENT:   Pt's symptoms and exam findings consistent with mechanical lbp  and hip bursitis secondary to repetitive st/sp injury, exacerbated by postural/ergonomic stressors. Pt responded well to flexion biased stretches and manual mobilization of the affected spinal and myofascial tissues with increased ROM; trial of conservative tx recommended consisting of stretching, graded mobilization/manipulation of the affected spinal and myofascial jt dysfunction, postural/ergonomic education and take home stretches/exercises. If symptoms fail to improve with short trial of conservative care, appropriate imaging and referral will be coordinated.  - The patient status is improving with lower back pain but migraine today, slight decrease in mm spasm in the neck and back after treatment.     PROCEDURE CODES: 23198 and 39517    TREATMENT:  Fear avoidance behavior discussion; encouraged and reassured pt that natural course of condition is to improve over time with adherence to tx plan and home care strategies. Home care recommendations: avoid bed rest, walk (but avoid trails and uneven surfaces), gradual return to activity to tolerance (avoid anything that peripheralizes symptoms), call if symptoms peripheralize, worsen, or neurologic deficit progresses. Ther-ex: IASTM; discussed post procedure soreness and/or ecchymosis for up to 36 hrs, applied to affected mm hypertonicities; supine hamstring stretch, supine gluteal stretch, side laying QL stretch, single knee to chest stretch, hip flexor pin-and-stretch, alternating prone hip extension, glute bridge, transitional mvmt education, abdominal  bracing; greater than 15 min spent performing above mentioned ther-ex to improve ROM/flexibility. Thoracic mobilization/manipulation: prone P-A mob, supine A-P manip; Lumbar mobilization/manipulation: diversified side laying graded HVLA, flexion-traction; SIJ Manipulation/Mobilization: R/L SIJ HVLA - long axis distraction, ahn drop table maneuver to affected SIJ    HPI:  Asmita ESPINOZA Bem is a 43 y.o. female  Mid to low back pain; Pain Scale: 5    The patient presents to the office with lower back pain that started without incident of trauma on June 1 while at a concert. She was sitting on the pain but has no pain until they were driving home. The patient does remember having mild tingling in the lower back about 6 months prior to that. The patient now has tingling across lower back. Pain is constant 4 with sitting can go up to a 8/10. Pt went to PT for a course with some improvements with working out., and did help overall but pain is still significant in the evenings. The patient was also given mm relaxers which she think she not help but also makes her too tired.  Massages once a month that helps temporarily. Exercise- metabolic and strength training. Pt sleeps a little better after Sleep therapy program but slightly more worse after last visit. Also gets pain with a bump behind legs but and notices more spider veins and most tingling into the legs but mentions wearing compression stockings for work.  7/19- The patient is feeling a little better today bc she has been off of work but still 5/10 pain.   8/2- The patient is feeling sore in the lower back pain, slightly worse bc of MRI procedure.  8/30- The patient is feeling a little better 5/10     Back Pain  The problem has been waxing and waning since onset. The pain is present in the lumbar spine.   Neck Pain     Past Medical History:   Diagnosis Date   • Allergic    • Anxiety    • Cancer (HCC) 2/1/2023    Thyroid- Papillary carcinoma   • COVID-19 01/2022    mild  s/s-not hospitalized, not vaccinated   • GERD (gastroesophageal reflux disease)    • Headache(784.0)    • History of IBS     with constipation   • Ingrown toenail    • Irritable bowel syndrome    • Migraines     Chronic per pt   • Plantar fasciitis    • Thyroid carcinoma (HCC)    • Varicella    • Venereal wart 08/07/2014      Past Surgical History:   Procedure Laterality Date   • CERVICAL BIOPSY  W/ LOOP ELECTRODE EXCISION  2017   • COLONOSCOPY     • MRI BREAST BIOPSY RIGHT (ALL INCLUSIVE) Right 7/26/2024   • NASAL SEPTUM SURGERY     • W[place] OSTEOT W/WO LNGTH SHRT/CORRJ 1ST METAR Left 12/30/2022    Procedure: OSTEOTOMY METATARSAL 1st;  Surgeon: Taiwo Harrington DPM;  Location: [facility];  Service: Podiatry   • THYROIDECTOMY N/A 2/24/2023    Procedure: TOTAL THYROIDECTOMY;  Surgeon: Vince Carey MD;  Location: [facility];  Service: Surgical Oncology   • TONSILLECTOMY     • UPPER GASTROINTESTINAL ENDOSCOPY     • US GUIDED THYROID BIOPSY  1/31/2023   • WISDOM TOOTH EXTRACTION       The following portions of the patient's history were reviewed and updated as appropriate: allergies, past family history, past medical history, past social history, past surgical history, and problem list.  Review of Systems   Musculoskeletal:  Positive for back pain, myalgias and neck pain.     Physical Exam  Musculoskeletal:         General: Tenderness present.      Cervical back: Tenderness present.      Thoracic back: Spasms and tenderness present. No swelling, edema, deformity, signs of trauma, lacerations or bony tenderness. Decreased range of motion. No scoliosis.      Lumbar back: Spasms and tenderness present. No swelling, edema, deformity, signs of trauma, lacerations or bony tenderness. Decreased range of motion. No scoliosis.        Back:    Neurological:      Gait: Gait is intact.      Deep Tendon Reflexes: Reflexes are normal and symmetric.   Psychiatric:         Attention and Perception: Attention normal.         Mood and  Affect: Affect normal.         Speech: Speech normal.         Behavior: Behavior is cooperative.         Cognition and Memory: Cognition normal.     SOFT TISSUE ASSESSMENT Hypertonicity and tenderness palpated B C4-T1, T10-S1 erector spinae, upper trap, SCM, Lev scap, hip flexor, glute med/min, QL, hamstring JOINT RESTRICTIONS: C4-T1, T10-S1 and R/L SIJ ORTHO: SI jt point tenderness: +; Mary unremarkable for centralization/peripheralization; michelle's, iliac compression, thigh thrust elicit lbp in R/L SIJ; prone femoral nerve stretch neg for upper lumbar neural tension, elicits R/L SIJ stiffness; sitting root elicits no lbp on R/L; slump test elicits no neural tension R/L, Cervical distraction- Neg, Maximal Foraminal stenosis- neg, Spurling's- neg    Return in about 1 week (around 9/6/2024) for Recheck.

## 2024-09-06 ENCOUNTER — OFFICE VISIT (OUTPATIENT)
Dept: PHYSICAL THERAPY | Facility: REHABILITATION | Age: 44
End: 2024-09-06
Payer: COMMERCIAL

## 2024-09-06 DIAGNOSIS — R10.2 PELVIC PAIN: Primary | ICD-10-CM

## 2024-09-06 PROCEDURE — 97110 THERAPEUTIC EXERCISES: CPT

## 2024-09-06 NOTE — PROGRESS NOTES
Occupational Therapy  Patient not seen in therapy.     On hold due to nursing request and medical condition     Pt placed on BIPAP and also found to have L ankle fx with NWB status requiring vending of CAM boot. Currently, pt is not appropriate for therapy at this time. IP OT will follow and assess when appropriate.       OBJECTIVE                      Therapy procedure time and total treatment time can be found documented on the Time Entry flowsheet   Daily Note     Today's date: 2024  Patient name: Asmita ESPINOZA Bem  : 1980  MRN: [id number]  Referring provider: Janet Martins MD  Dx:   Encounter Diagnosis     ICD-10-CM    1. Pelvic pain  R10.2               Start Time: 0810  Stop Time: 0900  Total time in clinic (min): 50 minutes    Chief Complaint: Constipation and Urgency/ Frequency   HPI: Pt presents to PT with longstanding history of urgency and constipation. Pt hx positive for Insterstitial cystitis dx in mid 20s. Bladder sxs have experienced sessions of flare in remission but recent flare began in 2024 ideopathically.   Constipation has been long standing for ~3 years but worsened after thyroidectomy in . Has been trailing various pharmacologic interventions w/o great success.   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 Notes that BM still do not feel complete. Urgency continues to be present w/ uses urge delay w/ fair improvement - notably mornings are difficult.       Objective: See treatment diary below      Assessment: Tolerated treatment well. Patient tolerated therapeutic exercises well with fatigue noted in target muscle groups. Progress abdominal and spinal strengthening w/ good performance. Pt would continue to benefit from skilled physical therapy to guide progression of interventions related to deficits contributing to bladder urgency, limiting pt's QOL. Next visit, continue to progress exercises as tolerated and promote PFM coordination.         Plan: Continue per plan of care.      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 (BOMN, approved, pending) Unit limit (Daily) Auth Start date Expiration date PT/OT + Visit Limit?   8/30/2024  BOMN    99     Date of Service 6/21 7/19 8/2 8/23 9/6    Visits Used 1 2 3 4 5    Visits Remaining           [employer] Created 6/21 7/19 8/2 8/23       Yes       Neuro Re-Ed         Urge deferral  Educated   Reinforced proper tactic - verbalized proper use     Breathing Mechanics Reviewed proper TA activation w/ breathing  External towel cues or breath w/ PFMC     Sitting an hook lying   15'       PFM Coordination         PFM Down Training          Internal Cueing            Biofeedback training (good performance)   30'       Ther Ex         PFM Strengthening  PFMC w/:    Bridges x20     Straight leg raise x20     Bear pose 5 sec hold x15      Curtsie squat X20 #15     Side plank w/ UE rotation x1 min each     Plank on bosu x1 min    Bear pose w/ lateral ambulation x1 min     Bear pose hold x1 min     Bear pose x1 min     Around the world #15 in tall kneel, half kneel, and standing    Anterior lat pull down Y spring x20     Half kneel forward bend #15 x20 each    Curtsie squat X20 #15     Side plank 1 min each     SL stance on bosu 1 min x 2     Bear pose w/ lateral ambulation x1 min     Half kneel forward bend #5 1 min ( unweighted R half kneel)     Tall plank w/ bilateral alternating abduction w/ sliders 2x 15 sec       Hip strengthening         Functional Strengthening     Bent over middle and upper trap shoulder extension x30 each     Abdominal Strengthening  TA contraction x20        Aerobic         Therapeutic  Rest Breaks                  Ther Activity         PFM Education         Voiding Diaries          Defecation Mechanics Educated and provided handout         Fluid Intake          Review of Sxs                  Manual Ther         PFM exam Performed rectally and vaginally         Ortho exam         PFM Manuals         Bowel Massage Performed 8'   Bowel massage 25'   [HEP]               Modalities                           Patient Education                  Outcome Measure           PFDI - 139

## 2024-09-10 ENCOUNTER — APPOINTMENT (OUTPATIENT)
Dept: LAB | Facility: HOSPITAL | Age: 44
End: 2024-09-10
Payer: COMMERCIAL

## 2024-09-10 DIAGNOSIS — Z85.850 HISTORY OF THYROID CANCER: ICD-10-CM

## 2024-09-10 DIAGNOSIS — Z08 ENCOUNTER FOR FOLLOW-UP SURVEILLANCE OF THYROID CANCER: ICD-10-CM

## 2024-09-10 DIAGNOSIS — E89.2 HYPOPARATHYROIDISM AFTER PROCEDURE (HCC): ICD-10-CM

## 2024-09-10 DIAGNOSIS — R20.2 PARESTHESIAS: ICD-10-CM

## 2024-09-10 DIAGNOSIS — Z85.850 ENCOUNTER FOR FOLLOW-UP SURVEILLANCE OF THYROID CANCER: ICD-10-CM

## 2024-09-10 LAB
PTH-INTACT SERPL-MCNC: 13.2 PG/ML (ref 12–88)
T4 FREE SERPL-MCNC: 1.11 NG/DL (ref 0.61–1.12)
T4 SERPL-MCNC: 9.64 UG/DL (ref 6.09–12.23)
TSH SERPL DL<=0.05 MIU/L-ACNC: 2.55 UIU/ML (ref 0.45–4.5)
VIT B12 SERPL-MCNC: 371 PG/ML (ref 180–914)

## 2024-09-10 PROCEDURE — 82607 VITAMIN B-12: CPT

## 2024-09-10 PROCEDURE — 84479 ASSAY OF THYROID (T3 OR T4): CPT

## 2024-09-10 PROCEDURE — 86800 THYROGLOBULIN ANTIBODY: CPT

## 2024-09-10 PROCEDURE — 84436 ASSAY OF TOTAL THYROXINE: CPT

## 2024-09-10 PROCEDURE — 83970 ASSAY OF PARATHORMONE: CPT

## 2024-09-10 PROCEDURE — 84439 ASSAY OF FREE THYROXINE: CPT

## 2024-09-10 PROCEDURE — 84432 ASSAY OF THYROGLOBULIN: CPT

## 2024-09-10 PROCEDURE — 36415 COLL VENOUS BLD VENIPUNCTURE: CPT

## 2024-09-10 PROCEDURE — 84443 ASSAY THYROID STIM HORMONE: CPT

## 2024-09-11 LAB
T3RU NFR SERPL: 30 % (ref 24–39)
THYROGLOB AB SERPL-ACNC: <1 IU/ML (ref 0–0.9)
THYROGLOB SERPL-MCNC: 0.3 NG/ML (ref 1.5–38.5)

## 2024-09-13 ENCOUNTER — HOSPITAL ENCOUNTER (OUTPATIENT)
Dept: RADIOLOGY | Age: 44
Discharge: HOME | End: 2024-09-13
Payer: COMMERCIAL

## 2024-09-13 DIAGNOSIS — Z85.850 ENCOUNTER FOR FOLLOW-UP SURVEILLANCE OF THYROID CANCER: ICD-10-CM

## 2024-09-13 DIAGNOSIS — Z08 ENCOUNTER FOR FOLLOW-UP SURVEILLANCE OF THYROID CANCER: ICD-10-CM

## 2024-09-13 PROCEDURE — 76536 US EXAM OF HEAD AND NECK: CPT

## 2024-09-18 ENCOUNTER — TELEPHONE (OUTPATIENT)
Age: 44
End: 2024-09-18

## 2024-09-18 ENCOUNTER — TRANSCRIBE ORDERS (OUTPATIENT)
Dept: GASTROENTEROLOGY | Facility: CLINIC | Age: 44
End: 2024-09-18

## 2024-09-18 ENCOUNTER — TELEMEDICINE (OUTPATIENT)
Age: 44
End: 2024-09-18
Payer: COMMERCIAL

## 2024-09-18 DIAGNOSIS — K58.1 IRRITABLE BOWEL SYNDROME WITH CONSTIPATION: Primary | ICD-10-CM

## 2024-09-18 DIAGNOSIS — K21.9 GASTROESOPHAGEAL REFLUX DISEASE WITHOUT ESOPHAGITIS: ICD-10-CM

## 2024-09-18 DIAGNOSIS — K59.01 SLOW TRANSIT CONSTIPATION: ICD-10-CM

## 2024-09-18 DIAGNOSIS — R14.0 BLOATING: ICD-10-CM

## 2024-09-18 PROCEDURE — 99214 OFFICE O/P EST MOD 30 MIN: CPT | Performed by: INTERNAL MEDICINE

## 2024-09-18 RX ORDER — LINACLOTIDE 72 UG/1
72 CAPSULE, GELATIN COATED ORAL DAILY
Qty: 90 CAPSULE | Refills: 2 | Status: SHIPPED | OUTPATIENT
Start: 2024-09-18

## 2024-09-18 NOTE — PROGRESS NOTES
[facility] - Outpatient Consultation  Asmita ESPINOZA Bem 44 y.o. female MRN: [id number]  Encounter: [id number]          ASSESSMENT AND PLAN:      1. Irritable bowel syndrome with constipation  - linaCLOtide (Linzess) 72 MCG CAPS; Take 72 mcg by mouth in the morning  Dispense: 90 capsule; Refill: 2  2. Slow transit constipation  3. Bloating  4. Gastroesophageal reflux disease without esophagitis    44-year-old female presenting for follow-up regarding chronic constipation likely secondary to irritable bowel syndrome as well as GERD.     Given lack of response to multiple OTC and prescription laxatives, she has been working with pelvic floor physical therapy and has had some improvement in terms of relaxation of the pelvic floor and possibly increased stool output.  Have encouraged her to continue pelvic PT to completion and to maintain exercise instructions    Given incomplete therapeutic response to Amitiza max dose requiring without complete response to OTC Dulcolax/MiraLAX, will trial dual therapy with Amitiza and low-dose Linzess.  I would like her to try this for roughly 2 weeks to assess any symptom improvement and make adjustments based on this.       Can continue PPI for GERD     Follow-up in 3 to 4 months  ______________________________________________________________________    HPI:   45 y/o female presenting for evaluation of ongoing constipation refractory to multiple bowel regimens, bloating , GERD.  Telemedicine consent    Patient: Asmita ESPINOZA Tyson  Provider: Thi Wall DO  Provider located at [facility] [address] [address] [phone]    The patient was identified by name and date of birth. Asmita ESPINOZA Edwinmarnie was informed that this is a telemedicine visit and that the visit is being conducted through the Epic Embedded platform. She agrees to proceed..  My office door was closed. No one else was  in the room.  She acknowledged consent and understanding of privacy and security of the video platform. The patient has agreed to participate and understands they can discontinue the visit at any time.    Patient is aware this is a billable service.     I spent 16 minutes with the patient during this visit. During her last visit there was an attempt to get Motegrity approved but unfortunately this was not covered.  Continued on Amitiza.  She reports being able to have bowel movement but this is only occurring after every several days.  She would ideally like to have more frequent stools although does feel that this is somewhat of an improvement.     She has tried Linzess max dose, titrating MiraLAX, Trulance, and currently on Amitiza 24 mcg.  Wanted to try Motegrity but unfortunately this was not approved despite 2 attempts.  She continues to have incomplete response with harder stool consistency using this with MiraLAX once daily.  Pelvic PT has provided some relief to allow for more complete evacuation but still not fully getting stool out of time.  She is still needing to take Dulcolax at least once a week to help empty but this is also incomplete.           REVIEW OF SYSTEMS:    CONSTITUTIONAL: Denies any fever, chills, rigors, and weight loss.  HEENT: Denies odynophagia, tinnitus  CARDIOVASCULAR: No chest pain or palpitations.   RESPIRATORY: Denies any cough, hemoptysis, shortness of breath or dyspnea on exertion.  GASTROINTESTINAL: As noted in the History of Present Illness.   GENITOURINARY: No problems with urination. Denies any hematuria or dysuria.  NEUROLOGIC: No dizziness or vertigo, denies headaches.   MUSCULOSKELETAL: Denies any muscle or joint pain.   SKIN: Denies skin rashes or itching.   ENDOCRINE:  Denies intolerance to heat or cold.  PSYCHOSOCIAL: Denies depression or anxiety. Denies any recent memory loss.       Historical Information   Past Medical History:   Diagnosis Date    Allergic      Anxiety     Cancer (HCC) 2/1/2023    Thyroid- Papillary carcinoma    COVID-19 01/2022    mild s/s-not hospitalized, not vaccinated    GERD (gastroesophageal reflux disease)     Headache(784.0)     History of IBS     with constipation    Ingrown toenail     Irritable bowel syndrome     Migraines     Chronic per pt    Plantar fasciitis     Thyroid carcinoma (HCC)     Varicella     Venereal wart 08/07/2014     Past Surgical History:   Procedure Laterality Date    CERVICAL BIOPSY  W/ LOOP ELECTRODE EXCISION  2017    COLONOSCOPY      MRI BREAST BIOPSY RIGHT (ALL INCLUSIVE) Right 7/26/2024    NASAL SEPTUM SURGERY      WI OSTEOT W/WO LNGTH SHRT/CORRJ 1ST METAR Left 12/30/2022    Procedure: OSTEOTOMY METATARSAL 1st;  Surgeon: Taiwo Harrington DPM;  Location: [facility];  Service: Podiatry    THYROIDECTOMY N/A 2/24/2023    Procedure: TOTAL THYROIDECTOMY;  Surgeon: Vince Carey MD;  [facility]: [facility];  Service: Surgical Oncology    TONSILLECTOMY      UPPER GASTROINTESTINAL ENDOSCOPY      US GUIDED THYROID BIOPSY  1/31/2023    WISDOM TOOTH EXTRACTION       Social History   Social History     Substance and Sexual Activity   Alcohol Use Yes    Alcohol/week: 2.0 standard drinks of alcohol    Types: 2 Glasses of wine per week    Comment: 2 glasses wine a week     Social History     Substance and Sexual Activity   Drug Use Never     Social History     Tobacco Use   Smoking Status Never   Smokeless Tobacco Never     Family History   Problem Relation Age of Onset    Hypertension Mother     Arthritis Mother     Thyroid disease Mother     Osteoarthritis Mother     Transient ischemic attack Father     Hypertension Father     Stroke Father     Vision loss Father     Rashes / Skin problems Sister         Shingles    Migraines Sister     No Known Problems Sister     Uterine cancer Maternal Grandmother 20    Cancer Maternal Grandmother         Uterine    Colon cancer Maternal Grandfather 60    Cancer Maternal Grandfather         Colon     Lung cancer Paternal Grandmother 60    Cancer Paternal Grandmother         Lung    Heart attack Paternal Grandfather     Colon cancer Cousin 30    Breast cancer Maternal Aunt     Breast cancer Maternal Aunt     Breast cancer Maternal Aunt        Meds/Allergies       Current Outpatient Medications:     linaCLOtide (Linzess) 72 MCG CAPS    acetaminophen (TYLENOL) 500 mg tablet    bisacodyl (DULCOLAX) 5 mg EC tablet    calcitriol (ROCALTROL) 0.25 mcg capsule    calcium citrate (CALCITRATE) 950 (200 Ca) MG tablet    cholecalciferol (VITAMIN D3) 1,000 units tablet    clindamycin (CLEOCIN T) 1 % lotion    Galcanezumab-gnlm (Emgality) 120 MG/ML SOAJ    hydrocortisone (ANUSOL-HC) 2.5 % rectal cream    Levocetirizine Dihydrochloride (XYZAL PO)    levothyroxine 125 mcg tablet    lubiprostone (AMITIZA) 24 mcg capsule    Melatonin 5 MG TABS    meloxicam (MOBIC) 15 mg tablet    Multiple Vitamin (multivitamin) tablet    omeprazole (PriLOSEC) 40 MG capsule    rimegepant sulfate (Nurtec) 75 mg TBDP    rizatriptan (MAXALT) 10 mg tablet    zolpidem (AMBIEN) 10 mg tablet    No Known Allergies        Objective     There were no vitals taken for this visit. There is no height or weight on file to calculate BMI.        PHYSICAL EXAM:      General Appearance:   Alert, cooperative, no distress   Limited, virtual visit                                              Lab Results:   No visits with results within 1 Day(s) from this visit.   Latest known visit with results is:   Appointment on 09/10/2024   Component Date Value    PTH 09/10/2024 13.2     TSH 3RD GENERATON 09/10/2024 2.548     Free T4 09/10/2024 1.11     Vitamin B-12 09/10/2024 371     T3 Uptake Ratio 09/10/2024 30     T4 TOTAL 09/10/2024 9.64     Thyroglobulin Ab 09/10/2024 <1.0     Thyroglobulin-MACHO 09/10/2024 0.3 (L)          Radiology Results:   US head neck lymph node mapping    Result Date: 9/17/2024  Narrative: NECK ULTRASOUND INDICATION: Z08: Encounter for follow-up  examination after completed treatment for malignant neoplasm Z85.850: Personal history of malignant neoplasm of thyroid. COMPARISON: 3/8/2024 FINDINGS: Ultrasound of the thyroidectomy bed and cervical lymph node chains was performed with a high frequency linear transducer. Stable 6 x 6 x 5 mm right thyroid bed nodule noted. Lymph nodes maintain normal morphologic contour, echogenicity and short axis dimensions of less than 0.7 cm. No evidence for microcalcification or focal nodularity.     Impression: Stable subcentimeter right thyroid bed nodule. No cervical lymphadenopathy. Workstation performed: [id number]

## 2024-09-18 NOTE — TELEPHONE ENCOUNTER
CCD chart pa request M Linzess  KEY:  [id number]    Called the number.  This is a co pay card for patient assistance. Pharmacy is using the incorrect card for the pt.

## 2024-09-19 ENCOUNTER — TELEPHONE (OUTPATIENT)
Age: 44
End: 2024-09-19

## 2024-09-19 NOTE — TELEPHONE ENCOUNTER
No pa needed for Linzess   Message sent to GI office  Please advise pt to call pharmacy and give her correct pharmacy benefits information.   Thank you

## 2024-09-19 NOTE — TELEPHONE ENCOUNTER
I called pt advised no PA required   Pharmacy has correct cards on file     Advised if she has a high co-pay that means she has not met her deductible for the year    Office has no barring on co-pay this is responsibility of pt to met per their insurance     Asked pt to return our call with any further questions

## 2024-09-20 ENCOUNTER — PROCEDURE VISIT (OUTPATIENT)
Age: 44
End: 2024-09-20
Payer: COMMERCIAL

## 2024-09-20 ENCOUNTER — OFFICE VISIT (OUTPATIENT)
Dept: PHYSICAL THERAPY | Facility: REHABILITATION | Age: 44
End: 2024-09-20
Payer: COMMERCIAL

## 2024-09-20 ENCOUNTER — OFFICE VISIT (OUTPATIENT)
Dept: SURGICAL ONCOLOGY | Facility: CLINIC | Age: 44
End: 2024-09-20
Payer: COMMERCIAL

## 2024-09-20 VITALS
SYSTOLIC BLOOD PRESSURE: 102 MMHG | BODY MASS INDEX: 26.4 KG/M2 | HEIGHT: 63 IN | DIASTOLIC BLOOD PRESSURE: 66 MMHG | HEART RATE: 72 BPM | OXYGEN SATURATION: 97 % | WEIGHT: 149 LBS

## 2024-09-20 VITALS
DIASTOLIC BLOOD PRESSURE: 80 MMHG | HEIGHT: 63 IN | WEIGHT: 149 LBS | TEMPERATURE: 98.8 F | SYSTOLIC BLOOD PRESSURE: 118 MMHG | BODY MASS INDEX: 26.4 KG/M2 | OXYGEN SATURATION: 99 % | HEART RATE: 73 BPM

## 2024-09-20 DIAGNOSIS — M99.04 SEGMENTAL DYSFUNCTION OF SACRAL REGION: ICD-10-CM

## 2024-09-20 DIAGNOSIS — M99.01 SEGMENTAL DYSFUNCTION OF CERVICAL REGION: ICD-10-CM

## 2024-09-20 DIAGNOSIS — M99.02 SEGMENTAL DYSFUNCTION OF THORACIC REGION: ICD-10-CM

## 2024-09-20 DIAGNOSIS — G89.29 CHRONIC LEFT-SIDED THORACIC BACK PAIN: Primary | ICD-10-CM

## 2024-09-20 DIAGNOSIS — M54.59 MECHANICAL LOW BACK PAIN: ICD-10-CM

## 2024-09-20 DIAGNOSIS — M99.03 SEGMENTAL DYSFUNCTION OF LUMBAR REGION: ICD-10-CM

## 2024-09-20 DIAGNOSIS — R10.2 PELVIC PAIN: Primary | ICD-10-CM

## 2024-09-20 DIAGNOSIS — Z08 ENCOUNTER FOR FOLLOW-UP SURVEILLANCE OF THYROID CANCER: Primary | ICD-10-CM

## 2024-09-20 DIAGNOSIS — M75.52 SUBACROMIAL BURSITIS OF LEFT SHOULDER JOINT: ICD-10-CM

## 2024-09-20 DIAGNOSIS — Z85.850 HISTORY OF THYROID CANCER: ICD-10-CM

## 2024-09-20 DIAGNOSIS — M54.6 CHRONIC LEFT-SIDED THORACIC BACK PAIN: Primary | ICD-10-CM

## 2024-09-20 DIAGNOSIS — Z85.850 ENCOUNTER FOR FOLLOW-UP SURVEILLANCE OF THYROID CANCER: Primary | ICD-10-CM

## 2024-09-20 PROCEDURE — 97110 THERAPEUTIC EXERCISES: CPT

## 2024-09-20 PROCEDURE — 99213 OFFICE O/P EST LOW 20 MIN: CPT

## 2024-09-20 PROCEDURE — 98941 CHIROPRACT MANJ 3-4 REGIONS: CPT | Performed by: CHIROPRACTOR

## 2024-09-20 PROCEDURE — 97110 THERAPEUTIC EXERCISES: CPT | Performed by: CHIROPRACTOR

## 2024-09-20 RX ORDER — VITAMIN B COMPLEX
1 TABLET ORAL
COMMUNITY

## 2024-09-20 NOTE — ASSESSMENT & PLAN NOTE
No evidence of disease recurrence on imaging, and thyroglobulin studies are stable.  Unfortunately, TSH is not currently suppressed.  I have encouraged the patient to discuss increasing her levothyroxine dose with her endocrinologist next week.  I will see her again in 6 months with repeat US.

## 2024-09-20 NOTE — PROGRESS NOTES
There are no diagnoses linked to this encounter.       ASSESSMENT:   Pt's symptoms and exam findings consistent with mechanical lbp  and hip bursitis secondary to repetitive st/sp injury, exacerbated by postural/ergonomic stressors. Pt responded well to flexion biased stretches and manual mobilization of the affected spinal and myofascial tissues with increased ROM; trial of conservative tx recommended consisting of stretching, graded mobilization/manipulation of the affected spinal and myofascial jt dysfunction, postural/ergonomic education and take home stretches/exercises. If symptoms fail to improve with short trial of conservative care, appropriate imaging and referral will be coordinated.  - The patient status is flared up with lower back pain but migraine today, slight decrease in mm spasm in the neck and back after treatment.     PROCEDURE CODES: 95548 and 86236    TREATMENT:  Fear avoidance behavior discussion; encouraged and reassured pt that natural course of condition is to improve over time with adherence to tx plan and home care strategies. Home care recommendations: avoid bed rest, walk (but avoid trails and uneven surfaces), gradual return to activity to tolerance (avoid anything that peripheralizes symptoms), call if symptoms peripheralize, worsen, or neurologic deficit progresses. Ther-ex: IASTM; discussed post procedure soreness and/or ecchymosis for up to 36 hrs, applied to affected mm hypertonicities; supine hamstring stretch, supine gluteal stretch, side laying QL stretch, single knee to chest stretch, hip flexor pin-and-stretch, alternating prone hip extension, glute bridge, transitional mvmt education, abdominal bracing; greater than 15 min spent performing above mentioned ther-ex to improve ROM/flexibility. Thoracic mobilization/manipulation: prone P-A mob, supine A-P manip; Lumbar mobilization/manipulation: diversified side laying graded HVLA, flexion-traction; SIJ  Manipulation/Mobilization: R/L SIJ HVLA - long axis distraction, ahn drop table maneuver to affected SIJ    HPI:  Asmita ESPINOZA Bem is a 44 y.o. female  Mid to low back pain; Pain Scale: 5    The patient presents to the office with lower back pain that started without incident of trauma on June 1 while at a concert. She was sitting on the pain but has no pain until they were driving home. The patient does remember having mild tingling in the lower back about 6 months prior to that. The patient now has tingling across lower back. Pain is constant 4 with sitting can go up to a 8/10. Pt went to PT for a course with some improvements with working out., and did help overall but pain is still significant in the evenings. The patient was also given mm relaxers which she think she not help but also makes her too tired.  Massages once a month that helps temporarily. Exercise- metabolic and strength training. Pt sleeps a little better after Sleep therapy program but slightly more worse after last visit. Also gets pain with a bump behind legs but and notices more spider veins and most tingling into the legs but mentions wearing compression stockings for work.  7/19- The patient is feeling a little better today bc she has been off of work but still 5/10 pain.   8/2- The patient is feeling sore in the lower back pain, slightly worse bc of MRI procedure.  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     Back Pain  The problem has been waxing and waning since onset. The pain is present in the lumbar spine.   Neck Pain       Past Medical History:   Diagnosis Date    Allergic     Anxiety     Cancer (HCC) 2/1/2023    Thyroid- Papillary carcinoma    COVID-19 01/2022    mild s/s-not hospitalized, not vaccinated    GERD (gastroesophageal reflux disease)     Headache(784.0)     History of IBS     with constipation    Ingrown toenail     Irritable  bowel syndrome     Migraines     Chronic per pt    Plantar fasciitis     Thyroid carcinoma (HCC)     Varicella     Venereal wart 08/07/2014      Past Surgical History:   Procedure Laterality Date    CERVICAL BIOPSY  W/ LOOP ELECTRODE EXCISION  2017    COLONOSCOPY      MRI BREAST BIOPSY RIGHT (ALL INCLUSIVE) Right 7/26/2024    NASAL SEPTUM SURGERY      AK OSTEOT W/WO LNGTH SHRT/CORRJ 1ST METAR Left 12/30/2022    Procedure: OSTEOTOMY METATARSAL 1st;  Surgeon: Taiwo Harrington DPM;  Location: [facility];  Service: Podiatry    THYROIDECTOMY N/A 2/24/2023    Procedure: TOTAL THYROIDECTOMY;  Surgeon: Vince Carey MD;  [facility];  Service: Surgical Oncology    TONSILLECTOMY      UPPER GASTROINTESTINAL ENDOSCOPY      US GUIDED THYROID BIOPSY  1/31/2023    WISDOM TOOTH EXTRACTION       The following portions of the patient's history were reviewed and updated as appropriate: allergies, past family history, past medical history, past social history, past surgical history, and problem list.  Review of Systems   Musculoskeletal:  Positive for back pain, myalgias and neck pain.     Physical Exam  Musculoskeletal:         General: Tenderness present.      Cervical back: Tenderness present.      Thoracic back: Spasms and tenderness present. No swelling, edema, deformity, signs of trauma, lacerations or bony tenderness. Decreased range of motion. No scoliosis.      Lumbar back: Spasms and tenderness present. No swelling, edema, deformity, signs of trauma, lacerations or bony tenderness. Decreased range of motion. No scoliosis.        Back:    Neurological:      Gait: Gait is intact.      Deep Tendon Reflexes: Reflexes are normal and symmetric.   Psychiatric:         Attention and Perception: Attention normal.         Mood and Affect: Affect normal.         Speech: Speech normal.         Behavior: Behavior is cooperative.         Cognition and Memory: Cognition normal.       SOFT TISSUE ASSESSMENT Hypertonicity and  tenderness palpated B C4-T1, T10-S1 erector spinae, upper trap, SCM, Lev scap, hip flexor, glute med/min, QL, hamstring JOINT RESTRICTIONS: C4-T1, T10-S1 and R/L SIJ ORTHO: SI jt point tenderness: +; Mary unremarkable for centralization/peripheralization; michelle's, iliac compression, thigh thrust elicit lbp in R/L SIJ; prone femoral nerve stretch neg for upper lumbar neural tension, elicits R/L SIJ stiffness; sitting root elicits no lbp on R/L; slump test elicits no neural tension R/L, Cervical distraction- Neg, Maximal Foraminal stenosis- neg, Spurling's- neg    No follow-ups on file.

## 2024-09-20 NOTE — LETTER
September 20, 2024     Vince Carey MD  [address] [address]    Patient: Asmita ESPINOZA Bem   YOB: 1980   Date of Visit: 9/20/2024       Dear Dr. Carey:    Thank you for referring Asmita Mehta to me for evaluation. Below are my notes for this consultation.    If you have questions, please do not hesitate to call me. I look forward to following your patient along with you.         Sincerely,        HENRY Queen        CC: MD Dione Barbosa CRNP  9/20/2024 11:06 AM  Sign when Signing Visit               Surgical Oncology Follow Up       [facility] SURGICAL ONCOLOGY [address] [address]    Asmita ESPINOZA Bem  1980  [id number]  [facility] SURGICAL ONCOLOGY [address] [address]    1. Encounter for follow-up surveillance of thyroid cancer  2. History of thyroid cancer  Assessment & Plan:  No evidence of disease recurrence on imaging, and thyroglobulin studies are stable.  Unfortunately, TSH is not currently suppressed.  I have encouraged the patient to discuss increasing her levothyroxine dose with her endocrinologist next week.  I will see her again in 6 months with repeat US.  Orders:  -     US head neck lymph node mapping; Future; Expected date: 03/20/2025         Chief Complaint   Patient presents with   • Follow-up       Return in about 6 months (around 3/20/2025) for Office Visit, Imaging - See orders.      Oncology History   History of thyroid cancer   1/31/2023 Biopsy    Thyroid, Left, lower (Thin-prep and smear preparations):    Malignant ([place] Category VI)  Papillary carcinoma.     2/24/2023 Surgery    Thyroid; total thyroidectomy:       - Papillary thyroid carcinoma, classic, 1.2 cm      - Carcinoma involves left thyroid lobe      - Lymphovascular invasion: Not identified       - Extrathyroidal extension: Not  identified       - All margins free of carcinoma       - Background thyroid with previous biopsy site changes  pT1b     2/24/2023 -  Cancer Staged    Staging form: Thyroid - Differentiated and Anaplastic, AJCC 8th Edition  - Pathologic stage from 2/24/2023: Stage I (pT1b, pNX, cM0, Age at diagnosis: < 55 years) - Signed by HENRY Queen on 8/21/2023  Stage prefix: Initial diagnosis  Lymph node metastasis: Unknown             History of Present Illness: This is a 43 y/o female who returns to the office today in follow-up for her history of thyroid cancer.  She is currently JAMAL at 1 1/2 years from total thyroidectomy.  She denies any new issues such as voice changes or difficulty swallowing, and she has not noticed any new lumps in her neck.  She does complain of fatigue and SOB, which she attributes to her history of anemia.  She continues to see her endocrinologist for levothyroxine management, with follow-up scheduled in 1 week. Thyroid studies have been completes, and US was performed on September 13.  I have reviewed these results with the patient today.      Review of Systems   Constitutional:  Positive for fatigue. Negative for activity change, appetite change and unexpected weight change.   HENT: Negative.  Negative for trouble swallowing and voice change.    Respiratory:  Positive for chest tightness and shortness of breath. Negative for cough.    Cardiovascular: Negative.    Musculoskeletal: Negative.    Skin:  Negative for color change.   Neurological:  Negative for dizziness and headaches.   Hematological:  Negative for adenopathy.   Psychiatric/Behavioral:  Positive for sleep disturbance.              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)   • Hyperlipidemia   • Iron deficiency   • Encounter for follow-up surveillance of thyroid cancer   • Myofascial pain syndrome     Past Medical History:   Diagnosis Date   • Allergic    • Anxiety    • Cancer (HCC) 2/1/2023    Thyroid- Papillary carcinoma   • COVID-19 01/2022    mild s/s-not hospitalized, not vaccinated   • GERD (gastroesophageal reflux disease)    • Headache(784.0)    • History of IBS     with constipation   • Ingrown toenail    • Irritable bowel syndrome    • Migraines     Chronic per pt   • Plantar fasciitis    • Thyroid carcinoma (HCC)    • Varicella    • Venereal wart 08/07/2014     Past Surgical History:   Procedure Laterality Date   • CERVICAL BIOPSY  W/ LOOP ELECTRODE EXCISION  2017   • COLONOSCOPY     • MRI BREAST BIOPSY RIGHT (ALL INCLUSIVE) Right 7/26/2024   • NASAL SEPTUM SURGERY     • GA OSTEOT W/WO LNGTH SHRT/CORRJ 1ST METAR Left 12/30/2022    Procedure: OSTEOTOMY METATARSAL 1st;  Surgeon: Taiwo Harrington DPM;  Location: [facility];  Service: Podiatry   • THYROIDECTOMY N/A 2/24/2023    Procedure: TOTAL THYROIDECTOMY;  Surgeon: Vince Carey MD;  Location: [facility];  Service: Surgical Oncology   • TONSILLECTOMY     • UPPER GASTROINTESTINAL ENDOSCOPY     • US GUIDED THYROID BIOPSY  1/31/2023   • WISDOM TOOTH EXTRACTION       Family History   Problem Relation Age of Onset   • Hypertension Mother    • Arthritis Mother    • Thyroid disease Mother    • Osteoarthritis Mother    • Transient ischemic attack Father    • Hypertension Father    • Stroke Father    • Vision loss Father    • Rashes / Skin problems Sister         Shingles   • Migraines Sister    • No Known Problems Sister    • Uterine cancer Maternal Grandmother 20   • Cancer Maternal Grandmother         Uterine   • Colon  cancer Maternal Grandfather 60   • Cancer Maternal Grandfather         Colon   • Lung cancer Paternal Grandmother 60   • Cancer Paternal Grandmother         Lung   • Heart attack Paternal Grandfather    • Colon cancer Cousin 30   • Breast cancer Maternal Aunt    • Breast cancer Maternal Aunt    • Breast cancer Maternal Aunt      Social History     Socioeconomic History   • Marital status: Single     Spouse name: Not on file   • Number of children: Not on file   • Years of education: Not on file   • Highest education level: Not on file   Occupational History   • Not on file   Tobacco Use   • Smoking status: Never   • Smokeless tobacco: Never   Vaping Use   • Vaping status: Never Used   Substance and Sexual Activity   • Alcohol use: Yes     Alcohol/week: 2.0 standard drinks of alcohol     Types: 2 Glasses of wine per week     Comment: 2 glasses wine a week   • Drug use: Never   • Sexual activity: Not Currently   Other Topics Concern   • Not on file   Social History Narrative   • Not on file     Social Determinants of Health     Financial Resource Strain: Not on file   Food Insecurity: Not on file   Transportation Needs: Not on file   Physical Activity: Not on file   Stress: Not on file   Social Connections: Not on file   Intimate Partner Violence: Not on file   Housing Stability: Not on file       Current Outpatient Medications:   •  acetaminophen (TYLENOL) 500 mg tablet, Take 1,000 mg by mouth every 6 (six) hours as needed for mild pain, Disp: , Rfl:   •  bisacodyl (DULCOLAX) 5 mg EC tablet, Take 2 tablets (10 mg total) by mouth 2 (two) times a day as needed for constipation, Disp: 60 tablet, Rfl: 1  •  calcitriol (ROCALTROL) 0.25 mcg capsule, TAKE ONE CAPSULE BY MOUTH 2 TIMES A DAY, Disp: 180 capsule, Rfl: 1  •  calcium citrate (CALCITRATE) 950 (200 Ca) MG tablet, Take 1 tablet (950 mg total) by mouth 2 (two) times a day, Disp: 120 tablet, Rfl: 0  •  cholecalciferol (VITAMIN D3) 1,000 units tablet, Take 1 tablet  (1,000 Units total) by mouth daily Do not start before March 1, 2023., Disp: 30 tablet, Rfl: 0  •  clindamycin (CLEOCIN T) 1 % lotion, in the morning, Disp: , Rfl:   •  Galcanezumab-gnlm (Emgality) 120 MG/ML SOAJ, INJECT 120MG SUBCUTANEOUSLY (UNDER THE SKIN) EVERY 28 DAYS, Disp: 1 mL, Rfl: 11  •  hydrocortisone (ANUSOL-HC) 2.5 % rectal cream, Apply topically 2 (two) times a day (Patient taking differently: Apply topically 2 (two) times a day PRN), Disp: 28 g, Rfl: 2  •  Levocetirizine Dihydrochloride (XYZAL PO), Take by mouth daily at bedtime, Disp: , Rfl:   •  levothyroxine 125 mcg tablet, TAKE ONE TABLET BY MOUTH DAILY, Disp: 90 tablet, Rfl: 1  •  linaCLOtide (Linzess) 72 MCG CAPS, Take 72 mcg by mouth in the morning, Disp: 90 capsule, Rfl: 2  •  lubiprostone (AMITIZA) 24 mcg capsule, Take 1 capsule (24 mcg total) by mouth 2 (two) times a day with meals, Disp: 60 capsule, Rfl: 5  •  Melatonin 5 MG TABS, Take by mouth as needed, Disp: , Rfl:   •  meloxicam (MOBIC) 15 mg tablet, Take 15 mg by mouth if needed, Disp: , Rfl:   •  Multiple Vitamin (multivitamin) tablet, Take 1 tablet by mouth daily, Disp: , Rfl:   •  omeprazole (PriLOSEC) 40 MG capsule, Take 1 capsule (40 mg total) by mouth daily, Disp: 90 capsule, Rfl: 1  •  rimegepant sulfate (Nurtec) 75 mg TBDP, Take one NURTEC 75 mg at onset under tongue. Limit 1 in 24 hours., Disp: 16 tablet, Rfl: 11  •  rizatriptan (MAXALT) 10 mg tablet, Take 1 tablet (10 mg total) by mouth once as needed for migraine May repeat in 2 hours if needed. Max 2/24 hours, 9/month., Disp: 9 tablet, Rfl: 12  •  zolpidem (AMBIEN) 10 mg tablet, Take 10 mg by mouth daily at bedtime as needed for sleep, Disp: , Rfl:   No Known Allergies  Vitals:    09/20/24 1034   BP: 118/80   Pulse: 73   Temp: 98.8 °F (37.1 °C)   SpO2: 99%       Physical Exam  Vitals reviewed.   Constitutional:       General: She is not in acute distress.     Appearance: Normal appearance. She is normal weight. She is not  ill-appearing or toxic-appearing.   HENT:      Head: Normocephalic and atraumatic.      Nose: Nose normal.      Mouth/Throat:      Mouth: Mucous membranes are moist.   Neck:      Comments: Well-healed anterior neck incision.  Cardiovascular:      Rate and Rhythm: Normal rate.   Pulmonary:      Effort: Pulmonary effort is normal.   Musculoskeletal:         General: Normal range of motion.      Cervical back: Normal range of motion and neck supple. No rigidity or tenderness.   Lymphadenopathy:      Head:      Right side of head: No submandibular adenopathy.      Left side of head: No submandibular adenopathy.      Cervical: No cervical adenopathy.      Upper Body:      Right upper body: No supraclavicular adenopathy.      Left upper body: No supraclavicular adenopathy.   Skin:     General: Skin is warm and dry.   Neurological:      General: No focal deficit present.      Mental Status: She is alert and oriented to person, place, and time.   Psychiatric:         Mood and Affect: Mood normal.         Behavior: Behavior normal.         Thought Content: Thought content normal.         Judgment: Judgment normal.         Labs:  Collected 09/10/2024:  Collected Updated Procedure    09/10/2024 0813 09/11/2024 1106 Thyroglobulin by MACHO [[id number]]    (Abnormal)   Blood    Component Value Units   Thyroglobulin-MACHO 0.3 Low   ng/mL          09/10/2024 0813 09/11/2024 1106 Thyroglobulin [[id number]]    Blood    Component Value Units   Thyroglobulin Ab <1.0  IU/mL          09/10/2024 0813 09/10/2024 0858 TSH, 3rd generation [[id number]]   Blood    Component Value Units   TSH 3RD GENERATON 2.548  uIU/mL              Imaging  US head neck lymph node mapping    Result Date: 9/17/2024  Narrative: NECK ULTRASOUND INDICATION: Z08: Encounter for follow-up examination after completed treatment for malignant neoplasm Z85.850: Personal history of malignant neoplasm of thyroid. COMPARISON: 3/8/2024 FINDINGS: Ultrasound of the thyroidectomy bed  and cervical lymph node chains was performed with a high frequency linear transducer. Stable 6 x 6 x 5 mm right thyroid bed nodule noted. Lymph nodes maintain normal morphologic contour, echogenicity and short axis dimensions of less than 0.7 cm. No evidence for microcalcification or focal nodularity.     Impression: Stable subcentimeter right thyroid bed nodule. No cervical lymphadenopathy. Workstation performed: [id number]     I personally reviewed and interpreted the above laboratory and imaging data.

## 2024-09-20 NOTE — PROGRESS NOTES
Discharge     Today's date: 2024  Patient name: Asmita ESPINOZA Bem  : 1980  MRN: [id number]  Referring provider: Janet Martins MD  Dx:   Encounter Diagnosis     ICD-10-CM    1. Pelvic pain  R10.2                 Start Time: 1300  Stop Time: 1345  Total time in clinic (min): 45 minutes    Chief Complaint: Constipation and Urgency/ Frequency   HPI: Pt presents to PT with longstanding history of urgency and constipation. Pt hx positive for Insterstitial cystitis dx in mid 20s. Bladder sxs have experienced sessions of flare in remission but recent flare began in 2024 ideopathically.   Constipation has been long standing for ~3 years but worsened after thyroidectomy in . Has been trailing various pharmacologic interventions w/o great success.   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 Urgency and frequency has noticed a flare w/ rise in migraines and mid thoracic pain.       Objective: See treatment diary below    Goals  Dysfunction:   In 10 weeks, patient will demonstrate improved PFM strength to at least 4/5. - not assessed  In 10 weeks, patient will demonstrate improved PFM endurance to 8 sec of 3/5 strength or greater. - not assessed  In 10 weeks, patient will demonstrate improved PFM relaxation to at least 70% or greater. - not assessed     Bladder:  In 10 weeks, patient will report improvement in urge incontinence as measured by 0 leaks in 14 days. - 2x in past week   In 10 weeks, patient will demonstrate normalized daytime void interval of 2-4 hours with adequate fluid intake. - on going  In 10 weeks, patient will reduce nocturia to 0 per night. - Has been in flare since pain     Pain:   In 10 weeks, patient will report painfree intravaginal PFM exam.    Bowel:  In 10 weeks, patient will report improvement in defecatory function as evidenced by no pain  during 100% of BM. - 70% - pain free   In 4 weeks, patient will perform x2 consecutive bear downs with proper breathing mechanics and 50% or greater PFM relaxation. - not assessed     Outcome Measure:  In 10 weeks, patient will score at least 10 or less on PFDI to indicate meaningful change from 139 at IE.     Assessment: Tolerated treatment well. Pt reports high levels of mid thoracic pain affective function - progress mobility and global relaxation today w/ good results. Educated on providers for thoracic pain and scheduled for visit. Reviewed pt goals and POC for bowel deficits/ bladder deficits. Further intervention for session deferred 2/2 pain. Patient reports goals have been met and is equipped to transition to self management. Pt verbalized understanding of HEP and proper progression of interventions. Plan to discharge at this time.           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 (BOMN, approved, pending) Unit limit (Daily) Auth Start date Expiration date PT/OT + Visit Limit?   8/30/2024  BOMN    99     Date of Service 6/21 7/19 8/2 8/23 9/6 9/20   Visits Used 1 2 3 4 5 6   Visits Remaining           [employer] Created 6/21 7/19 8/2 8/23       Yes       Neuro Re-Ed         Urge deferral  Educated   Reinforced proper tactic -  verbalized proper use     Breathing Mechanics Reviewed proper TA activation w/ breathing  External towel cues or breath w/ PFMC     Sitting an hook lying   15'       PFM Coordination         PFM Down Training          Internal Cueing            Biofeedback training (good performance)   30'       Ther Ex         PFM Strengthening  PFMC w/:    Bridges x20     Straight leg raise x20     Bear pose 5 sec hold x15      Curtsie squat X20 #15     Side plank w/ UE rotation x1 min each     Plank on bosu x1 min    Bear pose w/ lateral ambulation x1 min     Bear pose hold x1 min     Bear pose x1 min     Around the [place] #15 in tall kneel, half kneel, and standing    Anterior lat pull down Y spring x20     Half kneel forward bend #15 x20 each    Curtsie squat X20 #15     Side plank 1 min each     SL stance on bosu 1 min x 2     Bear pose w/ lateral ambulation x1 min     Half kneel forward bend #5 1 min ( unweighted R half kneel)     Tall plank w/ bilateral alternating abduction w/ sliders 2x 15 sec       Hip strengthening               Pball mobility 15'   DB w/ relaxation positions (LE on wall , supported bridge, Suported LE extension)    Functional Strengthening     Bent over middle and upper trap shoulder extension x30 each     Abdominal Strengthening  TA contraction x20        Aerobic      TM walking 8'    Therapeutic Rest Breaks                  Ther Activity         PFM Education         Voiding Diaries          Defecation Mechanics Educated and provided handout         Fluid Intake          Review of Sxs               Review of sxs and goals    Manual Ther         PFM exam Performed rectally and vaginally         Ortho exam         PFM Manuals         Bowel Massage Performed 8'   Bowel massage 25'   [HEP]               Modalities                           Patient Education                  Outcome Measure           PFDI - 139

## 2024-09-20 NOTE — PROGRESS NOTES
Surgical Oncology Follow Up       [facility] SURGICAL ONCOLOGY [place]  [address]  [place] [address]    Asmita ESPINOZA Bem  1980  [id number]  [facility] SURGICAL ONCOLOGY [place]  [address]  [place] [address]    1. Encounter for follow-up surveillance of thyroid cancer  2. History of thyroid cancer  Assessment & Plan:  No evidence of disease recurrence on imaging, and thyroglobulin studies are stable.  Unfortunately, TSH is not currently suppressed.  I have encouraged the patient to discuss increasing her levothyroxine dose with her endocrinologist next week.  I will see her again in 6 months with repeat US.  Orders:  -     US head neck lymph node mapping; Future; Expected date: 03/20/2025         Chief Complaint   Patient presents with    Follow-up       Return in about 6 months (around 3/20/2025) for Office Visit, Imaging - See orders.      Oncology History   History of thyroid cancer   1/31/2023 Biopsy    Thyroid, Left, lower (Thin-prep and smear preparations):    Malignant ([place] Category VI)  Papillary carcinoma.     2/24/2023 Surgery    Thyroid; total thyroidectomy:       - Papillary thyroid carcinoma, classic, 1.2 cm      - Carcinoma involves left thyroid lobe      - Lymphovascular invasion: Not identified       - Extrathyroidal extension: Not identified       - All margins free of carcinoma       - Background thyroid with previous biopsy site changes  pT1b     2/24/2023 -  Cancer Staged    Staging form: Thyroid - Differentiated and Anaplastic, AJCC 8th Edition  - Pathologic stage from 2/24/2023: Stage I (pT1b, pNX, cM0, Age at diagnosis: < 55 years) - Signed by HENRY Queen on 8/21/2023  Stage prefix: Initial diagnosis  Lymph node metastasis: Unknown             History of Present Illness: This is a 45 y/o female who returns to the office today in follow-up for her history of thyroid  cancer.  She is currently JAMAL at 1 1/2 years from total thyroidectomy.  She denies any new issues such as voice changes or difficulty swallowing, and she has not noticed any new lumps in her neck.  She does complain of fatigue and SOB, which she attributes to her history of anemia.  She continues to see her endocrinologist for levothyroxine management, with follow-up scheduled in 1 week. Thyroid studies have been completes, and US was performed on September 13.  I have reviewed these results with the patient today.      Review of Systems   Constitutional:  Positive for fatigue. Negative for activity change, appetite change and unexpected weight change.   HENT: Negative.  Negative for trouble swallowing and voice change.    Respiratory:  Positive for chest tightness and shortness of breath. Negative for cough.    Cardiovascular: Negative.    Musculoskeletal: Negative.    Skin:  Negative for color change.   Neurological:  Negative for dizziness and headaches.   Hematological:  Negative for adenopathy.   Psychiatric/Behavioral:  Positive for sleep disturbance.              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)    Hyperlipidemia    Iron deficiency    Encounter for follow-up surveillance of thyroid cancer    Myofascial pain syndrome     Past Medical History:   Diagnosis Date    Allergic     Anxiety     Cancer (HCC) 2/1/2023    Thyroid- Papillary carcinoma    COVID-19 01/2022    mild s/s-not hospitalized, not vaccinated    GERD (gastroesophageal reflux disease)      Headache(784.0)     History of IBS     with constipation    Ingrown toenail     Irritable bowel syndrome     Migraines     Chronic per pt    Plantar fasciitis     Thyroid carcinoma (HCC)     Varicella     Venereal wart 08/07/2014     Past Surgical History:   Procedure Laterality Date    CERVICAL BIOPSY  W/ LOOP ELECTRODE EXCISION  2017    COLONOSCOPY      MRI BREAST BIOPSY RIGHT (ALL INCLUSIVE) Right 7/26/2024    NASAL SEPTUM SURGERY      NM OSTEOT W/WO LNGTH SHRT/CORRJ 1ST METAR Left 12/30/2022    Procedure: OSTEOTOMY METATARSAL 1st;  Surgeon: Taiwo Harrington DPM;  Location: [facility];  Service: Podiatry    THYROIDECTOMY N/A 2/24/2023    Procedure: TOTAL THYROIDECTOMY;  Surgeon: Vince Carey MD;  [facility]: [facility];  Service: Surgical Oncology    TONSILLECTOMY      UPPER GASTROINTESTINAL ENDOSCOPY      US GUIDED THYROID BIOPSY  1/31/2023    WISDOM TOOTH EXTRACTION       Family History   Problem Relation Age of Onset    Hypertension Mother     Arthritis Mother     Thyroid disease Mother     Osteoarthritis Mother     Transient ischemic attack Father     Hypertension Father     Stroke Father     Vision loss Father     Rashes / Skin problems Sister         Shingles    Migraines Sister     No Known Problems Sister     Uterine cancer Maternal Grandmother 20    Cancer Maternal Grandmother         Uterine    Colon cancer Maternal Grandfather 60    Cancer Maternal Grandfather         Colon    Lung cancer Paternal Grandmother 60    Cancer Paternal Grandmother         Lung    Heart attack Paternal Grandfather     Colon cancer Cousin 30    Breast cancer Maternal Aunt     Breast cancer Maternal Aunt     Breast cancer Maternal Aunt      Social History     Socioeconomic History    Marital status: Single     Spouse name: Not on file    Number of children: Not on file    Years of education: Not on file    Highest education level: Not on file   Occupational History    Not on file   Tobacco Use    Smoking status: Never     Smokeless tobacco: Never   Vaping Use    Vaping status: Never Used   Substance and Sexual Activity    Alcohol use: Yes     Alcohol/week: 2.0 standard drinks of alcohol     Types: 2 Glasses of wine per week     Comment: 2 glasses wine a week    Drug use: Never    Sexual activity: Not Currently   Other Topics Concern    Not on file   Social History Narrative    Not on file     Social Determinants of Health     Financial Resource Strain: Not on file   Food Insecurity: Not on file   Transportation Needs: Not on file   Physical Activity: Not on file   Stress: Not on file   Social Connections: Not on file   Intimate Partner Violence: Not on file   Housing Stability: Not on file       Current Outpatient Medications:     acetaminophen (TYLENOL) 500 mg tablet, Take 1,000 mg by mouth every 6 (six) hours as needed for mild pain, Disp: , Rfl:     bisacodyl (DULCOLAX) 5 mg EC tablet, Take 2 tablets (10 mg total) by mouth 2 (two) times a day as needed for constipation, Disp: 60 tablet, Rfl: 1    calcitriol (ROCALTROL) 0.25 mcg capsule, TAKE ONE CAPSULE BY MOUTH 2 TIMES A DAY, Disp: 180 capsule, Rfl: 1    calcium citrate (CALCITRATE) 950 (200 Ca) MG tablet, Take 1 tablet (950 mg total) by mouth 2 (two) times a day, Disp: 120 tablet, Rfl: 0    cholecalciferol (VITAMIN D3) 1,000 units tablet, Take 1 tablet (1,000 Units total) by mouth daily Do not start before March 1, 2023., Disp: 30 tablet, Rfl: 0    clindamycin (CLEOCIN T) 1 % lotion, in the morning, Disp: , Rfl:     Galcanezumab-gnlm (Emgality) 120 MG/ML SOAJ, INJECT 120MG SUBCUTANEOUSLY (UNDER THE SKIN) EVERY 28 DAYS, Disp: 1 mL, Rfl: 11    hydrocortisone (ANUSOL-HC) 2.5 % rectal cream, Apply topically 2 (two) times a day (Patient taking differently: Apply topically 2 (two) times a day PRN), Disp: 28 g, Rfl: 2    Levocetirizine Dihydrochloride (XYZAL PO), Take by mouth daily at bedtime, Disp: , Rfl:     levothyroxine 125 mcg tablet, TAKE ONE TABLET BY MOUTH DAILY, Disp: 90  tablet, Rfl: 1    linaCLOtide (Linzess) 72 MCG CAPS, Take 72 mcg by mouth in the morning, Disp: 90 capsule, Rfl: 2    lubiprostone (AMITIZA) 24 mcg capsule, Take 1 capsule (24 mcg total) by mouth 2 (two) times a day with meals, Disp: 60 capsule, Rfl: 5    Melatonin 5 MG TABS, Take by mouth as needed, Disp: , Rfl:     meloxicam (MOBIC) 15 mg tablet, Take 15 mg by mouth if needed, Disp: , Rfl:     Multiple Vitamin (multivitamin) tablet, Take 1 tablet by mouth daily, Disp: , Rfl:     omeprazole (PriLOSEC) 40 MG capsule, Take 1 capsule (40 mg total) by mouth daily, Disp: 90 capsule, Rfl: 1    rimegepant sulfate (Nurtec) 75 mg TBDP, Take one NURTEC 75 mg at onset under tongue. Limit 1 in 24 hours., Disp: 16 tablet, Rfl: 11    rizatriptan (MAXALT) 10 mg tablet, Take 1 tablet (10 mg total) by mouth once as needed for migraine May repeat in 2 hours if needed. Max 2/24 hours, 9/month., Disp: 9 tablet, Rfl: 12    zolpidem (AMBIEN) 10 mg tablet, Take 10 mg by mouth daily at bedtime as needed for sleep, Disp: , Rfl:   No Known Allergies  Vitals:    09/20/24 1034   BP: 118/80   Pulse: 73   Temp: 98.8 °F (37.1 °C)   SpO2: 99%       Physical Exam  Vitals reviewed.   Constitutional:       General: She is not in acute distress.     Appearance: Normal appearance. She is normal weight. She is not ill-appearing or toxic-appearing.   HENT:      Head: Normocephalic and atraumatic.      Nose: Nose normal.      Mouth/Throat:      Mouth: Mucous membranes are moist.   Neck:      Comments: Well-healed anterior neck incision.  Cardiovascular:      Rate and Rhythm: Normal rate.   Pulmonary:      Effort: Pulmonary effort is normal.   Musculoskeletal:         General: Normal range of motion.      Cervical back: Normal range of motion and neck supple. No rigidity or tenderness.   Lymphadenopathy:      Head:      Right side of head: No submandibular adenopathy.      Left side of head: No submandibular adenopathy.      Cervical: No cervical  adenopathy.      Upper Body:      Right upper body: No supraclavicular adenopathy.      Left upper body: No supraclavicular adenopathy.   Skin:     General: Skin is warm and dry.   Neurological:      General: No focal deficit present.      Mental Status: She is alert and oriented to person, place, and time.   Psychiatric:         Mood and Affect: Mood normal.         Behavior: Behavior normal.         Thought Content: Thought content normal.         Judgment: Judgment normal.         Labs:  Collected 09/10/2024:  Collected Updated Procedure    09/10/2024 0813 09/11/2024 1106 Thyroglobulin by MACHO [[id number]]    (Abnormal)   Blood    Component Value Units   Thyroglobulin-MACHO 0.3 Low   ng/mL          09/10/2024 0813 09/11/2024 1106 Thyroglobulin [[id number]]    Blood    Component Value Units   Thyroglobulin Ab <1.0  IU/mL          09/10/2024 0813 09/10/2024 0858 TSH, 3rd generation [[id number]]   Blood    Component Value Units   TSH 3RD GENERATON 2.548  uIU/mL              Imaging  US head neck lymph node mapping    Result Date: 9/17/2024  Narrative: NECK ULTRASOUND INDICATION: Z08: Encounter for follow-up examination after completed treatment for malignant neoplasm Z85.850: Personal history of malignant neoplasm of thyroid. COMPARISON: 3/8/2024 FINDINGS: Ultrasound of the thyroidectomy bed and cervical lymph node chains was performed with a high frequency linear transducer. Stable 6 x 6 x 5 mm right thyroid bed nodule noted. Lymph nodes maintain normal morphologic contour, echogenicity and short axis dimensions of less than 0.7 cm. No evidence for microcalcification or focal nodularity.     Impression: Stable subcentimeter right thyroid bed nodule. No cervical lymphadenopathy. Workstation performed: [id number]     I personally reviewed and interpreted the above laboratory and imaging data.

## 2024-09-27 ENCOUNTER — OFFICE VISIT (OUTPATIENT)
Dept: ENDOCRINOLOGY | Facility: CLINIC | Age: 44
End: 2024-09-27
Payer: COMMERCIAL

## 2024-09-27 ENCOUNTER — PROCEDURE VISIT (OUTPATIENT)
Age: 44
End: 2024-09-27
Payer: COMMERCIAL

## 2024-09-27 VITALS
TEMPERATURE: 98.2 F | WEIGHT: 150.6 LBS | HEIGHT: 63 IN | HEART RATE: 80 BPM | BODY MASS INDEX: 26.68 KG/M2 | DIASTOLIC BLOOD PRESSURE: 62 MMHG | SYSTOLIC BLOOD PRESSURE: 114 MMHG | OXYGEN SATURATION: 98 %

## 2024-09-27 VITALS
HEIGHT: 63 IN | DIASTOLIC BLOOD PRESSURE: 68 MMHG | OXYGEN SATURATION: 98 % | WEIGHT: 150 LBS | SYSTOLIC BLOOD PRESSURE: 114 MMHG | HEART RATE: 78 BPM | BODY MASS INDEX: 26.58 KG/M2

## 2024-09-27 DIAGNOSIS — M99.04 SEGMENTAL DYSFUNCTION OF SACRAL REGION: ICD-10-CM

## 2024-09-27 DIAGNOSIS — M99.01 SEGMENTAL DYSFUNCTION OF CERVICAL REGION: ICD-10-CM

## 2024-09-27 DIAGNOSIS — E89.0 POSTOPERATIVE HYPOTHYROIDISM: Primary | ICD-10-CM

## 2024-09-27 DIAGNOSIS — E83.51 HYPOCALCEMIA: ICD-10-CM

## 2024-09-27 DIAGNOSIS — M99.03 SEGMENTAL DYSFUNCTION OF LUMBAR REGION: ICD-10-CM

## 2024-09-27 DIAGNOSIS — M54.6 CHRONIC LEFT-SIDED THORACIC BACK PAIN: Primary | ICD-10-CM

## 2024-09-27 DIAGNOSIS — M54.59 MECHANICAL LOW BACK PAIN: ICD-10-CM

## 2024-09-27 DIAGNOSIS — M99.02 SEGMENTAL DYSFUNCTION OF THORACIC REGION: ICD-10-CM

## 2024-09-27 DIAGNOSIS — Z85.850 HISTORY OF THYROID CANCER: ICD-10-CM

## 2024-09-27 DIAGNOSIS — M75.52 SUBACROMIAL BURSITIS OF LEFT SHOULDER JOINT: ICD-10-CM

## 2024-09-27 DIAGNOSIS — G89.29 CHRONIC LEFT-SIDED THORACIC BACK PAIN: Primary | ICD-10-CM

## 2024-09-27 PROCEDURE — 97110 THERAPEUTIC EXERCISES: CPT | Performed by: CHIROPRACTOR

## 2024-09-27 PROCEDURE — 99214 OFFICE O/P EST MOD 30 MIN: CPT | Performed by: STUDENT IN AN ORGANIZED HEALTH CARE EDUCATION/TRAINING PROGRAM

## 2024-09-27 PROCEDURE — 98941 CHIROPRACT MANJ 3-4 REGIONS: CPT | Performed by: CHIROPRACTOR

## 2024-09-27 NOTE — ASSESSMENT & PLAN NOTE
History of total thyroidectomy for [place] category VI thyroid nodule, on 2/24/2023.    Final pathology which showed Pathology showed unifocal papillary carcinoma, classic, greatest dimension of 1.2 cm, with no angioinvasion, no lymphatic invasion, no extrathyroidal extension and margin negative for carcinoma.   AJCC stage, pT1b.  No PHI.  Low risk for recurrence  TSH goal 0.5- 2  Currently on levothyroxine 125 mcg x 6 and half a pill on Sundays.  To increase as TSH was slightly above goal.  Tumor markers showed thyroglobulin less than 0.1, thyroglobulin antibodies 0.3, thyroglobulin antibody is positive, has been is stable, will monitor it closely.  Recent neck ultrasound with lymph node mapping showed possible residual thyroid tissue with no lymphadenopathy.  Check thyroglobulin and thyroglobulin antibody as well as lymph node mapping in 6 months.  Check TFT in 3 months.  Advised that levotyroxine should be taken on an empty stomach. Should avoid taking medications like iron, calcium, tums, [id number], PPI at the same time that may decrease absorption of T4. Should separate administration by 4hrs.

## 2024-09-27 NOTE — ASSESSMENT & PLAN NOTE
See plan for papillary thyroid carcinoma.  TSH goal 0.5 - 2  Currently on levothyroxine 125 mcg x 6 and half a pill on Sunday which was recently increased as TSH was not at goal.  Check TSH and free T4 in 2 months.    Orders:    T4, free; Future    TSH, 3rd generation; Future

## 2024-09-27 NOTE — PROGRESS NOTES
Diagnoses and all orders for this visit:    Chronic left-sided thoracic back pain    Segmental dysfunction of sacral region    Subacromial bursitis of left shoulder joint    Segmental dysfunction of lumbar region    Segmental dysfunction of thoracic region    Segmental dysfunction of cervical region    Mechanical low back pain           ASSESSMENT:   Pt's symptoms and exam findings consistent with mechanical lbp  and hip bursitis secondary to repetitive st/sp injury, exacerbated by postural/ergonomic stressors. Pt responded well to flexion biased stretches and manual mobilization of the affected spinal and myofascial tissues with increased ROM; trial of conservative tx recommended consisting of stretching, graded mobilization/manipulation of the affected spinal and myofascial jt dysfunction, postural/ergonomic education and take home stretches/exercises. If symptoms fail to improve with short trial of conservative care, appropriate imaging and referral will be coordinated.  - The patient status is flared up with lower back pain but migraine today, slight decrease in mm spasm in the neck and back after treatment.     PROCEDURE CODES: 94840 and 44012    TREATMENT:  Fear avoidance behavior discussion; encouraged and reassured pt that natural course of condition is to improve over time with adherence to tx plan and home care strategies. Home care recommendations: avoid bed rest, walk (but avoid trails and uneven surfaces), gradual return to activity to tolerance (avoid anything that peripheralizes symptoms), call if symptoms peripheralize, worsen, or neurologic deficit progresses. Ther-ex: IASTM; discussed post procedure soreness and/or ecchymosis for up to 36 hrs, applied to affected mm hypertonicities; supine hamstring stretch, supine gluteal stretch, side laying QL stretch, single knee to chest stretch, hip flexor pin-and-stretch, alternating prone hip extension, glute bridge, transitional mvmt education, abdominal  bracing; greater than 15 min spent performing above mentioned ther-ex to improve ROM/flexibility. Thoracic mobilization/manipulation: prone P-A mob; Lumbar mobilization/manipulation: diversified side laying graded HVLA, flexion-traction; SIJ Manipulation/Mobilization: R/L SIJ HVLA - long axis distraction, ahn drop table maneuver to affected SIJ    HPI:  Asmita ESPINOZA Bem is a 44 y.o. female  Mid to low back pain; Pain Scale: 5    The patient presents to the office with lower back pain that started without incident of trauma on June 1 while at a concert. She was sitting on the pain but has no pain until they were driving home. The patient does remember having mild tingling in the lower back about 6 months prior to that. The patient now has tingling across lower back. Pain is constant 4 with sitting can go up to a 8/10. Pt went to PT for a course with some improvements with working out., and did help overall but pain is still significant in the evenings. The patient was also given mm relaxers which she think she not help but also makes her too tired.  Massages once a month that helps temporarily. Exercise- metabolic and strength training. Pt sleeps a little better after Sleep therapy program but slightly more worse after last visit. Also gets pain with a bump behind legs but and notices more spider veins and most tingling into the legs but mentions wearing compression stockings for work.  7/19- The patient is feeling a little better today bc she has been off of work but still 5/10 pain.   8/2- The patient is feeling sore in the lower back pain, slightly worse bc of MRI procedure.  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   9/27- The patient is feeling sore today, they are trying to push up injections [facility].  The pt has massage tomorrow and taking Meloxicam.     Back Pain  The problem has been waxing and waning  since onset. The pain is present in the lumbar spine.   Neck Pain       Past Medical History:   Diagnosis Date    Allergic     Anxiety     Cancer (HCC) 2/1/2023    Thyroid- Papillary carcinoma    COVID-19 01/2022    mild s/s-not hospitalized, not vaccinated    GERD (gastroesophageal reflux disease)     Headache(784.0)     History of IBS     with constipation    Ingrown toenail     Irritable bowel syndrome     Migraines     Chronic per pt    Plantar fasciitis     Thyroid carcinoma (HCC)     Varicella     Venereal wart 08/07/2014      Past Surgical History:   Procedure Laterality Date    CERVICAL BIOPSY  W/ LOOP ELECTRODE EXCISION  2017    COLONOSCOPY      MRI BREAST BIOPSY RIGHT (ALL INCLUSIVE) Right 7/26/2024    NASAL SEPTUM SURGERY      IL OSTEOT W/WO LNGTH SHRT/CORRJ 1ST METAR Left 12/30/2022    Procedure: OSTEOTOMY METATARSAL 1st;  Surgeon: Taiwo Harrington DPM;  [facility];  Service: Podiatry    THYROIDECTOMY N/A 2/24/2023    Procedure: TOTAL THYROIDECTOMY;  Surgeon: Vince Carey MD;  [facility];  Service: Surgical Oncology    TONSILLECTOMY      UPPER GASTROINTESTINAL ENDOSCOPY      US GUIDED THYROID BIOPSY  1/31/2023    WISDOM TOOTH EXTRACTION       The following portions of the patient's history were reviewed and updated as appropriate: allergies, past family history, past medical history, past social history, past surgical history, and problem list.  Review of Systems   Musculoskeletal:  Positive for back pain, myalgias and neck pain.     Physical Exam  Musculoskeletal:         General: Tenderness present.      Cervical back: Tenderness present.      Thoracic back: Spasms and tenderness present. No swelling, edema, deformity, signs of trauma, lacerations or bony tenderness. Decreased range of motion. No scoliosis.      Lumbar back: Spasms and tenderness present. No swelling, edema, deformity, signs of trauma, lacerations or bony tenderness. Decreased range of motion. No scoliosis.         Back:    Neurological:      Gait: Gait is intact.      Deep Tendon Reflexes: Reflexes are normal and symmetric.   Psychiatric:         Attention and Perception: Attention normal.         Mood and Affect: Affect normal.         Speech: Speech normal.         Behavior: Behavior is cooperative.         Cognition and Memory: Cognition normal.     SOFT TISSUE ASSESSMENT Hypertonicity and tenderness palpated B C4-T1, T10-S1 erector spinae, upper trap, SCM, Lev scap, hip flexor, glute med/min, QL, hamstring JOINT RESTRICTIONS: C4-T1, T10-S1 and R/L SIJ ORTHO: SI jt point tenderness: +; Mary unremarkable for centralization/peripheralization; michelle's, iliac compression, thigh thrust elicit lbp in R/L SIJ; prone femoral nerve stretch neg for upper lumbar neural tension, elicits R/L SIJ stiffness; sitting root elicits no lbp on R/L; slump test elicits no neural tension R/L, Cervical distraction- Neg, Maximal Foraminal stenosis- neg, Spurling's- neg    Return in about 1 week (around 10/4/2024) for Recheck.

## 2024-09-27 NOTE — PROGRESS NOTES
Ambulatory Visit  Name: Asmita ESPINOZA Bem      : 1980      MRN: [id number]  Encounter Provider: Bogdan Smyth MD  Encounter Date: 2024   Encounter department: [facility] [place]    Assessment & Plan  Postoperative hypothyroidism  See plan for papillary thyroid carcinoma.  TSH goal 0.5 - 2  Currently on levothyroxine 125 mcg x 6 and half a pill on  which was recently increased as TSH was not at goal.  Check TSH and free T4 in 2 months.    Orders:    T4, free; Future    TSH, 3rd generation; Future    Hypocalcemia    Orders:    Calcium; Future    Albumin; Future    History of thyroid cancer  History of total thyroidectomy for [place] category VI thyroid nodule, on 2023.    Final pathology which showed Pathology showed unifocal papillary carcinoma, classic, greatest dimension of 1.2 cm, with no angioinvasion, no lymphatic invasion, no extrathyroidal extension and margin negative for carcinoma.   AJCC stage, pT1b.  No PHI.  Low risk for recurrence  TSH goal 0.5- 2  Currently on levothyroxine 125 mcg x 6 and half a pill on Sundays.  To increase as TSH was slightly above goal.  Tumor markers showed thyroglobulin less than 0.1, thyroglobulin antibodies 0.3, thyroglobulin antibody is positive, has been is stable, will monitor it closely.  Recent neck ultrasound with lymph node mapping showed possible residual thyroid tissue with no lymphadenopathy.  Check thyroglobulin and thyroglobulin antibody as well as lymph node mapping in 6 months.  Check TFT in 3 months.  Advised that levotyroxine should be taken on an empty stomach. Should avoid taking medications like iron, calcium, tums, [id number], PPI at the same time that may decrease absorption of T4. Should separate administration by 4hrs.             History of Present Illness     Asmita ESPINOZA Bem is a 44 y.o. female who presents for follow-up for papillary thyroid carcinoma status post total thyroidectomy.    Final  pathology showed papillary thyroid carcinoma classic for 1.2 cm left-sided, without extrathyroidal extension, lymphovascular invasion, extrathyroidal extension, all margins free of cancer.    Recent labs showed thyroglobulin < 0.1, thyroglobulin antibody 0.3,    Neck ultrasound with lymph node mapping showed stable subcentimeter right thyroid bed nodule without surgical lymphadenopathy.      History obtained from : patient  Review of Systems   Constitutional:  Positive for fatigue. Negative for appetite change and unexpected weight change.   Gastrointestinal:  Positive for constipation. Negative for abdominal pain, nausea and vomiting.   Endocrine: Negative for cold intolerance, heat intolerance, polydipsia, polyphagia and polyuria.   Musculoskeletal:  Positive for back pain.   Psychiatric/Behavioral:  Positive for sleep disturbance.      Medical History Reviewed by provider this encounter:       Current Outpatient Medications on File Prior to Visit   Medication Sig Dispense Refill    acetaminophen (TYLENOL) 500 mg tablet Take 1,000 mg by mouth every 6 (six) hours as needed for mild pain      B Complex Vitamins (B-Complex/B-12) TABS Take 1 tablet by mouth      bisacodyl (DULCOLAX) 5 mg EC tablet Take 2 tablets (10 mg total) by mouth 2 (two) times a day as needed for constipation 60 tablet 1    calcitriol (ROCALTROL) 0.25 mcg capsule TAKE ONE CAPSULE BY MOUTH 2 TIMES A  capsule 1    calcium citrate (CALCITRATE) 950 (200 Ca) MG tablet Take 1 tablet (950 mg total) by mouth 2 (two) times a day 120 tablet 0    cholecalciferol (VITAMIN D3) 1,000 units tablet Take 1 tablet (1,000 Units total) by mouth daily Do not start before March 1, 2023. 30 tablet 0    clindamycin (CLEOCIN T) 1 % lotion in the morning      Galcanezumab-gnlm (Emgality) 120 MG/ML SOAJ INJECT 120MG SUBCUTANEOUSLY (UNDER THE SKIN) EVERY 28 DAYS 1 mL 11    hydrocortisone (ANUSOL-HC) 2.5 % rectal cream Apply topically 2 (two) times a day (Patient  taking differently: Apply topically 2 (two) times a day PRN) 28 g 2    Levocetirizine Dihydrochloride (XYZAL PO) Take by mouth daily at bedtime      levothyroxine 125 mcg tablet TAKE ONE TABLET BY MOUTH DAILY (Patient taking differently: Take 125 mcg by mouth daily Taking 1 tablet Monday through sat. Sunday 1/2 pill) 90 tablet 1    linaCLOtide (Linzess) 72 MCG CAPS Take 72 mcg by mouth in the morning 90 capsule 2    lubiprostone (AMITIZA) 24 mcg capsule Take 1 capsule (24 mcg total) by mouth 2 (two) times a day with meals 60 capsule 5    Melatonin 5 MG TABS Take by mouth as needed      meloxicam (MOBIC) 15 mg tablet Take 15 mg by mouth if needed      Multiple Vitamin (multivitamin) tablet Take 1 tablet by mouth daily      omeprazole (PriLOSEC) 40 MG capsule Take 1 capsule (40 mg total) by mouth daily 90 capsule 1    rimegepant sulfate (Nurtec) 75 mg TBDP Take one NURTEC 75 mg at onset under tongue. Limit 1 in 24 hours. 16 tablet 11    rizatriptan (MAXALT) 10 mg tablet Take 1 tablet (10 mg total) by mouth once as needed for migraine May repeat in 2 hours if needed. Max 2/24 hours, 9/month. 9 tablet 12    zolpidem (AMBIEN) 10 mg tablet Take 10 mg by mouth daily at bedtime as needed for sleep       No current facility-administered medications on file prior to visit.          Objective     There were no vitals taken for this visit.    Physical Exam  Vitals and nursing note reviewed.   Constitutional:       General: She is not in acute distress.     Appearance: She is well-developed.   HENT:      Head: Normocephalic and atraumatic.   Neck:      Comments: Total thyroidectomy scar   Cardiovascular:      Rate and Rhythm: Normal rate and regular rhythm.   Pulmonary:      Effort: Pulmonary effort is normal. No respiratory distress.   Abdominal:      Palpations: Abdomen is soft.   Musculoskeletal:         General: No swelling.      Cervical back: Neck supple.   Skin:     General: Skin is warm and dry.   Neurological:       Mental Status: She is alert.             Component      Latest Ref Rng 9/10/2024   PTH      12.0 - 88.0 pg/mL 13.2    TSH 3RD GENERATON      0.450 - 4.500 uIU/mL 2.548    FREE T4      0.61 - 1.12 ng/dL 1.11    T4, TOTAL      6.09 - 12.23 ug/dL 9.64    THYROGLOBULIN AB      0.0 - 0.9 IU/mL <1.0    Thyroglobulin-MACHO      1.5 - 38.5 ng/mL 0.3 (L)       Legend:  (L) Low  Narrative & Impression   NECK ULTRASOUND     INDICATION: Z08: Encounter for follow-up examination after completed treatment for malignant neoplasm  Z85.850: Personal history of malignant neoplasm of thyroid.     COMPARISON: 3/8/2024     FINDINGS:     Ultrasound of the thyroidectomy bed and cervical lymph node chains was performed with a high frequency linear transducer.     Stable 6 x 6 x 5 mm right thyroid bed nodule noted.     Lymph nodes maintain normal morphologic contour, echogenicity and short axis dimensions of less than 0.7 cm. No evidence for microcalcification or focal nodularity.     IMPRESSION:     Stable subcentimeter right thyroid bed nodule. No cervical lymphadenopathy.        Workstation performed: [id number]

## 2024-10-04 ENCOUNTER — PROCEDURE VISIT (OUTPATIENT)
Age: 44
End: 2024-10-04
Payer: COMMERCIAL

## 2024-10-04 ENCOUNTER — HOSPITAL ENCOUNTER (OUTPATIENT)
Dept: RADIOLOGY | Age: 44
Discharge: HOME | End: 2024-10-04
Payer: COMMERCIAL

## 2024-10-04 VITALS
DIASTOLIC BLOOD PRESSURE: 60 MMHG | SYSTOLIC BLOOD PRESSURE: 118 MMHG | OXYGEN SATURATION: 94 % | HEART RATE: 91 BPM | BODY MASS INDEX: 26.58 KG/M2 | HEIGHT: 63 IN | WEIGHT: 150 LBS

## 2024-10-04 DIAGNOSIS — M75.52 SUBACROMIAL BURSITIS OF LEFT SHOULDER JOINT: ICD-10-CM

## 2024-10-04 DIAGNOSIS — M99.04 SEGMENTAL DYSFUNCTION OF SACRAL REGION: ICD-10-CM

## 2024-10-04 DIAGNOSIS — G89.29 CHRONIC LEFT-SIDED THORACIC BACK PAIN: Primary | ICD-10-CM

## 2024-10-04 DIAGNOSIS — M54.6 CHRONIC LEFT-SIDED THORACIC BACK PAIN: Primary | ICD-10-CM

## 2024-10-04 DIAGNOSIS — R20.2 PARESTHESIAS: ICD-10-CM

## 2024-10-04 DIAGNOSIS — M99.01 SEGMENTAL DYSFUNCTION OF CERVICAL REGION: ICD-10-CM

## 2024-10-04 DIAGNOSIS — M54.16 LUMBAR RADICULOPATHY: ICD-10-CM

## 2024-10-04 DIAGNOSIS — M99.03 SEGMENTAL DYSFUNCTION OF LUMBAR REGION: ICD-10-CM

## 2024-10-04 DIAGNOSIS — M99.02 SEGMENTAL DYSFUNCTION OF THORACIC REGION: ICD-10-CM

## 2024-10-04 DIAGNOSIS — M54.59 MECHANICAL LOW BACK PAIN: ICD-10-CM

## 2024-10-04 PROCEDURE — 98941 CHIROPRACT MANJ 3-4 REGIONS: CPT | Performed by: CHIROPRACTOR

## 2024-10-04 PROCEDURE — 97110 THERAPEUTIC EXERCISES: CPT | Performed by: CHIROPRACTOR

## 2024-10-04 PROCEDURE — A9585 GADOBUTROL INJECTION: HCPCS | Performed by: NURSE PRACTITIONER

## 2024-10-04 PROCEDURE — 72158 MRI LUMBAR SPINE W/O & W/DYE: CPT

## 2024-10-04 RX ORDER — CALCITRIOL 0.25 UG/1
CAPSULE, LIQUID FILLED ORAL
Qty: 180 CAPSULE | Refills: 1 | Status: SHIPPED | OUTPATIENT
Start: 2024-10-04

## 2024-10-04 RX ORDER — GADOBUTROL 604.72 MG/ML
7 INJECTION INTRAVENOUS
Status: COMPLETED | OUTPATIENT
Start: 2024-10-04 | End: 2024-10-04

## 2024-10-04 RX ADMIN — GADOBUTROL 7 ML: 604.72 INJECTION INTRAVENOUS at 12:06

## 2024-10-04 NOTE — PROGRESS NOTES
Diagnoses and all orders for this visit:    Chronic left-sided thoracic back pain    Segmental dysfunction of sacral region    Subacromial bursitis of left shoulder joint    Segmental dysfunction of lumbar region    Segmental dysfunction of thoracic region    Segmental dysfunction of cervical region    Mechanical low back pain           ASSESSMENT:   Pt's symptoms and exam findings consistent with mechanical lbp  and hip bursitis secondary to repetitive st/sp injury, exacerbated by postural/ergonomic stressors. Pt responded well to flexion biased stretches and manual mobilization of the affected spinal and myofascial tissues with increased ROM; trial of conservative tx recommended consisting of stretching, graded mobilization/manipulation of the affected spinal and myofascial jt dysfunction, postural/ergonomic education and take home stretches/exercises. If symptoms fail to improve with short trial of conservative care, appropriate imaging and referral will be coordinated.  - The patient status is flared up with lower back pain but migraine today, slight decrease in mm spasm in the neck and back after treatment.     PROCEDURE CODES: 56672 and 81533    TREATMENT:  Fear avoidance behavior discussion; encouraged and reassured pt that natural course of condition is to improve over time with adherence to tx plan and home care strategies. Home care recommendations: avoid bed rest, walk (but avoid trails and uneven surfaces), gradual return to activity to tolerance (avoid anything that peripheralizes symptoms), call if symptoms peripheralize, worsen, or neurologic deficit progresses. Ther-ex: IASTM; discussed post procedure soreness and/or ecchymosis for up to 36 hrs, applied to affected mm hypertonicities; supine hamstring stretch, supine gluteal stretch, side laying QL stretch, single knee to chest stretch, hip flexor pin-and-stretch, alternating prone hip extension, glute bridge, transitional mvmt education, abdominal  bracing; greater than 15 min spent performing above mentioned ther-ex to improve ROM/flexibility. Thoracic mobilization/manipulation: prone P-A mob; Lumbar mobilization/manipulation: diversified side laying graded HVLA, flexion-traction; SIJ Manipulation/Mobilization: R/L SIJ HVLA - long axis distraction, ahn drop table maneuver to affected SIJ    HPI:  Asmita ESPINOZA Bem is a 44 y.o. female  Mid to low back pain; Pain Scale: 5    The patient presents to the office with lower back pain that started without incident of trauma on June 1 while at a concert. She was sitting on the pain but has no pain until they were driving home. The patient does remember having mild tingling in the lower back about 6 months prior to that. The patient now has tingling across lower back. Pain is constant 4 with sitting can go up to a 8/10. Pt went to PT for a course with some improvements with working out., and did help overall but pain is still significant in the evenings. The patient was also given mm relaxers which she think she not help but also makes her too tired.  Massages once a month that helps temporarily. Exercise- metabolic and strength training. Pt sleeps a little better after Sleep therapy program but slightly more worse after last visit. Also gets pain with a bump behind legs but and notices more spider veins and most tingling into the legs but mentions wearing compression stockings for work.  7/19- The patient is feeling a little better today bc she has been off of work but still 5/10 pain.   8/2- The patient is feeling sore in the lower back pain, slightly worse bc of MRI procedure.  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   9/27- The patient is feeling sore today, they are trying to push up injections at [facility].  The pt has massage tomorrow and taking Meloxicam.   10/4- The patient is feeling sore today, very  slightly better. Not sleeping well. Still taking Meloxicam.     Back Pain  The problem has been waxing and waning since onset. The pain is present in the lumbar spine.   Neck Pain     Past Medical History:   Diagnosis Date   • Allergic    • Anxiety    • Cancer (HCC) 2/1/2023    Thyroid- Papillary carcinoma   • COVID-19 01/2022    mild s/s-not hospitalized, not vaccinated   • GERD (gastroesophageal reflux disease)    • Headache(784.0)    • History of IBS     with constipation   • Ingrown toenail    • Irritable bowel syndrome    • Migraines     Chronic per pt   • Plantar fasciitis    • Thyroid carcinoma (HCC)    • Varicella    • Venereal wart 08/07/2014      Past Surgical History:   Procedure Laterality Date   • CERVICAL BIOPSY  W/ LOOP ELECTRODE EXCISION  2017   • COLONOSCOPY     • MRI BREAST BIOPSY RIGHT (ALL INCLUSIVE) Right 7/26/2024   • NASAL SEPTUM SURGERY     • CA OSTEOT W/WO LNGTH SHRT/CORRJ 1ST METAR Left 12/30/2022    Procedure: OSTEOTOMY METATARSAL 1st;  Surgeon: Taiwo Harrington DPM;  Location: [facility];  Service: Podiatry   • THYROIDECTOMY N/A 2/24/2023    Procedure: TOTAL THYROIDECTOMY;  Surgeon: Vince Carey MD;  Location: [facility];  Service: Surgical Oncology   • TONSILLECTOMY     • UPPER GASTROINTESTINAL ENDOSCOPY     • US GUIDED THYROID BIOPSY  1/31/2023   • WISDOM TOOTH EXTRACTION       The following portions of the patient's history were reviewed and updated as appropriate: allergies, past family history, past medical history, past social history, past surgical history, and problem list.  Review of Systems   Musculoskeletal:  Positive for back pain, myalgias and neck pain.     Physical Exam  Musculoskeletal:         General: Tenderness present.      Cervical back: Tenderness present.      Thoracic back: Spasms and tenderness present. No swelling, edema, deformity, signs of trauma, lacerations or bony tenderness. Decreased range of motion. No scoliosis.      Lumbar back: Spasms and tenderness  present. No swelling, edema, deformity, signs of trauma, lacerations or bony tenderness. Decreased range of motion. No scoliosis.        Back:    Neurological:      Gait: Gait is intact.      Deep Tendon Reflexes: Reflexes are normal and symmetric.   Psychiatric:         Attention and Perception: Attention normal.         Mood and Affect: Affect normal.         Speech: Speech normal.         Behavior: Behavior is cooperative.         Cognition and Memory: Cognition normal.     SOFT TISSUE ASSESSMENT Hypertonicity and tenderness palpated B C4-T1, T10-S1 erector spinae, upper trap, SCM, Lev scap, hip flexor, glute med/min, QL, hamstring JOINT RESTRICTIONS: C4-T1, T10-S1 and R/L SIJ ORTHO: SI jt point tenderness: +; Mary unremarkable for centralization/peripheralization; michelle's, iliac compression, thigh thrust elicit lbp in R/L SIJ; prone femoral nerve stretch neg for upper lumbar neural tension, elicits R/L SIJ stiffness; sitting root elicits no lbp on R/L; slump test elicits no neural tension R/L, Cervical distraction- Neg, Maximal Foraminal stenosis- neg, Spurling's- neg    Return in about 1 week (around 10/11/2024) for Recheck.

## 2024-10-07 ENCOUNTER — TELEPHONE (OUTPATIENT)
Dept: PAIN MEDICINE | Facility: CLINIC | Age: 44
End: 2024-10-07

## 2024-10-07 NOTE — TELEPHONE ENCOUNTER
S/w the patient and reviewed. She stated that actually she has pain in the Left Mid Upper back area and her LB currently is not symptomatic. She is scheduled for TPI's on 10/10/24 for that. Encouraged her to contact us if the it does become worse. She appreciated the call.

## 2024-10-07 NOTE — TELEPHONE ENCOUNTER
----- Message from HENRY Rivers sent at 10/7/2024  8:11 AM EDT -----  MRI of the lumbar spine reveals mild arthritis from L3-4 through L5-S1.  There is no significant central or foraminal stenosis.    What are the patient's symptoms currently?

## 2024-10-10 ENCOUNTER — PROCEDURE VISIT (OUTPATIENT)
Dept: PAIN MEDICINE | Facility: CLINIC | Age: 44
End: 2024-10-10
Payer: COMMERCIAL

## 2024-10-10 VITALS
BODY MASS INDEX: 26.44 KG/M2 | DIASTOLIC BLOOD PRESSURE: 82 MMHG | HEIGHT: 63 IN | HEART RATE: 80 BPM | SYSTOLIC BLOOD PRESSURE: 119 MMHG | WEIGHT: 149.2 LBS

## 2024-10-10 DIAGNOSIS — M79.18 MYOFASCIAL PAIN SYNDROME: Primary | ICD-10-CM

## 2024-10-10 PROCEDURE — 76942 ECHO GUIDE FOR BIOPSY: CPT | Performed by: ANESTHESIOLOGY

## 2024-10-10 PROCEDURE — 20553 NJX 1/MLT TRIGGER POINTS 3/>: CPT | Performed by: ANESTHESIOLOGY

## 2024-10-10 RX ORDER — METHYLPREDNISOLONE ACETATE 40 MG/ML
40 INJECTION, SUSPENSION INTRA-ARTICULAR; INTRALESIONAL; INTRAMUSCULAR; SOFT TISSUE ONCE
Status: COMPLETED | OUTPATIENT
Start: 2024-10-10 | End: 2024-10-10

## 2024-10-10 RX ORDER — ROPIVACAINE HYDROCHLORIDE 2 MG/ML
40 INJECTION, SOLUTION EPIDURAL; INFILTRATION; PERINEURAL ONCE
Status: COMPLETED | OUTPATIENT
Start: 2024-10-10 | End: 2024-10-10

## 2024-10-10 RX ADMIN — METHYLPREDNISOLONE ACETATE 40 MG: 40 INJECTION, SUSPENSION INTRA-ARTICULAR; INTRALESIONAL; INTRAMUSCULAR; SOFT TISSUE at 15:12

## 2024-10-10 RX ADMIN — ROPIVACAINE HYDROCHLORIDE 40 MG: 2 INJECTION, SOLUTION EPIDURAL; INFILTRATION; PERINEURAL at 15:13

## 2024-10-10 NOTE — PROGRESS NOTES
44-year-old female presenting for ultrasound-guided trigger point injections into left thoracic paraspinal musculature, lumbar paraspinal musculature, and rhomboid.    Indication: Back pain  Preoperative diagnosis: Myofascial pain  Postoperative diagnosis: Myofascial pain  Procedure: Ultrasound guided left rhomboid, thoracic, and lumbar trigger point injection(s)    After discussing the risks, benefits, and alternatives to the procedure, the patient expressed understanding and wished to proceed. The patient was brought to the procedure suite and placed in the prone position. A procedural pause was conducted to verify: Correct patient identity, procedure to be performed and as applicable, correct side and site, correct patient position, and availability of implants, special equipment or special requirements. A simple surgical tray was used. Prior to the procedure, the left rhomboid, thoracic, and lumbar paraspinal musculature was examined with a 12MHz linear transducer to visualize the left rhomboid, thoracic, and lumbar paraspinal musculature and determine the optimal needle. Following this, the area was prepped with ChloraPrep scrub, then reexamined using the same transducer, a sterile ultrasound transducer cover, and sterile ultrasound transducer gel. Thereafter, using ultrasound guidance, a 1.5 inch 25 guage needle was advanced into each trigger point. After visualization of the tip and negative aspiration of blood, air, or bodily fluids; 1cc of 1 mL of 0.2% ropivacaine and 4 mg of Depo-Medrol was injected into the left rhomboid, thoracic, and lumbar paraspinal musculature x 10. The patient tolerated the procedure well and there were no apparent complications. After an appropriate amount of observation, the patient was dismissed from the recovery area in good condition under their own power.    The patient received a total steroid dose today of 40 mg of Depo-Medrol.

## 2024-10-11 ENCOUNTER — OFFICE VISIT (OUTPATIENT)
Dept: PHYSICAL THERAPY | Facility: REHABILITATION | Age: 44
End: 2024-10-11
Payer: COMMERCIAL

## 2024-10-11 DIAGNOSIS — M54.6 CHRONIC LEFT-SIDED THORACIC BACK PAIN: ICD-10-CM

## 2024-10-11 DIAGNOSIS — G89.29 CHRONIC LEFT-SIDED THORACIC BACK PAIN: ICD-10-CM

## 2024-10-11 DIAGNOSIS — M25.512 ACUTE PAIN OF LEFT SHOULDER: Primary | ICD-10-CM

## 2024-10-11 PROCEDURE — 97161 PT EVAL LOW COMPLEX 20 MIN: CPT

## 2024-10-11 PROCEDURE — 97530 THERAPEUTIC ACTIVITIES: CPT

## 2024-10-11 NOTE — PROGRESS NOTES
PT Evaluation     Today's Date: 10/11/2024  Patient name: Asmita ESPINOZA Bem  : 1980  MRN: [id number]  Referring provider: Janet Martins MD  Dx:  Encounter Diagnosis     ICD-10-CM    1. Acute pain of left shoulder  M25.512       2. Chronic left-sided thoracic back pain  M54.6     G89.29                         Assessment  Impairments: abnormal coordination, abnormal muscle firing, abnormal muscle tone, activity intolerance, impaired physical strength and pain with function  Symptom irritability: moderate    Assessment details:    Asmita ESPINOZA Bem is a 44 y.o. year old female presenting to outpatient [facility] health physical therapy via direct access secondary to left lower thoracic pain, and left anterior shoulder discomfort. 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  Patient's chief complaint is anterior shoulder and lower thoracic pain, impacting patients tolerance for working out, overhead lifting, and driving.  Objective examination reveals reduced cervical and thoracic ROM, specifically cervical flexion/extension, and thoracic extension and B/L rotation. Screened shoulder pain, noting left scapula dyskinesis, and (+) special tests for left shoulder impingement secondary to scapular deficiency.  Screened hinge mechanics, noting thoracolumbar hinge. Educated patient on appropriate hinge mechanics, diaphragmatic breathing for rib expansion, and core engagement to assist with rib movement.      Patient will benefit from skilled PT services to address these deficits and improve function. Based on patients age and motivation, patient is a positive candidate to respond favorably to physical therapy with a good prognosis. Patient was educated on examination and techniques, plan of care, goals of therapy, and HEP. Patient was provided an opportunity to ask any questions.  Provided Pt initial HEP. Patient demonstrated and verbalized understanding. Verbally reported to hold exercises if increased pain or discomfort.  Pt will be seen 1x/week for 4-6 weeks. Patient will be re-assessed regularly in order to document progress and limit any regression. The goal of PT is to progress patient towards independence of self care management.     Understanding of Dx/Px/POC: good     Prognosis: good    Goals  In 2 weeks, patient will report a reduction in pain at work to < or equal to 2/10 to reduce irritability and improve function  In 2 weeks, patient will display independence with HEP  In 4 weeks, patient will improve left shoulder IR/ER MMT to > 0.5 to assist with improved strength tolerance for overhead activities  In 4 weeks, patient will display full cervical ROM without symptom reproduction  In 6 weeks, patient will improve thoracic extension ROM > 25% to assist with overhead shoulder mobility  In 6 weeks, patient will perform appropriate lifting mechanics, with use of hips > lumbar spine  In 8 weeks, patient will display full shoulder ROM  In 8 weeks, patient will tolerate a full day of work without symptom exacerbation.   In 8 weeks, patient will report >70% improvement in symptoms to improve function        Plan  Patient would benefit from: skilled physical therapy  Planned modality interventions: TENS    Planned therapy interventions: balance, motor coordination training, neuromuscular re-education, patient/caregiver education, postural training, strengthening, stretching, therapeutic activities, therapeutic exercise, functional ROM exercises, body mechanics training and breathing training    Frequency: 1x week  Plan of Care beginning date: 10/11/2024  Plan of Care expiration date: 12/11/2024  Treatment plan discussed with: patient  Plan details: Plan includes manual, modalities, therapeutic exercise, therapeutic function, balance training, Mary principles, HEP    Subjective  Evaluation    History of Present Illness  Mechanism of injury: Patient reports a hx of migraines but notes a newer onset of back pain that radiates towards the LUE. Patient notes intermittent trapezius muscle spasm, and notes some anterior GHJ point.     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   Pain: Deep acheness that radiates towards the front (left side) from the mid lower thoracic/upper lumbar.   Previous Treatment: Physical therapy for similar pain.   PMH: Thyroidectomy (some complications - noted a reduction in activity).   Patient Goals  Patient goals for therapy: decreased pain, increased motion, return to sport/leisure activities, independence with ADLs/IADLs and increased strength    Pain  Current pain ratin  At best pain rating: 3  At worst pain ratin  Quality: dull ache and pressure      Diagnostic Tests    FCE comments: Chest X-Ray at one point - no findings.  MRI lumbar spine (last week) - arthritis findings, synovial cyst.   MRI thoracic spine (one year ago) - Treatments  Previous treatment: chiropractic, massage and physical therapy  Current treatment: physical therapy          Systems Review History:  Cardiovascular/pulmonary     Integumentary    Musculoskeletal Left shoulder pain   Left thoracic back pain     Neuromuscular System Any recent falls? Denies  Numbness/tingling? Denies     Surgical history and/or abdominal surgeries:  Thyroidectomy       Goal: Return to ADL         Objective     Active Range of Motion   Cervical/Thoracic Spine       Cervical    Flexion: 30 degrees  with pain Restriction level: moderate  Extension: 40 degrees     Restriction level: moderate  Left lateral flexion: 40 degrees     Restriction level: minimal  Right lateral flexion: 40 degrees      Restriction level minimal  Left rotation: 60 degrees Restriction level: minimal  Right rotation: 60 degrees    Restriction level: minimal    Thoracic    Flexion:  with pain Restriction level: moderate  Extension:  with pain Restriction level: moderate  Left rotation:  Restriction level: moderate  Right rotation:  Restriction level: moderate  Left Shoulder   Flexion: 120 degrees with pain  Abduction: 110 degrees with pain    Lumbar   Flexion:  Restriction level: minimal    Additional Active Range of Motion Details  Thoracic Flexion: Noted pain around T12  Thoracic Spine B/L rotation: Moderately significant restriction.         Reduced hinge mechanics.    Functional IR: WNL B/L  Functional ER: Right T1  Functional ER: Left occiput (top of head).   Forward Flexion: Thoracolumbar hinge    Passive Range of Motion   Left Shoulder   Flexion: 160 degrees WFL  External rotation 45°: WFL  Internal rotation 45°: WFL    Scapular Mobility   Left Shoulder   Scapular mobility: fair  Scapular Mobility with Shoulder to 90° FF   Scapular winging: moderate  Scapular elevation: severe  Upward rotation: delayed  Downward rotation: delayed    Strength/Myotome Testing     Left Shoulder     Planes of Motion   Flexion: 4   Abduction: 4   External rotation at 0°: 4-   Internal rotation at 0°: 4     Right Shoulder     Planes of Motion   Flexion: 5   Abduction: 5   External rotation at 0°: 5   Internal rotation at 0°: 5     Functional Assessment        Comments  Lifting Mechanics: Aberrant lumbar movement, reduced hinge  and noted thoracolumbar hinge.       Abdominal Assessment:      Abdominal Assessment: Breathing Mechanics:  Educated on diaphragmatic breathing- specifically lateral diaphragmatic breathing  Educated on deep TrA engagement.                Precautions: Standard - Hx of thyroid cx.   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 (BOMN, approved, pending) Unit limit (Daily) Auth Start date Expiration date PT/OT + Visit Limit?   12/11/2024          Date of Service 10/11/2024        Visits Used         Visits Remaining                           Neuro Re-Ed                                                                        Ther Ex         Standing Therex         Seated Therex                  Supine Hooklying  Breathing mechanics diaphragmatic and sidelying     Deep core activation Hooklying                                                                       Ther Activity         Education POC, HEP, breathing mechanics, core activation, anatomy                                    Manual Ther                                                               Modalities                           Outcome Measure

## 2024-10-11 NOTE — LETTER
2024    Brandon Tello DO      Patient: Asmita ESPINOZA Bem   YOB: 1980   Date of Visit: 10/11/2024     Encounter Diagnosis     ICD-10-CM    1. Acute pain of left shoulder  M25.512       2. Chronic left-sided thoracic back pain  M54.6     G89.29           Dear Dr. Tello,     Asmita ESPINOZA Bem self-referred to physical therapy for left shoulder pain and left thoracic pain.  They are appropriate for physical therapy and can be treated for 30 days via my Direct Access PT licensure. We require a signed Plan of Care to continue care after the initial 30 days of treatment. As Asmita ESPINOZA Bem's care provider, can you please review the attached documentation and verify that you agree with the plan of care by signing the attached order?    I sincerely appreciate the opportunity to share in the care of one of your patients and hope to have another opportunity to work with you in the near future. Please review the attached evaluation summary from Asmita's recent visit.     Please verify that you agree with the plan of care by signing the attached order.     If you have any questions or concerns, please do not hesitate to call.     I sincerely appreciate the opportunity to share in the care of one of your patients and hope to have another opportunity to work with you in the near future.       Sincerely,    Tara Negron, PT      Referring Provider:      I certify that I have read the below Plan of Care and certify the need for these services furnished under this plan of treatment while under my care.                    No Recipients          PT Evaluation     Today's Date: 10/11/2024  Patient name: Asmita ESPINOZA Bem  : 1980  MRN: [id number]  Referring provider: Janet Martins MD  Dx:  Encounter Diagnosis     ICD-10-CM    1. Acute pain of left shoulder  M25.512       2. Chronic left-sided thoracic back pain  M54.6     G89.29                         Assessment  Impairments: abnormal coordination,  abnormal muscle firing, abnormal muscle tone, activity intolerance, impaired physical strength and pain with function  Symptom irritability: moderate    Assessment details:    Asmita ESPINOZA Bem is a 44 y.o. year old female presenting to outpatient pelvic health physical therapy via direct access secondary to left lower thoracic pain, and left anterior shoulder discomfort. 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  Patient's chief complaint is anterior shoulder and lower thoracic pain, impacting patients tolerance for working out, overhead lifting, and driving.  Objective examination reveals reduced cervical and thoracic ROM, specifically cervical flexion/extension, and thoracic extension and B/L rotation. Screened shoulder pain, noting left scapula dyskinesis, and (+) special tests for left shoulder impingement secondary to scapular deficiency.  Screened hinge mechanics, noting thoracolumbar hinge. Educated patient on appropriate hinge mechanics, diaphragmatic breathing for rib expansion, and core engagement to assist with rib movement.      Patient will benefit from skilled PT services to address these deficits and improve function. Based on patients age and motivation, patient is a positive candidate to respond favorably to physical therapy with a good prognosis. Patient was educated on examination and techniques, plan of care, goals of therapy, and HEP. Patient was provided an opportunity to ask any questions. Provided Pt initial HEP. Patient demonstrated and verbalized understanding. Verbally reported to hold exercises if increased pain or discomfort.  Pt will be seen 1x/week for 4-6 weeks. Patient will be re-assessed regularly in order to document progress and limit any regression. The goal of PT is to progress patient towards independence of self care management.      Understanding of Dx/Px/POC: good     Prognosis: good    Goals  In 2 weeks, patient will report a reduction in pain at work to < or equal to 2/10 to reduce irritability and improve function  In 2 weeks, patient will display independence with HEP  In 4 weeks, patient will improve left shoulder IR/ER MMT to > 0.5 to assist with improved strength tolerance for overhead activities  In 4 weeks, patient will display full cervical ROM without symptom reproduction  In 6 weeks, patient will improve thoracic extension ROM > 25% to assist with overhead shoulder mobility  In 6 weeks, patient will perform appropriate lifting mechanics, with use of hips > lumbar spine  In 8 weeks, patient will display full shoulder ROM  In 8 weeks, patient will tolerate a full day of work without symptom exacerbation.   In 8 weeks, patient will report >70% improvement in symptoms to improve function        Plan  Patient would benefit from: skilled physical therapy  Planned modality interventions: TENS    Planned therapy interventions: balance, motor coordination training, neuromuscular re-education, patient/caregiver education, postural training, strengthening, stretching, therapeutic activities, therapeutic exercise, functional ROM exercises, body mechanics training and breathing training    Frequency: 1x week  Plan of Care beginning date: 10/11/2024  Plan of Care expiration date: 12/11/2024  Treatment plan discussed with: patient  Plan details: Plan includes manual, modalities, therapeutic exercise, therapeutic function, balance training, Mary principles, HEP    Subjective Evaluation    History of Present Illness  Mechanism of injury: Patient reports a hx of migraines but notes a newer onset of back pain that radiates towards the LUE. Patient notes intermittent trapezius muscle spasm, and notes some anterior GHJ point.     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   Pain: Deep acheness that radiates towards the front (left side) from the mid lower thoracic/upper lumbar.   Previous Treatment: Physical therapy for similar pain.   PMH: Thyroidectomy (some complications - noted a reduction in activity).   Patient Goals  Patient goals for therapy: decreased pain, increased motion, return to sport/leisure activities, independence with ADLs/IADLs and increased strength    Pain  Current pain ratin  At best pain rating: 3  At worst pain ratin  Quality: dull ache and pressure      Diagnostic Tests    FCE comments: Chest X-Ray at one point - no findings.  MRI lumbar spine (last week) - arthritis findings, synovial cyst.   MRI thoracic spine (one year ago) - Treatments  Previous treatment: chiropractic, massage and physical therapy  Current treatment: physical therapy          Systems Review History:  Cardiovascular/pulmonary     Integumentary    Musculoskeletal Left shoulder pain   Left thoracic back pain     Neuromuscular System Any recent falls? Denies  Numbness/tingling? Denies     Surgical history and/or abdominal surgeries:  Thyroidectomy       Goal: Return to ADL         Objective     Active Range of Motion   Cervical/Thoracic Spine       Cervical    Flexion: 30 degrees  with pain Restriction level: moderate  Extension: 40 degrees     Restriction level: moderate  Left lateral flexion: 40 degrees     Restriction level: minimal  Right lateral flexion: 40 degrees     Restriction level minimal  Left rotation: 60 degrees Restriction level: minimal  Right rotation: 60 degrees    Restriction level: minimal    Thoracic    Flexion:  with pain Restriction level: moderate  Extension:  with pain Restriction level: moderate  Left rotation:  Restriction level: moderate  Right rotation:  Restriction level: moderate  Left Shoulder   Flexion: 120 degrees  with pain  Abduction: 110 degrees with pain    Lumbar   Flexion:  Restriction level: minimal    Additional Active Range of Motion Details  Thoracic Flexion: Noted pain around T12  Thoracic Spine B/L rotation: Moderately significant restriction.         Reduced hinge mechanics.    Functional IR: WNL B/L  Functional ER: Right T1  Functional ER: Left occiput (top of head).   Forward Flexion: Thoracolumbar hinge    Passive Range of Motion   Left Shoulder   Flexion: 160 degrees WFL  External rotation 45°: WFL  Internal rotation 45°: WFL    Scapular Mobility   Left Shoulder   Scapular mobility: fair  Scapular Mobility with Shoulder to 90° FF   Scapular winging: moderate  Scapular elevation: severe  Upward rotation: delayed  Downward rotation: delayed    Strength/Myotome Testing     Left Shoulder     Planes of Motion   Flexion: 4   Abduction: 4   External rotation at 0°: 4-   Internal rotation at 0°: 4     Right Shoulder     Planes of Motion   Flexion: 5   Abduction: 5   External rotation at 0°: 5   Internal rotation at 0°: 5     Functional Assessment        Comments  Lifting Mechanics: Aberrant lumbar movement, reduced hinge  and noted thoracolumbar hinge.       Abdominal Assessment:      Abdominal Assessment: Breathing Mechanics:  Educated on diaphragmatic breathing- specifically lateral diaphragmatic breathing  Educated on deep TrA engagement.                Precautions: Standard - Hx of thyroid cx.   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 (BOMN, approved, pending) Unit limit (Daily) Auth Start date Expiration date PT/OT + Visit Limit?   12/11/2024          Date of Service 10/11/2024        Visits Used         Visits Remaining                           Neuro Re-Ed                                                                        Ther Ex         Standing Therex         Seated Therex                  Supine Hooklying  Breathing mechanics diaphragmatic and sidelying     Deep core activation Hooklying                                                                       Ther Activity         Education POC, HEP, breathing mechanics, core activation, anatomy                                    Manual Ther                                                               Modalities                           Outcome Measure

## 2024-10-18 ENCOUNTER — APPOINTMENT (OUTPATIENT)
Dept: PHYSICAL THERAPY | Facility: REHABILITATION | Age: 44
End: 2024-10-18
Payer: COMMERCIAL

## 2024-10-18 ENCOUNTER — PROCEDURE VISIT (OUTPATIENT)
Age: 44
End: 2024-10-18
Payer: COMMERCIAL

## 2024-10-18 ENCOUNTER — PROCEDURE VISIT (OUTPATIENT)
Dept: NEUROLOGY | Facility: CLINIC | Age: 44
End: 2024-10-18

## 2024-10-18 VITALS
SYSTOLIC BLOOD PRESSURE: 112 MMHG | BODY MASS INDEX: 26.4 KG/M2 | OXYGEN SATURATION: 98 % | WEIGHT: 149 LBS | HEIGHT: 63 IN | HEART RATE: 87 BPM | DIASTOLIC BLOOD PRESSURE: 60 MMHG

## 2024-10-18 VITALS — HEART RATE: 64 BPM | TEMPERATURE: 98.7 F | DIASTOLIC BLOOD PRESSURE: 71 MMHG | SYSTOLIC BLOOD PRESSURE: 117 MMHG

## 2024-10-18 DIAGNOSIS — M99.04 SEGMENTAL DYSFUNCTION OF SACRAL REGION: ICD-10-CM

## 2024-10-18 DIAGNOSIS — M99.03 SEGMENTAL DYSFUNCTION OF LUMBAR REGION: ICD-10-CM

## 2024-10-18 DIAGNOSIS — M54.6 CHRONIC LEFT-SIDED THORACIC BACK PAIN: Primary | ICD-10-CM

## 2024-10-18 DIAGNOSIS — M99.02 SEGMENTAL DYSFUNCTION OF THORACIC REGION: ICD-10-CM

## 2024-10-18 DIAGNOSIS — M99.01 SEGMENTAL DYSFUNCTION OF CERVICAL REGION: ICD-10-CM

## 2024-10-18 DIAGNOSIS — G89.29 CHRONIC LEFT-SIDED THORACIC BACK PAIN: Primary | ICD-10-CM

## 2024-10-18 DIAGNOSIS — M54.59 MECHANICAL LOW BACK PAIN: ICD-10-CM

## 2024-10-18 DIAGNOSIS — M75.52 SUBACROMIAL BURSITIS OF LEFT SHOULDER JOINT: ICD-10-CM

## 2024-10-18 DIAGNOSIS — G43.709 CHRONIC MIGRAINE WITHOUT AURA WITHOUT STATUS MIGRAINOSUS, NOT INTRACTABLE: Primary | ICD-10-CM

## 2024-10-18 PROCEDURE — 98941 CHIROPRACT MANJ 3-4 REGIONS: CPT | Performed by: CHIROPRACTOR

## 2024-10-18 PROCEDURE — 97110 THERAPEUTIC EXERCISES: CPT | Performed by: CHIROPRACTOR

## 2024-10-18 NOTE — PROGRESS NOTES
"Universal Protocol   procedure performed by consultantConsent: Verbal consent obtained. Written consent obtained.  Risks and benefits: risks, benefits and alternatives were discussed  Consent given by: patient  Time out: Immediately prior to procedure a \"time out\" was called to verify the correct patient, procedure, equipment, support staff and site/side marked as required.  Patient understanding: patient states understanding of the procedure being performed  Patient consent: the patient's understanding of the procedure matches consent given  Procedure consent: procedure consent matches procedure scheduled  Relevant documents: relevant documents present and verified  Patient identity confirmed: verbally with patient      Chemodenervation     Date/Time  10/18/2024 2:30 PM     Performed by  Christine Serrato PA-C   Authorized by  Christine Serrato PA-C     Pre-procedure details      Preparation: Patient was prepped and draped in usual sterile fashion     Anesthesia  (see MAR for exact dosages):     Anesthesia method:  None   Procedure details      Position:  Upright   Botox      Botox Type:  Type A    Brand:  Botox    mL's of Botulinum Toxin:  200    mL's of preservative free sterile saline:  4    Final Concentration per CC:  50 units    Needle Gauge:  30 G 2.5 inch   Procedures      Botox Procedures: chronic headache     Injection Location      Head / Face:  L superior cervical paraspinal, R superior cervical paraspinal, L , R , R frontalis, L frontalis, R medial occipitalis, L medial occipitalis, procerus, R temporalis, L superior trapezius, R superior trapezius and L temporalis    L  injection amount:  5 unit(s)    R  injection amount:  5 unit(s)    L lateral frontalis:  5 unit(s)    R lateral frontalis:  5 unit(s)    L medial frontalis:  5 unit(s)    R medial frontalis:  5 unit(s)    L temporalis injection amount:  20 unit(s)    R temporalis injection amount:  20 unit(s)    Procerus " injection amount:  5 unit(s)    L medial occipitalis injection amount:  15 unit(s)    R medial occipitalis injection amount:  15 unit(s)    L superior cervical paraspinal injection amount:  10 unit(s)    R superior cervical paraspinal injection amount:  10 unit(s)    L superior trapezius injection amount:  15 unit(s)    R superior trapezius injection amount:  15 unit(s)   Total Units      Total units used:  200   Post-procedure details      Chemodenervation:  Chronic migraine    Facial Nerve Location::  Bilateral facial nerve    Patient tolerance of procedure:  Tolerated well, no immediate complications   Comments       45 units left temporalis, parietal and occipitalis  All medically necessary

## 2024-10-18 NOTE — PROGRESS NOTES
Diagnoses and all orders for this visit:    Chronic left-sided thoracic back pain    Segmental dysfunction of sacral region    Subacromial bursitis of left shoulder joint    Segmental dysfunction of lumbar region    Segmental dysfunction of thoracic region    Segmental dysfunction of cervical region    Mechanical low back pain           ASSESSMENT:   Pt's symptoms and exam findings consistent with mechanical lbp  and hip bursitis secondary to repetitive st/sp injury, exacerbated by postural/ergonomic stressors. Pt responded well to flexion biased stretches and manual mobilization of the affected spinal and myofascial tissues with increased ROM; trial of conservative tx recommended consisting of stretching, graded mobilization/manipulation of the affected spinal and myofascial jt dysfunction, postural/ergonomic education and take home stretches/exercises. If symptoms fail to improve with short trial of conservative care, appropriate imaging and referral will be coordinated.  - The patient status is flared up with lower back pain but migraine today, slight decrease in mm spasm in the neck and back after treatment.     PROCEDURE CODES: 92836 and 05007    TREATMENT:  Fear avoidance behavior discussion; encouraged and reassured pt that natural course of condition is to improve over time with adherence to tx plan and home care strategies. Home care recommendations: avoid bed rest, walk (but avoid trails and uneven surfaces), gradual return to activity to tolerance (avoid anything that peripheralizes symptoms), call if symptoms peripheralize, worsen, or neurologic deficit progresses. Ther-ex: IASTM; discussed post procedure soreness and/or ecchymosis for up to 36 hrs, applied to affected mm hypertonicities; supine hamstring stretch, supine gluteal stretch, side laying QL stretch, single knee to chest stretch, hip flexor pin-and-stretch, alternating prone hip extension, glute bridge, transitional mvmt education, abdominal  bracing; greater than 15 min spent performing above mentioned ther-ex to improve ROM/flexibility. Thoracic mobilization/manipulation: prone P-A mob; Lumbar mobilization/manipulation: diversified side laying graded HVLA, flexion-traction; SIJ Manipulation/Mobilization: R/L SIJ HVLA - long axis distraction, ahn drop table maneuver to affected SIJ    HPI:  Asmita ESPINOZA Bem is a 44 y.o. female  Mid to low back pain; Pain Scale: 5    The patient presents to the office with lower back pain that started without incident of trauma on June 1 while at a concert. She was sitting on the pain but has no pain until they were driving home. The patient does remember having mild tingling in the lower back about 6 months prior to that. The patient now has tingling across lower back. Pain is constant 4 with sitting can go up to a 8/10. Pt went to PT for a course with some improvements with working out., and did help overall but pain is still significant in the evenings. The patient was also given mm relaxers which she think she not help but also makes her too tired.  Massages once a month that helps temporarily. Exercise- metabolic and strength training. Pt sleeps a little better after Sleep therapy program but slightly more worse after last visit. Also gets pain with a bump behind legs but and notices more spider veins and most tingling into the legs but mentions wearing compression stockings for work.  7/19- The patient is feeling a little better today bc she has been off of work but still 5/10 pain.   8/2- The patient is feeling sore in the lower back pain, slightly worse bc of MRI procedure.  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   9/27- The patient is feeling sore today, [facility]  The pt has massage tomorrow and taking Meloxicam.   10/4- The patient is feeling sore today, very  slightly better. Not sleeping well. Still taking Meloxicam.   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    Back Pain  The problem has been waxing and waning since onset. The pain is present in the lumbar spine.   Neck Pain       Past Medical History:   Diagnosis Date    Allergic     Anxiety     Cancer (HCC) 2/1/2023    Thyroid- Papillary carcinoma    COVID-19 01/2022    mild s/s-not hospitalized, not vaccinated    GERD (gastroesophageal reflux disease)     Headache(784.0)     History of IBS     with constipation    Ingrown toenail     Irritable bowel syndrome     Migraines     Chronic per pt    Plantar fasciitis     Thyroid carcinoma (HCC)     Varicella     Venereal wart 08/07/2014      Past Surgical History:   Procedure Laterality Date    CERVICAL BIOPSY  W/ LOOP ELECTRODE EXCISION  2017    COLONOSCOPY      MRI BREAST BIOPSY RIGHT (ALL INCLUSIVE) Right 7/26/2024    NASAL SEPTUM SURGERY      SD OSTEOT W/WO LNGTH SHRT/CORRJ 1ST METAR Left 12/30/2022    Procedure: OSTEOTOMY METATARSAL 1st;  Surgeon: Taiwo Harrington DPM;  Location: [facility];  Service: Podiatry    THYROIDECTOMY N/A 2/24/2023    Procedure: TOTAL THYROIDECTOMY;  Surgeon: Vince Carey MD;  Location: [facility];  Service: Surgical Oncology    TONSILLECTOMY      UPPER GASTROINTESTINAL ENDOSCOPY      US GUIDED THYROID BIOPSY  1/31/2023    WISDOM TOOTH EXTRACTION       The following portions of the patient's history were reviewed and updated as appropriate: allergies, past family history, past medical history, past social history, past surgical history, and problem list.  Review of Systems   Musculoskeletal:  Positive for back pain, myalgias and neck pain.     Physical Exam  Musculoskeletal:         General: Tenderness present.      Cervical back: Tenderness present.      Thoracic back: Spasms and tenderness present. No swelling, edema,  deformity, signs of trauma, lacerations or bony tenderness. Decreased range of motion. No scoliosis.      Lumbar back: Spasms and tenderness present. No swelling, edema, deformity, signs of trauma, lacerations or bony tenderness. Decreased range of motion. No scoliosis.        Back:    Neurological:      Gait: Gait is intact.      Deep Tendon Reflexes: Reflexes are normal and symmetric.   Psychiatric:         Attention and Perception: Attention normal.         Mood and Affect: Affect normal.         Speech: Speech normal.         Behavior: Behavior is cooperative.         Cognition and Memory: Cognition normal.       SOFT TISSUE ASSESSMENT Hypertonicity and tenderness palpated B C4-T1, T10-S1 erector spinae, upper trap, SCM, Lev scap, hip flexor, glute med/min, QL, hamstring JOINT RESTRICTIONS: C4-T1, T10-S1 and R/L SIJ ORTHO: SI jt point tenderness: +; Mary unremarkable for centralization/peripheralization; michelle's, iliac compression, thigh thrust elicit lbp in R/L SIJ; prone femoral nerve stretch neg for upper lumbar neural tension, elicits R/L SIJ stiffness; sitting root elicits no lbp on R/L; slump test elicits no neural tension R/L, Cervical distraction- Neg, Maximal Foraminal stenosis- neg, Spurling's- neg    Return in about 1 week (around 10/25/2024) for Recheck.

## 2024-10-24 ENCOUNTER — TELEPHONE (OUTPATIENT)
Dept: PAIN MEDICINE | Facility: CLINIC | Age: 44
End: 2024-10-24

## 2024-10-24 DIAGNOSIS — M25.512 LEFT SHOULDER PAIN, UNSPECIFIED CHRONICITY: Primary | ICD-10-CM

## 2024-10-25 ENCOUNTER — OFFICE VISIT (OUTPATIENT)
Dept: PHYSICAL THERAPY | Facility: REHABILITATION | Age: 44
End: 2024-10-25
Payer: COMMERCIAL

## 2024-10-25 DIAGNOSIS — M25.512 ACUTE PAIN OF LEFT SHOULDER: Primary | ICD-10-CM

## 2024-10-25 DIAGNOSIS — M54.6 CHRONIC LEFT-SIDED THORACIC BACK PAIN: ICD-10-CM

## 2024-10-25 DIAGNOSIS — G89.29 CHRONIC LEFT-SIDED THORACIC BACK PAIN: ICD-10-CM

## 2024-10-25 PROCEDURE — 97110 THERAPEUTIC EXERCISES: CPT

## 2024-10-25 PROCEDURE — 97140 MANUAL THERAPY 1/> REGIONS: CPT

## 2024-10-25 NOTE — PROGRESS NOTES
Daily Note     Today's date: 10/25/2024  Patient name: Asmita ESPINOZA Bem  : 1980  MRN: [id number]  Referring provider: Janet Martins MD  Dx:   Encounter Diagnosis     ICD-10-CM    1. Acute pain of left shoulder  M25.512       2. Chronic left-sided thoracic back pain  M54.6     G89.29                      Subjective: Patient reports continued improvement at this time, noting positive response from trigger point therapy. 20% improvement but notes continued L shoulder pain.       Objective: See treatment diary below      Assessment: Tolerated treatment well. Introduced scapular stabilization therex today, along with thoracic ROM. Noted continued difficulty with LUE shoulder abduction, noting TTP along suprapsinatus tendon and biceps insertion. S/S overall consistent with potential impingement syndrome.  Patient will benefit from addressing further scapular rhythm, thoracic extension, as well as, addressing potential RC tendonitis. 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 Patient would benefit from continued PT to address above deficits and assist with ergonomics to limit pain during occupation.       Plan: Continue per plan of care.      Precautions: Standard - Hx of thyroid cx. Increased mobility of right side.   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 (BOMN, approved, pending) Unit limit (Daily) Auth Start date Expiration date PT/OT + Visit Limit?   12/11/2024          Date of Service 10/11/2024 10/25/2024       Visits Used 1 2       Visits Remaining                           Neuro Re-Ed                                                                        Ther Ex         Warm up  2 min fwd  2 min back       Standing Therex         Seated Therex                  Supine Hooklying  Breathing mechanics diaphragmatic and sidelying     Deep core activation Hooklying        Thoracic mobility  Cat Cow   1 x 15    Child pose  5 x 8 seconds     Seated thoracic extension  1 x 12            Abdominal Strengthening          Scapular Strengthening  Prone row   2 x 10 B/L #2 lb    Prone shoulder extension with scapular depression   2 x 10  #1 lb        Scapular Strengthening  Standing ER #7 lb   1 x 10 held step to the side two and back inward two.B/L                                  Ther Activity         Education POC, HEP, breathing mechanics, core activation, anatomy                                    Manual Ther         Scapular mobilization  Sidelying scapular mobilization    Performed DEONDRE                                                     Modalities                           Outcome Measure

## 2024-10-31 NOTE — TELEPHONE ENCOUNTER
Caller: Asmita  Doctor/office: Ree ALFONSO#: [phone]    % of improvement: 60%  Pain Scale (1-10): 3/10      Still has Left shoulder pain PT is only slightly helping putting on a a jacket really aggravates the area what is next step?

## 2024-10-31 NOTE — TELEPHONE ENCOUNTER
X-ray left shoulder ordered.  Please schedule follow-up office visit for reevaluation and please have the patient complete x-ray prior to office visit

## 2024-11-01 ENCOUNTER — APPOINTMENT (OUTPATIENT)
Dept: RADIOLOGY | Age: 44
End: 2024-11-01
Payer: COMMERCIAL

## 2024-11-01 ENCOUNTER — PROCEDURE VISIT (OUTPATIENT)
Age: 44
End: 2024-11-01
Payer: COMMERCIAL

## 2024-11-01 VITALS
BODY MASS INDEX: 26.4 KG/M2 | SYSTOLIC BLOOD PRESSURE: 118 MMHG | OXYGEN SATURATION: 97 % | HEIGHT: 63 IN | WEIGHT: 149 LBS | HEART RATE: 94 BPM | DIASTOLIC BLOOD PRESSURE: 70 MMHG

## 2024-11-01 DIAGNOSIS — M99.02 SEGMENTAL DYSFUNCTION OF THORACIC REGION: ICD-10-CM

## 2024-11-01 DIAGNOSIS — M54.6 CHRONIC LEFT-SIDED THORACIC BACK PAIN: Primary | ICD-10-CM

## 2024-11-01 DIAGNOSIS — M75.52 SUBACROMIAL BURSITIS OF LEFT SHOULDER JOINT: ICD-10-CM

## 2024-11-01 DIAGNOSIS — M54.59 MECHANICAL LOW BACK PAIN: ICD-10-CM

## 2024-11-01 DIAGNOSIS — M99.03 SEGMENTAL DYSFUNCTION OF LUMBAR REGION: ICD-10-CM

## 2024-11-01 DIAGNOSIS — M99.04 SEGMENTAL DYSFUNCTION OF SACRAL REGION: ICD-10-CM

## 2024-11-01 DIAGNOSIS — G89.29 CHRONIC LEFT-SIDED THORACIC BACK PAIN: Primary | ICD-10-CM

## 2024-11-01 DIAGNOSIS — M99.01 SEGMENTAL DYSFUNCTION OF CERVICAL REGION: ICD-10-CM

## 2024-11-01 DIAGNOSIS — M25.512 LEFT SHOULDER PAIN, UNSPECIFIED CHRONICITY: ICD-10-CM

## 2024-11-01 PROCEDURE — 73030 X-RAY EXAM OF SHOULDER: CPT

## 2024-11-01 PROCEDURE — 97110 THERAPEUTIC EXERCISES: CPT | Performed by: CHIROPRACTOR

## 2024-11-01 PROCEDURE — 98941 CHIROPRACT MANJ 3-4 REGIONS: CPT | Performed by: CHIROPRACTOR

## 2024-11-01 NOTE — PROGRESS NOTES
Diagnoses and all orders for this visit:    Chronic left-sided thoracic back pain    Segmental dysfunction of sacral region    Subacromial bursitis of left shoulder joint    Segmental dysfunction of lumbar region    Segmental dysfunction of thoracic region    Segmental dysfunction of cervical region    Mechanical low back pain      ASSESSMENT:   Pt's symptoms and exam findings consistent with mechanical lbp  and hip bursitis secondary to repetitive st/sp injury, exacerbated by postural/ergonomic stressors. Pt responded well to flexion biased stretches and manual mobilization of the affected spinal and myofascial tissues with increased ROM; trial of conservative tx recommended consisting of stretching, graded mobilization/manipulation of the affected spinal and myofascial jt dysfunction, postural/ergonomic education and take home stretches/exercises. If symptoms fail to improve with short trial of conservative care, appropriate imaging and referral will be coordinated.  - The patient status is flared up with lower back pain but migraine today, slight decrease in mm spasm in the neck and back after treatment.     PROCEDURE CODES: 65459 and 00643    TREATMENT:  Fear avoidance behavior discussion; encouraged and reassured pt that natural course of condition is to improve over time with adherence to tx plan and home care strategies. Home care recommendations: avoid bed rest, walk (but avoid trails and uneven surfaces), gradual return to activity to tolerance (avoid anything that peripheralizes symptoms), call if symptoms peripheralize, worsen, or neurologic deficit progresses. Ther-ex: IASTM; discussed post procedure soreness and/or ecchymosis for up to 36 hrs, applied to affected mm hypertonicities; supine hamstring stretch, supine gluteal stretch, side laying QL stretch, single knee to chest stretch, hip flexor pin-and-stretch, alternating prone hip extension, glute bridge, transitional mvmt education, abdominal  bracing; greater than 15 min spent performing above mentioned ther-ex to improve ROM/flexibility. Thoracic mobilization/manipulation: prone P-A mob; Lumbar mobilization/manipulation: diversified side laying graded HVLA, flexion-traction; SIJ Manipulation/Mobilization: R/L SIJ HVLA - long axis distraction, ahn drop table maneuver to affected SIJ    HPI:  Asmita ESPINOZA Bem is a 44 y.o. female  Mid to low back pain; Pain Scale: 5    The patient presents to the office with lower back pain that started without incident of trauma on June 1 while at a concert. She was sitting on the pain but has no pain until they were driving home. The patient does remember having mild tingling in the lower back about 6 months prior to that. The patient now has tingling across lower back. Pain is constant 4 with sitting can go up to a 8/10. Pt went to PT for a course with some improvements with working out., and did help overall but pain is still significant in the evenings. The patient was also given mm relaxers which she think she not help but also makes her too tired.  Massages once a month that helps temporarily. Exercise- metabolic and strength training. Pt sleeps a little better after Sleep therapy program but slightly more worse after last visit. Also gets pain with a bump behind legs but and notices more spider veins and most tingling into the legs but mentions wearing compression stockings for work.  7/19- The patient is feeling a little better today bc she has been off of work but still 5/10 pain.   8/2- The patient is feeling sore in the lower back pain, slightly worse bc of MRI procedure.  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   9/27- The patient is feeling sore today, they are [facility]  The pt has massage tomorrow and taking Meloxicam.   10/4- The patient is feeling sore today, very  slightly better. Not sleeping well. Still taking Meloxicam.   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  11/1- The patient is feeling better overall, with massage, treatment here and injections have improved her pain. 5/10.    Back Pain  The problem has been waxing and waning since onset. The pain is present in the lumbar spine.   Neck Pain       Past Medical History:   Diagnosis Date    Allergic     Anxiety     Cancer (HCC) 2/1/2023    Thyroid- Papillary carcinoma    COVID-19 01/2022    mild s/s-not hospitalized, not vaccinated    GERD (gastroesophageal reflux disease)     Headache(784.0)     History of IBS     with constipation    Ingrown toenail     Irritable bowel syndrome     Migraines     Chronic per pt    Plantar fasciitis     Thyroid carcinoma (HCC)     Varicella     Venereal wart 08/07/2014      Past Surgical History:   Procedure Laterality Date    CERVICAL BIOPSY  W/ LOOP ELECTRODE EXCISION  2017    COLONOSCOPY      MRI BREAST BIOPSY RIGHT (ALL INCLUSIVE) Right 7/26/2024    NASAL SEPTUM SURGERY      TN OSTEOT W/WO LNGTH SHRT/CORRJ 1ST METAR Left 12/30/2022    Procedure: OSTEOTOMY METATARSAL 1st;  Surgeon: Taiwo Harrington DPM;  Location: [facility];  Service: Podiatry    THYROIDECTOMY N/A 2/24/2023    Procedure: TOTAL THYROIDECTOMY;  Surgeon: Vince Carey MD;  Location: [facility];  Service: Surgical Oncology    TONSILLECTOMY      UPPER GASTROINTESTINAL ENDOSCOPY      US GUIDED THYROID BIOPSY  1/31/2023    WISDOM TOOTH EXTRACTION       The following portions of the patient's history were reviewed and updated as appropriate: allergies, past family history, past medical history, past social history, past surgical history, and problem list.  Review of Systems   Musculoskeletal:  Positive for back pain, myalgias and neck pain.     Physical Exam  Musculoskeletal:         General: Tenderness present.       Cervical back: Tenderness present.      Thoracic back: Spasms and tenderness present. No swelling, edema, deformity, signs of trauma, lacerations or bony tenderness. Decreased range of motion. No scoliosis.      Lumbar back: Spasms and tenderness present. No swelling, edema, deformity, signs of trauma, lacerations or bony tenderness. Decreased range of motion. No scoliosis.        Back:    Neurological:      Gait: Gait is intact.      Deep Tendon Reflexes: Reflexes are normal and symmetric.   Psychiatric:         Attention and Perception: Attention normal.         Mood and Affect: Affect normal.         Speech: Speech normal.         Behavior: Behavior is cooperative.         Cognition and Memory: Cognition normal.     SOFT TISSUE ASSESSMENT Hypertonicity and tenderness palpated B C4-T1, T10-S1 erector spinae, upper trap, SCM, Lev scap, hip flexor, glute med/min, QL, hamstring JOINT RESTRICTIONS: C4-T1, T10-S1 and R/L SIJ ORTHO: SI jt point tenderness: +; Mary unremarkable for centralization/peripheralization; michelle's, iliac compression, thigh thrust elicit lbp in R/L SIJ; prone femoral nerve stretch neg for upper lumbar neural tension, elicits R/L SIJ stiffness; sitting root elicits no lbp on R/L; slump test elicits no neural tension R/L, Cervical distraction- Neg, Maximal Foraminal stenosis- neg, Spurling's- neg    Return in about 1 week (around 11/8/2024) for Recheck.

## 2024-11-02 ENCOUNTER — TELEPHONE (OUTPATIENT)
Dept: OTHER | Facility: OTHER | Age: 44
End: 2024-11-02

## 2024-11-02 NOTE — TELEPHONE ENCOUNTER
Patient is calling regarding cancelling an appointment.    Date/Time:11/2 at 11:15am    Patient was rescheduled: YES [] NO [x]    Patient requesting call back to reschedule: YES [x] NO []     Patient is sick and requesting to reschedule.

## 2024-11-02 NOTE — TELEPHONE ENCOUNTER
Called pt and LVM that I did cxl her appt for her and to give us a call back on Monday so we can get this appt r/s for her.

## 2024-11-04 DIAGNOSIS — C73 PAPILLARY THYROID CARCINOMA (HCC): ICD-10-CM

## 2024-11-05 RX ORDER — LEVOTHYROXINE SODIUM 125 UG/1
125 TABLET ORAL DAILY
Qty: 90 TABLET | Refills: 1 | Status: SHIPPED | OUTPATIENT
Start: 2024-11-05

## 2024-11-08 ENCOUNTER — OFFICE VISIT (OUTPATIENT)
Dept: PHYSICAL THERAPY | Facility: REHABILITATION | Age: 44
End: 2024-11-08
Payer: COMMERCIAL

## 2024-11-08 DIAGNOSIS — M54.6 CHRONIC LEFT-SIDED THORACIC BACK PAIN: ICD-10-CM

## 2024-11-08 DIAGNOSIS — M25.512 ACUTE PAIN OF LEFT SHOULDER: Primary | ICD-10-CM

## 2024-11-08 DIAGNOSIS — G89.29 CHRONIC LEFT-SIDED THORACIC BACK PAIN: ICD-10-CM

## 2024-11-08 PROCEDURE — 97110 THERAPEUTIC EXERCISES: CPT

## 2024-11-08 PROCEDURE — 97140 MANUAL THERAPY 1/> REGIONS: CPT

## 2024-11-08 NOTE — PROGRESS NOTES
Daily Note     Today's date: 2024  Patient name: Asmita ESPINOZA Bem  : 1980  MRN: [id number]  Referring provider: Janet Martins MD  Dx:   Encounter Diagnosis     ICD-10-CM    1. Acute pain of left shoulder  M25.512       2. Chronic left-sided thoracic back pain  M54.6     G89.29                      Subjective: Patient notes continued improvement in her back, but notes continued L shoulder pain. Pain most dominant with don/doffing clothing.       Objective: See treatment diary below. L Shoulder Screen:  Noted standing shoulder flexion WNL, ABDuction: pain at 90 degrees. IR/ER at 90 degrees abduction: WNL, no significant pain onset.       Assessment: Tolerated treatment well. Today's treatment further screened L shoulder pain, making note of no significant ROM deficits but noted pain with quick movements and with abduction,  more specifically at 90 degrees of abduction. Manual palpation noted TTP along supraspinatus tendon, and minor TTP along subscapularis. Utilized manual techniques with patient verbal consent of subscapularis. Further progressed RC loading and isometrics, providing education to patient regarding appropriate RC loading. Patient would benefit from continued PT to further progress L shoulder treatment to assist with ADL and functional grooming without pain.      Plan: Continue per plan of care.      Precautions: Standard - Hx of thyroid cx. Increased mobility of right side.   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 (BOMN, approved, pending) Unit limit (Daily) Auth Start date Expiration date PT/OT + Visit Limit?   12/11/2024          Date of Service 10/11/2024 10/25/2024 11/08/2024      Visits Used 1 2 3      Visits Remaining                           Neuro Re-Ed                                                                        Ther Ex         Warm up  2 min fwd  2 min back 2 min fws  2 min back - noted back with backward UBE movement      Standing Therex         Seated Therex                  Supine Hooklying  Breathing mechanics diaphragmatic and sidelying     Deep core activation Hooklying        Thoracic mobility  Cat Cow   1 x 15    Child pose  5 x 8 seconds     Seated thoracic extension  1 x 12            Abdominal Strengthening          Scapular Strengthening  Prone row   2 x 10 B/L #2 lb    Prone shoulder extension with scapular depression   2 x 10  #1 lb        Scapular /RC Strengthening  Standing ER #7 lb   1 x 10 held step to the side two and back inward two.B/L Standing RC isometrics  5'' x 12 IR    5'' x 12 ER    Standing IR step out  1 x 10 #7 lb      Standing ER Step out  1 x 10 #7 lb    Standing ER No money   2 x 10 YTB          Shoulder Mobility   Standing FF  1 x 15 with doorway stretch                        Ther Activity         Education POC, HEP, breathing mechanics, core activation, anatomy                                    Manual Ther         Scapular mobilization  Sidelying scapular  mobilization    Performed DEONDRE  Subscapularis release    PROM left shoulder     DEONDRE                                                    Modalities                           Outcome Measure

## 2024-11-12 ENCOUNTER — TELEPHONE (OUTPATIENT)
Dept: NEUROLOGY | Facility: CLINIC | Age: 44
End: 2024-11-12

## 2024-11-13 ENCOUNTER — APPOINTMENT (OUTPATIENT)
Dept: LAB | Facility: HOSPITAL | Age: 44
End: 2024-11-13
Payer: COMMERCIAL

## 2024-11-13 DIAGNOSIS — E61.1 IRON DEFICIENCY: ICD-10-CM

## 2024-11-13 LAB
BASOPHILS # BLD AUTO: 0.08 THOUSANDS/ÂΜL (ref 0–0.1)
BASOPHILS NFR BLD AUTO: 1 % (ref 0–1)
EOSINOPHIL # BLD AUTO: 0.14 THOUSAND/ÂΜL (ref 0–0.61)
EOSINOPHIL NFR BLD AUTO: 2 % (ref 0–6)
ERYTHROCYTE [DISTWIDTH] IN BLOOD BY AUTOMATED COUNT: 12 % (ref 11.6–15.1)
FERRITIN SERPL-MCNC: 24 NG/ML (ref 11–307)
HCT VFR BLD AUTO: 39.8 % (ref 34.8–46.1)
HGB BLD-MCNC: 13.1 G/DL (ref 11.5–15.4)
IMM GRANULOCYTES # BLD AUTO: 0.03 THOUSAND/UL (ref 0–0.2)
IMM GRANULOCYTES NFR BLD AUTO: 1 % (ref 0–2)
IRON SATN MFR SERPL: 25 % (ref 15–50)
IRON SERPL-MCNC: 83 UG/DL (ref 50–212)
LYMPHOCYTES # BLD AUTO: 1.54 THOUSANDS/ÂΜL (ref 0.6–4.47)
LYMPHOCYTES NFR BLD AUTO: 23 % (ref 14–44)
MCH RBC QN AUTO: 31.8 PG (ref 26.8–34.3)
MCHC RBC AUTO-ENTMCNC: 32.9 G/DL (ref 31.4–37.4)
MCV RBC AUTO: 97 FL (ref 82–98)
MONOCYTES # BLD AUTO: 0.56 THOUSAND/ÂΜL (ref 0.17–1.22)
MONOCYTES NFR BLD AUTO: 9 % (ref 4–12)
NEUTROPHILS # BLD AUTO: 4.25 THOUSANDS/ÂΜL (ref 1.85–7.62)
NEUTS SEG NFR BLD AUTO: 64 % (ref 43–75)
NRBC BLD AUTO-RTO: 0 /100 WBCS
PLATELET # BLD AUTO: 311 THOUSANDS/UL (ref 149–390)
PMV BLD AUTO: 10.4 FL (ref 8.9–12.7)
RBC # BLD AUTO: 4.12 MILLION/UL (ref 3.81–5.12)
TIBC SERPL-MCNC: 334 UG/DL (ref 250–450)
UIBC SERPL-MCNC: 251 UG/DL (ref 155–355)
WBC # BLD AUTO: 6.6 THOUSAND/UL (ref 4.31–10.16)

## 2024-11-13 PROCEDURE — 83540 ASSAY OF IRON: CPT

## 2024-11-13 PROCEDURE — 36415 COLL VENOUS BLD VENIPUNCTURE: CPT

## 2024-11-13 PROCEDURE — 82728 ASSAY OF FERRITIN: CPT

## 2024-11-13 PROCEDURE — 83550 IRON BINDING TEST: CPT

## 2024-11-13 PROCEDURE — 85025 COMPLETE CBC W/AUTO DIFF WBC: CPT

## 2024-11-15 ENCOUNTER — OFFICE VISIT (OUTPATIENT)
Dept: PHYSICAL THERAPY | Facility: REHABILITATION | Age: 44
End: 2024-11-15
Payer: COMMERCIAL

## 2024-11-15 DIAGNOSIS — M54.6 CHRONIC LEFT-SIDED THORACIC BACK PAIN: ICD-10-CM

## 2024-11-15 DIAGNOSIS — M25.512 ACUTE PAIN OF LEFT SHOULDER: Primary | ICD-10-CM

## 2024-11-15 DIAGNOSIS — G89.29 CHRONIC LEFT-SIDED THORACIC BACK PAIN: ICD-10-CM

## 2024-11-15 PROCEDURE — 97110 THERAPEUTIC EXERCISES: CPT

## 2024-11-15 NOTE — PROGRESS NOTES
PT RE-EVALUATION    Today's Date: 11/15/2024  Patient name: Asmita ESPINOZA Bem  : 1980  MRN: [id number]  Referring provider: Janet Martins MD  Dx:  Encounter Diagnosis     ICD-10-CM    1. Acute pain of left shoulder  M25.512       2. Chronic left-sided thoracic back pain  M54.6     G89.29                           Assessment  Impairments: abnormal coordination, abnormal muscle firing, abnormal muscle tone, activity intolerance, impaired physical strength and pain with function  Symptom irritability: moderate    Assessment details:    Asmita ESPINOZA Bem is progressing well in 4 visits. Patient subjectively reports a reduction in thoracic pain and discomfort. At this time, patient's chief complaint is left shoulder pain.  Objective examination notes WNL AROM of left shoulder, as well as improved strength of RC. Patient continues to display (+) painful arc, along with (+) neer and phillips, indicated rotator cuff syndrome with secondary impingement. Patient has been responding well to scapular strengthening and rotator cuff tendinous loading with appropriate progressive loading based on tissue response and pain levels.  At this time, patient will benefit from further skilled PT services to address above deficits and improve function. . The goal of PT is to progress patient toward independence with self care management. Patient will benefit from 1x/week x 4-6 weeks. Plan of care to shift towards focusing more on left shoulder scapular and RC strengthening, with a goal of tolerating reaching and don/doffing clothing without discomfort.   Understanding of Dx/Px/POC: good     Prognosis: good    Goals  In 2 weeks, patient will report a reduction in pain at work to < or equal to 2/10 to reduce irritability and improve function: achieved thoracic, progressing shoulder  In 2 weeks, patient will display independence with HEP: Achieved  In 4 weeks, patient will improve left shoulder IR/ER MMT to > 0.5 to assist with improved  strength tolerance for overhead activities: Achieved  In 4 weeks, patient will display full cervical ROM without symptom reproduction  In 6 weeks, patient will improve thoracic extension ROM > 25% to assist with overhead shoulder mobility: Achieved  In 6 weeks, patient will perform appropriate lifting mechanics, with use of hips > lumbar spine  In 8 weeks, patient will display full shoulder ROM: Achieved  In 8 weeks, patient will tolerate a full day of work without symptom exacerbation.   In 8 weeks, patient will report >70% improvement in symptoms to improve function; Achieved (thoracic)    New goals as of 11/15/2024  In 2 weeks, patient will perform FF and abduction of LUE shoulder without painful arc  In 4 weeks, patient will don/doff shirt without reports of pain  In 6 weeks, patient will report >70% improvement in shoulder pain for improved function        Plan  Patient would benefit from: skilled physical therapy  Planned modality interventions: TENS    Planned therapy interventions: balance, motor coordination training, neuromuscular re-education, patient/caregiver education, postural training, strengthening, stretching, therapeutic activities, therapeutic exercise, functional ROM exercises, body mechanics training and breathing training    Frequency: 1x week  Plan of Care beginning date: 10/11/2024  Plan of Care expiration date: 12/11/2024  Treatment plan discussed with: patient  Plan details: Plan includes manual, modalities, therapeutic exercise, therapeutic function, balance training, Mary principles, HEP      Subjective Evaluation    History of Present Illness  Mechanism of injury: 11/15/2024: Patient reports overall improvement in her thoracic pain and notes 80% improvement. At this time, patient's chief complaint is left shoulder pain. Noted most predominately with don/doffing clothing and with reaching. Patient also notes pain with lifting patients.     Thoracic pain: 2/10  L Shoulder pain: 3-6/10  - shooting pain with certain movements.         Evaluation Subjective: Patient reports a hx of migraines but notes a newer onset of back pain that radiates towards the LUE. Patient notes intermittent trapezius muscle spasm, and notes some anterior GHJ point.     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   Pain: Deep acheness that radiates towards the front (left side) from the mid lower thoracic/upper lumbar.   Previous Treatment: Physical therapy for similar pain.   PMH: Thyroidectomy (some complications - noted a reduction in activity).   Patient Goals  Patient goals for therapy: decreased pain, increased motion, return to sport/leisure activities, independence with ADLs/IADLs and increased strength    Pain  Current pain ratin  At best pain rating: 3  At worst pain ratin  Quality: dull ache and pressure      Diagnostic Tests    FCE comments: Chest X-Ray at one point - no findings.  MRI lumbar spine (last week) - arthritis findings, synovial cyst.   MRI thoracic spine (one year ago) - Treatments  Previous treatment: chiropractic, massage and physical therapy  Current treatment: physical therapy          Systems Review History:  Cardiovascular/pulmonary     Integumentary    Musculoskeletal Left shoulder pain   Left thoracic back pain     Neuromuscular System Any recent falls? Denies  Numbness/tingling? Denies     Surgical history and/or abdominal surgeries:  Thyroidectomy       Goal: Return to ADL         Objective     Active Range of Motion   Cervical/Thoracic Spine       Cervical    Flexion: 30 degrees  with pain Restriction level: moderate  Extension: 40 degrees     Restriction level: moderate  Left lateral flexion: 40 degrees     Restriction level: minimal  Right lateral flexion: 40 degrees      Restriction level minimal  Left rotation: 60 degrees Restriction level: minimal  Right rotation: 60 degrees    Restriction level: minimal    Thoracic    Flexion:  with pain Restriction level: moderate  Extension:  with pain Restriction level: moderate  Left rotation:  Restriction level: moderate  Right rotation:  Restriction level: moderate  Left Shoulder   Flexion: 160 degrees with pain  Abduction: 155 degrees with pain    Lumbar   Flexion:  Restriction level: minimal    Additional Active Range of Motion Details  Thoracic Flexion: Noted pain around T12  Thoracic Spine B/L rotation: Moderately significant restriction.         Reduced hinge mechanics.      Functional IR: WNL B/L  Functional ER: Right T1  Functional ER: Left occiput (top of head), Re-Eval: T6.      AROM Shoulder Stanidng:  FF eval: 120 degrees, Re-eval: 155 degrees  ABDuction eval: 110 degrees, Re-eval: 150 degrees      Forward Flexion: Thoracolumbar hinge    Passive Range of Motion   Left Shoulder   Flexion: 160 degrees WFL  External rotation 45°: WFL  Internal rotation 45°: WFL    Scapular Mobility   Left Shoulder   Scapular mobility: fair  Scapular Mobility with Shoulder to 90° FF   Scapular winging: moderate  Scapular elevation: severe  Upward rotation: delayed  Downward rotation: delayed    Strength/Myotome Testing     Left Shoulder     Planes of Motion   Flexion: 4   Abduction: 4   External rotation at 0°: 4   Internal rotation at 0°: 5     Right Shoulder     Planes of Motion   Flexion: 5   Abduction: 5   External rotation at 0°: 5   Internal rotation at 0°: 5     Tests     Left Shoulder   Positive Hawkin's, Neer's and painful arc.   Negative external rotation lag sign and bicep load .     Functional Assessment        Comments  Lifting Mechanics: Aberrant lumbar movement, reduced hinge  and noted thoracolumbar hinge.       Abdominal Assessment:      Abdominal Assessment: Breathing Mechanics:  Educated on diaphragmatic breathing-  specifically lateral diaphragmatic breathing  Educated on deep TrA engagement.                Precautions: Standard - Hx of thyroid cx.   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)    Hyperlipidemia    Iron deficiency    Encounter for follow-up surveillance of thyroid cancer    Myofascial pain syndrome       Body Region Impairment Result   Upper Body Left shoulder pain   Thoracic Pain  Scapular dyskinesis  Hinge mechanics   Hip     Knee     Ankle     Movement Screen         Outcome Measure Eval Re-Eval D/C   Oswestry      PFDI      LEFI      EPDS      TONI          POC Expiration:     Diagnosis: Left shoulder pain and Left Thoracic Pain    POC expires (Date that your POC expires) Auth Status? (BOMN, approved, pending) Unit limit (Daily) Auth Start date Expiration date PT/OT + Visit Limit?   12/11/2024          Precautions: Standard - Hx of thyroid cx. Increased mobility of right side.   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 (BOMN, approved, pending) Unit limit (Daily) Auth Start date Expiration date PT/OT + Visit Limit?   12/11/2024          Date of Service 10/11/2024 10/25/2024 11/08/2024 11/15/2024     Visits Used 1 2 3 4     Visits Remaining                           Neuro Re-Ed                                                                        Ther Ex         Warm up  2 min fwd  2 min back 2 min fws  2 min back - noted back with backward UBE movement 3 min forward     Standing Therex         Seated Therex                  Supine Hooklying  Breathing mechanics diaphragmatic and sidelying     Deep core activation Hooklying        Thoracic mobility  Cat Cow   1 x 15    Child pose  5 x 8 seconds     Seated thoracic extension  1 x 12            Abdominal Strengthening          Scapular Strengthening  Prone row   2 x 10 B/L #2 lb    Prone shoulder extension with scapular depression   2 x 10  #1 lb        Scapular /RC Strengthening  Standing ER #7 lb   1 x 10 held step to the side two and back inward two.B/L Standing RC isometrics  5'' x 12 IR    5'' x 12 ER    Standing IR step out  1 x 10 #7 lb      Standing ER Step out  1 x 10 #7 lb    Standing ER No money   2 x 10 YTB          Shoulder Mobility   Standing FF  1 x 15 with doorway stretch                        Ther Activity         Education POC, HEP, breathing mechanics, core activation, anatomy                                    Manual Ther         Scapular mobilization  Sidelying scapular mobilization    Performed DEONDRE  Subscapularis release    PROM left  shoulder     DEONDRE                                                    Modalities                           Outcome Measure

## 2024-11-20 DIAGNOSIS — G89.29 CHRONIC THORACIC BACK PAIN, UNSPECIFIED BACK PAIN LATERALITY: ICD-10-CM

## 2024-11-20 DIAGNOSIS — M54.6 CHRONIC THORACIC BACK PAIN, UNSPECIFIED BACK PAIN LATERALITY: ICD-10-CM

## 2024-11-20 DIAGNOSIS — M25.512 ACUTE PAIN OF LEFT SHOULDER: Primary | ICD-10-CM

## 2024-12-02 ENCOUNTER — COSMETIC (OUTPATIENT)
Dept: PLASTIC SURGERY | Facility: CLINIC | Age: 44
End: 2024-12-02

## 2024-12-02 DIAGNOSIS — Z41.1 ENCOUNTER FOR COSMETIC PROCEDURE: Primary | ICD-10-CM

## 2024-12-02 PROCEDURE — RECHECK: Performed by: STUDENT IN AN ORGANIZED HEALTH CARE EDUCATION/TRAINING PROGRAM

## 2024-12-02 PROCEDURE — TOXIN NEUROMODULATOR PER UNIT (BOTOX, DYSPORT, JEUVEAU, XEOMIN, DAXXIFY): Performed by: STUDENT IN AN ORGANIZED HEALTH CARE EDUCATION/TRAINING PROGRAM

## 2024-12-02 PROCEDURE — BOTOX2 TWO AREAS OR 50 UNITS: Performed by: STUDENT IN AN ORGANIZED HEALTH CARE EDUCATION/TRAINING PROGRAM

## 2024-12-06 ENCOUNTER — OFFICE VISIT (OUTPATIENT)
Dept: NEUROLOGY | Facility: CLINIC | Age: 44
End: 2024-12-06
Payer: COMMERCIAL

## 2024-12-06 ENCOUNTER — TELEPHONE (OUTPATIENT)
Age: 44
End: 2024-12-06

## 2024-12-06 VITALS
WEIGHT: 149.7 LBS | TEMPERATURE: 98.7 F | OXYGEN SATURATION: 98 % | HEIGHT: 63 IN | DIASTOLIC BLOOD PRESSURE: 62 MMHG | HEART RATE: 72 BPM | SYSTOLIC BLOOD PRESSURE: 126 MMHG | BODY MASS INDEX: 26.52 KG/M2

## 2024-12-06 DIAGNOSIS — Q04.8 CEREBELLAR TONSILLAR ECTOPIA (HCC): ICD-10-CM

## 2024-12-06 DIAGNOSIS — G43.109 MIGRAINE WITH AURA AND WITHOUT STATUS MIGRAINOSUS, NOT INTRACTABLE: Primary | ICD-10-CM

## 2024-12-06 PROCEDURE — 99215 OFFICE O/P EST HI 40 MIN: CPT | Performed by: PSYCHIATRY & NEUROLOGY

## 2024-12-06 RX ORDER — DEXAMETHASONE 4 MG/1
TABLET ORAL
Qty: 7 TABLET | Refills: 1 | Status: SHIPPED | OUTPATIENT
Start: 2024-12-06

## 2024-12-06 NOTE — ASSESSMENT & PLAN NOTE
72-year-old female with recently diagnosed papillary thyroid cancer via fine-needle aspiration biopsy (chronic history of headache/migraine, recent total thyroidectomy on 2/24), IBS, IC, insomnia, anxiety  Presents on 2/26 following development of diffuse extremity paresthesias suspected symptomatic hypocalcemia/parathyroid dysfunction  Neurology asked to evaluate today 2/28 following continued migraine as well as nausea which began on 2/25 following her surgery  Headache course and characteristics are typical of he migraine history without new/atypical features   No nuchal rigidity nor meningismus on exam   No other focal neurologic deficits elicited during exam     Plan:  -Headache meds trialed up to this morning include   -Imitrex 50 mg for 4 doses   -Zofran as needed   -Tylenol as needed  -Trial Toradol/Reglan for 1 dose  -Trial Mg Sulfate/Depacon and assess response   -Continued post-operative/medical management via surg oncology and endocrine teams
ambulatory

## 2024-12-06 NOTE — PATIENT INSTRUCTIONS
"Do follow-up with pain management for the EMG results and repeat MRI as recommended and let me know if you need me to assist    No estrogen due to migraine aura    Not my area expertise but your ferritin is 49 and the sleep doctors have thought me that they like to keep it above 75/80 as lower levels can contribute to restless leg syndrome/poor sleep.     If ever having runny nose in the future could always consider trying ipratropium bromide/Atrovent       Books to consider on audible since you had asked for resources  - \"Rewire Your Anxious Brain: How to Use the Neuroscience of Fear to End Anxiety, Panic, and Worry\" By Marguerite PhD  - \"atomic habits\" - By Ronen Hunter   - \"The real self care\"  By Candida Beal  - \"why we sleep\" - By Ismael Devlin   - \"the happiness advantage\" - By Leonidas Carey   - \"Breath\" - By Ronen Saeed     I have had patients recommend the following female therapists. (I am assuming female by their names of course and I do not know them myself):  - Inna Contreras   - Martha Andres PhD in [place]  - Alicia Momin in [place]  - Zoie Baugh in [place]  - Tasha Behnke in [place]  - Nakia Paiz in [place]   - Claudio Kunz in [place]  - Caitie Brock PhD  - Rosa Maria Moore MS   - Lashawn Aldrich in [place]         Headache/migraine treatment:   Abortive medications (for immediate treatment of a headache):   It is ok to take ibuprofen, acetaminophen or naproxen (Advil, Tylenol,  Aleve, Excedrin) if they help your headaches you should limit these to No more than 3 times a week to avoid medication overuse/rebound headaches.     -     rimegepant sulfate (Nurtec) 75 mg TBDP; Take one NURTEC 75 mg at onset under tongue. Limit 1 in 24 hours.  Prior auth, coupon card for copay     -     dexamethasone (DECADRON) 4 mg tablet; 4 mg  p.o. with breakfast for 1 to 7 days for unrelenting migraine      For your more moderate to severe migraines take this medication early   Maxalt " (rizatriptan) 10mg tabs - take one at the onset of headache. May repeat one time after 1-2 hours if pain has not resolved.   (Max 2 a day and 9 a month)     Prescription preventive medications for headaches/migraines   (to take every day to help prevent headaches - not to take at the time of headache):  [x] -  Botox every 91 days     Emgality/Galcanezumab -  120 mg injections every 28 days     READ INSTRUCTIONS that come with the medication. REFRIGERATE. Keep out of direct sunlight. Prior to administration, allow to come to room temperature for 30 minutes. Do not warm using a heat source (eg, microwave or hot water). Do not shake. Administer in preferably abdomen (avoiding 2 inches around the navel), thigh, upper arm, or buttocks avoiding areas of skin that are tender, bruised, red or hard. Deliver entire contents of single-use prefilled pen or syringe.  Unknown impact in pregnancy therefore would recommend stopping 6 months prior to considering pregnancy.      *Typically these types of medications take time untill you see the benefit, although some may see improvement in days, often it may take weeks, especially if the medication is being titrated up to a beneficial level. Please contact us if there are any concerns or questions regarding the medication.     Lifestyle Recommendations:  [x] SLEEP - Maintain a regular sleep schedule: Adults need at least 7-8 hours of uninterrupted a night. Maintain good sleep hygiene:  Going to bed and waking up at consistent times, avoiding excessive daytime naps, avoiding caffeinated beverages in the evening, avoid excessive stimulation in the evening and generally using bed primarily for sleeping.  One hour before bedtime would recommend turning lights down lower, decreasing your activity (may read quietly, listen to music at a low volume). When you get into bed, should eliminate all technology (no texting, emailing, playing with your phone, iPad or tablet in bed).  [x] HYDRATION  - Maintain good hydration.  Drink  2L of fluid a day (4 typical small water bottles)  [x] DIET - Maintain good nutrition. In particular don't skip meals and try and eat healthy balanced meals regularly.  [x] TRIGGERS - Look for other triggers and avoid them: Limit caffeine to 1-2 cups a day or less. Avoid dietary triggers that you have noticed bring on your headaches (this could include aged cheese, peanuts, MSG, aspartame and nitrates).  [x] EXERCISE - physical exercise as we all know is good for you in many ways, and not only is good for your heart, but also is beneficial for your mental health, cognitive health and  chronic pain/headaches. I would encourage at the least 5 days of physical exercise weekly for at least 30 minutes.     Education and Follow-up  [x] Please call with any questions or concerns. Of course if any new concerning symptoms go to the emergency department.  [x] Follow up  4-6 months,  sooner if needed

## 2024-12-06 NOTE — TELEPHONE ENCOUNTER
Caller: pt    Doctor: Ree    Reason for call: Pt calling back to reschedule appt with Dr Keenan. Can only see Dr on Fridays do to work schedule.     Pt is due for injections in January but cannot get appt until end of Jan.     Sent a message to  staff to see if we can accommodate 30 OVL with Dr keenan on an earlier date.     Will follow up with Pt once I hear back.     Call back#: [phone]

## 2024-12-06 NOTE — PROGRESS NOTES
Review of Systems   Constitutional:  Negative for appetite change, fatigue and fever.   HENT: Negative.  Negative for hearing loss, tinnitus, trouble swallowing and voice change.    Eyes:  Positive for photophobia and visual disturbance (blurry vision). Negative for pain.   Respiratory: Negative.  Negative for shortness of breath.    Cardiovascular: Negative.  Negative for palpitations.   Gastrointestinal: Negative.  Negative for nausea and vomiting.   Endocrine: Negative.  Negative for cold intolerance.   Genitourinary: Negative.  Negative for dysuria, frequency and urgency.   Musculoskeletal:  Negative for back pain, gait problem, myalgias, neck pain and neck stiffness.   Skin: Negative.  Negative for rash.   Allergic/Immunologic: Negative.    Neurological:  Positive for headaches (4 HA/month). Negative for dizziness, tremors, seizures, syncope, facial asymmetry, speech difficulty, weakness, light-headedness and numbness.   Hematological: Negative.  Does not bruise/bleed easily.   Psychiatric/Behavioral: Negative.  Negative for confusion, hallucinations and sleep disturbance.    All other systems reviewed and are negative.

## 2024-12-06 NOTE — PROGRESS NOTES
Neurology Ambulatory Visit  Name: Asmita ESPINOZA Bem       : 1980       MRN: [id number]   Encounter Provider: Nadege Jaffe MD   Encounter Date: 2024  Encounter department: NEUROLOGY [facility]      Assessment/Plan:   Asmita ESPINOZA Bem is a deligthful 44 y.o. female with a past medical history that includes papillary thyroid cancer status post total thyroidectomy 2023 with postop hypoparathyroidism and significantly low calcium now on replacement, IBS with constipation, PTSD, allergic rhinitis, chronic sinusitis, migraines, TOSHIA II, insomnia, neck pain, vasovagal syncope in 20s, anxiety, Intestinal cystitis, childhood asthma  referred here for evaluation of headache.  My initial evaluation 3/28/2022    Migraine with aura and without status migrainosus, not intractable  Cerebellar tonsillar ectopia  Features of idiopathic intracranial hypertension on imaging without papilledema not meeting criteria for IIH   Apneas not meeting criteria for NUNU, insomnia (home study 2023, 2 obstructive apneas, 2 hypopneas, BRYCE 0.7, O2 down to 92%) in lab sleep study recommended and ordered and not scheduled, but she did follow-up with sleep medicine and had improvement with CBT-I - should follow again with them  She reports a long history of headaches and migraines dating back to 12 or 13 years old.  Family history of migraines.  She reports she has followed with multiple neurologists in the past.  Pain is typically unilateral more often left-sided with visual aura at times and with typical associated migrainous features as well as autonomic features that do not seem to be unilateral to suggest trigeminal autonomic cephalalgia.  -as of 2022 on average over 15 migraine days per month.  Trial of emgality for prevention rizatriptan for abortive.  - as of 2022:  She reports she has had significant improvement on emgality down from 15 to 7-8 times a month and severity has improved as well. They  respond to rizatriptan which she feels she tolerates more than sumatriptan.   - as of 1/11/2023: She reports she continues to do very well on Emgality with about 4 migraines a month that resolve with rizatriptan.  Some wearing off when due for next dose and recommended taking it at 28 days rather than 30 or 31.  She would like to stay on propranolol 40 mg twice daily for now as she feels this is helping prevent tachycardia from causing migraine.  She was able to get off nortriptyline summer 2022 which she was on for an interstitial cystitis without increase.    - as of 4/5/2023:  She returns sooner with daily migraines now following papillary thyroid cancer status post total thyroidectomy 2/24/2023 with postop hypoparathyroidism and significantly low calcium now on replacement with some symptoms possibly consistent with high calcium and upcoming recheck of labs through endocrinology may suggest lowering calcium meds, but would defer to endocrine.  She certainly has had increase in her migrainous symptoms as well as some symptoms possibly consistent with sleep apnea and I ordered sleep study.  She reports she has had improvement in symptoms since the surgery but still was hoping there is something that could improve them as all the other doctors just told her to wait it out.  We will start low-dose topiramate at night to see if this can help if tolerated.  We also discussed PTSD and how it can cause intrusion, avoidance, negative impact on cognition and mood as well as arousal and reactivity changes and certainly sounds like she has symptoms of this as well and recommend counseling.  We will have our  reach out to see if she can help.  - as of 6/27/2023: She reports increasing frequency and severity of headaches overall and currently 3-4 a week.  She did not tolerate trial of topiramate as it worsened mood which is slightly improved from its low point, but she plans to follow-up with counselor.  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  Diagnostic sleep study ordered due to suspected false negative home study.  Has Decadron available for worse symptoms if needed.  Rizatriptan side effects and will add trial of Nurtec which also could help with prevention the more she takes it.  - as of 8/29/2023: Reviewed MRI brain which shows some features of possible slight increased intracranial pressure without IIH diagnosis and no papilledema.  She reports 9-10 headaches per month that typically improve with Nurtec, which is a significant improvement on Emgality but has wearing off effect and we discussed increasing acetazolamide/Diamox from 250 mg twice daily since well-tolerated gradually up until it decreases more of the symptoms that might be related as thoroughly instructed and discussed with patient today.  If side effects or not effective we will wean off.  Also will wean off propranolol as we wean up acetazolamide/Diamox.  Refill of Decadron for backup.  She is currently undergoing cognitive behavioral therapy for insomnia soon through the sleep medicine department.  - as of 11/28/2023: She reports overall doing much better and so many regards since last visit.  Better emotionally physically and mentally.  CBT for insomnia was almost magical she reports and she is doing much better with sleep although still taking low-dose Ambien half the month which we discussed I do not recommend, especially in the long run, as this does not produce the quality sleep.    She is having 10-12 migraines a month and Nurtec helps sometimes and when it does not rizatriptan always does although makes her nauseated so she avoids this as first option.  Emgality continues to help with prevention although has wearing off effect and she is currently delayed due to prior  Auth needing to be redone.  At last visit she was taking acetazolamide, but at some point soon after reports she discontinued it, as she wanted less meds.  She was weaned off propranolol since we were adding this medication and subsequently has had some increased pressure in the head which we discussed would be expected and we could add back acetazolamide, verapamil or propranolol at any time.  Potentially could be related to current sinus issues which she request referral to ENT for which was placed.  - as of 4/5/2024: She reports 3-4 headaches a week that she wonders is related to change of season.  Also could be related to stress, less sleep, thyroid/parathyroid issues, off propranolol and we discussed trial of Botox for migraine prevention send she has been having over 15 migraine days per month for over 3 months.  In the meantime, we will add verapamil as tolerated although blood pressure hangs low and history of constipation she reports constipation could not get any worse and she does not have a bowel movement without laxative at this time, following with GI.  Follow with sleep medicine but lately only getting 5 to 6 hours of sleep.  Nurtec helps for migraine rescue and if it does not rizatriptan typically does and she has dexamethasone if needed for backup.  Also discussed stress, she asked for list of therapists again and will have our  reach out as well with resources.  Ketorolac/Toradol IM for today's pain.  - as of 8/30/2024: Asmita reports she tried the verapamil up to 80 mg twice daily and just weaned herself off approximately a week ago as she was having some side effects of dizziness/vertiginous at any time of day.  Trial of Botox after 1 round may have helped slightly initially and less so now, expected to help more after the second round coming up.  Hardly getting any sleep 5 to 6 hours, stress at home and at work all big reasons for frequent headaches and migraines still which she is  aware.  3 to 4-week on average Nurtec typically works well.  She will take rizatriptan for the most severe ones approximately 2 times a month.  Continue exercise, following with team of providers as much of what they are taking care of will also help with headache prevention.  She is following with pain management and they are evaluating for lumbar radiculopathy with lower extremity paresthesias with MRI and we discussed I would be happy to order EMG/NCS since these can both months out as it may be needed, although continue to follow with them, since not my area of subspecialty.  She plans to discuss with other providers as well to rule out vascular etiology.  - as of 12/6/2024: She reports she has had a significant reduction in headache and migraine days after 2 rounds of Botox going from 3-4 a week on average to 4 per month.  Emgality likely helping as well and at any point in the future could consider trial off but would consider continuing both for now since finally doing so much better.  Has occasional left trigeminal nerve pain, reviewed anatomy possibly contributing, family history of Chiari malformation she certainly has cerebellar tonsillar ectopia which we discussed in detail.  Nurtec typically helps for migraine rescue.  She is following with pain management for lumbar radiculopathy, reviewed EMG results together.    Workup:  -MRI brain with without contrast 7/13/2023: No acute infarction, intracranial hemorrhage or mass.*As of my retrospective review 8/29/2023 and 11/28/2023 we discussed she has in my opinion partially flattened sella(not empty), prominent optic nerve sheath with some tortuosity bilaterally potentially slightly worse right greater than left, slightly tighter ventricle on the right, cerebellar tonsils overlie the foramen magnum with tight junction bilaterally    Preventative:  - we discussed headache hygiene and lifestyle factors that may improve headaches  - continue  emgality every 28  days. Discussed proper use, possible side effects and risks.  - botox every 91 days. Discussed proper use, possible side effects and risks.  - Currently on through other providers: ambien 5mg rarely 10 mg- (off and on 6 years and as of 11/28/2023 taking 5 to 7.5 mg maybe half the month), melatonin - hardly uses, tizanidine 2 mg as needed (doesn't use  much/hardly at all - for muscle spasm or back pain - doesn't take makes her feel tired/woozy), levothyroxine 125 mcg, vitamin D 1000 units daily, Calciitrate 950 (200 Ca) Mg tablet, multivitamin  - Past/ failed/contraindicated: topiramate, gabapentin, propranolol 40 mg BID, nortriptyline, amitriptyline, prosac/fluoxetine, aimovig contraindicated due to constiptation, verapamil  - future options: alternative CGRP, botox    Abortive:  - discussed not taking over-the-counter or prescription pain medications more than 3 days per week to prevent medication overuse/rebound headache  -   rimegepant sulfate (Nurtec) 75 mg TBDP; Take one NURTEC 75 mg at onset under tongue. Limit 1 in 24 hours. Discussed proper use, possible side effects and risks.  -  for back up rarely: dexamethasone (DECADRON) 4 mg tablet; Can take 8 mg for 1-2 days PO with breakfast, then if needed can take 4 mg with breakfast for 1-5 more days if needed for headache. Discussed proper use, possible side effects and risks.  - Past/ failed/contraindicated: sumatriptan 100 mg works sometimes and not others, rizatriptan helps but also makes her nauseous  - past/helped:  Steroids have helped temporarily, toradol IM in the past helped   - future options:   prochlorperazine, Toradol IM or p.o., could consider trial for 5 days of Depakote or dexamethasone for prolonged migraine, ubrelvy, reyvow      Patient instructions     As Dr. Marie wrote you in September when your B12 came back and I was out of the office your B12 was 371  Which is on the low end of normal and I recommend repletion below 400. I recommend adding  "over-the-counter vitamin B12 1000 mcg daily sublingual (better absorption than pill) - added after visit ended to remind you      Do follow-up with pain management for the EMG results and repeat MRI as recommended and let me know if you need me to assist    No estrogen due to migraine aura    Not my area expertise but your ferritin is 49 and the sleep doctors have thought me that they like to keep it above 75/80 as lower levels can contribute to restless leg syndrome/poor sleep.     If ever having runny nose in the future could always consider trying ipratropium bromide/Atrovent       Books to consider on audible since you had asked for resources  - \"Rewire Your Anxious Brain: How to Use the Neuroscience of Fear to End Anxiety, Panic, and Worry\" By Marguerite Yanez  - \"atomic habits\" - By Ronen Hunter   - \"The real self care\"  By Candida Beal  - \"why we sleep\" - By Ismael Devlin   - \"the happiness advantage\" - By Leonidas Carey   - \"Breath\" - By Ronen Saeed     I have had patients recommend the following female therapists. (I am assuming female by their names of course and I do not know them myself):  - Inna Contreras   - Martha Andres PhD in [place]  - Alicia Momin in [place]  - Zoie Baugh in [place]  - Tasha Behnke in [place]  - Nakia Paiz in [place]   - Claudio Kunz in [place]  - Caitie Brock PhD  - Rosa Maria Moore MS   - Lashawn Aldrich in [place]         Headache/migraine treatment:   Abortive medications (for immediate treatment of a headache):   It is ok to take ibuprofen, acetaminophen or naproxen (Advil, Tylenol,  Aleve, Excedrin) if they help your headaches you should limit these to No more than 3 times a week to avoid medication overuse/rebound headaches.     -     rimegepant sulfate (Nurtec) 75 mg TBDP; Take one NURTEC 75 mg at onset under tongue. Limit 1 in 24 hours.  Prior auth, coupon card for copay     -     dexamethasone (DECADRON) 4 mg tablet; 4 mg  p.o. with breakfast for 1 " to 7 days for unrelenting migraine      For your more moderate to severe migraines take this medication early   Maxalt (rizatriptan) 10mg tabs - take one at the onset of headache. May repeat one time after 1-2 hours if pain has not resolved.   (Max 2 a day and 9 a month)     Prescription preventive medications for headaches/migraines   (to take every day to help prevent headaches - not to take at the time of headache):  [x] -  Botox every 91 days     Emgality/Galcanezumab -  120 mg injections every 28 days     READ INSTRUCTIONS that come with the medication. REFRIGERATE. Keep out of direct sunlight. Prior to administration, allow to come to room temperature for 30 minutes. Do not warm using a heat source (eg, microwave or hot water). Do not shake. Administer in preferably abdomen (avoiding 2 inches around the navel), thigh, upper arm, or buttocks avoiding areas of skin that are tender, bruised, red or hard. Deliver entire contents of single-use prefilled pen or syringe.  Unknown impact in pregnancy therefore would recommend stopping 6 months prior to considering pregnancy.      *Typically these types of medications take time untill you see the benefit, although some may see improvement in days, often it may take weeks, especially if the medication is being titrated up to a beneficial level. Please contact us if there are any concerns or questions regarding the medication.     Lifestyle Recommendations:  [x] SLEEP - Maintain a regular sleep schedule: Adults need at least 7-8 hours of uninterrupted a night. Maintain good sleep hygiene:  Going to bed and waking up at consistent times, avoiding excessive daytime naps, avoiding caffeinated beverages in the evening, avoid excessive stimulation in the evening and generally using bed primarily for sleeping.  One hour before bedtime would recommend turning lights down lower, decreasing your activity (may read quietly, listen to music at a low volume). When you get into bed,  should eliminate all technology (no texting, emailing, playing with your phone, iPad or tablet in bed).  [x] HYDRATION - Maintain good hydration.  Drink  2L of fluid a day (4 typical small water bottles)  [x] DIET - Maintain good nutrition. In particular don't skip meals and try and eat healthy balanced meals regularly.  [x] TRIGGERS - Look for other triggers and avoid them: Limit caffeine to 1-2 cups a day or less. Avoid dietary triggers that you have noticed bring on your headaches (this could include aged cheese, peanuts, MSG, aspartame and nitrates).  [x] EXERCISE - physical exercise as we all know is good for you in many ways, and not only is good for your heart, but also is beneficial for your mental health, cognitive health and  chronic pain/headaches. I would encourage at the least 5 days of physical exercise weekly for at least 30 minutes.     Education and Follow-up  [x] Please call with any questions or concerns. Of course if any new concerning symptoms go to the emergency department.  [x] Follow up  4-6 months,  sooner if needed        CC:   We had the pleasure of evaluating Asmita ESPINOZA Bem in neurological consultation today. Asmita ESPINOZA Bem is a   right handed female who presents today for evaluation of headaches.     History obtained from patient as well as available medical record review.  History of Present Illness:   Interval history as of 12/6/2024  - Of note/managed by others:   Since last visit follow-up with PT for pelvic pain, neck ultrasound, sardonic, endo-, MRI spine for lumbar radiculopathy, pain management procedure for myofascial pain syndrome in October, follow with chiropractor, reviewed EMG results  - no significant new or concerning neurologic symptoms since last visit reported  - last eye exam: Dad has macular degeneration so has been since age 39, last visit ok, just recently vision more blurry a couple weeks, burning, watering (both eyes) and dry eyes - last year they gave samples, worse  with allergies, at the end of the day red and blood vessels prominent      - sleep: depends on the night, if on call or not, 5 hours and maybe more on the weekends,   Starts at 6:30 AM now and commutes 45 mins, 445 AM wakes, 4 - 10 hour shifty  Wakes up with eyes puffy when sleeps longer   CBT - I - was helpful initially but less time to devote     Headaches and migraines   endorses 4 HA/month and overall has imporved -    intermittent left trigeminal nerve pain - shooting pain along the left side of her face/head thats been happened 4-5 times this past month, has been on and off throughout life but most recent episode lasted about 10 minutes while driving and then self resolved- other symptoms include blurry vision and photophobia in both eyes     She has had a great reduction in migraine frequency, she has had a reduction in over 7 migraine days per month after 6 months trial of Botox    Preventative:   - Emgality every 28 days  - wearing off - doesn't bother it   -Trial of Botox-reached out to the Botox coordinators after last visit in April and she called in 4/30/24 to check on status, approved 5/16/2024 -first injection 7/12/2024,  10/10/2024, next 1/17/2025    Abortive:   -   rimegepant sulfate (Nurtec) 75 mg TBDP; Take one NURTEC 75 mg at onset under tongue.  Rizatriptan helps if Nurtec does not enough  Dexamethasone/Decadron help significantly as backup for very severe migraines and will refill  No reported bothersome side effects        Interval history as of 8/30/2024  -Since last visit she continues to follow with pain management for back pain lumbar radiculopathy and discussed paresthesias both legs for years and worse in the last year L maybe slightly worse than right, pins and needles and pulling sensation in her legs that is painful, standing for 45 mins is the worse, can be still there when sitting but not as bad, not while laying, calcium has been a little low lately, no B12 lately- has been getting  injections , I can order EMG since they are getting MRI spine, just since we know it will take some time   - following with pain management also for myofascial pain syndrome, PT for pelvic pain, plastic surgery cosmetic, chiropractor, workup for abnormal MRI breast, ENT for acute recurrent sinusitis (Dr. Rai said was GERD), GI for IBS, bloating, GERD  - last iron infusion Sept 2023   -Only getting 5 to 6 hours of sleep and does not appear she is followed back up with sleep medicine after she initially had some improvement with CBT-I and I highly recommend she do so, using less ambien - worked till the spring, work is stressful and at home is too, helps take care of niece a lot   - exercise - gym two days a week for small group personal training, at home 2 days a week as well, walks too on weekends   - if excited scared sad turns red and gets headache    Headaches and migraines   She reports 3-4 headaches a week and Nurtec typically works well  Approximately 2 times a month she will take rizatriptan for the most severe ones as it works little bit better  They are worse with change of season    Preventative:   -Trial of verapamil- 80 mg BID - she reports she weaned herself off about a week ago as she was getting dizzy - spinning -this would happen not just in the middle of the day but also sometimes about an hour after taking it although she usually would take it right before bed if she took it a little earlier she would have symptoms afterwards which we discussed then would not necessarily be the wearing off effect  -Trial of Botox-reached out to the Botox coordinators after last visit in April and she called in 4/30/24 to check on status, approved 5/16/2024 -first injection 7/12/2024, next injection 10/18/2024, then 1/17/2025    Abortive:   -   rimegepant sulfate (Nurtec) 75 mg TBDP; Take one NURTEC 75 mg at onset under tongue. Limit 1 in 24 hours.  Rizatriptan helps if Nurtec does not enough  Denies bothersome  side effects        Interval history as of 4/5/2024  - no significant new or concerning neurologic symptoms since last visit   - chiropractor today metal tool and pressure on tight areas, through [facility], massage and telling her what she can do, so tight and worse after getting, but maybe soon helping, TPIs didn't help through pain management, they did 10 spots close to spine TPI  - saw endo 3/22/24, TSH was too suppressed and they lowered her med   - left side bothers her   - US sonographer -TH 4 10s   - sleep - a lot better since sleep therapy, maybe 5-6 hours   - stress and thinking about talking to a therapist, trouble with attention at times, struggled with learning her whole life, loves to read, bugs her, trying to study for boards    Headaches and migraines   3-4 headaches a week   Change of season    She has had over 15 migraine days per month for over 3 months lasting over 4 hours an episode    Preventative:   - continue  emgality every 28 days - wearing off      Abortive:   -   rimegepant sulfate (Nurtec) 75 mg TBDP; Take one NURTEC 75 mg at onset under tongue. Limit 1 in 24 hours.  Rizatriptan helps if Nurtec does not enough  Denies bothersome side effects       Interval history as of 11/28/2023  - no significant new or concerning neurologic symptoms since last visit   - plans to follow up with ENT for post nasal sinus issues the last two months - huge black chunks with blood on it comes out and then feels so much better    Sleep - CBT for insomnia best thing she has done - like magical, Dr. Ovalle also helped with the iron infusions, night and day different from August, was so tired and miserable, finished the CBT in Oct  - joined a gym and classes twice a week, so much stronger   - still using ambien half the month 5-7.5 mg     - mood way better - feels way better in general - emotionally, physically mentally     - Left lower back nerve pain like someone is lightly tapping - saw pT, more annoying than  painful   Tingling started about a year ago and the pain since June, pain improved with PT, towards the end of the day bad nightly, heating pads on it - constant since June -we discussed I would be happy to place referral    Headaches and migraines   She reports she stopped acetazolamide/Diamox after she got up to  twice daily and did not see a significant difference and did not want to take too many meds -did not increase to 4 times a day after last visit    She reports 10-12 migraines a month, Nurtec helps sometimes and when it does not she takes rizatriptan at always helps    Preventative:   -Trial of acetazolamide/Diamox as of last visit she had stated she was taking 250 mg at lunch 1130 and before bed 9/930 and then the plan was to increase to 4 times a day and she reports she did not do this but did wean off propranolol in [facility]    - emgality helping - every 28 days - life changing - wears off at day 25  Delayed    - Currently on through other providers: propranolol 40 mg BID - stopped around Oct and had increased pressure headaches/sinus, ambien 10 mg- (off and on 6 years), melatonin    Abortive:   Nurtec 10 times a month - usually knocks out if mild  Rizatriptan -works if needed - twice a month, makes her nauseated  Has not steroid rescue   Denies bothersome side effects      Interval history as of 8/29/2023  - no significant new or concerning neurologic symptoms since last visit   - eye doctor May and vision is great and better than 20/20   - doing PT for back and has been having left mid back pain and has to stand to feel comfortable, steroids by mouth didn't help, MSK relaxors just briefly helped  - stomach sleeper is best    -MRI brain with without contrast 7/13/2023: No acute infarction, intracranial hemorrhage or mass.  I do see some findings consistent with subtle changes that could be related to idiopathic intracranial hypertension including partially flattened sella(not empty), prominent optic nerve  sheath with some tortuosity bilaterally (slightly) and as well as what appears to be slight enlargement of the optic nerve head     Headaches and migraines   9-10 headaches per month can be days in a row or once a week  Left temporal region to the back of skull on the left  Often due to wearing off effect for emgality    At night eyes watering and nose runs, if laughs or smiles eyes tear up, BM too sometimes   - evening gets progressively worse    Preventative:   -Trial of acetazolamide/Diamox tolerating well without side effects   250 mg at lunch 1130 and before bed 9/930    - trial of topiramate and was at 75 mg for 3 weeks and then stopped because she was having weird dark thoughts - 50 mg at night, mood was awful   - emgality helping - every 28 days - life changing   - Currently on through other providers: propranolol 40 mg BID, ambien 10 mg- (off and on 6 years), melatonin    Abortive:   -Nurtec is helping dramatically, but will not extinguish the migraine but will significantly reduce the severity and then will take 400 mg advil or 500 mg tylenol   - decadron for back up   Denies bothersome side effects       Sleep   -Follow-up with sleep medicine Dr Ovalle 8/1/23 and started on an iron infusion after iron was found to be low - no change  - doesn't sleep well, but improvement in sleep  - CBT started last week     study   The patient had a total of 5 respiratory events made up of 2 obstructive apneas, 0 central apneas, 0 mixed apneas and 2 hypopneas resulting in a respiratory event index (BRYCE) of 0.7.  The lowest SpO2 recorded is 92%.  The snore index was 0.2%.  IMPRESSION:  This study did not demonstrate evidence for obstructive sleep apnea.      Interval history as of 6/27/2023  - no significant new or concerning neurologic symptoms since last visit  - dealing with hypocalcemia following surgery and mood symptoms, fatigue,   - eye doctor May and vision is great and better than 20/20   - since last visit hurt  her back, mid back muscle spasm and was bad so was put on steroids and MSK relaxor    Headaches and migraines   3-4 headaches a week rizatriptan helps   Worse the past month   Weather     Preventative:   - trial of topiramate and was at 75 mg for 3 weeks and then stopped because she was having weird dark thoughts - 50 mg at night, mood is better   - emgality helping - PA renewed last time, every 28 days     Abortive:   Rizatriptan - nauseated and lethargic   - decadron not needed since last visit, but took other steroids   Denies bothersome side effects      Sleep study    The patient had a total of 5 respiratory events made up of 2 obstructive apneas, 0 central apneas, 0 mixed apneas and 2 hypopneas resulting in a respiratory event index (BRYCE) of 0.7.  The lowest SpO2 recorded is 92%.  The snore index was 0.2%.  IMPRESSION:  This study did not demonstrate evidence for obstructive sleep apnea.      Interval history as of 4/5/2023  - 2/24/23 thyroid resection and post op had severe hypoparathyroidism and they wanted to readmit her and then followed up with endocrine and continue calcium dose   - symptoms of hypoparathyroid intially and now symptoms of hyperparathyroidism    - all of the symptoms of PTSD, overwhelmed with noises   - was not on phone for a week because could not look at it     Headaches and migraines   - A lot better than she was, could not speak for a while and spasms, tetany, couldn't speak, much better    - daily headaches since the surgery, first in the hospital was way worse and steroids helped, and then headache never resolved. Worse at night before bed, comes and goes, not all day, better   - Mild headache now, they are daily, migraines nearly daily, not last night, every other night   - Dizziness better, but still there, may have had vertigo in the past.   Tinnitus both ears, Right >left 30 seconds of muffled hearing at times, rarely in the past     Preventative:   - emgality monthly - [employer]  "- a few days ago     Abortive:   - rizatriptan once dose often helps   Denies bothersome side effects       Sleep   - averages: 5-6 hours, sometimes takes ambien, snores   Problems falling asleep?:   Yes  Problems staying asleep?:  Yes, 3-4 a night  - tired all the time    How likely are you to doze off or fall sleep during the following situations?    0 - would never doze  1 - slight chance of dozing  2 - moderate chance of dozing  3 - high chance of dozing  [place] sleepiness scale   Sitting and reading - 2  Watching TV - 3  Sitting, inactive in a public place such as a theater 1  As a passenger in a car for an hour without a break 1  Lying down to rest in afternoon when circumstances permit 3  Sitting and talking to someone 0  Sitting quietly after lunch without alcohol 2  In a car while stopped for few minutes in traffic - 0      Interval history as of 1/11/2023  - denies any new or concerning neurologic symptoms since last visit   - foot surgery 12/30/22  - thyroid bx coming up    Headaches and migraines   She is doing much better, now about 4 a month and rizatriptan works well, \"its amazing\"  Nov had one that lasted a week  Last was Dec 29th  Wearing off effect when due for next dose, maybe 3 days before     Preventative:    - emgality     Denies bothersome side effects  - Currently on through other providers: propranolol 40 mg BID (could consider gradual wean off in the future),   nortriptyline 20 mg (for Intestinal cystitis) - stopped around July 2022 - IC not worse off of it, ambien 10 mg (previously on 5 and plans on going back, moved in April and loud) - (off and on 5 years)    Abortive:   - rizatriptan - working well now, rarely needs two  Denies bothersome side effects        Interval history as of 7/1/2022  - denies any new or concerning neurologic symptoms since last visit      Headaches and migraines   She reports improvement on emgality which has brought migraines down from 15 days a month to 7-8 a " "month.   Less severe as well   The first month did the best, \"almost forgot I had migraines for a while\"   Some wearing off effect when due for next shot   - triggered by weather changes     Preventative:   - Trial of emgality - approved 4/5/22 - 4th shot soon   Denies bothersome side effects     Abortive:   - Trial of rizatriptan 10 mg - makes her less nauseated than sumatriptan, does not work quiet as well as sumatriptan, may not completely get rid of it but improves and then eventually goes away   Denies bothersome side effects     History as of initial visit 3/28/22:   Headaches started at what age? 10-11 years old  How often do the headaches occur?   - as of 3/28/2022: on average over 15 days a month   What time of the day do the headaches start? Occasionally wakes up with them, or afternoon or evening  How long do the headaches last? All day, up to weeks   Are you ever headache free? Yes    Aura? Sometimes aura  Blurred, floaters, lines, flashing orange, 20 mins or more     Last eye exam: goes early for monitoring, no issues except     Where is your headache located and pain quality?   - most of the time unilateral and usually on the left  - left side of head and neck - temporal and parietal and occipital and down the neck  - stabbing, pressure, pulsating  - head warm to touch  - sinus pressure   What is the intensity of pain? Average: 5-6/10, worst 9/10  Associated symptoms:   [x] Nausea       [] Vomiting        [x] Stiff or sore neck   [x] Photophobia     [x]Phonophobia      [x] Osmophobia  [x] Blurred vision    [x] Light-headed or dizzy     [x] Tinnitus  - both  [x] Hands or feet tingle or feel numb/paresthesias - tingling in both legs and fingers sometimes without focal weakness   [] Ptosis      [] Facial droop  [x] Lacrimation - both  [x] Nasal congestion/rhinorrhea - chronically       Things that make the headache worse? No specific movements, any movement if bad enough    Headache triggers:  " Hormonal/menses, weather changes, stress, certain foods, alcohol, sinus congestion      Have you seen someone else for headaches or pain? Yes multiple neurologists, last about 10 years ago   Have you had trigger point injection performed and how often? No  Have you had Botox injection performed and how often? No   Have you had epidural injections or transforaminal injections performed? No  Are you current pregnant or planning on getting pregnant? No future pregnancy, not active   Have you ever had any Brain imaging? yes - MRI Brain in her teens was normal     What medications do you take or have you taken for your headaches?   ABORTIVE:      Sumatriptan 100 mg - makes her nauseated      Past  Steroids In Jan 2021 with COVID, and then again in Feb for unrelenting migraine helps   Rizatriptan/maxalt - thinks may not as been as effective as imitrex    PREVENTIVE:       Propranolol 40 mg BID (4 years has helped)  ambien 5  Nortriptyline 20 mg (started a couple of weeks ago for Intestinal cystitis)    Past/ failed/contraindicated:  nortriptyline  Amitriptyline  topiramate - maybe 1-2 months   Gabapentin  prosac/fluoxetine      LIFESTYLE  Sleep   - averages: 6 hours, sometimes takes ambien  Problems falling asleep?:   Yes  Problems staying asleep?:  Yes, 3-4 a night    Water: 40-60 oz per day  Caffeine: coffee - 1 cups in am, 1 at lunch, per day    Mood:   Anxiety maybe more in her 20s, not much now     The following portions of the patient's history were reviewed and updated as appropriate: allergies, current medications, past family history, past medical history, past social history, past surgical history and problem list.     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   Lives alone normally           Pertinent lab results:   See EMR for recent labs  - 01/14/2022 COVID-19 positive  - 8/19/21 CMP and CBC unremarkable   TSH elevated at 4.09 with normal Free T4     Imaging: I have personally reviewed imaging and radiology read   -MRI brain with without contrast 7/13/2023: No acute infarction, intracranial hemorrhage or mass.*As of my retrospective review 8/29/2023 and 11/28/2023 we discussed she has in my opinion partially flattened sella(not empty), prominent optic nerve sheath with some tortuosity bilaterally potentially slightly worse right greater than left, slightly tighter ventricle on the right, cerebellar tonsils overlie the foramen magnum with tight junction bilaterally     MRI brain in her teens was reportedly unremarkable      Objective       Physical Exam:                                                                 Vitals:            Constitutional:    /62 (BP Location: Left arm, Patient Position: Sitting, Cuff Size: Standard)   Pulse 72   Temp 98.7 °F (37.1 °C) (Temporal)   Ht 5' 3\" (1.6 m)   Wt 67.9 kg (149 lb 11.2 oz)   SpO2 98%   BMI 26.52 kg/m²   BP Readings from Last 3 Encounters:   12/06/24 126/62   11/01/24 118/70   10/18/24 117/71     Pulse Readings from Last 3 Encounters:   12/06/24 72   11/01/24 94   10/18/24 64         Well developed, well nourished, well groomed.        Psychiatric:  Normal behavior and appropriate affect        Able to answer questions appropriately, provide history of recent events   Normal language and spontaneous speech.  facial expression symmetric  symmetric bulk throughout. no atrophy, fasciculations or significant abnormal movements noted during our visit from observation.   steady casual gait       ROS:  ROS obtained by medical assistant and reviewed, but if any symptoms listed below say negative, does not mean patient has not had this symptom since last visit, please " see HPI for details of symptoms discussed this visit.  Regarding any abnormal or positive findings in ROS that are not neurologic related, patient instructed to address these issues with PCP or go to the ER if they are severe.    Review of Systems   Constitutional:  Negative for appetite change, fatigue and fever.   HENT: Negative.  Negative for hearing loss, tinnitus, trouble swallowing and voice change.    Eyes:  Positive for photophobia and visual disturbance (blurry vision). Negative for pain.   Respiratory: Negative.  Negative for shortness of breath.    Cardiovascular: Negative.  Negative for palpitations.   Gastrointestinal: Negative.  Negative for nausea and vomiting.   Endocrine: Negative.  Negative for cold intolerance.   Genitourinary: Negative.  Negative for dysuria, frequency and urgency.   Musculoskeletal:  Negative for back pain, gait problem, myalgias, neck pain and neck stiffness.   Skin: Negative.  Negative for rash.   Allergic/Immunologic: Negative.    Neurological:  Positive for headaches (4 HA/month). Negative for dizziness, tremors, seizures, syncope, facial asymmetry, speech difficulty, weakness, light-headedness and numbness.   Hematological: Negative.  Does not bruise/bleed easily.   Psychiatric/Behavioral: Negative.  Negative for confusion, hallucinations and sleep disturbance.    All other systems reviewed and are negative.      Administrative Statements  I have spent 4 minutes prior to or after the visit and 41 minutes during the visit, for a total time of 45 minutes in caring for this patient on the day of the visit/encounter including Diagnostic results, Prognosis, Risks and benefits of tx options, Instructions for management, Patient education, Importance of tx compliance, Risk factor reductions, Impressions, Counseling / Coordination of care, Documenting in the medical record, Reviewing / ordering tests, medicine, procedures  , Obtaining or reviewing history  , and Communicating with  other healthcare professionals .

## 2024-12-08 ENCOUNTER — RESULTS FOLLOW-UP (OUTPATIENT)
Dept: NEUROLOGY | Facility: CLINIC | Age: 44
End: 2024-12-08

## 2024-12-13 ENCOUNTER — OFFICE VISIT (OUTPATIENT)
Dept: PAIN MEDICINE | Facility: CLINIC | Age: 44
End: 2024-12-13
Payer: COMMERCIAL

## 2024-12-13 ENCOUNTER — APPOINTMENT (OUTPATIENT)
Dept: PHYSICAL THERAPY | Facility: REHABILITATION | Age: 44
End: 2024-12-13
Payer: COMMERCIAL

## 2024-12-13 ENCOUNTER — TELEPHONE (OUTPATIENT)
Dept: HEMATOLOGY ONCOLOGY | Facility: CLINIC | Age: 44
End: 2024-12-13

## 2024-12-13 ENCOUNTER — OFFICE VISIT (OUTPATIENT)
Dept: HEMATOLOGY ONCOLOGY | Facility: CLINIC | Age: 44
End: 2024-12-13
Payer: COMMERCIAL

## 2024-12-13 VITALS
DIASTOLIC BLOOD PRESSURE: 74 MMHG | BODY MASS INDEX: 26.58 KG/M2 | SYSTOLIC BLOOD PRESSURE: 114 MMHG | HEIGHT: 63 IN | HEART RATE: 73 BPM | WEIGHT: 150 LBS

## 2024-12-13 VITALS
SYSTOLIC BLOOD PRESSURE: 124 MMHG | RESPIRATION RATE: 17 BRPM | WEIGHT: 151 LBS | HEART RATE: 68 BPM | OXYGEN SATURATION: 95 % | TEMPERATURE: 98.1 F | DIASTOLIC BLOOD PRESSURE: 82 MMHG | BODY MASS INDEX: 26.75 KG/M2 | HEIGHT: 63 IN

## 2024-12-13 DIAGNOSIS — E61.1 IRON DEFICIENCY: Primary | ICD-10-CM

## 2024-12-13 DIAGNOSIS — R79.0 LOW FERRITIN: ICD-10-CM

## 2024-12-13 DIAGNOSIS — M54.17 LUMBOSACRAL RADICULOPATHY: Primary | ICD-10-CM

## 2024-12-13 DIAGNOSIS — G89.29 CHRONIC LEFT SHOULDER PAIN: ICD-10-CM

## 2024-12-13 DIAGNOSIS — M25.512 CHRONIC LEFT SHOULDER PAIN: ICD-10-CM

## 2024-12-13 DIAGNOSIS — M79.18 MYOFASCIAL PAIN SYNDROME: ICD-10-CM

## 2024-12-13 PROCEDURE — 99214 OFFICE O/P EST MOD 30 MIN: CPT | Performed by: ANESTHESIOLOGY

## 2024-12-13 PROCEDURE — 99213 OFFICE O/P EST LOW 20 MIN: CPT | Performed by: PHYSICIAN ASSISTANT

## 2024-12-13 RX ORDER — SODIUM CHLORIDE 9 MG/ML
20 INJECTION, SOLUTION INTRAVENOUS ONCE
OUTPATIENT
Start: 2024-12-20

## 2024-12-13 NOTE — ASSESSMENT & PLAN NOTE
2/2 menstrual blood loss and possible not absorbing due to PPI use     Orders:    CBC and differential; Standing    Iron Panel (Includes Ferritin, Iron Sat%, Iron, and TIBC); Standing

## 2024-12-13 NOTE — PROGRESS NOTES
Name: Asmita ESPINOZA Bem      : 1980      MRN: [id number]  Encounter Provider: Anita Moore PA-C  Encounter Date: 2024   Encounter department: [facility] [place]  :  Assessment & Plan  Iron deficiency  2/2 menstrual blood loss and possible not absorbing due to PPI use     Orders:    CBC and differential; Standing    Iron Panel (Includes Ferritin, Iron Sat%, Iron, and TIBC); Standing    Low ferritin  Ferritin 24   Has been gradually decreasing   Sxs as in HPI  Recommend Venofer 200 mg IV x 3   Continue labs every 12-24 weeks   Follow up in 1 year  Call sooner with any sxs   Orders:    CBC and differential; Standing    Iron Panel (Includes Ferritin, Iron Sat%, Iron, and TIBC); Standing        History of Present Illness   Chief Complaint   Patient presents with    Follow-up   Asmita ESPINOZA Bem is a 44 y.o. female presents for follow up visit.     Pertinent Medical History    follow up regarding iron deficiency. She was first seen in consultation for iron deficiency on 2023. She previously failed PO iron secondary to chronic constipation with IBS-C. At this time she had a Ferritin of 9 (on 2023). She received 6 Venofer infusions that she overall tolerated well. She did note feeling lightheaded during one of the infusions, but felt okay since she was sitting down. She also noted feeling slightly jittery after some infusions. She did not have any allergic reactions or itchiness. Today she notes drastic improvements in her symptoms. She has less restless legs and is sleeping better at night. She also notes less fatigue and is able to exercise again, which she is happy about.      Per patient, GI provider is aware of her new iron deficiency and recommends getting another routine colonoscopy in 3 years. Her last colonoscopy and EGD was in , both WNL. She continues to follow up with OBGYN as well. Her menstrual cycles start out heavy, but she notes this is not any  "change from her baseline.      Last infusion was in Sept 2023     Review of Systems   Constitutional:  Positive for fatigue. Negative for appetite change, chills, fever and unexpected weight change.   HENT:  Negative for mouth sores and nosebleeds.    Respiratory:  Negative for cough and shortness of breath.    Cardiovascular:  Negative for chest pain, palpitations and leg swelling.   Gastrointestinal:  Negative for abdominal pain, constipation, diarrhea, nausea and vomiting.   Genitourinary:  Negative for difficulty urinating, dysuria and hematuria.   Musculoskeletal:  Negative for arthralgias.   Skin: Negative.    Neurological:  Positive for numbness (feet/legs). Negative for dizziness, weakness, light-headedness and headaches.   Hematological: Negative.    Psychiatric/Behavioral: Negative.             Objective   /82 (BP Location: Left arm, Patient Position: Sitting, Cuff Size: Standard)   Pulse 68   Temp 98.1 °F (36.7 °C) (Temporal)   Resp 17   Ht 5' 3\" (1.6 m)   Wt 68.5 kg (151 lb)   SpO2 95%   BMI 26.75 kg/m²     Physical Exam  Vitals reviewed.   Constitutional:       General: She is not in acute distress.     Appearance: She is well-developed. She is not ill-appearing.   HENT:      Head: Normocephalic and atraumatic.   Eyes:      General: No scleral icterus.     Conjunctiva/sclera: Conjunctivae normal.   Cardiovascular:      Rate and Rhythm: Normal rate and regular rhythm.      Heart sounds: Normal heart sounds. No murmur heard.  Pulmonary:      Effort: Pulmonary effort is normal. No respiratory distress.      Breath sounds: Normal breath sounds.   Abdominal:      Palpations: Abdomen is soft.      Tenderness: There is no abdominal tenderness.   Musculoskeletal:         General: No tenderness. Normal range of motion.      Cervical back: Normal range of motion and neck supple.      Right lower leg: No edema.      Left lower leg: No edema.   Lymphadenopathy:      Cervical: No cervical adenopathy. "   Skin:     General: Skin is warm and dry.   Neurological:      Mental Status: She is alert and oriented to person, place, and time.      Cranial Nerves: No cranial nerve deficit.   Psychiatric:         Mood and Affect: Mood normal.         Behavior: Behavior normal.         Labs: I have reviewed the following labs:  Results for orders placed or performed in visit on 11/13/24   CBC and differential   Result Value Ref Range    WBC 6.60 4.31 - 10.16 Thousand/uL    RBC 4.12 3.81 - 5.12 Million/uL    Hemoglobin 13.1 11.5 - 15.4 g/dL    Hematocrit 39.8 34.8 - 46.1 %    MCV 97 82 - 98 fL    MCH 31.8 26.8 - 34.3 pg    MCHC 32.9 31.4 - 37.4 g/dL    RDW 12.0 11.6 - 15.1 %    MPV 10.4 8.9 - 12.7 fL    Platelets 311 149 - 390 Thousands/uL    nRBC 0 /100 WBCs    Segmented % 64 43 - 75 %    Immature Grans % 1 0 - 2 %    Lymphocytes % 23 14 - 44 %    Monocytes % 9 4 - 12 %    Eosinophils Relative 2 0 - 6 %    Basophils Relative 1 0 - 1 %    Absolute Neutrophils 4.25 1.85 - 7.62 Thousands/µL    Absolute Immature Grans 0.03 0.00 - 0.20 Thousand/uL    Absolute Lymphocytes 1.54 0.60 - 4.47 Thousands/µL    Absolute Monocytes 0.56 0.17 - 1.22 Thousand/µL    Eosinophils Absolute 0.14 0.00 - 0.61 Thousand/µL    Basophils Absolute 0.08 0.00 - 0.10 Thousands/µL   TIBC Panel (incl. Iron, TIBC, % Iron Saturation)   Result Value Ref Range    Iron Saturation 25 15 - 50 %    TIBC 334 250 - 450 ug/dL    Iron 83 50 - 212 ug/dL    UIBC 251 155 - 355 ug/dL   Result Value Ref Range    Ferritin 24 11 - 307 ng/mL             Administrative Statements   I have spent a total time of 20 minutes in caring for this patient on the day of the visit/encounter including Diagnostic results, Risks and benefits of tx options, Instructions for management, Patient and family education, Impressions, Documenting in the medical record, Reviewing / ordering tests, medicine, procedures  , and Obtaining or reviewing history  .

## 2024-12-13 NOTE — ASSESSMENT & PLAN NOTE
Ferritin 24   Has been gradually decreasing   Sxs as in HPI  Recommend Venofer 200 mg IV x 3   Continue labs every 12-24 weeks   Follow up in 1 year  Call sooner with any sxs   Orders:    CBC and differential; Standing    Iron Panel (Includes Ferritin, Iron Sat%, Iron, and TIBC); Standing

## 2024-12-13 NOTE — PROGRESS NOTES
Assessment  1. Lumbosacral radiculopathy    2. Chronic left shoulder pain    3. Myofascial pain syndrome        Plan  44-year-old female returning for follow-up of lumbosacral back pain that radiates into bilateral lower extremities and chronic left shoulder pain.  Patient did have a left subacromial bursa injection in March 2024 which provided about 80% of relief, however effects are wearing off.  She does take meloxicam as needed with some relief.  She has done physical therapy and chiropractic treatment for her left shoulder with only transient relief.  The patient did have an EMG of bilateral lower extremities demonstrating bilateral S1 radiculopathies.  There was noted to be Tarlov cyst noted in the sacral canal on MRI of the lumbar spine.    1.  I will order an MRI of the left shoulder without contrast as well as an MRI of the sacrum without contrast  2.  Patient will continue with meloxicam as prescribed  3.  Patient will continue with HEP and chiropractic treatment/PT  4.  I will follow-up with the patient in 2 months or sooner if needed      My impressions and treatment recommendations were discussed in detail with the patient who verbalized understanding and had no further questions.  Discharge instructions were provided. I personally saw and examined the patient and I agree with the above discussed plan of care.    No orders of the defined types were placed in this encounter.    No orders of the defined types were placed in this encounter.      History of Present Illness    Asmita ESPINOZA Bem is a 44 y.o. female returning for follow-up of lumbosacral back pain that radiates into bilateral lower extremities and chronic left shoulder pain.  Patient did have a left subacromial bursa injection in March 2024 which provided about 80% of relief, however effects are wearing off.  She does take meloxicam as needed with some relief.  She has done physical therapy and chiropractic treatment for her left shoulder with only  transient relief.  Left shoulder pain is predominantly noted when she she is lifting shoulder overhead or internally rotating the shoulder.  She does not notice much weakness of the left shoulder.  She denies any significant weakness of the legs, bladder or bowel incontinence, or saddle anesthesia.  The patient did have an EMG of bilateral lower extremities demonstrating bilateral S1 radiculopathies.  There was noted to be Tarlov cyst noted in the sacral canal on MRI of the lumbar spine.  The patient rates her pain 2 out of 10 and the pain is worse in the evening and at night.  The pain is intermittent and described as dull, aching, throbbing, and pins-and-needles.  The pain is increased with overhead activities involving the left upper extremity, standing, walking, bending, and exercise.  Pain is alleviated with injections and relaxation.    Other than as stated above, the patient denies any interval changes in medications, medical condition, mental condition, symptoms, or allergies since the last office visit.    I have personally reviewed and/or updated the patient's past medical history, past surgical history, family history, social history, current medications, allergies, and vital signs today.     Review of Systems   Constitutional:  Negative for fever and unexpected weight change.   HENT:  Negative for trouble swallowing.    Eyes:  Negative for visual disturbance.   Respiratory:  Negative for shortness of breath and wheezing.    Cardiovascular:  Negative for chest pain and palpitations.   Gastrointestinal:  Negative for constipation, diarrhea, nausea and vomiting.   Endocrine: Negative for cold intolerance, heat intolerance and polydipsia.   Genitourinary:  Negative for difficulty urinating and frequency.   Musculoskeletal:  Positive for joint swelling. Negative for arthralgias, gait problem and myalgias.        Pain in extremity   Skin:  Negative for rash.   Neurological:  Negative for dizziness, seizures,  syncope, weakness and headaches.   Hematological:  Does not bruise/bleed easily.   Psychiatric/Behavioral:  Negative for dysphoric mood.    All other systems reviewed and are negative.      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)    Hyperlipidemia    Iron deficiency    Encounter for follow-up surveillance of thyroid cancer    Myofascial pain syndrome    Low ferritin       Past Medical History:   Diagnosis Date    Allergic     Anxiety     Cancer (HCC) 2/1/2023    Thyroid- Papillary carcinoma    COVID-19 01/2022    mild s/s-not hospitalized, not vaccinated    GERD (gastroesophageal reflux disease)     Headache(784.0)     History of IBS     with constipation    Ingrown toenail     Irritable bowel syndrome     Migraines     Chronic per pt    Plantar fasciitis     Thyroid carcinoma (HCC)     Varicella     Venereal wart 08/07/2014       Past Surgical History:   Procedure Laterality Date    CERVICAL BIOPSY  W/ LOOP ELECTRODE EXCISION  2017    COLONOSCOPY      MRI BREAST BIOPSY RIGHT (ALL INCLUSIVE) Right 7/26/2024    NASAL SEPTUM SURGERY      AK OSTEOT W/WO LNGTH SHRT/CORRJ 1ST METAR Left 12/30/2022    Procedure: OSTEOTOMY METATARSAL 1st;  Surgeon: Taiwo Harrington DPM;  Location: [facility];  Service: Podiatry    THYROIDECTOMY N/A 2/24/2023    Procedure: TOTAL THYROIDECTOMY;  Surgeon: Vince Carey MD;  Location: [facility];  Service: Surgical Oncology    TONSILLECTOMY      UPPER GASTROINTESTINAL ENDOSCOPY      US GUIDED THYROID BIOPSY  1/31/2023    WISDOM TOOTH EXTRACTION          Family History   Problem Relation Age of Onset    Hypertension Mother     Arthritis Mother     Thyroid disease Mother     Osteoarthritis Mother     Transient ischemic attack Father     Hypertension Father     Stroke Father     Vision loss Father     Rashes / Skin problems Sister         Shingles    Migraines Sister     No Known Problems Sister     Uterine cancer Maternal Grandmother 20    Cancer Maternal Grandmother         Uterine    Colon cancer Maternal Grandfather 60    Cancer Maternal Grandfather         Colon    Lung cancer Paternal Grandmother 60    Cancer Paternal Grandmother         Lung    Heart attack Paternal Grandfather     Colon cancer Cousin 30    Breast cancer Maternal Aunt     Breast cancer Maternal Aunt     Breast cancer Maternal Aunt        Social History     Occupational History    Not on file   Tobacco Use    Smoking status: Never    Smokeless tobacco: Never   Vaping Use    Vaping status: Never Used   Substance and Sexual Activity    Alcohol use: Yes     Alcohol/week: 2.0 standard drinks of alcohol     Types: 2 Glasses of wine per week     Comment: 2 glasses wine a week    Drug use: Never    Sexual activity: Not Currently       Current Outpatient Medications on File Prior to Visit   Medication Sig    acetaminophen (TYLENOL) 500 mg tablet Take 1,000 mg by mouth every 6 (six) hours as needed for mild pain    B Complex Vitamins (B-Complex/B-12) TABS Take 1 tablet by mouth    bisacodyl (DULCOLAX) 5 mg EC tablet Take 2 tablets (10 mg total) by mouth 2 (two) times a day as needed for constipation    calcitriol (ROCALTROL) 0.25 mcg capsule TAKE ONE CAPSULE BY MOUTH 2 TIMES A DAY    clindamycin (CLEOCIN T) 1 % lotion in the morning    dexamethasone (DECADRON) 4 mg tablet 4 mg  p.o. with breakfast for 1 to 7 days for unrelenting migraine    Galcanezumab-gnlm (Emgality) 120 MG/ML SOAJ INJECT 120MG SUBCUTANEOUSLY (UNDER THE SKIN) EVERY 28 DAYS    hydrocortisone (ANUSOL-HC) 2.5 % rectal cream Apply  "topically 2 (two) times a day    Levocetirizine Dihydrochloride (XYZAL PO) Take by mouth daily at bedtime    levothyroxine 125 mcg tablet Take 1 tablet (125 mcg total) by mouth daily    linaCLOtide (Linzess) 72 MCG CAPS Take 72 mcg by mouth in the morning    lubiprostone (AMITIZA) 24 mcg capsule Take 1 capsule (24 mcg total) by mouth 2 (two) times a day with meals    Melatonin 5 MG TABS Take by mouth as needed    meloxicam (MOBIC) 15 mg tablet Take 15 mg by mouth if needed    Multiple Vitamin (multivitamin) tablet Take 1 tablet by mouth daily    omeprazole (PriLOSEC) 40 MG capsule Take 1 capsule (40 mg total) by mouth daily    rimegepant sulfate (Nurtec) 75 mg TBDP Take one NURTEC 75 mg at onset under tongue. Limit 1 in 24 hours.    rizatriptan (MAXALT) 10 mg tablet Take 1 tablet (10 mg total) by mouth once as needed for migraine May repeat in 2 hours if needed. Max 2/24 hours, 9/month.    zolpidem (AMBIEN) 10 mg tablet Take 10 mg by mouth daily at bedtime as needed for sleep    calcium citrate (CALCITRATE) 950 (200 Ca) MG tablet Take 1 tablet (950 mg total) by mouth 2 (two) times a day    cholecalciferol (VITAMIN D3) 1,000 units tablet Take 1 tablet (1,000 Units total) by mouth daily Do not start before March 1, 2023.     No current facility-administered medications on file prior to visit.       No Known Allergies    Physical Exam    /74   Pulse 73   Ht 5' 3\" (1.6 m)   Wt 68 kg (150 lb)   BMI 26.57 kg/m²     Constitutional: normal, well developed, well nourished, alert, in no distress and non-toxic and no overt pain behavior.  Eyes: anicteric  HEENT: grossly intact  Neck: supple, symmetric, trachea midline and no masses   Pulmonary:even and unlabored  Cardiovascular:No edema or pitting edema present  Skin:Normal without rashes or lesions and well hydrated  Psychiatric:Mood and affect appropriate  Neurologic:Cranial Nerves II-XII grossly intact  Musculoskeletal:normal gait.  Bilateral lumbar paraspinals " minimally tender to palpation, but ropey and texture from L4-S1.  Bilateral lower extremity strength 5 out of 5 in all muscle groups.  Sensation intact to light touch in L3-S1 dermatomes bilaterally.  Negative seated straight leg raise bilaterally.  Tenderness to palpation over the anterolateral aspect of the left shoulder.  Positive empty can test and Wilson test on the left.  Left upper extremity strength 5 out of 5 in all muscle groups.    Imaging  MRI LUMBAR SPINE WITH AND WITHOUT CONTRAST     INDICATION: M54.16: Radiculopathy, lumbar region. Tingling and numbness in both legs.     COMPARISON: Lumbar spine radiograph 6/16/2023     TECHNIQUE:  Multiplanar, multisequence imaging of the lumbar spine was performed before and after gadolinium administration. .  Imaging performed on 3.0T MRI     IV Contrast:  7 mL of Gadobutrol injection (SINGLE-DOSE)     IMAGE QUALITY:  Diagnostic     FINDINGS:     VERTEBRAL BODIES:  There are 5 lumbar type vertebral bodies. Slight levoscoliosis of lumbar spine. No spondylolysis or spondylolisthesis. No compression fracture.     Small L1 intraosseous vertebral hemangioma. Otherwise, normal bone marrow signal.     SACRUM:  Normal signal within the sacrum. No evidence of insufficiency or stress fracture. Small sacral Tarlov cysts.     DISTAL CORD AND CONUS:  Normal size and signal within the distal cord and conus. Conus medullaris tip at L1-L2.     PARASPINAL SOFT TISSUES:  Paraspinal soft tissues are unremarkable.     LOWER THORACIC DISC SPACES:  Normal disc height and signal.  No disc herniation, canal stenosis or foraminal narrowing.     LUMBAR DISC SPACES:     L1-L2: Normal.     L2-L3: Normal.     L3-L4: Mild left asymmetric disc bulge. Mild facet arthropathy, bilateral periarticular enhancing facet change. No significant canal stenosis or foraminal narrowing.     L4-L5: Mild facet arthropathy, 0.8 cm synovial cyst posteroinferiorly adjacent to right L4-L5 facet joint (6:22),  bilateral periarticular enhancing facet change. No significant canal stenosis or foraminal narrowing.     L5-S1: Mild facet arthropathy . No significant canal stenosis or foraminal narrowing.     POSTCONTRAST IMAGING: Please see above discussion.     OTHER FINDINGS: Right ovarian simple cyst measuring 4.3 x 4.3 cm (2:5) - no further imaging recommended for premenopausal female. Small bilateral renal cysts.        IMPRESSION:     Mild enhancing periarticular facet changes at bilateral L3-L4 and bilateral L4-L5 facet joints, likely due to active facet arthritis.     0.8 cm synovial cyst posteroinferiorly adjacent to right L4-L5 facet joint.     Mild multilevel degenerative changes of lumbar spine, as detailed above. No significant canal stenosis or foraminal narrowing.     Additional chronic/incidental findings as detailed above.     The study was marked in EPIC for significant notification.     Resident: ARLEY COLLIER I, the attending radiologist, have reviewed the images and agree with the final report above.  XR SHOULDER 2+ VW LEFT     INDICATION: M25.512: Pain in left shoulder.     COMPARISON: None     FINDINGS:     No acute fracture or dislocation.     No significant degenerative changes.     No lytic or blastic osseous lesion.     Unremarkable soft tissues.     IMPRESSION:     No acute osseous abnormality.        Computerized Assisted Algorithm (CAA) may have been used to analyze all applicable images.

## 2024-12-16 ENCOUNTER — OFFICE VISIT (OUTPATIENT)
Dept: PHYSICAL THERAPY | Facility: REHABILITATION | Age: 44
End: 2024-12-16
Payer: COMMERCIAL

## 2024-12-16 DIAGNOSIS — M54.6 CHRONIC THORACIC BACK PAIN, UNSPECIFIED BACK PAIN LATERALITY: ICD-10-CM

## 2024-12-16 DIAGNOSIS — G89.29 CHRONIC THORACIC BACK PAIN, UNSPECIFIED BACK PAIN LATERALITY: ICD-10-CM

## 2024-12-16 DIAGNOSIS — M25.512 ACUTE PAIN OF LEFT SHOULDER: ICD-10-CM

## 2024-12-16 PROCEDURE — 97140 MANUAL THERAPY 1/> REGIONS: CPT

## 2024-12-16 PROCEDURE — 97110 THERAPEUTIC EXERCISES: CPT

## 2024-12-16 NOTE — PROGRESS NOTES
Daily Note     Today's date: 2024  Patient name: Asmita ESPINOZA Bem  : 1980  MRN: [id number]  Referring provider: Janet Martins MD  Dx:   Encounter Diagnosis     ICD-10-CM    1. Acute pain of left shoulder  M25.512 Ambulatory referral to Physical Therapy      2. Chronic thoracic back pain, unspecified back pain laterality  M54.6 Ambulatory referral to Physical Therapy    G89.29                      Subjective: Patient reports following up with physician regarding LUE arm pain. Patient will be receiving an MRI       Objective: See treatment diary below. Noted pain with supine overhead function. (-) TTP along biceps tendon, subscapularis.       Assessment: Tolerated treatment well. Manual treatment progressed today with patient verbal consent to assist with pain reduction. Further progressed therapeutic exercise to assist with reducing pain and assist with improved L RC and scapular strengthening. Patient would benefit from continued PT to further progress appropriate manual and strengthening in order to reduce shoulder pain/discomfort. CC at this time is don/doffing jacket, and reaching her arm across her body.       Plan: Continue per plan of care.      Precautions: Standard - Hx of thyroid cx.   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 (MOODYMN, approved, pending) Unit limit (Daily) Auth Start date Expiration date PT/OT + Visit Limit?   12/11/2024          Precautions: Standard - Hx of thyroid cx. Increased mobility of right side.   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 (BOMN, approved, pending) Unit limit (Daily) Auth Start date Expiration date PT/OT + Visit Limit?   12/11/2024          Date of Service 10/11/2024 10/25/2024 11/08/2024 11/15/2024 12/16/2024    Visits Used 1 2 3 4 5    Visits Remaining                           Neuro Re-Ed                                                                        Ther Ex         Warm up  2 min fwd  2 min back 2 min fws  2 min back - noted  back with backward UBE movement 3 min forward 3 minute forward    Standing Therex         Seated Therex                  Supine Hooklying  Breathing mechanics diaphragmatic and sidelying     Deep core activation Hooklying        Thoracic mobility  Cat Cow   1 x 15    Child pose  5 x 8 seconds     Seated thoracic extension  1 x 12            Abdominal Strengthening          Scapular Strengthening  Prone row   2 x 10 B/L #2 lb    Prone shoulder extension with scapular depression   2 x 10  #1 lb        Scapular /RC Strengthening  Standing ER #7 lb   1 x 10 held step to the side two and back inward two.B/L Standing RC isometrics  5'' x 12 IR    5'' x 12 ER    Standing IR step out  1 x 10 #7 lb      Standing ER Step out  1 x 10 #7 lb    Standing ER No money   2 x 10 YTB      Standing RC step out ER YTB  1 x 10     Standing pull down   1 x 15     Standing ER Wth step out  #10 lb LLE  1x 15     Stepping ER with step out   #7 lb RLE    Supine ER with towel  1 x 8     Supine SA punch  5 x 10 CW/CCW          Shoulder Mobility   Standing FF  1 x 15 with doorway stretch  Pulley 3 minutes FF    Pendulum  4 x 30 seconds                      Ther Activity         Education POC, HEP, breathing mechanics, core activation, anatomy                                    Manual Ther         Scapular mobilization  Sidelying scapular mobilization    Performed DEONDRE  Subscapularis release    PROM left shoulder     DEONDRE   Subscapularis Release, Sleeper Stretch, SI hip mob, PROM left shoulder     DEONDRE                                                 Modalities                           Outcome Measure

## 2024-12-23 ENCOUNTER — OFFICE VISIT (OUTPATIENT)
Dept: PHYSICAL THERAPY | Facility: REHABILITATION | Age: 44
End: 2024-12-23
Payer: COMMERCIAL

## 2024-12-23 DIAGNOSIS — M25.512 ACUTE PAIN OF LEFT SHOULDER: Primary | ICD-10-CM

## 2024-12-23 DIAGNOSIS — M54.6 CHRONIC THORACIC BACK PAIN, UNSPECIFIED BACK PAIN LATERALITY: ICD-10-CM

## 2024-12-23 DIAGNOSIS — G89.29 CHRONIC THORACIC BACK PAIN, UNSPECIFIED BACK PAIN LATERALITY: ICD-10-CM

## 2024-12-23 PROCEDURE — 97110 THERAPEUTIC EXERCISES: CPT

## 2024-12-23 NOTE — PROGRESS NOTES
PT RE-EVALUATION    Today's Date: 2024  Patient name: Asmita ESPINOZA Bem  : 1980  MRN: [id number]  Referring provider: Janet Martins MD  Dx:  Encounter Diagnosis     ICD-10-CM    1. Acute pain of left shoulder  M25.512       2. Chronic thoracic back pain, unspecified back pain laterality  M54.6     G89.29             Assessment  Impairments: abnormal coordination, abnormal muscle firing, abnormal muscle tone, activity intolerance, impaired physical strength and pain with function  Symptom irritability: moderate    Assessment details:    Asmita ESPINOZA Bem is progressing well in 6 visits. Patient subjectively reports >75% improvement in thoracic discomfort and notes 30% improvement in shoulder discomfort. Objective examination notes improvement in thoracic ROM, and reports of reduced pain. Screen cervical ROM per potential radicular symptoms impacting L shoulder pain/discomfort. Noted some reduction in left cervical rotation and extension. At this time, patients CC is left shoulder pain, of which the symptoms present with TTP along left bicips region, with noted anterior shoulder discomfort with grooming, reaching behind the back, and don/doffing clothing. Patient is following up for an MRI in January. PT to hold for now, and patient is going to continue with HEP per maintenance status. Patient is to follow up post MRI.    Understanding of Dx/Px/POC: good     Prognosis: good    Goals  In 2 weeks, patient will report a reduction in pain at work to < or equal to 2/10 to reduce irritability and improve function: achieved thoracic, partially shoulder  In 2 weeks, patient will display independence with HEP: Achieved  In 4 weeks, patient will improve left shoulder IR/ER MMT to > 0.5 to assist with improved strength tolerance for overhead activities: Achieved  In 4 weeks, patient will display full cervical ROM without symptom reproduction: Achieved  In 6 weeks, patient will improve thoracic extension ROM > 25% to  assist with overhead shoulder mobility: Achieved  In 6 weeks, patient will perform appropriate lifting mechanics, with use of hips > lumbar spine  In 8 weeks, patient will display full shoulder ROM: Achieved  In 8 weeks, patient will tolerate a full day of work without symptom exacerbation: Not achieved  In 8 weeks, patient will report >70% improvement in symptoms to improve function: Achieved (thoracic)    New goals as of 11/15/2024  In 2 weeks, patient will perform FF and abduction of LUE shoulder without painful arc: Not achieved  In 4 weeks, patient will don/doff shirt without reports of pain: Not achieved  In 6 weeks, patient will report >70% improvement in shoulder pain for improved function: Thoracic (75% better achieved), Shoulder 30% achieved.         Plan  Patient would benefit from: skilled physical therapy  Planned modality interventions: TENS    Planned therapy interventions: balance, motor coordination training, neuromuscular re-education, patient/caregiver education, postural training, strengthening, stretching, therapeutic activities, therapeutic exercise, functional ROM exercises, body mechanics training and breathing training    Frequency: 1x week  Plan of Care beginning date: 10/11/2024  Plan of Care expiration date: 1/23/2024  Treatment plan discussed with: patient  Plan details: Plan includes manual, modalities, therapeutic exercise, therapeutic function, balance training, Mary principles, HEP    Subjective Evaluation    History of Present Illness  Mechanism of injury: 12/23/2024:  CC is pain with waking up in the morning, dressing, and reaching behind.       11/15/2024: Patient reports overall improvement in her thoracic pain and notes 80% improvement. At this time, patient's chief complaint is left shoulder pain. Noted most predominately with don/doffing clothing and with reaching. Patient also notes pain with lifting patients.     Thoracic pain: 2/10  L Shoulder pain: 3-6/10 - shooting  pain with certain movements.         Evaluation Subjective: Patient reports a hx of migraines but notes a newer onset of back pain that radiates towards the LUE. Patient notes intermittent trapezius muscle spasm, and notes some anterior GHJ point.     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   Pain: Deep acheness that radiates towards the front (left side) from the mid lower thoracic/upper lumbar.   Previous Treatment: Physical therapy for similar pain.   PMH: Thyroidectomy (some complications - noted a reduction in activity).   Patient Goals  Patient goals for therapy: decreased pain, increased motion, return to sport/leisure activities, independence with ADLs/IADLs and increased strength    Pain  Current pain ratin  At best pain ratin  At worst pain ratin  Quality: dull ache and pressure      Diagnostic Tests    [facility] comments: Chest X-Ray at one point - no findings.  MRI lumbar spine (last week) - arthritis findings, synovial cyst.   MRI thoracic spine (one year ago) - Treatments  Previous treatment: chiropractic, massage and physical therapy  Current treatment: physical therapy          Systems Review History:  Cardiovascular/pulmonary     Integumentary    Musculoskeletal Left shoulder pain   Left thoracic back pain     Neuromuscular System Any recent falls? Denies  Numbness/tingling? Denies     Surgical history and/or abdominal surgeries:  Thyroidectomy       Goal: Return to ADL         Objective     Active Range of Motion   Cervical/Thoracic Spine       Cervical    Flexion: 35 degrees  Restriction level: minimal  Extension: 45 degrees     Restriction level: moderate  Left lateral flexion: 40 degrees     Restriction level: minimal  Right lateral flexion: 40 degrees     Restriction level  minimal  Left rotation: 60 degrees with pain Restriction level: moderate  Right rotation: 70 degrees    Restriction level: minimal    Thoracic    Flexion:  Restriction level: minimal  Extension:  Restriction level: minimal  Left rotation:  Restriction level: minimal  Right rotation:  Restriction level: minimal  Left Shoulder   Flexion: 160 degrees with pain  Abduction: 155 degrees with pain    Lumbar   Flexion:  Restriction level: minimal    Additional Active Range of Motion Details  Discomfort with left rotation of cervical spine     Functional IR: WNL B/L  Right Functional ER: Right T1  Functional ER: Left occiput (top of head), Re-Eval: T6,       AROM Shoulder Stanidng:  FF eval: 120 degrees, Re-eval: 155 degrees, Re-eval: 160 deg, pain at 120 deg of FF  ABDuction eval: 110 degrees, Re-eval: 150 degrees, Re-Eval: 70 degrees until pain onset.       Forward Flexion: Thoracolumbar hinge    Passive Range of Motion   Left Shoulder   Flexion: 160 degrees WFL  External rotation 45°: WFL  Internal rotation 45°: WFL    Scapular Mobility   Left Shoulder   Scapular mobility: fair  Scapular Mobility with Shoulder to 90° FF   Scapular winging: moderate  Scapular elevation: severe  Upward rotation: delayed  Downward rotation: delayed    Strength/Myotome Testing     Left Shoulder     Planes of Motion   Flexion: 4   Abduction: 4   External rotation at 0°: 4   Internal rotation at 0°: 5     Right Shoulder     Planes of Motion   Flexion: 5   Abduction: 5   External rotation at 0°: 5   Internal rotation at 0°: 5     Additional Strength Details  Pain with FF and ER MMT.     Tests     Left Shoulder   Positive Hawkin's, Neer's and painful arc.   Negative external rotation lag sign and bicep load .     Functional Assessment        Comments  Lifting Mechanics: Aberrant lumbar movement, reduced hinge  and noted thoracolumbar hinge.       Abdominal Assessment:      Abdominal Assessment: Breathing Mechanics:  Educated on  diaphragmatic breathing- specifically lateral diaphragmatic breathing  Educated on deep TrA engagement.                Precautions: Standard - Hx of thyroid cx.   Patient Active Problem List   Diagnosis   • Chronic migraine w/o aura w/o status migrainosus, not intractable   • TOSHIA II (cervical intraepithelial neoplasia II)   • Irritable bowel syndrome with constipation   • Primary insomnia   • Neck pain   • Vestibulitis, vulvar   • Overweight (BMI 25.0-29.9)   • Abnormal thyroid biopsy   • History of thyroid cancer   • Hypothyroidism   • Gastroesophageal reflux disease   • Hypocalcemia   • Postoperative hypothyroidism   • 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)   • Hyperlipidemia   • Iron deficiency   • Encounter for follow-up surveillance of thyroid cancer   • Myofascial pain syndrome   • Low ferritin   • Lumbosacral radiculopathy   • Chronic left shoulder pain         POC Expiration: 12/11/2024    POC Expiration:     Diagnosis: Left shoulder pain and Left Thoracic Pain    POC expires (Date that your POC expires) Auth Status? (BOMN, approved, pending) Unit limit (Daily) Auth Start date Expiration date PT/OT + Visit Limit?   12/11/2024          Precautions: Standard - Hx of thyroid cx. Increased mobility of right side.   Patient Active Problem List   Diagnosis   • Chronic migraine w/o aura w/o status migrainosus, not intractable   • TOSHIA II (cervical intraepithelial neoplasia II)   • Irritable bowel syndrome with constipation   • Primary insomnia   • Neck pain   • Vestibulitis, vulvar   • Overweight (BMI 25.0-29.9)   • Abnormal thyroid biopsy   • History of thyroid cancer   • Hypothyroidism   • Gastroesophageal reflux disease   • Hypocalcemia   • Postoperative hypothyroidism   • 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)   • Hyperlipidemia   • Iron deficiency   • Encounter for follow-up surveillance of thyroid cancer   • Myofascial pain syndrome       Body Region Impairment Result   Upper Body Left shoulder pain    Thoracic Pain  Scapular dyskinesis  Hinge mechanics   Hip     Knee     Ankle     Movement Screen         Outcome Measure Eval Re-Eval D/C   Oswestry      PFDI      LEFI      EPDS      TONI          POC Expiration: 12/11/2024    Diagnosis: Left shoulder pain and Left Thoracic Pain    POC expires (Date that your POC expires) Auth Status? (BOMN, approved, pending) Unit limit (Daily) Auth Start date Expiration date PT/OT + Visit Limit?   12/11/2024          Date of Service 10/11/2024 10/25/2024 11/08/2024 11/15/2024 12/16/2024 12/23/2024   Visits Used 1 2 3 4 5    Visits Remaining                           Neuro Re-Ed                                                                        Ther Ex      RE_EVAL   Warm up  2 min fwd  2 min back 2 min fws  2 min back - noted back with backward UBE movement 3 min forward 3 minute forward    Standing Therex         Cervicothoracic      Chin tuck supine   1 x 12 with 3 second hold     SNAG extension   1 x 12     Chin tuck seated  1 x 12     SNAG L Rotation   1 x 12        Seated Therex                  Supine Hooklying  Breathing mechanics diaphragmatic and sidelying     Deep core activation Hooklying        Thoracic mobility  Cat Cow   1 x 15    Child pose  5 x 8 seconds     Seated thoracic extension  1 x 12            Abdominal Strengthening          Scapular Strengthening  Prone row   2 x 10 B/L #2 lb    Prone shoulder extension with scapular depression   2 x 10  #1 lb        Scapular /RC Strengthening  Standing ER #7 lb   1 x 10 held step to the side two and back inward two.B/L Standing RC isometrics  5'' x 12 IR    5'' x 12 ER    Standing IR step out  1 x 10 #7 lb      Standing ER Step out  1 x 10 #7 lb    Standing ER No money   2 x 10 YTB      Standing RC step out ER YTB  1 x 10     Standing pull down   1 x 15     Standing ER Wth step out  #10 lb LLE  1x 15     Stepping ER with step out   #7 lb RLE    Supine ER with towel  1 x 8     Supine SA punch  5 x 10 CW/CCW        SA punch  1 x 5 supine noting pain/discomfort.     Supine towel pull ER bent arm towards forehead  1 x 15    Shoulder Mobility   Standing FF  1 x 15 with doorway stretch  Pulley 3 minutes FF    Pendulum  4 x 30 seconds                      Ther Activity         Education POC, HEP, breathing mechanics, core activation, anatomy                                    Manual Ther         Scapular mobilization  Sidelying scapular mobilization    Performed DEONDRE  Subscapularis release    PROM left shoulder     DEONDRE   Subscapularis Release, Sleeper Stretch, SI hip mob, PROM left shoulder     DEONDRE                                                 Modalities                           Outcome Measure

## 2025-01-02 ENCOUNTER — RESULTS FOLLOW-UP (OUTPATIENT)
Dept: ENDOCRINOLOGY | Facility: CLINIC | Age: 45
End: 2025-01-02

## 2025-01-02 ENCOUNTER — APPOINTMENT (OUTPATIENT)
Dept: LAB | Facility: HOSPITAL | Age: 45
End: 2025-01-02
Payer: COMMERCIAL

## 2025-01-02 DIAGNOSIS — E83.51 HYPOCALCEMIA: ICD-10-CM

## 2025-01-02 DIAGNOSIS — E89.0 POSTOPERATIVE HYPOTHYROIDISM: ICD-10-CM

## 2025-01-02 LAB
ALBUMIN SERPL BCG-MCNC: 4.3 G/DL (ref 3.5–5)
CALCIUM SERPL-MCNC: 8.2 MG/DL (ref 8.4–10.2)
T4 FREE SERPL-MCNC: 1.08 NG/DL (ref 0.61–1.12)
TSH SERPL DL<=0.05 MIU/L-ACNC: 5.61 UIU/ML (ref 0.45–4.5)

## 2025-01-02 PROCEDURE — 84443 ASSAY THYROID STIM HORMONE: CPT

## 2025-01-02 PROCEDURE — 36415 COLL VENOUS BLD VENIPUNCTURE: CPT

## 2025-01-02 PROCEDURE — 84439 ASSAY OF FREE THYROXINE: CPT

## 2025-01-02 PROCEDURE — 82040 ASSAY OF SERUM ALBUMIN: CPT

## 2025-01-03 ENCOUNTER — OFFICE VISIT (OUTPATIENT)
Dept: PHYSICAL THERAPY | Facility: REHABILITATION | Age: 45
End: 2025-01-03
Payer: COMMERCIAL

## 2025-01-03 DIAGNOSIS — M54.6 CHRONIC THORACIC BACK PAIN, UNSPECIFIED BACK PAIN LATERALITY: ICD-10-CM

## 2025-01-03 DIAGNOSIS — G89.29 CHRONIC THORACIC BACK PAIN, UNSPECIFIED BACK PAIN LATERALITY: ICD-10-CM

## 2025-01-03 DIAGNOSIS — M25.512 ACUTE PAIN OF LEFT SHOULDER: Primary | ICD-10-CM

## 2025-01-03 PROCEDURE — 97110 THERAPEUTIC EXERCISES: CPT

## 2025-01-03 NOTE — PROGRESS NOTES
Daily Note     Today's date: 1/3/2025  Patient name: Asmita ESPINOZA Bem  : 1980  MRN: [id number]  Referring provider: Janet Martins MD  Dx:   Encounter Diagnosis     ICD-10-CM    1. Acute pain of left shoulder  M25.512       2. Chronic thoracic back pain, unspecified back pain laterality  M54.6     G89.29                      Subjective: Patient is following up for one more visit prior to MRI. Patient reports overall improvement. Notes (+) response with the cervical exercises.       Objective: See treatment diary below      Assessment: Tolerated treatment well. Reviewed cervical therex to assist with potential reduction of left shoulder discomfort for potential radicular symptoms. Further progressed shoulder therex within pain tolerance, of which the patient tolerated well. Noted continued difficulty with L shoulder ER of increased resistance secondary to anterior GHJ pain. NO pain with biceps curls or with forward raises. Patient is following up with MRI next week and is to communicate results post MRI.       Plan: Continue per plan of care.      Precautions: Standard - Hx of thyroid cx.   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)    Hyperlipidemia    Iron deficiency    Encounter for follow-up surveillance of thyroid cancer    Myofascial pain syndrome    Low ferritin    Lumbosacral radiculopathy    Chronic left shoulder pain           Diagnosis: Left shoulder pain and Left Thoracic Pain    POC expires (Date that your POC expires) Auth Status? (BOMN, approved,  pending) Unit limit (Daily) Auth Start date Expiration date PT/OT + Visit Limit?   12/11/2024          Date of Service 10/11/2024 10/25/2024 11/08/2024 11/15/2024 12/16/2024 12/23/2024 01/03/2025   Visits Used 1 2 3 4 5 6 7   Visits Remaining                              Neuro Re-Ed                                                                                Ther Ex      RE_EVAL    Warm up  2 min fwd  2 min back 2 min fws  2 min back - noted back with backward UBE movement 3 min forward 3 minute forward     Standing Therex          Cervicothoracic      Chin tuck supine   1 x 12 with 3 second hold     SNAG extension   1 x 12     Chin tuck seated  1 x 12     SNAG L Rotation   1 x 12      Chin tuck supine  1 x 12 with 3 hold     Chin tuck seated  1 x 10      SNAG Extension cervical   1 x 12     SNAG rotation   1 x 12 B/L    Seated Therex                    Supine Hooklying  Breathing mechanics diaphragmatic and sidelying     Deep core activation Hooklying         Thoracic mobility  Cat Cow   1 x 15    Child pose  5 x 8 seconds     Seated thoracic extension  1 x 12             Abdominal Strengthening           Scapular Strengthening  Prone row   2 x 10 B/L #2 lb    Prone shoulder extension with scapular depression   2 x 10  #1 lb      Standing ER with TB  2 x 10 #3 lb B/L     Standing ER with scapular depression and YTB  1 x 12        Scapular /RC Strengthening  Standing ER #7 lb   1 x 10 held step to the side two and back inward two.B/L Standing RC isometrics  5'' x 12 IR    5'' x 12 ER    Standing IR step out  1 x 10 #7 lb      Standing ER Step out  1 x 10 #7 lb    Standing ER No money   2 x 10 YTB      Standing RC step out ER YTB  1 x 10     Standing pull down   1 x 15     Standing ER Wth step out  #10 lb LLE  1x 15     Stepping ER with step out   #7 lb RLE    Supine ER with towel  1 x 8     Supine SA punch  5 x 10 CW/CCW       SA punch  1 x 5 supine noting pain/discomfort.     Supine towel pull ER bent arm towards  forehead  1 x 15  Standing bicep curls   2 x 10 #7 lb B/L     Standing Forward raises   2 x 10 #1 lb yp to 90 degrees.     Standing lateral raises  2 x 10 #1 lb    Shoulder Mobility   Standing FF  1 x 15 with doorway stretch  Pulley 3 minutes FF    Pendulum  4 x 30 seconds                         Ther Activity          Education POC, HEP, breathing mechanics, core activation, anatomy                                        Manual Ther          Scapular mobilization  Sidelying scapular mobilization    Performed DEONDRE  Subscapularis release    PROM left shoulder     DEONDRE   Subscapularis Release, Sleeper Stretch, SI hip mob, PROM left shoulder     DEONDRE                                                       Modalities                                Outcome Measure

## 2025-01-07 ENCOUNTER — OFFICE VISIT (OUTPATIENT)
Dept: URGENT CARE | Age: 45
End: 2025-01-07
Payer: COMMERCIAL

## 2025-01-07 ENCOUNTER — HOSPITAL ENCOUNTER (EMERGENCY)
Facility: HOSPITAL | Age: 45
Discharge: HOME | End: 2025-01-08
Attending: EMERGENCY MEDICINE
Payer: COMMERCIAL

## 2025-01-07 VITALS
DIASTOLIC BLOOD PRESSURE: 77 MMHG | RESPIRATION RATE: 14 BRPM | OXYGEN SATURATION: 100 % | SYSTOLIC BLOOD PRESSURE: 116 MMHG | TEMPERATURE: 97.5 F | HEART RATE: 68 BPM

## 2025-01-07 VITALS
DIASTOLIC BLOOD PRESSURE: 68 MMHG | RESPIRATION RATE: 16 BRPM | HEART RATE: 90 BPM | SYSTOLIC BLOOD PRESSURE: 107 MMHG | WEIGHT: 147 LBS | TEMPERATURE: 98.6 F | BODY MASS INDEX: 26.04 KG/M2 | OXYGEN SATURATION: 98 %

## 2025-01-07 DIAGNOSIS — K52.9 GASTROENTERITIS: Primary | ICD-10-CM

## 2025-01-07 DIAGNOSIS — E83.51 HYPOCALCEMIA: ICD-10-CM

## 2025-01-07 DIAGNOSIS — G43.909 MIGRAINE: ICD-10-CM

## 2025-01-07 DIAGNOSIS — R11.0 NAUSEA: ICD-10-CM

## 2025-01-07 DIAGNOSIS — R19.7 DIARRHEA, UNSPECIFIED TYPE: Primary | ICD-10-CM

## 2025-01-07 DIAGNOSIS — R10.9 ABDOMINAL PAIN, UNSPECIFIED ABDOMINAL LOCATION: ICD-10-CM

## 2025-01-07 DIAGNOSIS — R51.9 ACUTE INTRACTABLE HEADACHE, UNSPECIFIED HEADACHE TYPE: ICD-10-CM

## 2025-01-07 LAB
ALBUMIN SERPL BCG-MCNC: 3.9 G/DL (ref 3.5–5)
ALP SERPL-CCNC: 32 U/L (ref 34–104)
ALT SERPL W P-5'-P-CCNC: 14 U/L (ref 7–52)
ANION GAP SERPL CALCULATED.3IONS-SCNC: 6 MMOL/L (ref 4–13)
AST SERPL W P-5'-P-CCNC: 17 U/L (ref 13–39)
B-HCG SERPL-ACNC: <0.6 MIU/ML (ref 0–5)
BASOPHILS # BLD AUTO: 0.04 THOUSANDS/ΜL (ref 0–0.1)
BASOPHILS NFR BLD AUTO: 1 % (ref 0–1)
BILIRUB SERPL-MCNC: 0.19 MG/DL (ref 0.2–1)
BUN SERPL-MCNC: 10 MG/DL (ref 5–25)
CALCIUM SERPL-MCNC: 7.1 MG/DL (ref 8.4–10.2)
CARDIAC TROPONIN I PNL SERPL HS: <2 NG/L (ref ?–50)
CHLORIDE SERPL-SCNC: 102 MMOL/L (ref 96–108)
CO2 SERPL-SCNC: 30 MMOL/L (ref 21–32)
CREAT SERPL-MCNC: 0.59 MG/DL (ref 0.6–1.3)
EOSINOPHIL # BLD AUTO: 0.06 THOUSAND/ΜL (ref 0–0.61)
EOSINOPHIL NFR BLD AUTO: 1 % (ref 0–6)
ERYTHROCYTE [DISTWIDTH] IN BLOOD BY AUTOMATED COUNT: 12.3 % (ref 11.6–15.1)
GFR SERPL CREATININE-BSD FRML MDRD: 113 ML/MIN/1.73SQ M
GLUCOSE SERPL-MCNC: 91 MG/DL (ref 65–140)
HCT VFR BLD AUTO: 37.2 % (ref 34.8–46.1)
HGB BLD-MCNC: 12.7 G/DL (ref 11.5–15.4)
IMM GRANULOCYTES # BLD AUTO: 0.01 THOUSAND/UL (ref 0–0.2)
IMM GRANULOCYTES NFR BLD AUTO: 0 % (ref 0–2)
LIPASE SERPL-CCNC: 42 U/L (ref 11–82)
LYMPHOCYTES # BLD AUTO: 0.98 THOUSANDS/ΜL (ref 0.6–4.47)
LYMPHOCYTES NFR BLD AUTO: 23 % (ref 14–44)
MCH RBC QN AUTO: 32.6 PG (ref 26.8–34.3)
MCHC RBC AUTO-ENTMCNC: 34.1 G/DL (ref 31.4–37.4)
MCV RBC AUTO: 96 FL (ref 82–98)
MONOCYTES # BLD AUTO: 0.42 THOUSAND/ΜL (ref 0.17–1.22)
MONOCYTES NFR BLD AUTO: 10 % (ref 4–12)
NEUTROPHILS # BLD AUTO: 2.82 THOUSANDS/ΜL (ref 1.85–7.62)
NEUTS SEG NFR BLD AUTO: 65 % (ref 43–75)
NRBC BLD AUTO-RTO: 0 /100 WBCS
PLATELET # BLD AUTO: 223 THOUSANDS/UL (ref 149–390)
PMV BLD AUTO: 9.8 FL (ref 8.9–12.7)
POTASSIUM SERPL-SCNC: 3.1 MMOL/L (ref 3.5–5.3)
PROT SERPL-MCNC: 6.6 G/DL (ref 6.4–8.4)
RBC # BLD AUTO: 3.89 MILLION/UL (ref 3.81–5.12)
SODIUM SERPL-SCNC: 138 MMOL/L (ref 135–147)
WBC # BLD AUTO: 4.33 THOUSAND/UL (ref 4.31–10.16)

## 2025-01-07 PROCEDURE — 80053 COMPREHEN METABOLIC PANEL: CPT | Performed by: EMERGENCY MEDICINE

## 2025-01-07 PROCEDURE — 85025 COMPLETE CBC W/AUTO DIFF WBC: CPT | Performed by: EMERGENCY MEDICINE

## 2025-01-07 PROCEDURE — 96367 TX/PROPH/DG ADDL SEQ IV INF: CPT

## 2025-01-07 PROCEDURE — 99213 OFFICE O/P EST LOW 20 MIN: CPT | Performed by: PHYSICIAN ASSISTANT

## 2025-01-07 PROCEDURE — 83690 ASSAY OF LIPASE: CPT | Performed by: EMERGENCY MEDICINE

## 2025-01-07 PROCEDURE — 99284 EMERGENCY DEPT VISIT MOD MDM: CPT

## 2025-01-07 PROCEDURE — 36415 COLL VENOUS BLD VENIPUNCTURE: CPT

## 2025-01-07 PROCEDURE — 99284 EMERGENCY DEPT VISIT MOD MDM: CPT | Performed by: EMERGENCY MEDICINE

## 2025-01-07 PROCEDURE — 93005 ELECTROCARDIOGRAM TRACING: CPT

## 2025-01-07 PROCEDURE — 84484 ASSAY OF TROPONIN QUANT: CPT

## 2025-01-07 PROCEDURE — 96365 THER/PROPH/DIAG IV INF INIT: CPT

## 2025-01-07 PROCEDURE — 96375 TX/PRO/DX INJ NEW DRUG ADDON: CPT

## 2025-01-07 PROCEDURE — 84702 CHORIONIC GONADOTROPIN TEST: CPT

## 2025-01-07 RX ORDER — METOCLOPRAMIDE HYDROCHLORIDE 5 MG/ML
10 INJECTION INTRAMUSCULAR; INTRAVENOUS ONCE
Status: COMPLETED | OUTPATIENT
Start: 2025-01-07 | End: 2025-01-07

## 2025-01-07 RX ORDER — MAGNESIUM SULFATE HEPTAHYDRATE 40 MG/ML
2 INJECTION, SOLUTION INTRAVENOUS ONCE
Status: COMPLETED | OUTPATIENT
Start: 2025-01-07 | End: 2025-01-07

## 2025-01-07 RX ORDER — KETOROLAC TROMETHAMINE 30 MG/ML
30 INJECTION, SOLUTION INTRAMUSCULAR; INTRAVENOUS ONCE
Status: COMPLETED | OUTPATIENT
Start: 2025-01-07 | End: 2025-01-07

## 2025-01-07 RX ORDER — CALCIUM GLUCONATE 20 MG/ML
2 INJECTION, SOLUTION INTRAVENOUS ONCE
Status: COMPLETED | OUTPATIENT
Start: 2025-01-07 | End: 2025-01-07

## 2025-01-07 RX ORDER — ONDANSETRON 4 MG/1
4 TABLET, FILM COATED ORAL EVERY 8 HOURS PRN
Qty: 20 TABLET | Refills: 0 | Status: SHIPPED | OUTPATIENT
Start: 2025-01-07

## 2025-01-07 RX ORDER — DIPHENHYDRAMINE HYDROCHLORIDE 50 MG/ML
25 INJECTION INTRAMUSCULAR; INTRAVENOUS ONCE
Status: COMPLETED | OUTPATIENT
Start: 2025-01-07 | End: 2025-01-07

## 2025-01-07 RX ADMIN — SODIUM CHLORIDE 500 MG: 9 INJECTION, SOLUTION INTRAVENOUS at 23:28

## 2025-01-07 RX ADMIN — METOCLOPRAMIDE 10 MG: 5 INJECTION, SOLUTION INTRAMUSCULAR; INTRAVENOUS at 20:34

## 2025-01-07 RX ADMIN — MAGNESIUM SULFATE HEPTAHYDRATE 2 G: 40 INJECTION, SOLUTION INTRAVENOUS at 22:04

## 2025-01-07 RX ADMIN — DIPHENHYDRAMINE HYDROCHLORIDE 25 MG: 50 INJECTION INTRAMUSCULAR; INTRAVENOUS at 20:35

## 2025-01-07 RX ADMIN — CALCIUM GLUCONATE 2 G: 20 INJECTION, SOLUTION INTRAVENOUS at 20:36

## 2025-01-07 RX ADMIN — SODIUM CHLORIDE 1000 ML: 0.9 INJECTION, SOLUTION INTRAVENOUS at 20:35

## 2025-01-07 RX ADMIN — KETOROLAC TROMETHAMINE 30 MG: 30 INJECTION, SOLUTION INTRAMUSCULAR; INTRAVENOUS at 20:35

## 2025-01-07 NOTE — PROGRESS NOTES
[facility]        NAME: Asmita ESPINOZA Bem is a 44 y.o. female  : 1980    MRN: [id number]  DATE: 2025  TIME: 3:22 PM    Assessment and Plan   Diarrhea, unspecified type [R19.7]  1. Diarrhea, unspecified type  Transfer to other facility      2. Nausea  ondansetron (ZOFRAN) 4 mg tablet    Transfer to other facility      3. Abdominal pain, unspecified abdominal location  Transfer to other facility      4. Acute intractable headache, unspecified headache type              Patient Instructions       Follow up with PCP in 3-5 days.  Proceed to  ER if symptoms worsen.    If tests have been performed at [facility], our office will contact you with results if changes need to be made to the care plan discussed with you at the visit.  [facility]    Chief Complaint   No chief complaint on file.        History of Present Illness       Patient is a 44-year-old female who presents to the urgent care complaining of nausea and diarrhea.  Patient reports symptoms started a few days ago.  Admits history of migraines.  Patient reports she is starting to get a migraine and feels extremely dehydrated.  Patient also reports diffuse abdomen pain.  Admits history of IBS.  Denies any history of diverticulitis.  Denies any urinary symptoms.Denied any chances of COVID or flu.        Review of Systems   Review of Systems   Constitutional:  Positive for appetite change and fatigue.   HENT: Negative.     Respiratory: Negative.     Cardiovascular: Negative.    Gastrointestinal:  Positive for abdominal pain, diarrhea and nausea.   Genitourinary:  Negative for frequency and urgency.   Neurological:  Positive for headaches.   Psychiatric/Behavioral: Negative.           Current Medications       Current Outpatient Medications:     ondansetron (ZOFRAN) 4 mg tablet, Take 1 tablet (4 mg total) by mouth every 8 (eight) hours as needed for nausea or vomiting, Disp: 20 tablet, Rfl: 0     acetaminophen (TYLENOL) 500 mg tablet, Take 1,000 mg by mouth every 6 (six) hours as needed for mild pain, Disp: , Rfl:     B Complex Vitamins (B-Complex/B-12) TABS, Take 1 tablet by mouth, Disp: , Rfl:     bisacodyl (DULCOLAX) 5 mg EC tablet, Take 2 tablets (10 mg total) by mouth 2 (two) times a day as needed for constipation, Disp: 60 tablet, Rfl: 1    calcitriol (ROCALTROL) 0.25 mcg capsule, TAKE ONE CAPSULE BY MOUTH 2 TIMES A DAY, Disp: 180 capsule, Rfl: 1    calcium citrate (CALCITRATE) 950 (200 Ca) MG tablet, Take 1 tablet (950 mg total) by mouth 2 (two) times a day, Disp: 120 tablet, Rfl: 0    cholecalciferol (VITAMIN D3) 1,000 units tablet, Take 1 tablet (1,000 Units total) by mouth daily Do not start before March 1, 2023., Disp: 30 tablet, Rfl: 0    clindamycin (CLEOCIN T) 1 % lotion, in the morning, Disp: , Rfl:     dexamethasone (DECADRON) 4 mg tablet, 4 mg  p.o. with breakfast for 1 to 7 days for unrelenting migraine, Disp: 7 tablet, Rfl: 1    Galcanezumab-gnlm (Emgality) 120 MG/ML SOAJ, INJECT 120MG SUBCUTANEOUSLY (UNDER THE SKIN) EVERY 28 DAYS, Disp: 1 mL, Rfl: 11    hydrocortisone (ANUSOL-HC) 2.5 % rectal cream, Apply topically 2 (two) times a day, Disp: 28 g, Rfl: 2    Levocetirizine Dihydrochloride (XYZAL PO), Take by mouth daily at bedtime, Disp: , Rfl:     levothyroxine 125 mcg tablet, Take 1 tablet (125 mcg total) by mouth daily, Disp: 90 tablet, Rfl: 1    linaCLOtide (Linzess) 72 MCG CAPS, Take 72 mcg by mouth in the morning, Disp: 90 capsule, Rfl: 2    lubiprostone (AMITIZA) 24 mcg capsule, Take 1 capsule (24 mcg total) by mouth 2 (two) times a day with meals, Disp: 60 capsule, Rfl: 5    Melatonin 5 MG TABS, Take by mouth as needed, Disp: , Rfl:     meloxicam (MOBIC) 15 mg tablet, Take 15 mg by mouth if needed, Disp: , Rfl:     Multiple Vitamin (multivitamin) tablet, Take 1 tablet by mouth daily, Disp: , Rfl:     omeprazole (PriLOSEC) 40 MG capsule, Take 1 capsule (40 mg total) by mouth daily,  Disp: 90 capsule, Rfl: 1    rimegepant sulfate (Nurtec) 75 mg TBDP, Take one NURTEC 75 mg at onset under tongue. Limit 1 in 24 hours., Disp: 16 tablet, Rfl: 11    rizatriptan (MAXALT) 10 mg tablet, Take 1 tablet (10 mg total) by mouth once as needed for migraine May repeat in 2 hours if needed. Max 2/24 hours, 9/month., Disp: 9 tablet, Rfl: 12    zolpidem (AMBIEN) 10 mg tablet, Take 10 mg by mouth daily at bedtime as needed for sleep, Disp: , Rfl:     Current Allergies     Allergies as of 01/07/2025    (No Known Allergies)            The following portions of the patient's history were reviewed and updated as appropriate: allergies, current medications, past family history, past medical history, past social history, past surgical history and problem list.     Past Medical History:   Diagnosis Date    Allergic     Anxiety     Cancer (HCC) 2/1/2023    Thyroid- Papillary carcinoma    COVID-19 01/2022    mild s/s-not hospitalized, not vaccinated    GERD (gastroesophageal reflux disease)     Headache(784.0)     History of IBS     with constipation    Ingrown toenail     Irritable bowel syndrome     Migraines     Chronic per pt    Plantar fasciitis     Thyroid carcinoma (HCC)     Varicella     Venereal wart 08/07/2014       Past Surgical History:   Procedure Laterality Date    CERVICAL BIOPSY  W/ LOOP ELECTRODE EXCISION  2017    COLONOSCOPY      MRI BREAST BIOPSY RIGHT (ALL INCLUSIVE) Right 7/26/2024    NASAL SEPTUM SURGERY      UT OSTEOT W/WO LNGTH SHRT/CORRJ 1ST METAR Left 12/30/2022    Procedure: OSTEOTOMY METATARSAL 1st;  Surgeon: Taiwo Harrington DPM;  Location: [facility];  Service: Podiatry    THYROIDECTOMY N/A 2/24/2023    Procedure: TOTAL THYROIDECTOMY;  Surgeon: Vince Carey MD;  Location: [facility];  Service: Surgical Oncology    TONSILLECTOMY      UPPER GASTROINTESTINAL ENDOSCOPY      US GUIDED THYROID BIOPSY  1/31/2023    WISDOM TOOTH EXTRACTION         Family History   Problem Relation Age of Onset     Hypertension Mother     Arthritis Mother     Thyroid disease Mother     Osteoarthritis Mother     Transient ischemic attack Father     Hypertension Father     Stroke Father     Vision loss Father     Rashes / Skin problems Sister         Shingles    Migraines Sister     No Known Problems Sister     Uterine cancer Maternal Grandmother 20    Cancer Maternal Grandmother         Uterine    Colon cancer Maternal Grandfather 60    Cancer Maternal Grandfather         Colon    Lung cancer Paternal Grandmother 60    Cancer Paternal Grandmother         Lung    Heart attack Paternal Grandfather     Colon cancer Cousin 30    Breast cancer Maternal Aunt     Breast cancer Maternal Aunt     Breast cancer Maternal Aunt          Medications have been verified.        Objective   /68 (BP Location: Left arm, Patient Position: Sitting, Cuff Size: Adult)   Pulse 90   Temp 98.6 °F (37 °C) (Tympanic)   Resp 16   Wt 66.7 kg (147 lb)   SpO2 98%   BMI 26.04 kg/m²   No LMP recorded.       Physical Exam     Physical Exam  Vitals and nursing note reviewed.   Constitutional:       Appearance: Normal appearance.   HENT:      Head: Normocephalic.      Right Ear: Tympanic membrane, ear canal and external ear normal.      Left Ear: Tympanic membrane, ear canal and external ear normal.   Eyes:      Extraocular Movements: Extraocular movements intact.      Pupils: Pupils are equal, round, and reactive to light.   Cardiovascular:      Rate and Rhythm: Normal rate and regular rhythm.      Heart sounds: Normal heart sounds.   Pulmonary:      Breath sounds: Normal breath sounds. No wheezing.   Abdominal:      Comments: Diffuse abdomen pain   Neurological:      General: No focal deficit present.      Mental Status: She is alert and oriented to person, place, and time.   Psychiatric:         Mood and Affect: Mood normal.         Behavior: Behavior normal.

## 2025-01-08 DIAGNOSIS — E83.51 HYPOCALCEMIA: ICD-10-CM

## 2025-01-08 DIAGNOSIS — E89.2 HYPOPARATHYROIDISM AFTER PROCEDURE (HCC): Primary | ICD-10-CM

## 2025-01-08 RX ORDER — DICYCLOMINE HCL 20 MG
20 TABLET ORAL 2 TIMES DAILY
Qty: 20 TABLET | Refills: 0 | Status: SHIPPED | OUTPATIENT
Start: 2025-01-08

## 2025-01-08 NOTE — DISCHARGE INSTRUCTIONS
Dear Asmita,    You were seen at the [facility] department for headache.  While you were here we gave you medications for your migraine and we gave you calcium because you are found to be hypocalcemic.    If you have any concerning symptoms such as sudden thunderclap headache, neurological symptoms such as dizziness, or loss of consciousness, please come back to the emergency room.    Thank you for trusting us with your care.

## 2025-01-08 NOTE — ED PROVIDER NOTES
Time reflects when diagnosis was documented in both MDM as applicable and the Disposition within this note       Time User Action Codes Description Comment    1/7/2025 10:38 PM Inocente Irwin [K52.9] Gastroenteritis     1/7/2025 10:39 PM Inocente Irwin [E83.51] Hypocalcemia     1/7/2025 10:39 PM Inocente Irwin [G43.909] Migraine           ED Disposition       None          Assessment & Plan       Medical Decision Making  Patient a 44-year-old female presenting here today due to having diarrhea and migraines.     Differentials that I have for this patient include gastroenteritis, dehydration, angina, and electrolyte abnormalities.    For this patient I ordered a CBC, CMP, troponin, lipase, and a urine pregnancy test.  I also ordered a EKG.   I ordered Benadryl, Toradol, magnesium, Reglan, and a 1 L fluid bolus.    Patient signed out to Dr. Huber Nice.  Dispo pending improvement of migraine.    Amount and/or Complexity of Data Reviewed  Labs: ordered. Decision-making details documented in ED Course.  ECG/medicine tests:  Decision-making details documented in ED Course.    Risk  Prescription drug management.        ED Course as of 01/07/25 2336 Tue Jan 07, 2025 2103 Comprehensive metabolic panel(!)  Due to her low calcium, we have ordered calcium gluconate.   2103 CBC and differential  Reassuring CBC   2104 Lipase  Reassuring lipase   2138 HS Troponin 0hr (reflex protocol)  Normal troponin   2139 ECG 12 lead  73 bpm, sinus rhythm, normal axis, no ST segment elevations       Medications   valproate (DEPACON) 500 mg in sodium chloride 0.9 % 50 mL BOLUS (500 mg Intravenous New Bag 1/7/25 2328)   ketorolac (TORADOL) injection 30 mg (30 mg Intravenous Given 1/7/25 2035)   metoclopramide (REGLAN) injection 10 mg (10 mg Intravenous Given 1/7/25 2034)   diphenhydrAMINE (BENADRYL) injection 25 mg (25 mg Intravenous Given 1/7/25 2035)   magnesium sulfate 2 g/50 mL IVPB (premix) 2 g (0 g Intravenous Stopped  1/7/25 2311)   sodium chloride 0.9 % bolus 1,000 mL (0 mL Intravenous Stopped 1/7/25 2311)   calcium gluconate 2 g in sodium chloride 0.9% 100 mL (premix) (0 g Intravenous Stopped 1/7/25 2205)       ED Risk Strat Scores                                              History of Present Illness       Chief Complaint   Patient presents with    Diarrhea     C/o n/v/d since Sunday, and now having headaches along with symptoms continuing.        Past Medical History:   Diagnosis Date    Allergic     Anxiety     Cancer (HCC) 2/1/2023    Thyroid- Papillary carcinoma    COVID-19 01/2022    mild s/s-not hospitalized, not vaccinated    GERD (gastroesophageal reflux disease)     Headache(784.0)     History of IBS     with constipation    Ingrown toenail     Irritable bowel syndrome     Migraines     Chronic per pt    Plantar fasciitis     Thyroid carcinoma (HCC)     Varicella     Venereal wart 08/07/2014      Past Surgical History:   Procedure Laterality Date    CERVICAL BIOPSY  W/ LOOP ELECTRODE EXCISION  2017    COLONOSCOPY      MRI BREAST BIOPSY RIGHT (ALL INCLUSIVE) Right 7/26/2024    NASAL SEPTUM SURGERY      MO OSTEOT W/WO LNGTH SHRT/CORRJ 1ST METAR Left 12/30/2022    Procedure: OSTEOTOMY METATARSAL 1st;  Surgeon: Taiwo Harrington DPM;  Location: [facility];  Service: Podiatry    THYROIDECTOMY N/A 2/24/2023    Procedure: TOTAL THYROIDECTOMY;  Surgeon: Vince Carey MD;  [facility];  Service: Surgical Oncology    TONSILLECTOMY      UPPER GASTROINTESTINAL ENDOSCOPY      US GUIDED THYROID BIOPSY  1/31/2023    WISDOM TOOTH EXTRACTION        Family History   Problem Relation Age of Onset    Hypertension Mother     Arthritis Mother     Thyroid disease Mother     Osteoarthritis Mother     Transient ischemic attack Father     Hypertension Father     Stroke Father     Vision loss Father     Rashes / Skin problems Sister         Shingles    Migraines Sister     No Known Problems Sister     Uterine cancer Maternal  Grandmother 20    Cancer Maternal Grandmother         Uterine    Colon cancer Maternal Grandfather 60    Cancer Maternal Grandfather         Colon    Lung cancer Paternal Grandmother 60    Cancer Paternal Grandmother         Lung    Heart attack Paternal Grandfather     Colon cancer Cousin 30    Breast cancer Maternal Aunt     Breast cancer Maternal Aunt     Breast cancer Maternal Aunt       Social History     Tobacco Use    Smoking status: Never    Smokeless tobacco: Never   Vaping Use    Vaping status: Never Used   Substance Use Topics    Alcohol use: Yes     Alcohol/week: 2.0 standard drinks of alcohol     Types: 2 Glasses of wine per week     Comment: 2 glasses wine a week    Drug use: Never      E-Cigarette/Vaping    E-Cigarette Use Never User       E-Cigarette/Vaping Substances    Nicotine No     THC No     CBD No     Flavoring No     Other No     Unknown No       I have reviewed and agree with the history as documented.     Patient is a 44 year old female with a past medical history of IBS and migraines who presents here due to a migraine.  Patient says that she started having diarrhea on Sunday along with low-grade fevers in the low 100s.  Patient states that her diarrhea and fever got better yesterday however towards the evening, got worse again.  Patient was having a migraine since yesterday morning for which she tried taking her Nurtec and rizatriptan which helped her migraine however the migraine came back today.  Patient is feeling nauseous, she says she has some numbness and tingling in her fingers, toes, and her lips.  Patient says this happens when she gets hypocalcemic.      Diarrhea  Associated symptoms: fever and headaches    Associated symptoms: no abdominal pain, no arthralgias, no chills and no vomiting        Review of Systems   Constitutional:  Positive for fever. Negative for chills.   HENT:  Negative for ear pain and sore throat.    Eyes:  Negative for pain and visual disturbance.    Respiratory:  Negative for cough and shortness of breath.    Cardiovascular:  Negative for chest pain and palpitations.   Gastrointestinal:  Positive for diarrhea and nausea. Negative for abdominal pain and vomiting.   Genitourinary:  Negative for dysuria and hematuria.   Musculoskeletal:  Negative for arthralgias and back pain.   Skin:  Negative for color change and rash.   Neurological:  Positive for numbness and headaches. Negative for seizures and syncope.   All other systems reviewed and are negative.          Objective       ED Triage Vitals   Temperature Pulse Blood Pressure Respirations SpO2 Patient Position - Orthostatic VS   01/07/25 1540 01/07/25 1540 01/07/25 1540 01/07/25 1540 01/07/25 1540 01/07/25 2215   97.5 °F (36.4 °C) 84 121/78 18 100 % Lying      Temp Source Heart Rate Source BP Location FiO2 (%) Pain Score    01/07/25 1540 01/07/25 2215 01/07/25 2215 -- 01/07/25 1540    Temporal Monitor Left arm  6      Vitals      Date and Time Temp Pulse SpO2 Resp BP Pain Score FACES Pain Rating User   01/07/25 2215 -- 68 100 % 14 116/77 -- -- PIHLLIP   01/07/25 1540 97.5 °F (36.4 °C) 84 100 % 18 121/78 6 -- CS            Physical Exam  Vitals and nursing note reviewed.   Constitutional:       General: She is not in acute distress.     Appearance: She is well-developed.   HENT:      Head: Normocephalic and atraumatic.      Mouth/Throat:      Mouth: Mucous membranes are dry.   Eyes:      Conjunctiva/sclera: Conjunctivae normal.   Cardiovascular:      Rate and Rhythm: Normal rate and regular rhythm.      Heart sounds: No murmur heard.  Pulmonary:      Effort: Pulmonary effort is normal. No respiratory distress.      Breath sounds: Normal breath sounds.   Abdominal:      Palpations: Abdomen is soft.      Tenderness: There is abdominal tenderness (Throughout abdomen - mild).   Musculoskeletal:         General: No swelling.      Cervical back: Neck supple.   Skin:     General: Skin is warm and dry.      Capillary  Refill: Capillary refill takes less than 2 seconds.   Neurological:      General: No focal deficit present.      Mental Status: She is alert and oriented to person, place, and time.   Psychiatric:         Mood and Affect: Mood normal.         Results Reviewed       Procedure Component Value Units Date/Time    hCG, quantitative [[id number]]  (Normal) Collected: 01/07/25 2307    Lab Status: Final result Specimen: Blood Updated: 01/07/25 2327     HCG, Quant <0.6 mIU/mL     Narrative:       Expected Ranges:    HCG results between 5.0 and 25.0 mIU/mL may be indicative of early pregnancy but should be interpreted in light of the total clinical presentation.    HCG can rise to detectable levels in adam and post menopausal women (0-11.6 mIU/mL).     Approximate               Approximate HCG  Gestation age          Concentration ( mIU/mL)  _____________          ______________________   Weeks                      HCG values  0.2-1                       5-50  1-2                           2-3                         100-5000  3-4                         500-16738  4-5                         1000-40102  5-6                         [id number]  6-8                         [id number]  8-12                        [id number]      HS Troponin I 4hr [[id number]]     Lab Status: No result Specimen: Blood     HS Troponin 0hr (reflex protocol) [[id number]]  (Normal) Collected: 01/07/25 2036    Lab Status: Final result Specimen: Blood from Arm, Right Updated: 01/07/25 2103     hs TnI 0hr <2 ng/L     HS Troponin I 2hr [[id number]]     Lab Status: No result Specimen: Blood     Comprehensive metabolic panel [[id number]]  (Abnormal) Collected: 01/07/25 1543    Lab Status: Final result Specimen: Blood from Arm, Left Updated: 01/07/25 1621     Sodium 138 mmol/L      Potassium 3.1 mmol/L      Chloride 102 mmol/L      CO2 30 mmol/L      ANION GAP 6 mmol/L      BUN 10 mg/dL      Creatinine 0.59 mg/dL      Glucose 91 mg/dL      Calcium 7.1  mg/dL      AST 17 U/L      ALT 14 U/L      Alkaline Phosphatase 32 U/L      Total Protein 6.6 g/dL      Albumin 3.9 g/dL      Total Bilirubin 0.19 mg/dL      eGFR 113 ml/min/1.73sq m     Narrative:      National Kid[employer] Disease Foundation guidelines for Chronic Kidney Disease (CKD):     Stage 1 with normal or high GFR (GFR > 90 mL/min/1.73 square meters)    Stage 2 Mild CKD (GFR = 60-89 mL/min/1.73 square meters)    Stage 3A Moderate CKD (GFR = 45-59 mL/min/1.73 square meters)    Stage 3B Moderate CKD (GFR = 30-44 mL/min/1.73 square meters)    Stage 4 Severe CKD (GFR = 15-29 mL/min/1.73 square meters)    Stage 5 End Stage CKD (GFR <15 mL/min/1.73 square meters)  Note: GFR calculation is accurate only with a steady state creatinine    Lipase [[id number]]  (Normal) Collected: 01/07/25 1543    Lab Status: Final result Specimen: Blood from Arm, Left Updated: 01/07/25 1621     Lipase 42 u/L     CBC and differential [[id number]] Collected: 01/07/25 1543    Lab Status: Final result Specimen: Blood from Arm, Left Updated: 01/07/25 1601     WBC 4.33 Thousand/uL      RBC 3.89 Million/uL      Hemoglobin 12.7 g/dL      Hematocrit 37.2 %      MCV 96 fL      MCH 32.6 pg      MCHC 34.1 g/dL      RDW 12.3 %      MPV 9.8 fL      Platelets 223 Thousands/uL      nRBC 0 /100 WBCs      Segmented % 65 %      Immature Grans % 0 %      Lymphocytes % 23 %      Monocytes % 10 %      Eosinophils Relative 1 %      Basophils Relative 1 %      Absolute Neutrophils 2.82 Thousands/µL      Absolute Immature Grans 0.01 Thousand/uL      Absolute Lymphocytes 0.98 Thousands/µL      Absolute Monocytes 0.42 Thousand/µL      Eosinophils Absolute 0.06 Thousand/µL      Basophils Absolute 0.04 Thousands/µL             No orders to display       Procedures    ED Medication and Procedure Management   Prior to Admission Medications   Prescriptions Last Dose Informant Patient Reported? Taking?   B Complex Vitamins (B-Complex/B-12) TABS  Self Yes No   Sig: Take 1  tablet by mouth   Galcanezumab-gnlm (Emgality) 120 MG/ML SOAJ  Self No No   Sig: INJECT 120MG SUBCUTANEOUSLY (UNDER THE SKIN) EVERY 28 DAYS   Levocetirizine Dihydrochloride (XYZAL PO)  Self Yes No   Sig: Take by mouth daily at bedtime   Melatonin 5 MG TABS  Self Yes No   Sig: Take by mouth as needed   Multiple Vitamin (multivitamin) tablet  Self Yes No   Sig: Take 1 tablet by mouth daily   acetaminophen (TYLENOL) 500 mg tablet  Self Yes No   Sig: Take 1,000 mg by mouth every 6 (six) hours as needed for mild pain   bisacodyl (DULCOLAX) 5 mg EC tablet  Self No No   Sig: Take 2 tablets (10 mg total) by mouth 2 (two) times a day as needed for constipation   calcitriol (ROCALTROL) 0.25 mcg capsule  Self No No   Sig: TAKE ONE CAPSULE BY MOUTH 2 TIMES A DAY   calcium citrate (CALCITRATE) 950 (200 Ca) MG tablet  Self No No   Sig: Take 1 tablet (950 mg total) by mouth 2 (two) times a day   cholecalciferol (VITAMIN D3) 1,000 units tablet  Self No No   Sig: Take 1 tablet (1,000 Units total) by mouth daily Do not start before 2023.   clindamycin (CLEOCIN T) 1 % lotion  Self Yes No   Sig: in the morning   dexamethasone (DECADRON) 4 mg tablet   No No   Si mg  p.o. with breakfast for 1 to 7 days for unrelenting migraine   hydrocortisone (ANUSOL-HC) 2.5 % rectal cream  Self No No   Sig: Apply topically 2 (two) times a day   levothyroxine 125 mcg tablet  Self No No   Sig: Take 1 tablet (125 mcg total) by mouth daily   linaCLOtide (Linzess) 72 MCG CAPS  Self No No   Sig: Take 72 mcg by mouth in the morning   lubiprostone (AMITIZA) 24 mcg capsule  Self No No   Sig: Take 1 capsule (24 mcg total) by mouth 2 (two) times a day with meals   meloxicam (MOBIC) 15 mg tablet  Self Yes No   Sig: Take 15 mg by mouth if needed   omeprazole (PriLOSEC) 40 MG capsule  Self No No   Sig: Take 1 capsule (40 mg total) by mouth daily   ondansetron (ZOFRAN) 4 mg tablet   No No   Sig: Take 1 tablet (4 mg total) by mouth every 8 (eight) hours  as needed for nausea or vomiting   rimegepant sulfate (Nurtec) 75 mg TBDP  Self No No   Sig: Take one NURTEC 75 mg at onset under tongue. Limit 1 in 24 hours.   rizatriptan (MAXALT) 10 mg tablet  Self No No   Sig: Take 1 tablet (10 mg total) by mouth once as needed for migraine May repeat in 2 hours if needed. Max 2/24 hours, 9/month.   zolpidem (AMBIEN) 10 mg tablet  Self Yes No   Sig: Take 10 mg by mouth daily at bedtime as needed for sleep      Facility-Administered Medications: None     Patient's Medications   Discharge Prescriptions    No medications on file     No discharge procedures on file.  ED SEPSIS DOCUMENTATION   Time reflects when diagnosis was documented in both MDM as applicable and the Disposition within this note       Time User Action Codes Description Comment    1/7/2025 10:38 PM Inocente Irwin [K52.9] Gastroenteritis     1/7/2025 10:39 PM Inocente Irwin [E83.51] Hypocalcemia     1/7/2025 10:39 PM Inocente Irwin [G43.909] Migraine                  Inocente Irwin MD  01/07/25 7945

## 2025-01-09 LAB
ATRIAL RATE: 73 BPM
P AXIS: 48 DEGREES
PR INTERVAL: 160 MS
QRS AXIS: 79 DEGREES
QRSD INTERVAL: 84 MS
QT INTERVAL: 370 MS
QTC INTERVAL: 407 MS
T WAVE AXIS: -10 DEGREES
VENTRICULAR RATE: 73 BPM

## 2025-01-09 PROCEDURE — 93010 ELECTROCARDIOGRAM REPORT: CPT | Performed by: INTERNAL MEDICINE

## 2025-01-09 NOTE — ED ATTENDING ATTESTATION
1/7/2025  I, Vineet Almanza MD, saw and evaluated the patient. I have discussed the patient with the resident/non-physician practitioner and agree with the resident's/non-physician practitioner's findings, Plan of Care, and MDM as documented in the resident's/non-physician practitioner's note, except where noted. All available labs and Radiology studies were reviewed.  I was present for key portions of any procedure(s) performed by the resident/non-physician practitioner and I was immediately available to provide assistance.       At this point I agree with the current assessment done in the Emergency Department.  I have conducted an independent evaluation of this patient a history and physical is as follows:    ED Course     Impression: Diarrhea, headache, history of migraines,    Differential diagnosis: Viral syndrome, viral gastroenteritis, dehydration, electrolyte abnormality, primary headache disorder, migraine    Plan check labs CBC CMP troponin lipase urine pregnancy ECG multimodal analgesia replete lecture lites as indicated.  Reassess        Critical Care Time  Procedures

## 2025-01-10 ENCOUNTER — HOSPITAL ENCOUNTER (OUTPATIENT)
Dept: INFUSION CENTER | Facility: HOSPITAL | Age: 45
Discharge: HOME | End: 2025-01-10
Attending: INTERNAL MEDICINE

## 2025-01-10 ENCOUNTER — HOSPITAL ENCOUNTER (OUTPATIENT)
Dept: INFUSION CENTER | Facility: HOSPITAL | Age: 45
End: 2025-01-10
Attending: INTERNAL MEDICINE
Payer: COMMERCIAL

## 2025-01-10 VITALS
SYSTOLIC BLOOD PRESSURE: 119 MMHG | DIASTOLIC BLOOD PRESSURE: 77 MMHG | RESPIRATION RATE: 18 BRPM | TEMPERATURE: 97.7 F | HEART RATE: 74 BPM

## 2025-01-10 DIAGNOSIS — R79.0 LOW FERRITIN: ICD-10-CM

## 2025-01-10 DIAGNOSIS — E61.1 IRON DEFICIENCY: Primary | ICD-10-CM

## 2025-01-10 PROCEDURE — 96365 THER/PROPH/DIAG IV INF INIT: CPT

## 2025-01-10 RX ORDER — SODIUM CHLORIDE 9 MG/ML
20 INJECTION, SOLUTION INTRAVENOUS ONCE
Status: CANCELLED | OUTPATIENT
Start: 2025-01-17

## 2025-01-10 RX ORDER — SODIUM CHLORIDE 9 MG/ML
20 INJECTION, SOLUTION INTRAVENOUS ONCE
Status: COMPLETED | OUTPATIENT
Start: 2025-01-10 | End: 2025-01-10

## 2025-01-10 RX ADMIN — IRON SUCROSE 200 MG: 20 INJECTION, SOLUTION INTRAVENOUS at 14:16

## 2025-01-10 RX ADMIN — SODIUM CHLORIDE 20 ML/HR: 0.9 INJECTION, SOLUTION INTRAVENOUS at 14:17

## 2025-01-13 DIAGNOSIS — E83.51 HYPOCALCEMIA: Primary | ICD-10-CM

## 2025-01-17 ENCOUNTER — TELEPHONE (OUTPATIENT)
Age: 45
End: 2025-01-17

## 2025-01-17 ENCOUNTER — HOSPITAL ENCOUNTER (OUTPATIENT)
Dept: INFUSION CENTER | Facility: HOSPITAL | Age: 45
End: 2025-01-17
Attending: INTERNAL MEDICINE
Payer: COMMERCIAL

## 2025-01-17 ENCOUNTER — PROCEDURE VISIT (OUTPATIENT)
Dept: NEUROLOGY | Facility: CLINIC | Age: 45
End: 2025-01-17
Payer: COMMERCIAL

## 2025-01-17 ENCOUNTER — HOSPITAL ENCOUNTER (OUTPATIENT)
Dept: RADIOLOGY | Facility: HOSPITAL | Age: 45
Discharge: HOME | End: 2025-01-17
Attending: ANESTHESIOLOGY
Payer: COMMERCIAL

## 2025-01-17 VITALS — HEART RATE: 80 BPM | SYSTOLIC BLOOD PRESSURE: 114 MMHG | DIASTOLIC BLOOD PRESSURE: 67 MMHG | TEMPERATURE: 98.1 F

## 2025-01-17 VITALS
TEMPERATURE: 98.2 F | RESPIRATION RATE: 18 BRPM | HEART RATE: 80 BPM | DIASTOLIC BLOOD PRESSURE: 78 MMHG | SYSTOLIC BLOOD PRESSURE: 119 MMHG

## 2025-01-17 DIAGNOSIS — E61.1 IRON DEFICIENCY: Primary | ICD-10-CM

## 2025-01-17 DIAGNOSIS — R79.0 LOW FERRITIN: ICD-10-CM

## 2025-01-17 DIAGNOSIS — G43.709 CHRONIC MIGRAINE WITHOUT AURA WITHOUT STATUS MIGRAINOSUS, NOT INTRACTABLE: Primary | ICD-10-CM

## 2025-01-17 DIAGNOSIS — M54.17 LUMBOSACRAL RADICULOPATHY: ICD-10-CM

## 2025-01-17 DIAGNOSIS — G89.29 CHRONIC LEFT SHOULDER PAIN: ICD-10-CM

## 2025-01-17 DIAGNOSIS — M25.512 CHRONIC LEFT SHOULDER PAIN: ICD-10-CM

## 2025-01-17 PROCEDURE — 64615 CHEMODENERV MUSC MIGRAINE: CPT | Performed by: PHYSICIAN ASSISTANT

## 2025-01-17 PROCEDURE — 73221 MRI JOINT UPR EXTREM W/O DYE: CPT

## 2025-01-17 PROCEDURE — 72195 MRI PELVIS W/O DYE: CPT

## 2025-01-17 PROCEDURE — 96365 THER/PROPH/DIAG IV INF INIT: CPT

## 2025-01-17 RX ORDER — SODIUM CHLORIDE 9 MG/ML
20 INJECTION, SOLUTION INTRAVENOUS ONCE
Status: COMPLETED | OUTPATIENT
Start: 2025-01-17 | End: 2025-01-17

## 2025-01-17 RX ORDER — SODIUM CHLORIDE 9 MG/ML
20 INJECTION, SOLUTION INTRAVENOUS ONCE
Status: CANCELLED | OUTPATIENT
Start: 2025-01-31

## 2025-01-17 RX ADMIN — IRON SUCROSE 200 MG: 20 INJECTION, SOLUTION INTRAVENOUS at 14:36

## 2025-01-17 RX ADMIN — SODIUM CHLORIDE 20 ML/HR: 0.9 INJECTION, SOLUTION INTRAVENOUS at 14:36

## 2025-01-17 NOTE — PROGRESS NOTES
"Universal Protocol   procedure performed by consultantConsent: Verbal consent obtained. Written consent obtained.  Risks and benefits: risks, benefits and alternatives were discussed  Consent given by: patient  Time out: Immediately prior to procedure a \"time out\" was called to verify the correct patient, procedure, equipment, support staff and site/side marked as required.  Patient understanding: patient states understanding of the procedure being performed  Patient consent: the patient's understanding of the procedure matches consent given  Procedure consent: procedure consent matches procedure scheduled  Relevant documents: relevant documents present and verified  Patient identity confirmed: verbally with patient      Chemodenervation     Date/Time  1/17/2025 8:30 AM     Performed by  Christine Serrato PA-C   Authorized by  Christine Serrato PA-C     Pre-procedure details      Preparation: Patient was prepped and draped in usual sterile fashion     Anesthesia  (see MAR for exact dosages):     Anesthesia method:  None   Procedure details      Position:  Upright   Botox      Botox Type:  Type A    Brand:  Botox    mL's of Botulinum Toxin:  200    mL's of preservative free sterile saline:  4    Final Concentration per CC:  50 units    Needle Gauge:  30 G 2.5 inch   Procedures      Botox Procedures: chronic headache     Injection Location      Head / Face:  L superior cervical paraspinal, R superior cervical paraspinal, R frontalis, L frontalis, R medial occipitalis, L medial occipitalis, procerus, R temporalis, L superior trapezius, R superior trapezius and L temporalis    L lateral frontalis:  5 unit(s)    R lateral frontalis:  5 unit(s)    L medial frontalis:  5 unit(s)    R medial frontalis:  5 unit(s)    L temporalis injection amount:  20 unit(s)    R temporalis injection amount:  20 unit(s)    Procerus injection amount:  5 unit(s)    L medial occipitalis injection amount:  15 unit(s)    R medial occipitalis injection " amount:  15 unit(s)    L superior cervical paraspinal injection amount:  10 unit(s)    R superior cervical paraspinal injection amount:  10 unit(s)    L superior trapezius injection amount:  15 unit(s)    R superior trapezius injection amount:  15 unit(s)   Total Units      Total units used:  200   Post-procedure details      Chemodenervation:  Chronic migraine    Facial Nerve Location::  Bilateral facial nerve    Patient tolerance of procedure:  Tolerated well, no immediate complications   Comments       55 units left temporalis, parietal and occipitalis  All medically necessary

## 2025-01-17 NOTE — TELEPHONE ENCOUNTER
Patient calling to see if calcium order was put in. I let her know that it was. No further questions at this time.

## 2025-01-17 NOTE — PROGRESS NOTES
Asmita ESPINOZA Bem  tolerated treatment well with no complications.      Asmita ESPINOZA Bem is aware of future appt on 01/31/2025 at 2:30 pm.     Venofer given without incident. Patient declined printed AVS. Patient to return to department as scheduled.

## 2025-01-20 DIAGNOSIS — K58.1 IRRITABLE BOWEL SYNDROME WITH CONSTIPATION: ICD-10-CM

## 2025-01-20 DIAGNOSIS — K59.04 CHRONIC IDIOPATHIC CONSTIPATION: ICD-10-CM

## 2025-01-20 RX ORDER — LUBIPROSTONE 24 UG/1
24 CAPSULE ORAL 2 TIMES DAILY WITH MEALS
Qty: 60 CAPSULE | Refills: 5 | Status: SHIPPED | OUTPATIENT
Start: 2025-01-20

## 2025-01-24 ENCOUNTER — HOSPITAL ENCOUNTER (OUTPATIENT)
Dept: RADIOLOGY | Facility: IMAGING CENTER | Age: 45
End: 2025-01-24
Payer: COMMERCIAL

## 2025-01-24 ENCOUNTER — TELEPHONE (OUTPATIENT)
Age: 45
End: 2025-01-24

## 2025-01-24 VITALS — HEIGHT: 63 IN | WEIGHT: 148 LBS | BODY MASS INDEX: 26.22 KG/M2

## 2025-01-24 DIAGNOSIS — Z12.31 SCREENING MAMMOGRAM FOR BREAST CANCER: ICD-10-CM

## 2025-01-24 PROCEDURE — 77067 SCR MAMMO BI INCL CAD: CPT

## 2025-01-24 PROCEDURE — 77063 BREAST TOMOSYNTHESIS BI: CPT

## 2025-01-24 NOTE — TELEPHONE ENCOUNTER
Patient states that she went to  her medication and she is being told it is not covered anymore or she needs a prior auth.

## 2025-01-27 NOTE — TELEPHONE ENCOUNTER
Asmita Bem (Key: [id number])  Need Help? Call us at [phone]  Outcome  Additional Information Required  The [employer] Prior Authorization Team is unable to review this request for prior authorization as the requested medication is on formulary and does not require a prior authorization. No further action is needed at this time.  Drug  Lubiprostone 24MCG capsules    Form  [employer] Electronic Prior Authorization Form (2017 NCPDP)

## 2025-01-28 ENCOUNTER — TELEPHONE (OUTPATIENT)
Dept: NEUROLOGY | Facility: CLINIC | Age: 45
End: 2025-01-28

## 2025-01-28 NOTE — TELEPHONE ENCOUNTER
[facility] initiated on [facility]. [place] [id number]  PA Case: [id number], Status: Approved, Coverage Starts on: 1/28/2025 12:00 AM, Coverage Ends on: 1/28/2026 12:00 AM. Questions? Contact [phone].     Called [facility] pharmacy and left a message on their answering machine making them aware of the approval and for a call back if any questions.

## 2025-01-29 ENCOUNTER — RESULTS FOLLOW-UP (OUTPATIENT)
Dept: ENDOCRINOLOGY | Facility: CLINIC | Age: 45
End: 2025-01-29

## 2025-01-29 ENCOUNTER — APPOINTMENT (OUTPATIENT)
Dept: LAB | Facility: HOSPITAL | Age: 45
End: 2025-01-29
Payer: COMMERCIAL

## 2025-01-29 DIAGNOSIS — E61.1 IRON DEFICIENCY: Primary | ICD-10-CM

## 2025-01-29 DIAGNOSIS — R79.0 LOW FERRITIN: ICD-10-CM

## 2025-01-29 LAB
ALBUMIN SERPL BCG-MCNC: 4.5 G/DL (ref 3.5–5)
CA-I BLD-SCNC: 1.13 MMOL/L (ref 1.12–1.32)
CALCIUM SERPL-MCNC: 8.7 MG/DL (ref 8.4–10.2)
MAGNESIUM SERPL-MCNC: 2.2 MG/DL (ref 1.9–2.7)
PTH-INTACT SERPL-MCNC: 9.9 PG/ML (ref 12–88)

## 2025-01-29 RX ORDER — SODIUM CHLORIDE 9 MG/ML
20 INJECTION, SOLUTION INTRAVENOUS ONCE
Status: CANCELLED | OUTPATIENT
Start: 2025-01-31

## 2025-01-31 ENCOUNTER — HOSPITAL ENCOUNTER (OUTPATIENT)
Dept: INFUSION CENTER | Facility: HOSPITAL | Age: 45
End: 2025-01-31
Attending: INTERNAL MEDICINE
Payer: COMMERCIAL

## 2025-01-31 VITALS
RESPIRATION RATE: 18 BRPM | TEMPERATURE: 97.9 F | SYSTOLIC BLOOD PRESSURE: 121 MMHG | DIASTOLIC BLOOD PRESSURE: 78 MMHG | HEART RATE: 82 BPM

## 2025-01-31 DIAGNOSIS — E61.1 IRON DEFICIENCY: ICD-10-CM

## 2025-01-31 DIAGNOSIS — R79.0 LOW FERRITIN: Primary | ICD-10-CM

## 2025-01-31 PROCEDURE — 96365 THER/PROPH/DIAG IV INF INIT: CPT

## 2025-01-31 RX ORDER — SODIUM CHLORIDE 9 MG/ML
20 INJECTION, SOLUTION INTRAVENOUS ONCE
Status: COMPLETED | OUTPATIENT
Start: 2025-01-31 | End: 2025-01-31

## 2025-01-31 RX ORDER — SODIUM CHLORIDE 9 MG/ML
20 INJECTION, SOLUTION INTRAVENOUS ONCE
Status: CANCELLED | OUTPATIENT
Start: 2025-01-31

## 2025-01-31 RX ADMIN — IRON SUCROSE 200 MG: 20 INJECTION, SOLUTION INTRAVENOUS at 14:41

## 2025-01-31 RX ADMIN — SODIUM CHLORIDE 20 ML/HR: 0.9 INJECTION, SOLUTION INTRAVENOUS at 14:40

## 2025-01-31 NOTE — PROGRESS NOTES
Asmita ESPINOZA Bem  tolerated venofer with no complications.      Last scheduled appointment in infusion at this time.     AVS printed and given to Asmita ESPINOZA Bem:  No (Declined by Asmita ESPINOZA Bem)

## 2025-02-12 DIAGNOSIS — K21.9 GASTROESOPHAGEAL REFLUX DISEASE WITHOUT ESOPHAGITIS: ICD-10-CM

## 2025-02-12 RX ORDER — OMEPRAZOLE 40 MG/1
40 CAPSULE, DELAYED RELEASE ORAL DAILY
Qty: 90 CAPSULE | Refills: 0 | OUTPATIENT
Start: 2025-02-12

## 2025-02-14 ENCOUNTER — OFFICE VISIT (OUTPATIENT)
Dept: PAIN MEDICINE | Facility: CLINIC | Age: 45
End: 2025-02-14
Payer: COMMERCIAL

## 2025-02-14 DIAGNOSIS — M79.18 MYOFASCIAL PAIN SYNDROME: ICD-10-CM

## 2025-02-14 DIAGNOSIS — M54.17 LUMBOSACRAL RADICULOPATHY: ICD-10-CM

## 2025-02-14 DIAGNOSIS — G89.29 CHRONIC LEFT SHOULDER PAIN: ICD-10-CM

## 2025-02-14 DIAGNOSIS — M75.52 BURSITIS OF LEFT SHOULDER: ICD-10-CM

## 2025-02-14 DIAGNOSIS — M75.52 SUBACROMIAL BURSITIS OF LEFT SHOULDER JOINT: Primary | ICD-10-CM

## 2025-02-14 DIAGNOSIS — K21.9 GASTROESOPHAGEAL REFLUX DISEASE WITHOUT ESOPHAGITIS: ICD-10-CM

## 2025-02-14 DIAGNOSIS — M25.512 CHRONIC LEFT SHOULDER PAIN: ICD-10-CM

## 2025-02-14 PROCEDURE — 99214 OFFICE O/P EST MOD 30 MIN: CPT | Performed by: NURSE PRACTITIONER

## 2025-02-14 RX ORDER — MELOXICAM 15 MG/1
15 TABLET ORAL AS NEEDED
Qty: 30 TABLET | Refills: 1 | Status: SHIPPED | OUTPATIENT
Start: 2025-02-14

## 2025-02-14 NOTE — PROGRESS NOTES
Assessment:  1. Subacromial bursitis of left shoulder joint    2. Bursitis of left shoulder    3. Lumbosacral radiculopathy    4. Myofascial pain syndrome    5. Chronic left shoulder pain        Plan:  Patient will be scheduled for a left subacromial bursa injection under ultrasound guidance.  The use of direct ultrasound visualization of the needle (rather than a non-guided injection) is required for this procedure to ensure accurate injection placement so there can be diagnostic specificity when evaluating effectiveness of the injection, and for safety purposes to minimize risk of bleeding or injury to surrounding structures.  Patient may continue meloxicam as prescribed.  This medication was refilled today  We can repeat trigger point injection should patient's thoracic and lumbar pain worsen in the future  Continue with home exercise program  Patient may continue Tylenol as needed  Follow-up pending results of procedure or sooner if needed    History of Present Illness:    The patient is a 44 y.o. female last seen on 12/13/24 who presents for a follow up office visit in regards to chronic left shoulder pain secondary to subacromial bursitis and low back pain with intermittent radiation and neuropathic symptoms into the lower extremities.  Patient did have a left subacromial bursa injection in March 2024 with 80% relief of her pain for about 7 months.  She also had trigger point injections into the rhomboid, thoracic and lumbar musculature in October 2024 with ongoing 80% relief.  Patient would be interested in repeating a shoulder injection at this time.    MRI of the left shoulder demonstrates mild supraspinatus tendinosis.  MRI of the lumbar spine reveals arthritis at L3-4 and L4-5 without any significant central or foraminal stenosis.  EMG of the lower extremities confirms a bilateral S1 radiculopathy.    The patient rates her pain a 4 out of 10 on the numeric pain rating scale.  Pain is intermittent and  described as dull aching and sharp.  She continues to manage her pain with meloxicam 15 mg daily as needed, topical Voltaren gel as needed and Tylenol as needed    I have personally reviewed and/or updated the patient's past medical history, past surgical history, family history, social history, current medications, allergies, and vital signs today.       Review of Systems:    Review of Systems      Past Medical History:   Diagnosis Date    Allergic     Anxiety     Cancer (HCC) 2/1/2023    Thyroid- Papillary carcinoma    COVID-19 01/2022    mild s/s-not hospitalized, not vaccinated    GERD (gastroesophageal reflux disease)     Headache(784.0)     History of IBS     with constipation    Ingrown toenail     Irritable bowel syndrome     Migraines     Chronic per pt    Plantar fasciitis     Thyroid carcinoma (HCC)     Varicella     Venereal wart 08/07/2014       Past Surgical History:   Procedure Laterality Date    CERVICAL BIOPSY  W/ LOOP ELECTRODE EXCISION  2017    COLONOSCOPY      MRI BREAST BIOPSY RIGHT (ALL INCLUSIVE) Right 7/26/2024    NASAL SEPTUM SURGERY      ME OSTEOT W/WO LNGTH SHRT/CORRJ 1ST METAR Left 12/30/2022    Procedure: OSTEOTOMY METATARSAL 1st;  Surgeon: Taiwo Harrington DPM;  Location: [facility];  Service: Podiatry    THYROIDECTOMY N/A 2/24/2023    Procedure: TOTAL THYROIDECTOMY;  Surgeon: Vince Carey MD;  Location: [facility];  Service: Surgical Oncology    TONSILLECTOMY      UPPER GASTROINTESTINAL ENDOSCOPY      US GUIDED THYROID BIOPSY  1/31/2023    WISDOM TOOTH EXTRACTION         Family History   Problem Relation Age of Onset    Hypertension Mother     Arthritis Mother     Thyroid disease Mother     Osteoarthritis Mother     Transient ischemic attack Father     Hypertension Father     Stroke Father     Vision loss Father     No Known Problems Sister     Migraines Sister     Rashes / Skin problems Sister     Uterine cancer Maternal Grandmother 20    Cancer Maternal Grandmother         Uterine     Colon cancer Maternal Grandfather 60    Cancer Maternal Grandfather         Colon    Lung cancer Paternal Grandmother 60    Cancer Paternal Grandmother         Lung    Heart attack Paternal Grandfather     Breast cancer Maternal Aunt     Breast cancer Maternal Aunt     Breast cancer Maternal Aunt     Colon cancer Cousin 30       Social History     Occupational History    Not on file   Tobacco Use    Smoking status: Never    Smokeless tobacco: Never   Vaping Use    Vaping status: Never Used   Substance and Sexual Activity    Alcohol use: Yes     Alcohol/week: 2.0 standard drinks of alcohol     Types: 2 Glasses of wine per week     Comment: 2 glasses wine a week    Drug use: Never    Sexual activity: Not Currently         Current Outpatient Medications:     meloxicam (MOBIC) 15 mg tablet, Take 1 tablet (15 mg total) by mouth if needed for mild pain or moderate pain, Disp: 30 tablet, Rfl: 1    acetaminophen (TYLENOL) 500 mg tablet, Take 1,000 mg by mouth every 6 (six) hours as needed for mild pain, Disp: , Rfl:     B Complex Vitamins (B-Complex/B-12) TABS, Take 1 tablet by mouth, Disp: , Rfl:     bisacodyl (DULCOLAX) 5 mg EC tablet, Take 2 tablets (10 mg total) by mouth 2 (two) times a day as needed for constipation, Disp: 60 tablet, Rfl: 1    calcitriol (ROCALTROL) 0.25 mcg capsule, TAKE ONE CAPSULE BY MOUTH 2 TIMES A DAY, Disp: 180 capsule, Rfl: 1    calcium citrate (CALCITRATE) 950 (200 Ca) MG tablet, Take 1 tablet (950 mg total) by mouth 2 (two) times a day, Disp: 120 tablet, Rfl: 0    cholecalciferol (VITAMIN D3) 1,000 units tablet, Take 1 tablet (1,000 Units total) by mouth daily Do not start before March 1, 2023., Disp: 30 tablet, Rfl: 0    clindamycin (CLEOCIN T) 1 % lotion, in the morning, Disp: , Rfl:     dexamethasone (DECADRON) 4 mg tablet, 4 mg  p.o. with breakfast for 1 to 7 days for unrelenting migraine, Disp: 7 tablet, Rfl: 1    dicyclomine (BENTYL) 20 mg tablet, Take 1 tablet (20 mg total) by mouth  2 (two) times a day, Disp: 20 tablet, Rfl: 0    Galcanezumab-gnlm (Emgality) 120 MG/ML SOAJ, INJECT 120MG SUBCUTANEOUSLY (UNDER THE SKIN) EVERY 28 DAYS, Disp: 1 mL, Rfl: 11    hydrocortisone (ANUSOL-HC) 2.5 % rectal cream, Apply topically 2 (two) times a day, Disp: 28 g, Rfl: 2    Levocetirizine Dihydrochloride (XYZAL PO), Take by mouth daily at bedtime, Disp: , Rfl:     levothyroxine 125 mcg tablet, Take 1 tablet (125 mcg total) by mouth daily, Disp: 90 tablet, Rfl: 1    linaCLOtide (Linzess) 72 MCG CAPS, Take 72 mcg by mouth in the morning, Disp: 90 capsule, Rfl: 2    lubiprostone (AMITIZA) 24 mcg capsule, Take 1 capsule (24 mcg total) by mouth 2 (two) times a day with meals, Disp: 60 capsule, Rfl: 5    Melatonin 5 MG TABS, Take by mouth as needed, Disp: , Rfl:     Multiple Vitamin (multivitamin) tablet, Take 1 tablet by mouth daily, Disp: , Rfl:     omeprazole (PriLOSEC) 40 MG capsule, Take 1 capsule (40 mg total) by mouth daily, Disp: 90 capsule, Rfl: 1    ondansetron (ZOFRAN) 4 mg tablet, Take 1 tablet (4 mg total) by mouth every 8 (eight) hours as needed for nausea or vomiting, Disp: 20 tablet, Rfl: 0    rimegepant sulfate (Nurtec) 75 mg TBDP, Take one NURTEC 75 mg at onset under tongue. Limit 1 in 24 hours., Disp: 16 tablet, Rfl: 11    rizatriptan (MAXALT) 10 mg tablet, Take 1 tablet (10 mg total) by mouth once as needed for migraine May repeat in 2 hours if needed. Max 2/24 hours, 9/month., Disp: 9 tablet, Rfl: 12    zolpidem (AMBIEN) 10 mg tablet, Take 10 mg by mouth daily at bedtime as needed for sleep, Disp: , Rfl:     No Known Allergies    Physical Exam:    [facility] 01/05/2025     Constitutional:normal, well developed, well nourished, alert, in no distress and non-toxic and no overt pain behavior.  Eyes:anicteric  HEENT:grossly intact  Neck:supple, symmetric, trachea midline and no masses   Pulmonary:even and unlabored  Cardiovascular:No edema or pitting edema present  Skin:Normal without rashes or lesions  and well hydrated  Psychiatric:Mood and affect appropriate  Neurologic:Cranial Nerves II-XII grossly intact  Musculoskeletal:normal gait. Tenderness to palpation over left anterior shoulder.       Imaging  No orders to display     Narrative & Impression  MRI LEFT SHOULDER     INDICATION:   M25.512: Pain in left shoulder  G89.29: Other chronic pain. Chronic left shoulder pain.     COMPARISON: Left shoulder plain films from 11/1/2024.     TECHNIQUE:   Multiplanar/multisequence MR of the left shoulder was performed.        FINDINGS:     SUBCUTANEOUS TISSUES: Normal     JOINT EFFUSION: None.     ACROMION PROCESS: Normal.     ROTATOR CUFF:  -Supraspinatus: Mild tendinosis without tear.  -infraspinatus: Intact.  -Subscapularis: Intact.  -Teres minor: Intact.     SUBACROMIAL/SUBDELTOID BURSA: Normal.     LONG HEAD OF BICEPS TENDON: Normal.     GLENOID LABRUM: Intact.     GLENOHUMERAL JOINT: Intact.     ACROMIOCLAVICULAR JOINT:  Normal.     BONES: Normal.     IMPRESSION:     Mild supraspinatus tendinosis without rotator cuff tear.            MRI PELVIS SACRUM,COCCYX, SI JTS WO CONTRAST     INDICATION:   M54.17: Radiculopathy, lumbosacral region. Pain medicine note from 12/13/2024 was reviewed. Patient has lumbosacral back pain radiating into bilateral lower extremities and chronic left shoulder pain.     COMPARISON: Lumbar spine MR from 10/4/2024.     TECHNIQUE:  Multiplanar/multisequence MR of the pelvis to evaluate for sacrum and coccyx pathology was performed.        FINDINGS:     SUBCUTANEOUS TISSUES: Normal     SI JOINTS AND SYMPHYSIS PUBIS:   Intact. No evidence of sacroiliac joint osteoarthritis or inflammatory arthritis.     VISUALIZED LUMBAR SPINE: Please see recent lumbar spine MR from 10/4/2024.     Again seen is a 1 cm Tarlov cyst at the S2 segment level (series 4 image 16.)     BONES:  No fracture or dislocation.  No suspicious marrow abnormality.     MUSCULATURE:  Unremarkable.     PELVIC SOFT TISSUES:    Incidental 4.9 cm right ovarian simple appearing cyst. According to current guidelines (J Am Bisi Radiol 2020; 17:248-254) in this premenopausal woman, this requires no follow-up.     IMPRESSION:     No sacrococcygeal source of pain identified.     Again seen is a 1 cm Tarlov cyst at the S2 segment level (series 4 image 16.  No orders of the defined types were placed in this encounter.

## 2025-02-14 NOTE — TELEPHONE ENCOUNTER
Reason for call: Out of medication  Patient requested refill from PCP medication was denied and patient is requesting to be filled by gastro. Stated last appt 09.2024    [x] Refill   [] Prior Auth  [] Other:     Office:   [] PCP/Provider -   [x] Specialty/Provider - gastro     Medication: omeprazole     Dose/Frequency: 40 mg daily     Quantity: 90    Pharmacy: [facility] on file     Does the patient have enough for 3 days?   [] Yes   [x] No - Send as HP to POD    
Statement Selected

## 2025-02-17 RX ORDER — OMEPRAZOLE 40 MG/1
40 CAPSULE, DELAYED RELEASE ORAL DAILY
Qty: 90 CAPSULE | Refills: 1 | Status: SHIPPED | OUTPATIENT
Start: 2025-02-17

## 2025-03-03 ENCOUNTER — COSMETIC (OUTPATIENT)
Dept: PLASTIC SURGERY | Facility: CLINIC | Age: 45
End: 2025-03-03

## 2025-03-03 DIAGNOSIS — Z41.1 ENCOUNTER FOR COSMETIC PROCEDURE: Primary | ICD-10-CM

## 2025-03-03 PROCEDURE — BOTOX2 TWO AREAS OR 50 UNITS: Performed by: STUDENT IN AN ORGANIZED HEALTH CARE EDUCATION/TRAINING PROGRAM

## 2025-03-03 PROCEDURE — TOXIN NEUROMODULATOR PER UNIT (BOTOX, DYSPORT, JEUVEAU, XEOMIN, DAXXIFY): Performed by: STUDENT IN AN ORGANIZED HEALTH CARE EDUCATION/TRAINING PROGRAM

## 2025-03-03 NOTE — PROGRESS NOTES
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 Particular areas of concern: frontalis rhytids both lateral and centrally.     She received glabellar botox for migrains and does not need that area to be treated.     Will proceed with 28 units of botox     Risks and benefits discussed, patient agreed to proceed.     10 units to each crows feet  4 units to central frontalis  2 units to each lateral frontalis     Total 28 units, 10% off employee pricing     Patient tolerated well, f/u in 3 months

## 2025-03-13 ENCOUNTER — PROCEDURE VISIT (OUTPATIENT)
Dept: PAIN MEDICINE | Facility: CLINIC | Age: 45
End: 2025-03-13
Payer: COMMERCIAL

## 2025-03-13 VITALS — WEIGHT: 149 LBS | BODY MASS INDEX: 26.4 KG/M2 | HEIGHT: 63 IN

## 2025-03-13 DIAGNOSIS — M25.512 CHRONIC LEFT SHOULDER PAIN: Primary | ICD-10-CM

## 2025-03-13 DIAGNOSIS — G89.29 CHRONIC LEFT SHOULDER PAIN: Primary | ICD-10-CM

## 2025-03-13 DIAGNOSIS — K58.1 IRRITABLE BOWEL SYNDROME WITH CONSTIPATION: ICD-10-CM

## 2025-03-13 PROCEDURE — 20611 DRAIN/INJ JOINT/BURSA W/US: CPT | Performed by: ANESTHESIOLOGY

## 2025-03-13 RX ORDER — METHYLPREDNISOLONE ACETATE 40 MG/ML
40 INJECTION, SUSPENSION INTRA-ARTICULAR; INTRALESIONAL; INTRAMUSCULAR; SOFT TISSUE ONCE
Status: COMPLETED | OUTPATIENT
Start: 2025-03-13 | End: 2025-03-13

## 2025-03-13 RX ORDER — ROPIVACAINE HYDROCHLORIDE 2 MG/ML
40 INJECTION, SOLUTION EPIDURAL; INFILTRATION; PERINEURAL ONCE
Status: COMPLETED | OUTPATIENT
Start: 2025-03-13 | End: 2025-03-13

## 2025-03-13 RX ORDER — DOXYCYCLINE HYCLATE 50 MG/1
50 CAPSULE ORAL DAILY
COMMUNITY
Start: 2025-03-03

## 2025-03-13 RX ORDER — LINACLOTIDE 72 UG/1
72 CAPSULE, GELATIN COATED ORAL DAILY
Qty: 90 CAPSULE | Refills: 1 | Status: SHIPPED | OUTPATIENT
Start: 2025-03-13

## 2025-03-13 RX ADMIN — METHYLPREDNISOLONE ACETATE 40 MG: 40 INJECTION, SUSPENSION INTRA-ARTICULAR; INTRALESIONAL; INTRAMUSCULAR; SOFT TISSUE at 14:41

## 2025-03-13 RX ADMIN — ROPIVACAINE HYDROCHLORIDE 40 MG: 2 INJECTION, SOLUTION EPIDURAL; INFILTRATION; PERINEURAL at 14:41

## 2025-03-13 NOTE — PROGRESS NOTES
"Large joint arthrocentesis: L subacromial bursa  [place] Protocol:  procedure performed by consultantConsent: Verbal consent obtained. Written consent obtained.  Risks and benefits: risks, benefits and alternatives were discussed  Consent given by: patient  Time out: Immediately prior to procedure a \"time out\" was called to verify the correct patient, procedure, equipment, support staff and site/side marked as required.  Timeout called at: 3/13/2025 2:27 PM.  Patient understanding: patient states understanding of the procedure being performed  Patient consent: the patient's understanding of the procedure matches consent given  Procedure consent: procedure consent matches procedure scheduled  Site marked: the operative site was marked  Required items: required blood products, implants, devices, and special equipment available  Patient identity confirmed: verbally with patient  Supporting Documentation  Indications: pain   Procedure Details  Location: shoulder - L subacromial bursa  Preparation: Patient was prepped and draped in the usual sterile fashion  Needle size: 25 G  Ultrasound guidance: yes        Ultrasound-guided left subacromial subdeltoid bursa injection  Indication: Left shoulder pain  Preoperative diagnosis: Left shoulder bursitis  Postoperative diagnosis: Left shoulder bursitis  Procedure: Ultrasound-guided left subacromial subdeltoid bursa injection  After discussing the risks, benefits, and alternatives to the procedure, the patient expressed understanding and wished to proceed. The patient was brought to the procedure suite and placed in the side lying position. A procedural pause was conducted to verify: correct patient identity, procedure to be performed and as applicable, correct side and site, correct patient position, and availability of implants, special equipment, or special requirements. A simple surgical tray was used. Prior to the procedure, the left shoulder was examined with a 12MHz " linear transducer to visualize the subacromial-subdeltoid bursa and determine the optimal needle path. Following this, the left shoulder was prepared with a Chloraprep scrub, then re-examined using the same transducer, a sterile utrasound transducer cover, and sterile ultrasound transducer gel. Thereafter, using ultrasound guidance, a 2.5 inch 25 guage needle was advanced into the left subacromial-subdeltoid bursa. After visualization of the tip of the needle in the target area and negative aspiration for blood, a mixture of 40mg of Depo-Medrol in 3cc of 0.2% ropivacaine was injected into the left subacromial subdeltoid bursa. The patient tolerated the procedure well and there were no apparent complications. After an appropriate amount of observation, the patient was dismissed from the recovery area in good condition under their own power.  COMMENTS:  The patient received a total steroid dose of 40mg of Depo-Medrol.

## 2025-03-13 NOTE — TELEPHONE ENCOUNTER
[facility] on 9/18/25, was suppose to go to [facility].     Reason for call:   [x] Refill   [] Prior Auth  [] Other:     Office:   [] PCP/Provider -   [x] Specialty/Provider - Gastro     Medication:     linaCLOtide (Linzess) 72 MCG CAPS       Dose/Frequency:     72 mcg, Oral, Daily       Quantity: 90    Pharmacy: [facility] - [place], [place] - [address] [place] [address] A     Local Pharmacy   Does the patient have enough for 3 days?   [] Yes   [x] No - Send as HP to POD    Mail Away Pharmacy   Does the patient have enough for 10 days?   [] Yes   [] No - Send as HP to POD

## 2025-03-14 ENCOUNTER — HOSPITAL ENCOUNTER (OUTPATIENT)
Dept: RADIOLOGY | Age: 45
Discharge: HOME | End: 2025-03-14
Payer: COMMERCIAL

## 2025-03-14 DIAGNOSIS — Z85.850 HISTORY OF THYROID CANCER: ICD-10-CM

## 2025-03-14 PROCEDURE — 76536 US EXAM OF HEAD AND NECK: CPT

## 2025-03-21 ENCOUNTER — OFFICE VISIT (OUTPATIENT)
Dept: SURGICAL ONCOLOGY | Facility: CLINIC | Age: 45
End: 2025-03-21
Payer: COMMERCIAL

## 2025-03-21 VITALS
SYSTOLIC BLOOD PRESSURE: 104 MMHG | WEIGHT: 143 LBS | HEIGHT: 63 IN | OXYGEN SATURATION: 99 % | DIASTOLIC BLOOD PRESSURE: 64 MMHG | BODY MASS INDEX: 25.34 KG/M2 | HEART RATE: 80 BPM | TEMPERATURE: 98.1 F

## 2025-03-21 DIAGNOSIS — Z08 ENCOUNTER FOR FOLLOW-UP SURVEILLANCE OF THYROID CANCER: Primary | ICD-10-CM

## 2025-03-21 DIAGNOSIS — Z85.850 ENCOUNTER FOR FOLLOW-UP SURVEILLANCE OF THYROID CANCER: Primary | ICD-10-CM

## 2025-03-21 DIAGNOSIS — Z85.850 HISTORY OF THYROID CANCER: ICD-10-CM

## 2025-03-21 PROCEDURE — 99213 OFFICE O/P EST LOW 20 MIN: CPT

## 2025-03-21 RX ORDER — TRETINOIN 0.25 MG/G
CREAM TOPICAL
COMMUNITY

## 2025-03-21 NOTE — PROGRESS NOTES
Name: Asmita ESPINOZA Bem      : 1980      MRN: [id number]  Encounter Provider: HENRY Queen  Encounter Date: 3/21/2025   Encounter department: [facility] [place]  :  Assessment & Plan  Encounter for follow-up surveillance of thyroid cancer         History of thyroid cancer  Clinical exam is unremarkable.  Thyroid US shows relative stability of the nodule seen in the right thyroidectomy, measuring 8 x 4 x 5 mm  at this time, and all lymph nodes appear normal.  Her TSH in January was quite elevated, so we will recheck her level now, along with repeat thyroglobulin levels.  Assuming these are all within expected range, I will see her again in 6 months with repeat US.  Orders:  •  TSH, 3rd generation; Future  •  Anti-thyroglobulin antibody; Future  •  Thyroglobulin; Future  •  US head neck lymph node mapping; Future        History of Present Illness   No chief complaint on file.    Return in about 6 months (around 2025) for Office Visit, Imaging - See orders, Labs - See Treatment Plan.    Pertinent Medical History   This is a 45 y/o female who returns to the office today in follow-up for her history of thyroid cancer.  She is currently JAMAL at 4 years and doing well.  She denies any voice changes, lumps in her neck or difficulty swallowing.  She does complain of persistent fatigue.  She states her TSH was elevated in January, so her endocrinologist his adjust her medication dose.  She has not had this rechecked. Lymph node mapping US was performed on , and I have reviewed these results with her today.         Oncology History   Cancer Staging   History of thyroid cancer  Staging form: Thyroid - Differentiated and Anaplastic, AJCC 8th Edition  - Pathologic stage from 2023: Stage I (pT1b, pNX, cM0, Age at diagnosis: < 55 years) - Signed by HENRY Queen on 2023  Stage prefix: Initial diagnosis  Lymph node metastasis: Unknown  Oncology History    History of thyroid cancer   1/31/2023 Biopsy    Thyroid, Left, lower (Thin-prep and smear preparations):    Malignant ([place] Category VI)  Papillary carcinoma.     2/24/2023 Surgery    Thyroid; total thyroidectomy:       - Papillary thyroid carcinoma, classic, 1.2 cm      - Carcinoma involves left thyroid lobe      - Lymphovascular invasion: Not identified       - Extrathyroidal extension: Not identified       - All margins free of carcinoma       - Background thyroid with previous biopsy site changes  pT1b     2/24/2023 -  Cancer Staged    Staging form: Thyroid - Differentiated and Anaplastic, AJCC 8th Edition  - Pathologic stage from 2/24/2023: Stage I (pT1b, pNX, cM0, Age at diagnosis: < 55 years) - Signed by HENRY Queen on 8/21/2023  Stage prefix: Initial diagnosis  Lymph node metastasis: Unknown            Review of Systems A complete review of systems is negative other than that noted above in the HPI.       Current Outpatient Medications   Medication Sig Dispense Refill   • acetaminophen (TYLENOL) 500 mg tablet Take 1,000 mg by mouth every 6 (six) hours as needed for mild pain     • B Complex Vitamins (B-Complex/B-12) TABS Take 1 tablet by mouth     • bisacodyl (DULCOLAX) 5 mg EC tablet Take 2 tablets (10 mg total) by mouth 2 (two) times a day as needed for constipation 60 tablet 1   • calcitriol (ROCALTROL) 0.25 mcg capsule TAKE ONE CAPSULE BY MOUTH 2 TIMES A  capsule 1   • calcium citrate (CALCITRATE) 950 (200 Ca) MG tablet Take 1 tablet (950 mg total) by mouth 2 (two) times a day 120 tablet 0   • cholecalciferol (VITAMIN D3) 1,000 units tablet Take 1 tablet (1,000 Units total) by mouth daily Do not start before March 1, 2023. 30 tablet 0   • clindamycin (CLEOCIN T) 1 % lotion in the morning     • dexamethasone (DECADRON) 4 mg tablet 4 mg  p.o. with breakfast for 1 to 7 days for unrelenting migraine 7 tablet 1   • dicyclomine (BENTYL) 20 mg tablet Take 1 tablet (20 mg total) by mouth 2  "(two) times a day 20 tablet 0   • doxycycline hyclate (VIBRAMYCIN) 50 mg capsule Take 50 mg by mouth daily Take with food     • Galcanezumab-gnlm (Emgality) 120 MG/ML SOAJ INJECT 120MG SUBCUTANEOUSLY (UNDER THE SKIN) EVERY 28 DAYS 1 mL 11   • hydrocortisone (ANUSOL-HC) 2.5 % rectal cream Apply topically 2 (two) times a day 28 g 2   • Levocetirizine Dihydrochloride (XYZAL PO) Take by mouth daily at bedtime     • levothyroxine 125 mcg tablet Take 1 tablet (125 mcg total) by mouth daily 90 tablet 1   • linaCLOtide (Linzess) 72 MCG CAPS Take 72 mcg by mouth in the morning 90 capsule 1   • lubiprostone (AMITIZA) 24 mcg capsule Take 1 capsule (24 mcg total) by mouth 2 (two) times a day with meals 60 capsule 5   • Melatonin 5 MG TABS Take by mouth as needed     • meloxicam (MOBIC) 15 mg tablet Take 1 tablet (15 mg total) by mouth if needed for mild pain or moderate pain 30 tablet 1   • Multiple Vitamin (multivitamin) tablet Take 1 tablet by mouth daily     • omeprazole (PriLOSEC) 40 MG capsule Take 1 capsule (40 mg total) by mouth daily 90 capsule 1   • ondansetron (ZOFRAN) 4 mg tablet Take 1 tablet (4 mg total) by mouth every 8 (eight) hours as needed for nausea or vomiting 20 tablet 0   • rimegepant sulfate (Nurtec) 75 mg TBDP Take one NURTEC 75 mg at onset under tongue. Limit 1 in 24 hours. 16 tablet 11   • rizatriptan (MAXALT) 10 mg tablet Take 1 tablet (10 mg total) by mouth once as needed for migraine May repeat in 2 hours if needed. Max 2/24 hours, 9/month. 9 tablet 12   • tretinoin (RETIN-A) 0.025 % cream Apply topically daily at bedtime     • zolpidem (AMBIEN) 10 mg tablet Take 10 mg by mouth daily at bedtime as needed for sleep       No current facility-administered medications for this visit.      Objective   /64   Pulse 80   Temp 98.1 °F (36.7 °C)   Ht 5' 3\" (1.6 m)   Wt 64.9 kg (143 lb)   SpO2 99%   BMI 25.33 kg/m²     Pain Screening:  Pain Score: 0-No pain  ECOG    Physical Exam  Vitals reviewed. "   Constitutional:       General: She is not in acute distress.     Appearance: Normal appearance. She is normal weight. She is not ill-appearing or toxic-appearing.   HENT:      Head: Normocephalic and atraumatic.   Eyes:      General: No scleral icterus.  Neck:      Thyroid: No thyroid mass.   Cardiovascular:      Rate and Rhythm: Normal rate.   Pulmonary:      Effort: Pulmonary effort is normal.   Musculoskeletal:         General: Normal range of motion.      Cervical back: Normal range of motion and neck supple.   Lymphadenopathy:      Head:      Right side of head: No submandibular adenopathy.      Left side of head: No submandibular adenopathy.      Cervical: No cervical adenopathy.      Upper Body:      Right upper body: No supraclavicular adenopathy.      Left upper body: No supraclavicular adenopathy.   Skin:     General: Skin is warm and dry.   Neurological:      General: No focal deficit present.      Mental Status: She is alert and oriented to person, place, and time.   Psychiatric:         Mood and Affect: Mood normal.         Behavior: Behavior normal.         Thought Content: Thought content normal.         Judgment: Judgment normal.           Labs: I have reviewed pertinent labs. TSH:     Collected    01/02/2025 0730 Component Value Units   TSH 3RD GENERATON 5.613 High   uIU/mL          01/02/2025 0730 Component Value Units   Free T4 1.08  ng/dL          Radiology Results Review: I have reviewed radiology reports from 3/14/2025 including: Ultrasound(s).    US head neck lymph node mapping  Narrative & Impression   NECK ULTRASOUND     INDICATION: Z85.850: Personal history of malignant neoplasm of thyroid.     COMPARISON: Head and neck ultrasound 9/13/2024     FINDINGS:     Ultrasound of the thyroidectomy bed and cervical lymph node chains was performed with a high frequency linear transducer.  Right thyroid bed nodule measures 8 x 4 x 5 mm, isoechoic to muscle. Previous measurements of 6 x 6 x 5 mm, not  a significant change.     No findings in the left thyroid bed.     Lymph nodes maintain normal morphologic contour, echogenicity and short axis dimensions of less than 0.7 cm. No evidence for microcalcification or focal nodularity.     IMPRESSION:     1.  Stable small right thyroid bed nodule. No new findings for recurrent or metastatic disease.

## 2025-03-21 NOTE — LETTER
2025     Vince Carey MD  [address]  [address]nd [address]  [facility] [facility] [address]    Patient: Asmita ESPINOZA Bem   YOB: 1980   Date of Visit: 3/21/2025       Dear Dr. Carey:    Thank you for referring Asmita Mehta to me for evaluation. Below are my notes for this consultation.    If you have questions, please do not hesitate to call me. I look forward to following your patient along with you.         Sincerely,        HENRY Queen        CC: No Recipients    HENRY Queen  3/21/2025 11:22 AM  Sign when Signing Visit  Name: Asmita ESPINOZA Bem      : 1980      MRN: [id number]  Encounter Provider: HENRY Queen  Encounter Date: 3/21/2025   Encounter department: [facility] [place]  :  Assessment & Plan  Encounter for follow-up surveillance of thyroid cancer         History of thyroid cancer  Clinical exam is unremarkable.  Thyroid US shows relative stability of the nodule seen in the right thyroidectomy, measuring 8 x 4 x 5 mm  at this time, and all lymph nodes appear normal.  Her TSH in January was quite elevated, so we will recheck her level now, along with repeat thyroglobulin levels.  Assuming these are all within expected range, I will see her again in 6 months with repeat US.  Orders:  •  TSH, 3rd generation; Future  •  Anti-thyroglobulin antibody; Future  •  Thyroglobulin; Future  •  US head neck lymph node mapping; Future        History of Present Illness  No chief complaint on file.    Return in about 6 months (around 2025) for Office Visit, Imaging - See orders, Labs - See Treatment Plan.    Pertinent Medical History  This is a 45 y/o female who returns to the office today in follow-up for her history of thyroid cancer.  She is currently JAMAL at 4 years and doing well.  She denies any voice changes, lumps in her neck or difficulty swallowing.  She does complain of persistent fatigue.  She states her TSH was elevated in January, so her  endocrinologist his adjust her medication dose.  She has not had this rechecked. Lymph node mapping US was performed on March 14, and I have reviewed these results with her today.        Oncology History  Cancer Staging   History of thyroid cancer  Staging form: Thyroid - Differentiated and Anaplastic, AJCC 8th Edition  - Pathologic stage from 2/24/2023: Stage I (pT1b, pNX, cM0, Age at diagnosis: < 55 years) - Signed by HENRY Queen on 8/21/2023  Stage prefix: Initial diagnosis  Lymph node metastasis: Unknown  Oncology History   History of thyroid cancer   1/31/2023 Biopsy    Thyroid, Left, lower (Thin-prep and smear preparations):    Malignant ([place] Category VI)  Papillary carcinoma.     2/24/2023 Surgery    Thyroid; total thyroidectomy:       - Papillary thyroid carcinoma, classic, 1.2 cm      - Carcinoma involves left thyroid lobe      - Lymphovascular invasion: Not identified       - Extrathyroidal extension: Not identified       - All margins free of carcinoma       - Background thyroid with previous biopsy site changes  pT1b     2/24/2023 -  Cancer Staged    Staging form: Thyroid - Differentiated and Anaplastic, AJCC 8th Edition  - Pathologic stage from 2/24/2023: Stage I (pT1b, pNX, cM0, Age at diagnosis: < 55 years) - Signed by HENRY Queen on 8/21/2023  Stage prefix: Initial diagnosis  Lymph node metastasis: Unknown           Review of Systems A complete review of systems is negative other than that noted above in the HPI.       Current Outpatient Medications   Medication Sig Dispense Refill   • acetaminophen (TYLENOL) 500 mg tablet Take 1,000 mg by mouth every 6 (six) hours as needed for mild pain     • B Complex Vitamins (B-Complex/B-12) TABS Take 1 tablet by mouth     • bisacodyl (DULCOLAX) 5 mg EC tablet Take 2 tablets (10 mg total) by mouth 2 (two) times a day as needed for constipation 60 tablet 1   • calcitriol (ROCALTROL) 0.25 mcg capsule TAKE ONE CAPSULE BY MOUTH 2 TIMES A DAY  180 capsule 1   • calcium citrate (CALCITRATE) 950 (200 Ca) MG tablet Take 1 tablet (950 mg total) by mouth 2 (two) times a day 120 tablet 0   • cholecalciferol (VITAMIN D3) 1,000 units tablet Take 1 tablet (1,000 Units total) by mouth daily Do not start before March 1, 2023. 30 tablet 0   • clindamycin (CLEOCIN T) 1 % lotion in the morning     • dexamethasone (DECADRON) 4 mg tablet 4 mg  p.o. with breakfast for 1 to 7 days for unrelenting migraine 7 tablet 1   • dicyclomine (BENTYL) 20 mg tablet Take 1 tablet (20 mg total) by mouth 2 (two) times a day 20 tablet 0   • doxycycline hyclate (VIBRAMYCIN) 50 mg capsule Take 50 mg by mouth daily Take with food     • Galcanezumab-gnlm (Emgality) 120 MG/ML SOAJ INJECT 120MG SUBCUTANEOUSLY (UNDER THE SKIN) EVERY 28 DAYS 1 mL 11   • hydrocortisone (ANUSOL-HC) 2.5 % rectal cream Apply topically 2 (two) times a day 28 g 2   • Levocetirizine Dihydrochloride (XYZAL PO) Take by mouth daily at bedtime     • levothyroxine 125 mcg tablet Take 1 tablet (125 mcg total) by mouth daily 90 tablet 1   • linaCLOtide (Linzess) 72 MCG CAPS Take 72 mcg by mouth in the morning 90 capsule 1   • lubiprostone (AMITIZA) 24 mcg capsule Take 1 capsule (24 mcg total) by mouth 2 (two) times a day with meals 60 capsule 5   • Melatonin 5 MG TABS Take by mouth as needed     • meloxicam (MOBIC) 15 mg tablet Take 1 tablet (15 mg total) by mouth if needed for mild pain or moderate pain 30 tablet 1   • Multiple Vitamin (multivitamin) tablet Take 1 tablet by mouth daily     • omeprazole (PriLOSEC) 40 MG capsule Take 1 capsule (40 mg total) by mouth daily 90 capsule 1   • ondansetron (ZOFRAN) 4 mg tablet Take 1 tablet (4 mg total) by mouth every 8 (eight) hours as needed for nausea or vomiting 20 tablet 0   • rimegepant sulfate (Nurtec) 75 mg TBDP Take one NURTEC 75 mg at onset under tongue. Limit 1 in 24 hours. 16 tablet 11   • rizatriptan (MAXALT) 10 mg tablet Take 1 tablet (10 mg total) by mouth once as  "needed for migraine May repeat in 2 hours if needed. Max 2/24 hours, 9/month. 9 tablet 12   • tretinoin (RETIN-A) 0.025 % cream Apply topically daily at bedtime     • zolpidem (AMBIEN) 10 mg tablet Take 10 mg by mouth daily at bedtime as needed for sleep       No current facility-administered medications for this visit.      Objective  /64   Pulse 80   Temp 98.1 °F (36.7 °C)   Ht 5' 3\" (1.6 m)   Wt 64.9 kg (143 lb)   SpO2 99%   BMI 25.33 kg/m²     Pain Screening:  Pain Score: 0-No pain  ECOG    Physical Exam  Vitals reviewed.   Constitutional:       General: She is not in acute distress.     Appearance: Normal appearance. She is normal weight. She is not ill-appearing or toxic-appearing.   HENT:      Head: Normocephalic and atraumatic.   Eyes:      General: No scleral icterus.  Neck:      Thyroid: No thyroid mass.   Cardiovascular:      Rate and Rhythm: Normal rate.   Pulmonary:      Effort: Pulmonary effort is normal.   Musculoskeletal:         General: Normal range of motion.      Cervical back: Normal range of motion and neck supple.   Lymphadenopathy:      Head:      Right side of head: No submandibular adenopathy.      Left side of head: No submandibular adenopathy.      Cervical: No cervical adenopathy.      Upper Body:      Right upper body: No supraclavicular adenopathy.      Left upper body: No supraclavicular adenopathy.   Skin:     General: Skin is warm and dry.   Neurological:      General: No focal deficit present.      Mental Status: She is alert and oriented to person, place, and time.   Psychiatric:         Mood and Affect: Mood normal.         Behavior: Behavior normal.         Thought Content: Thought content normal.         Judgment: Judgment normal.           Labs: I have reviewed pertinent labs. TSH:     Collected    01/02/2025 0730 Component Value Units   TSH 3RD GENERATON 5.613 High   uIU/mL          01/02/2025 0730 Component Value Units   Free T4 1.08  ng/dL          Radiology " Results Review: I have reviewed radiology reports from 3/14/2025 including: Ultrasound(s).    US head neck lymph node mapping  Narrative & Impression   NECK ULTRASOUND     INDICATION: Z85.850: Personal history of malignant neoplasm of thyroid.     COMPARISON: Head and neck ultrasound 9/13/2024     FINDINGS:     Ultrasound of the thyroidectomy bed and cervical lymph node chains was performed with a high frequency linear transducer.  Right thyroid bed nodule measures 8 x 4 x 5 mm, isoechoic to muscle. Previous measurements of 6 x 6 x 5 mm, not a significant change.     No findings in the left thyroid bed.     Lymph nodes maintain normal morphologic contour, echogenicity and short axis dimensions of less than 0.7 cm. No evidence for microcalcification or focal nodularity.     IMPRESSION:     1.  Stable small right thyroid bed nodule. No new findings for recurrent or metastatic disease.

## 2025-03-21 NOTE — ASSESSMENT & PLAN NOTE
Clinical exam is unremarkable.  Thyroid US shows relative stability of the nodule seen in the right thyroidectomy, measuring 8 x 4 x 5 mm  at this time, and all lymph nodes appear normal.  Her TSH in January was quite elevated, so we will recheck her level now, along with repeat thyroglobulin levels.  Assuming these are all within expected range, I will see her again in 6 months with repeat US.  Orders:  •  TSH, 3rd generation; Future  •  Anti-thyroglobulin antibody; Future  •  Thyroglobulin; Future  •  US head neck lymph node mapping; Future

## 2025-03-27 ENCOUNTER — TELEPHONE (OUTPATIENT)
Dept: PAIN MEDICINE | Facility: CLINIC | Age: 45
End: 2025-03-27

## 2025-03-27 ENCOUNTER — APPOINTMENT (OUTPATIENT)
Dept: LAB | Age: 45
End: 2025-03-27
Payer: COMMERCIAL

## 2025-03-27 DIAGNOSIS — Z85.850 HISTORY OF THYROID CANCER: ICD-10-CM

## 2025-03-27 LAB — TSH SERPL DL<=0.05 MIU/L-ACNC: 1.54 UIU/ML (ref 0.45–4.5)

## 2025-03-27 PROCEDURE — 36415 COLL VENOUS BLD VENIPUNCTURE: CPT

## 2025-03-27 PROCEDURE — 84432 ASSAY OF THYROGLOBULIN: CPT

## 2025-03-27 PROCEDURE — 86800 THYROGLOBULIN ANTIBODY: CPT

## 2025-03-27 PROCEDURE — 84443 ASSAY THYROID STIM HORMONE: CPT

## 2025-03-28 ENCOUNTER — OFFICE VISIT (OUTPATIENT)
Dept: ENDOCRINOLOGY | Facility: CLINIC | Age: 45
End: 2025-03-28
Payer: COMMERCIAL

## 2025-03-28 VITALS
OXYGEN SATURATION: 99 % | SYSTOLIC BLOOD PRESSURE: 110 MMHG | HEIGHT: 63 IN | TEMPERATURE: 98 F | DIASTOLIC BLOOD PRESSURE: 66 MMHG | BODY MASS INDEX: 26.01 KG/M2 | WEIGHT: 146.8 LBS | RESPIRATION RATE: 18 BRPM | HEART RATE: 82 BPM

## 2025-03-28 DIAGNOSIS — Z08 ENCOUNTER FOR FOLLOW-UP SURVEILLANCE OF THYROID CANCER: ICD-10-CM

## 2025-03-28 DIAGNOSIS — R29.898 ARM WEAKNESS: ICD-10-CM

## 2025-03-28 DIAGNOSIS — E89.2 HYPOPARATHYROIDISM AFTER PROCEDURE (HCC): ICD-10-CM

## 2025-03-28 DIAGNOSIS — Z85.850 ENCOUNTER FOR FOLLOW-UP SURVEILLANCE OF THYROID CANCER: ICD-10-CM

## 2025-03-28 DIAGNOSIS — E89.0 POSTOPERATIVE HYPOTHYROIDISM: Primary | ICD-10-CM

## 2025-03-28 PROCEDURE — 99214 OFFICE O/P EST MOD 30 MIN: CPT | Performed by: STUDENT IN AN ORGANIZED HEALTH CARE EDUCATION/TRAINING PROGRAM

## 2025-03-28 RX ORDER — LEVOTHYROXINE SODIUM 125 MCG
125 TABLET ORAL DAILY
Qty: 90 TABLET | Refills: 1 | Status: SHIPPED | OUTPATIENT
Start: 2025-03-28

## 2025-03-28 NOTE — PROGRESS NOTES
Name: Asmita ESPINOZA Bem      : 1980      MRN: [id number]  Encounter Provider: Bogdan Smyth MD  Encounter Date: 3/28/2025   Encounter department: [facility] [place]    Chief Complaint   Patient presents with   • Follow-up   :  Assessment & Plan  Postoperative hypothyroidism  See plan for papillary thyroid cancer.    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 is likely,   We will continue calcium citrate at 600 mg twice a day, and calcitriol 0.25 mcg daily.    Orders:  •  Calcium, ionized; Future    Encounter for follow-up surveillance of thyroid cancer    She has a low risk for recurrence. Thyroglobulin levels have remained stable, with the last recorded level being 0.3, most recent neck ultrasound with lymph node mapping showed a stable thyroid bed nodule.  The most recent TSH level was within the target range at 1.5. The current dose of levothyroxine 125 mcg daily will be maintained. A prescription for Synthroid 125 mcg has been provided to see if it improves symptoms compared to the generic levothyroxine. An ultrasound will be scheduled in 6 months. Thyroglobulin and thyroglobulin antibody tests will be ordered with the next set of labs, along with TSH. If there are any issues with the thyroglobulin levels, further action will be taken sooner.      Follow-up  The patient will follow up in 6 months.        Orders:  •  TGAB+THYROGLOBULIN,MACHO OR LCMS    Arm weakness  The patient reported two episodes of sudden arm weakness and shaking, lasting about 20 minutes each. These episodes are unlikely to be related to low calcium or magnesium levels. The patient will monitor for any recurrence of these symptoms and inform the provider if they occur again. The patient  will also discuss these symptoms with her neurologist to rule out any neurological causes.         History of Present Illness   History of Present Illness          Asmita ESPINOZA Bem is a 44 y.o. female who presents for evaluation of thyroid cancer, post- operative hypothyroidism and hypoparathyroidism  Patient with a past medical history of papillary thyroid cancer diagnosed 2/24/2023 treated with total thyroidectomy, no HERNANDEZ mCi given.  last stable ultrasound on 3/2025. Continues to follow with Dr. Carey, Surgical-Oncology. Now with postoperative hypothyroidism.  Undergoing thyroid suppression therapy with levothyroxine 125 mcg daily, and a goal TSH of 0.5 -2.     Patient has been taking levothyroxine first thing in the morning with water, separate from food and other medications by at least one hour, and from supplements by at least 4 hours. She denies weight fluctuations, mood instability, changes in hair growth patterns, difficulty swallowing, breathing or changes in voice.     She recently underwent a neck ultrasound on 03/14/2025, which revealed a stable thyroid bed nodule. She had a consultation with the surgical oncology team last Friday, during which it was decided to repeat the ultrasound in 6 months. Thyroglobulin levels were checked yesterday.    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 She is considering switching from levothyroxine to Synthroid,     Her calcium levels have improved, and she did not increase her dosage to 3 pills per day as previously discussed.     She experienced sudden arm weakness and uncontrollable shaking twice, once on Sunday at [employer] and again at work yesterday. These episodes lasted approximately 20 minutes each. She describes the sensation as weakness rather than pain, originating from her elbow and extending to her wrist and fingers. She reports no spasms or numbness. She also reports no  palpitations, blurry vision, or dizziness but does experience occasional lightheadedness. She suffers from severe migraines and plans to discuss these symptoms with her neurologist. She takes magnesium supplements daily.    Pertinent Medical History           Review of Systems as per HPI  Medical History Reviewed by provider this encounter:     .  Current Outpatient Medications on File Prior to Visit   Medication Sig Dispense Refill   • acetaminophen (TYLENOL) 500 mg tablet Take 1,000 mg by mouth every 6 (six) hours as needed for mild pain     • B Complex Vitamins (B-Complex/B-12) TABS Take 1 tablet by mouth     • bisacodyl (DULCOLAX) 5 mg EC tablet Take 2 tablets (10 mg total) by mouth 2 (two) times a day as needed for constipation 60 tablet 1   • calcitriol (ROCALTROL) 0.25 mcg capsule TAKE ONE CAPSULE BY MOUTH 2 TIMES A  capsule 1   • calcium citrate (CALCITRATE) 950 (200 Ca) MG tablet Take 1 tablet (950 mg total) by mouth 2 (two) times a day 120 tablet 0   • cholecalciferol (VITAMIN D3) 1,000 units tablet Take 1 tablet (1,000 Units total) by mouth daily Do not start before March 1, 2023. 30 tablet 0   • clindamycin (CLEOCIN T) 1 % lotion in the morning     • dexamethasone (DECADRON) 4 mg tablet 4 mg  p.o. with breakfast for 1 to 7 days for unrelenting migraine 7 tablet 1   • dicyclomine (BENTYL) 20 mg tablet Take 1 tablet (20 mg total) by mouth 2 (two) times a day 20 tablet 0   • doxycycline hyclate (VIBRAMYCIN) 50 mg capsule Take 50 mg by mouth daily Take with food     • Galcanezumab-gnlm (Emgality) 120 MG/ML SOAJ INJECT 120MG SUBCUTANEOUSLY (UNDER THE SKIN) EVERY 28 DAYS 1 mL 11   • hydrocortisone (ANUSOL-HC) 2.5 % rectal cream Apply topically 2 (two) times a day 28 g 2   • Levocetirizine Dihydrochloride (XYZAL PO) Take by mouth daily at bedtime     • linaCLOtide (Linzess) 72 MCG CAPS Take 72 mcg by mouth in the morning 90 capsule 1   • lubiprostone (AMITIZA) 24 mcg capsule Take 1 capsule (24 mcg total)  "by mouth 2 (two) times a day with meals 60 capsule 5   • Melatonin 5 MG TABS Take by mouth as needed     • meloxicam (MOBIC) 15 mg tablet Take 1 tablet (15 mg total) by mouth if needed for mild pain or moderate pain 30 tablet 1   • Multiple Vitamin (multivitamin) tablet Take 1 tablet by mouth daily     • omeprazole (PriLOSEC) 40 MG capsule Take 1 capsule (40 mg total) by mouth daily 90 capsule 1   • ondansetron (ZOFRAN) 4 mg tablet Take 1 tablet (4 mg total) by mouth every 8 (eight) hours as needed for nausea or vomiting 20 tablet 0   • rimegepant sulfate (Nurtec) 75 mg TBDP Take one NURTEC 75 mg at onset under tongue. Limit 1 in 24 hours. 16 tablet 11   • rizatriptan (MAXALT) 10 mg tablet Take 1 tablet (10 mg total) by mouth once as needed for migraine May repeat in 2 hours if needed. Max 2/24 hours, 9/month. 9 tablet 12   • tretinoin (RETIN-A) 0.025 % cream Apply topically daily at bedtime     • zolpidem (AMBIEN) 10 mg tablet Take 10 mg by mouth daily at bedtime as needed for sleep     • [DISCONTINUED] levothyroxine 125 mcg tablet Take 1 tablet (125 mcg total) by mouth daily 90 tablet 1     No current facility-administered medications on file prior to visit.         Medical History Reviewed by provider this encounter:     .    Objective   /66 (BP Location: Right arm, Patient Position: Sitting, Cuff Size: Adult)   Pulse 82   Temp 98 °F (36.7 °C) (Temporal)   Resp 18   Ht 5' 3\" (1.6 m)   Wt 66.6 kg (146 lb 12.8 oz)   SpO2 99%   BMI 26.00 kg/m²      Body mass index is 26 kg/m².  Wt Readings from Last 3 Encounters:   03/28/25 66.6 kg (146 lb 12.8 oz)   03/21/25 64.9 kg (143 lb)   03/13/25 67.6 kg (149 lb)     Physical Exam  Vitals and nursing note reviewed.   Constitutional:       General: She is not in acute distress.     Appearance: She is well-developed.   HENT:      Head: Normocephalic and atraumatic.   Neck:      Comments: Total thyroidectomy scar  Cardiovascular:      Rate and Rhythm: Normal rate " and regular rhythm.   Pulmonary:      Effort: Pulmonary effort is normal. No respiratory distress.   Abdominal:      Palpations: Abdomen is soft.   Musculoskeletal:         General: No swelling.      Cervical back: Neck supple.   Skin:     General: Skin is warm and dry.   Neurological:      Mental Status: She is alert.       Physical Exam  Neck was examined.    Results  Laboratory Studies  Thyroglobulin level was stable at 9.3 in September 2024. PTH was slightly low at 9.9. Calcium level was normal at 8.7 in January 2025. Ionized calcium was also normal at 1.13. Magnesium level was normal. TSH was 1.5.    Imaging  Neck ultrasound showed a stable thyroid bed nodule with no sign or symptoms of recurrence or metastasis.  Labs:   Lab Results   Component Value Date    HGBA1C 5.4 05/02/2024    HGBA1C 5.3 04/17/2023    HGBA1C 5.2 04/11/2022     Lab Results   Component Value Date    CREATININE 0.59 (L) 01/07/2025    CREATININE 0.63 12/08/2023    CREATININE 0.84 07/05/2023    BUN 10 01/07/2025    K 3.1 (L) 01/07/2025     01/07/2025    CO2 30 01/07/2025     GFR, Calculated   Date Value Ref Range Status   08/13/2020 113 >60 mL/min/1.73m2 Final     Comment:     mL/min per 1.73 square meters                                            Normal Function or Mild Renal    Disease (if clinically at risk):  >or=60  Moderately Decreased:                30-59  Severely Decreased:                  15-29  Renal Failure:                         <15                                            -American GFR: multiply reported GFR by 1.16    Please note that the eGFR is based on the CKD-EPI calculation, and is not intended to be used for drug dosing.                                            Note: Calculated GFR may not be an accurate indicator of renal function if the patient's renal function is not in a steady state.    Ordering Provider: ANCELMO LOBO  Report Copied to : YAIR CASTANEDA     eGFR   Date Value Ref Range  Status   01/07/2025 113 ml/min/1.73sq m Final     Lab Results   Component Value Date    HDL 53 05/02/2024    TRIG 50 05/02/2024     Lab Results   Component Value Date    ALT 14 01/07/2025    AST 17 01/07/2025    ALKPHOS 32 (L) 01/07/2025     Lab Results   Component Value Date    NVQ4XVLHSARD 1.542 03/27/2025    YSO6VIVKCPVA 5.613 (H) 01/02/2025    QGR0CZBLQEDH 2.548 09/10/2024     Lab Results   Component Value Date    FREET4 1.08 01/02/2025     NECK ULTRASOUND     INDICATION: Z85.850: Personal history of malignant neoplasm of thyroid.     COMPARISON: Head and neck ultrasound 9/13/2024     FINDINGS:     Ultrasound of the thyroidectomy bed and cervical lymph node chains was performed with a high frequency linear transducer.  Right thyroid bed nodule measures 8 x 4 x 5 mm, isoechoic to muscle. Previous measurements of 6 x 6 x 5 mm, not a significant change.     No findings in the left thyroid bed.     Lymph nodes maintain normal morphologic contour, echogenicity and short axis dimensions of less than 0.7 cm. No evidence for microcalcification or focal nodularity.     IMPRESSION:     1.  Stable small right thyroid bed nodule. No new findings for recurrent or metastatic disease.        There are no Patient Instructions on file for this visit.    Discussed with the patient and all questioned fully answered. She will call me if any problems arise.

## 2025-03-29 LAB
THYROGLOB AB SERPL-ACNC: <1 IU/ML (ref 0–0.9)
THYROGLOB SERPL-MCNC: 0.2 NG/ML (ref 1.5–38.5)

## 2025-04-08 DIAGNOSIS — R20.2 PARESTHESIAS: ICD-10-CM

## 2025-04-09 RX ORDER — CALCITRIOL 0.25 UG/1
CAPSULE, LIQUID FILLED ORAL
Qty: 180 CAPSULE | Refills: 1 | Status: SHIPPED | OUTPATIENT
Start: 2025-04-09

## 2025-04-19 DIAGNOSIS — G43.109 MIGRAINE WITH AURA AND WITHOUT STATUS MIGRAINOSUS, NOT INTRACTABLE: ICD-10-CM

## 2025-04-19 DIAGNOSIS — G43.E09 CHRONIC MIGRAINE WITH AURA WITHOUT STATUS MIGRAINOSUS, NOT INTRACTABLE: ICD-10-CM

## 2025-04-21 RX ORDER — RIMEGEPANT SULFATE 75 MG/75MG
TABLET, ORALLY DISINTEGRATING ORAL
Qty: 16 TABLET | Refills: 11 | Status: SHIPPED | OUTPATIENT
Start: 2025-04-21

## 2025-04-25 ENCOUNTER — PROCEDURE VISIT (OUTPATIENT)
Dept: NEUROLOGY | Facility: CLINIC | Age: 45
End: 2025-04-25
Payer: COMMERCIAL

## 2025-04-25 VITALS — DIASTOLIC BLOOD PRESSURE: 64 MMHG | TEMPERATURE: 98.3 F | SYSTOLIC BLOOD PRESSURE: 120 MMHG | HEART RATE: 81 BPM

## 2025-04-25 DIAGNOSIS — G43.709 CHRONIC MIGRAINE WITHOUT AURA WITHOUT STATUS MIGRAINOSUS, NOT INTRACTABLE: Primary | ICD-10-CM

## 2025-04-25 PROCEDURE — 64615 CHEMODENERV MUSC MIGRAINE: CPT | Performed by: PHYSICIAN ASSISTANT

## 2025-04-25 NOTE — PROGRESS NOTES
"Universal Protocol   procedure performed by consultantConsent: Verbal consent obtained. Written consent obtained.  Risks and benefits: risks, benefits and alternatives were discussed  Consent given by: patient  Time out: Immediately prior to procedure a \"time out\" was called to verify the correct patient, procedure, equipment, support staff and site/side marked as required.  Patient understanding: patient states understanding of the procedure being performed  Patient consent: the patient's understanding of the procedure matches consent given  Procedure consent: procedure consent matches procedure scheduled  Relevant documents: relevant documents present and verified  Patient identity confirmed: verbally with patient      Chemodenervation     Date/Time  4/25/2025 8:30 AM     Performed by  Christine Serrato PA-C   Authorized by  Christine Serrato PA-C     Pre-procedure details      Preparation: Patient was prepped and draped in usual sterile fashion     Anesthesia  (see MAR for exact dosages):     Anesthesia method:  None   Procedure details      Position:  Upright   Botox      Botox Type:  Type A    Brand:  Botox    mL's of Botulinum Toxin:  200    mL's of preservative free sterile saline:  4    Final Concentration per CC:  50 units    Needle Gauge:  30 G 2.5 inch   Procedures      Botox Procedures: chronic headache     Injection Location      Head / Face:  L superior cervical paraspinal, R superior cervical paraspinal, R frontalis, L frontalis, R medial occipitalis, L medial occipitalis, procerus, R temporalis, L superior trapezius, R superior trapezius and L temporalis    L lateral frontalis:  5 unit(s)    R lateral frontalis:  5 unit(s)    L medial frontalis:  5 unit(s)    R medial frontalis:  5 unit(s)    L temporalis injection amount:  20 unit(s)    R temporalis injection amount:  20 unit(s)    Procerus injection amount:  5 unit(s)    L medial occipitalis injection amount:  15 unit(s)    R medial occipitalis injection " amount:  15 unit(s)    L superior cervical paraspinal injection amount:  10 unit(s)    R superior cervical paraspinal injection amount:  10 unit(s)    L superior trapezius injection amount:  15 unit(s)    R superior trapezius injection amount:  15 unit(s)   Total Units      Total units used:  200   Post-procedure details      Chemodenervation:  Chronic migraine    Facial Nerve Location::  Bilateral facial nerve    Patient tolerance of procedure:  Tolerated well, no immediate complications   Comments       55 units left temporalis, parietal and occipitalis  All medically necessary

## 2025-05-07 ENCOUNTER — OFFICE VISIT (OUTPATIENT)
Dept: NEUROLOGY | Facility: CLINIC | Age: 45
End: 2025-05-07
Payer: COMMERCIAL

## 2025-05-07 VITALS
BODY MASS INDEX: 25.16 KG/M2 | DIASTOLIC BLOOD PRESSURE: 80 MMHG | HEART RATE: 83 BPM | SYSTOLIC BLOOD PRESSURE: 128 MMHG | TEMPERATURE: 98.2 F | WEIGHT: 142 LBS | HEIGHT: 63 IN

## 2025-05-07 DIAGNOSIS — E53.8 B12 DEFICIENCY: Primary | ICD-10-CM

## 2025-05-07 DIAGNOSIS — G43.109 MIGRAINE WITH AURA AND WITHOUT STATUS MIGRAINOSUS, NOT INTRACTABLE: ICD-10-CM

## 2025-05-07 PROCEDURE — 99215 OFFICE O/P EST HI 40 MIN: CPT | Performed by: PSYCHIATRY & NEUROLOGY

## 2025-05-07 RX ORDER — FREMANEZUMAB-VFRM 225 MG/1.5ML
INJECTION SUBCUTANEOUS
Qty: 1 ML | Refills: 11 | Status: SHIPPED | OUTPATIENT
Start: 2025-05-07

## 2025-05-07 RX ORDER — DEXAMETHASONE 4 MG/1
TABLET ORAL
Qty: 5 TABLET | Refills: 0 | Status: SHIPPED | OUTPATIENT
Start: 2025-05-07

## 2025-05-07 RX ORDER — RIZATRIPTAN BENZOATE 10 MG/1
10 TABLET ORAL ONCE AS NEEDED
Qty: 9 TABLET | Refills: 12 | Status: SHIPPED | OUTPATIENT
Start: 2025-05-07

## 2025-05-07 RX ORDER — SPIRONOLACTONE 50 MG/1
1 TABLET, FILM COATED ORAL 2 TIMES DAILY
COMMUNITY
Start: 2025-04-08

## 2025-05-07 NOTE — PROGRESS NOTES
Neurology Ambulatory Visit  Name: Asmita ESPINOZA Bem       : 1980       MRN: [id number]   Encounter Provider: Nadege Jaffe MD   Encounter Date: 2025  Encounter department: [facility]      :  Assessment & Plan  B12 deficiency    Orders:    Vitamin B12; Future    Migraine with aura and without status migrainosus, not intractable      Assessment/Plan:   Asmita ESPINOZA Bem is a deligthful 44 y.o. female with a past medical history that includes papillary thyroid cancer status post total thyroidectomy 2023 with postop hypoparathyroidism and significantly low calcium now on replacement, IBS with constipation, PTSD, allergic rhinitis, chronic sinusitis, migraines, TOSHIA II, insomnia, neck pain, vasovagal syncope in 20s, anxiety, Intestinal cystitis, childhood asthma  referred here for evaluation of headache.  My initial evaluation 3/28/2022    Migraine with aura and without status migrainosus, not intractable  Cerebellar tonsillar ectopia  Features of idiopathic intracranial hypertension on imaging without papilledema not meeting criteria for IIH   Apneas not meeting criteria for NUNU, insomnia (home study 2023, 2 obstructive apneas, 2 hypopneas, BRYCE 0.7, O2 down to 92%) in lab sleep study recommended and ordered and not scheduled, but she did follow-up with sleep medicine and had improvement with CBT-I - should follow again with them  She reports a long history of headaches and migraines dating back to 12 or 13 years old.  Family history of migraines.  She reports she has followed with multiple neurologists in the past.  Pain is typically unilateral more often left-sided with visual aura at times and with typical associated migrainous features as well as autonomic features that do not seem to be unilateral to suggest trigeminal autonomic cephalalgia.  -as of 2022 on average over 15 migraine days per month.  Trial of emgality for prevention rizatriptan for abortive.  - as of  7/1/2022:  She reports she has had significant improvement on emgality down from 15 to 7-8 times a month and severity has improved as well. They respond to rizatriptan which she feels she tolerates more than sumatriptan.   - as of 1/11/2023: She reports she continues to do very well on Emgality with about 4 migraines a month that resolve with rizatriptan.  Some wearing off when due for next dose and recommended taking it at 28 days rather than 30 or 31.  She would like to stay on propranolol 40 mg twice daily for now as she feels this is helping prevent tachycardia from causing migraine.  She was able to get off nortriptyline summer 2022 which she was on for an interstitial cystitis without increase.    - as of 4/5/2023:  She returns sooner with daily migraines now following papillary thyroid cancer status post total thyroidectomy 2/24/2023 with postop hypoparathyroidism and significantly low calcium now on replacement with some symptoms possibly consistent with high calcium and upcoming recheck of labs through endocrinology may suggest lowering calcium meds, but would defer to endocrine.  She certainly has had increase in her migrainous symptoms as well as some symptoms possibly consistent with sleep apnea and I ordered sleep study.  She reports she has had improvement in symptoms since the surgery but still was hoping there is something that could improve them as all the other doctors just told her to wait it out.  We will start low-dose topiramate at night to see if this can help if tolerated.  We also discussed PTSD and how it can cause intrusion, avoidance, negative impact on cognition and mood as well as arousal and reactivity changes and certainly sounds like she has symptoms of this as well and recommend counseling.  We will have our  reach out to see if she can help.  - as of 6/27/2023: She reports increasing frequency and severity of headaches overall and currently 3-4 a week.  She did not  tolerate trial of topiramate as it worsened mood which is slightly improved from its low point, but she plans to follow-up with counselor.  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  Diagnostic sleep study ordered due to suspected false negative home study.  Has Decadron available for worse symptoms if needed.  Rizatriptan side effects and will add trial of Nurtec which also could help with prevention the more she takes it.  - as of 8/29/2023: Reviewed MRI brain which shows some features of possible slight increased intracranial pressure without IIH diagnosis and no papilledema.  She reports 9-10 headaches per month that typically improve with Nurtec, which is a significant improvement on Emgality but has wearing off effect and we discussed increasing acetazolamide/Diamox from 250 mg twice daily since well-tolerated gradually up until it decreases more of the symptoms that might be related as thoroughly instructed and discussed with patient today.  If side effects or not effective we will wean off.  Also will wean off propranolol as we wean up acetazolamide/Diamox.  Refill of Decadron for backup.  She is currently undergoing cognitive behavioral therapy for insomnia soon through the sleep medicine department.  - as of 11/28/2023: She reports overall doing much better and so many regards since last visit.  Better emotionally physically and mentally.  CBT for insomnia was almost magical she reports and she is doing much better with sleep although still taking low-dose Ambien half the month which we discussed I do not recommend, especially in the long run, as this does not produce the quality sleep.    She is having 10-12 migraines a month and Nurtec helps sometimes and when it does not rizatriptan always does although makes her nauseated so she avoids  this as first option.  Emgality continues to help with prevention although has wearing off effect and she is currently delayed due to prior Auth needing to be redone.  At last visit she was taking acetazolamide, but at some point soon after reports she discontinued it, as she wanted less meds.  She was weaned off propranolol since we were adding this medication and subsequently has had some increased pressure in the head which we discussed would be expected and we could add back acetazolamide, verapamil or propranolol at any time.  Potentially could be related to current sinus issues which she request referral to ENT for which was placed.  - as of 4/5/2024: She reports 3-4 headaches a week that she wonders is related to change of season.  Also could be related to stress, less sleep, thyroid/parathyroid issues, off propranolol and we discussed trial of Botox for migraine prevention send she has been having over 15 migraine days per month for over 3 months.  In the meantime, we will add verapamil as tolerated although blood pressure hangs low and history of constipation she reports constipation could not get any worse and she does not have a bowel movement without laxative at this time, following with GI.  Follow with sleep medicine but lately only getting 5 to 6 hours of sleep.  Nurtec helps for migraine rescue and if it does not rizatriptan typically does and she has dexamethasone if needed for backup.  Also discussed stress, she asked for list of therapists again and will have our  reach out as well with resources.  Ketorolac/Toradol IM for today's pain.  - as of 8/30/2024: Asmita reports she tried the verapamil up to 80 mg twice daily and just weaned herself off approximately a week ago as she was having some side effects of dizziness/vertiginous at any time of day.  Trial of Botox after 1 round may have helped slightly initially and less so now, expected to help more after the second round coming up.   Hardly getting any sleep 5 to 6 hours, stress at home and at work all big reasons for frequent headaches and migraines still which she is aware.  3 to 4-week on average Nurtec typically works well.  She will take rizatriptan for the most severe ones approximately 2 times a month.  Continue exercise, following with team of providers as much of what they are taking care of will also help with headache prevention.  She is following with pain management and they are evaluating for lumbar radiculopathy with lower extremity paresthesias with MRI and we discussed I would be happy to order EMG/NCS since these can both months out as it may be needed, although continue to follow with them, since not my area of subspecialty.  She plans to discuss with other providers as well to rule out vascular etiology.  - as of 12/6/2024: She reports she has had a significant reduction in headache and migraine days after 2 rounds of Botox going from 3-4 a week on average to 4 per month.  Emgality likely helping as well and at any point in the future could consider trial off but would consider continuing both for now since finally doing so much better.  Has occasional left trigeminal nerve pain, reviewed anatomy possibly contributing, family history of Chiari malformation she certainly has cerebellar tonsillar ectopia which we discussed in detail.  Nurtec typically helps for migraine rescue.  She is following with pain management for lumbar radiculopathy, reviewed EMG results together.  - as of 5/7/2025: She is currently getting about 10 headaches a month and Nurtec usually works if not rizatriptan does. Emgality does not seem to be as helpful as it was in the past (although still loves this class of medication and finds it life-changing) also now cost prohibitive $35 a month and some wearing off at the end of the month and we discussed trial of Ajovy monthly. Improvement on Botox with the reduction over 7 migraine days per month. Rarely  ever takes dexamethasone but will refill so she has available at home if ever needed Asmita was seen today for migraine.  Following with pain management and had improvement in back pain with associated tingling after injection for many months, seems to be slowly coming back and she plans to follow back up with them for this.  She has been able to resume weight training and exercise and has lost some weight and feeling good about this.  Overall doing better.    Workup:  -MRI brain with without contrast 7/13/2023: No acute infarction, intracranial hemorrhage or mass.*As of my retrospective review 8/29/2023 and 11/28/2023 we discussed she has in my opinion partially flattened sella(not empty), prominent optic nerve sheath with some tortuosity bilaterally potentially slightly worse right greater than left, slightly tighter ventricle on the right, cerebellar tonsils overlie the foramen magnum with tight junction bilaterally    Preventative:  - we discussed headache hygiene and lifestyle factors that may improve headaches  -     fremanezumab-vfrm (Ajovy) 225 MG/1.5ML auto-injector; Inject once a month subcutaneously. Discussed proper use, off label use with certain meds, possible side effects and risks.  - botox every 12 weeks. Discussed proper use, possible side effects and risks.  - Currently on through other providers: ambien 5mg rarely 10 mg- (off and on 6 years and as of 11/28/2023 taking 5 to 7.5 mg maybe half the month), melatonin - hardly uses, levothyroxine 125 mcg, vitamin D 1000 units daily, Calciitrate 950 (200 Ca) Mg tablet, multivitamin, off amitiza since Jan for constipation due to ins issues, linzess/linaclotide 72 mcg, spironolactone/Aldactone 50 mg twice daily helping acne  - Past/ failed/contraindicated: topiramate, gabapentin, propranolol 40 mg BID, nortriptyline, amitriptyline, prosac/fluoxetine, aimovig contraindicated due to constiptation, verapamil, doxycyline 50 mg - didn't help acne, tizanidine 2  "mg as needed (doesn't use  much/hardly at all - for muscle spasm or back pain - doesn't take makes her feel tired/woozy),  - future options: alternative CGRP- qulipta, botox    Abortive:  - discussed not taking over-the-counter or prescription pain medications more than 3 days per week to prevent medication overuse/rebound headache  -   rimegepant sulfate (Nurtec) 75 mg TBDP; Take one NURTEC 75 mg at onset under tongue. Limit 1 in 24 hours. Discussed proper use, possible side effects and risks.  -     rizatriptan (MAXALT) 10 mg tablet; Take 1 tablet (10 mg total) by mouth once as needed for migraine May repeat in 2 hours if needed. Max 2/24 hours, 9/month.. Discussed proper use, off label use with certain meds, possible side effects and risks.  -  for back up rarely: -     dexamethasone (DECADRON) 4 mg tablet; 4 mg  p.o. with breakfast for 1 to 5 days for unrelenting migraine. Discussed proper use, possible side effects and risks.  - Past/ failed/contraindicated: sumatriptan 100 mg works sometimes and not others, rizatriptan helps but also makes her nauseous  - past/helped:  Steroids have helped temporarily, toradol IM in the past helped   - future options:   prochlorperazine, Toradol IM or p.o., could consider trial for 5 days of Depakote or dexamethasone for prolonged migraine, ubrelvy, reyvow      Patient instructions      If your B12 is still under 400 then continue 1000 mcg daily, if it is much lower I would take 2000 daily if it is under 200 consider IM shots     No estrogen due to migraine aura    Continue to follow with hematology for your low iron.      Books to consider on audible since you had asked for resources  - \"Rewire Your Anxious Brain: How to Use the Neuroscience of Fear to End Anxiety, Panic, and Worry\" By Marguerite PhD  - \"atomic habits\" - By Ronen Hunter   - \"The real self care\"  By Candida Beal  - \"why we sleep\" - By Ismael Devlin   - \"the happiness advantage\" - By Leonidas Carey   - \"Breath\" - " By Ronen Saeed       Headache/migraine treatment:   Abortive medications (for immediate treatment of a headache):   It is ok to take ibuprofen, acetaminophen or naproxen (Advil, Tylenol,  Aleve, Excedrin) if they help your headaches you should limit these to No more than 3 times a week to avoid medication overuse/rebound headaches.     -     rimegepant sulfate (Nurtec) 75 mg TBDP; Take one NURTEC 75 mg at onset under tongue. Limit 1 in 24 hours.  Prior auth, coupon card for copay     -     dexamethasone (DECADRON) 4 mg tablet; 4 mg  p.o. with breakfast for 1 to 7 days for unrelenting migraine      For your more moderate to severe migraines take this medication early   Maxalt (rizatriptan) 10mg tabs - take one at the onset of headache. May repeat one time after 1-2 hours if pain has not resolved.   (Max 2 a day and 9 a month)     Prescription preventive medications for headaches/migraines   (to take every day to help prevent headaches - not to take at the time of headache):  [x] -  Botox every 91 days       -     fremanezumab-vfrm (Ajovy) 225 MG/1.5ML auto-injector; Inject once a month subcutaneously       READ INSTRUCTIONS that come with the medication. REFRIGERATE. Keep out of direct sunlight. Prior to administration, allow to come to room temperature for 30 minutes. Do not warm using a heat source (eg, microwave or hot water). Do not shake. Administer in preferably abdomen (avoiding 2 inches around the navel), thigh, upper arm, or buttocks avoiding areas of skin that are tender, bruised, red or hard. Deliver entire contents of single-use prefilled pen or syringe.  Unknown impact in pregnancy therefore would recommend stopping 6 months prior to considering pregnancy.      *Typically these types of medications take time untill you see the benefit, although some may see improvement in days, often it may take weeks, especially if the medication is being titrated up to a beneficial level. Please contact us if there  are any concerns or questions regarding the medication.     Lifestyle Recommendations:  [x] SLEEP - Maintain a regular sleep schedule: Adults need at least 7-8 hours of uninterrupted a night. Maintain good sleep hygiene:  Going to bed and waking up at consistent times, avoiding excessive daytime naps, avoiding caffeinated beverages in the evening, avoid excessive stimulation in the evening and generally using bed primarily for sleeping.  One hour before bedtime would recommend turning lights down lower, decreasing your activity (may read quietly, listen to music at a low volume). When you get into bed, should eliminate all technology (no texting, emailing, playing with your phone, iPad or tablet in bed).  [x] HYDRATION - Maintain good hydration.  Drink  2L of fluid a day (4 typical small water bottles)  [x] DIET - Maintain good nutrition. In particular don't skip meals and try and eat healthy balanced meals regularly.  [x] TRIGGERS - Look for other triggers and avoid them: Limit caffeine to 1-2 cups a day or less. Avoid dietary triggers that you have noticed bring on your headaches (this could include aged cheese, peanuts, MSG, aspartame and nitrates).  [x] EXERCISE - physical exercise as we all know is good for you in many ways, and not only is good for your heart, but also is beneficial for your mental health, cognitive health and  chronic pain/headaches. I would encourage at the least 5 days of physical exercise weekly for at least 30 minutes.     Education and Follow-up  [x] Please call with any questions or concerns. Of course if any new concerning symptoms go to the emergency department.  [x] Follow up  6 months,  sooner if needed  Orders:    dexamethasone (DECADRON) 4 mg tablet; 4 mg  p.o. with breakfast for 1 to 5 days for unrelenting migraine    fremanezumab-vfrm (Ajovy) 225 MG/1.5ML auto-injector; Inject once a month subcutaneously    rizatriptan (MAXALT) 10 mg tablet; Take 1 tablet (10 mg total) by mouth  once as needed for migraine May repeat in 2 hours if needed. Max 2/24 hours, 9/month.        CC:   We had the pleasure of evaluating Asmita ESPINOZA Bem in neurological consultation today. Asmita ESPINOZA Bem presents today for evaluation of headaches.   History obtained from patient as well as available medical record review.  History of Present Illness:   Interval history as of 5/7/2025  - Of note/managed by others:   Please see EMR for details since last visit which include following up with heme-onc for iron deficiency/low ferritin, pain management for lumbosacral radiculopathy, chronic left shoulder pain myofascial pain syndrome (inj in Oct helped decrease significantly), PT, ER for gastroenteritis/norovirus 1/7/2025, iron infusion in January, pain management, surgical  - when ferritin is low can see blephrospasm , better now  - no significant new or concerning neurologic symptoms since last visit reported    Headaches and migraines   She reports she has had significant improvement on Botox with the reduction over 7 migraine days per month, she is currently getting about 10 headaches a month and Nurtec usually works if not rizatriptan does.  Emgality does not seem to be as helpful as it was in the past and some wearing off at the end of the month and we discussed trial of Humbertoovy    She had the noro virus in January and had to go to the Er because nothing was helping so they gave her the migraine cocktail.    Preventative:   - Botox 7/12/2024, 10/18/2024, 1/17/2025, 4/25/2025-no issues-likes how she can move her face a little bit more now  -Emgality every 28 days  - Currently on through other providers: ambien 5mg rarely 10 mg- (off and on 6 years and as of 11/28/2023 taking 5 to 7.5 mg maybe half the month), melatonin - hardly uses, levothyroxine 125 mcg, vitamin D 1000 units daily, Calciitrate 950 (200 Ca) Mg tablet, multivitamin, off amitiza since Jan for constipation due to ins issues, linzess/linaclotide 72 mcg,  "spironolactone/Aldactone 50 mg twice daily helping acne     Abortive:   Nurtec usually  Rizatriptan 1-2 times a month  No reported bothersome side effects      Please see previous notes/EMR for details from previous visits.     Objective     Physical Exam:                                                                 Vitals:            Constitutional:    /80 (BP Location: Right arm, Patient Position: Sitting, Cuff Size: Standard)   Pulse 83   Temp 98.2 °F (36.8 °C) (Temporal)   Ht 5' 3\" (1.6 m)   Wt 64.4 kg (142 lb)   BMI 25.15 kg/m²   BP Readings from Last 3 Encounters:   05/07/25 128/80   04/25/25 120/64   03/28/25 110/66     Pulse Readings from Last 3 Encounters:   05/07/25 83   04/25/25 81   03/28/25 82         Well developed, well nourished, well groomed.        Psychiatric:  Normal behavior and appropriate affect        Able to answer questions appropriately, provide history of recent events   Normal language and spontaneous speech.  facial expression symmetric  symmetric bulk throughout. no atrophy, fasciculations or significant abnormal movements noted during our visit from observation.   steady casual gait       Administrative Statements   I have spent a total time of 50 minutes in caring for this patient on the day of the visit/encounter including Diagnostic results, Prognosis, Risks and benefits of tx options, Instructions for management, Patient education, Importance of tx compliance, Risk factor reductions, Impressions, Counseling / Coordination of care, Documenting in the medical record, Reviewing/placing orders in the medical record (including tests, medications, and/or procedures), Obtaining or reviewing history  , and Communicating with other healthcare professionals .      "

## 2025-05-07 NOTE — PATIENT INSTRUCTIONS
" If your B12 is still under 400 then continue 1000 mcg daily, if it is much lower I would take 2000 daily if it is under 200 consider IM shots     No estrogen due to migraine aura    Continue to follow with hematology for your low iron.      Books to consider on audible since you had asked for resources  - \"Rewire Your Anxious Brain: How to Use the Neuroscience of Fear to End Anxiety, Panic, and Worry\" By Marguerite Yanez  - \"atomic habits\" - By Ronen Hunter   - \"The real self care\"  By Candida Beal  - \"why we sleep\" - By Ismael Devlin   - \"the happiness advantage\" - By Leonidas Carey   - \"Breath\" - By Ronen Saeed       Headache/migraine treatment:   Abortive medications (for immediate treatment of a headache):   It is ok to take ibuprofen, acetaminophen or naproxen (Advil, Tylenol,  Aleve, Excedrin) if they help your headaches you should limit these to No more than 3 times a week to avoid medication overuse/rebound headaches.     -     rimegepant sulfate (Nurtec) 75 mg TBDP; Take one NURTEC 75 mg at onset under tongue. Limit 1 in 24 hours.  Prior auth, coupon card for copay     -     dexamethasone (DECADRON) 4 mg tablet; 4 mg  p.o. with breakfast for 1 to 7 days for unrelenting migraine      For your more moderate to severe migraines take this medication early   Maxalt (rizatriptan) 10mg tabs - take one at the onset of headache. May repeat one time after 1-2 hours if pain has not resolved.   (Max 2 a day and 9 a month)     Prescription preventive medications for headaches/migraines   (to take every day to help prevent headaches - not to take at the time of headache):  [x] -  Botox every 91 days       -     fremanezumab-vfrm (Ajovy) 225 MG/1.5ML auto-injector; Inject once a month subcutaneously       READ INSTRUCTIONS that come with the medication. REFRIGERATE. Keep out of direct sunlight. Prior to administration, allow to come to room temperature for 30 minutes. Do not warm using a heat source (eg, microwave or hot " water). Do not shake. Administer in preferably abdomen (avoiding 2 inches around the navel), thigh, upper arm, or buttocks avoiding areas of skin that are tender, bruised, red or hard. Deliver entire contents of single-use prefilled pen or syringe.  Unknown impact in pregnancy therefore would recommend stopping 6 months prior to considering pregnancy.      *Typically these types of medications take time untill you see the benefit, although some may see improvement in days, often it may take weeks, especially if the medication is being titrated up to a beneficial level. Please contact us if there are any concerns or questions regarding the medication.     Lifestyle Recommendations:  [x] SLEEP - Maintain a regular sleep schedule: Adults need at least 7-8 hours of uninterrupted a night. Maintain good sleep hygiene:  Going to bed and waking up at consistent times, avoiding excessive daytime naps, avoiding caffeinated beverages in the evening, avoid excessive stimulation in the evening and generally using bed primarily for sleeping.  One hour before bedtime would recommend turning lights down lower, decreasing your activity (may read quietly, listen to music at a low volume). When you get into bed, should eliminate all technology (no texting, emailing, playing with your phone, iPad or tablet in bed).  [x] HYDRATION - Maintain good hydration.  Drink  2L of fluid a day (4 typical small water bottles)  [x] DIET - Maintain good nutrition. In particular don't skip meals and try and eat healthy balanced meals regularly.  [x] TRIGGERS - Look for other triggers and avoid them: Limit caffeine to 1-2 cups a day or less. Avoid dietary triggers that you have noticed bring on your headaches (this could include aged cheese, peanuts, MSG, aspartame and nitrates).  [x] EXERCISE - physical exercise as we all know is good for you in many ways, and not only is good for your heart, but also is beneficial for your mental health, cognitive  health and  chronic pain/headaches. I would encourage at the least 5 days of physical exercise weekly for at least 30 minutes.     Education and Follow-up  [x] Please call with any questions or concerns. Of course if any new concerning symptoms go to the emergency department.  [x] Follow up  6 months,  sooner if needed

## 2025-05-09 ENCOUNTER — TELEPHONE (OUTPATIENT)
Age: 45
End: 2025-05-09

## 2025-05-09 NOTE — TELEPHONE ENCOUNTER
----- Message from Nadege Jaffe MD sent at 5/7/2025  4:46 PM EDT -----  Could you please help with prior authorization for Ajovy

## 2025-05-09 NOTE — TELEPHONE ENCOUNTER
[employer] initiated on CMM. (Key: [id number])   PA Case: [id number], Status: Approved, Coverage Starts on: 5/9/2025 12:00 AM, Coverage Ends on: 11/9/2025 12:00 AM.     Called [facility] pharmacy and left a message making them aware of the approval and for cb if any questions.

## 2025-05-29 ENCOUNTER — APPOINTMENT (OUTPATIENT)
Dept: LAB | Facility: HOSPITAL | Age: 45
End: 2025-05-29
Payer: COMMERCIAL

## 2025-05-29 ENCOUNTER — RESULTS FOLLOW-UP (OUTPATIENT)
Dept: NEUROLOGY | Facility: CLINIC | Age: 45
End: 2025-05-29

## 2025-05-29 DIAGNOSIS — R79.0 LOW FERRITIN: ICD-10-CM

## 2025-05-29 DIAGNOSIS — E61.1 IRON DEFICIENCY: ICD-10-CM

## 2025-05-29 DIAGNOSIS — E53.8 B12 DEFICIENCY: ICD-10-CM

## 2025-05-29 LAB
BASOPHILS # BLD AUTO: 0.06 THOUSANDS/ÂΜL (ref 0–0.1)
BASOPHILS NFR BLD AUTO: 1 % (ref 0–1)
EOSINOPHIL # BLD AUTO: 0.17 THOUSAND/ÂΜL (ref 0–0.61)
EOSINOPHIL NFR BLD AUTO: 2 % (ref 0–6)
ERYTHROCYTE [DISTWIDTH] IN BLOOD BY AUTOMATED COUNT: 12.2 % (ref 11.6–15.1)
FERRITIN SERPL-MCNC: 60 NG/ML (ref 30–307)
HCT VFR BLD AUTO: 42.2 % (ref 34.8–46.1)
HGB BLD-MCNC: 13.9 G/DL (ref 11.5–15.4)
IMM GRANULOCYTES # BLD AUTO: 0.03 THOUSAND/UL (ref 0–0.2)
IMM GRANULOCYTES NFR BLD AUTO: 0 % (ref 0–2)
IRON SATN MFR SERPL: 25 % (ref 15–50)
IRON SERPL-MCNC: 93 UG/DL (ref 50–212)
LYMPHOCYTES # BLD AUTO: 1.85 THOUSANDS/ÂΜL (ref 0.6–4.47)
LYMPHOCYTES NFR BLD AUTO: 25 % (ref 14–44)
MCH RBC QN AUTO: 32.4 PG (ref 26.8–34.3)
MCHC RBC AUTO-ENTMCNC: 32.9 G/DL (ref 31.4–37.4)
MCV RBC AUTO: 98 FL (ref 82–98)
MONOCYTES # BLD AUTO: 0.61 THOUSAND/ÂΜL (ref 0.17–1.22)
MONOCYTES NFR BLD AUTO: 8 % (ref 4–12)
NEUTROPHILS # BLD AUTO: 4.71 THOUSANDS/ÂΜL (ref 1.85–7.62)
NEUTS SEG NFR BLD AUTO: 64 % (ref 43–75)
NRBC BLD AUTO-RTO: 0 /100 WBCS
PLATELET # BLD AUTO: 317 THOUSANDS/UL (ref 149–390)
PMV BLD AUTO: 10 FL (ref 8.9–12.7)
RBC # BLD AUTO: 4.29 MILLION/UL (ref 3.81–5.12)
TIBC SERPL-MCNC: 365.4 UG/DL (ref 250–450)
TRANSFERRIN SERPL-MCNC: 261 MG/DL (ref 203–362)
UIBC SERPL-MCNC: 272 UG/DL (ref 155–355)
VIT B12 SERPL-MCNC: 617 PG/ML (ref 180–914)
WBC # BLD AUTO: 7.43 THOUSAND/UL (ref 4.31–10.16)

## 2025-05-29 PROCEDURE — 83550 IRON BINDING TEST: CPT

## 2025-05-29 PROCEDURE — 82728 ASSAY OF FERRITIN: CPT

## 2025-05-29 PROCEDURE — 82607 VITAMIN B-12: CPT

## 2025-05-29 PROCEDURE — 83540 ASSAY OF IRON: CPT

## 2025-05-29 PROCEDURE — 36415 COLL VENOUS BLD VENIPUNCTURE: CPT

## 2025-05-29 PROCEDURE — 85025 COMPLETE CBC W/AUTO DIFF WBC: CPT

## 2025-06-13 ENCOUNTER — ANNUAL EXAM (OUTPATIENT)
Age: 45
End: 2025-06-13
Payer: COMMERCIAL

## 2025-06-13 ENCOUNTER — OFFICE VISIT (OUTPATIENT)
Dept: PAIN MEDICINE | Facility: CLINIC | Age: 45
End: 2025-06-13
Payer: COMMERCIAL

## 2025-06-13 VITALS — SYSTOLIC BLOOD PRESSURE: 110 MMHG | BODY MASS INDEX: 25.65 KG/M2 | WEIGHT: 144.8 LBS | DIASTOLIC BLOOD PRESSURE: 70 MMHG

## 2025-06-13 VITALS — BODY MASS INDEX: 25.34 KG/M2 | HEIGHT: 63 IN | WEIGHT: 143 LBS

## 2025-06-13 DIAGNOSIS — M54.17 LUMBOSACRAL RADICULOPATHY: ICD-10-CM

## 2025-06-13 DIAGNOSIS — Z01.419 ENCOUNTER FOR ANNUAL ROUTINE GYNECOLOGICAL EXAMINATION: Primary | ICD-10-CM

## 2025-06-13 DIAGNOSIS — Z12.39 BREAST CANCER SCREENING, HIGH RISK PATIENT: ICD-10-CM

## 2025-06-13 DIAGNOSIS — G89.29 CHRONIC LEFT SHOULDER PAIN: Primary | ICD-10-CM

## 2025-06-13 DIAGNOSIS — M79.18 MYOFASCIAL PAIN SYNDROME: ICD-10-CM

## 2025-06-13 DIAGNOSIS — R82.90 CLOUDY URINE: ICD-10-CM

## 2025-06-13 DIAGNOSIS — M25.512 CHRONIC LEFT SHOULDER PAIN: Primary | ICD-10-CM

## 2025-06-13 DIAGNOSIS — Z12.31 ENCOUNTER FOR SCREENING MAMMOGRAM FOR MALIGNANT NEOPLASM OF BREAST: ICD-10-CM

## 2025-06-13 PROCEDURE — 99214 OFFICE O/P EST MOD 30 MIN: CPT | Performed by: ANESTHESIOLOGY

## 2025-06-13 PROCEDURE — S0612 ANNUAL GYNECOLOGICAL EXAMINA: HCPCS | Performed by: OBSTETRICS & GYNECOLOGY

## 2025-06-13 NOTE — ASSESSMENT & PLAN NOTE
44-year-old female with a history of chronic left shoulder pain, lumbosacral radiculopathy, sacral Tarlov cysts, and chronic myofascial pain returning for interval follow-up.  Patient complains of occasional low back pain and occasional pain and dysesthesias in the posterior aspect of bilateral lower extremities.  Major complaint is left shoulder pain.  Patient had a left subacromial bursa injection under ultrasound guidance March 13, 2025 which only provided about 25 to 30% of relief.  She does however feel that she has gained more function since the injection.  She does take ibuprofen as needed for relief.  The patient had greater than 60% of relief of her low back pain since ultrasound-guided trigger point injections October 10, 2024 which is still ongoing.  Left shoulder pain may be secondary to refractory subacromial bursitis/rotator cuff tendinitis versus deltoid strain.  No evidence of bicipital tendinitis.  Adhesive capsulitis on the differential considering slightly limited range of motion with flexion and abduction of the left shoulder.  The patient's low back pain seems to be mostly myofascial in nature.  Lower extremity symptoms are radicular in nature as she was found to have bilateral S1 radiculopathies on EMG which are likely secondary to her Tarlov cysts.    1.  Will refer the patient to sports medicine for second opinion regarding left shoulder pain  2.  May consider caudal SAJI if lower extremity symptoms worsen  3.  Will repeat TPI's as needed  4.  Patient may take ibuprofen 600 mg every 8 hours as needed  5.  I will follow-up with the patient on a as needed basis  6.  Patient will continue with HEP

## 2025-06-13 NOTE — PROGRESS NOTES
Name: Asmita ESPINOZA Bem      : 1980      MRN: [id number]  Encounter Provider: Osiel Keenan DO  Encounter Date: 2025   Encounter [facility]  :  Assessment & Plan  Chronic left shoulder pain    Orders:    Ambulatory Referral to Orthopedic Surgery; Future    Lumbosacral radiculopathy         Myofascial pain syndrome       44-year-old female with a history of chronic left shoulder pain, lumbosacral radiculopathy, sacral Tarlov cysts, and chronic myofascial pain returning for interval follow-up.  Patient complains of occasional low back pain and occasional pain and dysesthesias in the posterior aspect of bilateral lower extremities.  Major complaint is left shoulder pain.  Patient had a left subacromial bursa injection under ultrasound guidance 2025 which only provided about 25 to 30% of relief.  She does however feel that she has gained more function since the injection.  She does take ibuprofen as needed for relief.  The patient had greater than 60% of relief of her low back pain since ultrasound-guided trigger point injections October 10, 2024 which is still ongoing.  Left shoulder pain may be secondary to refractory subacromial bursitis/rotator cuff tendinitis versus deltoid strain.  No evidence of bicipital tendinitis.  Adhesive capsulitis on the differential considering slightly limited range of motion with flexion and abduction of the left shoulder.  The patient's low back pain seems to be mostly myofascial in nature.  Lower extremity symptoms are radicular in nature as she was found to have bilateral S1 radiculopathies on EMG which are likely secondary to her Tarlov cysts.    1.  Will refer the patient to sports medicine for second opinion regarding left shoulder pain  2.  May consider caudal SAJI if lower extremity symptoms worsen  3.  Will repeat TPI's as needed  4.  Patient may take ibuprofen 600 mg every 8 hours as needed  5.  I will follow-up with the  patient on a as needed basis  6.  Patient will continue with HEP        My impressions and treatment recommendations were discussed in detail with the patient who verbalized understanding and had no further questions.  Discharge instructions were provided. I personally saw and examined the patient and I agree with the above discussed plan of care.    History of Present Illness     Asmita ESPINOZA Bem is a 44 y.o. female with a history of chronic left shoulder pain, lumbosacral radiculopathy, sacral Tarlov cysts, and chronic myofascial pain returning for interval follow-up.  Patient complains of occasional low back pain and occasional pain and dysesthesias in the posterior aspect of bilateral lower extremities.  Major complaint is left shoulder pain.  She denies any numbness or paresthesias in the left arm.  She does have a little bit of weakness in the left deltoid.  She denies any bladder or bowel incontinence or saddle anesthesia.  She denies any lower extremity weakness.  Patient had a left subacromial bursa injection under ultrasound guidance March 13, 2025 which only provided about 25 to 30% of relief.  She does however feel that she has gained more function since the injection.  She does take ibuprofen as needed for relief.  The patient had greater than 60% of relief of her low back pain since ultrasound-guided trigger point injections October 10, 2024 which is still ongoing.   The pain is described as sharp.  Pain is increased with overhead activities of the left upper extremity and bending.  The pain is intermittent.  She rates her pain a 2 out of 10.  Pain is alleviated with relaxation and injections.    Other than as stated above, the patient denies any interval changes in medications, medical condition, mental condition, symptoms, or allergies since the last office visit.      Review of Systems   Musculoskeletal:  Positive for arthralgias, back pain and myalgias.   All other systems reviewed and are  negative.    Medical History Reviewed by provider this encounter:  Tobacco  Allergies  Meds  Problems  Med Hx  Surg Hx  Fam Hx     .  Pertinent Medical History   Thyroid cancer        Medical History Reviewed by provider this encounter:  Tobacco  Allergies  Meds  Problems  Med Hx  Surg Hx  Fam Hx     .  Past Medical History   Past Medical History[1]  Past Surgical History[2]  Family History[3]   reports that she has never smoked. She has never used smokeless tobacco. She reports current alcohol use of about 2.0 standard drinks of alcohol per week. She reports that she does not use drugs.  Current Outpatient Medications   Medication Instructions    acetaminophen (TYLENOL) 1,000 mg, Every 6 hours PRN    B Complex Vitamins (B-Complex/B-12) TABS 1 tablet    bisacodyl (DULCOLAX) 10 mg, Oral, 2 times daily PRN    calcitriol (ROCALTROL) 0.25 mcg capsule TAKE ONE CAPSULE BY MOUTH 2 TIMES A DAY    calcium citrate (CALCITRATE) 950 mg, Oral, 2 times daily    cholecalciferol (VITAMIN D3) 1,000 Units, Oral, Daily    clindamycin (CLEOCIN T) 1 % lotion Daily    dexamethasone (DECADRON) 4 mg tablet 4 mg  p.o. with breakfast for 1 to 5 days for unrelenting migraine    fremanezumab-vfrm (Ajovy) 225 MG/1.5ML auto-injector Inject once a month subcutaneously    hydrocortisone (ANUSOL-HC) 2.5 % rectal cream Topical, 2 times daily    Levocetirizine Dihydrochloride (XYZAL PO) Daily at bedtime    Linzess 72 mcg, Oral, Daily    lubiprostone (AMITIZA) 24 mcg, Oral, 2 times daily with meals    Melatonin 5 MG TABS As needed    meloxicam (MOBIC) 15 mg, Oral, As needed    Multiple Vitamin (multivitamin) tablet 1 tablet, Daily    omeprazole (PRILOSEC) 40 mg, Oral, Daily    ondansetron (ZOFRAN) 4 mg, Oral, Every 8 hours PRN    rimegepant sulfate (Nurtec) 75 mg TBDP TAKE 1 TABLET AT ONSET UNDER THE TONGUE. LIMIT 1 TABLET IN 24 HOURS    rizatriptan (MAXALT) 10 mg, Oral, Once as needed, May repeat in 2 hours if needed. Max 2/24 hours,  "9/month.    spironolactone (ALDACTONE) 50 mg tablet 1 tablet, 2 times daily    Synthroid 125 mcg, Oral, Daily    tretinoin (RETIN-A) 0.025 % cream Daily at bedtime    zolpidem (AMBIEN) 10 mg, Daily at bedtime PRN   Allergies[4]   Medications Ordered Prior to Encounter[5]   Social History[6]     Objective   Ht 5' 3\" (1.6 m)   Wt 64.9 kg (143 lb)   BMI 25.33 kg/m²      Pain Score:   2  Physical Exam  Constitutional: normal, well developed, well nourished, alert, in no distress and non-toxic and no overt pain behavior.  Eyes: anicteric  HEENT: grossly intact  Neck: supple, symmetric, trachea midline and no masses   Pulmonary: even and unlabored  Cardiovascular: No edema or pitting edema present  Skin: Normal without rashes or lesions and well hydrated  Psychiatric: Mood and affect appropriate  Neurologic: Cranial Nerves II-XII grossly intact  Musculoskeletal: normal gait.  Bilateral lumbar paraspinals minimally tender to palpation.  Bilateral lower extremity strength 5 out of 5 in all muscle groups.  Sensation intact to light touch in L3-S1 dermatomes bilaterally.  Negative seated straight leg raise bilaterally.  Tenderness to palpation over the anterior aspect of the left shoulder.  Negative Yergason's test, empty can test, Wilson test, and Neer's test.  Left upper extremity strength 5 out of 5 in all muscle groups.  Sensation intact to light touch in C5-T1 dermatomes in the left arm.  Limited active range of motion to roughly 120 degrees of flexion and abduction of the left shoulder.    Radiology Results Review: I personally reviewed the following image studies in PACS and associated radiology reports: MRI left shoulder. My interpretation of the radiology images/reports is: Supraspinatus tendinosis.         [1]   Past Medical History:  Diagnosis Date    Allergic     Anxiety     Cancer (HCC) 2/1/2023    Thyroid- Papillary carcinoma    COVID-19 01/2022    mild s/s-not hospitalized, not vaccinated    GERD " (gastroesophageal reflux disease)     Headache(784.0)     History of IBS     with constipation    Ingrown toenail     Irritable bowel syndrome     Migraines     Chronic per pt    Plantar fasciitis     Thyroid carcinoma (HCC)     Varicella     Venereal wart 08/07/2014   [2]   Past Surgical History:  Procedure Laterality Date    CERVICAL BIOPSY  W/ LOOP ELECTRODE EXCISION  2017    COLONOSCOPY      MRI BREAST BIOPSY RIGHT (ALL INCLUSIVE) Right 7/26/2024    NASAL SEPTUM SURGERY      MT OSTEOT W/WO LNGTH SHRT/CORRJ 1ST METAR Left 12/30/2022    Procedure: OSTEOTOMY METATARSAL 1st;  Surgeon: Taiwo Harrington DPM;  Location: [facility];  Service: Podiatry    THYROIDECTOMY N/A 2/24/2023    Procedure: TOTAL THYROIDECTOMY;  Surgeon: Vince Carey MD;  Location: [facility];  Service: Surgical Oncology    TONSILLECTOMY      UPPER GASTROINTESTINAL ENDOSCOPY      US GUIDED THYROID BIOPSY  1/31/2023    WISDOM TOOTH EXTRACTION     [3]   Family History  Problem Relation Name Age of Onset    Hypertension Mother Jeanne     Arthritis Mother Jeanne     Thyroid disease Mother Jeanne     Osteoarthritis Mother Jeanne     Transient ischemic attack Father Taiwo     Hypertension Father Taiwo     Stroke Father Taiwo     Vision loss Father Taiwo     No Known Problems Sister Abbey     Migraines Sister Karine     Rashes / Skin problems Sister She     Uterine cancer Maternal Grandmother Nadege 20    Cancer Maternal Grandmother Nadege         Uterine    Colon cancer Maternal Grandfather Alonso 60    Cancer Maternal Grandfather Alonso         Colon    Lung cancer Paternal Grandmother Devora 60    Cancer Paternal Grandmother Devora         Lung    Heart attack Paternal Grandfather Pedro     Breast cancer Maternal Aunt N/A1 great aunt     Breast cancer Maternal Aunt N/A2 great aunt     Breast cancer Maternal Aunt N/A3 great aunt     Colon cancer Cousin maternal 30   [4] No Known Allergies  [5]   Current Outpatient Medications on File  Prior to Visit   Medication Sig Dispense Refill    acetaminophen (TYLENOL) 500 mg tablet Take 1,000 mg by mouth every 6 (six) hours as needed for mild pain      B Complex Vitamins (B-Complex/B-12) TABS Take 1 tablet by mouth      bisacodyl (DULCOLAX) 5 mg EC tablet Take 2 tablets (10 mg total) by mouth 2 (two) times a day as needed for constipation 60 tablet 1    calcitriol (ROCALTROL) 0.25 mcg capsule TAKE ONE CAPSULE BY MOUTH 2 TIMES A  capsule 1    clindamycin (CLEOCIN T) 1 % lotion in the morning      dexamethasone (DECADRON) 4 mg tablet 4 mg  p.o. with breakfast for 1 to 5 days for unrelenting migraine 5 tablet 0    fremanezumab-vfrm (Ajovy) 225 MG/1.5ML auto-injector Inject once a month subcutaneously 1 mL 11    hydrocortisone (ANUSOL-HC) 2.5 % rectal cream Apply topically 2 (two) times a day 28 g 2    Levocetirizine Dihydrochloride (XYZAL PO) Take by mouth daily at bedtime      linaCLOtide (Linzess) 72 MCG CAPS Take 72 mcg by mouth in the morning 90 capsule 1    Melatonin 5 MG TABS Take by mouth as needed      meloxicam (MOBIC) 15 mg tablet Take 1 tablet (15 mg total) by mouth if needed for mild pain or moderate pain 30 tablet 1    Multiple Vitamin (multivitamin) tablet Take 1 tablet by mouth in the morning.      omeprazole (PriLOSEC) 40 MG capsule Take 1 capsule (40 mg total) by mouth daily 90 capsule 1    rimegepant sulfate (Nurtec) 75 mg TBDP TAKE 1 TABLET AT ONSET UNDER THE TONGUE. LIMIT 1 TABLET IN 24 HOURS 16 tablet 11    rizatriptan (MAXALT) 10 mg tablet Take 1 tablet (10 mg total) by mouth once as needed for migraine May repeat in 2 hours if needed. Max 2/24 hours, 9/month. 9 tablet 12    spironolactone (ALDACTONE) 50 mg tablet Take 1 tablet by mouth in the morning and 1 tablet before bedtime.      Synthroid 125 MCG tablet Take 1 tablet (125 mcg total) by mouth daily 90 tablet 1    tretinoin (RETIN-A) 0.025 % cream Apply topically daily at bedtime      zolpidem (AMBIEN) 10 mg tablet Take 10 mg  by mouth daily at bedtime as needed for sleep      calcium citrate (CALCITRATE) 950 (200 Ca) MG tablet Take 1 tablet (950 mg total) by mouth 2 (two) times a day 120 tablet 0    cholecalciferol (VITAMIN D3) 1,000 units tablet Take 1 tablet (1,000 Units total) by mouth daily Do not start before March 1, 2023. 30 tablet 0    lubiprostone (AMITIZA) 24 mcg capsule Take 1 capsule (24 mcg total) by mouth 2 (two) times a day with meals (Patient not taking: Reported on 6/13/2025) 60 capsule 5    ondansetron (ZOFRAN) 4 mg tablet Take 1 tablet (4 mg total) by mouth every 8 (eight) hours as needed for nausea or vomiting (Patient not taking: Reported on 5/7/2025) 20 tablet 0     No current facility-administered medications on file prior to visit.   [6]   Social History  Tobacco Use    Smoking status: Never    Smokeless tobacco: Never   Vaping Use    Vaping status: Never Used   Substance and Sexual Activity    Alcohol use: Yes     Alcohol/week: 2.0 standard drinks of alcohol     Types: 2 Glasses of wine per week     Comment: 2 glasses wine a week    Drug use: Never    Sexual activity: Not Currently

## 2025-06-13 NOTE — PROGRESS NOTES
Annual Wellness Visit  Name: Asmita ESPINOZA Bem      : 1980      MRN: [id number]  Encounter Provider: Janet Cha MD  Encounter Date: 2025   Encounter department: [facility] [place]    44 y.o.  yo presents today for her annual exam.:  Assessment & Plan  Encounter for annual routine gynecological examination    Orders:    Mammo screening bilateral w 3d and cad; Future    Encounter for screening mammogram for malignant neoplasm of breast    Orders:    Mammo screening bilateral w 3d and cad; Future    Breast cancer screening, high risk patient    Orders:    MRI breast bilateral w and wo contrast w cad; Future    Cloudy urine    Orders:    Urinalysis with microscopic; Future    Urine culture; Future             History of Present Illness     Asmita ESPINOZA Bem is a 44 y.o. female who presents for annual well woman exam.    GYN:  Denies  vaginal discharge, labial erythema or lesions  Patient is not currently sexually active  Has had gynecologic surgeries - LEEP 2017    Cycles seem to be slightly better this past month, recently changed to name brand Synthroid.     Period Cycle (Days): 28  Period Duration (Days): 5-6  Period Pattern: Regular  Menstrual Flow: Heavy, Moderate  Menstrual Control: Maxi pad, Tampon  Menstrual Control Change Freq (Hours): every 1 hr  Dysmenorrhea: (!) Severe  Dysmenorrhea Symptoms: Cramping      OB:   female    :  Has had some change in urinary symptoms.   Some cloudiness to urine as well as increased frequency.  Has not had urine studies in some time.  Known IC.   No hematuria, flank pain, incontinence.  Known IBS    Breast:  No breast mass, skin changes, dimpling, reddening, nipple retraction.  No breast discharge.    General:  ETOH use: twice a week  Tobacco use: no  Recreational drug use: no    Screening:  Cervical cancer: 24 - NILM, Neg HPV  Breast cancer: 25 - BIRad 2  Colon cancer: 21 - 10yr recall   STD screening: declined.    Review  of Systems as per HPI       Objective   /70   Wt 65.7 kg (144 lb 12.8 oz)   LMP 05/19/2025   BMI 25.65 kg/m²      Physical Exam  Vitals reviewed.   Constitutional:       General: She is not in acute distress.     Appearance: Normal appearance. She is well-developed.   Pulmonary:      Effort: No respiratory distress.   Chest:   Breasts:     Right: Normal. No swelling, bleeding, inverted nipple, mass, nipple discharge, skin change or tenderness.      Left: Normal. No swelling, bleeding, inverted nipple, mass, nipple discharge, skin change or tenderness.   Abdominal:      Palpations: Abdomen is soft.      Tenderness: There is no abdominal tenderness.   Genitourinary:     General: Normal vulva.      Labia:         Right: No rash or lesion.         Left: No rash or lesion.       Vagina: Normal. No vaginal discharge.      Cervix: No cervical motion tenderness or discharge.      Uterus: Normal. Not enlarged and not tender.       Adnexa:         Right: No mass or tenderness.          Left: No mass or tenderness.     Lymphadenopathy:      Upper Body:      Right upper body: No axillary adenopathy.      Left upper body: No axillary adenopathy.     Skin:     General: Skin is warm and dry.     Neurological:      Mental Status: She is alert.

## 2025-06-27 ENCOUNTER — APPOINTMENT (OUTPATIENT)
Dept: LAB | Age: 45
End: 2025-06-27
Attending: PREVENTIVE MEDICINE
Payer: COMMERCIAL

## 2025-06-27 ENCOUNTER — APPOINTMENT (OUTPATIENT)
Dept: LAB | Age: 45
End: 2025-06-27
Attending: OBSTETRICS & GYNECOLOGY
Payer: COMMERCIAL

## 2025-06-27 DIAGNOSIS — R82.90 CLOUDY URINE: ICD-10-CM

## 2025-06-27 DIAGNOSIS — Z00.8 HEALTH EXAMINATION IN POPULATION SURVEY: ICD-10-CM

## 2025-06-27 LAB
BACTERIA UR QL AUTO: NORMAL /HPF
BILIRUB UR QL STRIP: NEGATIVE
CHOLEST SERPL-MCNC: 169 MG/DL (ref ?–200)
CLARITY UR: CLEAR
COLOR UR: COLORLESS
EST. AVERAGE GLUCOSE BLD GHB EST-MCNC: 108 MG/DL
GLUCOSE UR STRIP-MCNC: NEGATIVE MG/DL
HBA1C MFR BLD: 5.4 %
HDLC SERPL-MCNC: 60 MG/DL
HGB UR QL STRIP.AUTO: NEGATIVE
KETONES UR STRIP-MCNC: NEGATIVE MG/DL
LDLC SERPL CALC-MCNC: 95 MG/DL (ref 0–100)
LEUKOCYTE ESTERASE UR QL STRIP: NEGATIVE
NITRITE UR QL STRIP: NEGATIVE
NON-SQ EPI CELLS URNS QL MICRO: NORMAL /HPF
NONHDLC SERPL-MCNC: 109 MG/DL
PH UR STRIP.AUTO: 7.5 [PH]
PROT UR STRIP-MCNC: NEGATIVE MG/DL
RBC #/AREA URNS AUTO: NORMAL /HPF
SP GR UR STRIP.AUTO: 1.01 (ref 1–1.03)
TRIGL SERPL-MCNC: 68 MG/DL (ref ?–150)
UROBILINOGEN UR STRIP-ACNC: <2 MG/DL
WBC #/AREA URNS AUTO: NORMAL /HPF

## 2025-06-27 PROCEDURE — 36415 COLL VENOUS BLD VENIPUNCTURE: CPT

## 2025-06-27 PROCEDURE — 81001 URINALYSIS AUTO W/SCOPE: CPT

## 2025-06-27 PROCEDURE — 80061 LIPID PANEL: CPT

## 2025-06-27 PROCEDURE — 83036 HEMOGLOBIN GLYCOSYLATED A1C: CPT

## 2025-06-27 PROCEDURE — 87086 URINE CULTURE/COLONY COUNT: CPT

## 2025-06-28 LAB — BACTERIA UR CULT: NORMAL

## 2025-07-09 ENCOUNTER — COSMETIC (OUTPATIENT)
Dept: PLASTIC SURGERY | Facility: CLINIC | Age: 45
End: 2025-07-09

## 2025-07-09 DIAGNOSIS — Z41.1 ENCOUNTER FOR COSMETIC PROCEDURE: Primary | ICD-10-CM

## 2025-07-09 PROCEDURE — BOTOX2 TWO AREAS OR 50 UNITS: Performed by: STUDENT IN AN ORGANIZED HEALTH CARE EDUCATION/TRAINING PROGRAM

## 2025-07-09 NOTE — PROGRESS NOTES
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 Particular areas of concern: frontalis rhytids both lateral and centrally.     She received glabellar botox for migrains and does not need that area to be treated.     Will proceed with 28 units of botox     Risks and benefits discussed, patient agreed to proceed.     10 units to each crows feet  4 units to central frontalis  2 units to each lateral frontalis     Total 28 units, 10% off employee pricing     Patient tolerated well, f/u in 3 months    Amrik Oliveira MD   [facility]   [address], [address]   [place] [address]   Office: [phone]

## 2025-07-10 PROCEDURE — 88305 TISSUE EXAM BY PATHOLOGIST: CPT | Performed by: STUDENT IN AN ORGANIZED HEALTH CARE EDUCATION/TRAINING PROGRAM

## 2025-07-11 ENCOUNTER — LAB REQUISITION (OUTPATIENT)
Dept: LAB | Facility: HOSPITAL | Age: 45
End: 2025-07-11
Payer: COMMERCIAL

## 2025-07-11 DIAGNOSIS — D22.5 MELANOCYTIC NEVI OF TRUNK: ICD-10-CM

## 2025-07-14 ENCOUNTER — TELEPHONE (OUTPATIENT)
Dept: VASCULAR SURGERY | Facility: CLINIC | Age: 45
End: 2025-07-14

## 2025-07-14 PROBLEM — M79.89 LOCALIZED SWELLING OF BOTH LOWER EXTREMITIES: Status: ACTIVE | Noted: 2025-07-14

## 2025-07-17 ENCOUNTER — HOSPITAL ENCOUNTER (OUTPATIENT)
Dept: RADIOLOGY | Facility: HOSPITAL | Age: 45
Discharge: HOME | End: 2025-07-17
Attending: OBSTETRICS & GYNECOLOGY
Payer: COMMERCIAL

## 2025-07-17 DIAGNOSIS — Z12.39 BREAST CANCER SCREENING, HIGH RISK PATIENT: ICD-10-CM

## 2025-07-17 PROCEDURE — A9585 GADOBUTROL INJECTION: HCPCS | Performed by: OBSTETRICS & GYNECOLOGY

## 2025-07-17 PROCEDURE — C8908 MRI W/O FOL W/CONT, BREAST,: HCPCS

## 2025-07-17 PROCEDURE — 88305 TISSUE EXAM BY PATHOLOGIST: CPT | Performed by: STUDENT IN AN ORGANIZED HEALTH CARE EDUCATION/TRAINING PROGRAM

## 2025-07-17 PROCEDURE — C8937 CAD BREAST MRI: HCPCS

## 2025-07-17 RX ORDER — GADOBUTROL 604.72 MG/ML
6 INJECTION INTRAVENOUS
Status: COMPLETED | OUTPATIENT
Start: 2025-07-17 | End: 2025-07-17

## 2025-07-17 RX ADMIN — GADOBUTROL 6 ML: 604.72 INJECTION INTRAVENOUS at 19:08

## 2025-07-22 DIAGNOSIS — K58.1 IRRITABLE BOWEL SYNDROME WITH CONSTIPATION: ICD-10-CM

## 2025-07-23 RX ORDER — LINACLOTIDE 72 UG/1
72 CAPSULE, GELATIN COATED ORAL DAILY
Qty: 90 CAPSULE | Refills: 1 | Status: SHIPPED | OUTPATIENT
Start: 2025-07-23

## 2025-07-28 ENCOUNTER — PROCEDURE VISIT (OUTPATIENT)
Dept: NEUROLOGY | Facility: CLINIC | Age: 45
End: 2025-07-28
Payer: COMMERCIAL

## 2025-07-28 VITALS — HEART RATE: 72 BPM | DIASTOLIC BLOOD PRESSURE: 78 MMHG | TEMPERATURE: 98.3 F | SYSTOLIC BLOOD PRESSURE: 108 MMHG

## 2025-07-28 DIAGNOSIS — G43.709 CHRONIC MIGRAINE WITHOUT AURA WITHOUT STATUS MIGRAINOSUS, NOT INTRACTABLE: Primary | ICD-10-CM

## 2025-07-28 PROCEDURE — 64615 CHEMODENERV MUSC MIGRAINE: CPT | Performed by: PHYSICIAN ASSISTANT

## (undated) DEVICE — 22.5 MM X 6.9 MM X 0.53 MM SAGITTAL BLADE

## (undated) DEVICE — SUT SILK 3-0 18 IN A184H

## (undated) DEVICE — BIPOLAR CORD DISP

## (undated) DEVICE — SUT VICRYL 3-0 SH 27 IN J416H

## (undated) DEVICE — CURITY NON-ADHERENT STRIPS: Brand: CURITY

## (undated) DEVICE — PACK UNIVERSAL NECK

## (undated) DEVICE — 3M™ STERI-STRIP™ COMPOUND BENZOIN TINCTURE 40 BAGS/CARTON 4 CARTONS/CASE C1544: Brand: 3M™ STERI-STRIP™

## (undated) DEVICE — GLOVE SRG BIOGEL 7

## (undated) DEVICE — SURGICEL 4 X 8

## (undated) DEVICE — HARMONIC 1100 SHEARS, 20CM SHAFT LENGTH: Brand: HARMONIC

## (undated) DEVICE — Device

## (undated) DEVICE — GAUZE SPONGES,16 PLY: Brand: CURITY

## (undated) DEVICE — SUT SILK 2-0 18 IN A185H

## (undated) DEVICE — SUT VICRYL 3-0 PS-2 27 IN J427H

## (undated) DEVICE — PLUMEPEN PRO 10FT

## (undated) DEVICE — SYRINGE 5ML LL

## (undated) DEVICE — STOCKINETTE REGULAR

## (undated) DEVICE — STRETCH BANDAGE: Brand: CURITY

## (undated) DEVICE — SUT ETHILON 4-0 PS-2 18 IN 1667H

## (undated) DEVICE — LIGACLIP MCA MULTIPLE CLIP APPLIERS, 20 SMALL CLIPS: Brand: LIGACLIP

## (undated) DEVICE — 10FR FRAZIER SUCTION HANDLE: Brand: CARDINAL HEALTH

## (undated) DEVICE — CUFF TOURNIQUET 18 X 4 IN QUICK CONNECT DISP 1 BLADDER

## (undated) DEVICE — INTENDED FOR TISSUE SEPARATION, AND OTHER PROCEDURES THAT REQUIRE A SHARP SURGICAL BLADE TO PUNCTURE OR CUT.: Brand: BARD-PARKER ® CARBON RIB-BACK BLADES

## (undated) DEVICE — SCD SEQUENTIAL COMPRESSION COMFORT SLEEVE MEDIUM KNEE LENGTH: Brand: KENDALL SCD

## (undated) DEVICE — NEEDLE 25G X 1 1/2

## (undated) DEVICE — BETHLEHEM UNIVERSAL  MIONR EXT: Brand: CARDINAL HEALTH

## (undated) DEVICE — POV-IOD SOLUTION 4OZ BT

## (undated) DEVICE — NEEDLE 18 G X 1 1/2

## (undated) DEVICE — SUT MONOCRYL 5-0 P-3 18 IN Y493G

## (undated) DEVICE — SUT VICRYL 4-0 PS-2 27 IN J426H

## (undated) DEVICE — ELECTRODE BLADE MOD E-Z CLEAN 2.5IN 6.4CM -0012M

## (undated) DEVICE — ALL PURPOSE SPONGES,NON-WOVEN, 4 PLY: Brand: CURITY

## (undated) DEVICE — 2000CC GUARDIAN II: Brand: GUARDIAN

## (undated) DEVICE — 3M™ STERI-STRIP™ REINFORCED ADHESIVE SKIN CLOSURES, R1547, 1/2 IN X 4 IN (12 MM X 100 MM), 6 STRIPS/ENVELOPE: Brand: 3M™ STERI-STRIP™

## (undated) DEVICE — DRAPE SURGIKIT SADDLE BAG

## (undated) DEVICE — GLOVE INDICATOR PI UNDERGLOVE SZ 7 BLUE

## (undated) DEVICE — GLOVE SRG BIOGEL 7.5

## (undated) DEVICE — DRILL BIT 2MM CALIBRATED

## (undated) DEVICE — CAST PADDING 4 IN SYNTHETIC NON-STRL

## (undated) DEVICE — SYRINGE 10ML LL

## (undated) DEVICE — MINOR PROCEDURE DRAPE: Brand: CONVERTORS

## (undated) DEVICE — CHLORAPREP HI-LITE 26ML ORANGE

## (undated) DEVICE — U-DRAPE: Brand: CONVERTORS

## (undated) DEVICE — SUT SILK 2-0 SH 30 IN K833H